# Patient Record
Sex: MALE | Race: WHITE | NOT HISPANIC OR LATINO | Employment: FULL TIME | URBAN - METROPOLITAN AREA
[De-identification: names, ages, dates, MRNs, and addresses within clinical notes are randomized per-mention and may not be internally consistent; named-entity substitution may affect disease eponyms.]

---

## 2022-02-08 ENCOUNTER — OFFICE VISIT (OUTPATIENT)
Dept: FAMILY MEDICINE CLINIC | Facility: CLINIC | Age: 52
End: 2022-02-08
Payer: COMMERCIAL

## 2022-02-08 VITALS
TEMPERATURE: 98 F | WEIGHT: 292.6 LBS | OXYGEN SATURATION: 98 % | BODY MASS INDEX: 39.63 KG/M2 | HEART RATE: 95 BPM | HEIGHT: 72 IN | SYSTOLIC BLOOD PRESSURE: 152 MMHG | DIASTOLIC BLOOD PRESSURE: 100 MMHG

## 2022-02-08 DIAGNOSIS — E66.01 MORBID OBESITY WITH BMI OF 40.0-44.9, ADULT (HCC): ICD-10-CM

## 2022-02-08 DIAGNOSIS — Z12.11 COLON CANCER SCREENING: ICD-10-CM

## 2022-02-08 DIAGNOSIS — R80.9 PROTEINURIA, UNSPECIFIED TYPE: ICD-10-CM

## 2022-02-08 DIAGNOSIS — I10 PRIMARY HYPERTENSION: Primary | ICD-10-CM

## 2022-02-08 DIAGNOSIS — M25.552 CHRONIC PAIN OF BOTH HIPS: ICD-10-CM

## 2022-02-08 DIAGNOSIS — G89.29 CHRONIC PAIN OF BOTH HIPS: ICD-10-CM

## 2022-02-08 DIAGNOSIS — M25.551 CHRONIC PAIN OF BOTH HIPS: ICD-10-CM

## 2022-02-08 DIAGNOSIS — Z80.42 FAMILY HISTORY OF PROSTATE CANCER IN FATHER: ICD-10-CM

## 2022-02-08 PROCEDURE — 1036F TOBACCO NON-USER: CPT | Performed by: STUDENT IN AN ORGANIZED HEALTH CARE EDUCATION/TRAINING PROGRAM

## 2022-02-08 PROCEDURE — 99204 OFFICE O/P NEW MOD 45 MIN: CPT | Performed by: STUDENT IN AN ORGANIZED HEALTH CARE EDUCATION/TRAINING PROGRAM

## 2022-02-08 PROCEDURE — 3008F BODY MASS INDEX DOCD: CPT | Performed by: STUDENT IN AN ORGANIZED HEALTH CARE EDUCATION/TRAINING PROGRAM

## 2022-02-08 PROCEDURE — 3725F SCREEN DEPRESSION PERFORMED: CPT | Performed by: STUDENT IN AN ORGANIZED HEALTH CARE EDUCATION/TRAINING PROGRAM

## 2022-02-08 RX ORDER — NEBULIZER AND COMPRESSOR
EACH MISCELLANEOUS 3 TIMES DAILY
Qty: 1 KIT | Refills: 0 | Status: SHIPPED | OUTPATIENT
Start: 2022-02-08

## 2022-02-08 RX ORDER — LISINOPRIL 10 MG/1
10 TABLET ORAL DAILY
Qty: 45 TABLET | Refills: 0 | Status: SHIPPED | OUTPATIENT
Start: 2022-02-08 | End: 2022-03-21 | Stop reason: SDUPTHER

## 2022-02-08 RX ORDER — NAPROXEN SODIUM 220 MG
220 TABLET ORAL
COMMUNITY

## 2022-02-08 NOTE — PROGRESS NOTES
Assessment/Plan:         Problem List Items Addressed This Visit        Cardiovascular and Mediastinum    Primary hypertension - Primary     Will send in lisinopril as there is protein in his urine  Will have close follow-up         Relevant Medications    lisinopril (ZESTRIL) 10 mg tablet    Blood Pressure Monitoring (Adult Blood Pressure Cuff Lg) KIT    Other Relevant Orders    Lipid panel    TSH, 3rd generation with Free T4 reflex    Comprehensive metabolic panel       Other    Proteinuria     Will repeat urine and kidney function  May be from chronic untreated high blood pressure and also NSAID use  Recommend he cut back on the NSAIDs for this and the blood pressure         Relevant Orders    UA w Reflex to Microscopic w Reflex to Culture - Clinic Collect    Comprehensive metabolic panel    Chronic pain of both hips     Sees Orthopedics and was scheduled for surgery but was canceled due to high blood pressure  Discussed other options take besides NSAIDs given the proteinuria and high blood pressure         Family history of prostate cancer in father    Relevant Orders    PSA, Total Screen      Other Visit Diagnoses     Colon cancer screening        Relevant Orders    Cologuard        Portions of the record may have been created with voice recognition software  Occasional wrong word or "sound a like" substitutions may have occurred due to the inherent limitations of voice recognition software  Read the chart carefully and recognize, using context, where substitutions have occurred  Subjective:      Patient ID: Basil Dalton is a 46 y o  male  HPI    Patient coming in to establish care and discuss high blood pressure  He has chronic hip pain related arthritis and was scheduled to undergo a hip replacement but was canceled due to high blood pressure  He is coming here to be seen and evaluated for this    States he never had any history of high blood pressure but has not been to a doctor in several years  He has actually lost about 15 lb as he has been following a ketogenic diet  He does take multiple NSAIDs today including Motrin, Advil, and Aleve  Denies any chest pain or trouble breathing    The following portions of the patient's history were reviewed and updated as appropriate:   Past Medical History:  He has no past medical history on file ,  _______________________________________________________________________  Medical Problems:  does not have any pertinent problems on file ,  _______________________________________________________________________  Past Surgical History:   has a past surgical history that includes Hernia repair and Tonsillectomy (1976)  ,  _______________________________________________________________________  Family History:  family history includes Cancer in his father and mother; Multiple myeloma in his mother; Prostate cancer in his father ,  _______________________________________________________________________  Social History:   reports that he has never smoked  His smokeless tobacco use includes chew  He reports current alcohol use  He reports that he does not use drugs  ,  _______________________________________________________________________  Allergies:  has No Known Allergies     _______________________________________________________________________  Current Outpatient Medications   Medication Sig Dispense Refill    naproxen sodium (ALEVE) 220 MG tablet Take 220 mg by mouth      Blood Pressure Monitoring (Adult Blood Pressure Cuff Lg) KIT Use 3 (three) times a day 1 kit 0    lisinopril (ZESTRIL) 10 mg tablet Take 1 tablet (10 mg total) by mouth daily 45 tablet 0     No current facility-administered medications for this visit      _______________________________________________________________________  Review of Systems   Constitutional: Negative for chills, fatigue and fever  HENT: Negative for rhinorrhea and sore throat  Eyes: Negative for visual disturbance  Respiratory: Negative for cough and shortness of breath  Cardiovascular: Negative for chest pain and palpitations  Gastrointestinal: Negative for abdominal pain, constipation, diarrhea, nausea and vomiting  Genitourinary: Negative for difficulty urinating, dysuria and frequency  Musculoskeletal: Positive for arthralgias and back pain  Negative for myalgias  Skin: Negative for color change and rash  Neurological: Negative for weakness and headaches  Objective:  Vitals:    02/08/22 1500   BP: 152/100   BP Location: Left arm   Patient Position: Sitting   Cuff Size: Large   Pulse: 95   Temp: 98 °F (36 7 °C)   SpO2: 98%   Weight: 133 kg (292 lb 9 6 oz)   Height: 5' 11 5" (1 816 m)     Body mass index is 40 24 kg/m²  Physical Exam  Constitutional:       General: He is not in acute distress  Appearance: Normal appearance  He is obese  He is not ill-appearing  HENT:      Head: Normocephalic and atraumatic  Right Ear: External ear normal       Left Ear: External ear normal       Nose: Nose normal  No congestion or rhinorrhea  Mouth/Throat:      Mouth: Mucous membranes are moist       Pharynx: Oropharynx is clear  No oropharyngeal exudate or posterior oropharyngeal erythema  Eyes:      Extraocular Movements: Extraocular movements intact  Conjunctiva/sclera: Conjunctivae normal       Pupils: Pupils are equal, round, and reactive to light  Cardiovascular:      Rate and Rhythm: Normal rate and regular rhythm  Pulses: Normal pulses  Heart sounds: No murmur heard  Pulmonary:      Effort: Pulmonary effort is normal  No respiratory distress  Breath sounds: Normal breath sounds  No wheezing  Chest:      Chest wall: No tenderness  Abdominal:      General: Bowel sounds are normal       Palpations: Abdomen is soft  Tenderness: There is no abdominal tenderness  Musculoskeletal:      Cervical back: Normal range of motion  Right hip: Tenderness present  Decreased range of motion  Left hip: Tenderness present  Decreased range of motion  Skin:     General: Skin is warm and dry  Capillary Refill: Capillary refill takes less than 2 seconds  Findings: No rash  Neurological:      General: No focal deficit present  Mental Status: He is alert  Mental status is at baseline  BMI Counseling: Body mass index is 40 24 kg/m²  The BMI is above normal  Nutrition recommendations include reducing portion sizes, 3-5 servings of fruits/vegetables daily, moderation in carbohydrate intake and reducing intake of saturated fat and trans fat  Exercise recommendations include moderate aerobic physical activity for 150 minutes/week, exercising 3-5 times per week and strength training exercises

## 2022-02-08 NOTE — ASSESSMENT & PLAN NOTE
Sees Orthopedics and was scheduled for surgery but was canceled due to high blood pressure    Discussed other options take besides NSAIDs given the proteinuria and high blood pressure

## 2022-02-08 NOTE — ASSESSMENT & PLAN NOTE
Will repeat urine and kidney function  May be from chronic untreated high blood pressure and also NSAID use    Recommend he cut back on the NSAIDs for this and the blood pressure

## 2022-03-21 ENCOUNTER — OFFICE VISIT (OUTPATIENT)
Dept: FAMILY MEDICINE CLINIC | Facility: CLINIC | Age: 52
End: 2022-03-21
Payer: COMMERCIAL

## 2022-03-21 VITALS
HEART RATE: 106 BPM | WEIGHT: 291.4 LBS | BODY MASS INDEX: 39.47 KG/M2 | DIASTOLIC BLOOD PRESSURE: 88 MMHG | TEMPERATURE: 97.7 F | SYSTOLIC BLOOD PRESSURE: 124 MMHG | HEIGHT: 72 IN | OXYGEN SATURATION: 96 %

## 2022-03-21 DIAGNOSIS — G89.29 CHRONIC PAIN OF BOTH HIPS: ICD-10-CM

## 2022-03-21 DIAGNOSIS — R80.9 PROTEINURIA, UNSPECIFIED TYPE: ICD-10-CM

## 2022-03-21 DIAGNOSIS — M25.551 CHRONIC PAIN OF BOTH HIPS: ICD-10-CM

## 2022-03-21 DIAGNOSIS — I10 PRIMARY HYPERTENSION: ICD-10-CM

## 2022-03-21 DIAGNOSIS — M25.552 CHRONIC PAIN OF BOTH HIPS: ICD-10-CM

## 2022-03-21 DIAGNOSIS — Z00.00 ANNUAL PHYSICAL EXAM: Primary | ICD-10-CM

## 2022-03-21 PROCEDURE — 3008F BODY MASS INDEX DOCD: CPT | Performed by: STUDENT IN AN ORGANIZED HEALTH CARE EDUCATION/TRAINING PROGRAM

## 2022-03-21 PROCEDURE — 99396 PREV VISIT EST AGE 40-64: CPT | Performed by: STUDENT IN AN ORGANIZED HEALTH CARE EDUCATION/TRAINING PROGRAM

## 2022-03-21 PROCEDURE — 3725F SCREEN DEPRESSION PERFORMED: CPT | Performed by: STUDENT IN AN ORGANIZED HEALTH CARE EDUCATION/TRAINING PROGRAM

## 2022-03-21 PROCEDURE — 1036F TOBACCO NON-USER: CPT | Performed by: STUDENT IN AN ORGANIZED HEALTH CARE EDUCATION/TRAINING PROGRAM

## 2022-03-21 RX ORDER — LISINOPRIL 10 MG/1
10 TABLET ORAL DAILY
Qty: 90 TABLET | Refills: 1 | Status: SHIPPED | OUTPATIENT
Start: 2022-03-21

## 2022-03-21 NOTE — ASSESSMENT & PLAN NOTE
Much better controlled  Continue lisinopril and he will follow-up labs  Given his blood pressure is much more supple now do recommend he proceed with orthopedic surgery  He will call if he needs a preop for few wants to see another surgeon

## 2022-03-21 NOTE — PROGRESS NOTES
[facility] [id number]    NAME: Lisa Martinez  AGE: 46 y o  SEX: male  : 1970     DATE: 3/21/2022     Assessment and Plan:     Problem List Items Addressed This Visit        Cardiovascular and Mediastinum    Primary hypertension     Much better controlled  Continue lisinopril and he will follow-up labs  Given his blood pressure is much more supple now do recommend he proceed with orthopedic surgery  He will call if he needs a preop for few wants to see another surgeon  Relevant Medications    lisinopril (ZESTRIL) 10 mg tablet       Other    Proteinuria    Chronic pain of both hips     Follow-up orthopedic           Other Visit Diagnoses     Annual physical exam    -  Primary          Immunizations and preventive care screenings were discussed with patient today  Appropriate education was printed on patient's after visit summary  Counseling:  · Exercise: the importance of regular exercise/physical activity was discussed  Recommend exercise 3-5 times per week for at least 30 minutes  Depression Screening and Follow-up Plan: Patient was screened for depression during today's encounter  They screened negative with a PHQ-2 score of 0  Tobacco Cessation Counseling: Tobacco cessation counseling was not provided  The patient is sincerely urged to quit consumption of tobacco  He is not ready to quit tobacco  Not a smoker      Return in about 6 months (around 2022) for Recheck htn  Chief Complaint:     Chief Complaint   Patient presents with    Annual Exam     No concerns       History of Present Illness:     Adult Annual Physical   Patient here for a comprehensive physical exam  The patient reports no problems  Diet and Physical Activity  · Diet/Nutrition: well balanced diet  · Exercise: no formal exercise        Depression Screening  PHQ-2/9 Depression Screening    Little interest or pleasure in doing things: 0 - not at all  Feeling down, depressed, or hopeless: 0 - not at all  PHQ-2 Score: 0  PHQ-2 Interpretation: Negative depression screen       General Health  · Sleep: sleeps well  · Hearing: normal - bilateral   · Vision: no vision problems and goes for regular eye exams  · Dental: regular dental visits   Health  · Symptoms include: none     Review of Systems:     Review of Systems   Constitutional: Negative for chills, fatigue and fever  HENT: Negative for rhinorrhea and sore throat  Eyes: Negative for visual disturbance  Respiratory: Negative for cough and shortness of breath  Cardiovascular: Negative for chest pain and palpitations  Gastrointestinal: Negative for abdominal pain, constipation, diarrhea, nausea and vomiting  Genitourinary: Negative for difficulty urinating, dysuria and frequency  Musculoskeletal: Positive for arthralgias  Negative for myalgias  Skin: Negative for color change and rash  Neurological: Negative for weakness and headaches  Past Medical History:     History reviewed  No pertinent past medical history  Past Surgical History:     Past Surgical History:   Procedure Laterality Date    HERNIA REPAIR      TONSILLECTOMY  1976      Family History:     Family History   Problem Relation Age of Onset    Cancer Mother     Multiple myeloma Mother     Prostate cancer Father     Cancer Father       Social History:     Social History     Socioeconomic History    Marital status: /Civil Union     Spouse name: None    Number of children: None    Years of education: None    Highest education level: None   Occupational History    None   Tobacco Use    Smoking status: Never Smoker    Smokeless tobacco: Current User     Types: Chew   Vaping Use    Vaping Use: Never used   Substance and Sexual Activity    Alcohol use:  Yes    Drug use: Never    Sexual activity: None   Other Topics Concern    None   Social History Narrative    None     Social Determinants of Health     Financial Resource Strain: Not on file   Food Insecurity: Not on file   Transportation Needs: Not on file   Physical Activity: Not on file   Stress: Not on file   Social Connections: Not on file   Intimate Partner Violence: Not on file   Housing Stability: Not on file      Current Medications:     Current Outpatient Medications   Medication Sig Dispense Refill    Blood Pressure Monitoring (Adult Blood Pressure Cuff Lg) KIT Use 3 (three) times a day 1 kit 0    lisinopril (ZESTRIL) 10 mg tablet Take 1 tablet (10 mg total) by mouth daily 90 tablet 1    naproxen sodium (ALEVE) 220 MG tablet Take 220 mg by mouth       No current facility-administered medications for this visit  Allergies:     No Known Allergies   Physical Exam:     /88 (BP Location: Left arm, Patient Position: Sitting, Cuff Size: Large)   Pulse (!) 106   Temp 97 7 °F (36 5 °C)   Ht 5' 11 5" (1 816 m)   Wt 132 kg (291 lb 6 4 oz)   SpO2 96%   BMI 40 08 kg/m²     Physical Exam  Constitutional:       General: He is not in acute distress  Appearance: Normal appearance  He is not ill-appearing  HENT:      Head: Normocephalic and atraumatic  Right Ear: Tympanic membrane, ear canal and external ear normal       Left Ear: Tympanic membrane, ear canal and external ear normal       Nose: Nose normal       Mouth/Throat:      Mouth: Mucous membranes are moist       Pharynx: Oropharynx is clear  No oropharyngeal exudate or posterior oropharyngeal erythema  Eyes:      General: No scleral icterus  Right eye: No discharge  Left eye: No discharge  Extraocular Movements: Extraocular movements intact  Conjunctiva/sclera: Conjunctivae normal       Pupils: Pupils are equal, round, and reactive to light  Cardiovascular:      Rate and Rhythm: Normal rate and regular rhythm  Pulses: Normal pulses  Heart sounds: Normal heart sounds   No murmur heard       Pulmonary:      Effort: Pulmonary effort is normal  No respiratory distress  Breath sounds: Normal breath sounds  Abdominal:      General: Bowel sounds are normal       Palpations: Abdomen is soft  Tenderness: There is no abdominal tenderness  Musculoskeletal:      Cervical back: Normal range of motion and neck supple  Right hip: Crepitus present  Decreased range of motion  Left hip: Crepitus present  Decreased range of motion  Lymphadenopathy:      Cervical: No cervical adenopathy  Skin:     General: Skin is warm and dry  Capillary Refill: Capillary refill takes less than 2 seconds  Neurological:      General: No focal deficit present  Mental Status: He is alert and oriented to person, place, and time  Mental status is at baseline  Cranial Nerves: No cranial nerve deficit  Psychiatric:         Mood and Affect: Mood normal           Cristopher Anderson MD  [address]  BMI Counseling: Body mass index is 40 08 kg/m²  The BMI is above normal  Nutrition recommendations include reducing portion sizes, 3-5 servings of fruits/vegetables daily, consuming healthier snacks, increasing intake of lean protein and reducing intake of saturated fat and trans fat  Exercise recommendations include moderate aerobic physical activity for 150 minutes/week, exercising 3-5 times per week and strength training exercises

## 2022-03-21 NOTE — PATIENT INSTRUCTIONS

## 2022-09-29 DIAGNOSIS — I10 PRIMARY HYPERTENSION: ICD-10-CM

## 2022-10-03 RX ORDER — LISINOPRIL 10 MG/1
TABLET ORAL
Qty: 90 TABLET | Refills: 1 | Status: SHIPPED | OUTPATIENT
Start: 2022-10-03

## 2022-11-21 DIAGNOSIS — I10 PRIMARY HYPERTENSION: ICD-10-CM

## 2022-11-21 RX ORDER — LISINOPRIL 10 MG/1
10 TABLET ORAL DAILY
Qty: 90 TABLET | Refills: 1 | Status: SHIPPED | OUTPATIENT
Start: 2022-11-21

## 2022-11-21 NOTE — TELEPHONE ENCOUNTER
T/c from pt - pt scheduled his 6m appt for 12/20 but it completely out of lisinopril  Requests a refill until scheduled appt  Please advise

## 2023-01-01 ENCOUNTER — APPOINTMENT (INPATIENT)
Dept: RADIOLOGY | Facility: HOSPITAL | Age: 53
DRG: 853 | End: 2023-01-01
Payer: COMMERCIAL

## 2023-01-01 ENCOUNTER — ANESTHESIA EVENT (INPATIENT)
Dept: RADIOLOGY | Facility: HOSPITAL | Age: 53
DRG: 853 | End: 2023-01-01
Payer: COMMERCIAL

## 2023-01-01 ENCOUNTER — ANESTHESIA (INPATIENT)
Dept: RADIOLOGY | Facility: HOSPITAL | Age: 53
DRG: 853 | End: 2023-01-01
Payer: COMMERCIAL

## 2023-01-01 ENCOUNTER — HOSPITAL ENCOUNTER (INPATIENT)
Facility: HOSPITAL | Age: 53
LOS: 7 days | DRG: 853 | End: 2023-10-20
Attending: SURGERY | Admitting: SURGERY
Payer: COMMERCIAL

## 2023-01-01 ENCOUNTER — ANESTHESIA (INPATIENT)
Dept: GASTROENTEROLOGY | Facility: HOSPITAL | Age: 53
DRG: 853 | End: 2023-01-01
Payer: COMMERCIAL

## 2023-01-01 ENCOUNTER — APPOINTMENT (INPATIENT)
Dept: RADIOLOGY | Facility: HOSPITAL | Age: 53
DRG: 853 | End: 2023-01-01
Attending: RADIOLOGY
Payer: COMMERCIAL

## 2023-01-01 ENCOUNTER — TELEPHONE (OUTPATIENT)
Dept: FAMILY MEDICINE CLINIC | Facility: CLINIC | Age: 53
End: 2023-01-01

## 2023-01-01 ENCOUNTER — TELEPHONE (OUTPATIENT)
Age: 53
End: 2023-01-01

## 2023-01-01 ENCOUNTER — APPOINTMENT (INPATIENT)
Dept: GASTROENTEROLOGY | Facility: HOSPITAL | Age: 53
DRG: 853 | End: 2023-01-01
Payer: COMMERCIAL

## 2023-01-01 ENCOUNTER — ANESTHESIA EVENT (INPATIENT)
Dept: GASTROENTEROLOGY | Facility: HOSPITAL | Age: 53
DRG: 853 | End: 2023-01-01
Payer: COMMERCIAL

## 2023-01-01 ENCOUNTER — APPOINTMENT (INPATIENT)
Dept: RADIOLOGY | Facility: HOSPITAL | Age: 53
DRG: 853 | End: 2023-01-01
Attending: INTERNAL MEDICINE
Payer: COMMERCIAL

## 2023-01-01 VITALS
OXYGEN SATURATION: 96 % | WEIGHT: 242.95 LBS | BODY MASS INDEX: 34.01 KG/M2 | SYSTOLIC BLOOD PRESSURE: 73 MMHG | RESPIRATION RATE: 22 BRPM | HEIGHT: 71 IN | TEMPERATURE: 102.9 F | DIASTOLIC BLOOD PRESSURE: 47 MMHG

## 2023-01-01 DIAGNOSIS — D62 ACUTE BLOOD LOSS ANEMIA: ICD-10-CM

## 2023-01-01 DIAGNOSIS — K85.91 NECROTIZING PANCREATITIS: Primary | ICD-10-CM

## 2023-01-01 DIAGNOSIS — K92.2 LOWER GI BLEED: ICD-10-CM

## 2023-01-01 DIAGNOSIS — R78.81 BACTEREMIA: ICD-10-CM

## 2023-01-01 LAB
ABO GROUP BLD BPU: NORMAL
ABO GROUP BLD: NORMAL
ALBUMIN SERPL BCP-MCNC: 2 G/DL (ref 3.5–5)
ALBUMIN SERPL BCP-MCNC: 2.1 G/DL (ref 3.5–5)
ALBUMIN SERPL BCP-MCNC: 2.2 G/DL (ref 3.5–5)
ALBUMIN SERPL BCP-MCNC: 2.9 G/DL (ref 3.5–5)
ALBUMIN SERPL BCP-MCNC: 3 G/DL (ref 3.5–5)
ALBUMIN SERPL BCP-MCNC: 3.1 G/DL (ref 3.5–5)
ALBUMIN SERPL BCP-MCNC: 3.3 G/DL (ref 3.5–5)
ALP SERPL-CCNC: 101 U/L (ref 34–104)
ALP SERPL-CCNC: 103 U/L (ref 34–104)
ALP SERPL-CCNC: 111 U/L (ref 34–104)
ALP SERPL-CCNC: 62 U/L (ref 34–104)
ALP SERPL-CCNC: 65 U/L (ref 34–104)
ALP SERPL-CCNC: 77 U/L (ref 34–104)
ALP SERPL-CCNC: 80 U/L (ref 34–104)
ALP SERPL-CCNC: 97 U/L (ref 34–104)
ALP SERPL-CCNC: 98 U/L (ref 34–104)
ALT SERPL W P-5'-P-CCNC: 10 U/L (ref 7–52)
ALT SERPL W P-5'-P-CCNC: 15 U/L (ref 7–52)
ALT SERPL W P-5'-P-CCNC: 18 U/L (ref 7–52)
ALT SERPL W P-5'-P-CCNC: 20 U/L (ref 7–52)
ALT SERPL W P-5'-P-CCNC: 21 U/L (ref 7–52)
ALT SERPL W P-5'-P-CCNC: 7 U/L (ref 7–52)
ALT SERPL W P-5'-P-CCNC: 9 U/L (ref 7–52)
AMMONIA PLAS-SCNC: <10 UMOL/L (ref 18–72)
ANCILLARY VALUES: ABNORMAL
ANION GAP SERPL CALCULATED.3IONS-SCNC: 10 MMOL/L
ANION GAP SERPL CALCULATED.3IONS-SCNC: 11 MMOL/L
ANION GAP SERPL CALCULATED.3IONS-SCNC: 11 MMOL/L
ANION GAP SERPL CALCULATED.3IONS-SCNC: 13 MMOL/L
ANION GAP SERPL CALCULATED.3IONS-SCNC: 14 MMOL/L
ANION GAP SERPL CALCULATED.3IONS-SCNC: 14 MMOL/L
ANION GAP SERPL CALCULATED.3IONS-SCNC: 19 MMOL/L
ANION GAP SERPL CALCULATED.3IONS-SCNC: 9 MMOL/L
ANISOCYTOSIS BLD QL SMEAR: PRESENT
APTT PPP: 44 SECONDS (ref 23–37)
AST SERPL W P-5'-P-CCNC: 10 U/L (ref 13–39)
AST SERPL W P-5'-P-CCNC: 14 U/L (ref 13–39)
AST SERPL W P-5'-P-CCNC: 15 U/L (ref 13–39)
AST SERPL W P-5'-P-CCNC: 19 U/L (ref 13–39)
AST SERPL W P-5'-P-CCNC: 22 U/L (ref 13–39)
AST SERPL W P-5'-P-CCNC: 23 U/L (ref 13–39)
AST SERPL W P-5'-P-CCNC: 25 U/L (ref 13–39)
AST SERPL W P-5'-P-CCNC: 25 U/L (ref 13–39)
AST SERPL W P-5'-P-CCNC: 41 U/L (ref 13–39)
ATRIAL RATE: 108 BPM
ATRIAL RATE: 132 BPM
ATRIAL RATE: 141 BPM
ATRIAL RATE: 146 BPM
ATRIAL RATE: 146 BPM
BACTERIA SPEC ANAEROBE CULT: ABNORMAL
BACTERIA SPEC BFLD CULT: ABNORMAL
BACTERIA UR QL AUTO: ABNORMAL /HPF
BASE EX.OXY STD BLDV CALC-SCNC: 73.6 % (ref 60–80)
BASE EXCESS BLDA CALC-SCNC: -10 MMOL/L (ref -2–3)
BASE EXCESS BLDA CALC-SCNC: -11 MMOL/L (ref -2–3)
BASE EXCESS BLDA CALC-SCNC: -3 MMOL/L (ref -2–3)
BASE EXCESS BLDA CALC-SCNC: -3.7 MMOL/L
BASE EXCESS BLDA CALC-SCNC: -4 MMOL/L (ref -2–3)
BASE EXCESS BLDA CALC-SCNC: -5 MMOL/L (ref -2–3)
BASE EXCESS BLDA CALC-SCNC: -5.3 MMOL/L
BASE EXCESS BLDA CALC-SCNC: -5.4 MMOL/L
BASE EXCESS BLDA CALC-SCNC: -5.5 MMOL/L
BASE EXCESS BLDA CALC-SCNC: -6 MMOL/L (ref -2–3)
BASE EXCESS BLDA CALC-SCNC: -6.8 MMOL/L
BASE EXCESS BLDA CALC-SCNC: -7 MMOL/L (ref -2–3)
BASE EXCESS BLDA CALC-SCNC: -7.1 MMOL/L
BASE EXCESS BLDV CALC-SCNC: -1.5 MMOL/L
BASOPHILS # BLD AUTO: 0.19 THOUSANDS/ÂΜL (ref 0–0.1)
BASOPHILS # BLD MANUAL: 0 THOUSAND/UL (ref 0–0.1)
BASOPHILS NFR BLD AUTO: 1 % (ref 0–1)
BASOPHILS NFR MAR MANUAL: 0 % (ref 0–1)
BILIRUB DIRECT SERPL-MCNC: 0.34 MG/DL (ref 0–0.2)
BILIRUB SERPL-MCNC: 0.48 MG/DL (ref 0.2–1)
BILIRUB SERPL-MCNC: 0.68 MG/DL (ref 0.2–1)
BILIRUB SERPL-MCNC: 0.81 MG/DL (ref 0.2–1)
BILIRUB SERPL-MCNC: 1.31 MG/DL (ref 0.2–1)
BILIRUB SERPL-MCNC: 3.05 MG/DL (ref 0.2–1)
BILIRUB SERPL-MCNC: 4.25 MG/DL (ref 0.2–1)
BILIRUB SERPL-MCNC: 5.35 MG/DL (ref 0.2–1)
BILIRUB UR QL STRIP: NEGATIVE
BLD GP AB SCN SERPL QL: NEGATIVE
BPU ID: NORMAL
BUN SERPL-MCNC: 16 MG/DL (ref 5–25)
BUN SERPL-MCNC: 16 MG/DL (ref 5–25)
BUN SERPL-MCNC: 17 MG/DL (ref 5–25)
BUN SERPL-MCNC: 17 MG/DL (ref 5–25)
BUN SERPL-MCNC: 19 MG/DL (ref 5–25)
BUN SERPL-MCNC: 20 MG/DL (ref 5–25)
BUN SERPL-MCNC: 21 MG/DL (ref 5–25)
BUN SERPL-MCNC: 23 MG/DL (ref 5–25)
BUN SERPL-MCNC: 24 MG/DL (ref 5–25)
BUN SERPL-MCNC: 26 MG/DL (ref 5–25)
BUN SERPL-MCNC: 28 MG/DL (ref 5–25)
BUN SERPL-MCNC: 29 MG/DL (ref 5–25)
CA-I BLD-SCNC: 0.7 MMOL/L (ref 1.12–1.32)
CA-I BLD-SCNC: 0.78 MMOL/L (ref 1.12–1.32)
CA-I BLD-SCNC: 0.99 MMOL/L (ref 1.12–1.32)
CA-I BLD-SCNC: 1 MMOL/L (ref 1.12–1.32)
CA-I BLD-SCNC: 1.08 MMOL/L (ref 1.12–1.32)
CA-I BLD-SCNC: 1.09 MMOL/L (ref 1.12–1.32)
CA-I BLD-SCNC: 1.1 MMOL/L (ref 1.12–1.32)
CA-I BLD-SCNC: 1.1 MMOL/L (ref 1.12–1.32)
CA-I BLD-SCNC: 1.12 MMOL/L (ref 1.12–1.32)
CA-I BLD-SCNC: 1.13 MMOL/L (ref 1.12–1.32)
CA-I BLD-SCNC: 1.15 MMOL/L (ref 1.12–1.32)
CA-I BLD-SCNC: 1.19 MMOL/L (ref 1.12–1.32)
CA-I BLD-SCNC: 1.25 MMOL/L (ref 1.12–1.32)
CALCIUM ALBUM COR SERPL-MCNC: 10 MG/DL (ref 8.3–10.1)
CALCIUM ALBUM COR SERPL-MCNC: 10.1 MG/DL (ref 8.3–10.1)
CALCIUM ALBUM COR SERPL-MCNC: 10.5 MG/DL (ref 8.3–10.1)
CALCIUM ALBUM COR SERPL-MCNC: 8.6 MG/DL (ref 8.3–10.1)
CALCIUM ALBUM COR SERPL-MCNC: 8.8 MG/DL (ref 8.3–10.1)
CALCIUM ALBUM COR SERPL-MCNC: 8.9 MG/DL (ref 8.3–10.1)
CALCIUM ALBUM COR SERPL-MCNC: 9.1 MG/DL (ref 8.3–10.1)
CALCIUM ALBUM COR SERPL-MCNC: 9.1 MG/DL (ref 8.3–10.1)
CALCIUM SERPL-MCNC: 6.9 MG/DL (ref 8.4–10.2)
CALCIUM SERPL-MCNC: 7.1 MG/DL (ref 8.4–10.2)
CALCIUM SERPL-MCNC: 7.2 MG/DL (ref 8.4–10.2)
CALCIUM SERPL-MCNC: 7.6 MG/DL (ref 8.4–10.2)
CALCIUM SERPL-MCNC: 7.6 MG/DL (ref 8.4–10.2)
CALCIUM SERPL-MCNC: 7.7 MG/DL (ref 8.4–10.2)
CALCIUM SERPL-MCNC: 7.7 MG/DL (ref 8.4–10.2)
CALCIUM SERPL-MCNC: 8.1 MG/DL (ref 8.4–10.2)
CALCIUM SERPL-MCNC: 8.1 MG/DL (ref 8.4–10.2)
CALCIUM SERPL-MCNC: 9.2 MG/DL (ref 8.4–10.2)
CALCIUM SERPL-MCNC: 9.3 MG/DL (ref 8.4–10.2)
CALCIUM SERPL-MCNC: 9.9 MG/DL (ref 8.4–10.2)
CFFFLEV: 450.7 MG/DL (ref 278–581)
CFFMA (FUNCTIONAL FIBRINOGEN MAX AMPLITUDE): 19.9 MM (ref 15–32)
CFFMA (FUNCTIONAL FIBRINOGEN MAX AMPLITUDE): 21.8 MM (ref 15–32)
CFFMA (FUNCTIONAL FIBRINOGEN MAX AMPLITUDE): 24.7 MM (ref 15–32)
CFFMA (FUNCTIONAL FIBRINOGEN MAX AMPLITUDE): 32.4 MM (ref 15–32)
CFFMA (FUNCTIONAL FIBRINOGEN MAX AMPLITUDE): >52 MM (ref 15–32)
CHLORIDE SERPL-SCNC: 101 MMOL/L (ref 96–108)
CHLORIDE SERPL-SCNC: 102 MMOL/L (ref 96–108)
CHLORIDE SERPL-SCNC: 103 MMOL/L (ref 96–108)
CHLORIDE SERPL-SCNC: 104 MMOL/L (ref 96–108)
CHLORIDE SERPL-SCNC: 107 MMOL/L (ref 96–108)
CHLORIDE SERPL-SCNC: 108 MMOL/L (ref 96–108)
CHLORIDE SERPL-SCNC: 108 MMOL/L (ref 96–108)
CHLORIDE SERPL-SCNC: 109 MMOL/L (ref 96–108)
CHLORIDE SERPL-SCNC: 96 MMOL/L (ref 96–108)
CHLORIDE SERPL-SCNC: 96 MMOL/L (ref 96–108)
CHLORIDE SERPL-SCNC: 97 MMOL/L (ref 96–108)
CHLORIDE SERPL-SCNC: 97 MMOL/L (ref 96–108)
CKA(ANGLE): 75.5 DEG (ref 63–78)
CKA(ANGLE): 79.2 DEG (ref 63–78)
CKHR(HEPARINASE REACTION TIME): 3.3 MIN (ref 4.3–8.3)
CKHR(HEPARINASE REACTION TIME): 6.1 MIN (ref 4.3–8.3)
CKK(CLOT KINETICS): 0.9 MIN (ref 0.8–2.1)
CKK(CLOT KINETICS): 1 MIN (ref 0.8–2.1)
CKLY30: 0 % (ref 0–2.6)
CKLY30: 0 % (ref 0–2.6)
CKMA(MAX AMPLITUDE): 62 MM (ref 52–69)
CKMA(MAX AMPLITUDE): 71.2 MM (ref 52–69)
CKR(REACTION TIME): 3.4 MIN (ref 4.6–9.1)
CKR(REACTION TIME): 3.5 MIN (ref 4.6–9.1)
CKR(REACTION TIME): 3.6 MIN (ref 4.6–9.1)
CKR(REACTION TIME): 4.6 MIN (ref 4.6–9.1)
CKR(REACTION TIME): 5.3 MIN (ref 4.6–9.1)
CLARITY UR: ABNORMAL
CO2 SERPL-SCNC: 18 MMOL/L (ref 21–32)
CO2 SERPL-SCNC: 20 MMOL/L (ref 21–32)
CO2 SERPL-SCNC: 21 MMOL/L (ref 21–32)
CO2 SERPL-SCNC: 22 MMOL/L (ref 21–32)
CO2 SERPL-SCNC: 23 MMOL/L (ref 21–32)
CO2 SERPL-SCNC: 24 MMOL/L (ref 21–32)
CO2 SERPL-SCNC: 24 MMOL/L (ref 21–32)
CO2 SERPL-SCNC: 25 MMOL/L (ref 21–32)
COLOR UR: YELLOW
CREAT SERPL-MCNC: 0.79 MG/DL (ref 0.6–1.3)
CREAT SERPL-MCNC: 0.83 MG/DL (ref 0.6–1.3)
CREAT SERPL-MCNC: 0.85 MG/DL (ref 0.6–1.3)
CREAT SERPL-MCNC: 0.9 MG/DL (ref 0.6–1.3)
CREAT SERPL-MCNC: 0.94 MG/DL (ref 0.6–1.3)
CREAT SERPL-MCNC: 0.95 MG/DL (ref 0.6–1.3)
CREAT SERPL-MCNC: 0.96 MG/DL (ref 0.6–1.3)
CREAT SERPL-MCNC: 0.97 MG/DL (ref 0.6–1.3)
CREAT SERPL-MCNC: 0.99 MG/DL (ref 0.6–1.3)
CREAT SERPL-MCNC: 1.01 MG/DL (ref 0.6–1.3)
CREAT SERPL-MCNC: 1.09 MG/DL (ref 0.6–1.3)
CREAT SERPL-MCNC: 1.11 MG/DL (ref 0.6–1.3)
CROSSMATCH: NORMAL
CRTMA(RAPIDTEG MAX AMPLITUDE): 54.7 MM (ref 52–70)
CRTMA(RAPIDTEG MAX AMPLITUDE): 59.5 MM (ref 52–70)
CRTMA(RAPIDTEG MAX AMPLITUDE): 60.6 MM (ref 52–70)
CRTMA(RAPIDTEG MAX AMPLITUDE): 69.2 MM (ref 52–70)
CRTMA(RAPIDTEG MAX AMPLITUDE): 70.8 MM (ref 52–70)
DS:DELIVERY SYSTEM: AC
EOSINOPHIL # BLD AUTO: 0 THOUSAND/ÂΜL (ref 0–0.61)
EOSINOPHIL # BLD MANUAL: 0 THOUSAND/UL (ref 0–0.4)
EOSINOPHIL NFR BLD AUTO: 0 % (ref 0–6)
EOSINOPHIL NFR BLD MANUAL: 0 % (ref 0–6)
ERYTHROCYTE [DISTWIDTH] IN BLOOD BY AUTOMATED COUNT: 15.1 % (ref 11.6–15.1)
ERYTHROCYTE [DISTWIDTH] IN BLOOD BY AUTOMATED COUNT: 15.2 % (ref 11.6–15.1)
ERYTHROCYTE [DISTWIDTH] IN BLOOD BY AUTOMATED COUNT: 15.9 % (ref 11.6–15.1)
ERYTHROCYTE [DISTWIDTH] IN BLOOD BY AUTOMATED COUNT: 16.8 % (ref 11.6–15.1)
ERYTHROCYTE [DISTWIDTH] IN BLOOD BY AUTOMATED COUNT: 17.1 % (ref 11.6–15.1)
ERYTHROCYTE [DISTWIDTH] IN BLOOD BY AUTOMATED COUNT: 17.7 % (ref 11.6–15.1)
ERYTHROCYTE [DISTWIDTH] IN BLOOD BY AUTOMATED COUNT: 17.8 % (ref 11.6–15.1)
ERYTHROCYTE [DISTWIDTH] IN BLOOD BY AUTOMATED COUNT: 18.3 % (ref 11.6–15.1)
ERYTHROCYTE [DISTWIDTH] IN BLOOD BY AUTOMATED COUNT: 19 % (ref 11.6–15.1)
ERYTHROCYTE [DISTWIDTH] IN BLOOD BY AUTOMATED COUNT: 19 % (ref 11.6–15.1)
FIO2 GAS DIL.REBREATH: 100 L
FIO2 GAS DIL.REBREATH: 40 L
GFR SERPL CREATININE-BSD FRML MDRD: 100 ML/MIN/1.73SQ M
GFR SERPL CREATININE-BSD FRML MDRD: 102 ML/MIN/1.73SQ M
GFR SERPL CREATININE-BSD FRML MDRD: 75 ML/MIN/1.73SQ M
GFR SERPL CREATININE-BSD FRML MDRD: 77 ML/MIN/1.73SQ M
GFR SERPL CREATININE-BSD FRML MDRD: 84 ML/MIN/1.73SQ M
GFR SERPL CREATININE-BSD FRML MDRD: 86 ML/MIN/1.73SQ M
GFR SERPL CREATININE-BSD FRML MDRD: 88 ML/MIN/1.73SQ M
GFR SERPL CREATININE-BSD FRML MDRD: 89 ML/MIN/1.73SQ M
GFR SERPL CREATININE-BSD FRML MDRD: 91 ML/MIN/1.73SQ M
GFR SERPL CREATININE-BSD FRML MDRD: 92 ML/MIN/1.73SQ M
GFR SERPL CREATININE-BSD FRML MDRD: 97 ML/MIN/1.73SQ M
GFR SERPL CREATININE-BSD FRML MDRD: 99 ML/MIN/1.73SQ M
GLUCOSE SERPL-MCNC: 102 MG/DL (ref 65–140)
GLUCOSE SERPL-MCNC: 104 MG/DL (ref 65–140)
GLUCOSE SERPL-MCNC: 106 MG/DL (ref 65–140)
GLUCOSE SERPL-MCNC: 109 MG/DL (ref 65–140)
GLUCOSE SERPL-MCNC: 109 MG/DL (ref 65–140)
GLUCOSE SERPL-MCNC: 110 MG/DL (ref 65–140)
GLUCOSE SERPL-MCNC: 112 MG/DL (ref 65–140)
GLUCOSE SERPL-MCNC: 113 MG/DL (ref 65–140)
GLUCOSE SERPL-MCNC: 114 MG/DL (ref 65–140)
GLUCOSE SERPL-MCNC: 115 MG/DL (ref 65–140)
GLUCOSE SERPL-MCNC: 117 MG/DL (ref 65–140)
GLUCOSE SERPL-MCNC: 118 MG/DL (ref 65–140)
GLUCOSE SERPL-MCNC: 121 MG/DL (ref 65–140)
GLUCOSE SERPL-MCNC: 123 MG/DL (ref 65–140)
GLUCOSE SERPL-MCNC: 124 MG/DL (ref 65–140)
GLUCOSE SERPL-MCNC: 125 MG/DL (ref 65–140)
GLUCOSE SERPL-MCNC: 127 MG/DL (ref 65–140)
GLUCOSE SERPL-MCNC: 131 MG/DL (ref 65–140)
GLUCOSE SERPL-MCNC: 134 MG/DL (ref 65–140)
GLUCOSE SERPL-MCNC: 137 MG/DL (ref 65–140)
GLUCOSE SERPL-MCNC: 140 MG/DL (ref 65–140)
GLUCOSE SERPL-MCNC: 143 MG/DL (ref 65–140)
GLUCOSE SERPL-MCNC: 144 MG/DL (ref 65–140)
GLUCOSE SERPL-MCNC: 146 MG/DL (ref 65–140)
GLUCOSE SERPL-MCNC: 151 MG/DL (ref 65–140)
GLUCOSE SERPL-MCNC: 155 MG/DL (ref 65–140)
GLUCOSE SERPL-MCNC: 158 MG/DL (ref 65–140)
GLUCOSE SERPL-MCNC: 161 MG/DL (ref 65–140)
GLUCOSE SERPL-MCNC: 162 MG/DL (ref 65–140)
GLUCOSE SERPL-MCNC: 165 MG/DL (ref 65–140)
GLUCOSE SERPL-MCNC: 177 MG/DL (ref 65–140)
GLUCOSE SERPL-MCNC: 177 MG/DL (ref 65–140)
GLUCOSE SERPL-MCNC: 180 MG/DL (ref 65–140)
GLUCOSE SERPL-MCNC: 181 MG/DL (ref 65–140)
GLUCOSE SERPL-MCNC: 185 MG/DL (ref 65–140)
GLUCOSE SERPL-MCNC: 186 MG/DL (ref 65–140)
GLUCOSE SERPL-MCNC: 189 MG/DL (ref 65–140)
GLUCOSE SERPL-MCNC: 191 MG/DL (ref 65–140)
GLUCOSE SERPL-MCNC: 208 MG/DL (ref 65–140)
GLUCOSE SERPL-MCNC: 208 MG/DL (ref 65–140)
GLUCOSE SERPL-MCNC: 211 MG/DL (ref 65–140)
GLUCOSE SERPL-MCNC: 85 MG/DL (ref 65–140)
GLUCOSE SERPL-MCNC: 96 MG/DL (ref 65–140)
GLUCOSE UR STRIP-MCNC: NEGATIVE MG/DL
GRAM STN SPEC: ABNORMAL
HCO3 BLDA-SCNC: 17.6 MMOL/L (ref 22–28)
HCO3 BLDA-SCNC: 18 MMOL/L (ref 22–28)
HCO3 BLDA-SCNC: 18.1 MMOL/L (ref 22–28)
HCO3 BLDA-SCNC: 18.6 MMOL/L (ref 24–30)
HCO3 BLDA-SCNC: 18.8 MMOL/L (ref 22–28)
HCO3 BLDA-SCNC: 18.9 MMOL/L (ref 22–28)
HCO3 BLDA-SCNC: 19.1 MMOL/L (ref 22–28)
HCO3 BLDA-SCNC: 19.2 MMOL/L (ref 22–28)
HCO3 BLDA-SCNC: 19.8 MMOL/L (ref 24–30)
HCO3 BLDA-SCNC: 20.2 MMOL/L (ref 22–28)
HCO3 BLDA-SCNC: 20.7 MMOL/L (ref 22–28)
HCO3 BLDA-SCNC: 21.3 MMOL/L (ref 22–28)
HCO3 BLDA-SCNC: 21.9 MMOL/L (ref 22–28)
HCO3 BLDA-SCNC: 22.2 MMOL/L (ref 22–28)
HCO3 BLDA-SCNC: 22.7 MMOL/L (ref 22–28)
HCO3 BLDV-SCNC: 24.2 MMOL/L (ref 24–30)
HCT VFR BLD AUTO: 21.9 % (ref 36.5–49.3)
HCT VFR BLD AUTO: 22.3 % (ref 36.5–49.3)
HCT VFR BLD AUTO: 22.7 % (ref 36.5–49.3)
HCT VFR BLD AUTO: 22.7 % (ref 36.5–49.3)
HCT VFR BLD AUTO: 24.7 % (ref 36.5–49.3)
HCT VFR BLD AUTO: 24.9 % (ref 36.5–49.3)
HCT VFR BLD AUTO: 25.1 % (ref 36.5–49.3)
HCT VFR BLD AUTO: 25.1 % (ref 36.5–49.3)
HCT VFR BLD AUTO: 26.1 % (ref 36.5–49.3)
HCT VFR BLD AUTO: 30.7 % (ref 36.5–49.3)
HCT VFR BLD AUTO: 30.8 % (ref 36.5–49.3)
HCT VFR BLD AUTO: 40.1 % (ref 36.5–49.3)
HCT VFR BLD CALC: 24 % (ref 36.5–49.3)
HCT VFR BLD CALC: 25 % (ref 36.5–49.3)
HCT VFR BLD CALC: 27 % (ref 36.5–49.3)
HCT VFR BLD CALC: 29 % (ref 36.5–49.3)
HCT VFR BLD CALC: 30 % (ref 36.5–49.3)
HCT VFR BLD CALC: 33 % (ref 36.5–49.3)
HCT VFR BLD CALC: 45 % (ref 36.5–49.3)
HGB BLD-MCNC: 10.3 G/DL (ref 12–17)
HGB BLD-MCNC: 10.5 G/DL (ref 12–17)
HGB BLD-MCNC: 14.3 G/DL (ref 12–17)
HGB BLD-MCNC: 6.8 G/DL (ref 12–17)
HGB BLD-MCNC: 7 G/DL (ref 12–17)
HGB BLD-MCNC: 7.1 G/DL (ref 12–17)
HGB BLD-MCNC: 7.6 G/DL (ref 12–17)
HGB BLD-MCNC: 7.7 G/DL (ref 12–17)
HGB BLD-MCNC: 7.8 G/DL (ref 12–17)
HGB BLD-MCNC: 8 G/DL (ref 12–17)
HGB BLD-MCNC: 8.1 G/DL (ref 12–17)
HGB BLD-MCNC: 8.2 G/DL (ref 12–17)
HGB BLD-MCNC: 8.4 G/DL (ref 12–17)
HGB BLD-MCNC: 8.5 G/DL (ref 12–17)
HGB BLD-MCNC: 8.7 G/DL (ref 12–17)
HGB BLD-MCNC: 8.8 G/DL (ref 12–17)
HGB BLDA-MCNC: 10.2 G/DL (ref 12–17)
HGB BLDA-MCNC: 11.2 G/DL (ref 12–17)
HGB BLDA-MCNC: 15.3 G/DL (ref 12–17)
HGB BLDA-MCNC: 8.2 G/DL (ref 12–17)
HGB BLDA-MCNC: 8.5 G/DL (ref 12–17)
HGB BLDA-MCNC: 9.2 G/DL (ref 12–17)
HGB BLDA-MCNC: 9.9 G/DL (ref 12–17)
HGB UR QL STRIP.AUTO: NEGATIVE
HYALINE CASTS #/AREA URNS LPF: ABNORMAL /LPF
IMM GRANULOCYTES # BLD AUTO: 0.26 THOUSAND/UL (ref 0–0.2)
IMM GRANULOCYTES NFR BLD AUTO: 1 % (ref 0–2)
INR PPP: 1.39 (ref 0.84–1.19)
INR PPP: 1.58 (ref 0.84–1.19)
KETONES UR STRIP-MCNC: ABNORMAL MG/DL
LACTATE SERPL-SCNC: 1.7 MMOL/L (ref 0.5–2)
LACTATE SERPL-SCNC: 3.3 MMOL/L (ref 0.5–2)
LACTATE SERPL-SCNC: 4.9 MMOL/L (ref 0.5–2)
LACTATE SERPL-SCNC: 5.5 MMOL/L (ref 0.5–2)
LEUKOCYTE ESTERASE UR QL STRIP: NEGATIVE
LYMPHOCYTES # BLD AUTO: 0.44 THOUSAND/UL (ref 0.6–4.47)
LYMPHOCYTES # BLD AUTO: 0.48 THOUSANDS/ÂΜL (ref 0.6–4.47)
LYMPHOCYTES # BLD AUTO: 2 % (ref 14–44)
LYMPHOCYTES NFR BLD AUTO: 2 % (ref 14–44)
MAGNESIUM SERPL-MCNC: 1.6 MG/DL (ref 1.9–2.7)
MAGNESIUM SERPL-MCNC: 1.9 MG/DL (ref 1.9–2.7)
MAGNESIUM SERPL-MCNC: 1.9 MG/DL (ref 1.9–2.7)
MAGNESIUM SERPL-MCNC: 2.1 MG/DL (ref 1.9–2.7)
MAGNESIUM SERPL-MCNC: 2.2 MG/DL (ref 1.9–2.7)
MAGNESIUM SERPL-MCNC: 2.3 MG/DL (ref 1.9–2.7)
MCH RBC QN AUTO: 25.6 PG (ref 26.8–34.3)
MCH RBC QN AUTO: 26 PG (ref 26.8–34.3)
MCH RBC QN AUTO: 28.1 PG (ref 26.8–34.3)
MCH RBC QN AUTO: 28.6 PG (ref 26.8–34.3)
MCH RBC QN AUTO: 28.8 PG (ref 26.8–34.3)
MCH RBC QN AUTO: 29 PG (ref 26.8–34.3)
MCH RBC QN AUTO: 29.1 PG (ref 26.8–34.3)
MCH RBC QN AUTO: 30.4 PG (ref 26.8–34.3)
MCH RBC QN AUTO: 30.6 PG (ref 26.8–34.3)
MCH RBC QN AUTO: 31.1 PG (ref 26.8–34.3)
MCHC RBC AUTO-ENTMCNC: 29.5 G/DL (ref 31.4–37.4)
MCHC RBC AUTO-ENTMCNC: 30 G/DL (ref 31.4–37.4)
MCHC RBC AUTO-ENTMCNC: 31.9 G/DL (ref 31.4–37.4)
MCHC RBC AUTO-ENTMCNC: 32.8 G/DL (ref 31.4–37.4)
MCHC RBC AUTO-ENTMCNC: 32.9 G/DL (ref 31.4–37.4)
MCHC RBC AUTO-ENTMCNC: 33.4 G/DL (ref 31.4–37.4)
MCHC RBC AUTO-ENTMCNC: 33.5 G/DL (ref 31.4–37.4)
MCHC RBC AUTO-ENTMCNC: 33.7 G/DL (ref 31.4–37.4)
MCHC RBC AUTO-ENTMCNC: 34.2 G/DL (ref 31.4–37.4)
MCHC RBC AUTO-ENTMCNC: 35.7 G/DL (ref 31.4–37.4)
MCV RBC AUTO: 85 FL (ref 82–98)
MCV RBC AUTO: 87 FL (ref 82–98)
MCV RBC AUTO: 88 FL (ref 82–98)
MCV RBC AUTO: 89 FL (ref 82–98)
MCV RBC AUTO: 91 FL (ref 82–98)
MONOCYTES # BLD AUTO: 0.56 THOUSAND/ÂΜL (ref 0.17–1.22)
MONOCYTES # BLD AUTO: 0.65 THOUSAND/UL (ref 0–1.22)
MONOCYTES NFR BLD AUTO: 2 % (ref 4–12)
MONOCYTES NFR BLD: 3 % (ref 4–12)
NASAL CANNULA: 6
NEUTROPHILS # BLD AUTO: 23.12 THOUSANDS/ÂΜL (ref 1.85–7.62)
NEUTROPHILS # BLD MANUAL: 20.7 THOUSAND/UL (ref 1.85–7.62)
NEUTS BAND NFR BLD MANUAL: 7 % (ref 0–8)
NEUTS SEG NFR BLD AUTO: 88 % (ref 43–75)
NEUTS SEG NFR BLD AUTO: 94 % (ref 43–75)
NITRITE UR QL STRIP: NEGATIVE
NON-SQ EPI CELLS URNS QL MICRO: ABNORMAL /HPF
NRBC BLD AUTO-RTO: 0 /100 WBCS
O2 CT BLDA-SCNC: 15.3 ML/DL (ref 16–23)
O2 CT BLDA-SCNC: 15.8 ML/DL (ref 16–23)
O2 CT BLDA-SCNC: 16.3 ML/DL (ref 16–23)
O2 CT BLDA-SCNC: 20.1 ML/DL (ref 16–23)
O2 CT BLDA-SCNC: 20.9 ML/DL (ref 16–23)
O2 CT BLDA-SCNC: 20.9 ML/DL (ref 16–23)
O2 CT BLDV-SCNC: 8.4 ML/DL
OXYHGB MFR BLDA: 93.1 % (ref 94–97)
OXYHGB MFR BLDA: 95.9 % (ref 94–97)
OXYHGB MFR BLDA: 96.7 % (ref 94–97)
OXYHGB MFR BLDA: 96.8 % (ref 94–97)
OXYHGB MFR BLDA: 98 % (ref 94–97)
OXYHGB MFR BLDA: 98.1 % (ref 94–97)
P AXIS: 33 DEGREES
P AXIS: 36 DEGREES
P AXIS: 44 DEGREES
P AXIS: 51 DEGREES
P AXIS: 56 DEGREES
PCO2 BLD: 19 MMOL/L (ref 21–32)
PCO2 BLD: 19 MMOL/L (ref 21–32)
PCO2 BLD: 20 MMOL/L (ref 21–32)
PCO2 BLD: 21 MMOL/L (ref 21–32)
PCO2 BLD: 22 MMOL/L (ref 21–32)
PCO2 BLD: 24 MMOL/L (ref 21–32)
PCO2 BLD: 24 MMOL/L (ref 21–32)
PCO2 BLD: 25.2 MM HG (ref 42–50)
PCO2 BLD: 34.3 MM HG (ref 42–50)
PCO2 BLD: 38.2 MM HG (ref 36–44)
PCO2 BLD: 42.7 MM HG (ref 36–44)
PCO2 BLD: 42.9 MM HG (ref 36–44)
PCO2 BLD: 45.9 MM HG (ref 36–44)
PCO2 BLD: 46.1 MM HG (ref 36–44)
PCO2 BLD: 48.2 MM HG (ref 36–44)
PCO2 BLD: 59 MM HG (ref 36–44)
PCO2 BLD: 78 MM HG
PCO2 BLDA: 28.8 MM HG (ref 36–44)
PCO2 BLDA: 31.5 MM HG (ref 36–44)
PCO2 BLDA: 38.6 MM HG (ref 36–44)
PCO2 BLDA: 41 MM HG (ref 36–44)
PCO2 BLDA: 43.5 MM HG (ref 36–44)
PCO2 BLDA: 46.1 MM HG (ref 36–44)
PCO2 BLDA: 46.2 MM HG
PCO2 BLDV: 45.5 MM HG (ref 42–50)
PH BLD: 7.17 [PH] (ref 7.35–7.45)
PH BLD: 7.18 [PH] (ref 7.35–7.45)
PH BLD: 7.2 [PH] (ref 7.35–7.45)
PH BLD: 7.26 [PH] (ref 7.35–7.45)
PH BLD: 7.28 [PH] (ref 7.35–7.45)
PH BLD: 7.3 [PH]
PH BLD: 7.3 [PH] (ref 7.35–7.45)
PH BLD: 7.34 [PH] (ref 7.35–7.45)
PH BLD: 7.34 [PH] (ref 7.3–7.4)
PH BLD: 7.5 [PH] (ref 7.3–7.4)
PH BLDA: 7.28 [PH] (ref 7.35–7.45)
PH BLDA: 7.29 [PH] (ref 7.35–7.45)
PH BLDA: 7.31 [PH] (ref 7.35–7.45)
PH BLDA: 7.33 [PH] (ref 7.35–7.45)
PH BLDA: 7.39 [PH] (ref 7.35–7.45)
PH BLDA: 7.41 [PH] (ref 7.35–7.45)
PH BLDV: 7.34 [PH] (ref 7.3–7.4)
PH UR STRIP.AUTO: 6 [PH]
PHOSPHATE SERPL-MCNC: 3.5 MG/DL (ref 2.7–4.5)
PHOSPHATE SERPL-MCNC: 3.8 MG/DL (ref 2.7–4.5)
PHOSPHATE SERPL-MCNC: 3.8 MG/DL (ref 2.7–4.5)
PHOSPHATE SERPL-MCNC: 5.4 MG/DL (ref 2.7–4.5)
PHOSPHATE SERPL-MCNC: 6 MG/DL (ref 2.7–4.5)
PLATELET # BLD AUTO: 101 THOUSANDS/UL (ref 149–390)
PLATELET # BLD AUTO: 130 THOUSANDS/UL (ref 149–390)
PLATELET # BLD AUTO: 143 THOUSANDS/UL (ref 149–390)
PLATELET # BLD AUTO: 262 THOUSANDS/UL (ref 149–390)
PLATELET # BLD AUTO: 312 THOUSANDS/UL (ref 149–390)
PLATELET # BLD AUTO: 341 THOUSANDS/UL (ref 149–390)
PLATELET # BLD AUTO: 351 THOUSANDS/UL (ref 149–390)
PLATELET # BLD AUTO: 393 THOUSANDS/UL (ref 149–390)
PLATELET # BLD AUTO: 407 THOUSANDS/UL (ref 149–390)
PLATELET # BLD AUTO: 423 THOUSANDS/UL (ref 149–390)
PLATELET # BLD AUTO: 490 THOUSANDS/UL (ref 149–390)
PLATELET BLD QL SMEAR: ADEQUATE
PMV BLD AUTO: 8.5 FL (ref 8.9–12.7)
PMV BLD AUTO: 8.6 FL (ref 8.9–12.7)
PMV BLD AUTO: 8.7 FL (ref 8.9–12.7)
PMV BLD AUTO: 9 FL (ref 8.9–12.7)
PMV BLD AUTO: 9.1 FL (ref 8.9–12.7)
PMV BLD AUTO: 9.2 FL (ref 8.9–12.7)
PMV BLD AUTO: 9.3 FL (ref 8.9–12.7)
PMV BLD AUTO: 9.3 FL (ref 8.9–12.7)
PMV BLD AUTO: 9.4 FL (ref 8.9–12.7)
PMV BLD AUTO: 9.8 FL (ref 8.9–12.7)
PMV BLD AUTO: 9.8 FL (ref 8.9–12.7)
PO2 BLD: 107 MM HG (ref 75–129)
PO2 BLD: 22 MM HG (ref 35–45)
PO2 BLD: 290 MM HG (ref 75–129)
PO2 BLD: 352 MM HG (ref 75–129)
PO2 BLD: 382 MM HG (ref 75–129)
PO2 BLD: 78 MM HG (ref 75–129)
PO2 BLD: 92 MM HG (ref 35–45)
PO2 BLD: 94 MM HG (ref 75–129)
PO2 BLD: 96 MM HG (ref 75–129)
PO2 BLDA: 106.7 MM HG (ref 75–129)
PO2 BLDA: 146.2 MM HG (ref 75–129)
PO2 BLDA: 148.5 MM HG (ref 75–129)
PO2 BLDA: 326.4 MM HG (ref 75–129)
PO2 BLDA: 331.8 MM HG (ref 75–129)
PO2 BLDA: 80.8 MM HG (ref 75–129)
PO2 BLDV: 41.7 MM HG (ref 35–45)
POIKILOCYTOSIS BLD QL SMEAR: PRESENT
POLYCHROMASIA BLD QL SMEAR: PRESENT
POTASSIUM BLD-SCNC: 2.9 MMOL/L (ref 3.5–5.3)
POTASSIUM BLD-SCNC: 2.9 MMOL/L (ref 3.5–5.3)
POTASSIUM BLD-SCNC: 3 MMOL/L (ref 3.5–5.3)
POTASSIUM BLD-SCNC: 3.2 MMOL/L (ref 3.5–5.3)
POTASSIUM BLD-SCNC: 3.7 MMOL/L (ref 3.5–5.3)
POTASSIUM BLD-SCNC: 3.7 MMOL/L (ref 3.5–5.3)
POTASSIUM BLD-SCNC: 3.8 MMOL/L (ref 3.5–5.3)
POTASSIUM BLD-SCNC: 4.2 MMOL/L (ref 3.5–5.3)
POTASSIUM BLD-SCNC: 4.2 MMOL/L (ref 3.5–5.3)
POTASSIUM SERPL-SCNC: 3 MMOL/L (ref 3.5–5.3)
POTASSIUM SERPL-SCNC: 3.2 MMOL/L (ref 3.5–5.3)
POTASSIUM SERPL-SCNC: 3.5 MMOL/L (ref 3.5–5.3)
POTASSIUM SERPL-SCNC: 3.5 MMOL/L (ref 3.5–5.3)
POTASSIUM SERPL-SCNC: 3.7 MMOL/L (ref 3.5–5.3)
POTASSIUM SERPL-SCNC: 3.8 MMOL/L (ref 3.5–5.3)
POTASSIUM SERPL-SCNC: 3.9 MMOL/L (ref 3.5–5.3)
POTASSIUM SERPL-SCNC: 4 MMOL/L (ref 3.5–5.3)
POTASSIUM SERPL-SCNC: 4.2 MMOL/L (ref 3.5–5.3)
POTASSIUM SERPL-SCNC: 4.5 MMOL/L (ref 3.5–5.3)
PR INTERVAL: 124 MS
PR INTERVAL: 124 MS
PR INTERVAL: 128 MS
PR INTERVAL: 129 MS
PR INTERVAL: 188 MS
PRESSURE SETTING: 6
PROCALCITONIN SERPL-MCNC: 4.45 NG/ML
PROT SERPL-MCNC: 4.6 G/DL (ref 6.4–8.4)
PROT SERPL-MCNC: 4.9 G/DL (ref 6.4–8.4)
PROT SERPL-MCNC: 5 G/DL (ref 6.4–8.4)
PROT SERPL-MCNC: 5 G/DL (ref 6.4–8.4)
PROT SERPL-MCNC: 5.6 G/DL (ref 6.4–8.4)
PROT SERPL-MCNC: 5.7 G/DL (ref 6.4–8.4)
PROT SERPL-MCNC: 5.8 G/DL (ref 6.4–8.4)
PROT SERPL-MCNC: 5.9 G/DL (ref 6.4–8.4)
PROT SERPL-MCNC: 5.9 G/DL (ref 6.4–8.4)
PROT UR STRIP-MCNC: ABNORMAL MG/DL
PROTHROMBIN TIME: 16.9 SECONDS (ref 11.6–14.5)
PROTHROMBIN TIME: 18.7 SECONDS (ref 11.6–14.5)
QRS AXIS: 34 DEGREES
QRS AXIS: 38 DEGREES
QRS AXIS: 79 DEGREES
QRS AXIS: 86 DEGREES
QRS AXIS: 91 DEGREES
QRSD INTERVAL: 76 MS
QRSD INTERVAL: 80 MS
QRSD INTERVAL: 83 MS
QRSD INTERVAL: 88 MS
QRSD INTERVAL: 88 MS
QT INTERVAL: 266 MS
QT INTERVAL: 267 MS
QT INTERVAL: 292 MS
QT INTERVAL: 316 MS
QT INTERVAL: 354 MS
QTC INTERVAL: 414 MS
QTC INTERVAL: 416 MS
QTC INTERVAL: 448 MS
QTC INTERVAL: 468 MS
QTC INTERVAL: 474 MS
RBC # BLD AUTO: 2.61 MILLION/UL (ref 3.88–5.62)
RBC # BLD AUTO: 2.62 MILLION/UL (ref 3.88–5.62)
RBC # BLD AUTO: 2.62 MILLION/UL (ref 3.88–5.62)
RBC # BLD AUTO: 2.81 MILLION/UL (ref 3.88–5.62)
RBC # BLD AUTO: 2.83 MILLION/UL (ref 3.88–5.62)
RBC # BLD AUTO: 2.85 MILLION/UL (ref 3.88–5.62)
RBC # BLD AUTO: 3.08 MILLION/UL (ref 3.88–5.62)
RBC # BLD AUTO: 3.37 MILLION/UL (ref 3.88–5.62)
RBC # BLD AUTO: 3.45 MILLION/UL (ref 3.88–5.62)
RBC # BLD AUTO: 4.6 MILLION/UL (ref 3.88–5.62)
RBC #/AREA URNS AUTO: ABNORMAL /HPF
RBC MORPH BLD: PRESENT
RESPIRATORY RATE: 16
RH BLD: POSITIVE
SAO2 % BLD FROM PO2: 100 % (ref 60–85)
SAO2 % BLD FROM PO2: 34 % (ref 60–85)
SAO2 % BLD FROM PO2: 94 % (ref 60–85)
SAO2 % BLD FROM PO2: 95 % (ref 60–85)
SAO2 % BLD FROM PO2: 95 % (ref 60–85)
SAO2 % BLD FROM PO2: 97 % (ref 60–85)
SAO2 % BLD FROM PO2: 98 % (ref 60–85)
SODIUM BLD-SCNC: 132 MMOL/L (ref 136–145)
SODIUM BLD-SCNC: 136 MMOL/L (ref 136–145)
SODIUM BLD-SCNC: 139 MMOL/L (ref 136–145)
SODIUM BLD-SCNC: 139 MMOL/L (ref 136–145)
SODIUM BLD-SCNC: 141 MMOL/L (ref 136–145)
SODIUM BLD-SCNC: 142 MMOL/L (ref 136–145)
SODIUM BLD-SCNC: 143 MMOL/L (ref 136–145)
SODIUM SERPL-SCNC: 130 MMOL/L (ref 135–147)
SODIUM SERPL-SCNC: 132 MMOL/L (ref 135–147)
SODIUM SERPL-SCNC: 132 MMOL/L (ref 135–147)
SODIUM SERPL-SCNC: 133 MMOL/L (ref 135–147)
SODIUM SERPL-SCNC: 134 MMOL/L (ref 135–147)
SODIUM SERPL-SCNC: 137 MMOL/L (ref 135–147)
SODIUM SERPL-SCNC: 142 MMOL/L (ref 135–147)
SP GR UR STRIP.AUTO: 1.04 (ref 1–1.03)
SPECIMEN EXPIRATION DATE: NORMAL
SPECIMEN SOURCE: ABNORMAL
T WAVE AXIS: 0 DEGREES
T WAVE AXIS: 33 DEGREES
T WAVE AXIS: 34 DEGREES
T WAVE AXIS: 34 DEGREES
T WAVE AXIS: 35 DEGREES
TRANS CELLS #/AREA URNS HPF: PRESENT /[HPF]
UNIT DISPENSE STATUS: NORMAL
UNIT PRODUCT CODE: NORMAL
UNIT PRODUCT VOLUME: 235 ML
UNIT PRODUCT VOLUME: 250 ML
UNIT PRODUCT VOLUME: 280 ML
UNIT PRODUCT VOLUME: 300 ML
UNIT PRODUCT VOLUME: 328 ML
UNIT PRODUCT VOLUME: 350 ML
UNIT RH: NORMAL
UROBILINOGEN UR STRIP-ACNC: <2 MG/DL
VANCOMYCIN SERPL-MCNC: 30.4 UG/ML (ref 10–20)
VENT TYPE: ABNORMAL
VENTILATION VALUE: 500
VENTRICULAR RATE: 108 BPM
VENTRICULAR RATE: 132 BPM
VENTRICULAR RATE: 141 BPM
VENTRICULAR RATE: 146 BPM
VENTRICULAR RATE: 146 BPM
WBC # BLD AUTO: 12.58 THOUSAND/UL (ref 4.31–10.16)
WBC # BLD AUTO: 14.59 THOUSAND/UL (ref 4.31–10.16)
WBC # BLD AUTO: 15.16 THOUSAND/UL (ref 4.31–10.16)
WBC # BLD AUTO: 15.7 THOUSAND/UL (ref 4.31–10.16)
WBC # BLD AUTO: 21.79 THOUSAND/UL (ref 4.31–10.16)
WBC # BLD AUTO: 23.66 THOUSAND/UL (ref 4.31–10.16)
WBC # BLD AUTO: 24.61 THOUSAND/UL (ref 4.31–10.16)
WBC # BLD AUTO: 24.7 THOUSAND/UL (ref 4.31–10.16)
WBC # BLD AUTO: 7.4 THOUSAND/UL (ref 4.31–10.16)
WBC # BLD AUTO: 9.5 THOUSAND/UL (ref 4.31–10.16)
WBC #/AREA URNS AUTO: ABNORMAL /HPF

## 2023-01-01 PROCEDURE — 84295 ASSAY OF SERUM SODIUM: CPT

## 2023-01-01 PROCEDURE — 85027 COMPLETE CBC AUTOMATED: CPT | Performed by: PHYSICIAN ASSISTANT

## 2023-01-01 PROCEDURE — 82803 BLOOD GASES ANY COMBINATION: CPT

## 2023-01-01 PROCEDURE — 87040 BLOOD CULTURE FOR BACTERIA: CPT | Performed by: PHYSICIAN ASSISTANT

## 2023-01-01 PROCEDURE — 99232 SBSQ HOSP IP/OBS MODERATE 35: CPT | Performed by: INTERNAL MEDICINE

## 2023-01-01 PROCEDURE — B41J1ZZ FLUOROSCOPY OF OTHER LOWER ARTERIES USING LOW OSMOLAR CONTRAST: ICD-10-PCS | Performed by: RADIOLOGY

## 2023-01-01 PROCEDURE — 84132 ASSAY OF SERUM POTASSIUM: CPT

## 2023-01-01 PROCEDURE — 97163 PT EVAL HIGH COMPLEX 45 MIN: CPT

## 2023-01-01 PROCEDURE — 85014 HEMATOCRIT: CPT

## 2023-01-01 PROCEDURE — 30233N1 TRANSFUSION OF NONAUTOLOGOUS RED BLOOD CELLS INTO PERIPHERAL VEIN, PERCUTANEOUS APPROACH: ICD-10-PCS

## 2023-01-01 PROCEDURE — 82948 REAGENT STRIP/BLOOD GLUCOSE: CPT

## 2023-01-01 PROCEDURE — 0W2HX0Z CHANGE DRAINAGE DEVICE IN RETROPERITONEUM, EXTERNAL APPROACH: ICD-10-PCS | Performed by: INTERNAL MEDICINE

## 2023-01-01 PROCEDURE — 86901 BLOOD TYPING SEROLOGIC RH(D): CPT | Performed by: PHYSICIAN ASSISTANT

## 2023-01-01 PROCEDURE — 83735 ASSAY OF MAGNESIUM: CPT

## 2023-01-01 PROCEDURE — 99292 CRITICAL CARE ADDL 30 MIN: CPT | Performed by: STUDENT IN AN ORGANIZED HEALTH CARE EDUCATION/TRAINING PROGRAM

## 2023-01-01 PROCEDURE — 80202 ASSAY OF VANCOMYCIN: CPT | Performed by: PHYSICIAN ASSISTANT

## 2023-01-01 PROCEDURE — 74174 CTA ABD&PLVS W/CONTRAST: CPT

## 2023-01-01 PROCEDURE — 85384 FIBRINOGEN ACTIVITY: CPT | Performed by: EMERGENCY MEDICINE

## 2023-01-01 PROCEDURE — C1887 CATHETER, GUIDING: HCPCS

## 2023-01-01 PROCEDURE — 85018 HEMOGLOBIN: CPT

## 2023-01-01 PROCEDURE — C1894 INTRO/SHEATH, NON-LASER: HCPCS

## 2023-01-01 PROCEDURE — 0BH18EZ INSERTION OF ENDOTRACHEAL AIRWAY INTO TRACHEA, VIA NATURAL OR ARTIFICIAL OPENING ENDOSCOPIC: ICD-10-PCS

## 2023-01-01 PROCEDURE — 86923 COMPATIBILITY TEST ELECTRIC: CPT

## 2023-01-01 PROCEDURE — C9113 INJ PANTOPRAZOLE SODIUM, VIA: HCPCS | Performed by: PHYSICIAN ASSISTANT

## 2023-01-01 PROCEDURE — 85007 BL SMEAR W/DIFF WBC COUNT: CPT | Performed by: PHYSICIAN ASSISTANT

## 2023-01-01 PROCEDURE — 85384 FIBRINOGEN ACTIVITY: CPT | Performed by: PHYSICIAN ASSISTANT

## 2023-01-01 PROCEDURE — 49423 EXCHANGE DRAINAGE CATHETER: CPT | Performed by: INTERNAL MEDICINE

## 2023-01-01 PROCEDURE — 49423 EXCHANGE DRAINAGE CATHETER: CPT

## 2023-01-01 PROCEDURE — 82330 ASSAY OF CALCIUM: CPT | Performed by: STUDENT IN AN ORGANIZED HEALTH CARE EDUCATION/TRAINING PROGRAM

## 2023-01-01 PROCEDURE — 87205 SMEAR GRAM STAIN: CPT | Performed by: PHYSICIAN ASSISTANT

## 2023-01-01 PROCEDURE — 86900 BLOOD TYPING SEROLOGIC ABO: CPT | Performed by: PHYSICIAN ASSISTANT

## 2023-01-01 PROCEDURE — 36247 INS CATH ABD/L-EXT ART 3RD: CPT | Performed by: INTERNAL MEDICINE

## 2023-01-01 PROCEDURE — P9017 PLASMA 1 DONOR FRZ W/IN 8 HR: HCPCS

## 2023-01-01 PROCEDURE — 83605 ASSAY OF LACTIC ACID: CPT | Performed by: PHYSICIAN ASSISTANT

## 2023-01-01 PROCEDURE — 82947 ASSAY GLUCOSE BLOOD QUANT: CPT

## 2023-01-01 PROCEDURE — 37244 VASC EMBOLIZE/OCCLUDE BLEED: CPT

## 2023-01-01 PROCEDURE — 87186 SC STD MICRODIL/AGAR DIL: CPT | Performed by: PHYSICIAN ASSISTANT

## 2023-01-01 PROCEDURE — 36246 INS CATH ABD/L-EXT ART 2ND: CPT

## 2023-01-01 PROCEDURE — 75726 ARTERY X-RAYS ABDOMEN: CPT | Performed by: INTERNAL MEDICINE

## 2023-01-01 PROCEDURE — 87185 SC STD ENZYME DETCJ PER NZM: CPT | Performed by: SURGERY

## 2023-01-01 PROCEDURE — 85027 COMPLETE CBC AUTOMATED: CPT | Performed by: SURGERY

## 2023-01-01 PROCEDURE — 75625 CONTRAST EXAM ABDOMINL AORTA: CPT

## 2023-01-01 PROCEDURE — 84100 ASSAY OF PHOSPHORUS: CPT | Performed by: PHYSICIAN ASSISTANT

## 2023-01-01 PROCEDURE — 99233 SBSQ HOSP IP/OBS HIGH 50: CPT | Performed by: STUDENT IN AN ORGANIZED HEALTH CARE EDUCATION/TRAINING PROGRAM

## 2023-01-01 PROCEDURE — 87186 SC STD MICRODIL/AGAR DIL: CPT | Performed by: SURGERY

## 2023-01-01 PROCEDURE — 84100 ASSAY OF PHOSPHORUS: CPT

## 2023-01-01 PROCEDURE — 99232 SBSQ HOSP IP/OBS MODERATE 35: CPT | Performed by: STUDENT IN AN ORGANIZED HEALTH CARE EDUCATION/TRAINING PROGRAM

## 2023-01-01 PROCEDURE — 99291 CRITICAL CARE FIRST HOUR: CPT | Performed by: SURGERY

## 2023-01-01 PROCEDURE — 75984 XRAY CONTROL CATHETER CHANGE: CPT

## 2023-01-01 PROCEDURE — 30233R1 TRANSFUSION OF NONAUTOLOGOUS PLATELETS INTO PERIPHERAL VEIN, PERCUTANEOUS APPROACH: ICD-10-PCS | Performed by: STUDENT IN AN ORGANIZED HEALTH CARE EDUCATION/TRAINING PROGRAM

## 2023-01-01 PROCEDURE — B4151ZZ FLUOROSCOPY OF INFERIOR MESENTERIC ARTERY USING LOW OSMOLAR CONTRAST: ICD-10-PCS | Performed by: RADIOLOGY

## 2023-01-01 PROCEDURE — 87077 CULTURE AEROBIC IDENTIFY: CPT | Performed by: SURGERY

## 2023-01-01 PROCEDURE — 80048 BASIC METABOLIC PNL TOTAL CA: CPT | Performed by: PHYSICIAN ASSISTANT

## 2023-01-01 PROCEDURE — 37244 VASC EMBOLIZE/OCCLUDE BLEED: CPT | Performed by: INTERNAL MEDICINE

## 2023-01-01 PROCEDURE — 36556 INSERT NON-TUNNEL CV CATH: CPT | Performed by: NURSE PRACTITIONER

## 2023-01-01 PROCEDURE — 82330 ASSAY OF CALCIUM: CPT

## 2023-01-01 PROCEDURE — C1769 GUIDE WIRE: HCPCS

## 2023-01-01 PROCEDURE — P9016 RBC LEUKOCYTES REDUCED: HCPCS

## 2023-01-01 PROCEDURE — 85347 COAGULATION TIME ACTIVATED: CPT | Performed by: EMERGENCY MEDICINE

## 2023-01-01 PROCEDURE — 75726 ARTERY X-RAYS ABDOMEN: CPT

## 2023-01-01 PROCEDURE — 93005 ELECTROCARDIOGRAM TRACING: CPT

## 2023-01-01 PROCEDURE — NC001 PR NO CHARGE: Performed by: NURSE PRACTITIONER

## 2023-01-01 PROCEDURE — 85576 BLOOD PLATELET AGGREGATION: CPT | Performed by: PHYSICIAN ASSISTANT

## 2023-01-01 PROCEDURE — 84100 ASSAY OF PHOSPHORUS: CPT | Performed by: STUDENT IN AN ORGANIZED HEALTH CARE EDUCATION/TRAINING PROGRAM

## 2023-01-01 PROCEDURE — 99232 SBSQ HOSP IP/OBS MODERATE 35: CPT | Performed by: SURGERY

## 2023-01-01 PROCEDURE — 86920 COMPATIBILITY TEST SPIN: CPT

## 2023-01-01 PROCEDURE — 85730 THROMBOPLASTIN TIME PARTIAL: CPT

## 2023-01-01 PROCEDURE — 71045 X-RAY EXAM CHEST 1 VIEW: CPT

## 2023-01-01 PROCEDURE — 85397 CLOTTING FUNCT ACTIVITY: CPT | Performed by: PHYSICIAN ASSISTANT

## 2023-01-01 PROCEDURE — 86850 RBC ANTIBODY SCREEN: CPT | Performed by: NURSE PRACTITIONER

## 2023-01-01 PROCEDURE — 93308 TTE F-UP OR LMTD: CPT | Performed by: STUDENT IN AN ORGANIZED HEALTH CARE EDUCATION/TRAINING PROGRAM

## 2023-01-01 PROCEDURE — 85347 COAGULATION TIME ACTIVATED: CPT | Performed by: PHYSICIAN ASSISTANT

## 2023-01-01 PROCEDURE — 99223 1ST HOSP IP/OBS HIGH 75: CPT | Performed by: SURGERY

## 2023-01-01 PROCEDURE — 97167 OT EVAL HIGH COMPLEX 60 MIN: CPT

## 2023-01-01 PROCEDURE — 85018 HEMOGLOBIN: CPT | Performed by: NURSE PRACTITIONER

## 2023-01-01 PROCEDURE — 86850 RBC ANTIBODY SCREEN: CPT

## 2023-01-01 PROCEDURE — B412YZZ FLUOROSCOPY OF HEPATIC ARTERY USING OTHER CONTRAST: ICD-10-PCS | Performed by: INTERNAL MEDICINE

## 2023-01-01 PROCEDURE — 93010 ELECTROCARDIOGRAM REPORT: CPT | Performed by: INTERNAL MEDICINE

## 2023-01-01 PROCEDURE — 86901 BLOOD TYPING SEROLOGIC RH(D): CPT

## 2023-01-01 PROCEDURE — C1760 CLOSURE DEV, VASC: HCPCS

## 2023-01-01 PROCEDURE — 87077 CULTURE AEROBIC IDENTIFY: CPT | Performed by: PHYSICIAN ASSISTANT

## 2023-01-01 PROCEDURE — 85397 CLOTTING FUNCT ACTIVITY: CPT

## 2023-01-01 PROCEDURE — 84145 PROCALCITONIN (PCT): CPT | Performed by: STUDENT IN AN ORGANIZED HEALTH CARE EDUCATION/TRAINING PROGRAM

## 2023-01-01 PROCEDURE — 99255 IP/OBS CONSLTJ NEW/EST HI 80: CPT | Performed by: INTERNAL MEDICINE

## 2023-01-01 PROCEDURE — 99285 EMERGENCY DEPT VISIT HI MDM: CPT

## 2023-01-01 PROCEDURE — 80076 HEPATIC FUNCTION PANEL: CPT

## 2023-01-01 PROCEDURE — NC001 PR NO CHARGE: Performed by: INTERNAL MEDICINE

## 2023-01-01 PROCEDURE — 36245 INS CATH ABD/L-EXT ART 1ST: CPT

## 2023-01-01 PROCEDURE — 80053 COMPREHEN METABOLIC PANEL: CPT

## 2023-01-01 PROCEDURE — 82805 BLOOD GASES W/O2 SATURATION: CPT | Performed by: PHYSICIAN ASSISTANT

## 2023-01-01 PROCEDURE — 83735 ASSAY OF MAGNESIUM: CPT | Performed by: EMERGENCY MEDICINE

## 2023-01-01 PROCEDURE — 04L43DZ OCCLUSION OF SPLENIC ARTERY WITH INTRALUMINAL DEVICE, PERCUTANEOUS APPROACH: ICD-10-PCS | Performed by: INTERNAL MEDICINE

## 2023-01-01 PROCEDURE — 85610 PROTHROMBIN TIME: CPT | Performed by: PHYSICIAN ASSISTANT

## 2023-01-01 PROCEDURE — NC001 PR NO CHARGE: Performed by: SURGERY

## 2023-01-01 PROCEDURE — 4A133J1 MONITORING OF ARTERIAL PULSE, PERIPHERAL, PERCUTANEOUS APPROACH: ICD-10-PCS | Performed by: SURGERY

## 2023-01-01 PROCEDURE — B4131ZZ FLUOROSCOPY OF SPLENIC ARTERIES USING LOW OSMOLAR CONTRAST: ICD-10-PCS | Performed by: INTERNAL MEDICINE

## 2023-01-01 PROCEDURE — C9113 INJ PANTOPRAZOLE SODIUM, VIA: HCPCS | Performed by: EMERGENCY MEDICINE

## 2023-01-01 PROCEDURE — 85576 BLOOD PLATELET AGGREGATION: CPT | Performed by: EMERGENCY MEDICINE

## 2023-01-01 PROCEDURE — 87070 CULTURE OTHR SPECIMN AEROBIC: CPT | Performed by: SURGERY

## 2023-01-01 PROCEDURE — 86900 BLOOD TYPING SEROLOGIC ABO: CPT | Performed by: NURSE PRACTITIONER

## 2023-01-01 PROCEDURE — 85347 COAGULATION TIME ACTIVATED: CPT

## 2023-01-01 PROCEDURE — 85014 HEMATOCRIT: CPT | Performed by: SURGERY

## 2023-01-01 PROCEDURE — 85049 AUTOMATED PLATELET COUNT: CPT

## 2023-01-01 PROCEDURE — 82140 ASSAY OF AMMONIA: CPT | Performed by: PHYSICIAN ASSISTANT

## 2023-01-01 PROCEDURE — 99223 1ST HOSP IP/OBS HIGH 75: CPT | Performed by: INTERNAL MEDICINE

## 2023-01-01 PROCEDURE — G1004 CDSM NDSC: HCPCS

## 2023-01-01 PROCEDURE — 99291 CRITICAL CARE FIRST HOUR: CPT | Performed by: STUDENT IN AN ORGANIZED HEALTH CARE EDUCATION/TRAINING PROGRAM

## 2023-01-01 PROCEDURE — B41BYZZ FLUOROSCOPY OF OTHER INTRA-ABDOMINAL ARTERIES USING OTHER CONTRAST: ICD-10-PCS | Performed by: INTERNAL MEDICINE

## 2023-01-01 PROCEDURE — 86850 RBC ANTIBODY SCREEN: CPT | Performed by: PHYSICIAN ASSISTANT

## 2023-01-01 PROCEDURE — 85018 HEMOGLOBIN: CPT | Performed by: PHYSICIAN ASSISTANT

## 2023-01-01 PROCEDURE — 36600 WITHDRAWAL OF ARTERIAL BLOOD: CPT

## 2023-01-01 PROCEDURE — 04HY32Z INSERTION OF MONITORING DEVICE INTO LOWER ARTERY, PERCUTANEOUS APPROACH: ICD-10-PCS | Performed by: SURGERY

## 2023-01-01 PROCEDURE — 83735 ASSAY OF MAGNESIUM: CPT | Performed by: PHYSICIAN ASSISTANT

## 2023-01-01 PROCEDURE — 80048 BASIC METABOLIC PNL TOTAL CA: CPT | Performed by: SURGERY

## 2023-01-01 PROCEDURE — C9113 INJ PANTOPRAZOLE SODIUM, VIA: HCPCS

## 2023-01-01 PROCEDURE — 87075 CULTR BACTERIA EXCEPT BLOOD: CPT | Performed by: SURGERY

## 2023-01-01 PROCEDURE — 85610 PROTHROMBIN TIME: CPT

## 2023-01-01 PROCEDURE — 85018 HEMOGLOBIN: CPT | Performed by: SURGERY

## 2023-01-01 PROCEDURE — NC001 PR NO CHARGE

## 2023-01-01 PROCEDURE — 83735 ASSAY OF MAGNESIUM: CPT | Performed by: STUDENT IN AN ORGANIZED HEALTH CARE EDUCATION/TRAINING PROGRAM

## 2023-01-01 PROCEDURE — 30233N1 TRANSFUSION OF NONAUTOLOGOUS RED BLOOD CELLS INTO PERIPHERAL VEIN, PERCUTANEOUS APPROACH: ICD-10-PCS | Performed by: STUDENT IN AN ORGANIZED HEALTH CARE EDUCATION/TRAINING PROGRAM

## 2023-01-01 PROCEDURE — 87205 SMEAR GRAM STAIN: CPT | Performed by: SURGERY

## 2023-01-01 PROCEDURE — 85027 COMPLETE CBC AUTOMATED: CPT | Performed by: EMERGENCY MEDICINE

## 2023-01-01 PROCEDURE — 94002 VENT MGMT INPAT INIT DAY: CPT

## 2023-01-01 PROCEDURE — 74178 CT ABD&PLV WO CNTR FLWD CNTR: CPT

## 2023-01-01 PROCEDURE — C1729 CATH, DRAINAGE: HCPCS

## 2023-01-01 PROCEDURE — 36245 INS CATH ABD/L-EXT ART 1ST: CPT | Performed by: RADIOLOGY

## 2023-01-01 PROCEDURE — 94003 VENT MGMT INPAT SUBQ DAY: CPT

## 2023-01-01 PROCEDURE — 76937 US GUIDE VASCULAR ACCESS: CPT

## 2023-01-01 PROCEDURE — 80053 COMPREHEN METABOLIC PANEL: CPT | Performed by: PHYSICIAN ASSISTANT

## 2023-01-01 PROCEDURE — 82805 BLOOD GASES W/O2 SATURATION: CPT | Performed by: STUDENT IN AN ORGANIZED HEALTH CARE EDUCATION/TRAINING PROGRAM

## 2023-01-01 PROCEDURE — P9037 PLATE PHERES LEUKOREDU IRRAD: HCPCS

## 2023-01-01 PROCEDURE — 99233 SBSQ HOSP IP/OBS HIGH 50: CPT | Performed by: INTERNAL MEDICINE

## 2023-01-01 PROCEDURE — B4141ZZ FLUOROSCOPY OF SUPERIOR MESENTERIC ARTERY USING LOW OSMOLAR CONTRAST: ICD-10-PCS | Performed by: RADIOLOGY

## 2023-01-01 PROCEDURE — 81001 URINALYSIS AUTO W/SCOPE: CPT | Performed by: PHYSICIAN ASSISTANT

## 2023-01-01 PROCEDURE — 86901 BLOOD TYPING SEROLOGIC RH(D): CPT | Performed by: NURSE PRACTITIONER

## 2023-01-01 PROCEDURE — 02HV33Z INSERTION OF INFUSION DEVICE INTO SUPERIOR VENA CAVA, PERCUTANEOUS APPROACH: ICD-10-PCS | Performed by: SURGERY

## 2023-01-01 PROCEDURE — 85576 BLOOD PLATELET AGGREGATION: CPT

## 2023-01-01 PROCEDURE — 99292 CRITICAL CARE ADDL 30 MIN: CPT | Performed by: SURGERY

## 2023-01-01 PROCEDURE — 76937 US GUIDE VASCULAR ACCESS: CPT | Performed by: INTERNAL MEDICINE

## 2023-01-01 PROCEDURE — 94760 N-INVAS EAR/PLS OXIMETRY 1: CPT

## 2023-01-01 PROCEDURE — 36246 INS CATH ABD/L-EXT ART 2ND: CPT | Performed by: INTERNAL MEDICINE

## 2023-01-01 PROCEDURE — P9040 RBC LEUKOREDUCED IRRADIATED: HCPCS

## 2023-01-01 PROCEDURE — 85384 FIBRINOGEN ACTIVITY: CPT

## 2023-01-01 PROCEDURE — 04L23DZ OCCLUSION OF GASTRIC ARTERY WITH INTRALUMINAL DEVICE, PERCUTANEOUS APPROACH: ICD-10-PCS | Performed by: INTERNAL MEDICINE

## 2023-01-01 PROCEDURE — 84100 ASSAY OF PHOSPHORUS: CPT | Performed by: EMERGENCY MEDICINE

## 2023-01-01 PROCEDURE — 31500 INSERT EMERGENCY AIRWAY: CPT | Performed by: SURGERY

## 2023-01-01 PROCEDURE — 4A133B1 MONITORING OF ARTERIAL PRESSURE, PERIPHERAL, PERCUTANEOUS APPROACH: ICD-10-PCS | Performed by: SURGERY

## 2023-01-01 PROCEDURE — B414YZZ FLUOROSCOPY OF SUPERIOR MESENTERIC ARTERY USING OTHER CONTRAST: ICD-10-PCS | Performed by: INTERNAL MEDICINE

## 2023-01-01 PROCEDURE — 85397 CLOTTING FUNCT ACTIVITY: CPT | Performed by: EMERGENCY MEDICINE

## 2023-01-01 PROCEDURE — 30233K1 TRANSFUSION OF NONAUTOLOGOUS FROZEN PLASMA INTO PERIPHERAL VEIN, PERCUTANEOUS APPROACH: ICD-10-PCS | Performed by: STUDENT IN AN ORGANIZED HEALTH CARE EDUCATION/TRAINING PROGRAM

## 2023-01-01 PROCEDURE — 0DJD8ZZ INSPECTION OF LOWER INTESTINAL TRACT, VIA NATURAL OR ARTIFICIAL OPENING ENDOSCOPIC: ICD-10-PCS | Performed by: STUDENT IN AN ORGANIZED HEALTH CARE EDUCATION/TRAINING PROGRAM

## 2023-01-01 PROCEDURE — 36620 INSERTION CATHETER ARTERY: CPT | Performed by: SURGERY

## 2023-01-01 PROCEDURE — NC001 PR NO CHARGE: Performed by: STUDENT IN AN ORGANIZED HEALTH CARE EDUCATION/TRAINING PROGRAM

## 2023-01-01 PROCEDURE — 82805 BLOOD GASES W/O2 SATURATION: CPT

## 2023-01-01 PROCEDURE — 82330 ASSAY OF CALCIUM: CPT | Performed by: PHYSICIAN ASSISTANT

## 2023-01-01 PROCEDURE — 82805 BLOOD GASES W/O2 SATURATION: CPT | Performed by: EMERGENCY MEDICINE

## 2023-01-01 PROCEDURE — 85027 COMPLETE CBC AUTOMATED: CPT

## 2023-01-01 PROCEDURE — 87076 CULTURE ANAEROBE IDENT EACH: CPT | Performed by: SURGERY

## 2023-01-01 PROCEDURE — 86900 BLOOD TYPING SEROLOGIC ABO: CPT

## 2023-01-01 PROCEDURE — 82330 ASSAY OF CALCIUM: CPT | Performed by: EMERGENCY MEDICINE

## 2023-01-01 PROCEDURE — 75726 ARTERY X-RAYS ABDOMEN: CPT | Performed by: RADIOLOGY

## 2023-01-01 PROCEDURE — 80053 COMPREHEN METABOLIC PANEL: CPT | Performed by: EMERGENCY MEDICINE

## 2023-01-01 PROCEDURE — 80053 COMPREHEN METABOLIC PANEL: CPT | Performed by: STUDENT IN AN ORGANIZED HEALTH CARE EDUCATION/TRAINING PROGRAM

## 2023-01-01 PROCEDURE — 85027 COMPLETE CBC AUTOMATED: CPT | Performed by: NURSE PRACTITIONER

## 2023-01-01 PROCEDURE — B41B1ZZ FLUOROSCOPY OF OTHER INTRA-ABDOMINAL ARTERIES USING LOW OSMOLAR CONTRAST: ICD-10-PCS | Performed by: INTERNAL MEDICINE

## 2023-01-01 PROCEDURE — 5A1945Z RESPIRATORY VENTILATION, 24-96 CONSECUTIVE HOURS: ICD-10-PCS

## 2023-01-01 PROCEDURE — 80048 BASIC METABOLIC PNL TOTAL CA: CPT

## 2023-01-01 PROCEDURE — 75984 XRAY CONTROL CATHETER CHANGE: CPT | Performed by: INTERNAL MEDICINE

## 2023-01-01 PROCEDURE — 87070 CULTURE OTHR SPECIMN AEROBIC: CPT | Performed by: PHYSICIAN ASSISTANT

## 2023-01-01 RX ORDER — DEXMEDETOMIDINE HYDROCHLORIDE 4 UG/ML
.1-.7 INJECTION, SOLUTION INTRAVENOUS
Status: DISCONTINUED | OUTPATIENT
Start: 2023-01-01 | End: 2023-01-01

## 2023-01-01 RX ORDER — MAGNESIUM SULFATE HEPTAHYDRATE 40 MG/ML
2 INJECTION, SOLUTION INTRAVENOUS ONCE
Status: COMPLETED | OUTPATIENT
Start: 2023-01-01 | End: 2023-01-01

## 2023-01-01 RX ORDER — ROCURONIUM BROMIDE 10 MG/ML
INJECTION, SOLUTION INTRAVENOUS AS NEEDED
Status: DISCONTINUED | OUTPATIENT
Start: 2023-01-01 | End: 2023-01-01

## 2023-01-01 RX ORDER — MIDAZOLAM HYDROCHLORIDE 2 MG/2ML
2 INJECTION, SOLUTION INTRAMUSCULAR; INTRAVENOUS ONCE
Status: COMPLETED | OUTPATIENT
Start: 2023-01-01 | End: 2023-01-01

## 2023-01-01 RX ORDER — PANTOPRAZOLE SODIUM 40 MG/10ML
40 INJECTION, POWDER, LYOPHILIZED, FOR SOLUTION INTRAVENOUS
Status: DISCONTINUED | OUTPATIENT
Start: 2023-01-01 | End: 2023-01-01

## 2023-01-01 RX ORDER — INSULIN LISPRO 100 [IU]/ML
2-12 INJECTION, SOLUTION INTRAVENOUS; SUBCUTANEOUS EVERY 6 HOURS SCHEDULED
Status: DISCONTINUED | OUTPATIENT
Start: 2023-01-01 | End: 2023-01-01

## 2023-01-01 RX ORDER — POTASSIUM CHLORIDE 14.9 MG/ML
20 INJECTION INTRAVENOUS ONCE
Status: COMPLETED | OUTPATIENT
Start: 2023-01-01 | End: 2023-01-01

## 2023-01-01 RX ORDER — ENOXAPARIN SODIUM 100 MG/ML
40 INJECTION SUBCUTANEOUS EVERY 12 HOURS SCHEDULED
Status: DISCONTINUED | OUTPATIENT
Start: 2023-01-01 | End: 2023-01-01

## 2023-01-01 RX ORDER — CALCIUM CHLORIDE 100 MG/ML
INJECTION INTRAVENOUS; INTRAVENTRICULAR AS NEEDED
Status: DISCONTINUED | OUTPATIENT
Start: 2023-01-01 | End: 2023-01-01

## 2023-01-01 RX ORDER — ACETAMINOPHEN 650 MG/1
650 SUPPOSITORY RECTAL EVERY 6 HOURS PRN
Status: DISCONTINUED | OUTPATIENT
Start: 2023-01-01 | End: 2023-01-01 | Stop reason: HOSPADM

## 2023-01-01 RX ORDER — MIDAZOLAM HYDROCHLORIDE 2 MG/2ML
INJECTION, SOLUTION INTRAMUSCULAR; INTRAVENOUS AS NEEDED
Status: DISCONTINUED | OUTPATIENT
Start: 2023-01-01 | End: 2023-01-01

## 2023-01-01 RX ORDER — IODIXANOL 320 MG/ML
200 INJECTION, SOLUTION INTRAVASCULAR
Status: COMPLETED | OUTPATIENT
Start: 2023-01-01 | End: 2023-01-01

## 2023-01-01 RX ORDER — OXYCODONE HYDROCHLORIDE 5 MG/1
5 TABLET ORAL EVERY 4 HOURS PRN
Status: DISCONTINUED | OUTPATIENT
Start: 2023-01-01 | End: 2023-01-01

## 2023-01-01 RX ORDER — PROPOFOL 10 MG/ML
INJECTION, EMULSION INTRAVENOUS CONTINUOUS PRN
Status: DISCONTINUED | OUTPATIENT
Start: 2023-01-01 | End: 2023-01-01

## 2023-01-01 RX ORDER — MIDAZOLAM HYDROCHLORIDE 2 MG/2ML
INJECTION, SOLUTION INTRAMUSCULAR; INTRAVENOUS
Status: COMPLETED
Start: 2023-01-01 | End: 2023-01-01

## 2023-01-01 RX ORDER — SODIUM CHLORIDE, SODIUM LACTATE, POTASSIUM CHLORIDE, CALCIUM CHLORIDE 600; 310; 30; 20 MG/100ML; MG/100ML; MG/100ML; MG/100ML
INJECTION, SOLUTION INTRAVENOUS CONTINUOUS PRN
Status: DISCONTINUED | OUTPATIENT
Start: 2023-01-01 | End: 2023-01-01

## 2023-01-01 RX ORDER — MIDAZOLAM HYDROCHLORIDE 2 MG/2ML
2 INJECTION, SOLUTION INTRAMUSCULAR; INTRAVENOUS EVERY 2 HOUR PRN
Status: DISCONTINUED | OUTPATIENT
Start: 2023-01-01 | End: 2023-01-01

## 2023-01-01 RX ORDER — NOREPINEPHRINE BITARTRATE 1 MG/ML
INJECTION, SOLUTION INTRAVENOUS
Status: DISPENSED
Start: 2023-01-01 | End: 2023-01-01

## 2023-01-01 RX ORDER — SODIUM CHLORIDE, SODIUM GLUCONATE, SODIUM ACETATE, POTASSIUM CHLORIDE, MAGNESIUM CHLORIDE, SODIUM PHOSPHATE, DIBASIC, AND POTASSIUM PHOSPHATE .53; .5; .37; .037; .03; .012; .00082 G/100ML; G/100ML; G/100ML; G/100ML; G/100ML; G/100ML; G/100ML
1000 INJECTION, SOLUTION INTRAVENOUS ONCE
Status: COMPLETED | OUTPATIENT
Start: 2023-01-01 | End: 2023-01-01

## 2023-01-01 RX ORDER — CALCIUM GLUCONATE 20 MG/ML
2 INJECTION, SOLUTION INTRAVENOUS ONCE
Status: COMPLETED | OUTPATIENT
Start: 2023-01-01 | End: 2023-01-01

## 2023-01-01 RX ORDER — KETAMINE HCL IN NACL, ISO-OSM 100MG/10ML
SYRINGE (ML) INJECTION AS NEEDED
Status: DISCONTINUED | OUTPATIENT
Start: 2023-01-01 | End: 2023-01-01

## 2023-01-01 RX ORDER — HALOPERIDOL 5 MG/ML
0.5 INJECTION INTRAMUSCULAR EVERY 2 HOUR PRN
Status: DISCONTINUED | OUTPATIENT
Start: 2023-01-01 | End: 2023-01-01 | Stop reason: HOSPADM

## 2023-01-01 RX ORDER — HEPARIN SODIUM 5000 [USP'U]/ML
5000 INJECTION, SOLUTION INTRAVENOUS; SUBCUTANEOUS EVERY 8 HOURS SCHEDULED
Status: CANCELLED | OUTPATIENT
Start: 2023-01-01

## 2023-01-01 RX ORDER — HEPARIN SODIUM 5000 [USP'U]/ML
5000 INJECTION, SOLUTION INTRAVENOUS; SUBCUTANEOUS EVERY 8 HOURS SCHEDULED
Status: DISCONTINUED | OUTPATIENT
Start: 2023-01-01 | End: 2023-01-01

## 2023-01-01 RX ORDER — VANCOMYCIN HYDROCHLORIDE 1 G/200ML
1000 INJECTION, SOLUTION INTRAVENOUS EVERY 12 HOURS
Status: DISCONTINUED | OUTPATIENT
Start: 2023-01-01 | End: 2023-01-01

## 2023-01-01 RX ORDER — BUMETANIDE 0.25 MG/ML
1 INJECTION INTRAMUSCULAR; INTRAVENOUS ONCE
Status: COMPLETED | OUTPATIENT
Start: 2023-01-01 | End: 2023-01-01

## 2023-01-01 RX ORDER — BUMETANIDE 0.25 MG/ML
2 INJECTION INTRAMUSCULAR; INTRAVENOUS ONCE
Status: COMPLETED | OUTPATIENT
Start: 2023-01-01 | End: 2023-01-01

## 2023-01-01 RX ORDER — VASOPRESSIN 20 U/ML
INJECTION PARENTERAL AS NEEDED
Status: DISCONTINUED | OUTPATIENT
Start: 2023-01-01 | End: 2023-01-01

## 2023-01-01 RX ORDER — POTASSIUM CHLORIDE 29.8 MG/ML
40 INJECTION INTRAVENOUS ONCE
Status: COMPLETED | OUTPATIENT
Start: 2023-01-01 | End: 2023-01-01

## 2023-01-01 RX ORDER — FENTANYL CITRATE 50 UG/ML
INJECTION, SOLUTION INTRAMUSCULAR; INTRAVENOUS
Status: COMPLETED
Start: 2023-01-01 | End: 2023-01-01

## 2023-01-01 RX ORDER — AMOXICILLIN 250 MG
1 CAPSULE ORAL
Status: DISCONTINUED | OUTPATIENT
Start: 2023-01-01 | End: 2023-01-01

## 2023-01-01 RX ORDER — OXYCODONE HYDROCHLORIDE 10 MG/1
10 TABLET ORAL EVERY 4 HOURS PRN
Status: DISCONTINUED | OUTPATIENT
Start: 2023-01-01 | End: 2023-01-01

## 2023-01-01 RX ORDER — CALCIUM CHLORIDE 100 MG/ML
INJECTION INTRAVENOUS; INTRAVENTRICULAR
Status: COMPLETED
Start: 2023-01-01 | End: 2023-01-01

## 2023-01-01 RX ORDER — CHLORHEXIDINE GLUCONATE ORAL RINSE 1.2 MG/ML
15 SOLUTION DENTAL EVERY 12 HOURS SCHEDULED
Status: DISCONTINUED | OUTPATIENT
Start: 2023-01-01 | End: 2023-01-01 | Stop reason: HOSPADM

## 2023-01-01 RX ORDER — LORAZEPAM 2 MG/ML
1 INJECTION INTRAMUSCULAR
Status: DISCONTINUED | OUTPATIENT
Start: 2023-01-01 | End: 2023-01-01 | Stop reason: HOSPADM

## 2023-01-01 RX ORDER — INSULIN LISPRO 100 [IU]/ML
2-12 INJECTION, SOLUTION INTRAVENOUS; SUBCUTANEOUS 4 TIMES DAILY
Status: DISCONTINUED | OUTPATIENT
Start: 2023-01-01 | End: 2023-01-01

## 2023-01-01 RX ORDER — MINERAL OIL AND PETROLATUM 150; 830 MG/G; MG/G
OINTMENT OPHTHALMIC
Status: DISCONTINUED | OUTPATIENT
Start: 2023-01-01 | End: 2023-01-01

## 2023-01-01 RX ORDER — ONDANSETRON 2 MG/ML
4 INJECTION INTRAMUSCULAR; INTRAVENOUS EVERY 6 HOURS PRN
Status: DISCONTINUED | OUTPATIENT
Start: 2023-01-01 | End: 2023-01-01 | Stop reason: HOSPADM

## 2023-01-01 RX ORDER — PROPOFOL 10 MG/ML
5-50 INJECTION, EMULSION INTRAVENOUS
Status: DISCONTINUED | OUTPATIENT
Start: 2023-01-01 | End: 2023-01-01

## 2023-01-01 RX ORDER — PROPOFOL 10 MG/ML
INJECTION, EMULSION INTRAVENOUS AS NEEDED
Status: DISCONTINUED | OUTPATIENT
Start: 2023-01-01 | End: 2023-01-01

## 2023-01-01 RX ORDER — GLYCOPYRROLATE 0.2 MG/ML
0.1 INJECTION INTRAMUSCULAR; INTRAVENOUS EVERY 4 HOURS PRN
Status: DISCONTINUED | OUTPATIENT
Start: 2023-01-01 | End: 2023-01-01 | Stop reason: HOSPADM

## 2023-01-01 RX ORDER — SODIUM CHLORIDE 9 MG/ML
INJECTION, SOLUTION INTRAVENOUS CONTINUOUS PRN
Status: DISCONTINUED | OUTPATIENT
Start: 2023-01-01 | End: 2023-01-01

## 2023-01-01 RX ORDER — ACETAMINOPHEN 325 MG/1
650 TABLET ORAL EVERY 6 HOURS PRN
Status: DISCONTINUED | OUTPATIENT
Start: 2023-01-01 | End: 2023-01-01

## 2023-01-01 RX ORDER — CALCIUM CHLORIDE 100 MG/ML
1 INJECTION INTRAVENOUS; INTRAVENTRICULAR ONCE
Status: COMPLETED | OUTPATIENT
Start: 2023-01-01 | End: 2023-01-01

## 2023-01-01 RX ORDER — SODIUM CHLORIDE, SODIUM GLUCONATE, SODIUM ACETATE, POTASSIUM CHLORIDE, MAGNESIUM CHLORIDE, SODIUM PHOSPHATE, DIBASIC, AND POTASSIUM PHOSPHATE .53; .5; .37; .037; .03; .012; .00082 G/100ML; G/100ML; G/100ML; G/100ML; G/100ML; G/100ML; G/100ML
500 INJECTION, SOLUTION INTRAVENOUS ONCE
Status: COMPLETED | OUTPATIENT
Start: 2023-01-01 | End: 2023-01-01

## 2023-01-01 RX ORDER — HYDROMORPHONE HCL/PF 1 MG/ML
0.5 SYRINGE (ML) INJECTION
Status: DISCONTINUED | OUTPATIENT
Start: 2023-01-01 | End: 2023-01-01 | Stop reason: HOSPADM

## 2023-01-01 RX ORDER — BISACODYL 10 MG
10 SUPPOSITORY, RECTAL RECTAL ONCE
Status: COMPLETED | OUTPATIENT
Start: 2023-01-01 | End: 2023-01-01

## 2023-01-01 RX ORDER — IODIXANOL 320 MG/ML
50 INJECTION, SOLUTION INTRAVASCULAR
Status: DISCONTINUED | OUTPATIENT
Start: 2023-01-01 | End: 2023-01-01

## 2023-01-01 RX ORDER — FENTANYL CITRATE 50 UG/ML
100 INJECTION, SOLUTION INTRAMUSCULAR; INTRAVENOUS ONCE
Status: COMPLETED | OUTPATIENT
Start: 2023-01-01 | End: 2023-01-01

## 2023-01-01 RX ORDER — PANTOPRAZOLE SODIUM 40 MG/10ML
40 INJECTION, POWDER, LYOPHILIZED, FOR SOLUTION INTRAVENOUS EVERY 12 HOURS SCHEDULED
Status: DISCONTINUED | OUTPATIENT
Start: 2023-01-01 | End: 2023-01-01

## 2023-01-01 RX ORDER — FENTANYL CITRATE-0.9 % NACL/PF 10 MCG/ML
50 PLASTIC BAG, INJECTION (ML) INTRAVENOUS CONTINUOUS
Status: DISCONTINUED | OUTPATIENT
Start: 2023-01-01 | End: 2023-01-01 | Stop reason: HOSPADM

## 2023-01-01 RX ORDER — LISINOPRIL 10 MG/1
10 TABLET ORAL DAILY
Status: DISCONTINUED | OUTPATIENT
Start: 2023-01-01 | End: 2023-01-01

## 2023-01-01 RX ORDER — FUROSEMIDE 20 MG/1
20 TABLET ORAL DAILY
Status: DISCONTINUED | OUTPATIENT
Start: 2023-01-01 | End: 2023-01-01

## 2023-01-01 RX ORDER — HYDROMORPHONE HCL/PF 1 MG/ML
0.3 SYRINGE (ML) INJECTION EVERY 2 HOUR PRN
Status: DISCONTINUED | OUTPATIENT
Start: 2023-01-01 | End: 2023-01-01

## 2023-01-01 RX ORDER — ENOXAPARIN SODIUM 100 MG/ML
30 INJECTION SUBCUTANEOUS EVERY 12 HOURS SCHEDULED
Status: DISCONTINUED | OUTPATIENT
Start: 2023-01-01 | End: 2023-01-01

## 2023-01-01 RX ORDER — LIDOCAINE HYDROCHLORIDE 10 MG/ML
INJECTION, SOLUTION EPIDURAL; INFILTRATION; INTRACAUDAL; PERINEURAL AS NEEDED
Status: COMPLETED | OUTPATIENT
Start: 2023-01-01 | End: 2023-01-01

## 2023-01-01 RX ORDER — MIDAZOLAM HYDROCHLORIDE 2 MG/2ML
2 INJECTION, SOLUTION INTRAMUSCULAR; INTRAVENOUS EVERY 2 HOUR PRN
Status: DISCONTINUED | OUTPATIENT
Start: 2023-01-01 | End: 2023-01-01 | Stop reason: HOSPADM

## 2023-01-01 RX ORDER — ALBUMIN, HUMAN INJ 5% 5 %
25 SOLUTION INTRAVENOUS ONCE
Status: COMPLETED | OUTPATIENT
Start: 2023-01-01 | End: 2023-01-01

## 2023-01-01 RX ORDER — INSULIN LISPRO 100 [IU]/ML
2-12 INJECTION, SOLUTION INTRAVENOUS; SUBCUTANEOUS EVERY 6 HOURS PRN
Status: DISCONTINUED | OUTPATIENT
Start: 2023-01-01 | End: 2023-01-01

## 2023-01-01 RX ORDER — NOREPINEPHRINE BITARTRATE 1 MG/ML
INJECTION, SOLUTION INTRAVENOUS
Status: COMPLETED
Start: 2023-01-01 | End: 2023-01-01

## 2023-01-01 RX ORDER — POLYETHYLENE GLYCOL 3350 17 G/17G
17 POWDER, FOR SOLUTION ORAL 3 TIMES DAILY
Status: DISCONTINUED | OUTPATIENT
Start: 2023-01-01 | End: 2023-01-01

## 2023-01-01 RX ORDER — FUROSEMIDE 10 MG/ML
20 INJECTION INTRAMUSCULAR; INTRAVENOUS ONCE
Status: COMPLETED | OUTPATIENT
Start: 2023-01-01 | End: 2023-01-01

## 2023-01-01 RX ORDER — SODIUM CHLORIDE, SODIUM GLUCONATE, SODIUM ACETATE, POTASSIUM CHLORIDE, MAGNESIUM CHLORIDE, SODIUM PHOSPHATE, DIBASIC, AND POTASSIUM PHOSPHATE .53; .5; .37; .037; .03; .012; .00082 G/100ML; G/100ML; G/100ML; G/100ML; G/100ML; G/100ML; G/100ML
125 INJECTION, SOLUTION INTRAVENOUS CONTINUOUS
Status: DISCONTINUED | OUTPATIENT
Start: 2023-01-01 | End: 2023-01-01

## 2023-01-01 RX ORDER — TRANEXAMIC ACID 100 MG/ML
INJECTION, SOLUTION INTRAVENOUS AS NEEDED
Status: DISCONTINUED | OUTPATIENT
Start: 2023-01-01 | End: 2023-01-01

## 2023-01-01 RX ORDER — POTASSIUM CHLORIDE 14.9 MG/ML
INJECTION INTRAVENOUS CONTINUOUS PRN
Status: DISCONTINUED | OUTPATIENT
Start: 2023-01-01 | End: 2023-01-01

## 2023-01-01 RX ADMIN — ALBUMIN (HUMAN) 25 G: 12.5 INJECTION, SOLUTION INTRAVENOUS at 06:05

## 2023-01-01 RX ADMIN — VANCOMYCIN HYDROCHLORIDE 2000 MG: 1 INJECTION, POWDER, LYOPHILIZED, FOR SOLUTION INTRAVENOUS at 18:09

## 2023-01-01 RX ADMIN — Medication: at 20:39

## 2023-01-01 RX ADMIN — PANCRELIPASE 6000 UNITS: 30000; 6000; 19000 CAPSULE, DELAYED RELEASE PELLETS ORAL at 07:22

## 2023-01-01 RX ADMIN — MIDAZOLAM 2 MG: 1 INJECTION INTRAMUSCULAR; INTRAVENOUS at 12:17

## 2023-01-01 RX ADMIN — VASOPRESSIN 1 UNITS: 20 INJECTION, SOLUTION INTRAMUSCULAR; SUBCUTANEOUS at 00:15

## 2023-01-01 RX ADMIN — NOREPINEPHRINE BITARTRATE 23 MCG/MIN: 1 INJECTION INTRAVENOUS at 22:51

## 2023-01-01 RX ADMIN — PANTOPRAZOLE SODIUM 40 MG: 40 INJECTION, POWDER, FOR SOLUTION INTRAVENOUS at 08:49

## 2023-01-01 RX ADMIN — PROPOFOL 50 MCG/KG/MIN: 10 INJECTION, EMULSION INTRAVENOUS at 22:24

## 2023-01-01 RX ADMIN — VASOPRESSIN 4 UNITS: 20 INJECTION, SOLUTION INTRAMUSCULAR; SUBCUTANEOUS at 03:06

## 2023-01-01 RX ADMIN — PIPERACILLIN SODIUM AND TAZOBACTAM SODIUM 3.38 G: 36; 4.5 INJECTION, POWDER, LYOPHILIZED, FOR SOLUTION INTRAVENOUS at 16:45

## 2023-01-01 RX ADMIN — INSULIN LISPRO 2 UNITS: 100 INJECTION, SOLUTION INTRAVENOUS; SUBCUTANEOUS at 05:51

## 2023-01-01 RX ADMIN — PIPERACILLIN SODIUM AND TAZOBACTAM SODIUM 3.38 G: 36; 4.5 INJECTION, POWDER, LYOPHILIZED, FOR SOLUTION INTRAVENOUS at 08:21

## 2023-01-01 RX ADMIN — MIDAZOLAM 2 MG: 1 INJECTION INTRAMUSCULAR; INTRAVENOUS at 06:06

## 2023-01-01 RX ADMIN — PIPERACILLIN SODIUM AND TAZOBACTAM SODIUM 3.38 G: 36; 4.5 INJECTION, POWDER, LYOPHILIZED, FOR SOLUTION INTRAVENOUS at 20:19

## 2023-01-01 RX ADMIN — ROCURONIUM BROMIDE 50 MG: 10 INJECTION, SOLUTION INTRAVENOUS at 02:49

## 2023-01-01 RX ADMIN — SODIUM CHLORIDE, SODIUM GLUCONATE, SODIUM ACETATE, POTASSIUM CHLORIDE, MAGNESIUM CHLORIDE, SODIUM PHOSPHATE, DIBASIC, AND POTASSIUM PHOSPHATE 125 ML/HR: .53; .5; .37; .037; .03; .012; .00082 INJECTION, SOLUTION INTRAVENOUS at 09:52

## 2023-01-01 RX ADMIN — FUROSEMIDE 20 MG: 20 TABLET ORAL at 08:37

## 2023-01-01 RX ADMIN — LIDOCAINE HYDROCHLORIDE 10 ML: 10 INJECTION, SOLUTION EPIDURAL; INFILTRATION; INTRACAUDAL; PERINEURAL at 16:20

## 2023-01-01 RX ADMIN — CHLORHEXIDINE GLUCONATE 15 ML: 1.2 SOLUTION ORAL at 09:22

## 2023-01-01 RX ADMIN — ACETAMINOPHEN 650 MG: 650 SUPPOSITORY RECTAL at 23:34

## 2023-01-01 RX ADMIN — VANCOMYCIN HYDROCHLORIDE 750 MG: 750 INJECTION, SOLUTION INTRAVENOUS at 08:40

## 2023-01-01 RX ADMIN — Medication: at 03:52

## 2023-01-01 RX ADMIN — INSULIN LISPRO 2 UNITS: 100 INJECTION, SOLUTION INTRAVENOUS; SUBCUTANEOUS at 00:22

## 2023-01-01 RX ADMIN — PANCRELIPASE 6000 UNITS: 30000; 6000; 19000 CAPSULE, DELAYED RELEASE PELLETS ORAL at 13:00

## 2023-01-01 RX ADMIN — ENOXAPARIN SODIUM 30 MG: 30 INJECTION SUBCUTANEOUS at 08:27

## 2023-01-01 RX ADMIN — PIPERACILLIN SODIUM AND TAZOBACTAM SODIUM 3.38 G: 36; 4.5 INJECTION, POWDER, LYOPHILIZED, FOR SOLUTION INTRAVENOUS at 07:58

## 2023-01-01 RX ADMIN — CALCIUM GLUCONATE 2 G: 20 INJECTION, SOLUTION INTRAVENOUS at 22:06

## 2023-01-01 RX ADMIN — PROPOFOL 50 MCG/KG/MIN: 10 INJECTION, EMULSION INTRAVENOUS at 13:03

## 2023-01-01 RX ADMIN — CHLORHEXIDINE GLUCONATE 15 ML: 1.2 SOLUTION ORAL at 08:25

## 2023-01-01 RX ADMIN — CALCIUM GLUCONATE 2 G: 20 INJECTION, SOLUTION INTRAVENOUS at 18:16

## 2023-01-01 RX ADMIN — PROPOFOL 50 MG: 10 INJECTION, EMULSION INTRAVENOUS at 16:22

## 2023-01-01 RX ADMIN — Medication: at 22:01

## 2023-01-01 RX ADMIN — LIDOCAINE HYDROCHLORIDE 10 ML: 10 INJECTION, SOLUTION EPIDURAL; INFILTRATION; INTRACAUDAL; PERINEURAL at 15:30

## 2023-01-01 RX ADMIN — PANCRELIPASE 6000 UNITS: 30000; 6000; 19000 CAPSULE, DELAYED RELEASE PELLETS ORAL at 16:04

## 2023-01-01 RX ADMIN — POTASSIUM CHLORIDE 40 MEQ: 29.8 INJECTION, SOLUTION INTRAVENOUS at 08:19

## 2023-01-01 RX ADMIN — PANTOPRAZOLE SODIUM 8 MG/HR: 40 INJECTION, POWDER, FOR SOLUTION INTRAVENOUS at 05:26

## 2023-01-01 RX ADMIN — HEPARIN SODIUM 5000 UNITS: 5000 INJECTION INTRAVENOUS; SUBCUTANEOUS at 05:00

## 2023-01-01 RX ADMIN — SENNOSIDES AND DOCUSATE SODIUM 1 TABLET: 50; 8.6 TABLET ORAL at 21:59

## 2023-01-01 RX ADMIN — PROPOFOL 50 MCG/KG/MIN: 10 INJECTION, EMULSION INTRAVENOUS at 07:04

## 2023-01-01 RX ADMIN — CALCIUM CHLORIDE 0.5 G: 100 INJECTION INTRAVENOUS; INTRAVENTRICULAR at 03:11

## 2023-01-01 RX ADMIN — PROPOFOL 50 MCG/KG/MIN: 10 INJECTION, EMULSION INTRAVENOUS at 03:51

## 2023-01-01 RX ADMIN — CALCIUM CHLORIDE 0.5 G: 100 INJECTION INTRAVENOUS; INTRAVENTRICULAR at 04:14

## 2023-01-01 RX ADMIN — PANTOPRAZOLE SODIUM 40 MG: 40 INJECTION, POWDER, FOR SOLUTION INTRAVENOUS at 08:26

## 2023-01-01 RX ADMIN — FUROSEMIDE 20 MG: 10 INJECTION, SOLUTION INTRAMUSCULAR; INTRAVENOUS at 12:39

## 2023-01-01 RX ADMIN — NOREPINEPHRINE BITARTRATE 10 MCG/MIN: 1 INJECTION, SOLUTION, CONCENTRATE INTRAVENOUS at 13:30

## 2023-01-01 RX ADMIN — MIDAZOLAM HYDROCHLORIDE 2 MG: 2 INJECTION, SOLUTION INTRAMUSCULAR; INTRAVENOUS at 06:06

## 2023-01-01 RX ADMIN — BUMETANIDE 1 MG: 0.25 INJECTION INTRAMUSCULAR; INTRAVENOUS at 19:41

## 2023-01-01 RX ADMIN — PANTOPRAZOLE SODIUM 8 MG/HR: 40 INJECTION, POWDER, FOR SOLUTION INTRAVENOUS at 00:45

## 2023-01-01 RX ADMIN — MIDAZOLAM 1 MG: 1 INJECTION INTRAMUSCULAR; INTRAVENOUS at 15:58

## 2023-01-01 RX ADMIN — SODIUM BICARBONATE 50 MEQ: 84 INJECTION, SOLUTION INTRAVENOUS at 13:01

## 2023-01-01 RX ADMIN — VASOPRESSIN 5 UNITS: 20 INJECTION, SOLUTION INTRAMUSCULAR; SUBCUTANEOUS at 03:08

## 2023-01-01 RX ADMIN — CALCIUM CHLORIDE 1 G: 100 INJECTION INTRAVENOUS; INTRAVENTRICULAR at 13:00

## 2023-01-01 RX ADMIN — VASOPRESSIN 0.04 UNITS/MIN: 20 INJECTION INTRAVENOUS at 20:41

## 2023-01-01 RX ADMIN — CALCIUM CHLORIDE 0.3 G: 100 INJECTION INTRAVENOUS; INTRAVENTRICULAR at 00:44

## 2023-01-01 RX ADMIN — PROPOFOL 50 MCG/KG/MIN: 10 INJECTION, EMULSION INTRAVENOUS at 15:53

## 2023-01-01 RX ADMIN — ROCURONIUM BROMIDE 50 MG: 10 INJECTION, SOLUTION INTRAVENOUS at 03:22

## 2023-01-01 RX ADMIN — PIPERACILLIN SODIUM AND TAZOBACTAM SODIUM 3.38 G: 36; 4.5 INJECTION, POWDER, LYOPHILIZED, FOR SOLUTION INTRAVENOUS at 19:53

## 2023-01-01 RX ADMIN — SENNOSIDES AND DOCUSATE SODIUM 1 TABLET: 50; 8.6 TABLET ORAL at 21:00

## 2023-01-01 RX ADMIN — CALCIUM CHLORIDE 1 G: 100 INJECTION, SOLUTION INTRAVENOUS at 14:43

## 2023-01-01 RX ADMIN — LORAZEPAM 1 MG: 2 INJECTION INTRAMUSCULAR; INTRAVENOUS at 09:37

## 2023-01-01 RX ADMIN — PIPERACILLIN SODIUM AND TAZOBACTAM SODIUM 3.38 G: 36; 4.5 INJECTION, POWDER, LYOPHILIZED, FOR SOLUTION INTRAVENOUS at 14:11

## 2023-01-01 RX ADMIN — SODIUM CHLORIDE: 9 INJECTION, SOLUTION INTRAVENOUS at 15:51

## 2023-01-01 RX ADMIN — BUMETANIDE 2 MG: 0.25 INJECTION INTRAMUSCULAR; INTRAVENOUS at 21:54

## 2023-01-01 RX ADMIN — ENOXAPARIN SODIUM 30 MG: 30 INJECTION SUBCUTANEOUS at 08:48

## 2023-01-01 RX ADMIN — SODIUM CHLORIDE, SODIUM GLUCONATE, SODIUM ACETATE, POTASSIUM CHLORIDE, MAGNESIUM CHLORIDE, SODIUM PHOSPHATE, DIBASIC, AND POTASSIUM PHOSPHATE 125 ML/HR: .53; .5; .37; .037; .03; .012; .00082 INJECTION, SOLUTION INTRAVENOUS at 00:52

## 2023-01-01 RX ADMIN — VASOPRESSIN 1 UNITS: 20 INJECTION, SOLUTION INTRAMUSCULAR; SUBCUTANEOUS at 23:58

## 2023-01-01 RX ADMIN — VASOPRESSIN 1 UNITS: 20 INJECTION, SOLUTION INTRAMUSCULAR; SUBCUTANEOUS at 00:49

## 2023-01-01 RX ADMIN — CALCIUM CHLORIDE 1 G: 100 INJECTION INTRAVENOUS; INTRAVENTRICULAR at 02:30

## 2023-01-01 RX ADMIN — FUROSEMIDE 20 MG: 20 TABLET ORAL at 09:04

## 2023-01-01 RX ADMIN — NOREPINEPHRINE BITARTRATE 10 MCG/MIN: 1 INJECTION INTRAVENOUS at 12:22

## 2023-01-01 RX ADMIN — CALCIUM GLUCONATE 2 G: 20 INJECTION, SOLUTION INTRAVENOUS at 07:31

## 2023-01-01 RX ADMIN — VASOPRESSIN 0.04 UNITS/MIN: 20 INJECTION INTRAVENOUS at 05:13

## 2023-01-01 RX ADMIN — POLYETHYLENE GLYCOL 3350 17 G: 17 POWDER, FOR SOLUTION ORAL at 20:57

## 2023-01-01 RX ADMIN — SODIUM CHLORIDE, SODIUM GLUCONATE, SODIUM ACETATE, POTASSIUM CHLORIDE, MAGNESIUM CHLORIDE, SODIUM PHOSPHATE, DIBASIC, AND POTASSIUM PHOSPHATE 125 ML/HR: .53; .5; .37; .037; .03; .012; .00082 INJECTION, SOLUTION INTRAVENOUS at 06:32

## 2023-01-01 RX ADMIN — IOHEXOL 100 ML: 350 INJECTION, SOLUTION INTRAVENOUS at 21:53

## 2023-01-01 RX ADMIN — LISINOPRIL 10 MG: 10 TABLET ORAL at 08:37

## 2023-01-01 RX ADMIN — PROPOFOL 50 MCG/KG/MIN: 10 INJECTION, EMULSION INTRAVENOUS at 02:04

## 2023-01-01 RX ADMIN — Medication: at 05:41

## 2023-01-01 RX ADMIN — ROCURONIUM BROMIDE 50 MG: 10 INJECTION, SOLUTION INTRAVENOUS at 04:16

## 2023-01-01 RX ADMIN — VASOPRESSIN 2 UNITS: 20 INJECTION, SOLUTION INTRAMUSCULAR; SUBCUTANEOUS at 02:56

## 2023-01-01 RX ADMIN — CALCIUM CHLORIDE 0.5 G: 100 INJECTION INTRAVENOUS; INTRAVENTRICULAR at 12:43

## 2023-01-01 RX ADMIN — VANCOMYCIN HYDROCHLORIDE 1250 MG: 10 INJECTION, POWDER, LYOPHILIZED, FOR SOLUTION INTRAVENOUS at 22:09

## 2023-01-01 RX ADMIN — MAGNESIUM SULFATE HEPTAHYDRATE 2 G: 40 INJECTION, SOLUTION INTRAVENOUS at 07:58

## 2023-01-01 RX ADMIN — SENNOSIDES AND DOCUSATE SODIUM 1 TABLET: 50; 8.6 TABLET ORAL at 22:09

## 2023-01-01 RX ADMIN — ACETAMINOPHEN 650 MG: 325 TABLET, FILM COATED ORAL at 23:00

## 2023-01-01 RX ADMIN — PANCRELIPASE 6000 UNITS: 30000; 6000; 19000 CAPSULE, DELAYED RELEASE PELLETS ORAL at 08:49

## 2023-01-01 RX ADMIN — SODIUM CHLORIDE, SODIUM GLUCONATE, SODIUM ACETATE, POTASSIUM CHLORIDE, MAGNESIUM CHLORIDE, SODIUM PHOSPHATE, DIBASIC, AND POTASSIUM PHOSPHATE 125 ML/HR: .53; .5; .37; .037; .03; .012; .00082 INJECTION, SOLUTION INTRAVENOUS at 07:11

## 2023-01-01 RX ADMIN — MAGNESIUM SULFATE HEPTAHYDRATE 2 G: 40 INJECTION, SOLUTION INTRAVENOUS at 08:19

## 2023-01-01 RX ADMIN — SODIUM CHLORIDE: 9 INJECTION, SOLUTION INTRAVENOUS at 12:14

## 2023-01-01 RX ADMIN — PANCRELIPASE 6000 UNITS: 30000; 6000; 19000 CAPSULE, DELAYED RELEASE PELLETS ORAL at 11:37

## 2023-01-01 RX ADMIN — SODIUM CHLORIDE, SODIUM GLUCONATE, SODIUM ACETATE, POTASSIUM CHLORIDE, MAGNESIUM CHLORIDE, SODIUM PHOSPHATE, DIBASIC, AND POTASSIUM PHOSPHATE 125 ML/HR: .53; .5; .37; .037; .03; .012; .00082 INJECTION, SOLUTION INTRAVENOUS at 16:04

## 2023-01-01 RX ADMIN — IOHEXOL 15 ML: 300 INJECTION, SOLUTION INTRAVENOUS at 16:50

## 2023-01-01 RX ADMIN — INSULIN LISPRO 2 UNITS: 100 INJECTION, SOLUTION INTRAVENOUS; SUBCUTANEOUS at 18:36

## 2023-01-01 RX ADMIN — Medication 20 MG: at 16:06

## 2023-01-01 RX ADMIN — Medication 20 MG: at 15:58

## 2023-01-01 RX ADMIN — CHLORHEXIDINE GLUCONATE 15 ML: 1.2 SOLUTION ORAL at 08:48

## 2023-01-01 RX ADMIN — NOREPINEPHRINE BITARTRATE 30 MCG/MIN: 1 INJECTION INTRAVENOUS at 03:06

## 2023-01-01 RX ADMIN — ROCURONIUM BROMIDE 50 MG: 10 INJECTION, SOLUTION INTRAVENOUS at 23:27

## 2023-01-01 RX ADMIN — POTASSIUM CHLORIDE 20 MEQ: 14.9 INJECTION, SOLUTION INTRAVENOUS at 09:31

## 2023-01-01 RX ADMIN — INSULIN LISPRO 2 UNITS: 100 INJECTION, SOLUTION INTRAVENOUS; SUBCUTANEOUS at 12:02

## 2023-01-01 RX ADMIN — MIDAZOLAM 2 MG: 1 INJECTION INTRAMUSCULAR; INTRAVENOUS at 05:39

## 2023-01-01 RX ADMIN — PROPOFOL 15 MCG/KG/MIN: 10 INJECTION, EMULSION INTRAVENOUS at 23:00

## 2023-01-01 RX ADMIN — VASOPRESSIN 2 UNITS: 20 INJECTION, SOLUTION INTRAMUSCULAR; SUBCUTANEOUS at 04:14

## 2023-01-01 RX ADMIN — POTASSIUM CHLORIDE 20 MEQ: 14.9 INJECTION, SOLUTION INTRAVENOUS at 11:37

## 2023-01-01 RX ADMIN — IOHEXOL 100 ML: 350 INJECTION, SOLUTION INTRAVENOUS at 09:27

## 2023-01-01 RX ADMIN — PIPERACILLIN SODIUM AND TAZOBACTAM SODIUM 3.38 G: 36; 4.5 INJECTION, POWDER, LYOPHILIZED, FOR SOLUTION INTRAVENOUS at 14:50

## 2023-01-01 RX ADMIN — Medication 10 MG: at 16:25

## 2023-01-01 RX ADMIN — MIDAZOLAM 2 MG: 1 INJECTION INTRAMUSCULAR; INTRAVENOUS at 04:14

## 2023-01-01 RX ADMIN — POTASSIUM CHLORIDE 20 MEQ: 14.9 INJECTION, SOLUTION INTRAVENOUS at 07:46

## 2023-01-01 RX ADMIN — VASOPRESSIN 4 UNITS: 20 INJECTION, SOLUTION INTRAMUSCULAR; SUBCUTANEOUS at 23:33

## 2023-01-01 RX ADMIN — NOREPINEPHRINE BITARTRATE 4 MG: 1 INJECTION, SOLUTION, CONCENTRATE INTRAVENOUS at 11:58

## 2023-01-01 RX ADMIN — PANTOPRAZOLE SODIUM 40 MG: 40 INJECTION, POWDER, FOR SOLUTION INTRAVENOUS at 20:01

## 2023-01-01 RX ADMIN — PHENYLEPHRINE HYDROCHLORIDE 200 MCG: 10 INJECTION INTRAVENOUS at 12:26

## 2023-01-01 RX ADMIN — MIDAZOLAM HYDROCHLORIDE 2 MG: 2 INJECTION, SOLUTION INTRAMUSCULAR; INTRAVENOUS at 05:39

## 2023-01-01 RX ADMIN — HEPARIN SODIUM 5000 UNITS: 5000 INJECTION INTRAVENOUS; SUBCUTANEOUS at 21:59

## 2023-01-01 RX ADMIN — PIPERACILLIN SODIUM AND TAZOBACTAM SODIUM 3.38 G: 36; 4.5 INJECTION, POWDER, LYOPHILIZED, FOR SOLUTION INTRAVENOUS at 07:59

## 2023-01-01 RX ADMIN — PANCRELIPASE 6000 UNITS: 30000; 6000; 19000 CAPSULE, DELAYED RELEASE PELLETS ORAL at 17:14

## 2023-01-01 RX ADMIN — BISACODYL 10 MG: 10 SUPPOSITORY RECTAL at 09:41

## 2023-01-01 RX ADMIN — IODIXANOL 80 ML: 320 INJECTION, SOLUTION INTRAVASCULAR at 04:08

## 2023-01-01 RX ADMIN — SODIUM CHLORIDE, SODIUM GLUCONATE, SODIUM ACETATE, POTASSIUM CHLORIDE, MAGNESIUM CHLORIDE, SODIUM PHOSPHATE, DIBASIC, AND POTASSIUM PHOSPHATE 1000 ML: .53; .5; .37; .037; .03; .012; .00082 INJECTION, SOLUTION INTRAVENOUS at 13:38

## 2023-01-01 RX ADMIN — SODIUM CHLORIDE: 0.9 INJECTION, SOLUTION INTRAVENOUS at 23:21

## 2023-01-01 RX ADMIN — MAGNESIUM SULFATE HEPTAHYDRATE 2 G: 40 INJECTION, SOLUTION INTRAVENOUS at 07:39

## 2023-01-01 RX ADMIN — VASOPRESSIN 1 UNITS: 20 INJECTION, SOLUTION INTRAMUSCULAR; SUBCUTANEOUS at 00:27

## 2023-01-01 RX ADMIN — SODIUM BICARBONATE 50 MEQ: 84 INJECTION, SOLUTION INTRAVENOUS at 03:38

## 2023-01-01 RX ADMIN — TRANEXAMIC ACID 1 G: 100 INJECTION INTRAVENOUS at 03:15

## 2023-01-01 RX ADMIN — SODIUM CHLORIDE, SODIUM GLUCONATE, SODIUM ACETATE, POTASSIUM CHLORIDE, MAGNESIUM CHLORIDE, SODIUM PHOSPHATE, DIBASIC, AND POTASSIUM PHOSPHATE 500 ML: .53; .5; .37; .037; .03; .012; .00082 INJECTION, SOLUTION INTRAVENOUS at 19:57

## 2023-01-01 RX ADMIN — HYDROMORPHONE HYDROCHLORIDE 0.5 MG: 1 INJECTION, SOLUTION INTRAMUSCULAR; INTRAVENOUS; SUBCUTANEOUS at 09:37

## 2023-01-01 RX ADMIN — PROPOFOL 30 MG: 10 INJECTION, EMULSION INTRAVENOUS at 16:25

## 2023-01-01 RX ADMIN — INSULIN LISPRO 2 UNITS: 100 INJECTION, SOLUTION INTRAVENOUS; SUBCUTANEOUS at 09:05

## 2023-01-01 RX ADMIN — DEXMEDETOMIDINE HYDROCHLORIDE 0.2 MCG/KG/HR: 4 INJECTION, SOLUTION INTRAVENOUS at 10:08

## 2023-01-01 RX ADMIN — SODIUM CHLORIDE: 0.9 INJECTION, SOLUTION INTRAVENOUS at 02:41

## 2023-01-01 RX ADMIN — CHLORHEXIDINE GLUCONATE 15 ML: 1.2 SOLUTION ORAL at 22:10

## 2023-01-01 RX ADMIN — SODIUM CHLORIDE: 0.9 INJECTION, SOLUTION INTRAVENOUS at 15:45

## 2023-01-01 RX ADMIN — VASOPRESSIN 2 UNITS: 20 INJECTION, SOLUTION INTRAMUSCULAR; SUBCUTANEOUS at 03:43

## 2023-01-01 RX ADMIN — ACETAMINOPHEN 650 MG: 325 TABLET, FILM COATED ORAL at 12:41

## 2023-01-01 RX ADMIN — SODIUM CHLORIDE, SODIUM GLUCONATE, SODIUM ACETATE, POTASSIUM CHLORIDE, MAGNESIUM CHLORIDE, SODIUM PHOSPHATE, DIBASIC, AND POTASSIUM PHOSPHATE 125 ML/HR: .53; .5; .37; .037; .03; .012; .00082 INJECTION, SOLUTION INTRAVENOUS at 01:33

## 2023-01-01 RX ADMIN — SODIUM CHLORIDE, SODIUM LACTATE, POTASSIUM CHLORIDE, AND CALCIUM CHLORIDE: .6; .31; .03; .02 INJECTION, SOLUTION INTRAVENOUS at 16:20

## 2023-01-01 RX ADMIN — CHLORHEXIDINE GLUCONATE 15 ML: 1.2 SOLUTION ORAL at 21:54

## 2023-01-01 RX ADMIN — OXYCODONE HYDROCHLORIDE 5 MG: 5 TABLET ORAL at 10:56

## 2023-01-01 RX ADMIN — HEPARIN SODIUM 5000 UNITS: 5000 INJECTION INTRAVENOUS; SUBCUTANEOUS at 21:00

## 2023-01-01 RX ADMIN — ENOXAPARIN SODIUM 30 MG: 30 INJECTION SUBCUTANEOUS at 22:09

## 2023-01-01 RX ADMIN — FENTANYL CITRATE 100 MCG: 50 INJECTION INTRAMUSCULAR; INTRAVENOUS at 02:45

## 2023-01-01 RX ADMIN — INSULIN LISPRO 2 UNITS: 100 INJECTION, SOLUTION INTRAVENOUS; SUBCUTANEOUS at 18:23

## 2023-01-01 RX ADMIN — PIPERACILLIN SODIUM AND TAZOBACTAM SODIUM 3.38 G: 36; 4.5 INJECTION, POWDER, LYOPHILIZED, FOR SOLUTION INTRAVENOUS at 01:39

## 2023-01-01 RX ADMIN — PANCRELIPASE 6000 UNITS: 30000; 6000; 19000 CAPSULE, DELAYED RELEASE PELLETS ORAL at 11:51

## 2023-01-01 RX ADMIN — Medication: at 18:37

## 2023-01-01 RX ADMIN — PANTOPRAZOLE SODIUM 8 MG/HR: 40 INJECTION, POWDER, FOR SOLUTION INTRAVENOUS at 14:26

## 2023-01-01 RX ADMIN — VASOPRESSIN 2 UNITS: 20 INJECTION, SOLUTION INTRAMUSCULAR; SUBCUTANEOUS at 03:38

## 2023-01-01 RX ADMIN — CALCIUM CHLORIDE 0.5 G: 100 INJECTION INTRAVENOUS; INTRAVENTRICULAR at 03:31

## 2023-01-01 RX ADMIN — SODIUM CHLORIDE, SODIUM GLUCONATE, SODIUM ACETATE, POTASSIUM CHLORIDE, MAGNESIUM CHLORIDE, SODIUM PHOSPHATE, DIBASIC, AND POTASSIUM PHOSPHATE 125 ML/HR: .53; .5; .37; .037; .03; .012; .00082 INJECTION, SOLUTION INTRAVENOUS at 00:38

## 2023-01-01 RX ADMIN — Medication: at 00:21

## 2023-01-01 RX ADMIN — PROPOFOL 50 MCG/KG/MIN: 10 INJECTION, EMULSION INTRAVENOUS at 14:01

## 2023-01-01 RX ADMIN — NOREPINEPHRINE BITARTRATE 30 MCG/MIN: 1 INJECTION INTRAVENOUS at 07:43

## 2023-01-01 RX ADMIN — Medication 100 MCG/HR: at 02:40

## 2023-01-01 RX ADMIN — PROPOFOL 50 MCG/KG/MIN: 10 INJECTION, EMULSION INTRAVENOUS at 19:34

## 2023-01-01 RX ADMIN — VASOPRESSIN 1 UNITS: 20 INJECTION, SOLUTION INTRAMUSCULAR; SUBCUTANEOUS at 00:34

## 2023-01-01 RX ADMIN — Medication 50 MCG/HR: at 05:34

## 2023-01-01 RX ADMIN — PANCRELIPASE 6000 UNITS: 30000; 6000; 19000 CAPSULE, DELAYED RELEASE PELLETS ORAL at 16:55

## 2023-01-01 RX ADMIN — FENTANYL CITRATE 100 MCG: 50 INJECTION, SOLUTION INTRAMUSCULAR; INTRAVENOUS at 02:30

## 2023-01-01 RX ADMIN — ENOXAPARIN SODIUM 30 MG: 30 INJECTION SUBCUTANEOUS at 20:01

## 2023-01-01 RX ADMIN — PANCRELIPASE 6000 UNITS: 30000; 6000; 19000 CAPSULE, DELAYED RELEASE PELLETS ORAL at 09:05

## 2023-01-01 RX ADMIN — ROCURONIUM BROMIDE 50 MG: 10 INJECTION, SOLUTION INTRAVENOUS at 12:21

## 2023-01-01 RX ADMIN — CISATRACURIUM BESYLATE 0.2 MCG/KG/MIN: 10 INJECTION INTRAVENOUS at 13:30

## 2023-01-01 RX ADMIN — MIDAZOLAM 1 MG: 1 INJECTION INTRAMUSCULAR; INTRAVENOUS at 16:23

## 2023-01-01 RX ADMIN — ALBUMIN (HUMAN) 25 G: 12.5 INJECTION, SOLUTION INTRAVENOUS at 12:21

## 2023-01-01 RX ADMIN — SODIUM BICARBONATE 50 ML/HR: 84 INJECTION, SOLUTION INTRAVENOUS at 00:24

## 2023-01-01 RX ADMIN — SODIUM CHLORIDE, SODIUM GLUCONATE, SODIUM ACETATE, POTASSIUM CHLORIDE, MAGNESIUM CHLORIDE, SODIUM PHOSPHATE, DIBASIC, AND POTASSIUM PHOSPHATE 500 ML: .53; .5; .37; .037; .03; .012; .00082 INJECTION, SOLUTION INTRAVENOUS at 10:30

## 2023-01-01 RX ADMIN — VANCOMYCIN HYDROCHLORIDE 750 MG: 750 INJECTION, SOLUTION INTRAVENOUS at 20:44

## 2023-01-01 RX ADMIN — MAGNESIUM SULFATE HEPTAHYDRATE 2 G: 40 INJECTION, SOLUTION INTRAVENOUS at 13:00

## 2023-01-01 RX ADMIN — SODIUM CHLORIDE 500 ML: 0.9 INJECTION, SOLUTION INTRAVENOUS at 03:43

## 2023-01-01 RX ADMIN — SODIUM BICARBONATE 50 MEQ: 84 INJECTION, SOLUTION INTRAVENOUS at 03:01

## 2023-01-01 RX ADMIN — PANCRELIPASE 6000 UNITS: 30000; 6000; 19000 CAPSULE, DELAYED RELEASE PELLETS ORAL at 17:40

## 2023-01-01 RX ADMIN — PANTOPRAZOLE SODIUM 40 MG: 40 INJECTION, POWDER, FOR SOLUTION INTRAVENOUS at 18:17

## 2023-01-01 RX ADMIN — FENTANYL CITRATE 100 MCG: 50 INJECTION, SOLUTION INTRAMUSCULAR; INTRAVENOUS at 02:45

## 2023-01-01 RX ADMIN — SODIUM CHLORIDE, SODIUM GLUCONATE, SODIUM ACETATE, POTASSIUM CHLORIDE, MAGNESIUM CHLORIDE, SODIUM PHOSPHATE, DIBASIC, AND POTASSIUM PHOSPHATE 125 ML/HR: .53; .5; .37; .037; .03; .012; .00082 INJECTION, SOLUTION INTRAVENOUS at 17:30

## 2023-01-01 RX ADMIN — PROPOFOL 50 MCG/KG/MIN: 10 INJECTION, EMULSION INTRAVENOUS at 01:00

## 2023-01-01 RX ADMIN — PIPERACILLIN SODIUM AND TAZOBACTAM SODIUM 3.38 G: 36; 4.5 INJECTION, POWDER, LYOPHILIZED, FOR SOLUTION INTRAVENOUS at 01:37

## 2023-01-01 RX ADMIN — Medication 100 MCG/HR: at 16:20

## 2023-01-01 RX ADMIN — POTASSIUM CHLORIDE: 14.9 INJECTION, SOLUTION INTRAVENOUS at 23:42

## 2023-01-01 RX ADMIN — HYDROMORPHONE HYDROCHLORIDE 0.5 MG: 1 INJECTION, SOLUTION INTRAMUSCULAR; INTRAVENOUS; SUBCUTANEOUS at 10:28

## 2023-01-01 RX ADMIN — ROCURONIUM BROMIDE 50 MG: 10 INJECTION, SOLUTION INTRAVENOUS at 12:53

## 2023-01-01 RX ADMIN — POTASSIUM CHLORIDE 20 MEQ: 14.9 INJECTION, SOLUTION INTRAVENOUS at 09:22

## 2023-01-01 RX ADMIN — PIPERACILLIN SODIUM AND TAZOBACTAM SODIUM 3.38 G: 36; 4.5 INJECTION, POWDER, LYOPHILIZED, FOR SOLUTION INTRAVENOUS at 00:37

## 2023-01-01 RX ADMIN — HALOPERIDOL LACTATE 0.5 MG: 5 INJECTION, SOLUTION INTRAMUSCULAR at 09:15

## 2023-01-01 RX ADMIN — VASOPRESSIN 0.04 UNITS/MIN: 20 INJECTION INTRAVENOUS at 14:31

## 2023-01-01 RX ADMIN — PIPERACILLIN SODIUM AND TAZOBACTAM SODIUM 3.38 G: 36; 4.5 INJECTION, POWDER, LYOPHILIZED, FOR SOLUTION INTRAVENOUS at 20:52

## 2023-01-01 RX ADMIN — HEPARIN SODIUM 5000 UNITS: 5000 INJECTION INTRAVENOUS; SUBCUTANEOUS at 05:20

## 2023-01-01 RX ADMIN — SODIUM CHLORIDE: 0.9 INJECTION, SOLUTION INTRAVENOUS at 12:14

## 2023-01-01 RX ADMIN — SODIUM CHLORIDE, SODIUM GLUCONATE, SODIUM ACETATE, POTASSIUM CHLORIDE, MAGNESIUM CHLORIDE, SODIUM PHOSPHATE, DIBASIC, AND POTASSIUM PHOSPHATE 125 ML/HR: .53; .5; .37; .037; .03; .012; .00082 INJECTION, SOLUTION INTRAVENOUS at 23:43

## 2023-01-01 RX ADMIN — Medication: at 02:04

## 2023-01-01 RX ADMIN — VASOPRESSIN 6 UNITS: 20 INJECTION, SOLUTION INTRAMUSCULAR; SUBCUTANEOUS at 04:26

## 2023-01-01 RX ADMIN — PHENYLEPHRINE HYDROCHLORIDE 200 MCG: 10 INJECTION INTRAVENOUS at 12:23

## 2023-01-01 RX ADMIN — CALCIUM CHLORIDE 0.5 G: 100 INJECTION INTRAVENOUS; INTRAVENTRICULAR at 12:22

## 2023-01-01 RX ADMIN — PIPERACILLIN SODIUM AND TAZOBACTAM SODIUM 3.38 G: 36; 4.5 INJECTION, POWDER, LYOPHILIZED, FOR SOLUTION INTRAVENOUS at 13:07

## 2023-01-01 RX ADMIN — NOREPINEPHRINE BITARTRATE 12 MCG/MIN: 1 INJECTION, SOLUTION, CONCENTRATE INTRAVENOUS at 21:25

## 2023-01-01 RX ADMIN — VASOPRESSIN 2 UNITS: 20 INJECTION, SOLUTION INTRAMUSCULAR; SUBCUTANEOUS at 03:33

## 2023-01-18 ENCOUNTER — TELEPHONE (OUTPATIENT)
Dept: FAMILY MEDICINE CLINIC | Facility: CLINIC | Age: 53
End: 2023-01-18

## 2023-01-18 NOTE — TELEPHONE ENCOUNTER
Left voicemail regarding cologuard kit  Please let patient know to complete kit and send sample back to the lab

## 2023-07-19 ENCOUNTER — TELEPHONE (OUTPATIENT)
Dept: FAMILY MEDICINE CLINIC | Facility: CLINIC | Age: 53
End: 2023-07-19

## 2023-07-19 NOTE — TELEPHONE ENCOUNTER
Can we get records?  I dont have anything in the system and need to know more to make any further comment

## 2023-07-19 NOTE — TELEPHONE ENCOUNTER
T/c from pt's wife, Camden Moreno [phone] - pt has an appt on 7/31 for f/u from ED for pancreatic swelling - placed on wait list for an earlier appt (just same day appts open) - he is feeling a little better each day but stomach is still swollen - taking GAS-X to help - he is not eating, only drinking water, clear sodas - is there anything he can eat (other than jello - he refused that) and take for the discomfort? Please advise.

## 2023-07-19 NOTE — TELEPHONE ENCOUNTER
Called wife - she will be calling [facility] in [place] to see if they can fax us the records for that visit. Wife was given our fax number.

## 2023-07-20 NOTE — TELEPHONE ENCOUNTER
Reviewed records and he has pancreatitis which can be quite severe and painful. If he cannot tolerate clear liquids and jello due to nausea, vomiting it is recommended that he go to the hospital for IV treatment. If he is tolerating clear liquids, he can start to increase starchy foods in his diet (bland pasta, rice, bread). Eat small meals 4-6x a day instead of 2-3 large meals. If tolerating this, start to add in lean/low fat meat such as chicken or turkey (skinless). If doing well  On this start to add more vegetables  and fruit. Avoid fatty foods until symptoms are completely resolved and return fatty foods to the diet slowly.  But until symptoms are resolved avoid red meat, alcohol fried foods, chips/snacks, butter/margirines, vegetables oils, and high dairy products such as milk or ice cream

## 2023-07-20 NOTE — TELEPHONE ENCOUNTER
Spoke to pt's spouse and gave detailed advisement. Spouse stated that pt has stopped vomiting and the pain is subsiding. Will comply with dr Dalia Todd advisement.

## 2023-07-24 ENCOUNTER — TELEPHONE (OUTPATIENT)
Dept: FAMILY MEDICINE CLINIC | Facility: CLINIC | Age: 53
End: 2023-07-24

## 2023-07-24 ENCOUNTER — APPOINTMENT (EMERGENCY)
Dept: CT IMAGING | Facility: HOSPITAL | Age: 53
End: 2023-07-24
Payer: COMMERCIAL

## 2023-07-24 ENCOUNTER — APPOINTMENT (OUTPATIENT)
Dept: RADIOLOGY | Facility: HOSPITAL | Age: 53
DRG: 438 | End: 2023-07-24
Payer: COMMERCIAL

## 2023-07-24 ENCOUNTER — HOSPITAL ENCOUNTER (EMERGENCY)
Facility: HOSPITAL | Age: 53
End: 2023-07-24
Attending: EMERGENCY MEDICINE | Admitting: EMERGENCY MEDICINE
Payer: COMMERCIAL

## 2023-07-24 ENCOUNTER — HOSPITAL ENCOUNTER (INPATIENT)
Facility: HOSPITAL | Age: 53
LOS: 4 days | Discharge: HOME | DRG: 438 | End: 2023-07-28
Attending: STUDENT IN AN ORGANIZED HEALTH CARE EDUCATION/TRAINING PROGRAM | Admitting: STUDENT IN AN ORGANIZED HEALTH CARE EDUCATION/TRAINING PROGRAM
Payer: COMMERCIAL

## 2023-07-24 VITALS
TEMPERATURE: 98.5 F | HEART RATE: 111 BPM | SYSTOLIC BLOOD PRESSURE: 126 MMHG | OXYGEN SATURATION: 94 % | RESPIRATION RATE: 18 BRPM | DIASTOLIC BLOOD PRESSURE: 73 MMHG

## 2023-07-24 DIAGNOSIS — K85.91 NECROTIZING PANCREATITIS: Primary | ICD-10-CM

## 2023-07-24 DIAGNOSIS — K55.069 SUPERIOR MESENTERIC ARTERY THROMBOSIS (HCC): ICD-10-CM

## 2023-07-24 DIAGNOSIS — E87.1 HYPONATREMIA: ICD-10-CM

## 2023-07-24 DIAGNOSIS — R73.03 PREDIABETES: ICD-10-CM

## 2023-07-24 PROBLEM — R82.90 ABNORMAL URINALYSIS: Status: ACTIVE | Noted: 2023-07-24

## 2023-07-24 PROBLEM — N17.9 AKI (ACUTE KIDNEY INJURY) (HCC): Status: ACTIVE | Noted: 2023-07-24

## 2023-07-24 PROBLEM — D72.829 LEUKOCYTOSIS: Status: ACTIVE | Noted: 2023-07-24

## 2023-07-24 PROBLEM — J90 PLEURAL EFFUSION, LEFT: Status: ACTIVE | Noted: 2023-07-24

## 2023-07-24 LAB
ALBUMIN SERPL BCP-MCNC: 3.2 G/DL (ref 3.5–5)
ALP SERPL-CCNC: 105 U/L (ref 34–104)
ALT SERPL W P-5'-P-CCNC: 34 U/L (ref 7–52)
ANION GAP SERPL CALCULATED.3IONS-SCNC: 10 MMOL/L
ANION GAP SERPL CALCULATED.3IONS-SCNC: 13 MMOL/L
APTT PPP: 25 SECONDS (ref 23–37)
APTT PPP: 46 SECONDS (ref 23–37)
AST SERPL W P-5'-P-CCNC: 27 U/L (ref 13–39)
BACTERIA UR QL AUTO: ABNORMAL /HPF
BACTERIA UR QL AUTO: ABNORMAL /HPF
BASOPHILS # BLD MANUAL: 0 THOUSAND/UL (ref 0–0.1)
BASOPHILS NFR MAR MANUAL: 0 % (ref 0–1)
BILIRUB SERPL-MCNC: 1.28 MG/DL (ref 0.2–1)
BILIRUB UR QL STRIP: NEGATIVE
BILIRUB UR QL STRIP: NEGATIVE
BUDDING YEAST: PRESENT
BUN SERPL-MCNC: 28 MG/DL (ref 5–25)
BUN SERPL-MCNC: 31 MG/DL (ref 5–25)
CALCIUM ALBUM COR SERPL-MCNC: 9.2 MG/DL (ref 8.3–10.1)
CALCIUM SERPL-MCNC: 8.1 MG/DL (ref 8.3–10.1)
CALCIUM SERPL-MCNC: 8.6 MG/DL (ref 8.4–10.2)
CHLORIDE SERPL-SCNC: 89 MMOL/L (ref 96–108)
CHLORIDE SERPL-SCNC: 96 MMOL/L (ref 96–108)
CHOLEST SERPL-MCNC: 84 MG/DL
CLARITY UR: ABNORMAL
CLARITY UR: CLEAR
CO2 SERPL-SCNC: 25 MMOL/L (ref 21–32)
CO2 SERPL-SCNC: 27 MMOL/L (ref 21–32)
COLOR UR: YELLOW
COLOR UR: YELLOW
CREAT SERPL-MCNC: 1.22 MG/DL (ref 0.6–1.3)
CREAT SERPL-MCNC: 1.48 MG/DL (ref 0.6–1.3)
EOSINOPHIL # BLD MANUAL: 0 THOUSAND/UL (ref 0–0.4)
EOSINOPHIL NFR BLD MANUAL: 0 % (ref 0–6)
ERYTHROCYTE [DISTWIDTH] IN BLOOD BY AUTOMATED COUNT: 13.6 % (ref 11.6–15.1)
ERYTHROCYTE [DISTWIDTH] IN BLOOD BY AUTOMATED COUNT: 13.7 % (ref 11.6–15.1)
EST. AVERAGE GLUCOSE BLD GHB EST-MCNC: 134 MG/DL
GFR SERPL CREATININE-BSD FRML MDRD: 53 ML/MIN/1.73SQ M
GFR SERPL CREATININE-BSD FRML MDRD: 67 ML/MIN/1.73SQ M
GLUCOSE SERPL-MCNC: 181 MG/DL (ref 65–140)
GLUCOSE SERPL-MCNC: 191 MG/DL (ref 65–140)
GLUCOSE SERPL-MCNC: 260 MG/DL (ref 65–140)
GLUCOSE SERPL-MCNC: 296 MG/DL (ref 65–140)
GLUCOSE UR STRIP-MCNC: ABNORMAL MG/DL
GLUCOSE UR STRIP-MCNC: ABNORMAL MG/DL
HBA1C MFR BLD: 6.3 %
HCT VFR BLD AUTO: 42.9 % (ref 36.5–49.3)
HCT VFR BLD AUTO: 47.8 % (ref 36.5–49.3)
HDLC SERPL-MCNC: 23 MG/DL
HGB BLD-MCNC: 14.7 G/DL (ref 12–17)
HGB BLD-MCNC: 16.1 G/DL (ref 12–17)
HGB UR QL STRIP.AUTO: ABNORMAL
HGB UR QL STRIP.AUTO: ABNORMAL
HYALINE CASTS #/AREA URNS LPF: ABNORMAL /LPF
INR PPP: 1.23 (ref 0.84–1.19)
KETONES UR STRIP-MCNC: ABNORMAL MG/DL
KETONES UR STRIP-MCNC: ABNORMAL MG/DL
LACTATE SERPL-SCNC: 1.5 MMOL/L (ref 0.5–2)
LACTATE SERPL-SCNC: 1.9 MMOL/L (ref 0.5–2)
LACTATE SERPL-SCNC: 2.1 MMOL/L (ref 0.5–2)
LDH SERPL-CCNC: 565 U/L (ref 140–271)
LDLC SERPL CALC-MCNC: 38 MG/DL (ref 0–100)
LEUKOCYTE ESTERASE UR QL STRIP: NEGATIVE
LEUKOCYTE ESTERASE UR QL STRIP: NEGATIVE
LIPASE SERPL-CCNC: 23 U/L (ref 11–82)
LYMPHOCYTES # BLD AUTO: 2.81 THOUSAND/UL (ref 0.6–4.47)
LYMPHOCYTES # BLD AUTO: 6 % (ref 14–44)
MAGNESIUM SERPL-MCNC: 2.4 MG/DL (ref 1.9–2.7)
MCH RBC QN AUTO: 28.9 PG (ref 26.8–34.3)
MCH RBC QN AUTO: 29.2 PG (ref 26.8–34.3)
MCHC RBC AUTO-ENTMCNC: 33.7 G/DL (ref 31.4–37.4)
MCHC RBC AUTO-ENTMCNC: 34.3 G/DL (ref 31.4–37.4)
MCV RBC AUTO: 85 FL (ref 82–98)
MCV RBC AUTO: 86 FL (ref 82–98)
METAMYELOCYTES NFR BLD MANUAL: 1 % (ref 0–1)
MONOCYTES # BLD AUTO: 0.8 THOUSAND/UL (ref 0–1.22)
MONOCYTES NFR BLD: 2 % (ref 4–12)
NEUTROPHILS # BLD MANUAL: 36.11 THOUSAND/UL (ref 1.85–7.62)
NEUTS BAND NFR BLD MANUAL: 4 % (ref 0–8)
NEUTS SEG NFR BLD AUTO: 86 % (ref 43–75)
NITRITE UR QL STRIP: NEGATIVE
NITRITE UR QL STRIP: NEGATIVE
NON-SQ EPI CELLS URNS QL MICRO: ABNORMAL /HPF
NON-SQ EPI CELLS URNS QL MICRO: ABNORMAL /HPF
NONHDLC SERPL-MCNC: 61 MG/DL
NRBC BLD AUTO-RTO: 0 /100 WBCS
PH UR STRIP.AUTO: 6 [PH]
PH UR STRIP.AUTO: 6.5 [PH]
PLATELET # BLD AUTO: 313 THOUSANDS/UL (ref 149–390)
PLATELET # BLD AUTO: 374 THOUSANDS/UL (ref 149–390)
PLATELET BLD QL SMEAR: ADEQUATE
PMV BLD AUTO: 10.2 FL (ref 8.9–12.7)
PMV BLD AUTO: 10.4 FL (ref 8.9–12.7)
POLYCHROMASIA BLD QL SMEAR: PRESENT
POTASSIUM SERPL-SCNC: 3.7 MMOL/L (ref 3.5–5.3)
POTASSIUM SERPL-SCNC: 3.8 MMOL/L (ref 3.5–5.3)
PROCALCITONIN SERPL-MCNC: 0.78 NG/ML
PROCALCITONIN SERPL-MCNC: 0.96 NG/ML
PROT SERPL-MCNC: 6.7 G/DL (ref 6.4–8.4)
PROT UR STRIP-MCNC: ABNORMAL MG/DL
PROT UR STRIP-MCNC: ABNORMAL MG/DL
PROTHROMBIN TIME: 15.8 SECONDS (ref 11.6–14.5)
RBC # BLD AUTO: 5.03 MILLION/UL (ref 3.88–5.62)
RBC # BLD AUTO: 5.58 MILLION/UL (ref 3.88–5.62)
RBC #/AREA URNS AUTO: ABNORMAL /HPF
RBC #/AREA URNS AUTO: ABNORMAL /HPF
RBC MORPH BLD: PRESENT
SODIUM SERPL-SCNC: 129 MMOL/L (ref 135–147)
SODIUM SERPL-SCNC: 131 MMOL/L (ref 135–147)
SP GR UR STRIP.AUTO: 1.02 (ref 1–1.03)
SP GR UR STRIP.AUTO: 1.05 (ref 1–1.03)
TOXIC GRANULES BLD QL SMEAR: PRESENT
TRIGL SERPL-MCNC: 115 MG/DL
UROBILINOGEN UR STRIP-ACNC: 2 MG/DL
UROBILINOGEN UR STRIP-ACNC: <2 MG/DL
VARIANT LYMPHS # BLD AUTO: 1 %
WBC # BLD AUTO: 34.54 THOUSAND/UL (ref 4.31–10.16)
WBC # BLD AUTO: 40.12 THOUSAND/UL (ref 4.31–10.16)
WBC #/AREA URNS AUTO: ABNORMAL /HPF
WBC #/AREA URNS AUTO: ABNORMAL /HPF
WBC TOXIC VACUOLES BLD QL SMEAR: PRESENT

## 2023-07-24 PROCEDURE — 80053 COMPREHEN METABOLIC PANEL: CPT | Performed by: EMERGENCY MEDICINE

## 2023-07-24 PROCEDURE — 85007 BL SMEAR W/DIFF WBC COUNT: CPT | Performed by: EMERGENCY MEDICINE

## 2023-07-24 PROCEDURE — 36415 COLL VENOUS BLD VENIPUNCTURE: CPT | Performed by: EMERGENCY MEDICINE

## 2023-07-24 PROCEDURE — 80048 BASIC METABOLIC PNL TOTAL CA: CPT | Performed by: PHYSICIAN ASSISTANT

## 2023-07-24 PROCEDURE — 80061 LIPID PANEL: CPT | Performed by: PHYSICIAN ASSISTANT

## 2023-07-24 PROCEDURE — 82948 REAGENT STRIP/BLOOD GLUCOSE: CPT

## 2023-07-24 PROCEDURE — 85027 COMPLETE CBC AUTOMATED: CPT | Performed by: PHYSICIAN ASSISTANT

## 2023-07-24 PROCEDURE — 74177 CT ABD & PELVIS W/CONTRAST: CPT

## 2023-07-24 PROCEDURE — 99291 CRITICAL CARE FIRST HOUR: CPT | Performed by: EMERGENCY MEDICINE

## 2023-07-24 PROCEDURE — 83605 ASSAY OF LACTIC ACID: CPT | Performed by: PHYSICIAN ASSISTANT

## 2023-07-24 PROCEDURE — 84145 PROCALCITONIN (PCT): CPT | Performed by: EMERGENCY MEDICINE

## 2023-07-24 PROCEDURE — 83605 ASSAY OF LACTIC ACID: CPT | Performed by: EMERGENCY MEDICINE

## 2023-07-24 PROCEDURE — 83036 HEMOGLOBIN GLYCOSYLATED A1C: CPT | Performed by: PHYSICIAN ASSISTANT

## 2023-07-24 PROCEDURE — 99223 1ST HOSP IP/OBS HIGH 75: CPT | Performed by: PHYSICIAN ASSISTANT

## 2023-07-24 PROCEDURE — 87147 CULTURE TYPE IMMUNOLOGIC: CPT | Performed by: PHYSICIAN ASSISTANT

## 2023-07-24 PROCEDURE — 85730 THROMBOPLASTIN TIME PARTIAL: CPT | Performed by: SURGERY

## 2023-07-24 PROCEDURE — 87086 URINE CULTURE/COLONY COUNT: CPT | Performed by: PHYSICIAN ASSISTANT

## 2023-07-24 PROCEDURE — 81001 URINALYSIS AUTO W/SCOPE: CPT | Performed by: PHYSICIAN ASSISTANT

## 2023-07-24 PROCEDURE — 87077 CULTURE AEROBIC IDENTIFY: CPT | Performed by: PHYSICIAN ASSISTANT

## 2023-07-24 PROCEDURE — 76705 ECHO EXAM OF ABDOMEN: CPT

## 2023-07-24 PROCEDURE — C9113 INJ PANTOPRAZOLE SODIUM, VIA: HCPCS | Performed by: PHYSICIAN ASSISTANT

## 2023-07-24 PROCEDURE — 85027 COMPLETE CBC AUTOMATED: CPT | Performed by: EMERGENCY MEDICINE

## 2023-07-24 PROCEDURE — 83735 ASSAY OF MAGNESIUM: CPT | Performed by: EMERGENCY MEDICINE

## 2023-07-24 PROCEDURE — 85610 PROTHROMBIN TIME: CPT | Performed by: EMERGENCY MEDICINE

## 2023-07-24 PROCEDURE — 87040 BLOOD CULTURE FOR BACTERIA: CPT | Performed by: EMERGENCY MEDICINE

## 2023-07-24 PROCEDURE — 83690 ASSAY OF LIPASE: CPT | Performed by: EMERGENCY MEDICINE

## 2023-07-24 PROCEDURE — 81001 URINALYSIS AUTO W/SCOPE: CPT | Performed by: EMERGENCY MEDICINE

## 2023-07-24 PROCEDURE — 85730 THROMBOPLASTIN TIME PARTIAL: CPT | Performed by: EMERGENCY MEDICINE

## 2023-07-24 PROCEDURE — 99254 IP/OBS CNSLTJ NEW/EST MOD 60: CPT | Performed by: SURGERY

## 2023-07-24 PROCEDURE — 83615 LACTATE (LD) (LDH) ENZYME: CPT | Performed by: EMERGENCY MEDICINE

## 2023-07-24 PROCEDURE — 84145 PROCALCITONIN (PCT): CPT | Performed by: PHYSICIAN ASSISTANT

## 2023-07-24 PROCEDURE — 87040 BLOOD CULTURE FOR BACTERIA: CPT | Performed by: PHYSICIAN ASSISTANT

## 2023-07-24 RX ORDER — OXYCODONE HYDROCHLORIDE 5 MG/1
5 TABLET ORAL EVERY 4 HOURS PRN
Status: DISCONTINUED | OUTPATIENT
Start: 2023-07-24 | End: 2023-07-25

## 2023-07-24 RX ORDER — HEPARIN SODIUM 10000 [USP'U]/100ML
3-30 INJECTION, SOLUTION INTRAVENOUS
Status: DISCONTINUED | OUTPATIENT
Start: 2023-07-24 | End: 2023-07-24

## 2023-07-24 RX ORDER — HYDRALAZINE HYDROCHLORIDE 20 MG/ML
10 INJECTION INTRAMUSCULAR; INTRAVENOUS EVERY 6 HOURS PRN
Status: DISCONTINUED | OUTPATIENT
Start: 2023-07-24 | End: 2023-07-25

## 2023-07-24 RX ORDER — PANTOPRAZOLE SODIUM 40 MG/10ML
40 INJECTION, POWDER, LYOPHILIZED, FOR SOLUTION INTRAVENOUS EVERY 12 HOURS SCHEDULED
Status: DISCONTINUED | OUTPATIENT
Start: 2023-07-25 | End: 2023-07-28

## 2023-07-24 RX ORDER — HEPARIN SODIUM 1000 [USP'U]/ML
10000 INJECTION, SOLUTION INTRAVENOUS; SUBCUTANEOUS ONCE
Status: DISCONTINUED | OUTPATIENT
Start: 2023-07-24 | End: 2023-07-24

## 2023-07-24 RX ORDER — INSULIN LISPRO 100 [IU]/ML
1-5 INJECTION, SOLUTION INTRAVENOUS; SUBCUTANEOUS
Status: DISCONTINUED | OUTPATIENT
Start: 2023-07-24 | End: 2023-07-24

## 2023-07-24 RX ORDER — HEPARIN SODIUM 1000 [USP'U]/ML
4800 INJECTION, SOLUTION INTRAVENOUS; SUBCUTANEOUS EVERY 6 HOURS PRN
Status: DISCONTINUED | OUTPATIENT
Start: 2023-07-24 | End: 2023-07-28 | Stop reason: HOSPADM

## 2023-07-24 RX ORDER — CHLORHEXIDINE GLUCONATE 0.12 MG/ML
15 RINSE ORAL EVERY 12 HOURS SCHEDULED
Status: DISCONTINUED | OUTPATIENT
Start: 2023-07-24 | End: 2023-07-28 | Stop reason: HOSPADM

## 2023-07-24 RX ORDER — PANTOPRAZOLE SODIUM 40 MG/10ML
40 INJECTION, POWDER, LYOPHILIZED, FOR SOLUTION INTRAVENOUS
Status: DISCONTINUED | OUTPATIENT
Start: 2023-07-24 | End: 2023-07-24

## 2023-07-24 RX ORDER — SODIUM CHLORIDE, SODIUM GLUCONATE, SODIUM ACETATE, POTASSIUM CHLORIDE, MAGNESIUM CHLORIDE, SODIUM PHOSPHATE, DIBASIC, AND POTASSIUM PHOSPHATE .53; .5; .37; .037; .03; .012; .00082 G/100ML; G/100ML; G/100ML; G/100ML; G/100ML; G/100ML; G/100ML
1000 INJECTION, SOLUTION INTRAVENOUS ONCE
Status: COMPLETED | OUTPATIENT
Start: 2023-07-24 | End: 2023-07-24

## 2023-07-24 RX ORDER — OXYCODONE HYDROCHLORIDE 10 MG/1
10 TABLET ORAL EVERY 4 HOURS PRN
Status: DISCONTINUED | OUTPATIENT
Start: 2023-07-24 | End: 2023-07-25

## 2023-07-24 RX ORDER — ONDANSETRON 2 MG/ML
4 INJECTION INTRAMUSCULAR; INTRAVENOUS ONCE
Status: COMPLETED | OUTPATIENT
Start: 2023-07-24 | End: 2023-07-24

## 2023-07-24 RX ORDER — INSULIN LISPRO 100 [IU]/ML
1-5 INJECTION, SOLUTION INTRAVENOUS; SUBCUTANEOUS EVERY 6 HOURS SCHEDULED
Status: DISCONTINUED | OUTPATIENT
Start: 2023-07-24 | End: 2023-07-24

## 2023-07-24 RX ORDER — HYDROMORPHONE HCL/PF 1 MG/ML
0.5 SYRINGE (ML) INJECTION EVERY 2 HOUR PRN
Status: DISCONTINUED | OUTPATIENT
Start: 2023-07-24 | End: 2023-07-25

## 2023-07-24 RX ORDER — SODIUM CHLORIDE, SODIUM GLUCONATE, SODIUM ACETATE, POTASSIUM CHLORIDE, MAGNESIUM CHLORIDE, SODIUM PHOSPHATE, DIBASIC, AND POTASSIUM PHOSPHATE .53; .5; .37; .037; .03; .012; .00082 G/100ML; G/100ML; G/100ML; G/100ML; G/100ML; G/100ML; G/100ML
100 INJECTION, SOLUTION INTRAVENOUS CONTINUOUS
Status: DISCONTINUED | OUTPATIENT
Start: 2023-07-24 | End: 2023-07-25

## 2023-07-24 RX ORDER — HEPARIN SODIUM 1000 [USP'U]/ML
9600 INJECTION, SOLUTION INTRAVENOUS; SUBCUTANEOUS ONCE
Status: COMPLETED | OUTPATIENT
Start: 2023-07-24 | End: 2023-07-24

## 2023-07-24 RX ORDER — HEPARIN SODIUM 10000 [USP'U]/100ML
3-30 INJECTION, SOLUTION INTRAVENOUS
Status: DISCONTINUED | OUTPATIENT
Start: 2023-07-24 | End: 2023-07-28 | Stop reason: HOSPADM

## 2023-07-24 RX ORDER — HEPARIN SODIUM 1000 [USP'U]/ML
9600 INJECTION, SOLUTION INTRAVENOUS; SUBCUTANEOUS EVERY 6 HOURS PRN
Status: DISCONTINUED | OUTPATIENT
Start: 2023-07-24 | End: 2023-07-28 | Stop reason: HOSPADM

## 2023-07-24 RX ORDER — MORPHINE SULFATE 4 MG/ML
4 INJECTION, SOLUTION INTRAMUSCULAR; INTRAVENOUS ONCE
Status: COMPLETED | OUTPATIENT
Start: 2023-07-24 | End: 2023-07-24

## 2023-07-24 RX ORDER — HEPARIN SODIUM 1000 [USP'U]/ML
10000 INJECTION, SOLUTION INTRAVENOUS; SUBCUTANEOUS EVERY 6 HOURS PRN
Status: DISCONTINUED | OUTPATIENT
Start: 2023-07-24 | End: 2023-07-24

## 2023-07-24 RX ORDER — HEPARIN SODIUM 1000 [USP'U]/ML
5000 INJECTION, SOLUTION INTRAVENOUS; SUBCUTANEOUS EVERY 6 HOURS PRN
Status: DISCONTINUED | OUTPATIENT
Start: 2023-07-24 | End: 2023-07-24

## 2023-07-24 RX ADMIN — IOHEXOL 100 ML: 350 INJECTION, SOLUTION INTRAVENOUS at 10:23

## 2023-07-24 RX ADMIN — SODIUM CHLORIDE, SODIUM GLUCONATE, SODIUM ACETATE, POTASSIUM CHLORIDE, MAGNESIUM CHLORIDE, SODIUM PHOSPHATE, DIBASIC, AND POTASSIUM PHOSPHATE 125 ML/HR: .53; .5; .37; .037; .03; .012; .00082 INJECTION, SOLUTION INTRAVENOUS at 21:09

## 2023-07-24 RX ADMIN — HEPARIN SODIUM 9600 UNITS: 1000 INJECTION INTRAVENOUS; SUBCUTANEOUS at 15:26

## 2023-07-24 RX ADMIN — PANTOPRAZOLE SODIUM 40 MG: 40 INJECTION, POWDER, FOR SOLUTION INTRAVENOUS at 15:25

## 2023-07-24 RX ADMIN — ONDANSETRON 4 MG: 2 INJECTION INTRAMUSCULAR; INTRAVENOUS at 09:59

## 2023-07-24 RX ADMIN — SODIUM CHLORIDE 1000 ML: 0.9 INJECTION, SOLUTION INTRAVENOUS at 09:37

## 2023-07-24 RX ADMIN — SODIUM CHLORIDE 3 UNITS/HR: 9 INJECTION, SOLUTION INTRAVENOUS at 17:23

## 2023-07-24 RX ADMIN — SODIUM CHLORIDE, SODIUM GLUCONATE, SODIUM ACETATE, POTASSIUM CHLORIDE, MAGNESIUM CHLORIDE, SODIUM PHOSPHATE, DIBASIC, AND POTASSIUM PHOSPHATE 125 ML/HR: .53; .5; .37; .037; .03; .012; .00082 INJECTION, SOLUTION INTRAVENOUS at 15:42

## 2023-07-24 RX ADMIN — SODIUM CHLORIDE, SODIUM GLUCONATE, SODIUM ACETATE, POTASSIUM CHLORIDE, MAGNESIUM CHLORIDE, SODIUM PHOSPHATE, DIBASIC, AND POTASSIUM PHOSPHATE 1000 ML: .53; .5; .37; .037; .03; .012; .00082 INJECTION, SOLUTION INTRAVENOUS at 19:15

## 2023-07-24 RX ADMIN — MORPHINE SULFATE 4 MG: 4 INJECTION INTRAVENOUS at 09:59

## 2023-07-24 RX ADMIN — HEPARIN SODIUM 4800 UNITS: 1000 INJECTION INTRAVENOUS; SUBCUTANEOUS at 22:56

## 2023-07-24 RX ADMIN — HEPARIN SODIUM 18 UNITS/KG/HR: 10000 INJECTION, SOLUTION INTRAVENOUS at 15:26

## 2023-07-24 NOTE — EMTALA/ACUTE CARE TRANSFER
[address] [facility] [id number]  Dept: [phone]      EMTALA TRANSFER CONSENT    NAME Karlynn Nissen                                         1970                              MRN [id number]    I have been informed of my rights regarding examination, treatment, and transfer   by Dr. Nicholas Lara DO    Benefits: Specialized equipment and/or services available at the receiving facility (Include comment)________________________    Risks: Potential deterioration of medical condition, Loss of IV, Increased discomfort during transfer, Possible worsening of condition or death during transfer      Consent for Transfer:  I acknowledge that my medical condition has been evaluated and explained to me by the emergency department physician or other qualified medical person and/or my attending physician, who has recommended that I be transferred to the service of  Accepting Physician: Dr. Gera Kumar at [address] [place] [address] [address] Name, [place] [facility] [address] : [facility]. The above potential benefits of such transfer, the potential risks associated with such transfer, and the probable risks of not being transferred have been explained to me, and I fully understand them. The doctor has explained that, in my case, the benefits of transfer outweigh the risks. I agree to be transferred. I authorize the performance of emergency medical procedures and treatments upon me in both transit and upon arrival at the receiving facility. Additionally, I authorize the release of any and all medical records to the receiving facility and request they be transported with me, if possible. I understand that the safest mode of transportation during a medical emergency is an ambulance and that the Hospital advocates the use of this mode of transport.  Risks of traveling to the receiving facility by car, including absence of medical control, life sustaining equipment, such as oxygen, and medical personnel has been explained to me and I fully understand them. (GEE CORRECT BOX BELOW)  [  ]  I consent to the stated transfer and to be transported by ambulance/helicopter. [  ]  I consent to the stated transfer, but refuse transportation by ambulance and accept full responsibility for my transportation by car. I understand the risks of non-ambulance transfers and I exonerate the Hospital and its staff from any deterioration in my condition that results from this refusal.    X___________________________________________    DATE  23  TIME________  Signature of patient or legally responsible individual signing on patient behalf           RELATIONSHIP TO PATIENT_________________________          Provider Certification    NAME Stu Goddard                                         1970                              MRN [id number]    A medical screening exam was performed on the above named patient. Based on the examination:    Condition Necessitating Transfer The primary encounter diagnosis was Necrotizing pancreatitis. A diagnosis of Hyponatremia was also pertinent to this visit.     Patient Condition: The patient has been stabilized such that within reasonable medical probability, no material deterioration of the patient condition or the condition of the unborn child(jorge) is likely to result from the transfer    Reason for Transfer: Level of Care needed not available at this facility    Transfer Requirements: Facility [facility]   · Space available and qualified personnel available for treatment as acknowledged by    · Agreed to accept transfer and to provide appropriate medical treatment as acknowledged by       Dr. Promise Hines  · Appropriate medical records of the examination and treatment of the patient are provided at the time of transfer   [address] _______  · Transfer will be performed by qualified personnel from    and appropriate transfer equipment as required, including the use of necessary and appropriate life support measures. Provider Certification: I have examined the patient and explained the following risks and benefits of being transferred/refusing transfer to the patient/family:  General risk, such as traffic hazards, adverse weather conditions, rough terrain or turbulence, possible failure of equipment (including vehicle or aircraft), or consequences of actions of persons outside the control of the transport personnel, Unanticipated needs of medical equipment and personnel during transport, Risk of worsening condition, The possibility of a transport vehicle being unavailable      Based on these reasonable risks and benefits to the patient and/or the unborn child(jorge), and based upon the information available at the time of the patient’s examination, I certify that the medical benefits reasonably to be expected from the provision of appropriate medical treatments at another medical facility outweigh the increasing risks, if any, to the individual’s medical condition, and in the case of labor to the unborn child, from effecting the transfer.     X____________________________________________ DATE 07/24/23        TIME_______      ORIGINAL - SEND TO MEDICAL RECORDS   COPY - SEND WITH PATIENT DURING TRANSFER

## 2023-07-24 NOTE — H&P
[address] [place] [address]  H&P: Critical Care  Name: Kristin Robles 46 y.o. male I MRN: [id number]  Unit/Bed#: MICU 05 I Date of Admission: 7/24/2023   Date of Service: 7/24/2023 I Hospital Day: 0      Assessment/Plan   Neuro:   • Acute Pain due to Pancreatitis  o Oxy 5/10 Q4 PRN  o Dilaudid 0.5 Q4 PRN for breakthrough pain   • Diagnosis: At risk for ICU Delirium   o CAM-ICU  o Regulate Sleep Wake Cycle  o Redirection as needed      CV:   • Diagnosis:History of Hypertension   o Continue to monitor off home antihypertensives as is normotensive on arrival  o Follow-up Lipid Panel   o Hydralazine 10mg IV PRN for SBP > 160  o Continue Cardiopulmonary Monitoring   o MAP >65      Pulm:  • Diagnosis:Small Left Sided Pleural Effusion  o Maintain SpO2 > 90%  o Pulmonary Toilet       GI:   • Diagnosis:Acute Necrotizing Pancreatitis   o Pain Regimen as above  o NPO, Sips with clears  o Serial Abdominal Exams  o Red Surgery Consult  o Protonix 40 BID  • Diagnosis:SMA Thrombosis  o Plan: Initiate Heparin Drip        :   • Diagnosis: JERZY  o Likely Pre-Renal in the setting of poor PO intake  o Continue to monitor BUN/Cr.  - Baseline: 0.9  - Today:1.48  o Continue to Monitor I/O      F/E/N:    • F: Isolyte 125/hr  • E: Hyponatremia  o Continue to monitor sodium on daily BMP  o Replete other as needed, continue to trend electrolytes.  Goal: K >4.0, Mg >2.0, Phos >3.0  • N: NPO, Sips of Clears       Heme/Onc:   • Diagnosis:SMA Thrombosis  - Continue Heparin Drip    o Continue to monitor CBC, Current HgB 16.1  o Consider Transfusion for Hemoglobin <7  • DVT Prophylaxis: Heparin drip, bilateral SCD      Endo:   • Diagnosis:Hyperglycemia   o SSI  o Maintain Euglycemia  o Goal blood glucose 140-180 in the setting of critical illness      ID:   • Diagnosis:Leukocytosis  o Follow Up Blood and Urine Cultures and RUQ ultrasound   o Monitor off Antibiotics for now  o Continue to Monitor Fever Curve and WBC Count MSK/Skin:   • Diagnosis: At risk for pressure ulcers  o Plan: Q2 Turning  o Strict skin surveillance   o PT/OT Eval and Treat      Disposition: Stepdown Level 1       History of Present Illness     HPI: Kelly Patterson is a 46year old male with PMH of HTN who presented to [facility] with complaints of Abdominal Pain, neausea, vomiting and weakness since 7/15. Patient was seen at an outside hospital on 7/19 and diagnosed with pancreatitis with reccomendations for admission however patient elected to manage symtpoms at home with a bland diet. He represented on 7/24 to [facility] with continued abdominal pain, nausea, vomiting and weakness. CT scan revealed evidence of necrotizing pancreatitis therefore patient was transferred to [facility] for management. History obtained from chart review and the patient. Review of Systems   Constitutional: Positive for fever. Gastrointestinal: Positive for abdominal pain. Negative for diarrhea, nausea and vomiting. All other systems reviewed and are negative. Historical Information   No past medical history on file. Past Surgical History:  No date: HERNIA REPAIR  1976: TONSILLECTOMY   Current Outpatient Medications   Medication Instructions   • Blood Pressure Monitoring (Adult Blood Pressure Cuff Lg) KIT Does not apply, 3 times daily   • lisinopril (ZESTRIL) 10 mg, Oral, Daily   • naproxen sodium (ALEVE) 220 mg, Oral    No Known Allergies   Social History     Tobacco Use   • Smoking status: Never   • Smokeless tobacco: Current     Types: Chew   Vaping Use   • Vaping Use: Never used   Substance Use Topics   • Alcohol use:  Yes   • Drug use: Never    Family History   Problem Relation Age of Onset   • Cancer Mother    • Multiple myeloma Mother    • Prostate cancer Father    • Cancer Father           Objective                            Vitals I/O      Most Recent Min/Max in 24hrs   Temp   Temp  Min: 98.5 °F (36.9 °C)  Max: 98.5 °F (36.9 °C)   Pulse   Pulse  Min: 111  Max: 119   Resp Resp  Min: 17  Max: 20   BP   BP  Min: 122/63  Max: 132/63   O2 Sat   SpO2  Min: 93 %  Max: 99 %    No intake or output data in the 24 hours ending 07/24/23 1520      Diet NPO; Sips of clear liquids     Invasive Monitoring Physical exam    Physical Exam  Vitals and nursing note reviewed. Constitutional:       General: He is not in acute distress. Appearance: Normal appearance. He is normal weight. He is not ill-appearing or toxic-appearing. HENT:      Nose: Nose normal.      Mouth/Throat:      Mouth: Mucous membranes are moist.      Pharynx: Oropharynx is clear. Eyes:      Extraocular Movements: Extraocular movements intact. Pupils: Pupils are equal, round, and reactive to light. Cardiovascular:      Rate and Rhythm: Normal rate and regular rhythm. Pulses: Normal pulses. Pulmonary:      Effort: Pulmonary effort is normal.      Breath sounds: Normal breath sounds. Abdominal:      General: There is distension. Palpations: Abdomen is soft. Tenderness: There is abdominal tenderness. Comments: Abdomen Distended, tender in the epigastrium, no rebound or guarding    Musculoskeletal:         General: Normal range of motion. Cervical back: Normal range of motion. Skin:     General: Skin is warm. Capillary Refill: Capillary refill takes less than 2 seconds. Neurological:      General: No focal deficit present. Mental Status: He is alert and oriented to person, place, and time. Mental status is at baseline. Cranial Nerves: No cranial nerve deficit. Diagnostic Studies    EKG: NSR  Imaging:  I have personally reviewed pertinent reports.        Medications:  Scheduled PRN   chlorhexidine, 15 mL, Q12H ALEX  heparin (porcine), 9,600 Units, Once  insulin lispro, 1-5 Units, Q6H ALEX  insulin lispro, 1-5 Units, HS  pantoprazole, 40 mg, Q24H ALEX      heparin (porcine), 4,800 Units, Q6H PRN  heparin (porcine), 9,600 Units, Q6H PRN  hydrALAZINE, 10 mg, Q6H PRN  HYDROmorphone, 0.5 mg, Q2H PRN  oxyCODONE, 10 mg, Q4H PRN  oxyCODONE, 5 mg, Q4H PRN       Continuous    heparin (porcine), 3-30 Units/kg/hr (Order-Specific)  multi-electrolyte, 125 mL/hr         Labs:    CBC    Recent Labs     07/24/23  0936   WBC 40.12*   HGB 16.1   HCT 47.8      BANDSPCT 4     BMP    Recent Labs     07/24/23  0936   SODIUM 129*   K 3.7   CL 89*   CO2 27   AGAP 13   BUN 31*   CREATININE 1.48*   CALCIUM 8.6       Coags    No recent results     Additional Electrolytes  Recent Labs     07/24/23  0936   MG 2.4          Blood Gas    No recent results  No recent results LFTs  Recent Labs     07/24/23  0936   ALT 34   AST 27   ALKPHOS 105*   ALB 3.2*   TBILI 1.28*       Infectious  Recent Labs     07/24/23  1133   PROCALCITONI 0.78*     Glucose  Recent Labs     07/24/23  0936   GLUC 296*                 [facility]

## 2023-07-24 NOTE — ED PROVIDER NOTES
Pt Name: James Hernandez  MRN: [id number]  [address] [facility] [place] 1970  Age/Sex: 46 y.o. male  Date of evaluation: 7/24/2023  PCP: Chayo Santacruz MD    CHIEF COMPLAINT    Chief Complaint   Patient presents with   • Abdominal Pain     Pt with a pancreatitis flare up. Also c/o weakness and diarrhea. HPI and MDM    46 y.o. male presenting with abdominal pain, nausea/vomiting, generalized weakness. Patient had been on a low sugar diet for about 6 weeks, last weekend on Saturday was his niece's wedding, he states he started eating greasy foods and drank alcohol. The next day he started having abdominal pain and nausea and vomiting. Since then he has had the symptoms. He was seen at a hospital on 7/19/2023 in [place], states he was told he had alcoholic pancreatitis and was recommended hospitalization however he did not want to stay. Since then he has been trying to stick to a bland diet but symptoms have not been improving. No history abdominal surgeries. No fevers or chills. Differential diagnosis considered includes but not limited to gastritis, pancreatitis, electrolyte abnormalities, dehydration. I reviewed charts from [facility] in [place], patient was seen on 7/16/2023, he was found to have acute pancreatitis alcohol-related lipase greater than 10,000, gallbladder sonogram per tech likely negative for gallstones however official read was pending. Patient is not an alcoholic. Patient states he was offered hospitalization but decided not to stay. Patient with significant leukocytosis. Tachycardia. Otherwise vitals are reassuring. He is not very tender lamination, does not look toxic or in acute distress. CT abdomen/pelvis is concerning for necrotizing pancreatitis. I spoke with general surgery, Dr. Lexx Vasques, recommending transfer to [address] [place] [address] for surgical ICU. Spoke with Dr. Sandhya Elaine at [address] [place] [address] surgical ICU, agrees with transfer.   Patient and wife updated with results and transfer plan. Medications   sodium chloride 0.9 % bolus 1,000 mL (0 mL Intravenous Stopped 7/24/23 1044)   morphine injection 4 mg (4 mg Intravenous Given 7/24/23 0959)   ondansetron (ZOFRAN) injection 4 mg (4 mg Intravenous Given 7/24/23 0959)   iohexol (OMNIPAQUE) 350 MG/ML injection (SINGLE-DOSE) 100 mL (100 mL Intravenous Given 7/24/23 1023)         Past Medical and Surgical History    History reviewed. No pertinent past medical history. Past Surgical History:   Procedure Laterality Date   • HERNIA REPAIR     • TONSILLECTOMY  1976       Family History   Problem Relation Age of Onset   • Cancer Mother    • Multiple myeloma Mother    • Prostate cancer Father    • Cancer Father        Social History     Tobacco Use   • Smoking status: Never   • Smokeless tobacco: Current     Types: Chew   Vaping Use   • Vaping Use: Never used   Substance Use Topics   • Alcohol use: Yes   • Drug use: Never           Allergies    No Known Allergies    Home Medications    Prior to Admission medications    Medication Sig Start Date End Date Taking? Authorizing Provider   Blood Pressure Monitoring (Adult Blood Pressure Cuff Lg) KIT Use 3 (three) times a day 2/8/22   Parker Parry MD   lisinopril (ZESTRIL) 10 mg tablet Take 1 tablet (10 mg total) by mouth daily 11/21/22   Parker Parry MD   naproxen sodium (ALEVE) 220 MG tablet Take 220 mg by mouth    Historical Provider, MD           Physical Exam      ED Triage Vitals   Temperature Pulse Respirations Blood Pressure SpO2   07/24/23 0913 07/24/23 0914 07/24/23 0914 07/24/23 0914 07/24/23 0914   98.5 °F (36.9 °C) (!) 119 20 132/63 99 %      Temp src Heart Rate Source Patient Position - Orthostatic VS BP Location FiO2 (%)   -- 07/24/23 1030 07/24/23 1150 07/24/23 1150 --    Monitor Lying Right arm       Pain Score       07/24/23 0959       3               Physical Exam  Constitutional:       General: He is not in acute distress.      Appearance: He is not ill-appearing. HENT:      Head: Normocephalic and atraumatic. Nose: Nose normal.      Mouth/Throat:      Mouth: Mucous membranes are dry. Eyes:      Extraocular Movements: Extraocular movements intact. Pupils: Pupils are equal, round, and reactive to light. Cardiovascular:      Rate and Rhythm: Regular rhythm. Tachycardia present. Pulmonary:      Effort: No respiratory distress. Breath sounds: Normal breath sounds. No wheezing. Abdominal:      General: There is no distension. Palpations: Abdomen is soft. Comments: Epigastric ttp   Musculoskeletal:         General: No swelling or deformity. Normal range of motion. Cervical back: Normal range of motion and neck supple. Skin:     General: Skin is warm. Findings: No erythema. Neurological:      Mental Status: He is alert and oriented to person, place, and time. Mental status is at baseline. Diagnostic Results      Labs:    Results Reviewed     Procedure Component Value Units Date/Time    Procalcitonin [[id number]]  (Abnormal) Collected: 07/24/23 1133    Lab Status: Final result Specimen: Blood Updated: 07/24/23 1217     Procalcitonin 0.78 ng/ml     Lactic acid, plasma (w/reflex if result > 2.0) [[id number]]  (Normal) Collected: 07/24/23 1136    Lab Status: Final result Specimen: Blood from Arm, Left Updated: 07/24/23 1203     LACTIC ACID 1.5 mmol/L     Narrative:      Result may be elevated if tourniquet was used during collection. Blood culture #2 [[id number]] Collected: 07/24/23 1133    Lab Status: In process Specimen: Blood from Arm, Left Updated: 07/24/23 1142    Blood culture #1 [[id number]] Collected: 07/24/23 1136    Lab Status:  In process Specimen: Blood from Arm, Left Updated: 07/24/23 1142    LD,Blood [[id number]]  (Abnormal) Collected: 07/24/23 0936    Lab Status: Final result Specimen: Blood from Arm, Left Updated: 07/24/23 1142      U/L     Manual Differential(PHLEBS Do Not Order) [[id number]]  (Abnormal) Collected: 07/24/23 0936    Lab Status: Final result Specimen: Blood from Arm, Left Updated: 07/24/23 1022     Segmented % 86 %      Bands % 4 %      Lymphocytes % 6 %      Monocytes % 2 %      Eosinophils, % 0 %      Basophils % 0 %      Metamyelocytes% 1 %      Atypical Lymphocytes % 1 %      Absolute Neutrophils 36.11 Thousand/uL      Lymphocytes Absolute 2.81 Thousand/uL      Monocytes Absolute 0.80 Thousand/uL      Eosinophils Absolute 0.00 Thousand/uL      Basophils Absolute 0.00 Thousand/uL      Total Counted --     Toxic Granulation Present     Toxic Vacuolated Neutrophils Present     RBC Morphology Present     Platelet Estimate Adequate     Polychromasia Present    Comprehensive metabolic panel [[id number]]  (Abnormal) Collected: 07/24/23 0936    Lab Status: Final result Specimen: Blood from Arm, Left Updated: 07/24/23 1004     Sodium 129 mmol/L      Potassium 3.7 mmol/L      Chloride 89 mmol/L      CO2 27 mmol/L      ANION GAP 13 mmol/L      BUN 31 mg/dL      Creatinine 1.48 mg/dL      Glucose 296 mg/dL      Calcium 8.6 mg/dL      Corrected Calcium 9.2 mg/dL      AST 27 U/L      ALT 34 U/L      Alkaline Phosphatase 105 U/L      Total Protein 6.7 g/dL      Albumin 3.2 g/dL      Total Bilirubin 1.28 mg/dL      eGFR 53 ml/min/1.73sq m     Narrative:      [place] guidelines for Chronic Kidney Disease (CKD):   •  Stage 1 with normal or high GFR (GFR > 90 mL/min/1.73 square meters)  •  Stage 2 Mild CKD (GFR = 60-89 mL/min/1.73 square meters)  •  Stage 3A Moderate CKD (GFR = 45-59 mL/min/1.73 square meters)  •  Stage 3B Moderate CKD (GFR = 30-44 mL/min/1.73 square meters)  •  Stage 4 Severe CKD (GFR = 15-29 mL/min/1.73 square meters)  •  Stage 5 End Stage CKD (GFR <15 mL/min/1.73 square meters)  Note: GFR calculation is accurate only with a steady state creatinine    Lipase [[id number]]  (Normal) Collected: 07/24/23 0936    Lab Status: Final result Specimen: Blood from Arm, Left Updated: 07/24/23 1004     Lipase 23 u/L     Magnesium [[id number]]  (Normal) Collected: 07/24/23 0936    Lab Status: Final result Specimen: Blood from Arm, Left Updated: 07/24/23 1004     Magnesium 2.4 mg/dL     CBC and differential [[id number]]  (Abnormal) Collected: 07/24/23 0936    Lab Status: Final result Specimen: Blood from Arm, Left Updated: 07/24/23 0952     WBC 40.12 Thousand/uL      RBC 5.58 Million/uL      Hemoglobin 16.1 g/dL      Hematocrit 47.8 %      MCV 86 fL      MCH 28.9 pg      MCHC 33.7 g/dL      RDW 13.6 %      MPV 10.2 fL      Platelets 342 Thousands/uL     Urine Microscopic [[id number]]  (Abnormal) Collected: 07/24/23 0938    Lab Status: Final result Specimen: Urine, Clean Catch Updated: 07/24/23 0952     RBC, UA Innumerable /hpf      WBC, UA 4-10 /hpf      Epithelial Cells Occasional /hpf      Bacteria, UA Moderate /hpf      Hyaline Casts, UA 5-10 /lpf      Budding Yeast Present    UA (URINE) with reflex to Scope [[id number]]  (Abnormal) Collected: 07/24/23 0938    Lab Status: Final result Specimen: Urine, Clean Catch Updated: 07/24/23 0948     Color, UA Yellow     Clarity, UA Turbid     Specific Gravity, UA 1.022     pH, UA 6.0     Leukocytes, UA Negative     Nitrite, UA Negative     Protein,  (2+) mg/dl      Glucose,  (3/10%) mg/dl      Ketones, UA 20 (1+) mg/dl      Urobilinogen, UA 2.0 mg/dl      Bilirubin, UA Negative     Occult Blood, UA Large          All labs reviewed and utilized in the medical decision making process    Radiology:    CT abdomen pelvis with contrast   Final Result      Necrotizing pancreatitis with areas of parenchymal pancreatic necrosis in the tail and body of the pancreas   Peripancreatic fat necrosis in the lesser sac, gastrocolic ligament with tracking of inflammatory changes along the anterior posterior pararenal fascia, paracolic fascia into the pelvis   Evolving nonocclusive thrombus in the superior mesenteric vein      I personally discussed this study with Bharathi Ibarra on 7/24/2023 11:07 AM.         The study was marked in EPIC for immediate notification. Workstation performed: [id number]             All radiology studies independently viewed by me and interpreted by the radiologist.    Procedure    CriticalCare Time    Date/Time: 7/24/2023 3:16 PM    Performed by: Bharathi Ibarra DO  Authorized by: Bharathi Ibarra DO    Critical care provider statement:     Critical care time (minutes):  35    Critical care was necessary to treat or prevent imminent or life-threatening deterioration of the following conditions:  Dehydration, metabolic crisis, circulatory failure and CNS failure or compromise    Critical care was time spent personally by me on the following activities:  Obtaining history from patient or surrogate, development of treatment plan with patient or surrogate, discussions with consultants, evaluation of patient's response to treatment, examination of patient, review of old charts, re-evaluation of patient's condition and ordering and review of laboratory studies            FINAL IMPRESSION    Final diagnoses:   Necrotizing pancreatitis   Hyponatremia         DISPOSITION    Time reflects when diagnosis was documented in both MDM as applicable and the Disposition within this note     Time User Action Codes Description Comment    7/24/2023 12:02 PM Delilah Roles Add [K85.91] Necrotizing pancreatitis     7/24/2023 12:02 PM Delilah Roles Add [E87.1] Hyponatremia       ED Disposition     ED Disposition   Transfer to Another Facility-In Network    Condition   --    Date/Time   Mon Jul 24, 2023 12:02 PM    Comment   Viktoriya Calhoun should be transferred out to B.            MD Documentation    Nobie Pretty Most Recent Value   Patient Condition The patient has been stabilized such that within reasonable medical probability, no material deterioration of the patient condition or the condition of the unborn child(jorge) is likely to result from the transfer   Reason for Transfer Level of Care needed not available at this facility   Benefits of Transfer Specialized equipment and/or services available at the receiving facility (Include comment)________________________   Risks of Transfer Potential deterioration of medical condition, Loss of IV, Increased discomfort during transfer, Possible worsening of condition or death during transfer   Accepting Physician Dr. Andrew Howe Name, Jerome Russo   Provider Certification General risk, such as traffic hazards, adverse weather conditions, rough terrain or turbulence, possible failure of equipment (including vehicle or aircraft), or consequences of actions of persons outside the control of the transport personnel, Unanticipated needs of medical equipment and personnel during transport, Risk of worsening condition, The possibility of a transport vehicle being unavailable      RN Documentation    [address] Name, [facility]      Follow-up Information    None           PATIENT REFERRED TO:    No follow-up provider specified. DISCHARGE MEDICATIONS:    Discharge Medication List as of 7/24/2023 12:46 PM      CONTINUE these medications which have NOT CHANGED    Details   Blood Pressure Monitoring (Adult Blood Pressure Cuff Lg) KIT Use 3 (three) times a day, Starting Tue 2/8/2022, Normal      lisinopril (ZESTRIL) 10 mg tablet Take 1 tablet (10 mg total) by mouth daily, Starting Mon 11/21/2022, Normal      naproxen sodium (ALEVE) 220 MG tablet Take 220 mg by mouth, Historical Med             No discharge procedures on file. Daljit Butcher DO        This note was partially completed using voice recognition technology, and was scanned for gross errors; however some errors may still exist. Please contact the author with any questions or requests for clarification.       Daljit Butcher DO  07/24/23 9153

## 2023-07-24 NOTE — TELEPHONE ENCOUNTER
T/c from pt's spouse -- state pt is getting weaker and is having trouble with eating solid foods. Taking him to the ED now. Just wanted to give dr Blake Null an update. Will call to update if pt is being admitted.

## 2023-07-24 NOTE — CONSULTS
Consultation - General Surgery   Erendira Molina 46 y.o. male MRN: [id number]  Unit/Bed#: MICU 05 Encounter: [id number]    Assessment/Plan     Assessment:  47yo M acute necrotizing pancreatitis, etiology possibly 2/2 heavy alcohol intake at a wedding. Also with nonocclusive SMV thrombus seen on CT. Afebrile, tachycardic to 110s  Voiding, will start measure UOP    Lactic 2.1 (1.5)  WBC 34.54 (40)  Glucose 260  Creatinine 1.22 (1.48)    7/24 CT AP: Necrotizing pancreatitis with areas of parenchymal pancreatic necrosis in the tail and body of the pancreas Peripancreatic fat necrosis in the lesser sac, gastrocolic ligament with tracking of inflammatory changes along the anterior posterior pararenal fascia, paracolic fascia into the pelvis. Evolving nonocclusive thrombus in the superior mesenteric vein    Plan:  - NPO  - need to discuss alternative nutrition until patient is able to tolerate adequate PO intake, likely PICC/TPN  - mIVF 125  - strict I/Os  - f/u RUQ US looking for gallstones  - heparin gtt for SMV thrombus  - monitor off abx, no evidence of infected pancreatic collection at this time  - appreciate care per ICU    History of Present Illness   HPI:  Erendira Molina is a 46 y.o. male who presented as a transfer from [facility] with necrotizing pancreatitis. He states he has had these symptoms for about a week. Started after a family wedding after he had about 15 alcoholic drinks over the course of 2 days (Friday and Saturday). Then started having severe abdominal pain on Sunday. He has not had any alcoholic drinks for about 6 months prior to this, but used to drink socially on Friday/Saturdays. He denies daily alcohol consumption prior to this wedding. He is unsure if he has gallstones and does not believe he has high triglyceride levels. He has had poor PO intake for the past week, unable to tolerate anything more than broth and jello. He feels he is weaker from this lack of nutrition.   His PMHx consists of HTN and only surgical history is a hernia repair. Inpatient consult to Acute Care Surgery(General Surgery)  Consult performed by: Grace Sultana MD  Consult ordered by: Deshawn Torrez PA-C          Review of Systems   Constitutional: Positive for appetite change. Negative for fever. HENT: Negative. Respiratory: Negative. Cardiovascular: Negative. Gastrointestinal: Positive for abdominal pain and diarrhea. Genitourinary: Negative. Musculoskeletal: Negative. Skin: Negative. Neurological: Negative. Psychiatric/Behavioral: Negative. Historical Information   History reviewed. No pertinent past medical history.   Past Surgical History:   Procedure Laterality Date   • HERNIA REPAIR     • JOINT REPLACEMENT     • TONSILLECTOMY  12     Social History   Social History     Substance and Sexual Activity   Alcohol Use Yes   • Alcohol/week: 6.0 standard drinks of alcohol   • Types: 6 Cans of beer per week    Comment: Weekends     Social History     Substance and Sexual Activity   Drug Use Never     E-Cigarette/Vaping   • E-Cigarette Use Never User      E-Cigarette/Vaping Substances   • Nicotine No    • THC No      Social History     Tobacco Use   Smoking Status Never   Smokeless Tobacco Current   • Types: Chew     Family History: non-contributory    Meds/Allergies   all current active meds have been reviewed  No Known Allergies    Objective   First Vitals:   Blood Pressure: 133/90 (07/24/23 1415)  Pulse: (!) 114 (07/24/23 1415)  Temperature: 99 °F (37.2 °C) (07/24/23 1415)  Temp Source: Oral (07/24/23 1415)  Respirations: (!) 27 (07/24/23 1415)  Height: 5' 11" (180.3 cm) (07/24/23 1600)  Weight - Scale: 124 kg (274 lb 4 oz) (07/24/23 1410)  SpO2: 94 % (07/24/23 1415)    Current Vitals:   Blood Pressure: 142/74 (07/24/23 1700)  Pulse: (!) 116 (07/24/23 1700)  Temperature: 99 °F (37.2 °C) (07/24/23 1415)  Temp Source: Oral (07/24/23 1415)  Respirations: (!) 25 (07/24/23 1700)  Height: 5' 11" (180.3 cm) (07/24/23 1600)  Weight - Scale: 124 kg (274 lb 4 oz) (07/24/23 1410)  SpO2: 93 % (07/24/23 1700)    No intake or output data in the 24 hours ending 07/24/23 1720    Invasive Devices     Peripheral Intravenous Line  Duration           Peripheral IV 07/24/23 Left Antecubital <1 day                Physical Exam:  General: No acute distress  Neuro: alert and oriented  HEENT: moist mucous membranes  CV: Well perfused, regular rate and rhythm  Lungs: Normal work of breathing, no increased respiratory effort  Abdomen: Soft, minimally tender, non-distended. Extremities: No edema, clubbing or cyanosis  Skin: Warm, dry    Lab Results:   CBC:   Lab Results   Component Value Date    WBC 34.54 (HH) 07/24/2023    HGB 14.7 07/24/2023    HCT 42.9 07/24/2023    MCV 85 07/24/2023     07/24/2023    RBC 5.03 07/24/2023    MCH 29.2 07/24/2023    MCHC 34.3 07/24/2023    RDW 13.7 07/24/2023    MPV 10.4 07/24/2023    NRBC 0 07/24/2023   , CMP:   Lab Results   Component Value Date    SODIUM 131 (L) 07/24/2023    K 3.8 07/24/2023    CL 96 07/24/2023    CO2 25 07/24/2023    BUN 28 (H) 07/24/2023    CREATININE 1.22 07/24/2023    CALCIUM 8.1 (L) 07/24/2023    AST 27 07/24/2023    ALT 34 07/24/2023    ALKPHOS 105 (H) 07/24/2023    EGFR 67 07/24/2023     Imaging: I have personally reviewed pertinent films in PACS  EKG, Pathology, and Other Studies: I have personally reviewed pertinent films in PACS    Counseling / Coordination of Care  Total floor / unit time spent today 20 minutes. Greater than 50% of total time was spent with the patient and / or family counseling and / or coordination of care.        Krysta Chadwick MD  General Surgery PGY2

## 2023-07-25 LAB
ALBUMIN SERPL BCP-MCNC: 1.6 G/DL (ref 3.5–5)
ALP SERPL-CCNC: 111 U/L (ref 46–116)
ALT SERPL W P-5'-P-CCNC: 32 U/L (ref 12–78)
ANION GAP SERPL CALCULATED.3IONS-SCNC: 8 MMOL/L
APTT PPP: 48 SECONDS (ref 23–37)
APTT PPP: 58 SECONDS (ref 23–37)
APTT PPP: 60 SECONDS (ref 23–37)
APTT PPP: 64 SECONDS (ref 23–37)
AST SERPL W P-5'-P-CCNC: 34 U/L (ref 5–45)
ATRIAL RATE: 107 BPM
BACTERIA UR CULT: ABNORMAL
BACTERIA UR CULT: ABNORMAL
BILIRUB SERPL-MCNC: 1.04 MG/DL (ref 0.2–1)
BUN SERPL-MCNC: 24 MG/DL (ref 5–25)
CA-I BLD-SCNC: 1.01 MMOL/L (ref 1.12–1.32)
CALCIUM ALBUM COR SERPL-MCNC: 9.3 MG/DL (ref 8.3–10.1)
CALCIUM SERPL-MCNC: 7.4 MG/DL (ref 8.3–10.1)
CHLORIDE SERPL-SCNC: 100 MMOL/L (ref 96–108)
CO2 SERPL-SCNC: 27 MMOL/L (ref 21–32)
CREAT SERPL-MCNC: 1.25 MG/DL (ref 0.6–1.3)
ERYTHROCYTE [DISTWIDTH] IN BLOOD BY AUTOMATED COUNT: 13.9 % (ref 11.6–15.1)
GFR SERPL CREATININE-BSD FRML MDRD: 65 ML/MIN/1.73SQ M
GLUCOSE SERPL-MCNC: 117 MG/DL (ref 65–140)
GLUCOSE SERPL-MCNC: 120 MG/DL (ref 65–140)
GLUCOSE SERPL-MCNC: 121 MG/DL (ref 65–140)
GLUCOSE SERPL-MCNC: 123 MG/DL (ref 65–140)
GLUCOSE SERPL-MCNC: 129 MG/DL (ref 65–140)
GLUCOSE SERPL-MCNC: 131 MG/DL (ref 65–140)
GLUCOSE SERPL-MCNC: 137 MG/DL (ref 65–140)
GLUCOSE SERPL-MCNC: 146 MG/DL (ref 65–140)
GLUCOSE SERPL-MCNC: 150 MG/DL (ref 65–140)
GLUCOSE SERPL-MCNC: 157 MG/DL (ref 65–140)
GLUCOSE SERPL-MCNC: 158 MG/DL (ref 65–140)
GLUCOSE SERPL-MCNC: 181 MG/DL (ref 65–140)
GLUCOSE SERPL-MCNC: 185 MG/DL (ref 65–140)
GLUCOSE SERPL-MCNC: 238 MG/DL (ref 65–140)
HCT VFR BLD AUTO: 39.3 % (ref 36.5–49.3)
HGB BLD-MCNC: 13.2 G/DL (ref 12–17)
MAGNESIUM SERPL-MCNC: 2.3 MG/DL (ref 1.6–2.6)
MCH RBC QN AUTO: 28.8 PG (ref 26.8–34.3)
MCHC RBC AUTO-ENTMCNC: 33.6 G/DL (ref 31.4–37.4)
MCV RBC AUTO: 86 FL (ref 82–98)
NRBC BLD AUTO-RTO: 0 /100 WBCS
P AXIS: 46 DEGREES
PHOSPHATE SERPL-MCNC: 2.5 MG/DL (ref 2.7–4.5)
PLATELET # BLD AUTO: 278 THOUSANDS/UL (ref 149–390)
PMV BLD AUTO: 9.6 FL (ref 8.9–12.7)
POTASSIUM SERPL-SCNC: 3.6 MMOL/L (ref 3.5–5.3)
PR INTERVAL: 133 MS
PROT SERPL-MCNC: 5.2 G/DL (ref 6.4–8.4)
QRS AXIS: 105 DEGREES
QRSD INTERVAL: 88 MS
QT INTERVAL: 417 MS
QTC INTERVAL: 557 MS
RBC # BLD AUTO: 4.58 MILLION/UL (ref 3.88–5.62)
SODIUM SERPL-SCNC: 135 MMOL/L (ref 135–147)
T WAVE AXIS: 22 DEGREES
VENTRICULAR RATE: 107 BPM
WBC # BLD AUTO: 35.81 THOUSAND/UL (ref 4.31–10.16)

## 2023-07-25 PROCEDURE — 85730 THROMBOPLASTIN TIME PARTIAL: CPT | Performed by: STUDENT IN AN ORGANIZED HEALTH CARE EDUCATION/TRAINING PROGRAM

## 2023-07-25 PROCEDURE — 99233 SBSQ HOSP IP/OBS HIGH 50: CPT | Performed by: SURGERY

## 2023-07-25 PROCEDURE — 84100 ASSAY OF PHOSPHORUS: CPT | Performed by: PHYSICIAN ASSISTANT

## 2023-07-25 PROCEDURE — C9113 INJ PANTOPRAZOLE SODIUM, VIA: HCPCS | Performed by: EMERGENCY MEDICINE

## 2023-07-25 PROCEDURE — 99233 SBSQ HOSP IP/OBS HIGH 50: CPT | Performed by: STUDENT IN AN ORGANIZED HEALTH CARE EDUCATION/TRAINING PROGRAM

## 2023-07-25 PROCEDURE — 93005 ELECTROCARDIOGRAM TRACING: CPT

## 2023-07-25 PROCEDURE — 82330 ASSAY OF CALCIUM: CPT | Performed by: PHYSICIAN ASSISTANT

## 2023-07-25 PROCEDURE — 80053 COMPREHEN METABOLIC PANEL: CPT | Performed by: PHYSICIAN ASSISTANT

## 2023-07-25 PROCEDURE — C9113 INJ PANTOPRAZOLE SODIUM, VIA: HCPCS

## 2023-07-25 PROCEDURE — 85730 THROMBOPLASTIN TIME PARTIAL: CPT | Performed by: SURGERY

## 2023-07-25 PROCEDURE — 93010 ELECTROCARDIOGRAM REPORT: CPT | Performed by: INTERNAL MEDICINE

## 2023-07-25 PROCEDURE — 82948 REAGENT STRIP/BLOOD GLUCOSE: CPT

## 2023-07-25 PROCEDURE — 85027 COMPLETE CBC AUTOMATED: CPT | Performed by: PHYSICIAN ASSISTANT

## 2023-07-25 PROCEDURE — 83735 ASSAY OF MAGNESIUM: CPT | Performed by: PHYSICIAN ASSISTANT

## 2023-07-25 RX ORDER — AMOXICILLIN 250 MG
1 CAPSULE ORAL 2 TIMES DAILY
Status: DISCONTINUED | OUTPATIENT
Start: 2023-07-25 | End: 2023-07-28 | Stop reason: HOSPADM

## 2023-07-25 RX ORDER — SODIUM CHLORIDE 9 MG/ML
150 INJECTION, SOLUTION INTRAVENOUS CONTINUOUS
Status: DISCONTINUED | OUTPATIENT
Start: 2023-07-25 | End: 2023-07-26

## 2023-07-25 RX ORDER — HYDROMORPHONE HCL IN WATER/PF 6 MG/30 ML
0.2 PATIENT CONTROLLED ANALGESIA SYRINGE INTRAVENOUS
Status: DISCONTINUED | OUTPATIENT
Start: 2023-07-25 | End: 2023-07-28 | Stop reason: HOSPADM

## 2023-07-25 RX ORDER — ACETAMINOPHEN 325 MG/1
650 TABLET ORAL EVERY 6 HOURS PRN
Status: DISCONTINUED | OUTPATIENT
Start: 2023-07-25 | End: 2023-07-25

## 2023-07-25 RX ORDER — HYDRALAZINE HYDROCHLORIDE 20 MG/ML
10 INJECTION INTRAMUSCULAR; INTRAVENOUS EVERY 6 HOURS PRN
Status: DISCONTINUED | OUTPATIENT
Start: 2023-07-25 | End: 2023-07-28 | Stop reason: HOSPADM

## 2023-07-25 RX ORDER — ACETAMINOPHEN 325 MG/1
975 TABLET ORAL EVERY 6 HOURS
Status: DISCONTINUED | OUTPATIENT
Start: 2023-07-26 | End: 2023-07-28 | Stop reason: HOSPADM

## 2023-07-25 RX ORDER — OXYCODONE HYDROCHLORIDE 5 MG/1
5 TABLET ORAL EVERY 4 HOURS PRN
Status: DISCONTINUED | OUTPATIENT
Start: 2023-07-25 | End: 2023-07-28 | Stop reason: HOSPADM

## 2023-07-25 RX ADMIN — SODIUM CHLORIDE, SODIUM GLUCONATE, SODIUM ACETATE, POTASSIUM CHLORIDE, MAGNESIUM CHLORIDE, SODIUM PHOSPHATE, DIBASIC, AND POTASSIUM PHOSPHATE 100 ML/HR: .53; .5; .37; .037; .03; .012; .00082 INJECTION, SOLUTION INTRAVENOUS at 05:58

## 2023-07-25 RX ADMIN — PANTOPRAZOLE SODIUM 40 MG: 40 INJECTION, POWDER, FOR SOLUTION INTRAVENOUS at 08:50

## 2023-07-25 RX ADMIN — SODIUM CHLORIDE 1.5 UNITS/HR: 9 INJECTION, SOLUTION INTRAVENOUS at 20:31

## 2023-07-25 RX ADMIN — CHLORHEXIDINE GLUCONATE 15 ML: 1.2 SOLUTION ORAL at 20:41

## 2023-07-25 RX ADMIN — CHLORHEXIDINE GLUCONATE 15 ML: 1.2 SOLUTION ORAL at 08:50

## 2023-07-25 RX ADMIN — SODIUM CHLORIDE 10 ML/HR: 0.9 INJECTION, SOLUTION INTRAVENOUS at 20:41

## 2023-07-25 RX ADMIN — POTASSIUM PHOSPHATE, MONOBASIC AND POTASSIUM PHOSPHATE, DIBASIC 30 MMOL: 224; 236 INJECTION, SOLUTION, CONCENTRATE INTRAVENOUS at 08:59

## 2023-07-25 RX ADMIN — ACETAMINOPHEN 650 MG: 325 TABLET, FILM COATED ORAL at 18:59

## 2023-07-25 RX ADMIN — HEPARIN SODIUM 20 UNITS/KG/HR: 10000 INJECTION, SOLUTION INTRAVENOUS at 14:04

## 2023-07-25 RX ADMIN — HEPARIN SODIUM 4800 UNITS: 1000 INJECTION INTRAVENOUS; SUBCUTANEOUS at 17:06

## 2023-07-25 RX ADMIN — PANTOPRAZOLE SODIUM 40 MG: 40 INJECTION, POWDER, FOR SOLUTION INTRAVENOUS at 20:41

## 2023-07-25 RX ADMIN — SODIUM CHLORIDE 10 ML/HR: 0.9 INJECTION, SOLUTION INTRAVENOUS at 01:48

## 2023-07-25 RX ADMIN — HEPARIN SODIUM 20 UNITS/KG/HR: 10000 INJECTION, SOLUTION INTRAVENOUS at 01:55

## 2023-07-25 NOTE — PROGRESS NOTES
Progress Note - General Surgery   Eusebia Sheth 46 y.o. male MRN: [id number]  Unit/Bed#: MICU 05 Encounter: [id number]    Assessment:  45yo M with acute necrotizing pancreatitis    Plan:  - advance diet to CLD with no carbonation  - cont to monitor off abx until evidence of infected collection  - monitor I/Os  - f/u RUQ US  - heparin gtt  - appreciate care per ICU    Subjective/Objective   Subjective:   No acute events overnight. Feeling better this morning, does not have any pain. Passing flatus, had a small BM. But is also burping. Objective:     Blood pressure 142/73, pulse (!) 106, temperature 99.6 °F (37.6 °C), temperature source Oral, resp. rate (!) 26, height 5' 11" (1.803 m), weight 126 kg (277 lb 9 oz), SpO2 93 %. ,Body mass index is 38.71 kg/m². Intake/Output Summary (Last 24 hours) at 7/25/2023 0942  Last data filed at 7/25/2023 0600  Gross per 24 hour   Intake 3675.86 ml   Output 775 ml   Net 2900.86 ml       Invasive Devices     Peripheral Intravenous Line  Duration           Peripheral IV 07/24/23 Left Antecubital 1 day    Peripheral IV 07/24/23 Dorsal (posterior); Right Hand <1 day                Physical Exam:  General: No acute distress  Neuro: alert and oriented  HEENT: moist mucous membranes  CV: Well perfused, regular rate and rhythm  Lungs: Normal work of breathing, no increased respiratory effort  Abdomen: Soft, minimally tender, distended.   Extremities: No edema, clubbing or cyanosis  Skin: Warm, dry        Keena Romero MD  General Surgery PGY2

## 2023-07-25 NOTE — PROGRESS NOTES
Pastoral Care Progress Note    2023  Patient: Erendira Molina : 1970  Admission Date & Time: 2023 1403  MRN: [id number] CSN: [id number]          Introduced myself/pastoral care to Pt and spouse in room. They were gracious yet did not seem to want to talk/share much, so I kept the visit very brief and wished them well.

## 2023-07-25 NOTE — UTILIZATION REVIEW
Initial Clinical Review    Admission: Date/Time/Statement:   Admission Orders (From admission, onward)     Ordered        07/24/23 1409  Inpatient Admission  Once                      Orders Placed This Encounter   Procedures   • Inpatient Admission     Standing Status:   Standing     Number of Occurrences:   1     Order Specific Question:   Level of Care     Answer:   Critical Care [15]     Order Specific Question:   Estimated length of stay     Answer:   More than 2 Midnights     Order Specific Question:   Certification     Answer:   I certify that inpatient services are medically necessary for this patient for a duration of greater than two midnights. See H&P and MD Progress Notes for additional information about the patient's course of treatment. Initial Presentation: transferred from [address] [place] [address]   46year old male with PMH of HTN who presented to [facility] with complaints of Abdominal Pain, neausea, vomiting and weakness since 7/15. Patient was seen at an outside hospital on 7/19 and diagnosed with pancreatitis with reccomendations for admission however patient elected to manage symtpoms at home with a bland diet. He represented on 7/24 to [facility] with continued abdominal pain, nausea, vomiting and weakness. CT scan revealed evidence of necrotizing pancreatitis. Transferred to [facility] admitted to inpatient status for necrotizing pancreatitis. Per surg: acute necrotizing pancreatitis, etiology possibly 2/2 heavy alcohol intake at a wedding. Also with nonocclusive SMV thrombus seen on CT.  - f/u RUQ US looking for gallstones-elevated Tbili  - heparin gtt for SMV thrombus  - monitor off abx, no evidence of infected pancreatic collection at this time    Date: 7/25/23   Day 2:   HPI/24hr events: Given 1L [place] for elevated lactate, decrease in UOP. Lactate cleared. Pain well controlled, not requiring analgesics. Abdomen: Soft, minimally tender, distended, +BS, burping.  Trial advancing diet to clear liquids. Remains on IV Heparin gtt, IV Insulin gtt.  US pending    ED Triage Vitals   Temperature Pulse Respirations Blood Pressure SpO2   07/24/23 1415 07/24/23 1415 07/24/23 1415 07/24/23 1415 07/24/23 1415   99 °F (37.2 °C) (!) 114 (!) 27 133/90 94 %      Temp Source Heart Rate Source Patient Position - Orthostatic VS BP Location FiO2 (%)   07/24/23 1415 07/25/23 0800 07/25/23 0800 07/25/23 0800 --   Oral Monitor Lying Right arm       Pain Score       07/24/23 1415       4          Wt Readings from Last 1 Encounters:   07/25/23 126 kg (277 lb 9 oz)     Additional Vital Signs:   Date/Time Temp Pulse Resp BP MAP (mmHg) SpO2 O2 Device Patient Position - Orthostatic VS   07/25/23 1000 -- 102 22 -- -- 94 % -- --   07/25/23 0900 -- 106 Abnormal  26 Abnormal  126/79 94 94 % -- --   07/25/23 0800 98.4 °F (36.9 °C) 108 Abnormal  22 138/76 92 93 % None (Room air) Lying   07/25/23 0700 -- 108 Abnormal  22 148/77 98 93 % -- --   07/25/23 0615 -- 106 Abnormal  26 Abnormal  142/73 95 93 % None (Room air) --   07/25/23 0500 -- 108 Abnormal  26 Abnormal  137/78 96 93 % None (Room air) --   07/25/23 0400 99.6 °F (37.6 °C) 110 Abnormal  26 Abnormal  150/69 87 94 % None (Room air) --   07/25/23 0300 -- 108 Abnormal  22 154/79 100 94 % -- --   07/25/23 0200 -- 106 Abnormal  26 Abnormal  135/83 99 94 % -- --   07/25/23 0100 -- 106 Abnormal  22 141/81 97 93 % -- --   07/25/23 0000 99.1 °F (37.3 °C) 112 Abnormal  24 Abnormal  123/78 91 94 % None (Room air) --   07/24/23 2300 -- 114 Abnormal  31 Abnormal  140/81 103 93 % -- --   07/24/23 2200 -- 114 Abnormal  28 Abnormal  144/71 94 95 % -- --   07/24/23 2100 -- 118 Abnormal  28 Abnormal  144/79 92 93 % -- --   07/24/23 2000 99.9 °F (37.7 °C) 116 Abnormal  29 Abnormal  140/75 96 93 % None (Room air) --   07/24/23 1900 98.6 °F (37 °C) 120 Abnormal  26 Abnormal  142/79 108 94 % -- --   07/24/23 1700 -- 116 Abnormal  25 Abnormal  142/74 89 93 % -- --   07/24/23 1600 -- 112 Abnormal  22 112/56 67 91 % -- --   07/24/23 1415 99 °F (37.2 °C) 114 Abnormal  27 Abnormal  133/90 119 94 % -- --     Pertinent Labs/Diagnostic Test Results:   Prior to admission 7/24  CT abdomen pelvis with contrast  Necrotizing pancreatitis with areas of parenchymal pancreatic necrosis in the tail and body of the pancreas  Peripancreatic fat necrosis in the lesser sac, gastrocolic ligament with tracking of inflammatory changes along the anterior posterior pararenal fascia, paracolic fascia into the pelvis  Evolving nonocclusive thrombus in the superior mesenteric vein    US right upper quadrant   Final Result  (07/25 1349)         1. Mild hepatic steatosis. 2. Nonobstructive cholelithiasis with no additional sonographic evidence of acute cholecystitis or biliary duct dilation. 3. Trace ascites. 4. Suboptimally visualized pancreas due to body habitus and overlying bowel gas.      Results from last 7 days   Lab Units 07/25/23  0502 07/24/23  1452 07/24/23  0936   WBC Thousand/uL 35.81* 34.54* 40.12*   HEMOGLOBIN g/dL 13.2 14.7 16.1   HEMATOCRIT % 39.3 42.9 47.8   PLATELETS Thousands/uL 278 313 374   BANDS PCT %  --   --  4     Results from last 7 days   Lab Units 07/25/23  0502 07/24/23  1452 07/24/23  0936   SODIUM mmol/L 135 131* 129*   POTASSIUM mmol/L 3.6 3.8 3.7   CHLORIDE mmol/L 100 96 89*   CO2 mmol/L 27 25 27   ANION GAP mmol/L 8 10 13   BUN mg/dL 24 28* 31*   CREATININE mg/dL 1.25 1.22 1.48*   EGFR ml/min/1.73sq m 65 67 53   CALCIUM mg/dL 7.4* 8.1* 8.6   CALCIUM, IONIZED mmol/L 1.01*  --   --    MAGNESIUM mg/dL 2.3  --  2.4   PHOSPHORUS mg/dL 2.5*  --   --      Results from last 7 days   Lab Units 07/25/23  0502 07/24/23  0936   AST U/L 34 27   ALT U/L 32 34   ALK PHOS U/L 111 105*   TOTAL PROTEIN g/dL 5.2* 6.7   ALBUMIN g/dL 1.6* 3.2*   TOTAL BILIRUBIN mg/dL 1.04* 1.28*     Results from last 7 days   Lab Units 07/25/23  1559 07/25/23  1401 07/25/23  1158 07/25/23  0957 07/25/23  0839 07/25/23  9640 07/25/23  0402 07/25/23  0149 07/25/23  0004 07/24/23  2200 07/24/23 2005 07/24/23  1728   POC GLUCOSE mg/dl 157* 158* 123 117 137 150* 131 120 185* 181* 191* 238*     Results from last 7 days   Lab Units 07/25/23  0502 07/24/23  1452 07/24/23  0936   GLUCOSE RANDOM mg/dL 146* 260* 296*     Results from last 7 days   Lab Units 07/24/23  1452   HEMOGLOBIN A1C % 6.3*   EAG mg/dl 134     Results from last 7 days   Lab Units 07/25/23  1048 07/25/23  0502 07/24/23  2157 07/24/23  1517   PROTIME seconds  --   --   --  15.8*   INR   --   --   --  1.23*   PTT seconds 64* 60* 46* 25     Results from last 7 days   Lab Units 07/24/23  1452 07/24/23  1133   PROCALCITONIN ng/ml 0.96* 0.78*     Results from last 7 days   Lab Units 07/24/23  1830 07/24/23  1452 07/24/23  1136   LACTIC ACID mmol/L 1.9 2.1* 1.5     Results from last 7 days   Lab Units 07/24/23  0936   LIPASE u/L 23     Results from last 7 days   Lab Units 07/24/23  1513 07/24/23  0938   CLARITY UA  Clear Turbid   COLOR UA  Yellow Yellow   SPEC GRAV UA  1.048* 1.022   PH UA  6.5 6.0   GLUCOSE UA mg/dl 100 (1/10%)* 300 (3/10%)*   KETONES UA mg/dl 10 (1+)* 20 (1+)*   BLOOD UA  Large* Large*   PROTEIN UA mg/dl 100 (2+)* 100 (2+)*   NITRITE UA  Negative Negative   BILIRUBIN UA  Negative Negative   UROBILINOGEN UA (BE) mg/dl <2.0 2.0*   LEUKOCYTES UA  Negative Negative   WBC UA /hpf 4-10* 4-10*   RBC UA /hpf Innumerable* Innumerable*   BACTERIA UA /hpf Occasional Moderate*   EPITHELIAL CELLS WET PREP /hpf Occasional Occasional     Results from last 7 days   Lab Units 07/24/23  1513 07/24/23  1136 07/24/23  1133   BLOOD CULTURE  Received in Microbiology Lab. Culture in Progress. Received in Microbiology Lab. Culture in Progress. No Growth at 24 hrs. No Growth at 24 hrs. History reviewed. No pertinent past medical history.   Present on Admission:  • Primary hypertension  • Necrotizing pancreatitis  • Superior mesenteric artery thrombosis (HCC)  • Pleural effusion, left  • Hyponatremia  • Abnormal urinalysis  • Leukocytosis  • JERZY (acute kidney injury) ([address])      Admitting Diagnosis: Necrotizing pancreatitis [K85.91]  Age/Sex: 46 y.o. male  Admission Orders:  Neuro checks q4h  Scd/foot pumps  accuchecks    Scheduled Medications:  chlorhexidine, 15 mL, Mouth/Throat, Q12H ALEX  pantoprazole, 40 mg, Intravenous, Q12H [facility]  potassium phosphate, 30 mmol, Intravenous, Once  senna-docusate sodium, 1 tablet, Oral, BID    Continuous IV Infusions:  heparin (porcine), 3-30 Units/kg/hr (Order-Specific), Intravenous, Titrated  insulin regular (HumuLIN R,NovoLIN R) 1 Units/mL in sodium chloride 0.9 % 100 mL infusion, 0.3-21 Units/hr, Intravenous, Titrated  sodium chloride, 10 mL/hr, Intravenous, Continuous    PRN Meds:  heparin (porcine), 4,800 Units, Intravenous, Q6H PRN  heparin (porcine), 9,600 Units, Intravenous, Q6H PRN  hydrALAZINE, 10 mg, Intravenous, Q6H PRN  HYDROmorphone, 0.2 mg, Intravenous, Q3H PRN  oxyCODONE, 5 mg, Oral, Q4H PRN   Or  oxyCODONE, 2.5 mg, Oral, Q4H PRN    Network Utilization Review Department  ATTENTION: Please call with any questions or concerns to [phone] and carefully listen to the prompts so that you are directed to the right person. All voicemails are confidential.  Genet Romero all requests for admission clinical reviews, approved or denied determinations and any other requests to dedicated fax number below belonging to the campus where the patient is receiving treatment.  List of dedicated fax numbers for the Facilities:  [place] DENIALS (Administrative/Medical Necessity) [phone] [address] [facility] (Maternity/NICU/Pediatrics) [phone]   [address] [place] [address] Drive [phone]   [address] [phone] [address] [place] [address] [phone]   [address] [address] [facility] [phone]

## 2023-07-25 NOTE — PROGRESS NOTES
[address] [place] [address]  Progress Note: Critical Care  Name: Sahra Dockery 46 y.o. male I MRN: [id number]  Unit/Bed#: MICU 05 I Date of Admission: 7/24/2023   Date of Service: 7/25/2023 I Hospital Day: 1    Assessment and Plan  Principal Problem:    Necrotizing pancreatitis  Active Problems:    Hyponatremia    Superior mesenteric artery thrombosis (HCC)    Abnormal urinalysis    Pleural effusion, left    Leukocytosis    Primary hypertension    JERZY (acute kidney injury) ([address])  Resolved Problems:    * No resolved hospital problems. *      Neuro:   · No acute neurologic issues   ? Routine neuro checks     ? Sleep/wake cycle regulation as able   ? CAM-ICU BID  · Analgesia   ? Continuous infusions: None  ? Scheduled medication: None  ? PRN medications:  § Oxycodone 5-10mg q4h   § 0 doses / 24 hours    § Reduce to 2.5-5 mg given no doses or high-level pain reported   § Dilaudid 0.5 mg q3h PRN  § 0 doses / 24 hours   § Reduce to 0.2mg  § Consider discontinuing       CV:   • Sinus tachycardia  o Likely related to ongoing inflammatory response related to necrotizing pancreatitis   o Continue volume resuscitation as indicated   o Continue telemetry monitoring   • Hypertension   o Maintain MAP >65  o Maintain SBP < 180  o Holding home antihypertensives:  - Lisinopril 10mg daily   o Hydralazine 10mg q6h PRN       Pulm:  • No acute issues   o Monitor for evidence of volume overload   o Maintain SpO2 >90%  o  Continue pulmonary hygiene. Incentive spirometer q1h while awake, encourage coughing and deep breathing. Upright positioning  o Suction as needed and closely monitor secretions. Maintain HOB >30 degrees. Q4h oral care with chlorhexidine BID      GI:   • Necrotizing pancreatitis   o 7/24/23 CT A/P: Necrotizing pancreatitis with areas of parenchymal pancreatic necrosis in the tail and body of the pancreas.  Peripancreatic fat necrosis in the lesser sac, gastrocolic ligament with tracking of inflammatory changes along the anterior posterior pararenal fascia, paracolic fascia into the pelvis  o Protonix 40mg IV q24h   o Red surgery consulted, appreciate ongoing follow up  • Elevated total bilirubin  o RUQ US ordered and pending  o Follow up AM CMP   • Bowel regimen  o Start senna/colace BID   o Last BM: 7/24/23      :   • JERZY  o Likely pre-renal given poor oral intake and capillary leak from inflammatory state   o Baseline creatinine: 0.87 - 0.92  o Admission creatinine: 1.48 (7/24/23)   o Last creatinine: 1.22 (7/24/23)   o Follow up AM creatinine   o Continue volume resuscitation as indicated   o Strict q4h I/O monitoring  o Monitor without Saunders catheter  o Continue to follow renal function tests      F/E/N:   • Fluids  o Maintenance fluids: Isolyte 100ml/hr   o Diuresis plan: None  • Electrolytes   o Replete electrolytes with as needed to maintain K >4.0, Mag >2.0, Phos >3.0, ical > 1.10  • Nutrition  o NPO  o Advance to clear liquids today as tolerated       Heme/Onc:   • SMA thrombosis   o Heparin drip anticoagulation   o Monitor CBC daily   • VTE prophylaxis:  SCD's to BLE      Endo/Rheum:   • Type 2 diabetes   o Last hemoglobin A1c 6.3% on 7/24/23  o Continue insulin infusion for today  o Consider transition to SSI once on stable diet   o Adjust insulin regimen as needed to maintain goal -180  o Holding home antihyperglycemics:      ID:   • Leukocytosis   o Related to acute necrotizing pancreatitis   o Continue to monitor fever and WBC curve off antibiotics   o Cultures:  - 7/24/23 BCXx2: PENDING   - 7/24/23 BCXx2: PENDING    - 7/24/23 Urine Cx: PENDING       MSK/Skin:   • No acute issues   o Reposition q2h, eliminate pressure points while in bed  o Close skin surveillance     Disposition: Stepdown Level 2    ICU Core Measures     A: Assess, Prevent, and Manage Pain · Has pain been assessed? Yes  · Need for changes to pain regimen?  No   B: Both SAT/SAT  · N/A   C: Choice of Sedation · RASS Goal: 0 Alert and Calm or N/A patient not on sedation  · Need for changes to sedation or analgesia regimen? NA   D: Delirium · CAM-ICU: Negative   E: Early Mobility  · Plan for early mobility? Yes   F: Family Engagement · Plan for family engagement today? Yes         Prophylaxis:  VTE VTE covered by:  heparin (porcine), Intravenous, 20 Units/kg/hr at 07/25/23 0155  heparin (porcine), Intravenous, 4,800 Units at 07/24/23 2256  heparin (porcine), Intravenous       Stress Ulcer  covered by pantoprazole (PROTONIX) injection 40 mg [[id number]]          Subjective   HPI/24hr events: No acute events overnight. Given 1L Isolyte for elevated lactate, decrease in UOP. Lactate cleared. Pain well controlled, not requiring analgesics. Review of Systems   Constitutional: Positive for fatigue. Respiratory: Negative for shortness of breath. Gastrointestinal: Negative for abdominal pain and nausea. Musculoskeletal: Negative for back pain. Objective                            Vitals I/O      Most Recent Min/Max in 24hrs   Temp 99.1 °F (37.3 °C) Temp  Min: 98.5 °F (36.9 °C)  Max: 99.9 °F (37.7 °C)   Pulse (!) 106 Pulse  Min: 106  Max: 120   Resp (!) 26 Resp  Min: 17  Max: 31   /83 BP  Min: 112/56  Max: 144/71   O2 Sat 94 % SpO2  Min: 91 %  Max: 99 %      Intake/Output Summary (Last 24 hours) at 7/25/2023 0245  Last data filed at 7/25/2023 0200  Gross per 24 hour   Intake 3109.69 ml   Output 775 ml   Net 2334.69 ml         Diet NPO; Sips of clear liquids     Invasive Monitoring Physical exam    Physical Exam  Vitals reviewed. Constitutional:       General: He is awake. Appearance: Normal appearance. He is obese. HENT:      Head: Normocephalic and atraumatic. Mouth/Throat:      Lips: Pink. Mouth: Mucous membranes are dry. Eyes:      General: No scleral icterus. Cardiovascular:      Rate and Rhythm: Regular rhythm. Tachycardia present.       Pulses:           Radial pulses are 2+ on the right side and 2+ on the left side. Dorsalis pedis pulses are 2+ on the right side and 2+ on the left side. Heart sounds: Normal heart sounds. Pulmonary:      Effort: Pulmonary effort is normal. Tachypnea present. Breath sounds: Examination of the right-lower field reveals decreased breath sounds. Examination of the left-lower field reveals decreased breath sounds. Decreased breath sounds present. Abdominal:      General: Abdomen is protuberant. Bowel sounds are normal. There is no distension. Palpations: Abdomen is soft. Tenderness: There is no abdominal tenderness. Musculoskeletal:      Right lower le+ Pitting Edema present. Left lower le+ Pitting Edema present. Skin:     General: Skin is warm and dry. Capillary Refill: Capillary refill takes less than 2 seconds. Neurological:      General: No focal deficit present. Mental Status: He is alert. GCS: GCS eye subscore is 4. GCS verbal subscore is 5. GCS motor subscore is 6. Motor: Motor function is intact. Psychiatric:         Behavior: Behavior is cooperative. Diagnostic Studies      EKG: Sinus tachycardia   Imaging: No new imaging I have personally reviewed pertinent reports.    and I have personally reviewed pertinent films in PACS     Medications:  Scheduled PRN   chlorhexidine, 15 mL, Q12H [address]  pantoprazole, 40 mg, Q12H ALEX      heparin (porcine), 4,800 Units, Q6H PRN  heparin (porcine), 9,600 Units, Q6H PRN  hydrALAZINE, 10 mg, Q6H PRN  HYDROmorphone, 0.5 mg, Q2H PRN  oxyCODONE, 10 mg, Q4H PRN  oxyCODONE, 5 mg, Q4H PRN       Continuous    heparin (porcine), 3-30 Units/kg/hr (Order-Specific), Last Rate: 20 Units/kg/hr (23 0155)  insulin regular (HumuLIN R,NovoLIN R) 1 Units/mL in sodium chloride 0.9 % 100 mL infusion, 0.3-21 Units/hr, Last Rate: 1.5 Units/hr (23 014)  multi-electrolyte, 125 mL/hr, Last Rate: 125 mL/hr (23)  sodium chloride, 10 mL/hr, Last Rate: 10 mL/hr (07/25/23 0148)         Labs:    CBC    Recent Labs     07/24/23  0936 07/24/23  1452   WBC 40.12* 34.54*   HGB 16.1 14.7   HCT 47.8 42.9    313   BANDSPCT 4  --      BMP    Recent Labs     07/24/23  0936 07/24/23  1452   SODIUM 129* 131*   K 3.7 3.8   CL 89* 96   CO2 27 25   AGAP 13 10   BUN 31* 28*   CREATININE 1.48* 1.22   CALCIUM 8.6 8.1*       Coags    Recent Labs     07/24/23  1517 07/24/23  2157   INR 1.23*  --    PTT 25 46*        Additional Electrolytes  Recent Labs     07/24/23  0936   MG 2.4          Blood Gas    No recent results  No recent results LFTs  Recent Labs     07/24/23  0936   ALT 34   AST 27   ALKPHOS 105*   ALB 3.2*   TBILI 1.28*       Infectious  Recent Labs     07/24/23  1133 07/24/23  1452   PROCALCITONI 0.78* 0.96*     Glucose  Recent Labs     07/24/23  0936 07/24/23  1452   GLUC 296* [address] [facility]

## 2023-07-26 ENCOUNTER — RA CDI HCC (OUTPATIENT)
Dept: OTHER | Facility: HOSPITAL | Age: 53
End: 2023-07-26

## 2023-07-26 LAB
ANION GAP SERPL CALCULATED.3IONS-SCNC: 9 MMOL/L
APTT PPP: 59 SECONDS (ref 23–37)
APTT PPP: 75 SECONDS (ref 23–37)
APTT PPP: 92 SECONDS (ref 23–37)
BUN SERPL-MCNC: 23 MG/DL (ref 5–25)
CALCIUM SERPL-MCNC: 7.2 MG/DL (ref 8.3–10.1)
CHLORIDE SERPL-SCNC: 102 MMOL/L (ref 96–108)
CO2 SERPL-SCNC: 26 MMOL/L (ref 21–32)
CREAT SERPL-MCNC: 1.22 MG/DL (ref 0.6–1.3)
ERYTHROCYTE [DISTWIDTH] IN BLOOD BY AUTOMATED COUNT: 14 % (ref 11.6–15.1)
GFR SERPL CREATININE-BSD FRML MDRD: 67 ML/MIN/1.73SQ M
GLUCOSE SERPL-MCNC: 123 MG/DL (ref 65–140)
GLUCOSE SERPL-MCNC: 124 MG/DL (ref 65–140)
GLUCOSE SERPL-MCNC: 126 MG/DL (ref 65–140)
GLUCOSE SERPL-MCNC: 126 MG/DL (ref 65–140)
GLUCOSE SERPL-MCNC: 131 MG/DL (ref 65–140)
GLUCOSE SERPL-MCNC: 133 MG/DL (ref 65–140)
GLUCOSE SERPL-MCNC: 133 MG/DL (ref 65–140)
GLUCOSE SERPL-MCNC: 138 MG/DL (ref 65–140)
GLUCOSE SERPL-MCNC: 139 MG/DL (ref 65–140)
GLUCOSE SERPL-MCNC: 147 MG/DL (ref 65–140)
GLUCOSE SERPL-MCNC: 151 MG/DL (ref 65–140)
GLUCOSE SERPL-MCNC: 163 MG/DL (ref 65–140)
GLUCOSE SERPL-MCNC: 176 MG/DL (ref 65–140)
HCT VFR BLD AUTO: 39.8 % (ref 36.5–49.3)
HGB BLD-MCNC: 13.3 G/DL (ref 12–17)
MAGNESIUM SERPL-MCNC: 2.4 MG/DL (ref 1.6–2.6)
MCH RBC QN AUTO: 29.1 PG (ref 26.8–34.3)
MCHC RBC AUTO-ENTMCNC: 33.4 G/DL (ref 31.4–37.4)
MCV RBC AUTO: 87 FL (ref 82–98)
PHOSPHATE SERPL-MCNC: 3.6 MG/DL (ref 2.7–4.5)
PLATELET # BLD AUTO: 272 THOUSANDS/UL (ref 149–390)
PMV BLD AUTO: 10 FL (ref 8.9–12.7)
POTASSIUM SERPL-SCNC: 3.1 MMOL/L (ref 3.5–5.3)
RBC # BLD AUTO: 4.57 MILLION/UL (ref 3.88–5.62)
SODIUM SERPL-SCNC: 137 MMOL/L (ref 135–147)
WBC # BLD AUTO: 29.32 THOUSAND/UL (ref 4.31–10.16)

## 2023-07-26 PROCEDURE — C9113 INJ PANTOPRAZOLE SODIUM, VIA: HCPCS

## 2023-07-26 PROCEDURE — 99232 SBSQ HOSP IP/OBS MODERATE 35: CPT | Performed by: SURGERY

## 2023-07-26 PROCEDURE — 82948 REAGENT STRIP/BLOOD GLUCOSE: CPT

## 2023-07-26 PROCEDURE — 85027 COMPLETE CBC AUTOMATED: CPT

## 2023-07-26 PROCEDURE — 80048 BASIC METABOLIC PNL TOTAL CA: CPT

## 2023-07-26 PROCEDURE — 85730 THROMBOPLASTIN TIME PARTIAL: CPT | Performed by: SURGERY

## 2023-07-26 PROCEDURE — 83735 ASSAY OF MAGNESIUM: CPT

## 2023-07-26 PROCEDURE — 84100 ASSAY OF PHOSPHORUS: CPT

## 2023-07-26 RX ORDER — SODIUM CHLORIDE, SODIUM LACTATE, POTASSIUM CHLORIDE, CALCIUM CHLORIDE 600; 310; 30; 20 MG/100ML; MG/100ML; MG/100ML; MG/100ML
125 INJECTION, SOLUTION INTRAVENOUS CONTINUOUS
Status: DISCONTINUED | OUTPATIENT
Start: 2023-07-26 | End: 2023-07-27

## 2023-07-26 RX ADMIN — CHLORHEXIDINE GLUCONATE 15 ML: 1.2 SOLUTION ORAL at 08:02

## 2023-07-26 RX ADMIN — HEPARIN SODIUM 24 UNITS/KG/HR: 10000 INJECTION, SOLUTION INTRAVENOUS at 09:59

## 2023-07-26 RX ADMIN — Medication 2.5 MG: at 18:15

## 2023-07-26 RX ADMIN — HEPARIN SODIUM 4800 UNITS: 1000 INJECTION INTRAVENOUS; SUBCUTANEOUS at 13:14

## 2023-07-26 RX ADMIN — PANTOPRAZOLE SODIUM 40 MG: 40 INJECTION, POWDER, FOR SOLUTION INTRAVENOUS at 08:02

## 2023-07-26 RX ADMIN — CHLORHEXIDINE GLUCONATE 15 ML: 1.2 SOLUTION ORAL at 21:29

## 2023-07-26 RX ADMIN — SODIUM CHLORIDE, SODIUM LACTATE, POTASSIUM CHLORIDE, AND CALCIUM CHLORIDE 125 ML/HR: .6; .31; .03; .02 INJECTION, SOLUTION INTRAVENOUS at 08:01

## 2023-07-26 RX ADMIN — HEPARIN SODIUM 24 UNITS/KG/HR: 10000 INJECTION, SOLUTION INTRAVENOUS at 00:14

## 2023-07-26 RX ADMIN — SODIUM CHLORIDE, SODIUM LACTATE, POTASSIUM CHLORIDE, AND CALCIUM CHLORIDE 125 ML/HR: .6; .31; .03; .02 INJECTION, SOLUTION INTRAVENOUS at 22:59

## 2023-07-26 RX ADMIN — ACETAMINOPHEN 975 MG: 325 TABLET, FILM COATED ORAL at 18:03

## 2023-07-26 RX ADMIN — HEPARIN SODIUM 4800 UNITS: 1000 INJECTION INTRAVENOUS; SUBCUTANEOUS at 00:04

## 2023-07-26 RX ADMIN — HEPARIN SODIUM 26 UNITS/KG/HR: 10000 INJECTION, SOLUTION INTRAVENOUS at 18:03

## 2023-07-26 RX ADMIN — ACETAMINOPHEN 975 MG: 325 TABLET, FILM COATED ORAL at 00:03

## 2023-07-26 RX ADMIN — ACETAMINOPHEN 975 MG: 325 TABLET, FILM COATED ORAL at 07:05

## 2023-07-26 RX ADMIN — Medication 2.5 MG: at 22:57

## 2023-07-26 RX ADMIN — SODIUM CHLORIDE 150 ML/HR: 0.9 INJECTION, SOLUTION INTRAVENOUS at 05:13

## 2023-07-26 RX ADMIN — PANTOPRAZOLE SODIUM 40 MG: 40 INJECTION, POWDER, FOR SOLUTION INTRAVENOUS at 21:29

## 2023-07-26 NOTE — PROGRESS NOTES
[address] [facility] coding opportunities       Chart reviewed, no opportunity found: CHART REVIEWED, NO OPPORTUNITY FOUND        Patients Insurance        Commercial Insurance: [employer]

## 2023-07-26 NOTE — PLAN OF CARE
Problem: Nutrition/Hydration-ADULT  Goal: Nutrient/Hydration intake appropriate for improving, restoring or maintaining nutritional needs  Description: Monitor and assess patient's nutrition/hydration status for malnutrition. Collaborate with interdisciplinary team and initiate plan and interventions as ordered. Monitor patient's weight and dietary intake as ordered or per policy. Utilize nutrition screening tool and intervene as necessary. Determine patient's food preferences and provide high-protein, high-caloric foods as appropriate.      INTERVENTIONS:  - Monitor oral intake, urinary output, labs, and treatment plans  - Assess nutrition and hydration status and recommend course of action  - Evaluate amount of meals eaten  - Assist patient with eating if necessary   - Allow adequate time for meals  - Recommend/ encourage appropriate diets, oral nutritional supplements, and vitamin/mineral supplements  - Order, calculate, and assess calorie counts as needed  - Recommend, monitor, and adjust tube feedings and TPN/PPN based on assessed needs  - Assess need for intravenous fluids  - Provide specific nutrition/hydration education as appropriate  - Include patient/family/caregiver in decisions related to nutrition  Outcome: Progressing     Problem: Prexisting or High Potential for Compromised Skin Integrity  Goal: Skin integrity is maintained or improved  Description: INTERVENTIONS:  - Identify patients at risk for skin breakdown  - Assess and monitor skin integrity  - Assess and monitor nutrition and hydration status  - Monitor labs   - Assess for incontinence   - Turn and reposition patient  - Assist with mobility/ambulation  - Relieve pressure over bony prominences  - Avoid friction and shearing  - Provide appropriate hygiene as needed including keeping skin clean and dry  - Evaluate need for skin moisturizer/barrier cream  - Collaborate with interdisciplinary team   - Patient/family teaching  - Consider wound care consult   Outcome: Progressing

## 2023-07-26 NOTE — CASE MANAGEMENT
Case Management Assessment & Discharge Planning Note    Patient name Nataliia Lewis  Location [address] [address]22 MRN [id number]  : 1970 Date 2023       Current Admission Date: 2023  Current Admission Diagnosis:Necrotizing pancreatitis   Patient Active Problem List    Diagnosis Date Noted   • Necrotizing pancreatitis 2023   • Superior mesenteric artery thrombosis ([address]) 2023   • Pleural effusion, left 2023   • Hyponatremia 2023   • Abnormal urinalysis 2023   • Leukocytosis 2023   • JERZY (acute kidney injury) ([address]) 2023   • Primary hypertension 2022   • Proteinuria 2022   • Chronic pain of both hips 2022   • Family history of prostate cancer in father 2022      LOS (days): 2  Geometric Mean LOS (GMLOS) (days):   Days to GMLOS:     OBJECTIVE:    Risk of Unplanned Readmission Score: 9.34         Current admission status: Inpatient       Preferred Pharmacy:   [facility]/pharmacy #[id number]- [place], [place] - [address] [address]  Phone: [phone] Fax: [phone]    Primary Care Provider: Rachael Eckert MD    Primary Insurance: Rusty Mason Insurance:     ASSESSMENT:  [facility] Proxies    There are no active Health Care Proxies on file. Readmission Root Cause  30 Day Readmission: No    Patient Information  Admitted from[de-identified] Home  Mental Status: Alert  During Assessment patient was accompanied by: Not accompanied during assessment  Assessment information provided by[de-identified] Patient  Primary Caregiver: Self  Support Systems: Self, Spouse/significant other  [place] of Residence: [address] [place] [address] do you live in?: [facility] entry access options.  Select all that apply.: Stairs  Number of steps to enter home.: 2  Do the steps have railings?: Yes  Type of Current Residence: [facility]  In the last 12 months, was there a time when you were not able to pay the mortgage or rent on time?: No  In the last 12 months, was there a time when you did not have a steady place to sleep or slept in a shelter (including now)?: No  Homeless/housing insecurity resource given?: N/A  Living Arrangements: Lives w/ Spouse/significant other  Is patient a ?: Yes  Is patient active with [facility])?: No    Activities of Daily Living Prior to Admission  Functional Status: Independent  Completes ADLs independently?: Yes  Ambulates independently?: Yes  Does patient use assisted devices?: No  Does patient currently own DME?: No  Does patient have a history of Outpatient Therapy (PT/OT)?: No  Does the patient have a history of Short-Term Rehab?: No  Does patient have a history of HHC?: Yes ([address] [facility] years ago)  Does patient currently have [facility]?: No         Patient Information Continued  Income Source: Employed  Does patient have prescription coverage?: Yes  Within the past 12 months, you worried that your food would run out before you got the money to buy more.: Never true  Within the past 12 months, the food you bought just didn't last and you didn't have money to get more.: Never true  Food insecurity resource given?: N/A  Does patient receive dialysis treatments?: No  Does patient have a history of substance abuse?: No  Does patient have a history of Mental Health Diagnosis?: No         Means of Transportation  Means of Transport to Appts[de-identified] Drives Self  In the past 12 months, has lack of transportation kept you from medical appointments or from getting medications?: No  In the past 12 months, has lack of transportation kept you from meetings, work, or from getting things needed for daily living?: No  Was application for public transport provided?: N/A        DISCHARGE DETAILS:    Discharge planning discussed with[de-identified] Patient at bedside. Freedom of Choice: Yes  Comments - Freedom of Choice: No PT/OT ordered at this time. No rehab needs anticipated at this time.   LORRAINE contacted family/caregiver?: No- see comments (Patient indepent and able to [facility] own decisions.)  Were Treatment Team discharge recommendations reviewed with patient/caregiver?: Yes  Did patient/caregiver verbalize understanding of patient care needs?: Yes  Were patient/caregiver advised of the risks associated with not following Treatment Team discharge recommendations?: Yes    Contacts  Patient Contacts: Patient  Relationship to Patient[de-identified] Other (Comment)  Contact Method: In Person  Reason/Outcome: Continuity of Care, Discharge [address] [facility]         Is the patient interested in [facility] at discharge?: No    DME Referral Provided  Referral made for DME?: No    Other Referral/Resources/Interventions Provided:  Referral Comments: No referrals made at this time. No PT/OT consulted. Pt fully ambulatory prior to admission and did not use any DME to ambulate.          Treatment Team Recommendation: Home  Discharge Destination Plan[de-identified] Home  Transport at Discharge : Family

## 2023-07-26 NOTE — PLAN OF CARE
Problem: Nutrition/Hydration-ADULT  Goal: Nutrient/Hydration intake appropriate for improving, restoring or maintaining nutritional needs  Description: Monitor and assess patient's nutrition/hydration status for malnutrition. Collaborate with interdisciplinary team and initiate plan and interventions as ordered. Monitor patient's weight and dietary intake as ordered or per policy. Utilize nutrition screening tool and intervene as necessary. Determine patient's food preferences and provide high-protein, high-caloric foods as appropriate.      INTERVENTIONS:  - Monitor oral intake, urinary output, labs, and treatment plans  - Assess nutrition and hydration status and recommend course of action  - Evaluate amount of meals eaten  - Assist patient with eating if necessary   - Allow adequate time for meals  - Recommend/ encourage appropriate diets, oral nutritional supplements, and vitamin/mineral supplements  - Order, calculate, and assess calorie counts as needed  - Recommend, monitor, and adjust tube feedings and TPN/PPN based on assessed needs  - Assess need for intravenous fluids  - Provide specific nutrition/hydration education as appropriate  - Include patient/family/caregiver in decisions related to nutrition  Outcome: Progressing     Problem: Prexisting or High Potential for Compromised Skin Integrity  Goal: Skin integrity is maintained or improved  Description: INTERVENTIONS:  - Identify patients at risk for skin breakdown  - Assess and monitor skin integrity  - Assess and monitor nutrition and hydration status  - Monitor labs   - Assess for incontinence   - Turn and reposition patient  - Assist with mobility/ambulation  - Relieve pressure over bony prominences  - Avoid friction and shearing  - Provide appropriate hygiene as needed including keeping skin clean and dry  - Evaluate need for skin moisturizer/barrier cream  - Collaborate with interdisciplinary team   - Patient/family teaching  - Consider wound care consult   Outcome: Progressing     Problem: MOBILITY - ADULT  Goal: Maintain or return to baseline ADL function  Description: INTERVENTIONS:  -  Assess patient's ability to carry out ADLs; assess patient's baseline for ADL function and identify physical deficits which impact ability to perform ADLs (bathing, care of mouth/teeth, toileting, grooming, dressing, etc.)  - Assess/evaluate cause of self-care deficits   - Assess range of motion  - Assess patient's mobility; develop plan if impaired  - Assess patient's need for assistive devices and provide as appropriate  - Encourage maximum independence but intervene and supervise when necessary  - Involve family in performance of ADLs  - Assess for home care needs following discharge   - Consider OT consult to assist with ADL evaluation and planning for discharge  - Provide patient education as appropriate  Outcome: Progressing  Goal: Maintains/Returns to pre admission functional level  Description: INTERVENTIONS:  - Perform BMAT or MOVE assessment daily.   - Set and communicate daily mobility goal to care team and patient/family/caregiver. - Collaborate with rehabilitation services on mobility goals if consulted  - Perform Range of Motion 3 times a day. - Reposition patient every 2 hours.   - Dangle patient 3 times a day  - Stand patient 3 times a day  - Ambulate patient 3 times a day  - Out of bed to chair 3 times a day   - Out of bed for meals 3 times a day  - Out of bed for toileting  - Record patient progress and toleration of activity level   Outcome: Progressing

## 2023-07-26 NOTE — PROGRESS NOTES
Progress Note - General Surgery   Phoebchristiano Gains 46 y.o. male MRN: [id number]  Unit/Bed#: [facility] 826-01 Encounter: [id number]    Assessment:  45yo M with acute necrotizing pancreatitis    7/24 RUQ US : Mild hepatic steatosis, suboptimally visualized pancreas, nonobstructive cholelithiasis    Plan:  - back to CLD with no carbonation  - cont to monitor off abx until evidence of infected collection  - monitor I/Os  - heparin gtt  - appreciate care per ICU    Subjective/Objective   Subjective: Patient was febrile to 102 overnight. Doing well this morning. Pain under control. Reports passing flatus, having bowel movements. Tolerating his clear liquid diet well. Objective: Vitals    Blood pressure 129/78, pulse 96, temperature 99.7 °F (37.6 °C), resp. rate 18, height 5' 11" (1.803 m), weight 130 kg (286 lb 9.6 oz), SpO2 94 %. ,Body mass index is 39.97 kg/m². Intake/Output Summary (Last 24 hours) at 7/26/2023 0953  Last data filed at 7/26/2023 0804  Gross per 24 hour   Intake 3332.35 ml   Output 0 ml   Net 3332.35 ml       Invasive Devices     Peripheral Intravenous Line  Duration           Peripheral IV 07/24/23 Left Antecubital 2 days    Peripheral IV 07/24/23 Dorsal (posterior); Right Hand 1 day                Physical Exam:   General: No acute distress  Neuro: alert and oriented  HEENT: moist mucous membranes  CV: Well perfused, regular rate and rhythm  Lungs: Normal work of breathing, no increased respiratory effort  Abdomen: Soft, minimally tender, distended.   Extremities: No edema, clubbing or cyanosis  Skin: Warm, dry    Lab, Imaging and other studies:  CBC:   Lab Results   Component Value Date    WBC 29.32 (H) 07/26/2023    HGB 13.3 07/26/2023    HCT 39.8 07/26/2023    MCV 87 07/26/2023     07/26/2023    RBC 4.57 07/26/2023    MCH 29.1 07/26/2023    MCHC 33.4 07/26/2023    RDW 14.0 07/26/2023    MPV 10.0 07/26/2023   , CMP:   Lab Results   Component Value Date    SODIUM 137 07/26/2023    K 3.1 (L) 07/26/2023     07/26/2023    CO2 26 07/26/2023    BUN 23 07/26/2023    CREATININE 1.22 07/26/2023    CALCIUM 7.2 (L) 07/26/2023    EGFR 67 07/26/2023

## 2023-07-27 LAB
ANION GAP SERPL CALCULATED.3IONS-SCNC: 7 MMOL/L
APTT PPP: 53 SECONDS (ref 23–37)
APTT PPP: 92 SECONDS (ref 23–37)
APTT PPP: >210 SECONDS (ref 23–37)
BUN SERPL-MCNC: 21 MG/DL (ref 5–25)
CALCIUM SERPL-MCNC: 7.4 MG/DL (ref 8.3–10.1)
CHLORIDE SERPL-SCNC: 101 MMOL/L (ref 96–108)
CO2 SERPL-SCNC: 27 MMOL/L (ref 21–32)
CREAT SERPL-MCNC: 1.16 MG/DL (ref 0.6–1.3)
ERYTHROCYTE [DISTWIDTH] IN BLOOD BY AUTOMATED COUNT: 14 % (ref 11.6–15.1)
EST. AVERAGE GLUCOSE BLD GHB EST-MCNC: 134 MG/DL
GFR SERPL CREATININE-BSD FRML MDRD: 71 ML/MIN/1.73SQ M
GLUCOSE SERPL-MCNC: 101 MG/DL (ref 65–140)
GLUCOSE SERPL-MCNC: 116 MG/DL (ref 65–140)
GLUCOSE SERPL-MCNC: 128 MG/DL (ref 65–140)
GLUCOSE SERPL-MCNC: 136 MG/DL (ref 65–140)
GLUCOSE SERPL-MCNC: 143 MG/DL (ref 65–140)
GLUCOSE SERPL-MCNC: 152 MG/DL (ref 65–140)
GLUCOSE SERPL-MCNC: 154 MG/DL (ref 65–140)
GLUCOSE SERPL-MCNC: 165 MG/DL (ref 65–140)
GLUCOSE SERPL-MCNC: 91 MG/DL (ref 65–140)
HBA1C MFR BLD: 6.3 %
HCT VFR BLD AUTO: 37.7 % (ref 36.5–49.3)
HGB BLD-MCNC: 12.6 G/DL (ref 12–17)
MAGNESIUM SERPL-MCNC: 2.2 MG/DL (ref 1.6–2.6)
MCH RBC QN AUTO: 29.2 PG (ref 26.8–34.3)
MCHC RBC AUTO-ENTMCNC: 33.4 G/DL (ref 31.4–37.4)
MCV RBC AUTO: 88 FL (ref 82–98)
PHOSPHATE SERPL-MCNC: 3.1 MG/DL (ref 2.7–4.5)
PLATELET # BLD AUTO: 278 THOUSANDS/UL (ref 149–390)
PMV BLD AUTO: 9.8 FL (ref 8.9–12.7)
POTASSIUM SERPL-SCNC: 3.1 MMOL/L (ref 3.5–5.3)
RBC # BLD AUTO: 4.31 MILLION/UL (ref 3.88–5.62)
SODIUM SERPL-SCNC: 135 MMOL/L (ref 135–147)
WBC # BLD AUTO: 28.97 THOUSAND/UL (ref 4.31–10.16)

## 2023-07-27 PROCEDURE — 83036 HEMOGLOBIN GLYCOSYLATED A1C: CPT | Performed by: SURGERY

## 2023-07-27 PROCEDURE — 80048 BASIC METABOLIC PNL TOTAL CA: CPT | Performed by: SURGERY

## 2023-07-27 PROCEDURE — 84100 ASSAY OF PHOSPHORUS: CPT | Performed by: NURSE PRACTITIONER

## 2023-07-27 PROCEDURE — 83735 ASSAY OF MAGNESIUM: CPT | Performed by: NURSE PRACTITIONER

## 2023-07-27 PROCEDURE — C9113 INJ PANTOPRAZOLE SODIUM, VIA: HCPCS

## 2023-07-27 PROCEDURE — 85730 THROMBOPLASTIN TIME PARTIAL: CPT | Performed by: SURGERY

## 2023-07-27 PROCEDURE — 99255 IP/OBS CONSLTJ NEW/EST HI 80: CPT | Performed by: INTERNAL MEDICINE

## 2023-07-27 PROCEDURE — 99232 SBSQ HOSP IP/OBS MODERATE 35: CPT | Performed by: SURGERY

## 2023-07-27 PROCEDURE — 85027 COMPLETE CBC AUTOMATED: CPT | Performed by: SURGERY

## 2023-07-27 PROCEDURE — 82948 REAGENT STRIP/BLOOD GLUCOSE: CPT

## 2023-07-27 RX ORDER — INSULIN LISPRO 100 [IU]/ML
4-20 INJECTION, SOLUTION INTRAVENOUS; SUBCUTANEOUS
Status: DISCONTINUED | OUTPATIENT
Start: 2023-07-27 | End: 2023-07-27

## 2023-07-27 RX ORDER — INSULIN LISPRO 100 [IU]/ML
1-5 INJECTION, SOLUTION INTRAVENOUS; SUBCUTANEOUS
Status: DISCONTINUED | OUTPATIENT
Start: 2023-07-27 | End: 2023-07-28 | Stop reason: HOSPADM

## 2023-07-27 RX ORDER — POTASSIUM CHLORIDE 20 MEQ/1
40 TABLET, EXTENDED RELEASE ORAL ONCE
Status: COMPLETED | OUTPATIENT
Start: 2023-07-27 | End: 2023-07-27

## 2023-07-27 RX ORDER — INSULIN LISPRO 100 [IU]/ML
1-6 INJECTION, SOLUTION INTRAVENOUS; SUBCUTANEOUS
Status: DISCONTINUED | OUTPATIENT
Start: 2023-07-27 | End: 2023-07-28 | Stop reason: HOSPADM

## 2023-07-27 RX ADMIN — OXYCODONE HYDROCHLORIDE 5 MG: 5 TABLET ORAL at 14:28

## 2023-07-27 RX ADMIN — HYDROMORPHONE HYDROCHLORIDE 0.2 MG: 0.2 INJECTION, SOLUTION INTRAMUSCULAR; INTRAVENOUS; SUBCUTANEOUS at 15:32

## 2023-07-27 RX ADMIN — HEPARIN SODIUM 23 UNITS/KG/HR: 10000 INJECTION, SOLUTION INTRAVENOUS at 21:08

## 2023-07-27 RX ADMIN — CHLORHEXIDINE GLUCONATE 15 ML: 1.2 SOLUTION ORAL at 10:39

## 2023-07-27 RX ADMIN — OXYCODONE HYDROCHLORIDE 5 MG: 5 TABLET ORAL at 18:57

## 2023-07-27 RX ADMIN — POTASSIUM CHLORIDE 40 MEQ: 1500 TABLET, EXTENDED RELEASE ORAL at 10:38

## 2023-07-27 RX ADMIN — INSULIN LISPRO 1 UNITS: 100 INJECTION, SOLUTION INTRAVENOUS; SUBCUTANEOUS at 17:47

## 2023-07-27 RX ADMIN — ACETAMINOPHEN 975 MG: 325 TABLET, FILM COATED ORAL at 17:46

## 2023-07-27 RX ADMIN — SENNOSIDES AND DOCUSATE SODIUM 1 TABLET: 50; 8.6 TABLET ORAL at 10:38

## 2023-07-27 RX ADMIN — SODIUM CHLORIDE, SODIUM LACTATE, POTASSIUM CHLORIDE, AND CALCIUM CHLORIDE 125 ML/HR: .6; .31; .03; .02 INJECTION, SOLUTION INTRAVENOUS at 05:59

## 2023-07-27 RX ADMIN — PANTOPRAZOLE SODIUM 40 MG: 40 INJECTION, POWDER, FOR SOLUTION INTRAVENOUS at 10:38

## 2023-07-27 RX ADMIN — SODIUM CHLORIDE, SODIUM LACTATE, POTASSIUM CHLORIDE, AND CALCIUM CHLORIDE 125 ML/HR: .6; .31; .03; .02 INJECTION, SOLUTION INTRAVENOUS at 12:18

## 2023-07-27 RX ADMIN — HEPARIN SODIUM 24 UNITS/KG/HR: 10000 INJECTION, SOLUTION INTRAVENOUS at 02:28

## 2023-07-27 RX ADMIN — ACETAMINOPHEN 975 MG: 325 TABLET, FILM COATED ORAL at 10:38

## 2023-07-27 RX ADMIN — INSULIN LISPRO 1 UNITS: 100 INJECTION, SOLUTION INTRAVENOUS; SUBCUTANEOUS at 21:05

## 2023-07-27 RX ADMIN — CHLORHEXIDINE GLUCONATE 15 ML: 1.2 SOLUTION ORAL at 21:05

## 2023-07-27 RX ADMIN — PANTOPRAZOLE SODIUM 40 MG: 40 INJECTION, POWDER, FOR SOLUTION INTRAVENOUS at 21:04

## 2023-07-27 RX ADMIN — OXYCODONE HYDROCHLORIDE 5 MG: 5 TABLET ORAL at 10:39

## 2023-07-27 RX ADMIN — Medication 2.5 MG: at 05:37

## 2023-07-27 RX ADMIN — HEPARIN SODIUM 4800 UNITS: 1000 INJECTION INTRAVENOUS; SUBCUTANEOUS at 18:57

## 2023-07-27 RX ADMIN — SENNOSIDES AND DOCUSATE SODIUM 1 TABLET: 50; 8.6 TABLET ORAL at 17:46

## 2023-07-27 RX ADMIN — ACETAMINOPHEN 975 MG: 325 TABLET, FILM COATED ORAL at 01:33

## 2023-07-27 RX ADMIN — HEPARIN SODIUM 21 UNITS/KG/HR: 10000 INJECTION, SOLUTION INTRAVENOUS at 12:18

## 2023-07-27 NOTE — PROGRESS NOTES
General Surgery  Progress Note   Danelle Lou 46 y.o. male MRN: [id number]  Unit/Bed#: [facility] 826-01 Encounter: [id number]    Assessment:  47yo M with acute necrotizing pancreatitis      RUQ US : Mild hepatic steatosis, suboptimally visualized pancreas, nonobstructive cholelithiasis     Plan:   - Carb control diet  - cont to monitor off abx until evidence of infected collection  - monitor I/Os  - heparin gtt  - appreciate care per ICU         Subjective/Objective   Subjective:   Patient was febrile to 102 overnight. Doing well this morning. Pain under control. Reports passing flatus, having bowel movements. Tolerating his clear liquid diet well. Objective:   Vitals:Blood pressure 133/80, pulse 98, temperature 98.9 °F (37.2 °C), resp. rate 22, height 5' 11" (1.803 m), weight 130 kg (286 lb 9.6 oz), SpO2 95 %. Temp (24hrs), Av.4 °F (37.4 °C), Min:98.9 °F (37.2 °C), Max:99.7 °F (37.6 °C)      I/O        0701   0700  0701   0700    P. O. 840 120    I.V. (mL/kg) 2129.8 (16.4) 2215.2 (17)    IV Piggyback 250     Total Intake(mL/kg) 3219.8 (24.8) 2335.2 (18)    Urine (mL/kg/hr) 0 (0)     Stool 0     Total Output 0     Net +3219.8 +2335.2          Unmeasured Urine Occurrence 2 x 1 x    Unmeasured Stool Occurrence 3 x 4 x          Invasive Devices     Peripheral Intravenous Line  Duration           Peripheral IV 23 Dorsal (posterior); Right Hand 2 days    Peripheral IV 23 Left Antecubital 2 days                Physical Exam:   General: No acute distress  Neuro: Awake, Alert and Oriented x 3  HEENT:  Normocephalic, atraumatic, moist mucous membranes  CV: Regular rate and rhythm  Lungs: Normal work of breathing, no increased respiratory effort  Abdomen: Soft, minimally tender, distended.   Extremities: No edema, clubbing or cyanosis  Skin: Warm, dry    Lab Results:   BMP/CMP:   Lab Results   Component Value Date    SODIUM 135 2023    K 3.1 (L) 2023     07/27/2023    CO2 27 07/27/2023    BUN 21 07/27/2023    CREATININE 1.16 07/27/2023    CALCIUM 7.4 (L) 07/27/2023    EGFR 71 07/27/2023    and CBC:   Lab Results   Component Value Date    WBC 28.97 (H) 07/27/2023    HGB 12.6 07/27/2023    HCT 37.7 07/27/2023    MCV 88 07/27/2023     07/27/2023    RBC 4.31 07/27/2023    MCH 29.2 07/27/2023    MCHC 33.4 07/27/2023    RDW 14.0 07/27/2023    MPV 9.8 07/27/2023     VTE Prophylaxis: Sequential compression device (Venodyne)  and Heparin    Steven Lin MD  7/27/2023  2:48 AM

## 2023-07-27 NOTE — CONSULTS
Consultation - Eusebia Sheth 46 y.o. male MRN: [id number]    Unit/Bed#: [facility] 826-01 Encounter: [id number]      Assessment/Plan     Assessment:  New onset prediabetes with hyperglycemia in the setting of acute necrotizing pancreatitis  Acute necrotizing pancreatitis secondary to alcohol use  This is a 46y.o.-year-old male with  hyperglycemia in the setting of new diagnosis of prediabetes with A1c 6.3 admitted with acute necrotizing pancreatitis in the tail and body of pancreas, possibly following heavy alcohol use. Plan:  Blood glucose levels while inpatient have been within goal, off insulin gtt. Not tolerating diet at this time, advised to continue with correctional scale algorithm as needed, changed to algorithm 3 with meals and 2 at bedtime  Patient will benefit from nutrition consult  Had a detailed discussion at bedside about healthy lifestyle measures and low glycemic diet. Patient to be followed as outpatient with his primary care physician. Advise glucose checks once daily when he returns home. Will need glucometer with strips and bedside teaching for checking his sugars before discharge. Patient seen at bedside and management plan discussed with attending physician. Thank you for letting us participate in the care of your patient, please do not hesitate to reach out with questions. Please Tigertext questions to the clinician covering the "[id number]" Role. Thank you. CC: Diabetes Consult    History of Present Illness     HPI: Eusebia Sheth is a 46y.o. year old male with hyperglycemia in the setting of new diagnosis of prediabetes with A1c 6.  admitted with acute necrotizing pancreatitis in the tail and body of pancreas, following an episode of heavy alcohol use at a wedding.   No previous heavy alcohol history or history of pancreatitis or diabetes mellitus  Patient states that he does not have any polyuria polydipsia or polyphagia denies blurry vision  Follows up with primary care physician at [facility], not aware of previous diagnosis of prediabetes. Denies family history of diabetes. Denies coronary artery disease, MI/CVA, neuropathy symptoms. Inpatient, he had been maintained on insulin infusion discontinued this a.m. currently on sliding scale algorithm 5 premeals, started on a diet this afternoon, was n.p.o. before that but states that he did not have lunch except juice; appetite has not returned yet. Blood glucose levels have been within goal 140- 180. Fasting blood glucose this a.m. 128 then 154 and repeat 136. Inpatient consult to Endocrinology  Consult performed by: Steffen Burris MD  Consult ordered by: Vero Haney DO          Review of Systems   10 point ROS performed, negative except stated above. Historical Information   History reviewed. No pertinent past medical history.   Past Surgical History:   Procedure Laterality Date   • HERNIA REPAIR     • JOINT REPLACEMENT     • TONSILLECTOMY  12     Social History   Social History     Substance and Sexual Activity   Alcohol Use Yes   • Alcohol/week: 6.0 standard drinks of alcohol   • Types: 6 Cans of beer per week    Comment: Weekends     Social History     Substance and Sexual Activity   Drug Use Never     Social History     Tobacco Use   Smoking Status Never   Smokeless Tobacco Current   • Types: Chew     Family History:   Family History   Problem Relation Age of Onset   • Cancer Mother    • Multiple myeloma Mother    • Prostate cancer Father    • Cancer Father        Meds/Allergies   Current Facility-Administered Medications   Medication Dose Route Frequency Provider Last Rate Last Admin   • acetaminophen (TYLENOL) tablet 975 mg  975 mg Oral Q6H Tarsha Sanchez MD   975 mg at 07/27/23 0133   • chlorhexidine (PERIDEX) 0.12 % oral rinse 15 mL  15 mL Mouth/Throat Q12H [facility] Loi Ordaz   15 mL at 07/26/23 2129   • heparin (porcine) 25,000 units in 0.45% NaCl 250 mL infusion (premix)  3-30 Units/kg/hr (Order-Specific) Intravenous Titrated Loi G Orlin 28.8 mL/hr at 07/27/23 0228 24 Units/kg/hr at 07/27/23 0228   • heparin (porcine) injection 4,800 Units  4,800 Units Intravenous Q6H PRN Silvio Roach   4,800 Units at 07/26/23 1314   • heparin (porcine) injection 9,600 Units  9,600 Units Intravenous Q6H PRN Silvio Roach       • hydrALAZINE (APRESOLINE) injection 10 mg  10 mg Intravenous Q6H PRN Silvio Roach       • HYDROmorphone HCl (DILAUDID) injection 0.2 mg  0.2 mg Intravenous Q3H PRN Silvio Roach       • insulin lispro (HumaLOG) 100 units/mL subcutaneous injection 4-20 Units  4-20 Units Subcutaneous TID AC Dinesh Haney DO       • lactated ringers infusion  125 mL/hr Intravenous Continuous Vern Schrader  mL/hr at 07/27/23 0559 125 mL/hr at 07/27/23 0559   • oxyCODONE (ROXICODONE) IR tablet 5 mg  5 mg Oral Q4H PRN Silvio Roach        Or   • oxyCODONE (ROXICODONE) split tablet 2.5 mg  2.5 mg Oral Q4H PRN Loi G Orlin   2.5 mg at 07/27/23 0537   • pantoprazole (PROTONIX) injection 40 mg  40 mg Intravenous Q12H [address] Loi G Orlin   40 mg at 07/26/23 2129   • potassium chloride (K-DUR,KLOR-CON) CR tablet 40 mEq  40 mEq Oral Once GHASSAN Reyes       • senna-docusate sodium (SENOKOT S) 8.6-50 mg per tablet 1 tablet  1 tablet Oral BID Loi G Orlin         No Known Allergies    Objective   Vitals: Blood pressure 131/80, pulse 93, temperature 97.9 °F (36.6 °C), resp. rate 16, height 5' 11" (1.803 m), weight 133 kg (293 lb 3.4 oz), SpO2 95 %. Intake/Output Summary (Last 24 hours) at 7/27/2023 0840  Last data filed at 7/27/2023 0558  Gross per 24 hour   Intake 2787.56 ml   Output --   Net 2787.56 ml     Invasive Devices     Peripheral Intravenous Line  Duration           Peripheral IV 07/24/23 Dorsal (posterior); Right Hand 2 days    Peripheral IV 07/24/23 Left Antecubital 2 days                Physical Exam  Constitutional:       Appearance: He is obese. Comments: No acute distress   HENT:      Head: Normocephalic and atraumatic. Eyes:      Extraocular Movements: Extraocular movements intact. Neck:      Comments: No neck swelling  Cardiovascular:      Rate and Rhythm: Normal rate. Pulmonary:      Effort: Pulmonary effort is normal.      Breath sounds: Normal breath sounds. Comments: On room air  Abdominal:      Palpations: Abdomen is soft. Tenderness: There is no abdominal tenderness (Nontender). Comments: Obese   Musculoskeletal:         General: Normal range of motion. Cervical back: Neck supple. Comments: Resting in bed   Skin:     General: Skin is warm and dry. Neurological:      Mental Status: He is alert and oriented to person, place, and time. Psychiatric:         Mood and Affect: Mood normal.         The history was obtained from the review of the chart, patient. Lab Results:   Results from last 7 days   Lab Units 07/24/23  1452   HEMOGLOBIN A1C % 6.3*     Lab Results   Component Value Date    WBC 28.97 (H) 07/27/2023    HGB 12.6 07/27/2023    HCT 37.7 07/27/2023    MCV 88 07/27/2023     07/27/2023     Lab Results   Component Value Date/Time    BUN 21 07/27/2023 02:38 AM    K 3.1 (L) 07/27/2023 02:38 AM     07/27/2023 02:38 AM    CO2 27 07/27/2023 02:38 AM    CREATININE 1.16 07/27/2023 02:38 AM    AST 34 07/25/2023 05:02 AM    ALT 32 07/25/2023 05:02 AM    TP 5.2 (L) 07/25/2023 05:02 AM    ALB 1.6 (L) 07/25/2023 05:02 AM     Recent Labs     07/24/23  1452   HDL 23*   TRIG 115     No results found for: "Magdi Aas", "[id number]"  POC Glucose (mg/dl)   Date Value   07/27/2023 136   07/27/2023 154 (H)   07/27/2023 128   07/27/2023 101   07/26/2023 91   07/26/2023 133   07/26/2023 151 (H)   07/26/2023 139   07/26/2023 163 (H)   07/26/2023 176 (H)       Imaging Studies: I have personally reviewed pertinent reports. Portions of the record may have been created with voice recognition software.

## 2023-07-27 NOTE — UTILIZATION REVIEW
Continued Stay Review    Date: 7/27/23                          Current Patient Class: IP  Current Level of Care: MS    HPI:52 y.o. male initially admitted on 7/24/23 - DX:  Necrotizing pancreatitis    Assessment/Plan:   7/27/23: Patient was febrile to 102 overnight. Reports passing flatus, having bowel movements.  Tolerating his clear liquid diet well. cont to monitor off abx  Plan: cont iv protonix; cont heparin gtt; cont ivf; cont insulin gtt; monitor labs; pain control    Vital Signs:   Date/Time Temp Pulse Resp BP MAP (mmHg) SpO2 O2 Device Patient Position - Orthostatic VS   07/27/23 07:34:20 97.9 °F (36.6 °C) 93 16 131/80 97 95 % -- --   07/26/23 22:53:57 98.9 °F (37.2 °C) 98 22 133/80 98 95 % -- --   07/26/23 15:28:08 99.6 °F (37.6 °C) 99 15 131/79 96 96 % -- --   07/26/23 07:32:31 99.7 °F (37.6 °C) 96 18 129/78 95 94 % -- --   07/26/23 0003 100.3 °F (37.9 °C) -- -- -- -- -- -- --   07/25/23 23:14:06 100.4 °F (38 °C) 103 18 129/79 96 94 % -- --   07/25/23 22:01:44 101.6 °F (38.7 °C) Abnormal  103 -- -- -- 93 % -- --   07/25/23 21:15:14 100.7 °F (38.2 °C) Abnormal  105 -- -- -- 93 % None (Room air) --   07/25/23 20:00:48 102.9 °F (39.4 °C) Abnormal  108 Abnormal  -- -- -- 93 % -- --   07/25/23 18:45:40 102.8 °F (39.3 °C) Abnormal  107 Abnormal  18 129/79 96 93 % -- --   07/25/23 1600 -- 106 Abnormal  24 Abnormal  118/82 95 93 % -- --   07/25/23 1500 -- 104 23 Abnormal  -- -- 94 % -- --   07/25/23 1200 -- 110 Abnormal  27 Abnormal  -- -- 94 % -- --         Pertinent Labs/Diagnostic Results:       Results from last 7 days   Lab Units 07/27/23 0238 07/26/23  0557 07/25/23  0502 07/24/23  1452 07/24/23  0936   WBC Thousand/uL 28.97* 29.32* 35.81* 34.54* 40.12*   HEMOGLOBIN g/dL 12.6 13.3 13.2 14.7 16.1   HEMATOCRIT % 37.7 39.8 39.3 42.9 47.8   PLATELETS Thousands/uL 278 272 278 313 374   BANDS PCT %  --   --   --   --  4         Results from last 7 days   Lab Units 07/27/23 0238 07/26/23  0557 07/25/23  0502 07/24/23  1452 07/24/23  0936   SODIUM mmol/L 135 137 135 131* 129*   POTASSIUM mmol/L 3.1* 3.1* 3.6 3.8 3.7   CHLORIDE mmol/L 101 102 100 96 89*   CO2 mmol/L 27 26 27 25 27   ANION GAP mmol/L 7 9 8 10 13   BUN mg/dL 21 23 24 28* 31*   CREATININE mg/dL 1.16 1.22 1.25 1.22 1.48*   EGFR ml/min/1.73sq m 71 67 65 67 53   CALCIUM mg/dL 7.4* 7.2* 7.4* 8.1* 8.6   CALCIUM, IONIZED mmol/L  --   --  1.01*  --   --    MAGNESIUM mg/dL 2.2 2.4 2.3  --  2.4   PHOSPHORUS mg/dL 3.1 3.6 2.5*  --   --      Results from last 7 days   Lab Units 07/25/23  0502 07/24/23  0936   AST U/L 34 27   ALT U/L 32 34   ALK PHOS U/L 111 105*   TOTAL PROTEIN g/dL 5.2* 6.7   ALBUMIN g/dL 1.6* 3.2*   TOTAL BILIRUBIN mg/dL 1.04* 1.28*     Results from last 7 days   Lab Units 07/27/23  0806 07/27/23  0557 07/27/23  0411 07/27/23  0206 07/26/23  2353 07/26/23  2147 07/26/23  1957 07/26/23  1756 07/26/23  1607 07/26/23  1359 07/26/23  1158 07/26/23  0958   POC GLUCOSE mg/dl 136 154* 128 101 91 133 151* 139 163* 176* 131 126     Results from last 7 days   Lab Units 07/27/23  0238 07/26/23  0557 07/25/23  0502 07/24/23  1452 07/24/23  0936   GLUCOSE RANDOM mg/dL 116 133 146* 260* 296*         Results from last 7 days   Lab Units 07/27/23  0238 07/24/23  1452   HEMOGLOBIN A1C % 6.3* 6.3*   EAG mg/dl 134 134       Results from last 7 days   Lab Units 07/27/23  0922 07/27/23  0240 07/26/23  1912 07/24/23  2157 07/24/23  1517   PROTIME seconds  --   --   --   --  15.8*   INR   --   --   --   --  1.23*   PTT seconds >210* 92* 92*   < > 25    < > = values in this interval not displayed.          Results from last 7 days   Lab Units 07/24/23  1452 07/24/23  1133   PROCALCITONIN ng/ml 0.96* 0.78*     Results from last 7 days   Lab Units 07/24/23  1830 07/24/23  1452 07/24/23  1136   LACTIC ACID mmol/L 1.9 2.1* 1.5       Results from last 7 days   Lab Units 07/24/23  0936   LIPASE u/L 23       Results from last 7 days   Lab Units 07/24/23  1513 07/24/23  6900 CLARITY UA  Clear Turbid   COLOR UA  Yellow Yellow   SPEC GRAV UA  1.048* 1.022   PH UA  6.5 6.0   GLUCOSE UA mg/dl 100 (1/10%)* 300 (3/10%)*   KETONES UA mg/dl 10 (1+)* 20 (1+)*   BLOOD UA  Large* Large*   PROTEIN UA mg/dl 100 (2+)* 100 (2+)*   NITRITE UA  Negative Negative   BILIRUBIN UA  Negative Negative   UROBILINOGEN UA (BE) mg/dl <2.0 2.0*   LEUKOCYTES UA  Negative Negative   WBC UA /hpf 4-10* 4-10*   RBC UA /hpf Innumerable* Innumerable*   BACTERIA UA /hpf Occasional Moderate*   EPITHELIAL CELLS WET PREP /hpf Occasional Occasional       Results from last 7 days   Lab Units 07/24/23  1513 07/24/23  1136 07/24/23  1133   BLOOD CULTURE  No Growth at 48 hrs. No Growth at 48 hrs. No Growth at 48 hrs. No Growth at 48 hrs. URINE CULTURE  >100,000 cfu/ml Aerococcus urinae*  >100,000 cfu/ml Streptococcus anginosus*  --   --        Medications:   Scheduled Medications:  acetaminophen, 975 mg, Oral, Q6H  chlorhexidine, 15 mL, Mouth/Throat, Q12H ALEX  insulin lispro, 4-20 Units, Subcutaneous, TID AC  pantoprazole, 40 mg, Intravenous, Q12H ALEX  senna-docusate sodium, 1 tablet, Oral, BID      Continuous IV Infusions:  heparin (porcine), 3-30 Units/kg/hr (Order-Specific), Intravenous, Titrated  lactated ringers, 125 mL/hr, Intravenous, Continuous  insulin regular (HumuLIN R,NovoLIN R) 1 Units/mL in sodium chloride 0.9 % 100 mL infusion    PRN Meds:  heparin (porcine), 4,800 Units, Intravenous, Q6H PRN  heparin (porcine), 9,600 Units, Intravenous, Q6H PRN  hydrALAZINE, 10 mg, Intravenous, Q6H PRN  HYDROmorphone, 0.2 mg, Intravenous, Q3H PRN  oxyCODONE, 5 mg, Oral, Q4H PRN (7/27 recd x1 so far today)    Or  oxyCODONE, 2.5 mg, Oral, Q4H PRN  (7/27 recd x1 so far today)         Discharge Plan: TBD    Network Utilization Review Department  ATTENTION: Please call with any questions or concerns to [phone] and carefully listen to the prompts so that you are directed to the right person.  All voicemails are confidential.  Ke Miguel all requests for admission clinical reviews, approved or denied determinations and any other requests to dedicated fax number below belonging to the campus where the patient is receiving treatment.  List of dedicated fax numbers for the Facilities:  [facility] (Administrative/Medical Necessity) [phone] [address] (Maternity/NICU/Pediatrics) [phone]   [address] [place] [address] Drive [phone]   [facility] [address] [facility] [phone]   [address] [place] [address] [address] [place] [address] [id number] [place] [address] [phone]   [address] [facility] [address] [facility] [address] [phone]

## 2023-07-27 NOTE — PLAN OF CARE
Problem: Nutrition/Hydration-ADULT  Goal: Nutrient/Hydration intake appropriate for improving, restoring or maintaining nutritional needs  Description: Monitor and assess patient's nutrition/hydration status for malnutrition. Collaborate with interdisciplinary team and initiate plan and interventions as ordered. Monitor patient's weight and dietary intake as ordered or per policy. Utilize nutrition screening tool and intervene as necessary. Determine patient's food preferences and provide high-protein, high-caloric foods as appropriate.      INTERVENTIONS:  - Monitor oral intake, urinary output, labs, and treatment plans  - Assess nutrition and hydration status and recommend course of action  - Evaluate amount of meals eaten  - Assist patient with eating if necessary   - Allow adequate time for meals  - Recommend/ encourage appropriate diets, oral nutritional supplements, and vitamin/mineral supplements  - Order, calculate, and assess calorie counts as needed  - Recommend, monitor, and adjust tube feedings and TPN/PPN based on assessed needs  - Assess need for intravenous fluids  - Provide specific nutrition/hydration education as appropriate  - Include patient/family/caregiver in decisions related to nutrition  Outcome: Progressing     Problem: Prexisting or High Potential for Compromised Skin Integrity  Goal: Skin integrity is maintained or improved  Description: INTERVENTIONS:  - Identify patients at risk for skin breakdown  - Assess and monitor skin integrity  - Assess and monitor nutrition and hydration status  - Monitor labs   - Assess for incontinence   - Turn and reposition patient  - Assist with mobility/ambulation  - Relieve pressure over bony prominences  - Avoid friction and shearing  - Provide appropriate hygiene as needed including keeping skin clean and dry  - Evaluate need for skin moisturizer/barrier cream  - Collaborate with interdisciplinary team   - Patient/family teaching  - Consider wound care consult   Outcome: Progressing     Problem: MOBILITY - ADULT  Goal: Maintain or return to baseline ADL function  Description: INTERVENTIONS:  -  Assess patient's ability to carry out ADLs; assess patient's baseline for ADL function and identify physical deficits which impact ability to perform ADLs (bathing, care of mouth/teeth, toileting, grooming, dressing, etc.)  - Assess/evaluate cause of self-care deficits   - Assess range of motion  - Assess patient's mobility; develop plan if impaired  - Assess patient's need for assistive devices and provide as appropriate  - Encourage maximum independence but intervene and supervise when necessary  - Involve family in performance of ADLs  - Assess for home care needs following discharge   - Consider OT consult to assist with ADL evaluation and planning for discharge  - Provide patient education as appropriate  Outcome: Progressing  Goal: Maintains/Returns to pre admission functional level  Description: INTERVENTIONS:  - Perform BMAT or MOVE assessment daily.   - Set and communicate daily mobility goal to care team and patient/family/caregiver.    - Collaborate with rehabilitation services on mobility goals if consulted  - Ambulate patient 3 times a day  - Out of bed to chair 3 times a day   - Out of bed for meals 3 times a day  - Out of bed for toileting  - Record patient progress and toleration of activity level   Outcome: Progressing

## 2023-07-28 VITALS
TEMPERATURE: 98.1 F | HEART RATE: 97 BPM | BODY MASS INDEX: 41.17 KG/M2 | OXYGEN SATURATION: 96 % | WEIGHT: 294.09 LBS | HEIGHT: 71 IN | SYSTOLIC BLOOD PRESSURE: 146 MMHG | DIASTOLIC BLOOD PRESSURE: 90 MMHG | RESPIRATION RATE: 16 BRPM

## 2023-07-28 LAB
ANION GAP SERPL CALCULATED.3IONS-SCNC: 10 MMOL/L
APTT PPP: 52 SECONDS (ref 23–37)
APTT PPP: 64 SECONDS (ref 23–37)
BASOPHILS # BLD MANUAL: 0 THOUSAND/UL (ref 0–0.1)
BASOPHILS NFR MAR MANUAL: 0 % (ref 0–1)
BUN SERPL-MCNC: 17 MG/DL (ref 5–25)
CALCIUM SERPL-MCNC: 7.5 MG/DL (ref 8.3–10.1)
CHLORIDE SERPL-SCNC: 103 MMOL/L (ref 96–108)
CO2 SERPL-SCNC: 24 MMOL/L (ref 21–32)
CREAT SERPL-MCNC: 0.88 MG/DL (ref 0.6–1.3)
EOSINOPHIL # BLD MANUAL: 0 THOUSAND/UL (ref 0–0.4)
EOSINOPHIL NFR BLD MANUAL: 0 % (ref 0–6)
ERYTHROCYTE [DISTWIDTH] IN BLOOD BY AUTOMATED COUNT: 14.3 % (ref 11.6–15.1)
GFR SERPL CREATININE-BSD FRML MDRD: 98 ML/MIN/1.73SQ M
GIANT PLATELETS BLD QL SMEAR: PRESENT
GLUCOSE SERPL-MCNC: 142 MG/DL (ref 65–140)
GLUCOSE SERPL-MCNC: 144 MG/DL (ref 65–140)
GLUCOSE SERPL-MCNC: 151 MG/DL (ref 65–140)
HCT VFR BLD AUTO: 37.8 % (ref 36.5–49.3)
HGB BLD-MCNC: 12 G/DL (ref 12–17)
LYMPHOCYTES # BLD AUTO: 0.2 THOUSAND/UL (ref 0.6–4.47)
LYMPHOCYTES # BLD AUTO: 1 % (ref 14–44)
MCH RBC QN AUTO: 28.6 PG (ref 26.8–34.3)
MCHC RBC AUTO-ENTMCNC: 31.7 G/DL (ref 31.4–37.4)
MCV RBC AUTO: 90 FL (ref 82–98)
MONOCYTES # BLD AUTO: 0.6 THOUSAND/UL (ref 0–1.22)
MONOCYTES NFR BLD: 3 % (ref 4–12)
NEUTROPHILS # BLD MANUAL: 19.05 THOUSAND/UL (ref 1.85–7.62)
NEUTS BAND NFR BLD MANUAL: 40 % (ref 0–8)
NEUTS SEG NFR BLD AUTO: 56 % (ref 43–75)
PLATELET # BLD AUTO: 297 THOUSANDS/UL (ref 149–390)
PLATELET BLD QL SMEAR: ADEQUATE
PMV BLD AUTO: 10.2 FL (ref 8.9–12.7)
POTASSIUM SERPL-SCNC: 3.5 MMOL/L (ref 3.5–5.3)
RBC # BLD AUTO: 4.2 MILLION/UL (ref 3.88–5.62)
SODIUM SERPL-SCNC: 137 MMOL/L (ref 135–147)
WBC # BLD AUTO: 19.84 THOUSAND/UL (ref 4.31–10.16)

## 2023-07-28 PROCEDURE — 99238 HOSP IP/OBS DSCHRG MGMT 30/<: CPT | Performed by: SURGERY

## 2023-07-28 PROCEDURE — 82948 REAGENT STRIP/BLOOD GLUCOSE: CPT

## 2023-07-28 PROCEDURE — 80048 BASIC METABOLIC PNL TOTAL CA: CPT | Performed by: NURSE PRACTITIONER

## 2023-07-28 PROCEDURE — 99232 SBSQ HOSP IP/OBS MODERATE 35: CPT | Performed by: SURGERY

## 2023-07-28 PROCEDURE — 85027 COMPLETE CBC AUTOMATED: CPT | Performed by: NURSE PRACTITIONER

## 2023-07-28 PROCEDURE — C9113 INJ PANTOPRAZOLE SODIUM, VIA: HCPCS

## 2023-07-28 PROCEDURE — 85730 THROMBOPLASTIN TIME PARTIAL: CPT | Performed by: SURGERY

## 2023-07-28 PROCEDURE — 85007 BL SMEAR W/DIFF WBC COUNT: CPT | Performed by: NURSE PRACTITIONER

## 2023-07-28 RX ORDER — RIVAROXABAN 15 MG-20MG
KIT ORAL
Qty: 1 EACH | Refills: 0 | Status: SHIPPED | OUTPATIENT
Start: 2023-07-28 | End: 2023-07-28

## 2023-07-28 RX ORDER — PANTOPRAZOLE SODIUM 40 MG/1
40 TABLET, DELAYED RELEASE ORAL
Status: DISCONTINUED | OUTPATIENT
Start: 2023-07-28 | End: 2023-07-28 | Stop reason: HOSPADM

## 2023-07-28 RX ADMIN — ACETAMINOPHEN 975 MG: 325 TABLET, FILM COATED ORAL at 06:03

## 2023-07-28 RX ADMIN — HEPARIN SODIUM 23 UNITS/KG/HR: 10000 INJECTION, SOLUTION INTRAVENOUS at 06:34

## 2023-07-28 RX ADMIN — INSULIN LISPRO 1 UNITS: 100 INJECTION, SOLUTION INTRAVENOUS; SUBCUTANEOUS at 11:30

## 2023-07-28 RX ADMIN — ACETAMINOPHEN 975 MG: 325 TABLET, FILM COATED ORAL at 01:35

## 2023-07-28 RX ADMIN — HEPARIN SODIUM 4800 UNITS: 1000 INJECTION INTRAVENOUS; SUBCUTANEOUS at 09:41

## 2023-07-28 RX ADMIN — OXYCODONE HYDROCHLORIDE 5 MG: 5 TABLET ORAL at 14:26

## 2023-07-28 RX ADMIN — Medication 2.5 MG: at 01:34

## 2023-07-28 RX ADMIN — OXYCODONE HYDROCHLORIDE 5 MG: 5 TABLET ORAL at 09:40

## 2023-07-28 RX ADMIN — PANTOPRAZOLE SODIUM 40 MG: 40 INJECTION, POWDER, FOR SOLUTION INTRAVENOUS at 09:40

## 2023-07-28 NOTE — PROGRESS NOTES
General Surgery  Progress Note   Elda Eng 46 y.o. male MRN: [id number]  Unit/Bed#: [facility] 826-01 Encounter: [id number]    Assessment:  47yo M with acute necrotizing pancreatitis     Vital signs within normal limits and stable    Blood cultures x2 no growth at 72 hours  WBC pending from 28.9 from 29.3  Hemoglobin pending from 12.6  Creatinine pending from 1.16    No documented output    Plan:   Carb control diet  Cont to monitor off abx until evidence of infected collection  Monitor I/Os  Transition heparin drip to oral anticoagulant  Discharge planning         Subjective/Objective   Subjective:   Patient seen and examined at bedside. No acute events overnight. Patient reports he is doing well and having improved abdominal pain. He is tolerating a diet without nausea vomiting fevers chills and shortness of breath. He is having bowel movements, he is urinating, and ambulating. Patient reports he is ready go home. Objective:   Vitals:Blood pressure 156/81, pulse 105, temperature 99.1 °F (37.3 °C), resp. rate 18, height 5' 11" (1.803 m), weight 133 kg (293 lb 3.4 oz), SpO2 94 %. Temp (24hrs), Av.4 °F (36.9 °C), Min:97.9 °F (36.6 °C), Max:99.1 °F (37.3 °C)      I/O        0701   0700  0701   0700    P. O. 840 120    I.V. (mL/kg) 2129.8 (16.4) 2215.2 (17)    IV Piggyback 250     Total Intake(mL/kg) 3219.8 (24.8) 2335.2 (18)    Urine (mL/kg/hr) 0 (0)     Stool 0     Total Output 0     Net +3219.8 +2335.2          Unmeasured Urine Occurrence 2 x 1 x    Unmeasured Stool Occurrence 3 x 4 x          Invasive Devices     Peripheral Intravenous Line  Duration           Peripheral IV 23 Dorsal (posterior); Right Hand 3 days    Peripheral IV 23 Left Antecubital 3 days    Peripheral IV 23 Dorsal (posterior); Left Forearm <1 day                Physical Exam:   General: NAD  Neuro: AOx3  HEENT:  Normocephalic, atraumatic, moist mucous membranes  CV: Well-perfused regular rate  Lungs: No respiratory distress, normal work of breathing on room air  Abdomen: Soft, minimally tender, distended.   Extremities: No edema, clubbing or cyanosis  Skin: Warm and dry    Lab Results:   BMP/CMP:   No results found for: "SODIUM", "K", "CL", "CO2", "ANIONGAP", "BUN", "CREATININE", "GLUCOSE", "CALCIUM", "AST", "ALT", "ALKPHOS", "PROT", "BILITOT", "EGFR" and CBC:   No results found for: "WBC", "HGB", "HCT", "MCV", "PLT", "ADJUSTEDWBC", "RBC", "MCH", "MCHC", "RDW", "MPV", "NRBC"  VTE Prophylaxis: Sequential compression device (Venodyne)  and Heparin    Bartolo Hay MD  7/28/2023  6:15 AM

## 2023-07-28 NOTE — PROGRESS NOTES
The pantoprazole has / have been converted to Oral per [facility] IV-to-PO Auto-Conversion Protocol for Adults as approved by the Pharmacy and Therapeutics Committee. The patient met all eligible criteria:  3 Age = 25years old   2) Received at least one dose of the IV form   3) Receiving at least one other scheduled oral/enteral medication   4) Tolerating an oral/enteral diet   and did not have any exclusions:   1) Critical care patient   2) Active GI bleed (IF assessing H2RAs or PPIs)   3) Continuous tube feeding (IF assessing cipro, doxycycline, levofloxacin, minocycline, rifampin, or voriconazole)   4) Receiving PO vancomycin (IF assessing metronidazole)   5) Persistent nausea and/or vomiting   6) Ileus or gastrointestinal obstruction   7) Philip/nasogastric tube set for continuous suction   8) Specific order not to automatically convert to PO (in the order's comments or if discussed in the most recent Infectious Disease or primary team's progress notes).      Abran Saenz, BowenD

## 2023-07-28 NOTE — DISCHARGE INSTR - AVS FIRST PAGE
Discharge instructions    Activity: You may go up and down stairs, walk as much as you are comfortable, but walk at least 3 times each day. Diet: You may resume a regular diet. You may wish to eat lightly for a few days: soups, crackers, and sandwiches. You may resume a regular diet when ready. Medications: Resume all of your previous medications, unless told otherwise by the doctor. Tylenol and ibuoprofen is always fine, unless you are taking any narcotic pain medication containing Tylenol (such as Percocet, Darvocet, Vicodin, or anything containing acetaminophen). Do not take Tylenol if you're taking these medications. You do not need to take the narcotic pain medications unless you are having significant pain and discomfort. Upset Stomach: You may take Maalox, Tums, or similar items for an upset stomach. If your narcotic pain medication causes an upset stomach, do not take it on an empty stomach.  Try taking it with at least some crackers or toast.     Constipation: You may take over-the-counter medication for this, such as Metamucil, Senokot, Dulcolax, milk of magnesia, etc.

## 2023-07-29 LAB
BACTERIA BLD CULT: NORMAL

## 2023-07-31 ENCOUNTER — OFFICE VISIT (OUTPATIENT)
Dept: FAMILY MEDICINE CLINIC | Facility: CLINIC | Age: 53
End: 2023-07-31
Payer: COMMERCIAL

## 2023-07-31 ENCOUNTER — TELEPHONE (OUTPATIENT)
Dept: FAMILY MEDICINE CLINIC | Facility: CLINIC | Age: 53
End: 2023-07-31

## 2023-07-31 ENCOUNTER — TRANSITIONAL CARE MANAGEMENT (OUTPATIENT)
Dept: FAMILY MEDICINE CLINIC | Facility: CLINIC | Age: 53
End: 2023-07-31

## 2023-07-31 VITALS
HEART RATE: 120 BPM | BODY MASS INDEX: 41.02 KG/M2 | DIASTOLIC BLOOD PRESSURE: 72 MMHG | HEIGHT: 71 IN | SYSTOLIC BLOOD PRESSURE: 138 MMHG | WEIGHT: 293 LBS | TEMPERATURE: 99 F | OXYGEN SATURATION: 98 %

## 2023-07-31 DIAGNOSIS — K55.069 SUPERIOR MESENTERIC ARTERY THROMBOSIS (HCC): Primary | ICD-10-CM

## 2023-07-31 DIAGNOSIS — K85.91 NECROTIZING PANCREATITIS: Primary | ICD-10-CM

## 2023-07-31 DIAGNOSIS — I10 PRIMARY HYPERTENSION: ICD-10-CM

## 2023-07-31 DIAGNOSIS — K55.069 SUPERIOR MESENTERIC ARTERY THROMBOSIS (HCC): ICD-10-CM

## 2023-07-31 DIAGNOSIS — K85.91 NECROTIZING PANCREATITIS: ICD-10-CM

## 2023-07-31 PROCEDURE — 99496 TRANSJ CARE MGMT HIGH F2F 7D: CPT | Performed by: STUDENT IN AN ORGANIZED HEALTH CARE EDUCATION/TRAINING PROGRAM

## 2023-07-31 PROCEDURE — 1111F DSCHRG MED/CURRENT MED MERGE: CPT | Performed by: STUDENT IN AN ORGANIZED HEALTH CARE EDUCATION/TRAINING PROGRAM

## 2023-07-31 RX ORDER — LISINOPRIL 10 MG/1
10 TABLET ORAL DAILY
Qty: 90 TABLET | Refills: 1 | Status: SHIPPED | OUTPATIENT
Start: 2023-07-31

## 2023-07-31 NOTE — UTILIZATION REVIEW
NOTIFICATION OF ADMISSION DISCHARGE   This is a Notification of Discharge from [facility]. Please be advised that this patient has been discharge from our facility. Below you will find the admission and discharge date and time including the patient’s disposition. UTILIZATION REVIEW CONTACT:  Ilda Murillo  Utilization   Network Utilization Review Department  Phone: [phone] x carefully listen to the prompts. All voicemails are confidential.  Email: [email or web address]     ADMISSION INFORMATION  PRESENTATION DATE: 7/24/2023  2:03 PM  OBERVATION ADMISSION DATE:  INPATIENT ADMISSION DATE: 7/24/23  2:03 PM   DISCHARGE DATE: 7/28/2023  5:49 PM   DISPOSITION:Home/Self Care    IMPORTANT INFORMATION:  Send all requests for admission clinical reviews, approved or denied determinations and any other requests to dedicated fax number below belonging to the campus where the patient is receiving treatment.  List of dedicated fax numbers:  [place] DENIALS (Administrative/Medical Necessity) [phone] [address] [facility] (Maternity/NICU/Pediatrics) [phone]   [facility] [phone]   [facility] [phone] [address] [facility] [phone]   [address] [place] [address] [phone]   [facility] [phone]   [address] [place] [address] [address] [place] [address] [phone]   [address] [place] [address] [phone] [address] [facility] [phone]   [address] [facility] [address] [phone]

## 2023-07-31 NOTE — TELEPHONE ENCOUNTER
-    Pt wishes to have STAT CT in order to schedule his CT asap  - Expected Date: 8/4/2023. Natty Luevano notified - we will call once req completed. Please advise       Pt left a vm earlier RE: STAT CT :   "Hi calling for [employer]. YOB: 1970. We were just there. He was supposed to call for an outpatient, [facility], [employer], [facility], some kind of CAT scan. And I called over the [facility] here in [place], the [address] [place] [address], and they told him because we're supposed to have it done for Friday. And they told me I cannot schedule it for Friday, that the doctor has to schedule it as a stat or I won't be able to get into September. So I'm hopefully you could call and put that in as a staff for me so I could get in for Friday. My phone number is [phone].  And it's Aviva Ugarte Thank you."

## 2023-07-31 NOTE — PROGRESS NOTES
Assessment & Plan     1. Superior mesenteric artery thrombosis [facility])  -     Ambulatory Referral to Hematology / Oncology; Future    2. Necrotizing pancreatitis    3. Primary hypertension  -     lisinopril (ZESTRIL) 10 mg tablet; Take 1 tablet (10 mg total) by mouth daily    Will have patient see hematology given the new blood clot. May need ongoing work-up. Follow-up repeat CT this Friday may need to follow-up with surgery based on results  BMI Counseling: Body mass index is 40.87 kg/m². The BMI is above normal. Nutrition recommendations include decreasing portion sizes, encouraging healthy choices of fruits and vegetables, decreasing fast food intake, consuming healthier snacks, limiting drinks that contain sugar and moderation in carbohydrate intake. Exercise recommendations include moderate physical activity 150 minutes/week, exercising 3-5 times per week and strength training exercises. No pharmacotherapy was ordered. Rationale for BMI follow-up plan is due to patient being overweight or obese. Depression Screening and Follow-up Plan: Patient was screened for depression during today's encounter. They screened negative with a PHQ-2 score of 0. Subjective     Transitional Care Management Review:   Aris Mcardle is a 46 y.o. male here for TCM follow up. During the TCM phone call patient stated:  TCM Call     Date and time call was made  7/31/2023  9:52 AM    Patient was hospitialized at  [address] [place] [address]    Date of Admission  07/24/23    Date of discharge  07/28/23    Diagnosis  Necrotizing Pancreatitis    Disposition  Home    Were the patients medications reviewed and updated  Yes    Current Symptoms  None      TCM Call     Post hospital issues  None    Should patient be enrolled in anticoag monitoring? Yes    Scheduled for follow up?   Yes    Did you obtain your prescribed medications  Yes    Do you need help managing your prescriptions or medications  No    Is transportation to your appointment needed  No    I have advised the patient to call PCP with any new or worsening symptoms  Diana Madison/MA    Living Arrangements  Spouse or Significiant other    Support System  Spouse    The type of support provided  Emotional; Physical    Do you have social support  Yes, as much as I need    Are you recieving any outpatient services  No    Are you recieving home care services  No    Are you using any community resources  No    Current waiver services  No    Have you fallen in the last 12 months  No    Interperter language line needed  No    Counseling  Patient    Counseling topics  instructions for management; patient and family education        Hospital discharge follow up, prancreatitis seen in [place] (possibly related to having alcohol). Then went back to the ER for continued pain, N/V. Dad  of pancreatic cancer, mom  of breast/bone cancer, half sister  of lung cancer (never smoked or toxic eposure)    Review of Systems   Constitutional: Negative for chills, fatigue and fever. HENT: Negative for rhinorrhea and sore throat. Eyes: Negative for visual disturbance. Respiratory: Negative for cough and shortness of breath. Cardiovascular: Negative for chest pain and palpitations. Gastrointestinal: Negative for abdominal pain, constipation, diarrhea, nausea and vomiting. Genitourinary: Negative for difficulty urinating, dysuria and frequency. Musculoskeletal: Negative for arthralgias and myalgias. Skin: Negative for color change and rash. Neurological: Negative for weakness and headaches. Objective     /72 (BP Location: Left arm, Patient Position: Sitting, Cuff Size: Standard)   Pulse (!) 120   Temp 99 °F (37.2 °C)   Ht 5' 11" (1.803 m)   Wt 133 kg (293 lb)   SpO2 98%   BMI 40.87 kg/m²      Physical Exam  Constitutional:       General: He is not in acute distress. Appearance: Normal appearance. He is not ill-appearing.    HENT:      Head: Normocephalic and atraumatic. Right Ear: Tympanic membrane, ear canal and external ear normal.      Left Ear: Tympanic membrane, ear canal and external ear normal.      Nose: Nose normal.      Mouth/Throat:      Mouth: Mucous membranes are moist.      Pharynx: Oropharynx is clear. No oropharyngeal exudate or posterior oropharyngeal erythema. Eyes:      General: No scleral icterus. Right eye: No discharge. Left eye: No discharge. Extraocular Movements: Extraocular movements intact. Conjunctiva/sclera: Conjunctivae normal.      Pupils: Pupils are equal, round, and reactive to light. Cardiovascular:      Rate and Rhythm: Normal rate and regular rhythm. Pulses: Normal pulses. Heart sounds: Normal heart sounds. No murmur heard. Pulmonary:      Effort: Pulmonary effort is normal. No respiratory distress. Breath sounds: Normal breath sounds. Abdominal:      General: Bowel sounds are normal. There is distension. Palpations: Abdomen is soft. Tenderness: There is no abdominal tenderness. Hernia: No hernia is present. Musculoskeletal:         General: Normal range of motion. Cervical back: Normal range of motion and neck supple. Lymphadenopathy:      Cervical: No cervical adenopathy. Skin:     General: Skin is warm and dry. Capillary Refill: Capillary refill takes less than 2 seconds. Neurological:      General: No focal deficit present. Mental Status: He is alert and oriented to person, place, and time. Mental status is at baseline. Cranial Nerves: No cranial nerve deficit.    Psychiatric:         Mood and Affect: Mood normal.       Medications have been reviewed by provider in current encounter    Herlinda Rodriges MD

## 2023-08-01 ENCOUNTER — TELEPHONE (OUTPATIENT)
Dept: HEMATOLOGY ONCOLOGY | Facility: CLINIC | Age: 53
End: 2023-08-01

## 2023-08-01 NOTE — TELEPHONE ENCOUNTER
Notified Kelvin Marina STAT CT was ordered -- she is going to contact central scheduling to schedule

## 2023-08-01 NOTE — DISCHARGE SUMMARY
Discharge Summary - Acute Care Surgery  Karlynn Nissen 46 y.o. male MRN: [id number]  Unit/Bed#: [facility] 826-01 Encounter: [id number]    Admission Date: 7/24/2023     Discharge Date: 7/28/2023    Attending: Monroe Savage DO    Consultants: Endocrinology    Admitting Diagnosis: Necrotizing pancreatitis [K85.91]    Principle/ Secondary Diagnosis:  History reviewed. No pertinent past medical history. Past Surgical History:   Procedure Laterality Date   • HERNIA REPAIR     • JOINT REPLACEMENT     • TONSILLECTOMY  1976       Procedures Performed: No orders of the defined types were placed in this encounter. No admission procedures for hospital encounter. Procedure name not found. Laboratory data at discharge:   Results from last 7 days   Lab Units 07/28/23  0807 07/27/23  0238 07/26/23  0557   WBC Thousand/uL 19.84* 28.97* 29.32*   HEMOGLOBIN g/dL 12.0 12.6 13.3   HEMATOCRIT % 37.8 37.7 39.8   PLATELETS Thousands/uL 297 278 272     Results from last 7 days   Lab Units 07/28/23  0807 07/27/23  0238 07/26/23  0557   POTASSIUM mmol/L 3.5 3.1* 3.1*   CHLORIDE mmol/L 103 101 102   CO2 mmol/L 24 27 26   BUN mg/dL 17 21 23   CREATININE mg/dL 0.88 1.16 1.22   CALCIUM mg/dL 7.5* 7.4* 7.2*     Results from last 7 days   Lab Units 07/28/23  0807 07/28/23  0129 07/27/23  1807   PTT seconds 52* 64* 53*           Discharge instructions/Information to patient and family:   See after visit summary for information provided to patient and family. Discharge Medications:  See after visit summary for reconciled discharge medications provided to patient and family. Hospital Course:   HPI: Per Emeli El PA-C "Karlynn Nissen is a 46year old male with PMH of HTN who presented to [facility] with complaints of Abdominal Pain, neausea, vomiting and weakness since 7/15.  Patient was seen at an outside hospital on 7/19 and diagnosed with pancreatitis with reccomendations for admission however patient elected to manage symtpoms at home with a bland diet. He represented on 7/24 to [facility] with continued abdominal pain, nausea, vomiting and weakness. CT scan revealed evidence of necrotizing pancreatitis therefore patient was transferred [facility] for management."    Patient was made NPO, blood cultures were taken and had a right upper quadrant ultrasound done showing nonobstructive cholelithiasis. Monitored off antibiotics, blood culture had no growth at 72 hours. Patient's diet was advanced as tolerated. Discharged home 7/28. Prior to discharge, the patient was given instructions for outpatient care and follow-up. The patient has been instructed to call w/ any questions, changes, or concerns for the medical condition. For further details of the hospitalization, please refer to the medical record. Condition at Discharge: fair     Provisions for Follow-Up Care:  See after visit summary for information related to follow-up care and any pertinent home health orders. Disposition: Home    Planned Readmission: No    Discharge Statement   I spent 25 minutes discharging the patient. This time was spent on the day of discharge. I had direct contact with the patient on the day of discharge. Additional documentation is required if more than 30 minutes were spent on discharge. Davida Perez MD  General Surgery Resident    This text is generated with voice recognition software. There may be translation, syntax,  or grammatical errors. If you have any questions, please contact the dictating provider.

## 2023-08-01 NOTE — TELEPHONE ENCOUNTER
I called Aric Marrow in response to a referral that was received for patient to establish care with Hematology. Outreach was made to schedule a consultation. .    I left a voicemail explaining the reason for my call and advised patient to call [facility] at [phone]. Another attempt will be made to contact patient.

## 2023-08-02 ENCOUNTER — TELEPHONE (OUTPATIENT)
Dept: HEMATOLOGY ONCOLOGY | Facility: CLINIC | Age: 53
End: 2023-08-02

## 2023-08-02 NOTE — TELEPHONE ENCOUNTER
I called Evelin Rodriguez in response to a referral that was received for patient to establish care with Hematology. Outreach was made to schedule a consultation. .    I left a voicemail explaining the reason for my call and advised patient to call [facility] at [phone]. Another attempt will be made to contact patient.

## 2023-08-03 ENCOUNTER — TELEPHONE (OUTPATIENT)
Dept: HEMATOLOGY ONCOLOGY | Facility: CLINIC | Age: 53
End: 2023-08-03

## 2023-08-03 ENCOUNTER — TELEPHONE (OUTPATIENT)
Dept: FAMILY MEDICINE CLINIC | Facility: CLINIC | Age: 53
End: 2023-08-03

## 2023-08-03 NOTE — TELEPHONE ENCOUNTER
I called Jeremiah Willis in response to a referral that was received for patient to establish care with Hematology. Outreach was made to schedule a consultation. .    I left a voicemail explaining the reason for my call and advised patient to call [facility] at [phone]. This is the third attempt to schedule patient unsuccessfully. The referral has been closed, a [email or web address] message has been sent to patient (if applicable) and a letter has been sent to the address on file.

## 2023-08-03 NOTE — TELEPHONE ENCOUNTER
Received FMLA paperwork via fax from [facility] Forms placed in provider's bin.     F# [phone]

## 2023-08-04 ENCOUNTER — TELEPHONE (OUTPATIENT)
Dept: FAMILY MEDICINE CLINIC | Facility: CLINIC | Age: 53
End: 2023-08-04

## 2023-08-04 ENCOUNTER — HOSPITAL ENCOUNTER (OUTPATIENT)
Dept: CT IMAGING | Facility: HOSPITAL | Age: 53
End: 2023-08-04
Payer: COMMERCIAL

## 2023-08-04 ENCOUNTER — APPOINTMENT (EMERGENCY)
Dept: RADIOLOGY | Facility: HOSPITAL | Age: 53
End: 2023-08-04
Payer: COMMERCIAL

## 2023-08-04 ENCOUNTER — HOSPITAL ENCOUNTER (INPATIENT)
Facility: HOSPITAL | Age: 53
LOS: 7 days | Discharge: HOME HEALTH | DRG: 438 | End: 2023-08-11
Attending: SURGERY | Admitting: SURGERY
Payer: COMMERCIAL

## 2023-08-04 ENCOUNTER — HOSPITAL ENCOUNTER (EMERGENCY)
Facility: HOSPITAL | Age: 53
End: 2023-08-04
Attending: EMERGENCY MEDICINE
Payer: COMMERCIAL

## 2023-08-04 VITALS
HEART RATE: 103 BPM | OXYGEN SATURATION: 96 % | RESPIRATION RATE: 20 BRPM | DIASTOLIC BLOOD PRESSURE: 59 MMHG | TEMPERATURE: 98.2 F | SYSTOLIC BLOOD PRESSURE: 118 MMHG

## 2023-08-04 DIAGNOSIS — N17.9 AKI (ACUTE KIDNEY INJURY) (HCC): ICD-10-CM

## 2023-08-04 DIAGNOSIS — A41.9 SEVERE SEPSIS (HCC): ICD-10-CM

## 2023-08-04 DIAGNOSIS — R65.20 SEVERE SEPSIS (HCC): ICD-10-CM

## 2023-08-04 DIAGNOSIS — Z80.42 FAMILY HISTORY OF PROSTATE CANCER IN FATHER: ICD-10-CM

## 2023-08-04 DIAGNOSIS — K85.91 NECROTIZING PANCREATITIS: Primary | ICD-10-CM

## 2023-08-04 DIAGNOSIS — K85.91 NECROTIZING PANCREATITIS: ICD-10-CM

## 2023-08-04 DIAGNOSIS — E87.20 LACTIC ACIDOSIS: ICD-10-CM

## 2023-08-04 DIAGNOSIS — R73.9 HYPERGLYCEMIA: ICD-10-CM

## 2023-08-04 DIAGNOSIS — K55.069 SUPERIOR MESENTERIC ARTERY THROMBOSIS (HCC): ICD-10-CM

## 2023-08-04 DIAGNOSIS — R82.90 ABNORMAL URINALYSIS: ICD-10-CM

## 2023-08-04 DIAGNOSIS — E86.0 DEHYDRATION: ICD-10-CM

## 2023-08-04 DIAGNOSIS — T83.9XXA DIFFICULT FOLEY CATHETER PLACEMENT (HCC): ICD-10-CM

## 2023-08-04 LAB
2HR DELTA HS TROPONIN: -3 NG/L
ALBUMIN SERPL BCP-MCNC: 1.5 G/DL (ref 3.5–5)
ALBUMIN SERPL BCP-MCNC: 2.4 G/DL (ref 3.5–5)
ALP SERPL-CCNC: 111 U/L (ref 46–116)
ALP SERPL-CCNC: 120 U/L (ref 34–104)
ALT SERPL W P-5'-P-CCNC: 53 U/L (ref 12–78)
ALT SERPL W P-5'-P-CCNC: 60 U/L (ref 7–52)
ANION GAP SERPL CALCULATED.3IONS-SCNC: 12 MMOL/L
ANION GAP SERPL CALCULATED.3IONS-SCNC: 9 MMOL/L
APTT PPP: 45 SECONDS (ref 23–37)
APTT PPP: 50 SECONDS (ref 23–37)
AST SERPL W P-5'-P-CCNC: 61 U/L (ref 5–45)
AST SERPL W P-5'-P-CCNC: 63 U/L (ref 13–39)
ATRIAL RATE: 108 BPM
BASE EX.OXY STD BLDV CALC-SCNC: 62.7 % (ref 60–80)
BASE EXCESS BLDV CALC-SCNC: 0.7 MMOL/L
BASOPHILS # BLD MANUAL: 0 THOUSAND/UL (ref 0–0.1)
BASOPHILS NFR MAR MANUAL: 0 % (ref 0–1)
BILIRUB SERPL-MCNC: 1.43 MG/DL (ref 0.2–1)
BILIRUB SERPL-MCNC: 2.34 MG/DL (ref 0.2–1)
BUN SERPL-MCNC: 27 MG/DL (ref 5–25)
BUN SERPL-MCNC: 27 MG/DL (ref 5–25)
CA-I BLD-SCNC: 1.02 MMOL/L (ref 1.12–1.32)
CALCIUM ALBUM COR SERPL-MCNC: 9.7 MG/DL (ref 8.3–10.1)
CALCIUM ALBUM COR SERPL-MCNC: 9.8 MG/DL (ref 8.3–10.1)
CALCIUM SERPL-MCNC: 7.7 MG/DL (ref 8.3–10.1)
CALCIUM SERPL-MCNC: 8.5 MG/DL (ref 8.4–10.2)
CARDIAC TROPONIN I PNL SERPL HS: 11 NG/L
CARDIAC TROPONIN I PNL SERPL HS: 8 NG/L
CHLORIDE SERPL-SCNC: 95 MMOL/L (ref 96–108)
CHLORIDE SERPL-SCNC: 98 MMOL/L (ref 96–108)
CO2 SERPL-SCNC: 26 MMOL/L (ref 21–32)
CO2 SERPL-SCNC: 28 MMOL/L (ref 21–32)
CREAT SERPL-MCNC: 1.65 MG/DL (ref 0.6–1.3)
CREAT SERPL-MCNC: 1.69 MG/DL (ref 0.6–1.3)
EOSINOPHIL # BLD MANUAL: 0 THOUSAND/UL (ref 0–0.4)
EOSINOPHIL NFR BLD MANUAL: 0 % (ref 0–6)
ERYTHROCYTE [DISTWIDTH] IN BLOOD BY AUTOMATED COUNT: 14.5 % (ref 11.6–15.1)
ERYTHROCYTE [DISTWIDTH] IN BLOOD BY AUTOMATED COUNT: 14.6 % (ref 11.6–15.1)
GFR SERPL CREATININE-BSD FRML MDRD: 45 ML/MIN/1.73SQ M
GFR SERPL CREATININE-BSD FRML MDRD: 47 ML/MIN/1.73SQ M
GLUCOSE SERPL-MCNC: 265 MG/DL (ref 65–140)
GLUCOSE SERPL-MCNC: 271 MG/DL (ref 65–140)
GLUCOSE SERPL-MCNC: 286 MG/DL (ref 65–140)
HCO3 BLDV-SCNC: 25.5 MMOL/L (ref 24–30)
HCT VFR BLD AUTO: 23.7 % (ref 36.5–49.3)
HCT VFR BLD AUTO: 29.1 % (ref 36.5–49.3)
HGB BLD-MCNC: 7.8 G/DL (ref 12–17)
HGB BLD-MCNC: 9.4 G/DL (ref 12–17)
INR PPP: 4.44 (ref 0.84–1.19)
LACTATE SERPL-SCNC: 2 MMOL/L (ref 0.5–2)
LACTATE SERPL-SCNC: 2.2 MMOL/L (ref 0.5–2)
LACTATE SERPL-SCNC: 5 MMOL/L (ref 0.5–2)
LIPASE SERPL-CCNC: 129 U/L (ref 11–82)
LIPASE SERPL-CCNC: 545 U/L (ref 73–393)
LYMPHOCYTES # BLD AUTO: 0.32 THOUSAND/UL (ref 0.6–4.47)
LYMPHOCYTES # BLD AUTO: 1 % (ref 14–44)
MAGNESIUM SERPL-MCNC: 1.8 MG/DL (ref 1.9–2.7)
MAGNESIUM SERPL-MCNC: 2.5 MG/DL (ref 1.6–2.6)
MCH RBC QN AUTO: 29.1 PG (ref 26.8–34.3)
MCH RBC QN AUTO: 29.1 PG (ref 26.8–34.3)
MCHC RBC AUTO-ENTMCNC: 32.3 G/DL (ref 31.4–37.4)
MCHC RBC AUTO-ENTMCNC: 32.9 G/DL (ref 31.4–37.4)
MCV RBC AUTO: 88 FL (ref 82–98)
MCV RBC AUTO: 90 FL (ref 82–98)
MONOCYTES # BLD AUTO: 0.64 THOUSAND/UL (ref 0–1.22)
MONOCYTES NFR BLD: 2 % (ref 4–12)
NEUTROPHILS # BLD MANUAL: 30.84 THOUSAND/UL (ref 1.85–7.62)
NEUTS SEG NFR BLD AUTO: 97 % (ref 43–75)
O2 CT BLDV-SCNC: 8.8 ML/DL
P AXIS: 61 DEGREES
PCO2 BLDV: 41.3 MM HG (ref 42–50)
PH BLDV: 7.41 [PH] (ref 7.3–7.4)
PHOSPHATE SERPL-MCNC: 5.3 MG/DL (ref 2.7–4.5)
PLATELET # BLD AUTO: 343 THOUSANDS/UL (ref 149–390)
PLATELET # BLD AUTO: 431 THOUSANDS/UL (ref 149–390)
PLATELET BLD QL SMEAR: ABNORMAL
PMV BLD AUTO: 10.1 FL (ref 8.9–12.7)
PMV BLD AUTO: 9.9 FL (ref 8.9–12.7)
PO2 BLDV: 34.5 MM HG (ref 35–45)
POTASSIUM SERPL-SCNC: 3.6 MMOL/L (ref 3.5–5.3)
POTASSIUM SERPL-SCNC: 3.8 MMOL/L (ref 3.5–5.3)
PR INTERVAL: 138 MS
PROCALCITONIN SERPL-MCNC: 1.73 NG/ML
PROCALCITONIN SERPL-MCNC: 2.35 NG/ML
PROT SERPL-MCNC: 5.6 G/DL (ref 6.4–8.4)
PROT SERPL-MCNC: 6.5 G/DL (ref 6.4–8.4)
PROTHROMBIN TIME: 41.3 SECONDS (ref 11.6–14.5)
QRS AXIS: 37 DEGREES
QRSD INTERVAL: 82 MS
QT INTERVAL: 362 MS
QTC INTERVAL: 485 MS
RBC # BLD AUTO: 2.68 MILLION/UL (ref 3.88–5.62)
RBC # BLD AUTO: 3.23 MILLION/UL (ref 3.88–5.62)
RBC MORPH BLD: NORMAL
SODIUM SERPL-SCNC: 133 MMOL/L (ref 135–147)
SODIUM SERPL-SCNC: 135 MMOL/L (ref 135–147)
T WAVE AXIS: 62 DEGREES
VENTRICULAR RATE: 108 BPM
WBC # BLD AUTO: 27.47 THOUSAND/UL (ref 4.31–10.16)
WBC # BLD AUTO: 31.79 THOUSAND/UL (ref 4.31–10.16)

## 2023-08-04 PROCEDURE — 85730 THROMBOPLASTIN TIME PARTIAL: CPT

## 2023-08-04 PROCEDURE — 83605 ASSAY OF LACTIC ACID: CPT | Performed by: EMERGENCY MEDICINE

## 2023-08-04 PROCEDURE — 99291 CRITICAL CARE FIRST HOUR: CPT | Performed by: EMERGENCY MEDICINE

## 2023-08-04 PROCEDURE — 99285 EMERGENCY DEPT VISIT HI MDM: CPT

## 2023-08-04 PROCEDURE — 36415 COLL VENOUS BLD VENIPUNCTURE: CPT | Performed by: EMERGENCY MEDICINE

## 2023-08-04 PROCEDURE — 85025 COMPLETE CBC W/AUTO DIFF WBC: CPT

## 2023-08-04 PROCEDURE — 84484 ASSAY OF TROPONIN QUANT: CPT | Performed by: EMERGENCY MEDICINE

## 2023-08-04 PROCEDURE — 80053 COMPREHEN METABOLIC PANEL: CPT | Performed by: EMERGENCY MEDICINE

## 2023-08-04 PROCEDURE — 74177 CT ABD & PELVIS W/CONTRAST: CPT

## 2023-08-04 PROCEDURE — 93005 ELECTROCARDIOGRAM TRACING: CPT

## 2023-08-04 PROCEDURE — 80053 COMPREHEN METABOLIC PANEL: CPT

## 2023-08-04 PROCEDURE — NC001 PR NO CHARGE: Performed by: SURGERY

## 2023-08-04 PROCEDURE — 82805 BLOOD GASES W/O2 SATURATION: CPT | Performed by: EMERGENCY MEDICINE

## 2023-08-04 PROCEDURE — 85730 THROMBOPLASTIN TIME PARTIAL: CPT | Performed by: EMERGENCY MEDICINE

## 2023-08-04 PROCEDURE — 96365 THER/PROPH/DIAG IV INF INIT: CPT

## 2023-08-04 PROCEDURE — 87040 BLOOD CULTURE FOR BACTERIA: CPT | Performed by: EMERGENCY MEDICINE

## 2023-08-04 PROCEDURE — 82948 REAGENT STRIP/BLOOD GLUCOSE: CPT

## 2023-08-04 PROCEDURE — 83605 ASSAY OF LACTIC ACID: CPT

## 2023-08-04 PROCEDURE — 85610 PROTHROMBIN TIME: CPT | Performed by: EMERGENCY MEDICINE

## 2023-08-04 PROCEDURE — 96368 THER/DIAG CONCURRENT INF: CPT

## 2023-08-04 PROCEDURE — 96375 TX/PRO/DX INJ NEW DRUG ADDON: CPT

## 2023-08-04 PROCEDURE — 83690 ASSAY OF LIPASE: CPT

## 2023-08-04 PROCEDURE — G1004 CDSM NDSC: HCPCS

## 2023-08-04 PROCEDURE — 85007 BL SMEAR W/DIFF WBC COUNT: CPT | Performed by: EMERGENCY MEDICINE

## 2023-08-04 PROCEDURE — 96366 THER/PROPH/DIAG IV INF ADDON: CPT

## 2023-08-04 PROCEDURE — 85027 COMPLETE CBC AUTOMATED: CPT | Performed by: EMERGENCY MEDICINE

## 2023-08-04 PROCEDURE — 83735 ASSAY OF MAGNESIUM: CPT

## 2023-08-04 PROCEDURE — 84145 PROCALCITONIN (PCT): CPT

## 2023-08-04 PROCEDURE — 83735 ASSAY OF MAGNESIUM: CPT | Performed by: EMERGENCY MEDICINE

## 2023-08-04 PROCEDURE — 93010 ELECTROCARDIOGRAM REPORT: CPT | Performed by: INTERNAL MEDICINE

## 2023-08-04 PROCEDURE — 84145 PROCALCITONIN (PCT): CPT | Performed by: EMERGENCY MEDICINE

## 2023-08-04 PROCEDURE — 99223 1ST HOSP IP/OBS HIGH 75: CPT | Performed by: SURGERY

## 2023-08-04 PROCEDURE — 84100 ASSAY OF PHOSPHORUS: CPT

## 2023-08-04 PROCEDURE — 82330 ASSAY OF CALCIUM: CPT

## 2023-08-04 PROCEDURE — 87040 BLOOD CULTURE FOR BACTERIA: CPT

## 2023-08-04 PROCEDURE — 71045 X-RAY EXAM CHEST 1 VIEW: CPT

## 2023-08-04 PROCEDURE — 83690 ASSAY OF LIPASE: CPT | Performed by: EMERGENCY MEDICINE

## 2023-08-04 RX ORDER — CEFEPIME HYDROCHLORIDE 2 G/50ML
2000 INJECTION, SOLUTION INTRAVENOUS ONCE
Status: COMPLETED | OUTPATIENT
Start: 2023-08-04 | End: 2023-08-04

## 2023-08-04 RX ORDER — OXYCODONE HYDROCHLORIDE 10 MG/1
10 TABLET ORAL EVERY 4 HOURS PRN
Status: DISCONTINUED | OUTPATIENT
Start: 2023-08-04 | End: 2023-08-11 | Stop reason: HOSPADM

## 2023-08-04 RX ORDER — INSULIN LISPRO 100 [IU]/ML
1-6 INJECTION, SOLUTION INTRAVENOUS; SUBCUTANEOUS EVERY 6 HOURS SCHEDULED
Status: DISCONTINUED | OUTPATIENT
Start: 2023-08-04 | End: 2023-08-05

## 2023-08-04 RX ORDER — ONDANSETRON 2 MG/ML
4 INJECTION INTRAMUSCULAR; INTRAVENOUS EVERY 6 HOURS PRN
Status: DISCONTINUED | OUTPATIENT
Start: 2023-08-04 | End: 2023-08-11 | Stop reason: HOSPADM

## 2023-08-04 RX ORDER — MAGNESIUM SULFATE HEPTAHYDRATE 40 MG/ML
2 INJECTION, SOLUTION INTRAVENOUS ONCE
Status: COMPLETED | OUTPATIENT
Start: 2023-08-04 | End: 2023-08-04

## 2023-08-04 RX ORDER — SODIUM CHLORIDE, SODIUM GLUCONATE, SODIUM ACETATE, POTASSIUM CHLORIDE, MAGNESIUM CHLORIDE, SODIUM PHOSPHATE, DIBASIC, AND POTASSIUM PHOSPHATE .53; .5; .37; .037; .03; .012; .00082 G/100ML; G/100ML; G/100ML; G/100ML; G/100ML; G/100ML; G/100ML
1000 INJECTION, SOLUTION INTRAVENOUS ONCE
Status: COMPLETED | OUTPATIENT
Start: 2023-08-04 | End: 2023-08-04

## 2023-08-04 RX ORDER — OXYCODONE HYDROCHLORIDE 5 MG/1
5 TABLET ORAL EVERY 4 HOURS PRN
Status: DISCONTINUED | OUTPATIENT
Start: 2023-08-04 | End: 2023-08-11 | Stop reason: HOSPADM

## 2023-08-04 RX ORDER — SODIUM CHLORIDE, SODIUM GLUCONATE, SODIUM ACETATE, POTASSIUM CHLORIDE, MAGNESIUM CHLORIDE, SODIUM PHOSPHATE, DIBASIC, AND POTASSIUM PHOSPHATE .53; .5; .37; .037; .03; .012; .00082 G/100ML; G/100ML; G/100ML; G/100ML; G/100ML; G/100ML; G/100ML
100 INJECTION, SOLUTION INTRAVENOUS CONTINUOUS
Status: DISCONTINUED | OUTPATIENT
Start: 2023-08-04 | End: 2023-08-07

## 2023-08-04 RX ORDER — HEPARIN SODIUM 10000 [USP'U]/100ML
3-30 INJECTION, SOLUTION INTRAVENOUS
Status: DISCONTINUED | OUTPATIENT
Start: 2023-08-04 | End: 2023-08-05

## 2023-08-04 RX ORDER — ACETAMINOPHEN 325 MG/1
975 TABLET ORAL EVERY 8 HOURS SCHEDULED
Status: DISCONTINUED | OUTPATIENT
Start: 2023-08-04 | End: 2023-08-09

## 2023-08-04 RX ORDER — LABETALOL HYDROCHLORIDE 5 MG/ML
10 INJECTION, SOLUTION INTRAVENOUS EVERY 6 HOURS PRN
Status: DISCONTINUED | OUTPATIENT
Start: 2023-08-04 | End: 2023-08-11 | Stop reason: HOSPADM

## 2023-08-04 RX ORDER — HYDROMORPHONE HCL/PF 1 MG/ML
0.5 SYRINGE (ML) INJECTION EVERY 4 HOURS PRN
Status: DISCONTINUED | OUTPATIENT
Start: 2023-08-04 | End: 2023-08-11 | Stop reason: HOSPADM

## 2023-08-04 RX ORDER — CHLORHEXIDINE GLUCONATE 0.12 MG/ML
15 RINSE ORAL EVERY 12 HOURS SCHEDULED
Status: DISCONTINUED | OUTPATIENT
Start: 2023-08-04 | End: 2023-08-11 | Stop reason: HOSPADM

## 2023-08-04 RX ORDER — HYDROMORPHONE HCL IN WATER/PF 6 MG/30 ML
0.2 PATIENT CONTROLLED ANALGESIA SYRINGE INTRAVENOUS ONCE
Status: COMPLETED | OUTPATIENT
Start: 2023-08-04 | End: 2023-08-04

## 2023-08-04 RX ADMIN — CEFEPIME HYDROCHLORIDE 2000 MG: 2 INJECTION, SOLUTION INTRAVENOUS at 15:24

## 2023-08-04 RX ADMIN — CHLORHEXIDINE GLUCONATE 15 ML: 1.2 SOLUTION ORAL at 21:24

## 2023-08-04 RX ADMIN — ACETAMINOPHEN 975 MG: 325 TABLET, FILM COATED ORAL at 21:26

## 2023-08-04 RX ADMIN — SODIUM CHLORIDE, SODIUM GLUCONATE, SODIUM ACETATE, POTASSIUM CHLORIDE, MAGNESIUM CHLORIDE, SODIUM PHOSPHATE, DIBASIC, AND POTASSIUM PHOSPHATE 1000 ML: .53; .5; .37; .037; .03; .012; .00082 INJECTION, SOLUTION INTRAVENOUS at 15:04

## 2023-08-04 RX ADMIN — SODIUM CHLORIDE, SODIUM GLUCONATE, SODIUM ACETATE, POTASSIUM CHLORIDE, MAGNESIUM CHLORIDE, SODIUM PHOSPHATE, DIBASIC, AND POTASSIUM PHOSPHATE 1000 ML: .53; .5; .37; .037; .03; .012; .00082 INJECTION, SOLUTION INTRAVENOUS at 15:02

## 2023-08-04 RX ADMIN — SODIUM CHLORIDE, SODIUM GLUCONATE, SODIUM ACETATE, POTASSIUM CHLORIDE, MAGNESIUM CHLORIDE, SODIUM PHOSPHATE, DIBASIC, AND POTASSIUM PHOSPHATE 1000 ML: .53; .5; .37; .037; .03; .012; .00082 INJECTION, SOLUTION INTRAVENOUS at 16:10

## 2023-08-04 RX ADMIN — INSULIN LISPRO 4 UNITS: 100 INJECTION, SOLUTION INTRAVENOUS; SUBCUTANEOUS at 21:24

## 2023-08-04 RX ADMIN — HEPARIN SODIUM 18 UNITS/KG/HR: 10000 INJECTION, SOLUTION INTRAVENOUS at 21:25

## 2023-08-04 RX ADMIN — SODIUM CHLORIDE, SODIUM GLUCONATE, SODIUM ACETATE, POTASSIUM CHLORIDE, MAGNESIUM CHLORIDE, SODIUM PHOSPHATE, DIBASIC, AND POTASSIUM PHOSPHATE 150 ML/HR: .53; .5; .37; .037; .03; .012; .00082 INJECTION, SOLUTION INTRAVENOUS at 21:25

## 2023-08-04 RX ADMIN — HYDROMORPHONE HYDROCHLORIDE 0.2 MG: 0.2 INJECTION, SOLUTION INTRAMUSCULAR; INTRAVENOUS; SUBCUTANEOUS at 16:47

## 2023-08-04 RX ADMIN — MAGNESIUM SULFATE HEPTAHYDRATE 2 G: 40 INJECTION, SOLUTION INTRAVENOUS at 16:18

## 2023-08-04 RX ADMIN — IOHEXOL 100 ML: 350 INJECTION, SOLUTION INTRAVENOUS at 11:30

## 2023-08-04 NOTE — TELEPHONE ENCOUNTER
Pt's spouse King Zhen) has been contacted and has been instructed to have pt transported back to the ER. Pt's spouse has expressed understanding.

## 2023-08-04 NOTE — TELEPHONE ENCOUNTER
[facility] radiology called with significant findings on pts CT abd/pelvis, which was completed today.

## 2023-08-04 NOTE — ED PROVIDER NOTES
Pt Name: Reyna Shoemaker  MRN: [id number]  [address] 1970  Age/Sex: 46 y.o. male  Date of evaluation: 8/4/2023  PCP: Courtney Mitchell MD    CHIEF COMPLAINT    Chief Complaint   Patient presents with   • Abdominal Pain     Patient " c/o abd pain, diarrhea x over 1 week, not eating, feels weak"         HPI    46 y.o. male presenting with abdominal pain, diarrhea, decreased appetite and generalized weakness. Patient states he recently had a diagnosis of necrotizing pancreatitis, was released from the hospital, was doing well but about a week ago went to a wedding and had several drinks afterwards. The next morning, he woke with the abdominal pain and diarrhea. The pain is intermittent, moderate in intensity, in the epigastric region, radiating throughout the abdomen, worse with eating and better at rest.  He also complains of loose stools, typically provoked by any attempt to eat or drink, nonbloody. He has had nausea but not vomiting. He denies fever, trauma, chest pain, shortness of breath, other symptoms. HPI      Past Medical and Surgical History    Past Medical History:   Diagnosis Date   • Pancreatitis        Past Surgical History:   Procedure Laterality Date   • HERNIA REPAIR     • JOINT REPLACEMENT     • TONSILLECTOMY  1976       Family History   Problem Relation Age of Onset   • Cancer Mother    • Multiple myeloma Mother    • Prostate cancer Father    • Cancer Father        Social History     Tobacco Use   • Smoking status: Never   • Smokeless tobacco: Current     Types: Chew   Vaping Use   • Vaping Use: Never used   Substance Use Topics   • Alcohol use: Yes     Alcohol/week: 6.0 standard drinks of alcohol     Types: 6 Cans of beer per week     Comment: Weekends   • Drug use: Never           Allergies    No Known Allergies    Home Medications    Prior to Admission medications    Medication Sig Start Date End Date Taking?  Authorizing Provider   apixaban (Eliquis) 5 mg Take 2 tablets (10 mg total) by mouth 2 (two) times a day for 7 days, THEN 1 tablet (5 mg total) 2 (two) times a day for 23 days. 7/28/23 8/27/23  Gemma Garcia MD   Blood Pressure Monitoring (Adult Blood Pressure Cuff Lg) KIT Use 3 (three) times a day  Patient not taking: Reported on 7/24/2023 2/8/22   Chayo Santacruz MD   lisinopril (ZESTRIL) 10 mg tablet Take 1 tablet (10 mg total) by mouth daily 7/31/23   Chayo Santacruz MD   naproxen sodium (ALEVE) 220 MG tablet Take 220 mg by mouth    Historical Provider, MD           Review of Systems    Review of Systems   Constitutional: Negative for appetite change, chills and diaphoresis. HENT: Negative for drooling, facial swelling, trouble swallowing and voice change. Respiratory: Negative for apnea, shortness of breath and wheezing. Cardiovascular: Negative for chest pain and leg swelling. Gastrointestinal: Positive for abdominal pain, diarrhea and nausea. Negative for abdominal distention and vomiting. Genitourinary: Negative for dysuria and urgency. Musculoskeletal: Negative for arthralgias, back pain, gait problem and neck pain. Skin: Negative for color change, rash and wound. Neurological: Negative for seizures, speech difficulty, weakness and headaches. Psychiatric/Behavioral: Negative for agitation, behavioral problems and dysphoric mood. The patient is not nervous/anxious. All other systems reviewed and negative. Physical Exam      ED Triage Vitals   Temperature Pulse Respirations Blood Pressure SpO2   08/04/23 1435 08/04/23 1435 08/04/23 1435 08/04/23 1435 08/04/23 1435   98.2 °F (36.8 °C) (!) 120 16 (!) 86/52 98 %      Temp src Heart Rate Source Patient Position - Orthostatic VS BP Location FiO2 (%)   -- 08/04/23 1435 08/04/23 1435 08/04/23 1435 --    Monitor Sitting Left arm       Pain Score       08/04/23 1647       6               Physical Exam  Vitals and nursing note reviewed. Constitutional:       General: He is in acute distress. Appearance: He is well-developed. He is ill-appearing. He is not toxic-appearing. HENT:      Head: Normocephalic and atraumatic. Right Ear: External ear normal.      Left Ear: External ear normal.      Nose: Nose normal. No congestion or rhinorrhea. Mouth/Throat:      Mouth: Mucous membranes are dry. Pharynx: Oropharynx is clear. Eyes:      Conjunctiva/sclera: Conjunctivae normal.      Pupils: Pupils are equal, round, and reactive to light. Neck:      Trachea: No tracheal deviation. Cardiovascular:      Rate and Rhythm: Regular rhythm. Tachycardia present. Pulses: Normal pulses. Heart sounds: Normal heart sounds. No murmur heard. Pulmonary:      Effort: Pulmonary effort is normal. No respiratory distress. Breath sounds: Normal breath sounds. No stridor. No wheezing or rales. Abdominal:      General: There is no distension. Palpations: Abdomen is soft. Tenderness: There is abdominal tenderness. There is no guarding or rebound. Comments: Tender to palpation in the epigastric region, no rebound or guarding   Musculoskeletal:         General: No deformity. Normal range of motion. Cervical back: Normal range of motion and neck supple. Right lower leg: Edema present. Left lower leg: Edema present. Comments: 1+ pitting edema bilateral lower EXTR   Skin:     General: Skin is warm and dry. Capillary Refill: Capillary refill takes less than 2 seconds. Findings: No rash. Neurological:      Mental Status: He is alert and oriented to person, place, and time. Psychiatric:         Behavior: Behavior normal.         Thought Content:  Thought content normal.         Judgment: Judgment normal.              Diagnostic Results  EKG Interpretation    Rate:  108  BPM  Rhythm: Sinus tachycardia  Axis:  Normal   Intervals: Normal, no blocks, QTc  485 ms  Q waves:  No pathologic Q waves   T waves:  Normal   ST segments:  No significant elevations or depressions     Impression: Sinus tachycardia without evidence of acute ischemia or significant arrhythmia      EKG for comparison: EKG dated 25 July 2023 similar in character no major changes    EKG interpreted by me. Labs:    Results Reviewed     Procedure Component Value Units Date/Time    HS Troponin I 2hr [[id number]]  (Normal) Collected: 08/04/23 1642    Lab Status: Final result Specimen: Blood from Arm, Right Updated: 08/04/23 1717     hs TnI 2hr 8 ng/L      Delta 2hr hsTnI -3 ng/L     Lactic acid 2 Hours [[id number]]  (Normal) Collected: 08/04/23 1642    Lab Status: Final result Specimen: Blood from Arm, Right Updated: 08/04/23 1706     LACTIC ACID 2.0 mmol/L     Narrative:      Result may be elevated if tourniquet was used during collection.     HS Troponin I 4hr [[id number]]     Lab Status: No result Specimen: Blood     CBC and differential [[id number]]  (Abnormal) Collected: 08/04/23 1454    Lab Status: Final result Specimen: Blood from Arm, Left Updated: 08/04/23 1602     WBC 31.79 Thousand/uL      RBC 3.23 Million/uL      Hemoglobin 9.4 g/dL      Hematocrit 29.1 %      MCV 90 fL      MCH 29.1 pg      MCHC 32.3 g/dL      RDW 14.5 %      MPV 10.1 fL      Platelets 610 Thousands/uL     RBC Morphology Reflex Test [[id number]] Collected: 08/04/23 1454    Lab Status: Final result Specimen: Blood from Arm, Left Updated: 08/04/23 1601    Manual Differential(PHLEBS Do Not Order) [[id number]]  (Abnormal) Collected: 08/04/23 1454    Lab Status: Final result Specimen: Blood from Arm, Left Updated: 08/04/23 1544     Segmented % 97 %      Lymphocytes % 1 %      Monocytes % 2 %      Eosinophils, % 0 %      Basophils % 0 %      Absolute Neutrophils 30.84 Thousand/uL      Lymphocytes Absolute 0.32 Thousand/uL      Monocytes Absolute 0.64 Thousand/uL      Eosinophils Absolute 0.00 Thousand/uL      Basophils Absolute 0.00 Thousand/uL      Total Counted --     RBC Morphology Normal     Platelet Estimate Increased Procalcitonin [[id number]]  (Abnormal) Collected: 08/04/23 1454    Lab Status: Final result Specimen: Blood from Arm, Left Updated: 08/04/23 1539     Procalcitonin 1.73 ng/ml     Lactic acid [[id number]]  (Abnormal) Collected: 08/04/23 1454    Lab Status: Final result Specimen: Blood from Arm, Left Updated: 08/04/23 1537     LACTIC ACID 5.0 mmol/L     Narrative:      Result may be elevated if tourniquet was used during collection.     HS Troponin 0hr (reflex protocol) [[id number]]  (Normal) Collected: 08/04/23 1454    Lab Status: Final result Specimen: Blood from Arm, Left Updated: 08/04/23 1536     hs TnI 0hr 11 ng/L     Lipase [[id number]]  (Abnormal) Collected: 08/04/23 1454    Lab Status: Final result Specimen: Blood from Arm, Left Updated: 08/04/23 1534     Lipase 129 u/L     Comprehensive metabolic panel [[id number]]  (Abnormal) Collected: 08/04/23 1454    Lab Status: Final result Specimen: Blood from Arm, Left Updated: 08/04/23 1534     Sodium 133 mmol/L      Potassium 3.8 mmol/L      Chloride 95 mmol/L      CO2 26 mmol/L      ANION GAP 12 mmol/L      BUN 27 mg/dL      Creatinine 1.69 mg/dL      Glucose 286 mg/dL      Calcium 8.5 mg/dL      Corrected Calcium 9.8 mg/dL      AST 63 U/L      ALT 60 U/L      Alkaline Phosphatase 120 U/L      Total Protein 6.5 g/dL      Albumin 2.4 g/dL      Total Bilirubin 2.34 mg/dL      eGFR 45 ml/min/1.73sq m     Narrative:      [place] guidelines for Chronic Kidney Disease (CKD):   •  Stage 1 with normal or high GFR (GFR > 90 mL/min/1.73 square meters)  •  Stage 2 Mild CKD (GFR = 60-89 mL/min/1.73 square meters)  •  Stage 3A Moderate CKD (GFR = 45-59 mL/min/1.73 square meters)  •  Stage 3B Moderate CKD (GFR = 30-44 mL/min/1.73 square meters)  •  Stage 4 Severe CKD (GFR = 15-29 mL/min/1.73 square meters)  •  Stage 5 End Stage CKD (GFR <15 mL/min/1.73 square meters)  Note: GFR calculation is accurate only with a steady state creatinine    Magnesium [[id number]]  (Abnormal) Collected: 08/04/23 1454    Lab Status: Final result Specimen: Blood from Arm, Left Updated: 08/04/23 1534     Magnesium 1.8 mg/dL     Protime-INR [[id number]]  (Abnormal) Collected: 08/04/23 1454    Lab Status: Final result Specimen: Blood from Arm, Left Updated: 08/04/23 1529     Protime 41.3 seconds      INR 4.44    APTT [[id number]]  (Abnormal) Collected: 08/04/23 1454    Lab Status: Final result Specimen: Blood from Arm, Left Updated: 08/04/23 1529     PTT 50 seconds     Blood gas, Venous [[id number]]  (Abnormal) Collected: 08/04/23 1454    Lab Status: Final result Specimen: Blood from Arm, Left Updated: 08/04/23 1521     pH, Hasmukh 7.408     pCO2, Hasmukh 41.3 mm Hg      pO2, Hasmukh 34.5 mm Hg      HCO3, Hasmukh 25.5 mmol/L      Base Excess, Hasmukh 0.7 mmol/L      O2 Content, Hasmukh 8.8 ml/dL      O2 HGB, VENOUS 62.7 %     Blood culture #1 [[id number]] Collected: 08/04/23 1454    Lab Status: In process Specimen: Blood from Arm, Left Updated: 08/04/23 1506    Blood culture #2 [[id number]] Collected: 08/04/23 1454    Lab Status: In process Specimen: Blood from Arm, Right Updated: 08/04/23 1504    UA w Reflex to Microscopic w Reflex to Culture [[id number]]     Lab Status: No result Specimen: Urine           All labs reviewed and utilized in the medical decision making process    Radiology:    XR chest portable   Final Result      No acute cardiopulmonary disease.                   Workstation performed: [id number]             All radiology studies independently viewed by me and interpreted by the radiologist.    Procedure    CriticalCare Time    Date/Time: 8/4/2023 6:34 PM    Performed by: Sherry Donald MD  Authorized by: Sherry Donald MD    Critical care provider statement:     Critical care time (minutes):  45    Critical care time was exclusive of:  Teaching time and separately billable procedures and treating other patients    Critical care was necessary to treat or prevent imminent or life-threatening deterioration of the following conditions:  Renal failure, shock and dehydration    Critical care was time spent personally by me on the following activities:  Obtaining history from patient or surrogate, development of treatment plan with patient or surrogate, discussions with consultants, examination of patient, evaluation of patient's response to treatment, ordering and performing treatments and interventions, ordering and review of laboratory studies, ordering and review of radiographic studies, re-evaluation of patient's condition and review of old charts            ED Course of Care and Re-Assessments      On arrival, patient appears dehydrated, given fluid resuscitation of 30 mL per KG of ideal body weight, also started on cefepime with concern for sepsis. On review of the electronic medical record, patient had a CT scan of the abdomen pelvis performed today that showed worsening necrotizing pancreatitis as well as unchanged clot in intra-abdominal blood vessels. Fortunately, hypotension improved and patient responded well to resuscitation with IV fluids. Magnesium noted below, repleted in ER. Given hydromorphone for pain. Discussed case with Dr. Bonnie Christie of critical care, agreed patient would benefit from transfer to [facility] for surgical intensive care unit. Discussed case with Dr. Kaba of general surgery, patient accepted by same, transfer to [facility].     Medications   multi-electrolyte (PLASMALYTE-A/ISOLYTE-S PH 7.4) IV solution 1,000 mL (0 mL Intravenous Stopped 8/4/23 1610)     Followed by   multi-electrolyte (PLASMALYTE-A/ISOLYTE-S PH 7.4) IV solution 1,000 mL (0 mL Intravenous Stopped 8/4/23 1632)     Followed by   multi-electrolyte (PLASMALYTE-A/ISOLYTE-S PH 7.4) IV solution 1,000 mL (1,000 mL Intravenous New Bag 8/4/23 1610)   cefepime (MAXIPIME) IVPB (premix in dextrose) 2,000 mg 50 mL (0 mg Intravenous Stopped 8/4/23 1607)   magnesium sulfate 2 g/50 mL IVPB (premix) 2 g (0 g Intravenous Stopped 8/4/23 1647)   HYDROmorphone HCl (DILAUDID) injection 0.2 mg (0.2 mg Intravenous Given 8/4/23 1647)           FINAL IMPRESSION    Final diagnoses:   Necrotizing pancreatitis   Dehydration   JERZY (acute kidney injury) ([address])   Severe sepsis (HCC)   Lactic acidosis         DISPOSITION/PLAN    Presentation as above with dehydration JERZY and lactic acidosis in the setting of necrotizing pancreatitis, with worsening of that condition suspected to be secondary to use of alcohol. Vital signs and examination as above. Resuscitated in emergency department with improvement of tachycardia and hypotension, treated symptomatically and started on broad-spectrum antibiotics based on concern for infection in the setting of necrotic tissue. Transferred to [facility] for surgical intensive care unit which is not available at this facility. Hemodynamically stable and comfortable at time of departure via ALS ambulance. Time reflects when diagnosis was documented in both MDM as applicable and the Disposition within this note     Time User Action Codes Description Comment    8/4/2023  3:45 PM Acosta Siu Add [K85.91] Necrotizing pancreatitis     8/4/2023  3:45 PM Laurel Juan C T Add [E86.0] Dehydration     8/4/2023  3:45 PM Laurel Juan C T Add [N17.9] JERZY (acute kidney injury) ([address])     8/4/2023  3:45 PM Laurel Juan C T Add [A41.9,  R65.20] Severe sepsis ([address])     8/4/2023  3:45 PM Acosta Siu Add [E87.20] Lactic acidosis       ED Disposition     ED Disposition   Transfer to Another Facility-In Network    Condition   --    Date/Time   Fri Aug 4, 2023  3:45 PM    Comment   Kedar Glover should be transferred out to [facility].            MD Documentation    [facility] Most Recent Value   Patient Condition An emergency transfer is being made prior to stabilization due to the need for definitive care and the benefit of transfer outweighs the risk Reason for Transfer Level of Care needed not available at this facility, No bed available at level of patient's needs   Benefits of Transfer Specialized equipment and/or services available at the receiving facility (Include comment)________________________  [SICU]   Risks of Transfer Potential for delay in receiving treatment, Potential deterioration of medical condition, Loss of IV, Possible worsening of condition or death during transfer, Increased discomfort during transfer   Accepting Physician Dr. Fredrick Esposito   [address] [facility] [address] Name, [facility] (Name & Tel number) PACS   Sending MD Tabatha De MD   Provider Certification Risk of worsening condition, Unanticipated needs of medical equipment and personnel during transport, General risk, such as traffic hazards, adverse weather conditions, rough terrain or turbulence, possible failure of equipment (including vehicle or aircraft), or consequences of actions of persons outside the control of the transport personnel, The possibility of a transport vehicle being unavailable      RN Documentation    [address] Name, [address]    (Name & Tel number) PACS   Transport Mode Ambulance   Level of Care Advanced life support      Follow-up Information    None           PATIENT REFERRED TO:    No follow-up provider specified.     DISCHARGE MEDICATIONS:    Discharge Medication List as of 8/4/2023  5:01 PM      CONTINUE these medications which have NOT CHANGED    Details   apixaban (Eliquis) 5 mg Multiple Dosages:Starting Fri 7/28/2023, Until Thu 8/3/2023 at 2359, THEN Starting Fri 8/4/2023, Until Sat 8/26/2023 at 2359Take 2 tablets (10 mg total) by mouth 2 (two) times a day for 7 days, THEN 1 tablet (5 mg total) 2 (two) times a day for 23 day s., Normal      Blood Pressure Monitoring (Adult Blood Pressure Cuff Lg) KIT Use 3 (three) times a day, Starting Tue 2/8/2022, Normal      lisinopril (ZESTRIL) 10 mg tablet Take 1 tablet (10 mg total) by mouth daily, Starting Mon 7/31/2023, Normal      naproxen sodium (ALEVE) 220 MG tablet Take 220 mg by mouth, Historical Med             No discharge procedures on file. Shirlene Odonnell MD    Portions of the record may have been created with voice recognition software. Occasional wrong word or "sound alike" substitutions may have occurred due to the inherent limitations of voice recognition software.   Please read the chart carefully and recognize, using context, where substitutions have occurred     Shirlene Odonnell MD  08/04/23 [id number]

## 2023-08-04 NOTE — TELEPHONE ENCOUNTER
Spoke to Tsering -- Pt is still not able to digest anything. She is concerned. She called and LVM. Hi, this is [place] ([facility]). I'm calling for [employer]. His YOB: 1970. First the he has an FMLA that was faxed over doctors Doctor James Anderson and that was needs to be filled out and send it back for him. Second, he just had a CT scan done over at the hospital if the doctor could see the results because we're not going to hear nothing from Darryn Romanokushalcurt for another week and he's really sick and he's not doing any better. And if he could give us a call to explain his results and what's going on and what we need to do for our next step, I would definitely appreciate it. My phone number is [phone]. And if you could call us up and tell us what's going on. And I do have a question for somebody with the team because he has pancreatitis and he can't, still cannot digest anything, goes right through and goes right to the bathroom. If I could give him some kind of supplement, if there's a supplement that could help for his digestive system with this, that he could take a lion or something like that. But I don't want to give him anything until somebody tells me what I can do. So thanks again. It's [place] ([facility]. Thank you. Wondering if she should take him to the ED, she said he doesn't want to go but she will try her best to get him to go.

## 2023-08-04 NOTE — CONSULTS
Consultation - General Surgery   Danelle Lou 46 y.o. 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 Known to the general surgery service as he was discharged 7/28 after a short admission for management of acute necrotizing pancreatitis. Patient was tolerating a diet with minimal to no pain, hemodynamically stable with improving labs on discharge. However, now returned to [facility] with PO intolerance, abdominal pain, and diarrhea. Transferred to [facility] for higher level of care. Na 133  Creatinine 1.69  Glucose 286  T bili 2.34  AST/ALT 63/60  Alk phos 120  Lipase 129    Lactic 2 (5)    WBC 31.79  Hgb 9.4    8/4 CT AP: Worsening necrotizing pancreatitis greater than 30% of the pancreas with extensive peripancreatic inflammatory and necrotic changes. New acute necrotic collection in the lesser sac exerting mass effect on the gastric body. Similar nonocclusive thrombus in the superior mesenteric vein. 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 Moderate abdominopelvic ascites and small bilateral pleural effusions, increased since prior study. Subtle nodularity of the liver surface, which may represent early cirrhotic changes.     Plan:  - admit to surgical ICU, appreciate care  - f/u blood cultures  - will discuss with GI, the patient's CT shows an organized collection that seems to be appropriate for cystogastrostomy, appreciate GI input regarding candidacy  - hold eliquis, heparin gtt while inpatient  - NPO w/sips and ice chips  - monitor off abx, no evidence of infection seen on CT  - will need feeding access, PICC/TPN vs post-pyloric keofed vs PEG-J pending clinical progression    History of Present Illness   HPI:  Danelle Lou is a 46 y.o. male who presents as a transfer from [facility] for necrotizing pancreatitis. He had a scheduled outpatient CT that showed worsening of his pancreatitis and his wife informed the patient's PCP that he has not been able to eat much. He was recently admitted for about 4 days and discharged on 7/28 for management of his necrotizing pancreatitis. By the day of discharge, he was tolerating a regular diet with minimal pain. He has been having diarrhea after each meal, but not vomiting. He has only been eating liquids and toast due to poor appetite. Denies fevers, chills, nausea, vomiting. Has been voiding, describes urine as bright yellow. Has been having diarrhea after every meal, but meals are minimal. Denies much abdominal pain, but did experience pain on the ride over. No previous surgeries  PMHx HTN      Consults    Review of Systems   Constitutional: Positive for appetite change. Negative for chills and fever. HENT: Negative. Respiratory: Negative. Cardiovascular: Negative. Gastrointestinal: Positive for diarrhea. Negative for abdominal pain, blood in stool, nausea and vomiting. Genitourinary: Negative. Musculoskeletal: Negative. Skin: Negative. Neurological: Negative. Psychiatric/Behavioral: Negative.         Historical Information   Past Medical History:   Diagnosis Date   • Pancreatitis      Past Surgical History:   Procedure Laterality Date   • HERNIA REPAIR     • JOINT REPLACEMENT     • TONSILLECTOMY  12     Social History   Social History     Substance and Sexual Activity   Alcohol Use Yes   • Alcohol/week: 6.0 standard drinks of alcohol   • Types: 6 Cans of beer per week    Comment: Weekends     Social History     Substance and Sexual Activity   Drug Use Never     E-Cigarette/Vaping   • E-Cigarette Use Never User      E-Cigarette/Vaping Substances   • Nicotine No    • THC No      Social History     Tobacco Use   Smoking Status Never   Smokeless Tobacco Current   • Types: Chew     Family History: non-contributory    Meds/Allergies   all current active meds have been reviewed  No Known Allergies    Objective   First Vitals:        Current Vitals:        No intake or output data in the 24 hours ending 08/04/23 1725    Invasive Devices     Peripheral Intravenous Line  Duration           Peripheral IV 08/04/23 Left Antecubital <1 day    Peripheral IV 08/04/23 Right Antecubital <1 day    Peripheral IV 08/04/23 Right;Ventral (anterior) Hand <1 day                Physical Exam:  General: No acute distress  Neuro: alert and oriented  HEENT: moist mucous membranes  CV: Well perfused, regular rate and rhythm  Lungs: Normal work of breathing, no increased respiratory effort  Abdomen: Soft, tender, significantly distended  Extremities: No edema, clubbing or cyanosis  Skin: Warm, dry    Lab Results:   CBC:   Lab Results   Component Value Date    WBC 31.79 (HH) 08/04/2023    HGB 9.4 (L) 08/04/2023    HCT 29.1 (L) 08/04/2023    MCV 90 08/04/2023     (H) 08/04/2023    RBC 3.23 (L) 08/04/2023    MCH 29.1 08/04/2023    MCHC 32.3 08/04/2023    RDW 14.5 08/04/2023    MPV 10.1 08/04/2023   , CMP:   Lab Results   Component Value Date    SODIUM 133 (L) 08/04/2023    K 3.8 08/04/2023    CL 95 (L) 08/04/2023    CO2 26 08/04/2023    BUN 27 (H) 08/04/2023    CREATININE 1.69 (H) 08/04/2023    CALCIUM 8.5 08/04/2023    AST 63 (H) 08/04/2023    ALT 60 (H) 08/04/2023    ALKPHOS 120 (H) 08/04/2023    EGFR 45 08/04/2023   , Lipase:   Lab Results   Component Value Date    LIPASE 129 (H) 08/04/2023     Imaging: I have personally reviewed pertinent films in PACS  EKG, Pathology, and Other Studies: I have personally reviewed pertinent films in PACS    Counseling / Coordination of Care  Total floor / unit time spent today 20 minutes. Greater than 50% of total time was spent with the patient and / or family counseling and / or coordination of care.        Keena Romero MD  General Surgery PGY2

## 2023-08-04 NOTE — TELEPHONE ENCOUNTER
Report reviewed with Dr. Brigitte Cote in the absence of Dr. Oedssa Hilton. Pt's spouse Siddharth Mazariegos) has been contacted and has been instructed to have pt transported back to the ER. Pt's spouse has expressed understanding.

## 2023-08-04 NOTE — CONSULTS
[address] [place] [address]  H&P: Critical Care  Name: Hesham Valdivia 46 y.o. male I MRN: [id number]  Unit/Bed#: CRB I Date of Admission: 8/4/2023   Date of Service: 8/4/2023 I Hospital Day: 0      Assessment/Plan   Neuro:   · Diagnosis: Acute pain  · Plan: Scheduled tylenol, PRN oxycodone 5/10, PRN dilaudid  · Diagnosis: Acute Nausea  · Plan: PRN Zofran  · ICU-CAM, delirium precautions, q2h neuro checks      CV:   · Diagnosis: Sinus tachycardia  · Plan: Likely 2/2 SIRS from necrotizing pancreatitis and hypovolemia from poor oral intake. Continue with fluid resuscitation. Monitor heart rate. 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 Monitor respiratory rate and SpO2. Supplemental oxygen for goal SpO2>92. Monitor respiratory status closely given severely distended abdomen. GI:   · Diagnosis: Necrotizing pancreatitis  · necrotic pancreatic collection with mass effect on gastric body  · Plan: NPO sips of clears. Continue fluid resuscitation with Plasmalyte@ 150 cc/hr. No indication for antibiotics currently. Serial abdominal exams, patient currently non-tender but severely distended, monitor for signs of abdominal compartment syndrome. · GI consulted for cystogastrostomy tube. :   No active issues. Trend urine output and serum creatinine. Monitor for signs of abdominal compartment syndrome, none-currently present. Urinary retention protocol. Low threshold for urinary catheter placement. F/E/N:    · Fluids: Plasmalyte @125 cc/hr  · Electrolytes: Replete for goal K>4, PO4>3, Mg>2  · Nutrition: sips of clears, NPO [place]      Heme/Onc:   · Diagnosis: Nonocclusive thrombus in SMV  · Plan: 2/2 adjacent necrotizing pancreatitis. On eliquis as outpatient. Start heparin gtt.       Endo: · Diagnosis: Hyperglycemia  · Plan: q6h glucose. SSI. Hypoglycemia protocol. ID:   No active issues. Follow up blood cultures. Trend WBC, fever curve. No current indication for antibiotics. Will attempt to obtain cultures of collection during cystogastrostomy procedure. MSK/Skin:   · Diagnosis: Physical deconditioning  · Plan: Frequent repositioning, PT/OT when appropriate. Disposition: Critical care       History of Present Illness     HPI: Rachna Mckay is a 46 y.o. who presents with history of necrotizing pancreatitis and recent admission for such (discharged 7/28) who presents with worsening necrotizing pancreatitis as evidence on outpatient CT as well as PO intolerance. Patient is endorsing nausea and diarrhea, but denies emesis, fevers, chills. Denies abdominal pain. He has only been tolerating liquids 2/2 poor appetite. CT AP with worsening necrotizing pancreatitis greater than 30% of the pancreas with extensive peripancreatic inflammatory and necrotic changes. New acute necrotic collection in the lesser sac exerting mass effect on the gastric body. Nonocclusive thrombus in the superior mesenteric vein. 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 Lactate 5 on admission, cleared to 2. WBC 32. Endorses Eliquis use for above listed SMV thrombus. No AP use. History obtained from chart review and the patient. Review of Systems   Constitutional: Positive for appetite change. Negative for chills and fever. HENT: Negative. Eyes: Negative. Respiratory: Negative. Cardiovascular: Negative. Gastrointestinal: Positive for abdominal distention, diarrhea and nausea. Negative for abdominal pain and vomiting. Endocrine: Negative. Genitourinary: Negative. Musculoskeletal: Negative. Skin: Negative.     Allergic/Immunologic: Negative. Neurological: Negative. Hematological: Negative. Psychiatric/Behavioral: Negative. Historical Information   Past Medical History:  No date: Pancreatitis Past Surgical History:  No date: HERNIA REPAIR  No date: JOINT REPLACEMENT  1976: TONSILLECTOMY   Current Outpatient Medications   Medication Instructions   • apixaban (Eliquis) 5 mg Take 2 tablets (10 mg total) by mouth 2 (two) times a day for 7 days, THEN 1 tablet (5 mg total) 2 (two) times a day for 23 days. • Blood Pressure Monitoring (Adult Blood Pressure Cuff Lg) KIT Does not apply, 3 times daily   • lisinopril (ZESTRIL) 10 mg, Oral, Daily   • naproxen sodium (ALEVE) 220 mg, Oral    No Known Allergies   Social History     Tobacco Use   • Smoking status: Never   • Smokeless tobacco: Current     Types: Chew   Vaping Use   • Vaping Use: Never used   Substance Use Topics   • Alcohol use: Yes     Alcohol/week: 6.0 standard drinks of alcohol     Types: 6 Cans of beer per week     Comment: Weekends   • Drug use: Never    Family History   Problem Relation Age of Onset   • Cancer Mother    • Multiple myeloma Mother    • Prostate cancer Father    • Cancer Father           Objective                            Vitals I/O      Most Recent Min/Max in 24hrs   Temp 98.8 °F (37.1 °C) Temp  Min: 98.2 °F (36.8 °C)  Max: 98.8 °F (37.1 °C)   Pulse (!) 112 Pulse  Min: 102  Max: 120   Resp 20 Resp  Min: 16  Max: 22   /53 BP  Min: 86/52  Max: 118/59   O2 Sat 98 % SpO2  Min: 95 %  Max: 98 %    No intake or output data in the 24 hours ending 08/04/23 1838      Diet NPO; Sips of clear liquids  Diet NPO; Sips with meds     Invasive Monitoring Physical exam    General: No acute distress, alert and oriented  Neuro: Alert and oriented, following commands, GCS 15  HEENT: EOMI,  CV: Well perfused, tachycardia  Lungs: Normal work of breathing, no increased respiratory effort, on room air  Abdomen: Soft, non-tender, very distended.  No rebound or guarding  Extremities: No edema, clubbing or cyanosis  Skin: Warm, dry         Diagnostic Studies      EKG: Reviewed   Imaging:  I have personally reviewed pertinent reports.    and I have personally reviewed pertinent films in PACS     Medications:  Scheduled PRN   acetaminophen, 975 mg, Q8H [address]  chlorhexidine, 15 mL, Q12H ALEX  heparin, , Once      HYDROmorphone, 0.5 mg, Q4H PRN  labetalol, 10 mg, Q6H PRN  ondansetron, 4 mg, Q6H PRN  oxyCODONE, 10 mg, Q4H PRN  oxyCODONE, 5 mg, Q4H PRN       Continuous    multi-electrolyte, 150 mL/hr         Labs:    CBC    Recent Labs     08/04/23  1454   WBC 31.79*   HGB 9.4*   HCT 29.1*   *     BMP    Recent Labs     08/04/23  1454   SODIUM 133*   K 3.8   CL 95*   CO2 26   AGAP 12   BUN 27*   CREATININE 1.69*   CALCIUM 8.5       Coags    Recent Labs     08/04/23  1454   INR 4.44*   PTT 50*        Additional Electrolytes  Recent Labs     08/04/23  1454   MG 1.8*          Blood Gas    No recent results  Recent Labs     08/04/23  1454   PHVEN 7.408*   EVP4XJU 41.3*   PO2VEN 34.5*   [id number] 25.5   BEVEN 0.7    LFTs  Recent Labs     08/04/23  1454   ALT 60*   AST 63*   ALKPHOS 120*   ALB 2.4*   TBILI 2.34*       Infectious  Recent Labs     08/04/23  1454   PROCALCITONI 1.73*     Glucose  Recent Labs     08/04/23  1454   GLUC 538 MD Noemi  General Surgery   08/04/23

## 2023-08-04 NOTE — EMTALA/ACUTE CARE TRANSFER
[address] [facility] [address]  Dept: [phone]      EMTALA TRANSFER CONSENT    NAME James RODRIGUES 1970                              MRN [id number]    I have been informed of my rights regarding examination, treatment, and transfer   by Dr. Chuck Saldana MD    Benefits: Specialized equipment and/or services available at the receiving facility (Include comment)________________________ (SICU)    Risks: Potential for delay in receiving treatment, Potential deterioration of medical condition, Loss of IV, Possible worsening of condition or death during transfer, Increased discomfort during transfer      Consent for Transfer:  I acknowledge that my medical condition has been evaluated and explained to me by the emergency department physician or other qualified medical person and/or my attending physician, who has recommended that I be transferred to the service of  Accepting Physician: Dr. Kaba at [address] Name, [facility] : [place]. The above potential benefits of such transfer, the potential risks associated with such transfer, and the probable risks of not being transferred have been explained to me, and I fully understand them. The doctor has explained that, in my case, the benefits of transfer outweigh the risks. I agree to be transferred. I authorize the performance of emergency medical procedures and treatments upon me in both transit and upon arrival at the receiving facility. Additionally, I authorize the release of any and all medical records to the receiving facility and request they be transported with me, if possible. I understand that the safest mode of transportation during a medical emergency is an ambulance and that the Hospital advocates the use of this mode of transport.  Risks of traveling to the receiving facility by car, including absence of medical control, life sustaining equipment, such as oxygen, and medical personnel has been explained to me and I fully understand them. (GEE CORRECT BOX BELOW)  [  ]  I consent to the stated transfer and to be transported by ambulance/helicopter. [  ]  I consent to the stated transfer, but refuse transportation by ambulance and accept full responsibility for my transportation by car. I understand the risks of non-ambulance transfers and I exonerate the Hospital and its staff from any deterioration in my condition that results from this refusal.    X___________________________________________    DATE  23  TIME________  Signature of patient or legally responsible individual signing on patient behalf           RELATIONSHIP TO PATIENT_________________________          Provider Certification    NAME Andree Rubio                                        [facility] 1970                              MRN [id number]    A medical screening exam was performed on the above named patient. Based on the examination:    Condition Necessitating Transfer The primary encounter diagnosis was Necrotizing pancreatitis. Diagnoses of Dehydration, JERZY (acute kidney injury) ([address]), Severe sepsis ([address]), and Lactic acidosis were also pertinent to this visit. Patient Condition:  An emergency transfer is being made prior to stabilization due to the need for definitive care and the benefit of transfer outweighs the risk    Reason for Transfer: Level of Care needed not available at this facility, No bed available at level of patient's needs    Transfer Requirements: [address]   · Space available and qualified personnel available for treatment as acknowledged by PACS  · Agreed to accept transfer and to provide appropriate medical treatment as acknowledged by       Dr. Kaba  · Appropriate medical records of the examination and treatment of the patient are provided at the time of transfer   [address] [facility] [address] [place] [address] _______  · Transfer will be performed by qualified personnel from    and appropriate transfer equipment as required, including the use of necessary and appropriate life support measures. Provider Certification: I have examined the patient and explained the following risks and benefits of being transferred/refusing transfer to the patient/family:  Risk of worsening condition, Unanticipated needs of medical equipment and personnel during transport, General risk, such as traffic hazards, adverse weather conditions, rough terrain or turbulence, possible failure of equipment (including vehicle or aircraft), or consequences of actions of persons outside the control of the transport personnel, The possibility of a transport vehicle being unavailable      Based on these reasonable risks and benefits to the patient and/or the unborn child(jorge), and based upon the information available at the time of the patient’s examination, I certify that the medical benefits reasonably to be expected from the provision of appropriate medical treatments at another medical facility outweigh the increasing risks, if any, to the individual’s medical condition, and in the case of labor to the unborn child, from effecting the transfer.     X____________________________________________ DATE 08/04/23        TIME_______      ORIGINAL - SEND TO MEDICAL RECORDS   COPY - SEND WITH PATIENT DURING TRANSFER

## 2023-08-04 NOTE — ED NOTES
TRANSFER INFORMATION    Going to [facility] ED   ETA 1630 with [facility]  Accepting Dr TO SURGERY  Report  Noé Diggs, RN  08/04/23 8848

## 2023-08-05 ENCOUNTER — APPOINTMENT (INPATIENT)
Dept: RADIOLOGY | Facility: HOSPITAL | Age: 53
DRG: 438 | End: 2023-08-05
Payer: COMMERCIAL

## 2023-08-05 ENCOUNTER — ANESTHESIA (INPATIENT)
Dept: PERIOP | Facility: HOSPITAL | Age: 53
DRG: 438 | End: 2023-08-05
Payer: COMMERCIAL

## 2023-08-05 ENCOUNTER — APPOINTMENT (INPATIENT)
Dept: RADIOLOGY | Facility: HOSPITAL | Age: 53
DRG: 438 | End: 2023-08-05
Attending: RADIOLOGY
Payer: COMMERCIAL

## 2023-08-05 ENCOUNTER — ANESTHESIA EVENT (INPATIENT)
Dept: PERIOP | Facility: HOSPITAL | Age: 53
DRG: 438 | End: 2023-08-05
Payer: COMMERCIAL

## 2023-08-05 LAB
ALBUMIN SERPL BCP-MCNC: 1.4 G/DL (ref 3.5–5)
ALP SERPL-CCNC: 101 U/L (ref 46–116)
ALT SERPL W P-5'-P-CCNC: 46 U/L (ref 12–78)
ANION GAP SERPL CALCULATED.3IONS-SCNC: 8 MMOL/L
APTT PPP: 109 SECONDS (ref 23–37)
AST SERPL W P-5'-P-CCNC: 51 U/L (ref 5–45)
BILIRUB SERPL-MCNC: 1.16 MG/DL (ref 0.2–1)
BUN SERPL-MCNC: 33 MG/DL (ref 5–25)
CA-I BLD-SCNC: 1 MMOL/L (ref 1.12–1.32)
CALCIUM ALBUM COR SERPL-MCNC: 9.8 MG/DL (ref 8.3–10.1)
CALCIUM SERPL-MCNC: 7.7 MG/DL (ref 8.3–10.1)
CHLORIDE SERPL-SCNC: 99 MMOL/L (ref 96–108)
CO2 SERPL-SCNC: 29 MMOL/L (ref 21–32)
CREAT SERPL-MCNC: 1.96 MG/DL (ref 0.6–1.3)
ERYTHROCYTE [DISTWIDTH] IN BLOOD BY AUTOMATED COUNT: 14.6 % (ref 11.6–15.1)
GFR SERPL CREATININE-BSD FRML MDRD: 38 ML/MIN/1.73SQ M
GLUCOSE SERPL-MCNC: 238 MG/DL (ref 65–140)
GLUCOSE SERPL-MCNC: 243 MG/DL (ref 65–140)
GLUCOSE SERPL-MCNC: 248 MG/DL (ref 65–140)
GLUCOSE SERPL-MCNC: 266 MG/DL (ref 65–140)
GLUCOSE SERPL-MCNC: 266 MG/DL (ref 65–140)
HCT VFR BLD AUTO: 23.1 % (ref 36.5–49.3)
HGB BLD-MCNC: 7.5 G/DL (ref 12–17)
LACTATE SERPL-SCNC: 1.8 MMOL/L (ref 0.5–2)
MAGNESIUM SERPL-MCNC: 2.4 MG/DL (ref 1.6–2.6)
MCH RBC QN AUTO: 29 PG (ref 26.8–34.3)
MCHC RBC AUTO-ENTMCNC: 32.5 G/DL (ref 31.4–37.4)
MCV RBC AUTO: 89 FL (ref 82–98)
PHOSPHATE SERPL-MCNC: 5.5 MG/DL (ref 2.7–4.5)
PLATELET # BLD AUTO: 351 THOUSANDS/UL (ref 149–390)
PMV BLD AUTO: 10 FL (ref 8.9–12.7)
POTASSIUM SERPL-SCNC: 3.4 MMOL/L (ref 3.5–5.3)
PROT SERPL-MCNC: 5.6 G/DL (ref 6.4–8.4)
RBC # BLD AUTO: 2.59 MILLION/UL (ref 3.88–5.62)
SODIUM SERPL-SCNC: 136 MMOL/L (ref 135–147)
WBC # BLD AUTO: 24.85 THOUSAND/UL (ref 4.31–10.16)

## 2023-08-05 PROCEDURE — 10030 IMG GID FLU COLL DRG SFT TIS: CPT

## 2023-08-05 PROCEDURE — 0F9G30Z DRAINAGE OF PANCREAS WITH DRAINAGE DEVICE, PERCUTANEOUS APPROACH: ICD-10-PCS | Performed by: RADIOLOGY

## 2023-08-05 PROCEDURE — 0T9B70Z DRAINAGE OF BLADDER WITH DRAINAGE DEVICE, VIA NATURAL OR ARTIFICIAL OPENING: ICD-10-PCS | Performed by: UROLOGY

## 2023-08-05 PROCEDURE — 0T7D8ZZ DILATION OF URETHRA, VIA NATURAL OR ARTIFICIAL OPENING ENDOSCOPIC: ICD-10-PCS | Performed by: UROLOGY

## 2023-08-05 PROCEDURE — C1758 CATHETER, URETERAL: HCPCS | Performed by: UROLOGY

## 2023-08-05 PROCEDURE — 77012 CT SCAN FOR NEEDLE BIOPSY: CPT

## 2023-08-05 PROCEDURE — 87205 SMEAR GRAM STAIN: CPT | Performed by: SURGERY

## 2023-08-05 PROCEDURE — 10030 IMG GID FLU COLL DRG SFT TIS: CPT | Performed by: RADIOLOGY

## 2023-08-05 PROCEDURE — 99223 1ST HOSP IP/OBS HIGH 75: CPT | Performed by: UROLOGY

## 2023-08-05 PROCEDURE — 0T9B80Z DRAINAGE OF BLADDER WITH DRAINAGE DEVICE, VIA NATURAL OR ARTIFICIAL OPENING ENDOSCOPIC: ICD-10-PCS | Performed by: UROLOGY

## 2023-08-05 PROCEDURE — 82330 ASSAY OF CALCIUM: CPT

## 2023-08-05 PROCEDURE — 80053 COMPREHEN METABOLIC PANEL: CPT

## 2023-08-05 PROCEDURE — 85730 THROMBOPLASTIN TIME PARTIAL: CPT

## 2023-08-05 PROCEDURE — 87075 CULTR BACTERIA EXCEPT BLOOD: CPT | Performed by: SURGERY

## 2023-08-05 PROCEDURE — 99024 POSTOP FOLLOW-UP VISIT: CPT | Performed by: RADIOLOGY

## 2023-08-05 PROCEDURE — 83735 ASSAY OF MAGNESIUM: CPT

## 2023-08-05 PROCEDURE — 87070 CULTURE OTHR SPECIMN AEROBIC: CPT | Performed by: SURGERY

## 2023-08-05 PROCEDURE — 85027 COMPLETE CBC AUTOMATED: CPT

## 2023-08-05 PROCEDURE — 82948 REAGENT STRIP/BLOOD GLUCOSE: CPT

## 2023-08-05 PROCEDURE — 99291 CRITICAL CARE FIRST HOUR: CPT | Performed by: EMERGENCY MEDICINE

## 2023-08-05 PROCEDURE — C1769 GUIDE WIRE: HCPCS

## 2023-08-05 PROCEDURE — 99222 1ST HOSP IP/OBS MODERATE 55: CPT | Performed by: INTERNAL MEDICINE

## 2023-08-05 PROCEDURE — 84100 ASSAY OF PHOSPHORUS: CPT

## 2023-08-05 PROCEDURE — BT141ZZ FLUOROSCOPY OF KIDNEYS, URETERS AND BLADDER USING LOW OSMOLAR CONTRAST: ICD-10-PCS | Performed by: UROLOGY

## 2023-08-05 PROCEDURE — 52276 CYSTOSCOPY AND TREATMENT: CPT | Performed by: UROLOGY

## 2023-08-05 PROCEDURE — NC001 PR NO CHARGE: Performed by: UROLOGY

## 2023-08-05 PROCEDURE — 74450 X-RAY URETHRA/BLADDER: CPT

## 2023-08-05 PROCEDURE — 83605 ASSAY OF LACTIC ACID: CPT

## 2023-08-05 RX ORDER — SODIUM CHLORIDE 9 MG/ML
10 INJECTION INTRAVENOUS DAILY
Qty: 300 ML | Refills: 3 | Status: SHIPPED | OUTPATIENT
Start: 2023-08-05 | End: 2023-12-03

## 2023-08-05 RX ORDER — ROCURONIUM BROMIDE 10 MG/ML
INJECTION, SOLUTION INTRAVENOUS AS NEEDED
Status: DISCONTINUED | OUTPATIENT
Start: 2023-08-05 | End: 2023-08-05

## 2023-08-05 RX ORDER — LIDOCAINE HYDROCHLORIDE 20 MG/ML
1 JELLY TOPICAL ONCE
Status: COMPLETED | OUTPATIENT
Start: 2023-08-05 | End: 2023-08-05

## 2023-08-05 RX ORDER — INSULIN LISPRO 100 [IU]/ML
2-12 INJECTION, SOLUTION INTRAVENOUS; SUBCUTANEOUS EVERY 6 HOURS SCHEDULED
Status: DISCONTINUED | OUTPATIENT
Start: 2023-08-05 | End: 2023-08-06

## 2023-08-05 RX ORDER — SODIUM CHLORIDE, SODIUM LACTATE, POTASSIUM CHLORIDE, CALCIUM CHLORIDE 600; 310; 30; 20 MG/100ML; MG/100ML; MG/100ML; MG/100ML
INJECTION, SOLUTION INTRAVENOUS CONTINUOUS PRN
Status: DISCONTINUED | OUTPATIENT
Start: 2023-08-05 | End: 2023-08-05

## 2023-08-05 RX ORDER — LIDOCAINE HYDROCHLORIDE 10 MG/ML
INJECTION, SOLUTION EPIDURAL; INFILTRATION; INTRACAUDAL; PERINEURAL AS NEEDED
Status: COMPLETED | OUTPATIENT
Start: 2023-08-05 | End: 2023-08-05

## 2023-08-05 RX ORDER — ALBUMIN, HUMAN INJ 5% 5 %
SOLUTION INTRAVENOUS CONTINUOUS PRN
Status: DISCONTINUED | OUTPATIENT
Start: 2023-08-05 | End: 2023-08-05

## 2023-08-05 RX ORDER — DEXAMETHASONE SODIUM PHOSPHATE 10 MG/ML
INJECTION, SOLUTION INTRAMUSCULAR; INTRAVENOUS AS NEEDED
Status: DISCONTINUED | OUTPATIENT
Start: 2023-08-05 | End: 2023-08-05

## 2023-08-05 RX ORDER — LIDOCAINE HYDROCHLORIDE 10 MG/ML
INJECTION, SOLUTION EPIDURAL; INFILTRATION; INTRACAUDAL; PERINEURAL AS NEEDED
Status: DISCONTINUED | OUTPATIENT
Start: 2023-08-05 | End: 2023-08-05

## 2023-08-05 RX ORDER — POTASSIUM CHLORIDE 14.9 MG/ML
20 INJECTION INTRAVENOUS
Status: COMPLETED | OUTPATIENT
Start: 2023-08-05 | End: 2023-08-05

## 2023-08-05 RX ORDER — SUCCINYLCHOLINE/SOD CL,ISO/PF 100 MG/5ML
SYRINGE (ML) INTRAVENOUS AS NEEDED
Status: DISCONTINUED | OUTPATIENT
Start: 2023-08-05 | End: 2023-08-05

## 2023-08-05 RX ORDER — PROPOFOL 10 MG/ML
INJECTION, EMULSION INTRAVENOUS AS NEEDED
Status: DISCONTINUED | OUTPATIENT
Start: 2023-08-05 | End: 2023-08-05

## 2023-08-05 RX ORDER — CALCIUM GLUCONATE 20 MG/ML
2 INJECTION, SOLUTION INTRAVENOUS ONCE
Status: COMPLETED | OUTPATIENT
Start: 2023-08-05 | End: 2023-08-05

## 2023-08-05 RX ORDER — SODIUM CHLORIDE, SODIUM GLUCONATE, SODIUM ACETATE, POTASSIUM CHLORIDE, MAGNESIUM CHLORIDE, SODIUM PHOSPHATE, DIBASIC, AND POTASSIUM PHOSPHATE .53; .5; .37; .037; .03; .012; .00082 G/100ML; G/100ML; G/100ML; G/100ML; G/100ML; G/100ML; G/100ML
1000 INJECTION, SOLUTION INTRAVENOUS ONCE
Status: COMPLETED | OUTPATIENT
Start: 2023-08-05 | End: 2023-08-05

## 2023-08-05 RX ORDER — CEFAZOLIN SODIUM 1 G/3ML
INJECTION, POWDER, FOR SOLUTION INTRAMUSCULAR; INTRAVENOUS AS NEEDED
Status: DISCONTINUED | OUTPATIENT
Start: 2023-08-05 | End: 2023-08-05

## 2023-08-05 RX ADMIN — INSULIN LISPRO 3 UNITS: 100 INJECTION, SOLUTION INTRAVENOUS; SUBCUTANEOUS at 05:47

## 2023-08-05 RX ADMIN — ACETAMINOPHEN 975 MG: 325 TABLET, FILM COATED ORAL at 21:26

## 2023-08-05 RX ADMIN — SUGAMMADEX 400 MG: 100 INJECTION, SOLUTION INTRAVENOUS at 15:23

## 2023-08-05 RX ADMIN — SODIUM CHLORIDE, SODIUM GLUCONATE, SODIUM ACETATE, POTASSIUM CHLORIDE, MAGNESIUM CHLORIDE, SODIUM PHOSPHATE, DIBASIC, AND POTASSIUM PHOSPHATE 150 ML/HR: .53; .5; .37; .037; .03; .012; .00082 INJECTION, SOLUTION INTRAVENOUS at 23:00

## 2023-08-05 RX ADMIN — CHLORHEXIDINE GLUCONATE 15 ML: 1.2 SOLUTION ORAL at 21:27

## 2023-08-05 RX ADMIN — ROCURONIUM BROMIDE 20 MG: 10 INJECTION, SOLUTION INTRAVENOUS at 14:05

## 2023-08-05 RX ADMIN — POTASSIUM CHLORIDE 20 MEQ: 14.9 INJECTION, SOLUTION INTRAVENOUS at 08:58

## 2023-08-05 RX ADMIN — ACETAMINOPHEN 975 MG: 325 TABLET, FILM COATED ORAL at 05:46

## 2023-08-05 RX ADMIN — NOREPINEPHRINE BITARTRATE 2 MCG/MIN: 1 INJECTION INTRAVENOUS at 14:01

## 2023-08-05 RX ADMIN — PROPOFOL 50 MG: 10 INJECTION, EMULSION INTRAVENOUS at 14:34

## 2023-08-05 RX ADMIN — PROPOFOL 200 MG: 10 INJECTION, EMULSION INTRAVENOUS at 13:32

## 2023-08-05 RX ADMIN — INSULIN LISPRO 6 UNITS: 100 INJECTION, SOLUTION INTRAVENOUS; SUBCUTANEOUS at 17:29

## 2023-08-05 RX ADMIN — INSULIN LISPRO 3 UNITS: 100 INJECTION, SOLUTION INTRAVENOUS; SUBCUTANEOUS at 00:12

## 2023-08-05 RX ADMIN — LIDOCAINE HYDROCHLORIDE 10 ML: 10 INJECTION, SOLUTION EPIDURAL; INFILTRATION; INTRACAUDAL; PERINEURAL at 15:03

## 2023-08-05 RX ADMIN — LIDOCAINE HYDROCHLORIDE 1 APPLICATION: 20 JELLY TOPICAL at 11:42

## 2023-08-05 RX ADMIN — POTASSIUM CHLORIDE 20 MEQ: 14.9 INJECTION, SOLUTION INTRAVENOUS at 07:49

## 2023-08-05 RX ADMIN — PHENYLEPHRINE HYDROCHLORIDE 50 MCG/MIN: 10 INJECTION INTRAVENOUS at 13:49

## 2023-08-05 RX ADMIN — LIDOCAINE HYDROCHLORIDE 100 MG: 10 INJECTION, SOLUTION EPIDURAL; INFILTRATION; INTRACAUDAL at 13:32

## 2023-08-05 RX ADMIN — SODIUM CHLORIDE, SODIUM GLUCONATE, SODIUM ACETATE, POTASSIUM CHLORIDE, MAGNESIUM CHLORIDE, SODIUM PHOSPHATE, DIBASIC, AND POTASSIUM PHOSPHATE 150 ML/HR: .53; .5; .37; .037; .03; .012; .00082 INJECTION, SOLUTION INTRAVENOUS at 08:58

## 2023-08-05 RX ADMIN — CHLORHEXIDINE GLUCONATE 15 ML: 1.2 SOLUTION ORAL at 08:09

## 2023-08-05 RX ADMIN — CEFAZOLIN 3000 MG: 1 INJECTION, POWDER, FOR SOLUTION INTRAMUSCULAR; INTRAVENOUS at 13:41

## 2023-08-05 RX ADMIN — CALCIUM GLUCONATE 2 G: 20 INJECTION, SOLUTION INTRAVENOUS at 07:49

## 2023-08-05 RX ADMIN — DEXAMETHASONE SODIUM PHOSPHATE 10 MG: 10 INJECTION, SOLUTION INTRAMUSCULAR; INTRAVENOUS at 13:36

## 2023-08-05 RX ADMIN — SODIUM CHLORIDE, SODIUM GLUCONATE, SODIUM ACETATE, POTASSIUM CHLORIDE, MAGNESIUM CHLORIDE, SODIUM PHOSPHATE, DIBASIC, AND POTASSIUM PHOSPHATE 150 ML/HR: .53; .5; .37; .037; .03; .012; .00082 INJECTION, SOLUTION INTRAVENOUS at 16:06

## 2023-08-05 RX ADMIN — ALBUMIN (HUMAN): 12.5 INJECTION, SOLUTION INTRAVENOUS at 13:43

## 2023-08-05 RX ADMIN — PROPOFOL 50 MG: 10 INJECTION, EMULSION INTRAVENOUS at 14:20

## 2023-08-05 RX ADMIN — ROCURONIUM BROMIDE 30 MG: 10 INJECTION, SOLUTION INTRAVENOUS at 13:48

## 2023-08-05 RX ADMIN — Medication 200 MG: at 13:32

## 2023-08-05 RX ADMIN — ROCURONIUM BROMIDE 50 MG: 10 INJECTION, SOLUTION INTRAVENOUS at 14:41

## 2023-08-05 RX ADMIN — PROPOFOL 50 MG: 10 INJECTION, EMULSION INTRAVENOUS at 14:26

## 2023-08-05 RX ADMIN — SUGAMMADEX 600 MG: 100 INJECTION, SOLUTION INTRAVENOUS at 15:19

## 2023-08-05 RX ADMIN — SODIUM CHLORIDE, SODIUM GLUCONATE, SODIUM ACETATE, POTASSIUM CHLORIDE, MAGNESIUM CHLORIDE, SODIUM PHOSPHATE, DIBASIC, AND POTASSIUM PHOSPHATE 1000 ML: .53; .5; .37; .037; .03; .012; .00082 INJECTION, SOLUTION INTRAVENOUS at 05:04

## 2023-08-05 RX ADMIN — SODIUM CHLORIDE, SODIUM LACTATE, POTASSIUM CHLORIDE, AND CALCIUM CHLORIDE: .6; .31; .03; .02 INJECTION, SOLUTION INTRAVENOUS at 13:24

## 2023-08-05 RX ADMIN — HEPARIN SODIUM 16 UNITS/KG/HR: 10000 INJECTION, SOLUTION INTRAVENOUS at 08:59

## 2023-08-05 NOTE — CONSULTS
UROLOGY CONSULTATION NOTE     Patient Identifiers: Jose Ratliff (MRN [id number])  Service Requesting Consultation: Trauma critical care  Service Providing Consultation:  Urology, Anna Maynard MD    Date of Service: 8/5/2023    Reason for Consultation: Suspected urethral stricture      ASSESSMENT:     1. Suspected  urethral stricture  - I am unable to place a urethral Saunders at the bedside    #2 need for urethral Saunders to monitor urinary output  given acute kidney injury    The trauma critical care service requires Saunders catheter to monitor ins and outs. Patient is also at an elevated risk of abdominal compartment syndrome per the primary team.  As such a Saunders catheter is medically requisite    Given the suspected urethral stricture, he will require operative intervention in the form of cystoscopy under anesthesia, dilation or incision of urethral stricture placed Saunders catheter. I have spoken with the ICU team and they feel that the benefits of this outweigh the risks despite the patient's concomitant medical issues and additional interventions scheduled for later today. PLAN:     -to OR for cystoscopy under anesthesia, direct vision internal urethrotomy/dilation of urethral stricture and Saunders catheter placement    I have spent 75 minutes with Patient and family today in which greater than 50% of this time was spent in counseling/coordination of care regarding Diagnostic results, Prognosis, Risks and benefits of tx options and Communicating with other healthcare professionals . Please note this time includes cumulative time on the day of encounter, including reviewing medical records and/or coordinating care among the patient's other specialists. Thank you for allowing me to participate in this patients’ care. Please do not hesitate to call with any additional questions.   Anna Maynard MD    History of Present Illness:     Jose Ratliff is a 46 y.o. old with a history of necrotizing pancreatitis    Past Medical, Past Surgical History:     Past Medical History:   Diagnosis Date   • Pancreatitis    :    Past Surgical History:   Procedure Laterality Date   • HERNIA REPAIR     • JOINT REPLACEMENT     • TONSILLECTOMY  1976   :    Medications, Allergies:     Current Facility-Administered Medications:   •  acetaminophen (TYLENOL) tablet 975 mg, 975 mg, Oral, Q8H [address], Keagan Estrada MD, 975 mg at 08/05/23 0546  •  chlorhexidine (PERIDEX) 0.12 % oral rinse 15 mL, 15 mL, Mouth/Throat, Q12H [address], Keagan Estrada MD, 15 mL at 08/05/23 0809  •  heparin (porcine) 25,000 units in 0.45% NaCl 250 mL infusion (premix), 3-30 Units/kg/hr (Order-Specific), Intravenous, Titrated, Keagan Estrada MD, Held at 08/05/23 0920  •  HYDROmorphone (DILAUDID) injection 0.5 mg, 0.5 mg, Intravenous, Q4H PRN, Keagan Estrada MD  •  insulin lispro (HumaLOG) 100 units/mL subcutaneous injection 2-12 Units, 2-12 Units, Subcutaneous, Q6H [address] **AND** Fingerstick Glucose (POCT), , , Q6H, Keagan Estrada MD  •  labetalol (NORMODYNE) injection 10 mg, 10 mg, Intravenous, Q6H PRN, Keagan Estrada MD  •  lidocaine (URO-JET) 2 % urethral/mucosal gel 1 Application, 1 Application, Urethral, Once, Mikayla Guadarrama, PA-C  •  multi-electrolyte (PLASMALYTE-A/ISOLYTE-S PH 7.4) IV solution, 150 mL/hr, Intravenous, Continuous, Keagan Estrada MD, Last Rate: 150 mL/hr at 08/05/23 0858, 150 mL/hr at 08/05/23 0858  •  ondansetron (ZOFRAN) injection 4 mg, 4 mg, Intravenous, Q6H PRN, Keagan Estrada MD  •  oxyCODONE (ROXICODONE) immediate release tablet 10 mg, 10 mg, Oral, Q4H PRN, Keagan Estrada MD  •  oxyCODONE (ROXICODONE) IR tablet 5 mg, 5 mg, Oral, Q4H PRN, Keagan Estrada MD    Allergies:  No Known Allergies:    Social and Family History:   Social History:   Social History     Tobacco Use   • Smoking status: Never Passive exposure: Past   • Smokeless tobacco: Former     Types: Chew     Quit date: 7/15/2023   • Tobacco comments:     Quit 7/15/23   Vaping Use   • Vaping Use: Never used   Substance Use Topics   • Alcohol use: Not Currently     Comment: quit 7/15/23 (previous 6 drinks per week)   • Drug use: Never   . Social History     Tobacco Use   Smoking Status Never   • Passive exposure: Past   Smokeless Tobacco Former   • Types: Chew   • Quit date: 7/15/2023   Tobacco Comments    Quit 7/15/23       Family History:  Family History   Problem Relation Age of Onset   • Cancer Mother    • Multiple myeloma Mother    • Prostate cancer Father    • Cancer Father    :     Review of Systems:     General: Fever, chills, or night sweats: negative  Cardiac: Negative for chest pain. Pulmonary: Negative for shortness of breath. Gastrointestinal: Abdominal pain positive. Nausea, vomiting, or diarrhea positive,  Genitourinary: See HPI above. Patient does not have hematuria. All other systems queried were negative. Physical Exam:   General: Patient is pleasant and in NAD. Awake and alert  /60 (BP Location: Left arm)   Pulse 98   Temp 98.7 °F (37.1 °C) (Oral)   Resp 21   Ht 5' 11" (1.803 m)   Wt 132 kg (291 lb 3.6 oz)   SpO2 96%   BMI 40.62 kg/m²   Cardiac: Peripheral edema: positive  Pulmonary: Non-labored breathing  Abdomen: Mildly tender, distended. No surgical scars. No masses, tenderness, hernias noted. Genitourinary: Negative CVA tenderness, negative suprapubic tenderness. (Male): Penis circumcised, phallus normal, meatus patent. Testicles descended into scrotum bilaterally without masses nor tenderness. No inguinal hernias bilaterally.         Labs:     Lab Results   Component Value Date    HGB 7.5 (L) 08/05/2023    HCT 23.1 (L) 08/05/2023    WBC 24.85 (H) 08/05/2023     08/05/2023   ]    Lab Results   Component Value Date    K 3.4 (L) 08/05/2023    CL 99 08/05/2023    CO2 29 08/05/2023 BUN 33 (H) 08/05/2023    CREATININE 1.96 (H) 08/05/2023    CALCIUM 7.7 (L) 08/05/2023   ]    Imaging:   I personally reviewed the images and report of the following studies, and reviewed them with the patient:    Procedure: XR chest portable    Result Date: 8/4/2023  Narrative: CHEST INDICATION:   abdominal pain. Feels weak. COMPARISON:  None EXAM PERFORMED/VIEWS:  XR CHEST PORTABLE FINDINGS: Cardiomediastinal silhouette appears unremarkable. Lung markings are crowded secondary to low lung volumes. Within limitations of this examination there is no focal airspace opacity to suggest pneumonia. No pneumothorax or pleural effusion. Osseous structures appear within normal limits for patient age. Impression: No acute cardiopulmonary disease. Workstation performed: [id number]     Procedure: CT abdomen pelvis w contrast    Result Date: 8/4/2023  Narrative: CT ABDOMEN AND PELVIS WITH IV CONTRAST INDICATION:   K85.91: Acute pancreatitis with uninfected necrosis, unspecified K55.069: Acute infarction of intestine, part and extent unspecified. Necrotizing pancreatitis, superior mesenteric artery thrombosis. Follow-up pancreatic pseudocyst/necrosis changes. COMPARISON: CT abdomen pelvis 7/24/2023. TECHNIQUE:  CT examination of the abdomen and pelvis was performed. Multiplanar 2D reformatted images were created from the source data. This examination, like all CT scans performed in the [facility], was performed utilizing techniques to minimize radiation dose exposure, including the use of iterative reconstruction and automated exposure control. Radiation dose length product (DLP) for this visit:  0322 mGy-cm IV Contrast:  100 mL of iohexol (OMNIPAQUE) Enteric Contrast:  Enteric contrast was not administered. FINDINGS: ABDOMEN LOWER CHEST: Small bilateral pleural effusions with adjacent compressive atelectasis, greater on the right, increased since prior study.  Stable small nonenlarged right cardiophrenic lymph nodes. LIVER/BILIARY TREE: Subtle nodularity of the liver surface, which may represent early cirrhotic changes. No suspicious hepatic mass identified. No biliary ductal dilatation. GALLBLADDER:  No calcified gallstones. No pericholecystic inflammatory change. SPLEEN:  Unremarkable. PANCREAS: There is evidence of acute pancreatitis, which will be described based on the revised Laurie Classification of Acute Pancreatitis: -TIME OF ONSET: less than or equal to 4 weeks -NECROSIS: There is evidence of pancreatic parenchyma necrosis, therefore this is necrotizing pancreatitis. Areas of decreased enhancement in the pancreatic tail, body, and pancreatic neck, greater than 30% of the pancreas. Extensive peripancreatic inflammatory and necrotic changes surrounding the pancreatic tail, pancreatic head, lesser sac, gastrohepatic ligament and seen extending into the bilateral pericolic gutters, increased since prior study. -LOCAL COMPLICATIONS: There is acute necrotic collection ([place].) Peripancreatic fluid collection in the lesser sac measuring 13.7 x 6.7 x 11.3 cm (3/64, 604/108) with mass effect on the gastric body, consistent with an acute necrotic collection. -INFECTION: There is no abnormal gas in or around the pancreas to suggest infection. ADRENAL GLANDS: Stable nodular thickening of left adrenal gland. The right adrenal gland is unremarkable. KIDNEYS/URETERS:  Unremarkable. No hydronephrosis. STOMACH AND BOWEL:  Mass effect on the gastric body from the adjacent lesser sac acute necrotic collection. No evidence of gastric outlet obstruction. Bowel otherwise unremarkable. APPENDIX:  No findings to suggest appendicitis. ABDOMINOPELVIC CAVITY:  Moderate abdominopelvic ascites, increased since prior study. No pneumoperitoneum. No lymphadenopathy. VESSELS: No abdominal aortic aneurysm. Severe narrowing of the main portal vein, superior mesenteric vein, and splenic vein from the adjacent necrotizing pancreatitis. Occlusion of the proximal splenic vein with reconstitution distally. Nonocclusive thrombus in the proximal superior mesenteric vein (3/81), similar to prior study. PELVIS REPRODUCTIVE ORGANS:  Unremarkable for patient's age. URINARY BLADDER: Underdistended, limiting its evaluation. ABDOMINAL WALL/INGUINAL REGIONS: Moderate body wall edema. OSSEOUS STRUCTURES:  No acute fracture or destructive osseous lesion. Spinal degenerative changes are noted. Bilateral hip arthroplasties. Impression: Worsening necrotizing pancreatitis greater than 30% of the pancreas with extensive peripancreatic inflammatory and necrotic changes. New acute necrotic collection in the lesser sac exerting mass effect on the gastric body. Similar nonocclusive thrombus in the superior mesenteric vein. Severe narrowing of the main portal vein, superior mesenteric vein, and splenic vein from the adjacent necrotizing pancreatitis with occlusion of the proximal splenic vein with reconstitution  distally. Moderate abdominopelvic ascites and small bilateral pleural effusions, increased since prior study. Subtle nodularity of the liver surface, which may represent early cirrhotic changes. The study was marked in [facility] for immediate notification. Workstation performed: [id number]     Procedure: US right upper quadrant    Result Date: 7/25/2023  Narrative: RIGHT UPPER QUADRANT ULTRASOUND INDICATION:     SMV thrombosis. COMPARISON: CT abdomen pelvis 7/24/2023 TECHNIQUE:   Real-time ultrasound of the right upper quadrant was performed with a curvilinear transducer with both volumetric sweeps and still imaging techniques. FINDINGS: PANCREAS: Majority of the pancreas is obscured by overlying bowel gas. Visualized portions of the pancreas are unremarkable. AORTA AND IVC: Obscured by bowel gas. LIVER: Size:  Within normal range. The liver measures 15.8 cm in the midclavicular line. Contour:  Surface contour is smooth. Parenchyma:  There is mild diffuse increased echogenicity with smooth echotexture, without significant beam attenuation or loss of periportal echogenicity. Most consistent with mild hepatic steatosis. No liver mass identified. Limited imaging of the main portal vein shows it to be patent and hepatopetal. BILIARY: The gallbladder is normal in caliber. No wall thickening or pericholecystic fluid. A 0.9 mm mobile, echogenic gallstone is noted. No sonographic Dunne's sign. No intrahepatic biliary dilatation. CBD measures 6.0 mm. No choledocholithiasis. KIDNEY: Difficult to visualize due to body habitus and overlying bowel gas. Right kidney measures 12.7 x 7.4 x 7.3 cm. Volume 359.8 mL Kidney within normal limits. ASCITES:   Trace ascites noted. Impression: 1. Mild hepatic steatosis. 2. Nonobstructive cholelithiasis with no additional sonographic evidence of acute cholecystitis or biliary duct dilation. 3. Trace ascites. 4. Suboptimally visualized pancreas due to body habitus and overlying bowel gas. Resident: Peter Degroot, the attending radiologist, have reviewed the images and agree with the final report above. Workstation performed: [id number]     Procedure: CT abdomen pelvis with contrast    Result Date: 7/24/2023  Narrative: CT ABDOMEN AND PELVIS WITH IV CONTRAST INDICATION:   mid-abdominal/epigastric pain. COMPARISON:  None. TECHNIQUE:  CT examination of the abdomen and pelvis was performed. Multiplanar 2D reformatted images were created from the source data. This examination, like all CT scans performed in the [facility], was performed utilizing techniques to minimize radiation dose exposure, including the use of iterative reconstruction and automated exposure control. Radiation dose length product (DLP) for this visit:  3032 mGy-cm IV Contrast:  100 mL of iohexol (OMNIPAQUE) Enteric Contrast:  Enteric contrast was not administered.  FINDINGS: ABDOMEN LOWER CHEST: Small left effusion seen Lymph nodes are seen in the right cardiophrenic angle region measuring about 8 mm, 9 mm LIVER/BILIARY TREE: Hepatic steatosis seen GALLBLADDER:  No calcified gallstones. No pericholecystic inflammatory change. SPLEEN:  Unremarkable. PANCREAS: Areas of decreased enhancement in the pancreatic tail measuring about 4 cm, image 97 series 601 Additional area of decreased enhancement in the pancreatic body, measuring 4.4 cm, image 71 series 601 Peripancreatic inflammatory changes seen with heterogeneity in the peripancreatic fat. The peripancreatic inflammatory changes are seen in the lesser sac, and the gastrohepatic ligament, there is thickening of the anterior pararenal fascia. There is thickening of the lateroconal fascia there is tracking of fluid and inflammatory exudate along the paracolic region into the pelvis Loculated fluid along the greater curvature of the stomach, in the omentum ADRENAL GLANDS: Right adrenal gland appears unremarkable Nodularity left adrenal gland seen KIDNEYS/URETERS:  Unremarkable. No hydronephrosis. STOMACH AND BOWEL: No abnormal dilatation of the small bowel loops Mild enhancement of the small bowel loops seen APPENDIX:  No findings to suggest appendicitis. ABDOMINOPELVIC CAVITY: Small amount of fluid in the pelvis. Inflammatory changes in the lesser sac, gastrocolic ligament, along the greater curvature of the stomach, omentum, with thickening of the lateroconal fascia, anterior and posterior pararenal fascia VESSELS: Celiac trunk is patent SMA is patent Nonocclusive thrombus is seen in the superior mesenteric vein just caudal to the confluence of the splenic vein and superior mesenteric vein, image 80 series 2 and image 79 series 601 PELVIS REPRODUCTIVE ORGANS:  Unremarkable for patient's age. URINARY BLADDER:  Unremarkable. ABDOMINAL WALL/INGUINAL REGIONS:  Unremarkable. OSSEOUS STRUCTURES: No acute compression collapse of the vetebra.  Bilateral hip arthroplasty seen     Impression: Necrotizing pancreatitis with areas of parenchymal pancreatic necrosis in the tail and body of the pancreas Peripancreatic fat necrosis in the lesser sac, gastrocolic ligament with tracking of inflammatory changes along the anterior posterior pararenal fascia, paracolic fascia into the pelvis Evolving nonocclusive thrombus in the superior mesenteric vein I personally discussed this study with [employer] on 7/24/2023 11:07 AM. The study was marked in [employer] for immediate notification.  Workstation performed: [id number]

## 2023-08-05 NOTE — BRIEF OP NOTE (RAD/CATH)
INTERVENTIONAL RADIOLOGY PROCEDURE NOTE    Date: 8/5/2023    Procedure:   Procedure Summary     Date: 08/05/23 Room / Location: [facility] ROOM 01 / [address] [facility]; [address] [place] [address] CAT Scan    Anesthesia Start: 9412 Anesthesia Stop:     Procedures:       CYSTOSCOPY. Saunders insertion (Bladder)      IR DRAINAGE TUBE PLACEMENT Diagnosis:       Necrotizing pancreatitis      Difficult Saunders catheter placement (HCC)      (Necrotizing pancreatitis [K85.91])      (Difficult Saunders catheter placement ([address]) [N83. 9XXA])      (drain)    Scheduled Providers: Jazmyne Doss MD Responsible Provider: Courtney aH MD    Anesthesia Type: general ASA Status: 4          Preoperative diagnosis:   1. Necrotizing pancreatitis    2. JERZY (acute kidney injury) ([address])    3. Abnormal urinalysis    4. Family history of prostate cancer in father    11. Difficult Saunders catheter placement [facility])         Postoperative diagnosis: Same. Surgeon: Katharina De Los Santos MD     Assistant: None. No qualified resident was available. Blood loss: 0    Specimens: aerobic anaerobic culture     Findings: 12 fr drain placement    400+ cc bloody material    Tube may traverse stomach wall - do not remove without IR check or until tract matures. Attention on future endoscopy    Complications: None immediate.     Anesthesia: general

## 2023-08-05 NOTE — ANESTHESIA POSTPROCEDURE EVALUATION
Post-Op Assessment Note    CV Status:  Stable  Pain Score: 0    Pain management: adequate     Mental Status:  Alert and awake   Hydration Status:  Euvolemic and stable   PONV Controlled:  None   Airway Patency:  Patent      Post Op Vitals Reviewed: Yes      Staff: CRNA   Comments: report to icu staff        No notable events documented.     /63 (08/05/23 1556)    Temp      Pulse 102 (08/05/23 1556)   Resp 20 (08/05/23 1556)    SpO2 98 % (08/05/23 1556)

## 2023-08-05 NOTE — SEDATION DOCUMENTATION
Pancreatic drainage tube placement performed by Dr. Magdaleno Lopez. Anesthesia present throughout case. 550 ml sanguineous returned from drain. Samples sent to lab. Report given to bedside RN.

## 2023-08-05 NOTE — CONSULTS
Consultation - Interventional Radiology  Jose Ratliff 46 y.o. male MRN: [id number]  Unit/Bed#: OR POOL Encounter: [id number]        Consults    ASSESSMENT/PLAN:      59-year-old with gallstone pancreatitis in the setting of obesity    Admitted with worsening pancreatitis and necrotic collections. CT was obtained and I personally reviewed the imaging    Large left upper quadrant collection. Gastroenterology team was consulted and believes the collection is not mature or walled off enough for cyst gastrostomy via endoscopy    However he is having gastric compression and obstruction which is resulting in inability to eat       we can offer percutaneous drainage of this collection to reduce mass effect    I discussed with the patient the risks of bleeding given that he has some Eliquis on board and was recently on heparin drip which we discontinued this morning    I also discussed the duration of drainage is indeterminant in the setting of pancreatitis which can take weeks or months to evolve    Initially earlier today I had planned for Drainage tube placement with sedation    Markedly abnormal INR yesterday likely a result of Eliquis  Heparin is now been off several hours    However since then attempts were made at Saunders catheter placement by the urology service which was unsuccessful. He will be going to the operating room for Saunders tube placement. In order to minimize the risks of anesthesia and sedation I have discussed extensively with urology and anesthesiology, we will have the patient have a single sedation event. He will undergo Saunders catheter  placement with cystoscopy in the operating room and then transferred to the CT suite    There was also discussion about feeding tube placement. I believe transfers to 2 rooms is probably appropriate and we will defer fluoroscopy guided feeding tube placement as this would need to happen in the angiography suite    I personally examined the patient.   I personally discussed risks benefits alternatives of the proposed procedure informed consent was obtained              Medical Problems     Problem List     * (Principal) Necrotizing pancreatitis    Primary hypertension    Proteinuria    Chronic pain of both hips    Family history of prostate cancer in father    Superior mesenteric artery thrombosis (HCC)    Pleural effusion, left    Hyponatremia    Abnormal urinalysis    Leukocytosis    JERZY (acute kidney injury) ([address])          Reason for Consult / Principal Problem:    HPI: Sakshi Weston is a 46y.o. year old male who presents with Necrotizing pancreatitis      Review of Systems    Positive for fever abdominal pain abdominal distention      Past Medical History:  Past Medical History:   Diagnosis Date   • Pancreatitis        Past Surgical History:  Past Surgical History:   Procedure Laterality Date   • HERNIA REPAIR     • JOINT REPLACEMENT     • TONSILLECTOMY  1976       Social History:  Social History     Substance and Sexual Activity   Alcohol Use Not Currently    Comment: quit 7/15/23 (previous 6 drinks per week)     Social History     Substance and Sexual Activity   Drug Use Never     Social History     Tobacco Use   Smoking Status Never   • Passive exposure: Past   Smokeless Tobacco Former   • Types: Chew   • Quit date: 7/15/2023   Tobacco Comments    Quit 7/15/23       Family History:  Family History   Problem Relation Age of Onset   • Cancer Mother    • Multiple myeloma Mother    • Prostate cancer Father    • Cancer Father        Allergies:  No Known Allergies    Medications:  Medications Prior to Admission   Medication   • apixaban (Eliquis) 5 mg   • Blood Pressure Monitoring (Adult Blood Pressure Cuff Lg) KIT   • lisinopril (ZESTRIL) 10 mg tablet   • naproxen sodium (ALEVE) 220 MG tablet     Current Facility-Administered Medications   Medication Dose Route Frequency   • [MAR Hold] acetaminophen (TYLENOL) tablet 975 mg  975 mg Oral Q8H [facility]   • ceFAZolin (ANCEF) 3,000 mg in dextrose 5% 100 ml IVPB  3,000 mg Intravenous On Call To OR   • [MAR Hold] chlorhexidine (PERIDEX) 0.12 % oral rinse 15 mL  15 mL Mouth/Throat Q12H [address]   • heparin (porcine) 25,000 units in 0.45% NaCl 250 mL infusion (premix)  3-30 Units/kg/hr (Order-Specific) Intravenous Titrated   • [MAR Hold] HYDROmorphone (DILAUDID) injection 0.5 mg  0.5 mg Intravenous Q4H PRN   • [MAR Hold] insulin lispro (HumaLOG) 100 units/mL subcutaneous injection 2-12 Units  2-12 Units Subcutaneous Q6H [address]   • [MAR Hold] labetalol (NORMODYNE) injection 10 mg  10 mg Intravenous Q6H PRN   • multi-electrolyte (PLASMALYTE-A/ISOLYTE-S PH 7.4) IV solution  150 mL/hr Intravenous Continuous   • [MAR Hold] ondansetron (ZOFRAN) injection 4 mg  4 mg Intravenous Q6H PRN   • [MAR Hold] oxyCODONE (ROXICODONE) immediate release tablet 10 mg  10 mg Oral Q4H PRN   • [MAR Hold] oxyCODONE (ROXICODONE) IR tablet 5 mg  5 mg Oral Q4H PRN     Facility-Administered Medications Ordered in Other Encounters   Medication Dose Route Frequency   • albumin human (FLEXBUMIN) 5 % injection   Intravenous Continuous PRN   • ceFAZolin (ANCEF) injection   Intravenous PRN   • dexamethasone (PF) (DECADRON) injection   Intravenous PRN   • lactated ringers infusion   Intravenous Continuous PRN   • lidocaine (PF) (XYLOCAINE-MPF) 1 % injection   Intravenous PRN   • norepinephrine (LEVOPHED) bolus from bag   Intravenous PRN   • phenylephrine bolus from bag   Intravenous PRN   • propofol (DIPRIVAN) 200 MG/20ML bolus injection   Intravenous PRN   • Succinylcholine Chloride 100 mg/5 mL syringe   Intravenous PRN       Vitals:  /60 (BP Location: Left arm)   Pulse 98   Temp 98.7 °F (37.1 °C) (Oral)   Resp 21   Ht 5' 11" (1.803 m)   Wt 132 kg (291 lb 3.6 oz)   SpO2 96%   BMI 40.62 kg/m²   Body mass index is 40.62 kg/m².   Weight (last 2 days)     Date/Time Weight    08/05/23 0600 132 (291.23)    08/04/23 1942 132 (291.23)          I/Os:    Intake/Output Summary (Last 24 hours) at 8/5/2023 1345  Last data filed at 8/5/2023 1000  Gross per 24 hour   Intake 2063.12 ml   Output 325 ml   Net 1738.12 ml       PHYSICAL EXAM  General appearance: alert and oriented, in no acute distress  Skin: Skin color, texture, turgor normal. No rashes or lesions  Head: Normocephalic, without obvious abnormality  Heart: Regular  Lungs: Room air  Abdomen:   Obese soft nontender particularly anterior and left flank    Back: negative  Rectal: deferred  Neurological: normal without focal findings, mental status, speech normal, alert and oriented x3 and ANTON     : No Saunders    Lab Results and Cultures:   CBC with diff:   Lab Results   Component Value Date    WBC 24.85 (H) 08/05/2023    HGB 7.5 (L) 08/05/2023    HCT 23.1 (L) 08/05/2023    MCV 89 08/05/2023     08/05/2023    RBC 2.59 (L) 08/05/2023    MCH 29.0 08/05/2023    MCHC 32.5 08/05/2023    RDW 14.6 08/05/2023    MPV 10.0 08/05/2023    NRBC 0 07/25/2023      BMP/CMP:  Lab Results   Component Value Date    K 3.4 (L) 08/05/2023    CL 99 08/05/2023    CO2 29 08/05/2023    BUN 33 (H) 08/05/2023    CREATININE 1.96 (H) 08/05/2023    CALCIUM 7.7 (L) 08/05/2023    AST 51 (H) 08/05/2023    ALT 46 08/05/2023    ALKPHOS 101 08/05/2023    EGFR 38 08/05/2023   ,     Coags:   Lab Results   Component Value Date     (H) 08/05/2023    INR 4.44 (H) 08/04/2023   ,   Results from last 7 days   Lab Units 08/05/23  0341 08/04/23  2103 08/04/23  1454   PTT seconds 109* 45* 50*   INR   --   --  4.44*        Lipid Panel: No results found for: "CHOL"  Lab Results   Component Value Date    HDL 23 (L) 07/24/2023     Lab Results   Component Value Date    HDL 23 (L) 07/24/2023     Lab Results   Component Value Date    LDLCALC 38 07/24/2023     Lab Results   Component Value Date    TRIG 115 07/24/2023       HgbA1c:   Lab Results   Component Value Date    HGBA1C 6.3 (H) 07/27/2023    HGBA1C 6.3 (H) 07/24/2023       Blood Culture:   Lab Results Component Value Date    BLOODCX Received in Microbiology Lab. Culture in Progress. 08/04/2023    BLOODCX Received in Microbiology Lab. Culture in Progress. 08/04/2023   ,   Urinalysis:   Lab Results   Component Value Date    COLORU Yellow 07/24/2023    CLARITYU Clear 07/24/2023    SPECGRAV 1.048 (H) 07/24/2023    PHUR 6.5 07/24/2023    LEUKOCYTESUR Negative 07/24/2023    NITRITE Negative 07/24/2023    GLUCOSEU 100 (1/10%) (A) 07/24/2023    KETONESU 10 (1+) (A) 07/24/2023    BILIRUBINUR Negative 07/24/2023    BLOODU Large (A) 07/24/2023   ,   Urine Culture:   Lab Results   Component Value Date    URINECX >100,000 cfu/ml Aerococcus urinae (A) 07/24/2023    URINECX >100,000 cfu/ml Streptococcus anginosus (A) 07/24/2023   ,   Wound Culure:  No results found for: "WOUNDCULT"    Imaging Studies: I have personally reviewed pertinent films in PACS    Counseling / Coordination of Care  Total time spent today  37 min. Greater than 50% of total time was spent with the patient and / or family counseling and / or coordination of care. Thank you for allowing me to participate in the care of Sakshi Weston. Please don't hesitate to contact us with any questions.

## 2023-08-05 NOTE — OP NOTE
Operative Note     PATIENT:  Rachna Mckay (MRN [id number])    DATE OF PROCEDURE:   8/5/2023    PRE-OP DIAGNOSIS:   1) urethral stricture  2 inability to place a Saunders catheter at bedside  3 complex intra-abdominal fluid collection requiring strict I's and O's  #4 acute kidney injury    POST-OP DIAGNOSIS:   1) distal urethral stricture status post prior hypospadias repair  2 inability to place a Saunders catheter at bedside  3 complex intra-abdominal fluid collection requiring strict I's and O's  #4 acute kidney injury    PROCEDURES PERFORMED:  1) Cystoscopy  #2 retrograde urethrogram with fluoroscopic interpretation  3) direct vision internal urethrotomy  4. Dilation of urethral stricture  5. Complex Saunders catheter placement over guidewire      SURGEON:  Jazmyne Doss MD    FINDINGS:  -Exam under anesthesia confirmed that the patient is status post hypospadias repair  - There is a distal urethral stricture and it was clear on cystoscopy the prior attempts to place Saunders catheter had introduced a rather large false passage within the anterior urethra  -The true lumen is identified and cannulated with a wire. The stricture is then managed using a combination of direct vision internal urethrotomy under vision as well as fluoroscopically guided coaxial dilation using Arabella dilators  -Ultimately a 12 [place] tip Saunders catheter was placed over the guidewire following dilation with confirmation of appropriate positioning radiographically  -Patient will require Saunders catheter drainage for 10 to 14 days. ANESTHESIA: Choice     COMPLICATIONS:   None    ANTIBIOTICS:  Ancef    INTRAOPERATIVE THROMBOEMBOLISM PROPHYLAXIS:  Pneumatic compression stockings       PROCEDURE IN DETAIL:   The patient was identified and brought to the OR. Antibiotic prophylaxis and DVT prophylaxis were administered. They were placed in the comfortable dorsal lithotomy position with care to pad all pressure points.   They were prepped and draped in the usual sterile fashion using hibiclens. A surgical time out was performed with all in the room in agreement with the correct patient, procedure, indications, and laterality. Exam under anesthesia demonstrates findings consistent with a prior hypospadias repair. The urethral meatus is in the orthotopic position and there is evidence of rotational skin flaps being performed along the penis. I began by gently dilating the urethral meatus to accommodate the that a large false passage had been induced in the anterior urethra secondary to attempted Saunders catheter placement. The true lumen was identified cystoscope. Once this was introduced it was clear and was cannulated under vision with a wire. I then utilized fluoroscopy to ensure that the wire was passed within the posterior urethra and was seen coiling in the bladder on fluoroscopy. Contrast was utilized throughout the remaining steps of the case    It was clear that due to the stenosis of the stricture, incision and dilation will be required. I began by using a urethrotome with a cold Bowling knife. It was difficult to perform the incision as the stricture was very distal.  With confirmation of wire positioning I then proceeded with dilation under fluoroscopic guidance using Arabella sounds. I dilated from 10 [place] up to 25 [place]    I then utilized fluoroscopy to guide Saunders catheter placement in which a 16 [place] tip Saunders catheter was placed. 10 cc inflated. ESTIMATED BLOOD LOSS:  Minimal      SPECIMENS:   No specimens collected during this procedure. IMPLANTS:   * No implants in log *     COMPLICATIONS: None    DISPOSITION: To IR, for planned abdominal drain placement    PLAN:    Saunders catheter must remain in place for 10 to 14 days to allow appropriate healing of the urethra. I will request follow-up in our office practice for Saunders removal and returning for PVR.     Patient appeared to be rather asymptomatic prior to hospitalization though if he develops any symptomatology going forward he would require referral to a reconstructive urologist.

## 2023-08-05 NOTE — PROGRESS NOTES
[facility]  Progress Note: Critical Care  Name: Carmine Cordero 46 y.o. male I MRN: [id number]  Unit/Bed#: MICU 15 I Date of Admission: 8/4/2023   Date of Service: 8/5/2023 I Hospital Day: 1    Assessment/Plan   Neuro:   · Diagnosis: Acute pain  · Plan: Scheduled tylenol, PRN oxycodone 5/10, PRN dilaudid  · Diagnosis: Acute Nausea  · Plan: PRN Zofran  · ICU-CAM, delirium precautions, transition to q4h neuro checks      CV:   · Diagnosis: Sinus tachycardia  · Improving. Likely 2/2 SIRS from necrotizing pancreatitis as well as hypovolemia. Continue fluid resuscitation. 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 Needs decompression. GI consulted for possible cystogastrostomy tube, if unable, will consult IR for drain placement. Pulm:  No active issues. Monitor respiratory rate and SpO2. Supplemental oxygen for goal SpO2>92. Monitor respiratory status closely given severely distended abdomen. GI:   · Diagnosis: Necrotizing pancreatitis  · necrotic pancreatic collection with mass effect on gastric body  · NPO. Plasmalyte @150 cc/hr. No current indication for antibiotics. Continue serial abdominal exams. · GI consulted for cystogastrostomy tube, if unable, will consult IR for drain placement. :   · Diagnosis: Oliguria  ·  Trend urine output and serum creatinine. Monitor for signs of abdominal compartment syndrome, none-currently present. Urinary retention protocol. · Cr:  1.96 from 1.65. UOP marginal. Will likely need a palafox placed.      F/E/N:   · Fluids: Plasmalyte @150 cc/hr  · Electrolytes: Replete for goal K>4, PO4>3, Mg>2; 3.4, 5.5, 2.4  · Nutrition: NPO, will add sips of no procedures planned today    Heme/Onc:   · Diagnosis: Nonocclusive thrombus in SMV  · Plan: 2/2 adjacent necrotizing pancreatitis. On eliquis as outpatient. Continue heparin gtt. · Diagnosis: Anemia, multifactorial  · Plan: Transfuse for Hgb <7 or if symptomatic. Trend H&H. Hgb 7.5 from 7.8 from 9.4      Endo:   · Diagnosis: Hyperglycemia  · Plan: SSI, increased to level 4. Q6h glucose checks. Hypoglycemia protocol  · B-286  · Insulin: 10units      ID:   · Diagnosis: Leukocytosis  · WBC 24.9 from 27.5  · Likely 2/2 SIRS response. No indication for antibiotics. Follow up blood cultures. · Monitor WBC, fever curve. Obtain cultures for pancreatic collection. MSK/Skin:   · Diagnosis: Physical deconditioning  · Plan: PT/OT. Frequent repositioning. Disposition: Stepdown Level 2    ICU Core Measures     A: Assess, Prevent, and Manage Pain · Has pain been assessed? Yes  · Need for changes to pain regimen? No   B: Both SAT/SAT  · N/A   C: Choice of Sedation · RASS Goal: 0 Alert and Calm  · Need for changes to sedation or analgesia regimen? No   D: Delirium · CAM-ICU: Negative   E: Early Mobility  · Plan for early mobility? Yes   F: Family Engagement · Plan for family engagement today? Yes         Prophylaxis:  VTE VTE covered by:  heparin (porcine), Intravenous, 16 Units/kg/hr at 23 0420       Stress Ulcer  not ordered          Subjective   HPI/24hr events: No acute events overnight. Afebrile, hemodynamically stable. No vomiting, fevers or chills. No abdominal pain. Remains very distended.           Objective                            Vitals I/O      Most Recent Min/Max in 24hrs   Temp 98.5 °F (36.9 °C) Temp  Min: 98.2 °F (36.8 °C)  Max: 98.8 °F (37.1 °C)   Pulse 102 Pulse  Min: 102  Max: 120   Resp 21 Resp  Min: 16  Max: 28   /56 BP  Min: 86/52  Max: 118/59   O2 Sat 96 % SpO2  Min: 95 %  Max: 98 %      Intake/Output Summary (Last 24 hours) at 2023 0422  Last data filed at 2023 0000  Gross per 24 hour   Intake 445.62 ml   Output 200 ml   Net 245.62 ml         Diet NPO; Sips with meds     Invasive Monitoring Physical exam    Physical Exam  Constitutional:       General: He is not in acute distress. Appearance: He is not toxic-appearing. HENT:      Head: Normocephalic and atraumatic. Right Ear: External ear normal.      Left Ear: External ear normal.      Nose: Nose normal.      Mouth/Throat:      Pharynx: Oropharynx is clear. Eyes:      Extraocular Movements: Extraocular movements intact. Cardiovascular:      Rate and Rhythm: Tachycardia present. Pulses: Normal pulses. Pulmonary:      Effort: Pulmonary effort is normal. No respiratory distress. Abdominal:      General: There is distension. Palpations: Abdomen is soft. Tenderness: There is no abdominal tenderness. There is no guarding. Musculoskeletal:         General: No tenderness. Cervical back: Neck supple. Skin:     General: Skin is warm. Neurological:      General: No focal deficit present. Mental Status: He is alert. Mental status is at baseline. Psychiatric:         Mood and Affect: Mood normal.         Behavior: Behavior normal.            Diagnostic Studies      EKG: Reviewed  Imaging:  I have personally reviewed pertinent reports.    and I have personally reviewed pertinent films in PACS     Medications:  Scheduled PRN   acetaminophen, 975 mg, Q8H [address]  chlorhexidine, 15 mL, Q12H [address]  insulin lispro, 1-6 Units, Q6H ALEX      HYDROmorphone, 0.5 mg, Q4H PRN  labetalol, 10 mg, Q6H PRN  ondansetron, 4 mg, Q6H PRN  oxyCODONE, 10 mg, Q4H PRN  oxyCODONE, 5 mg, Q4H PRN       Continuous    heparin (porcine), 3-30 Units/kg/hr (Order-Specific), Last Rate: 16 Units/kg/hr (08/05/23 0420)  multi-electrolyte, 150 mL/hr, Last Rate: 150 mL/hr (08/04/23 2125)         Labs:    CBC    Recent Labs     08/04/23  2103 08/05/23  0340   WBC 27.47* 24.85*   HGB 7.8* 7.5*   HCT 23.7* 23.1*    351     BMP    Recent Labs     08/04/23  1454 08/04/23  2103   SODIUM 133* 135   K 3.8 3.6   CL 95* 98   CO2 26 28   AGAP 12 9 BUN 27* 27*   CREATININE 1.69* 1.65*   CALCIUM 8.5 7.7*       Coags    Recent Labs     08/04/23  1454 08/04/23 2103 08/05/23  0341   INR 4.44*  --   --    PTT 50* 45* 109*        Additional Electrolytes  Recent Labs     08/04/23 1454 08/04/23 2103 08/05/23  0340   MG 1.8* 2.5  --    PHOS  --  5.3*  --    CAIONIZED  --  1.02* 1.00*          Blood Gas    No recent results  Recent Labs     08/04/23  1454   PHVEN 7.408*   AEW1IJX 41.3*   PO2VEN 34.5*   AAZ0TCL 25.5   BEVEN 0.7    LFTs  Recent Labs     08/04/23 1454 08/04/23 2103   ALT 60* 53   AST 63* 61*   ALKPHOS 120* 111   ALB 2.4* 1.5*   TBILI 2.34* 1.43*       Infectious  Recent Labs     08/04/23 1454 08/04/23 2103   PROCALCITONI 1.73* 2.35*     Glucose  Recent Labs     08/04/23 1454 08/04/23 2103   GLUC 286* [address] Darryn Jones MD

## 2023-08-05 NOTE — PROGRESS NOTES
Progress Note - General Surgery   Hesham Valdivia 46 y.o. male MRN: [id number]  Unit/Bed#: MICU 13 Encounter: [id number]    Assessment:  47yo M with necrotizing pancreatitis and non-occlusive SMV     Afebrile, improvement in tachycardia    +few unmeasured    Na 133 (136)  Creatinine 1.69 (1.96)  Glucose 286 (243)  T bili 2.34 (1.16)  AST/ALT 63/60 (51/46)  Alk phos 120 (101)  Lipase 129 (545)     Lactic 1.8 (2.2)     WBC (24.85) 31.79  Hgb 7.5 (9.4)    Plan:  - NPO w/sips and chips  - cont heparin gtt  - cont to monitor off abx  - appreciate GI input, collection is not yet organized enough for a cystgastrostomy, will cont to monitor and plan for repeat CT   - strict I/Os, monitor UOP  - IVF, titrate as needed for adequate resuscitation  - appreciate care per ICU    Subjective/Objective   Subjective:   No acute events overnight. Still having diarrhea, voiding the same amount as at home. Denies having any pain. No n/v.    Objective:     Blood pressure 109/53, pulse (!) 112, temperature 98.7 °F (37.1 °C), temperature source Oral, resp. rate 20, height 5' 11" (1.803 m), weight 132 kg (291 lb 3.6 oz), SpO2 98 %. ,Body mass index is 40.62 kg/m². No intake or output data in the 24 hours ending 08/04/23 2009    Invasive Devices     Peripheral Intravenous Line  Duration           Peripheral IV 08/04/23 Left Antecubital <1 day    Peripheral IV 08/04/23 Right Antecubital <1 day    Peripheral IV 08/04/23 Right;Ventral (anterior) Hand <1 day                Physical Exam:  General: No acute distress  Neuro: alert and oriented  HEENT: moist mucous membranes  CV: Well perfused, regular rate and rhythm  Lungs: Normal work of breathing, no increased respiratory effort  Abdomen: Soft, non-tender, significantly distended.    Extremities: No edema, clubbing or cyanosis  Skin: Warm, dry      Eileen Casarez MD  General Surgery PGY2

## 2023-08-05 NOTE — ANESTHESIA PREPROCEDURE EVALUATION
Procedure:  CYSTOSCOPY. Saunders insertion (Bladder)    Relevant Problems   CARDIO   (+) Primary hypertension      GI/HEPATIC   (+) Necrotizing pancreatitis      /RENAL   (+) JERZY (acute kidney injury) ([address])      PULMONARY   (+) Pleural effusion, left        Physical Exam    Airway    Mallampati score: II         Dental   No notable dental hx     Cardiovascular      Pulmonary      Other Findings        Anesthesia Plan  ASA Score- 4     Anesthesia Type- general with ASA Monitors. Additional Monitors:   Airway Plan: ETT. Comment: I, Dr. Kamlesh Christopher, the attending physician, have personally seen and evaluated the patient prior to anesthetic care. I have reviewed the pre-anesthetic record, and other medical records if appropriate to the anesthetic care. If a CRNA is involved in the case, I have reviewed the CRNA assessment, if present, and agree. The patient is in a suitable condition to proceed with my formulated anesthetic plan. .       Plan Factors-    Chart reviewed. Induction- intravenous. Postoperative Plan-     Informed Consent- Anesthetic plan and risks discussed with patient. I personally reviewed this patient with the CRNA. Discussed and agreed on the Anesthesia Plan with the CRNA. Yossi Gruber

## 2023-08-05 NOTE — QUICK NOTE
Acute Care Surgery   Post-Op Check Progress Note   James Hernandez 46 y.o. male MRN: [id number]  Unit/Bed#: MICU 13 Encounter: [id number]    Assessment and Plan:    51-year-old male with necrotizing pancreatitis, ureteral structure status post cystoscopy with palafox placement and IR drain placement   - P.R.N. Analgesia. - Encouraged incentive spirometry use. - Ordering KUB post-procedure   - monitor drain output      Subjective/Objective     Subjective: Patient seen and evaluated. No acute issues at this time. Pain well controlled. No fever, chills, nausea, emesis. Palafox in place. Discussed plan for follow-up imaging from his procedure. He and his significant other did not have further questions. Objective:     Blood pressure 119/63, pulse 102, temperature 98.7 °F (37.1 °C), temperature source Oral, resp. rate 20, height 5' 11" (1.803 m), weight 132 kg (291 lb 3.6 oz), SpO2 98 %. ,Body mass index is 40.62 kg/m². Intake/Output Summary (Last 24 hours) at 8/5/2023 1607  Last data filed at 8/5/2023 1559  Gross per 24 hour   Intake 3613.12 ml   Output 325 ml   Net 3288.12 ml       Invasive Devices     Peripheral Intravenous Line  Duration           Peripheral IV 08/04/23 Right Antecubital 1 day    Peripheral IV 08/04/23 Right;Ventral (anterior) Hand 1 day          Drain  Duration           Abscess Drain RUQ <1 day    Urethral Catheter Other (Comment) 16 Fr. <1 day                Physical Exam:    GENERAL APPEARANCE: Patient in no acute distress. HEENT: NCAT; PERRL, EOMs intact; Mucous membranes moist  CV: Regular rate and rhythm; palpable radial pulses  LUNGS: No acute respiratory distress, saturating well, equal rise and fall of chest wall  ABD: distended, no guarding, no rigidity, nonperitoneal, drain in LUQ  EXT: +2 pulses bilaterally upper & lower extremities; no clubbing/cyanosis/edema. NEURO: GCS 15; no focal neurologic deficits; neurovascularly intact.   SKIN: Warm, dry and well perfused Rony Alberto DO  8/5/2023

## 2023-08-05 NOTE — CONSULTS
Consultation - [facility]) [facility]  Kush Hernandez 46 y.o. male MRN: [id number]  [facility] En[facility]: [id number]              Inpatient consult to gastroenterology     Performed by  Yesenia Harris MD   Authorized by Jin Cox MD             Reason for Consult / Principal Problem:     Need for cyst gastrostomy peripancreatic fluid collection  Necrotizing pancreatitis      ASSESSMENT AND PLAN:    55-year-old male with history of HTN and recent admission for necrotizing pancreatitis who presented to the hospital with worsening abdominal distention and decreased appetite. On admission to the hospital patient was tachycardic but otherwise hemodynamically stable. Labs significant for sodium 133, BUN 27, creatinine 1.69, blood glucose 286, AST 63, ALT 60, , bilirubin 2.3, WBC 31.8, hemoglobin 9.4, . CT imaging revealed worsening necrotizing pancreatitis with new acute necrotic collection measuring 13.7 x 6.7 x 11.3 cm causing mass effect on the gastric body. Severe necrotizing pancreatitis  Peripancreatic fluid collection  C/b nonocclusive SMV thrombus    · BISAP 3  · Etiology: Most likely secondary to gallstone pancreatitis. Will need cholecystectomy once he is clinically improved  · Patient currently has a large necrotic collection causing mass effect on the gastric body which is probably causing him to have decreased appetite  · Unfortunately the fluid collection is not walled off for us to safely perform cyst gastrostomy. Discussed case with Dr Mary Morris   · If clinically indicated to perform drainage of fluid collection would recommend IR consultation  · Recommend getting repeat CT next week tentatively on Wednesday to evaluate for fluid collection and if it is walled off we will plan on cyst gastrostomy and subsequent necrosectomy  · Currently no signs of infection except for leukocytosis which could be reactive.   If planning on IR drain placement would recommend antibiotics otherwise okay to hold  · Currently n.p.o.  · Can perform EGD with postpyloric Dobbhoff placement. However if he is to undergo IR guided drainage he should have improvement in symptoms as his gastric outlet obstruction would be relieved  · Currently on heparin gtt for SMV thrombosis. Hold home Eliquis      JERZY    · Recommend checking urine electrolytes  · Most likely prerenal in etiology however no significant improvement with fluid resuscitation      Abnormal liver imaging  Cirrhosis? She has abdominal imaging is concerning for nodularity concerning for early cirrhosis. No previous imaging available for review. He does have mildly elevated INR on previous admission and on this admission his INR was supratherapeutic. Does not have any splenomegaly or signs of portal hypertension. · He denies any significant alcohol use and if he does have cirrhosis it could be related to MUSE  · Recommend follow-up in hepatology clinic for further evaluation of underlying cirrhosis  · We will check chronic hepatitis panel but further work-up to be done as outpatient  · He does have mildly elevated liver enzymes could be related to his current admission for severe pancreatitis and are improving  · Patient does have moderate abdominal pelvic ascites which could be in the setting of pancreatitis. If plan on drainage would recommend checking albumin, protein, fluid count and cultures. Will need calculation of SAAG. She is currently comfortable therefore no indication to perform paracentesis    Dayton Meyer MD   Gastroenterology Fellow     ______________________________________________________________________    HPI: 80-year-old male with history of HTN and recent admission for necrotizing pancreatitis who presented to the hospital with worsening abdominal distention and decreased appetite.     Patient had initially presented to the outside hospital on 7/19 with abdominal pain and diagnosed with pancreatitis. He states he only drinks alcohol socially and his pancreatitis is most likely secondary to gallstones as seen on ultrasound imaging. He had decided to manage his pancreatitis as outpatient but returned on 7/24 with worsening abdominal pain and was found to have necrotizing pancreatitis with nonocclusive thrombus in SMV. He was managed conservatively and his diet was advanced as tolerated and he was discharged on 7/28 with apixaban. Now presents with worsening abdominal pain and decreased appetite at home. He currently states that he is comfortable and denies any significant abdominal pain. He denies any nausea or vomiting. Denies constipation or diarrhea. Denies any fevers at home. REVIEW OF SYSTEMS:    CONSTITUTIONAL: Denies any fever, chills, rigors, and weight loss. HEENT: No earache or tinnitus. Denies hearing loss or visual disturbances. CARDIOVASCULAR: No chest pain or palpitations. RESPIRATORY: Denies any cough, hemoptysis, shortness of breath or dyspnea on exertion. GASTROINTESTINAL: As noted in the History of Present Illness. GENITOURINARY: No problems with urination. Denies any hematuria or dysuria. NEUROLOGIC: No dizziness or vertigo, denies headaches. MUSCULOSKELETAL: Denies any muscle or joint pain. SKIN: Denies skin rashes or itching. ENDOCRINE: Denies excessive thirst. Denies intolerance to heat or cold. PSYCHOSOCIAL: Denies depression or anxiety. Denies any recent memory loss.        Historical Information   Past Medical History:   Diagnosis Date   • Pancreatitis      Past Surgical History:   Procedure Laterality Date   • HERNIA REPAIR     • JOINT REPLACEMENT     • TONSILLECTOMY  12     Social History   Social History     Substance and Sexual Activity   Alcohol Use Not Currently    Comment: quit 7/15/23 (previous 6 drinks per week)     Social History     Substance and Sexual Activity   Drug Use Never     Social History     Tobacco Use   Smoking Status Never • Passive exposure: Past   Smokeless Tobacco Former   • Types: Chew   • Quit date: 7/15/2023   Tobacco Comments    Quit 7/15/23     Family History   Problem Relation Age of Onset   • Cancer Mother    • Multiple myeloma Mother    • Prostate cancer Father    • Cancer Father        Meds/Allergies     Medications Prior to Admission   Medication   • apixaban (Eliquis) 5 mg   • Blood Pressure Monitoring (Adult Blood Pressure Cuff Lg) KIT   • lisinopril (ZESTRIL) 10 mg tablet   • naproxen sodium (ALEVE) 220 MG tablet     Current Facility-Administered Medications   Medication Dose Route Frequency   • acetaminophen (TYLENOL) tablet 975 mg  975 mg Oral Q8H [address]   • chlorhexidine (PERIDEX) 0.12 % oral rinse 15 mL  15 mL Mouth/Throat Q12H [address]   • heparin (porcine) 25,000 units in 0.45% NaCl 250 mL infusion (premix)  3-30 Units/kg/hr (Order-Specific) Intravenous Titrated   • HYDROmorphone (DILAUDID) injection 0.5 mg  0.5 mg Intravenous Q4H PRN   • insulin lispro (HumaLOG) 100 units/mL subcutaneous injection 2-12 Units  2-12 Units Subcutaneous Q6H [address]   • labetalol (NORMODYNE) injection 10 mg  10 mg Intravenous Q6H PRN   • multi-electrolyte (PLASMALYTE-A/ISOLYTE-S PH 7.4) IV solution  150 mL/hr Intravenous Continuous   • ondansetron (ZOFRAN) injection 4 mg  4 mg Intravenous Q6H PRN   • oxyCODONE (ROXICODONE) immediate release tablet 10 mg  10 mg Oral Q4H PRN   • oxyCODONE (ROXICODONE) IR tablet 5 mg  5 mg Oral Q4H PRN       No Known Allergies        Objective     Blood pressure 104/60, pulse 98, temperature 98.7 °F (37.1 °C), temperature source Oral, resp. rate 21, height 5' 11" (1.803 m), weight 132 kg (291 lb 3.6 oz), SpO2 96 %. Body mass index is 40.62 kg/m².       Intake/Output Summary (Last 24 hours) at 8/5/2023 1232  Last data filed at 8/5/2023 1000  Gross per 24 hour   Intake 2063.12 ml   Output 325 ml   Net 1738.12 ml         PHYSICAL EXAM:      General Appearance:   Alert, cooperative, no distress   HEENT: Normocephalic, atraumatic, anicteric. Neck:  Supple, symmetrical, trachea midline   Lungs:   Clear to auscultation bilaterally; no rales, rhonchi or wheezing; respirations unlabored    Heart[de-identified]   Regular rate and rhythm; no murmur, rub, or gallop. Abdomen:   Soft, non-tender,+ distended    Genitalia:   Deferred    Rectal:   Deferred    Extremities:  No cyanosis, clubbing or edema    Pulses:  2+ and symmetric all extremities    Skin:  No jaundice, rashes, or lesions    Lymph nodes:  No palpable cervical lymphadenopathy        Lab Results:   Admission on 08/04/2023   Component Date Value   • WBC 08/04/2023 27.47 (H)    • RBC 08/04/2023 2.68 (L)    • Hemoglobin 08/04/2023 7.8 (L)    • Hematocrit 08/04/2023 23.7 (L)    • MCV 08/04/2023 88    • MCH 08/04/2023 29.1    • MCHC 08/04/2023 32.9    • RDW 08/04/2023 14.6    • MPV 08/04/2023 9.9    • Platelets 34/38/3481 343    • Sodium 08/04/2023 135    • Potassium 08/04/2023 3.6    • Chloride 08/04/2023 98    • CO2 08/04/2023 28    • ANION GAP 08/04/2023 9    • BUN 08/04/2023 27 (H)    • Creatinine 08/04/2023 1.65 (H)    • Glucose 08/04/2023 265 (H)    • Calcium 08/04/2023 7.7 (L)    • Corrected Calcium 08/04/2023 9.7    • AST 08/04/2023 61 (H)    • ALT 08/04/2023 53    • Alkaline Phosphatase 08/04/2023 111    • Total Protein 08/04/2023 5.6 (L)    • Albumin 08/04/2023 1.5 (L)    • Total Bilirubin 08/04/2023 1.43 (H)    • eGFR 08/04/2023 47    • LACTIC ACID 08/04/2023 2.2 (HH)    • Procalcitonin 08/04/2023 2.35 (H)    • Calcium, Ionized 08/04/2023 1.02 (L)    • Phosphorus 08/04/2023 5.3 (H)    • Magnesium 08/04/2023 2.5    • Lipase 08/04/2023 545 (H)    • PTT 08/04/2023 45 (H)    • Blood Culture 08/04/2023 Received in Microbiology Lab. Culture in Progress. • Blood Culture 08/04/2023 Received in Microbiology Lab. Culture in Progress.     • LACTIC ACID 08/05/2023 1.8    • POC Glucose 08/04/2023 271 (H)    • PTT 08/05/2023 109 (H)    • POC Glucose 08/05/2023 238 (H)    • Sodium 08/05/2023 136    • Potassium 08/05/2023 3.4 (L)    • Chloride 08/05/2023 99    • CO2 08/05/2023 29    • ANION GAP 08/05/2023 8    • BUN 08/05/2023 33 (H)    • Creatinine 08/05/2023 1.96 (H)    • Glucose 08/05/2023 243 (H)    • Calcium 08/05/2023 7.7 (L)    • Corrected Calcium 08/05/2023 9.8    • AST 08/05/2023 51 (H)    • ALT 08/05/2023 46    • Alkaline Phosphatase 08/05/2023 101    • Total Protein 08/05/2023 5.6 (L)    • Albumin 08/05/2023 1.4 (L)    • Total Bilirubin 08/05/2023 1.16 (H)    • eGFR 08/05/2023 38    • Magnesium 08/05/2023 2.4    • Phosphorus 08/05/2023 5.5 (H)    • Calcium, Ionized 08/05/2023 1.00 (L)    • WBC 08/05/2023 24.85 (H)    • RBC 08/05/2023 2.59 (L)    • Hemoglobin 08/05/2023 7.5 (L)    • Hematocrit 08/05/2023 23.1 (L)    • MCV 08/05/2023 89    • MCH 08/05/2023 29.0    • MCHC 08/05/2023 32.5    • RDW 08/05/2023 14.6    • MPV 08/05/2023 10.0    • Platelets 66/30/0843 351    • POC Glucose 08/05/2023 266 (H)    • POC Glucose 08/05/2023 248 (H)        Imaging Studies: I have personally reviewed pertinent imaging studies.

## 2023-08-05 NOTE — PLAN OF CARE
Problem: PAIN - ADULT  Goal: Verbalizes/displays adequate comfort level or baseline comfort level  Description: Interventions:  - Encourage patient to monitor pain and request assistance  - Assess pain using appropriate pain scale  - Administer analgesics based on type and severity of pain and evaluate response  - Implement non-pharmacological measures as appropriate and evaluate response  - Consider cultural and social influences on pain and pain management  - Notify physician/advanced practitioner if interventions unsuccessful or patient reports new pain  Outcome: Progressing     Problem: INFECTION - ADULT  Goal: Absence or prevention of progression during hospitalization  Description: INTERVENTIONS:  - Assess and monitor for signs and symptoms of infection  - Monitor lab/diagnostic results  - Monitor all insertion sites, i.e. indwelling lines, tubes, and drains  - Monitor endotracheal if appropriate and nasal secretions for changes in amount and color  - [place] appropriate cooling/warming therapies per order  - Administer medications as ordered  - Instruct and encourage patient and family to use good hand hygiene technique  - Identify and instruct in appropriate isolation precautions for identified infection/condition  Outcome: Progressing  Goal: Absence of fever/infection during neutropenic period  Description: INTERVENTIONS:  - Monitor WBC    Outcome: Progressing     Problem: SAFETY ADULT  Goal: Patient will remain free of falls  Description: INTERVENTIONS:  - Educate patient/family on patient safety including physical limitations  - Instruct patient to call for assistance with activity   - Consult OT/PT to assist with strengthening/mobility   - Keep Call bell within reach  - Keep bed low and locked with side rails adjusted as appropriate  - Keep care items and personal belongings within reach  - Initiate and maintain comfort rounds  - Make Fall Risk Sign visible to staff  - Apply yellow socks and bracelet for high fall risk patients  - Consider moving patient to room near nurses station  Outcome: Progressing  Goal: Maintain or return to baseline ADL function  Description: INTERVENTIONS:  -  Assess patient's ability to carry out ADLs; assess patient's baseline for ADL function and identify physical deficits which impact ability to perform ADLs (bathing, care of mouth/teeth, toileting, grooming, dressing, etc.)  - Assess/evaluate cause of self-care deficits   - Assess range of motion  - Assess patient's mobility; develop plan if impaired  - Assess patient's need for assistive devices and provide as appropriate  - Encourage maximum independence but intervene and supervise when necessary  - Involve family in performance of ADLs  - Assess for home care needs following discharge   - Consider OT consult to assist with ADL evaluation and planning for discharge  - Provide patient education as appropriate  Outcome: Progressing  Goal: Maintains/Returns to pre admission functional level  Description: INTERVENTIONS:  - Perform BMAT or MOVE assessment daily.   - Set and communicate daily mobility goal to care team and patient/family/caregiver.    - Collaborate with rehabilitation services on mobility goals if consulted  - Record patient progress and toleration of activity level   Outcome: Progressing     Problem: DISCHARGE PLANNING  Goal: Discharge to home or other facility with appropriate resources  Description: INTERVENTIONS:  - Identify barriers to discharge w/patient and caregiver  - Arrange for needed discharge resources and transportation as appropriate  - Identify discharge learning needs (meds, wound care, etc.)  - Arrange for interpretive services to assist at discharge as needed  - Refer to Case Management Department for coordinating discharge planning if the patient needs post-hospital services based on physician/advanced practitioner order or complex needs related to functional status, cognitive ability, or social support system  Outcome: Progressing     Problem: Knowledge Deficit  Goal: Patient/family/caregiver demonstrates understanding of disease process, treatment plan, medications, and discharge instructions  Description: Complete learning assessment and assess knowledge base.   Interventions:  - Provide teaching at level of understanding  - Provide teaching via preferred learning methods  Outcome: Progressing     Problem: Prexisting or High Potential for Compromised Skin Integrity  Goal: Skin integrity is maintained or improved  Description: INTERVENTIONS:  - Identify patients at risk for skin breakdown  - Assess and monitor skin integrity  - Assess and monitor nutrition and hydration status  - Monitor labs   - Assess for incontinence   - Turn and reposition patient  - Assist with mobility/ambulation  - Relieve pressure over bony prominences  - Avoid friction and shearing  - Provide appropriate hygiene as needed including keeping skin clean and dry  - Evaluate need for skin moisturizer/barrier cream  - Collaborate with interdisciplinary team   - Patient/family teaching  - Consider wound care consult   Outcome: Progressing

## 2023-08-05 NOTE — H&P
General Surgery  History and Physical  Mendel Rivas 46 y.o. male MRN: [id number]  Unit/Bed#: MICU 13 Encounter: [id number]    Assessment:  47yo M with necrotizing pancreatitis and non-occlusive SMV thrombus on Eliquis. Known to the general surgery service as he was discharged 7/28 after a short admission for management of acute necrotizing pancreatitis. Patient was tolerating a diet with minimal to no pain, hemodynamically stable with improving labs on discharge. However, now returned to [facility] with PO intolerance, abdominal pain, and diarrhea. Transferred to [facility] for higher level of care.     Na 133  Creatinine 1.69  Glucose 286  T bili 2.34  AST/ALT 63/60  Alk phos 120  Lipase 129     Lactic 2 (5)     WBC 31.79  Hgb 9.4     8/4 CT AP: Worsening necrotizing pancreatitis greater than 30% of the pancreas with extensive peripancreatic inflammatory and necrotic changes. New acute necrotic collection in the lesser sac exerting mass effect on the gastric body. Similar nonocclusive thrombus in the superior mesenteric vein. 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 Moderate abdominopelvic ascites and small bilateral pleural effusions, increased since prior study.  Subtle nodularity of the liver surface, which may represent early cirrhotic changes.     Plan:  - admit to surgical ICU, appreciate care  - f/u blood cultures  - will discuss with GI, the patient's CT shows an organized collection that seems to be appropriate for cystogastrostomy, appreciate GI input regarding candidacy  - hold eliquis, heparin gtt while inpatient  - NPO w/sips and ice chips  - monitor off abx, no evidence of infection seen on CT  - will need feeding access, PICC/TPN vs post-pyloric keofed vs PEG-J pending clinical progression     History of Present Illness         HPI:  Stanley Law Laura Garcia is a 46 y.o. male who presents as a transfer from [place] for necrotizing pancreatitis. He had a scheduled outpatient CT that showed worsening of his pancreatitis and his wife informed the patient's PCP that he has not been able to eat much. He was recently admitted for about 4 days and discharged on 7/28 for management of his necrotizing pancreatitis. By the day of discharge, he was tolerating a regular diet with minimal pain. He has been having diarrhea after each meal, but not vomiting. He has only been eating liquids and toast due to poor appetite. Denies fevers, chills, nausea, vomiting. Has been voiding, describes urine as bright yellow. Has been having diarrhea after every meal, but meals are minimal. Denies much abdominal pain, but did experience pain on the ride over.     No previous surgeries  PMHx HTN        Consults     Review of Systems   Constitutional: Positive for appetite change. Negative for chills and fever. HENT: Negative. Respiratory: Negative. Cardiovascular: Negative. Gastrointestinal: Positive for diarrhea. Negative for abdominal pain, blood in stool, nausea and vomiting. Genitourinary: Negative. Musculoskeletal: Negative. Skin: Negative. Neurological: Negative.     Psychiatric/Behavioral: Negative.          Historical Information         Medical History        Past Medical History:   Diagnosis Date   • Pancreatitis           Surgical History         Past Surgical History:   Procedure Laterality Date   • HERNIA REPAIR       • JOINT REPLACEMENT       • TONSILLECTOMY   12         Social History         Social History           Substance and Sexual Activity   Alcohol Use Yes   • Alcohol/week: 6.0 standard drinks of alcohol   • Types: 6 Cans of beer per week     Comment: Weekends      Social History          Substance and Sexual Activity   Drug Use Never            E-Cigarette/Vaping   • E-Cigarette Use Never User              E-Cigarette/Vaping Substances   • Nicotine No     • THC No        Social History           Tobacco Use   Smoking Status Never   Smokeless Tobacco Current   • Types: Chew      Family History: non-contributory     Meds/Allergies   all current active meds have been reviewed  No Known Allergies     Objective   First Vitals:      Current Vitals:      No intake or output data in the 24 hours ending 08/04/23 1725         Invasive Devices            Peripheral Intravenous Line  Duration             Peripheral IV 08/04/23 Left Antecubital <1 day     Peripheral IV 08/04/23 Right Antecubital <1 day     Peripheral IV 08/04/23 Right;Ventral (anterior) Hand <1 day                    Physical Exam:  General: No acute distress  Neuro: alert and oriented  HEENT: moist mucous membranes  CV: Well perfused, regular rate and rhythm  Lungs: Normal work of breathing, no increased respiratory effort  Abdomen: Soft, tender, significantly distended  Extremities: No edema, clubbing or cyanosis  Skin: Warm, dry     Lab Results:   CBC:         Lab Results   Component Value Date     WBC 31.79 (HH) 08/04/2023     HGB 9.4 (L) 08/04/2023     HCT 29.1 (L) 08/04/2023     MCV 90 08/04/2023      (H) 08/04/2023     RBC 3.23 (L) 08/04/2023     MCH 29.1 08/04/2023     MCHC 32.3 08/04/2023     RDW 14.5 08/04/2023     MPV 10.1 08/04/2023   , CMP:         Lab Results   Component Value Date     SODIUM 133 (L) 08/04/2023     K 3.8 08/04/2023     CL 95 (L) 08/04/2023     CO2 26 08/04/2023     BUN 27 (H) 08/04/2023     CREATININE 1.69 (H) 08/04/2023     CALCIUM 8.5 08/04/2023     AST 63 (H) 08/04/2023     ALT 60 (H) 08/04/2023     ALKPHOS 120 (H) 08/04/2023     EGFR 45 08/04/2023   , Lipase:         Lab Results   Component Value Date     LIPASE 129 (H) 08/04/2023      Imaging: I have personally reviewed pertinent films in PACS  EKG, Pathology, and Other Studies: I have personally reviewed pertinent films in PACS     Counseling / Coordination of Care  Total floor / unit time spent today 20 minutes.   Greater than 50% of total time was spent with the patient and / or family counseling and / or coordination of care.     Leonor Luu MD  General Surgery PGY2

## 2023-08-05 NOTE — DISCHARGE INSTRUCTIONS
TUBE CARE INSTRUCTIONS    Care after your procedure:    Resume your normal diet. Small sips of flat soda will help with nausea. 1. The properly functioning catheter should be forward flushed once (1x) daily with 10ml of normal saline using clean technique. You will be given a prescription for flushes. To flush the tube, clean both connections with alcohol swab. Twist off the drainage bag/ bulb  tubing and twist the saline syringe into the drainage tube and flush. Remove the syringe and twist the drainage bag / bulb tubing tubing back on.    2. The drainage bag/bulb may be emptied as necessary. Keep a record of the amount of fluid you drain from your tube. This should be done with clean technique as well. 3. A fresh dressing should be applied daily over the tube insertion site. 4. As the tube is secured to the skin with only a suture,try not to pull on your tube. Tub baths are not permitted. Showers are permitted if the patient's skin entry site is prevented from getting wet. Similarly, washcloth "baths" are acceptable. Contact Interventional Radiology at [phone] Ari[place] PATIENTS: Contact Interventional Radiology at [phone]) [place] PATIENTS: Contact Interventional Radiology at [phone]) if:    1. Leakage or large amounts of liquid around the catheter. 2. Fever of 101 degrees lasting several hours without other obvious cause (such as sore throat, flu, etc). 3. Persistent nausea or vomiting. 4. Diminished drainage, which may be associated with pressure or pain. Or when the     drainage from your tube is less than 10mls for 48 hours. 5. Catheter pulled back or falls out. The following pharmacies carry the flush syringes.        [facility] AND [address] [place]  Phone [phone]            Phone [phone] [address] [facility]                                [phone]  [address] [facility] [place]                      [address]  Phone [phone]            Phone [phone]                      Ernesto Massey                                                                                                          [phone]  [facility]  [address] [place] [address].   [address]                                                                               [address] [facility]  Phone [phone]

## 2023-08-05 NOTE — PROCEDURES
Universal Protocol:  Procedure performed by: Puma Pérez RN, present at bedside)  Consent: Verbal consent obtained. Consent given by: patient  Patient identity confirmed: verbally with patient and arm band      Bladder catheterization    Date/Time: 8/5/2023 10:05 AM    Performed by: Lucrecia Young PA-C  Authorized by: Lucrecia Young PA-C    Unsuccessful Attempt: unsuccessful attempt    Patient location:  Bedside  Consent:     Consent given by:  Patient  Universal protocol:     Patient identity confirmed:  Verbally with patient and arm band  Pre-procedure details:     Preparation: Patient was prepped and draped in usual sterile fashion    Procedure details:     Successful placement: no    Post-procedure details:     Patient tolerance of procedure: Tolerated well, no immediate complications        Saunders catheter requested by SCCM/ ACS team given necrotizing pancreatitis w/ c/f compartment syndrome. ? UO w/ elevated Cr. Placement needed for accurate I/Os. Nursing attempted catheter insertion w/ 18Fr latex & 16Fr Coude catheter w/o success. ? Hypospadius. Resistance met at approx 2cm insertion. Bladder scan 378ml    Pt denies prior urologic history. Denies prostate problems. +Nocturia about every 2 hours. + sensation of fully emptied bladder and appropriate stream. +difficulties w/ stopping urination. Abdomen distended. Tympanic. Circumcised male, Nl phallus. Scrotum wnl. Informed, verbal consent obtained from patient. Under sterile technique, after local anesthetic w/ topical Urojet, attempts w/ 60HZ 5ml ribbed silicone, 35JL 5cc Coude, and 46TG- 5ml ribbed silicone catheter placement unsuccessful-- resistance met at <1inch insertion. Suspect urethral stricture. Procedure abort. Dr. Chris Spears notified. Plan for cysto today.       Lucrecia Young  8/5/2023

## 2023-08-06 ENCOUNTER — APPOINTMENT (INPATIENT)
Dept: RADIOLOGY | Facility: HOSPITAL | Age: 53
DRG: 438 | End: 2023-08-06
Payer: COMMERCIAL

## 2023-08-06 ENCOUNTER — TELEPHONE (OUTPATIENT)
Dept: OTHER | Facility: HOSPITAL | Age: 53
End: 2023-08-06

## 2023-08-06 LAB
ABO GROUP BLD: NORMAL
ABO GROUP BLD: NORMAL
ALBUMIN SERPL BCP-MCNC: 1.4 G/DL (ref 3.5–5)
ALP SERPL-CCNC: 87 U/L (ref 46–116)
ALT SERPL W P-5'-P-CCNC: 24 U/L (ref 12–78)
ANION GAP SERPL CALCULATED.3IONS-SCNC: 9 MMOL/L
AST SERPL W P-5'-P-CCNC: 34 U/L (ref 5–45)
BILIRUB SERPL-MCNC: 0.61 MG/DL (ref 0.2–1)
BLD GP AB SCN SERPL QL: NEGATIVE
BUN SERPL-MCNC: 43 MG/DL (ref 5–25)
CALCIUM ALBUM COR SERPL-MCNC: 9.7 MG/DL (ref 8.3–10.1)
CALCIUM SERPL-MCNC: 7.6 MG/DL (ref 8.3–10.1)
CFFMA (FUNCTIONAL FIBRINOGEN MAX AMPLITUDE): 44.9 MM (ref 15–32)
CHLORIDE SERPL-SCNC: 101 MMOL/L (ref 96–108)
CKLY30: 0 % (ref 0–2.6)
CKR(REACTION TIME): 8.2 MIN (ref 4.6–9.1)
CO2 SERPL-SCNC: 28 MMOL/L (ref 21–32)
CREAT SERPL-MCNC: 1.73 MG/DL (ref 0.6–1.3)
CRTMA(RAPIDTEG MAX AMPLITUDE): 72.7 MM (ref 52–70)
ERYTHROCYTE [DISTWIDTH] IN BLOOD BY AUTOMATED COUNT: 14.6 % (ref 11.6–15.1)
ERYTHROCYTE [DISTWIDTH] IN BLOOD BY AUTOMATED COUNT: 14.8 % (ref 11.6–15.1)
GFR SERPL CREATININE-BSD FRML MDRD: 44 ML/MIN/1.73SQ M
GLUCOSE SERPL-MCNC: 234 MG/DL (ref 65–140)
GLUCOSE SERPL-MCNC: 237 MG/DL (ref 65–140)
GLUCOSE SERPL-MCNC: 244 MG/DL (ref 65–140)
GLUCOSE SERPL-MCNC: 251 MG/DL (ref 65–140)
GLUCOSE SERPL-MCNC: 357 MG/DL (ref 65–140)
GLUCOSE SERPL-MCNC: 386 MG/DL (ref 65–140)
HCT VFR BLD AUTO: 18.7 % (ref 36.5–49.3)
HCT VFR BLD AUTO: 21.9 % (ref 36.5–49.3)
HGB BLD-MCNC: 6 G/DL (ref 12–17)
HGB BLD-MCNC: 7.1 G/DL (ref 12–17)
INR PPP: 1.88 (ref 0.84–1.19)
MAGNESIUM SERPL-MCNC: 2.5 MG/DL (ref 1.6–2.6)
MCH RBC QN AUTO: 28.7 PG (ref 26.8–34.3)
MCH RBC QN AUTO: 29.1 PG (ref 26.8–34.3)
MCHC RBC AUTO-ENTMCNC: 32.1 G/DL (ref 31.4–37.4)
MCHC RBC AUTO-ENTMCNC: 32.4 G/DL (ref 31.4–37.4)
MCV RBC AUTO: 89 FL (ref 82–98)
MCV RBC AUTO: 91 FL (ref 82–98)
PHOSPHATE SERPL-MCNC: 6.6 MG/DL (ref 2.7–4.5)
PLATELET # BLD AUTO: 292 THOUSANDS/UL (ref 149–390)
PLATELET # BLD AUTO: 328 THOUSANDS/UL (ref 149–390)
PMV BLD AUTO: 10.2 FL (ref 8.9–12.7)
PMV BLD AUTO: 10.3 FL (ref 8.9–12.7)
POTASSIUM SERPL-SCNC: 3.7 MMOL/L (ref 3.5–5.3)
PROT SERPL-MCNC: 5.5 G/DL (ref 6.4–8.4)
PROTHROMBIN TIME: 21.8 SECONDS (ref 11.6–14.5)
RBC # BLD AUTO: 2.06 MILLION/UL (ref 3.88–5.62)
RBC # BLD AUTO: 2.47 MILLION/UL (ref 3.88–5.62)
RH BLD: POSITIVE
RH BLD: POSITIVE
SODIUM SERPL-SCNC: 138 MMOL/L (ref 135–147)
SPECIMEN EXPIRATION DATE: NORMAL
WBC # BLD AUTO: 22.48 THOUSAND/UL (ref 4.31–10.16)
WBC # BLD AUTO: 23.46 THOUSAND/UL (ref 4.31–10.16)

## 2023-08-06 PROCEDURE — C9113 INJ PANTOPRAZOLE SODIUM, VIA: HCPCS | Performed by: STUDENT IN AN ORGANIZED HEALTH CARE EDUCATION/TRAINING PROGRAM

## 2023-08-06 PROCEDURE — 83735 ASSAY OF MAGNESIUM: CPT

## 2023-08-06 PROCEDURE — 85027 COMPLETE CBC AUTOMATED: CPT

## 2023-08-06 PROCEDURE — 99233 SBSQ HOSP IP/OBS HIGH 50: CPT | Performed by: INTERNAL MEDICINE

## 2023-08-06 PROCEDURE — 99232 SBSQ HOSP IP/OBS MODERATE 35: CPT | Performed by: UROLOGY

## 2023-08-06 PROCEDURE — 85347 COAGULATION TIME ACTIVATED: CPT

## 2023-08-06 PROCEDURE — 83735 ASSAY OF MAGNESIUM: CPT | Performed by: NURSE PRACTITIONER

## 2023-08-06 PROCEDURE — 85576 BLOOD PLATELET AGGREGATION: CPT

## 2023-08-06 PROCEDURE — G1004 CDSM NDSC: HCPCS

## 2023-08-06 PROCEDURE — 86920 COMPATIBILITY TEST SPIN: CPT

## 2023-08-06 PROCEDURE — 82948 REAGENT STRIP/BLOOD GLUCOSE: CPT

## 2023-08-06 PROCEDURE — 85610 PROTHROMBIN TIME: CPT

## 2023-08-06 PROCEDURE — 99232 SBSQ HOSP IP/OBS MODERATE 35: CPT | Performed by: SURGERY

## 2023-08-06 PROCEDURE — P9016 RBC LEUKOCYTES REDUCED: HCPCS

## 2023-08-06 PROCEDURE — 84100 ASSAY OF PHOSPHORUS: CPT | Performed by: NURSE PRACTITIONER

## 2023-08-06 PROCEDURE — 85027 COMPLETE CBC AUTOMATED: CPT | Performed by: NURSE PRACTITIONER

## 2023-08-06 PROCEDURE — 85397 CLOTTING FUNCT ACTIVITY: CPT

## 2023-08-06 PROCEDURE — 74174 CTA ABD&PLVS W/CONTRAST: CPT

## 2023-08-06 PROCEDURE — 84100 ASSAY OF PHOSPHORUS: CPT

## 2023-08-06 PROCEDURE — 86850 RBC ANTIBODY SCREEN: CPT

## 2023-08-06 PROCEDURE — 86900 BLOOD TYPING SEROLOGIC ABO: CPT

## 2023-08-06 PROCEDURE — 80053 COMPREHEN METABOLIC PANEL: CPT | Performed by: NURSE PRACTITIONER

## 2023-08-06 PROCEDURE — 80048 BASIC METABOLIC PNL TOTAL CA: CPT

## 2023-08-06 PROCEDURE — 85384 FIBRINOGEN ACTIVITY: CPT

## 2023-08-06 PROCEDURE — 86901 BLOOD TYPING SEROLOGIC RH(D): CPT

## 2023-08-06 PROCEDURE — 99291 CRITICAL CARE FIRST HOUR: CPT | Performed by: EMERGENCY MEDICINE

## 2023-08-06 RX ORDER — INSULIN LISPRO 100 [IU]/ML
4-20 INJECTION, SOLUTION INTRAVENOUS; SUBCUTANEOUS EVERY 6 HOURS SCHEDULED
Status: DISCONTINUED | OUTPATIENT
Start: 2023-08-06 | End: 2023-08-07

## 2023-08-06 RX ORDER — PANTOPRAZOLE SODIUM 40 MG/10ML
40 INJECTION, POWDER, LYOPHILIZED, FOR SOLUTION INTRAVENOUS EVERY 12 HOURS SCHEDULED
Status: DISCONTINUED | OUTPATIENT
Start: 2023-08-06 | End: 2023-08-09

## 2023-08-06 RX ADMIN — ACETAMINOPHEN 975 MG: 325 TABLET, FILM COATED ORAL at 21:18

## 2023-08-06 RX ADMIN — INSULIN LISPRO 4 UNITS: 100 INJECTION, SOLUTION INTRAVENOUS; SUBCUTANEOUS at 06:04

## 2023-08-06 RX ADMIN — SODIUM CHLORIDE, SODIUM GLUCONATE, SODIUM ACETATE, POTASSIUM CHLORIDE, MAGNESIUM CHLORIDE, SODIUM PHOSPHATE, DIBASIC, AND POTASSIUM PHOSPHATE 150 ML/HR: .53; .5; .37; .037; .03; .012; .00082 INJECTION, SOLUTION INTRAVENOUS at 05:57

## 2023-08-06 RX ADMIN — CHLORHEXIDINE GLUCONATE 15 ML: 1.2 SOLUTION ORAL at 09:14

## 2023-08-06 RX ADMIN — ACETAMINOPHEN 975 MG: 325 TABLET, FILM COATED ORAL at 14:00

## 2023-08-06 RX ADMIN — SODIUM CHLORIDE, SODIUM GLUCONATE, SODIUM ACETATE, POTASSIUM CHLORIDE, MAGNESIUM CHLORIDE, SODIUM PHOSPHATE, DIBASIC, AND POTASSIUM PHOSPHATE 150 ML/HR: .53; .5; .37; .037; .03; .012; .00082 INJECTION, SOLUTION INTRAVENOUS at 20:31

## 2023-08-06 RX ADMIN — PANTOPRAZOLE SODIUM 40 MG: 40 INJECTION, POWDER, FOR SOLUTION INTRAVENOUS at 12:15

## 2023-08-06 RX ADMIN — INSULIN LISPRO 4 UNITS: 100 INJECTION, SOLUTION INTRAVENOUS; SUBCUTANEOUS at 11:39

## 2023-08-06 RX ADMIN — IOHEXOL 100 ML: 350 INJECTION, SOLUTION INTRAVENOUS at 09:08

## 2023-08-06 RX ADMIN — SODIUM CHLORIDE, SODIUM GLUCONATE, SODIUM ACETATE, POTASSIUM CHLORIDE, MAGNESIUM CHLORIDE, SODIUM PHOSPHATE, DIBASIC, AND POTASSIUM PHOSPHATE 150 ML/HR: .53; .5; .37; .037; .03; .012; .00082 INJECTION, SOLUTION INTRAVENOUS at 12:49

## 2023-08-06 RX ADMIN — INSULIN LISPRO 6 UNITS: 100 INJECTION, SOLUTION INTRAVENOUS; SUBCUTANEOUS at 00:09

## 2023-08-06 RX ADMIN — ACETAMINOPHEN 975 MG: 325 TABLET, FILM COATED ORAL at 06:02

## 2023-08-06 RX ADMIN — INSULIN LISPRO 16 UNITS: 100 INJECTION, SOLUTION INTRAVENOUS; SUBCUTANEOUS at 23:47

## 2023-08-06 RX ADMIN — PANTOPRAZOLE SODIUM 40 MG: 40 INJECTION, POWDER, FOR SOLUTION INTRAVENOUS at 21:19

## 2023-08-06 RX ADMIN — CHLORHEXIDINE GLUCONATE 15 ML: 1.2 SOLUTION ORAL at 21:19

## 2023-08-06 RX ADMIN — INSULIN LISPRO 10 UNITS: 100 INJECTION, SOLUTION INTRAVENOUS; SUBCUTANEOUS at 17:52

## 2023-08-06 NOTE — PROGRESS NOTES
Progress Note - General Surgery   Rachna Mckay 46 y.o. male MRN: [id number]  Unit/Bed#: MICU 13 Encounter: [id number]    Assessment:  47yo M with necrotizing pancreatitis and non-occlusive SMV s/p IR drainage 8/5    AVSS  UOP 1L  IR drain 660 cc    hgb 6 (7.5)    Plan:  - NPO w/sips and chips  - f/u stat CTA given drop in hgb  - holding heparin gtt  - cont to monitor off abx  - strict I/Os, monitor UOP  - IVF, titrate as needed for adequate resuscitation  - appreciate care per ICU    Subjective/Objective   Subjective:   No acute events overnight. BMs have slowed. Feeling hungry this AM after IR procedure. Objective:     Blood pressure 126/60, pulse 94, temperature 97.8 °F (36.6 °C), temperature source Oral, resp. rate 16, height 5' 11" (1.803 m), weight (!) 137 kg (301 lb 5.9 oz), SpO2 99 %. ,Body mass index is 42.03 kg/m². Intake/Output Summary (Last 24 hours) at 8/6/2023 0759  Last data filed at 8/6/2023 0600  Gross per 24 hour   Intake 4820.83 ml   Output 1685 ml   Net 3135.83 ml       Invasive Devices     Peripheral Intravenous Line  Duration           Peripheral IV 08/04/23 Right Arm 1 day    Peripheral IV 08/04/23 Right;Ventral (anterior) Hand 1 day          Drain  Duration           Abscess Drain RUQ <1 day    Urethral Catheter Other (Comment) 16 Fr. <1 day                Physical Exam:  General: No acute distress  Neuro: alert and oriented  HEENT: moist mucous membranes  CV: Well perfused, regular rate and rhythm  Lungs: Normal work of breathing, no increased respiratory effort  Abdomen: Soft, non-tender, significantly distended.   Extremities: No edema, clubbing or cyanosis  Skin: Warm, dry        Lamin Raya MD  General Surgery PGY2

## 2023-08-06 NOTE — PROGRESS NOTES
Progress Note -urology  Eusebia Sheth 46 y.o. male MRN: [id number]  Unit/Bed#: MICU 13 Encounter: [id number]    Assessment &Plan:  59-year-old male with history of necrotizing pancreatitis with difficult Saunders catheter insertion requiring OR for cystoscopy direct vision internal urethrotomy/dilatation of urethral stricture and Saunders catheter placement:    -Maintain urethral Saunders catheter at this time. Would recommend 10 days with urethral Saunders catheter we will plan for outpatient removal.  -Urethral Saunders catheter in place draining clear yellow urine.  -745 cc 24-hour urinary output  -Discussed with patient that he will require Saunders catheter for duration of time with outpatient follow-up. Discussed our office will arrange everything for him. Discussed with nursing staff will review Saunders catheter care. If any questions or concerns he may call our office. No further  intervention required this admission. Urology will sign off. We are is available for additional questions and concerns in regards to this patient. Subjective/Objective   Chief Complaint: None    Subjective: Patient sitting comfortably no acute distress. Denies any discomfort with urethral Saunders catheter. Only reports that he is hungry. Objective:     Blood pressure 108/65, pulse 96, temperature 98.1 °F (36.7 °C), resp. rate 17, height 5' 11" (1.803 m), weight 132 kg (291 lb 3.6 oz), SpO2 98 %. ,Body mass index is 40.62 kg/m².       Intake/Output Summary (Last 24 hours) at 8/6/2023 0241  Last data filed at 8/6/2023 0145  Gross per 24 hour   Intake 3937.5 ml   Output 1270 ml   Net 2667.5 ml       Invasive Devices     Peripheral Intravenous Line  Duration           Peripheral IV 08/04/23 Right Antecubital 1 day    Peripheral IV 08/04/23 Right;Ventral (anterior) Hand 1 day          Drain  Duration           Abscess Drain RUQ <1 day    Urethral Catheter Other (Comment) 16 Fr. <1 day                Physical Exam  Constitutional: General: He is not in acute distress. Appearance: He is obese. He is ill-appearing and toxic-appearing. He is not diaphoretic. HENT:      Head: Normocephalic and atraumatic. Right Ear: External ear normal.      Left Ear: External ear normal.   Cardiovascular:      Rate and Rhythm: Normal rate and regular rhythm. Pulses: Normal pulses. Heart sounds: No murmur heard. No friction rub. No gallop. Pulmonary:      Effort: Pulmonary effort is normal. No respiratory distress. Abdominal:      General: Bowel sounds are normal. There is distension. Palpations: Abdomen is soft. Tenderness: There is abdominal tenderness. Comments: IR drain in place   Genitourinary:     Comments: Saunders catheter clear yellow urine  Neurological:      Mental Status: He is alert. Lab, Imaging and other studies:I have personally reviewed pertinent lab results.     Lab Results   Component Value Date    WBC 24.85 (H) 08/05/2023    HGB 7.5 (L) 08/05/2023    HCT 23.1 (L) 08/05/2023    MCV 89 08/05/2023     08/05/2023     Lab Results   Component Value Date    SODIUM 136 08/05/2023    K 3.4 (L) 08/05/2023    CL 99 08/05/2023    CO2 29 08/05/2023    BUN 33 (H) 08/05/2023    CREATININE 1.96 (H) 08/05/2023    GLUC 243 (H) 08/05/2023    CALCIUM 7.7 (L) 08/05/2023       VTE Pharmacologic Prophylaxis: Reason for no pharmacologic prophylaxis Defer to primary team  VTE Mechanical Prophylaxis: sequential compression device      Ascencion Perdomo PA-C

## 2023-08-06 NOTE — UTILIZATION REVIEW
NOTIFICATION OF INPATIENT ADMISSION   AUTHORIZATION REQUEST   SERVICING FACILITY:   [address] [place] [address]  Address: [address] [place] [address]  Tax ID: [id number]  NPI: [id number] ATTENDING PROVIDER:  Attending Name and NPI#: Sherie Gleason [[id number]]  Address: [address] [place] [address]  Phone: [phone]   ADMISSION INFORMATION:  Place of Service: Inpatient [address] [facility]  Place of Service Code: 21  Inpatient Admission Date/Time: 8/4/23  6:33 PM  Discharge Date/Time: No discharge date for patient encounter. Admitting Diagnosis Code/Description:  Abdominal pain [R10.9]  Necrotizing pancreatitis [K85.91]     UTILIZATION REVIEW CONTACT:  Caty Levine Utilization   Network Utilization Review Department  Phone: [phone]  Fax: [phone]  Email: [email or web address]  Contact for approvals/pending authorizations, clinical reviews, and discharge. PHYSICIAN ADVISORY SERVICES:  Medical Necessity Denial & [employer] Review  Phone: [phone]  Fax: [phone]  Email: [email or web address]

## 2023-08-06 NOTE — UTILIZATION REVIEW
Initial Clinical Review    Admission: Date/Time/Statement:   Admission Orders (From admission, onward)     Ordered        08/04/23 1833  Inpatient Admission  Once                      Orders Placed This Encounter   Procedures   • Inpatient Admission     Standing Status:   Standing     Number of Occurrences:   1     Order Specific Question:   Level of Care     Answer:   Critical Care [15]     Order Specific Question:   Estimated length of stay     Answer:   More than 2 Midnights     Order Specific Question:   Certification     Answer:   I certify that inpatient services are medically necessary for this patient for a duration of greater than two midnights. See H&P and MD Progress Notes for additional information about the patient's course of treatment. ED Arrival Information     Expected   8/4/2023     Arrival   8/4/2023 17:40    Acuity   Emergent            Means of arrival   Ambulance    Escorted by   [employer])    Service   Surgery-General    Admission type   Emergency            Arrival complaint   necrotizing pancreatitis           Chief Complaint   Patient presents with   • Abdominal Pain     Transferred from [address] [facility] to see surgical team for [facility]. Pancreatitis. Initial Presentation: 46 y.o. male to ED via EMS [address] [facility] to higher level of care d/t   Present to ED with necrotizing pancreatitis and non-occlusive SMV thrombus on Eliquis. Known to the general surgery service as he was discharged 7/28 after a short admission for management of acute necrotizing pancreatitis. Patient was tolerating a diet with minimal to no pain, hemodynamically stable with improving labs on discharge. However, now returned to [facility] with PO intolerance, abdominal pain, and diarrhea. Transferred to [facility] for higher level of care.   PMHX: Pancreatitis  Admitted to ICU with DX: Necrotizing pancreatitis  on exam: tachy; tachypnea   CT AP: Worsening necrotizing pancreatitis greater than 30% of the pancreas with extensive peripancreatic inflammatory and necrotic changes. New acute necrotic collection in the lesser sac exerting mass effect on the gastric body. Similar nonocclusive thrombus in the superior mesenteric vein. 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 Moderate abdominopelvic ascites and small bilateral pleural effusions, increased since prior study. Subtle nodularity of the liver surface, which may represent early cirrhotic changes. PLAN: cont ivf; cont heparin gtt; monitor labs; Cardiopulmonary monitoring; neuro check; NPO; f/u blood cx; GI consult for cystogastrostomy tube; serial abdominal exams      Date: 8/5/23    Day 2  OP REPORT  DATE OF PROCEDURE: 8/5/2023  PROCEDURES PERFORMED:   1) Cystoscopy  #2 retrograde urethrogram with fluoroscopic interpretation  3) direct vision internal urethrotomy  4. Dilation of urethral stricture  5. Complex Saunders catheter placement over guidewire     FINDINGS:  -Exam under anesthesia confirmed that the patient is status post hypospadias repair  - There is a distal urethral stricture and it was clear on cystoscopy the prior attempts to place Saunders catheter had introduced a rather large false passage within the anterior urethra  -The true lumen is identified and cannulated with a wire.   The stricture is then managed using a combination of direct vision internal urethrotomy under vision as well as fluoroscopically guided coaxial dilation using Arabella dilators  -Ultimately a 12 [place] tip Saunders catheter was placed over the guidewire following dilation with confirmation of appropriate positioning radiographically  -Patient will require Saunders catheter drainage for 10 to 14 days.         ED Triage Vitals   Temperature Pulse Respirations Blood Pressure SpO2   08/04/23 1746 08/04/23 1746 08/04/23 1746 08/04/23 1746 08/04/23 1746   98.8 °F (37.1 °C) (!) 112 20 109/53 98 %      Temp Source Heart Rate Source Patient Position - Orthostatic VS BP Location FiO2 (%)   08/04/23 1746 08/04/23 2000 08/04/23 2000 08/04/23 2000 --   Oral Monitor Sitting Right arm       Pain Score       08/04/23 1746       No Pain          Wt Readings from Last 1 Encounters:   08/06/23 (!) 137 kg (301 lb 5.9 oz)     Additional Vital Signs:   Date/Time Temp Pulse Resp BP MAP (mmHg) SpO2 Calculated FIO2 (%) - Nasal Cannula Nasal Cannula O2 Flow Rate (L/min) O2 Device Patient Position - Orthostatic VS   08/05/23 2335 -- 96 12 107/56 70 99 % -- -- -- --   08/05/23 2235 -- 96 15 111/67 80 99 % -- -- -- --   08/05/23 2135 -- 96 17 108/65 78 98 % -- -- -- --   08/05/23 2100 -- -- -- -- -- -- 28 2 L/min Nasal cannula --   08/05/23 2045 -- 102 19 -- -- 90 % -- -- None (Room air) --   08/05/23 2035 98.1 °F (36.7 °C) 102 17 103/57 71 96 % -- -- -- --   08/05/23 2000 -- 100 14 -- -- 99 % 36 4 L/min Nasal cannula --   08/05/23 1600 97.7 °F (36.5 °C) 100 21 -- -- 100 % 36 4 L/min Nasal cannula --   08/05/23 1548 -- 102 32 Abnormal  -- -- 97 % -- -- -- --   08/05/23 1547 -- 98 27 Abnormal  -- -- 97 % -- -- -- --   08/05/23 1546 -- 100 24 Abnormal  -- -- 98 % -- -- -- --   08/05/23 1536 -- 99 24 Abnormal  99/59 -- 98 % 44 6 L/min Nasal cannula --   08/05/23 0800 98.7 °F (37.1 °C) 102 24 Abnormal  104/60 73 97 % -- -- None (Room air) Lying   08/04/23 2245 -- 106 Abnormal  22 106/60 74 96 % -- -- -- --   08/04/23 2145 -- 108 Abnormal  24 Abnormal  108/60 72 97 % -- -- -- --   08/04/23 2045 98.5 °F (36.9 °C) 106 Abnormal  25 Abnormal  112/57 77 96 % -- -- None (Room air) --   08/04/23 2000 98.8 °F (37.1 °C) 108 Abnormal  28 Abnormal  102/58 71 95 % -- -- None (Room air) Sitting   08/04/23 1746 98.8 °F (37.1 °C) 112 Abnormal  20 109/53 -- 98 % -- -- None (Room air) --         EKG: Sinus tachycardia  Cannot rule out Inferior infarct , age undetermined  Cannot rule out Anterior infarct (cited on or before 25-JUL-2023)  Abnormal ECG    Pertinent Labs/Diagnostic Test Results:   CTA abdomen pelvis w wo contrast   Final Result by King Trinidad MD (08/06 0945)      1. Interval decrease in size of acute necrotic collection (ANC) in the lesser sac status post percutaneous drainage, with improved but persistent mass effect on the gastric body. There is increased attenuation of the collection, in keeping with    hemorrhagic/proteinaceous contents. However, no active contrast extravasation is seen. 2. Similar appearance of acute necrotizing pancreatitis with extensive (>30%) parenchymal necrosis, tracking peripancreatic fluid and moderate volume ascites.          Workstation performed: [id number]         IR drainage tube placement    (Results Pending)   FL urethrocystogram retrograde    (Results Pending)   IR drainage tube check/change/reposition/reinsertion/upsize    (Results Pending)         Results from last 7 days   Lab Units 08/06/23 0435 08/05/23 0340 08/04/23 2103 08/04/23  1454   WBC Thousand/uL 22.48* 24.85* 27.47* 31.79*   HEMOGLOBIN g/dL 6.0* 7.5* 7.8* 9.4*   HEMATOCRIT % 18.7* 23.1* 23.7* 29.1*   PLATELETS Thousands/uL 292 351 343 431*         Results from last 7 days   Lab Units 08/06/23 0435 08/05/23 0340 08/04/23 2103 08/04/23  1454   SODIUM mmol/L 138 136 135 133*   POTASSIUM mmol/L 3.7 3.4* 3.6 3.8   CHLORIDE mmol/L 101 99 98 95*   CO2 mmol/L 28 29 28 26   ANION GAP mmol/L 9 8 9 12   BUN mg/dL 43* 33* 27* 27*   CREATININE mg/dL 1.73* 1.96* 1.65* 1.69*   EGFR ml/min/1.73sq m 44 38 47 45   CALCIUM mg/dL 7.6* 7.7* 7.7* 8.5   CALCIUM, IONIZED mmol/L  --  1.00* 1.02*  --    MAGNESIUM mg/dL 2.5 2.4 2.5 1.8*   PHOSPHORUS mg/dL 6.6* 5.5* 5.3*  --      Results from last 7 days   Lab Units 08/06/23 0435 08/05/23  0340 08/04/23  2103 08/04/23  1454   AST U/L 34 51* 61* 63*   ALT U/L 24 46 53 60*   ALK PHOS U/L 87 101 111 120*   TOTAL PROTEIN g/dL 5.5* 5.6* 5.6* 6.5   ALBUMIN g/dL 1.4* 1.4* 1.5* 2.4*   TOTAL BILIRUBIN mg/dL 0.61 1.16* 1.43* 2.34*     Results from last 7 days   Lab Units 08/06/23  1137 08/06/23  0003 08/05/23  1727 08/05/23  1153 08/05/23  0546 08/05/23  0011 08/04/23  2122   POC GLUCOSE mg/dl 234* 251* 266* 248* 266* 238* 271*     Results from last 7 days   Lab Units 08/06/23  0435 08/05/23  0340 08/04/23 2103 08/04/23  1454   GLUCOSE RANDOM mg/dL 237* 243* 265* 286*       Results from last 7 days   Lab Units 08/04/23  1454   PH WILLIAM  7.408*   PCO2 WILLIAM mm Hg 41.3*   PO2 WILLIAM mm Hg 34.5*   HCO3 WILLIAM mmol/L 25.5   BASE EXC WILLIAM mmol/L 0.7   O2 CONTENT WILLIAM ml/dL 8.8   O2 HGB, VENOUS % 62.7       Results from last 7 days   Lab Units 08/04/23  1642 08/04/23  1454   HS TNI 0HR ng/L  --  11   HS TNI 2HR ng/L 8  --    HSTNI D2 ng/L -3  --        Results from last 7 days   Lab Units 08/05/23  0341 08/04/23 2103 08/04/23  1454   PROTIME seconds  --   --  41.3*   INR   --   --  4.44*   PTT seconds 109* 45* 50*       Results from last 7 days   Lab Units 08/04/23 2103 08/04/23  1454   PROCALCITONIN ng/ml 2.35* 1.73*     Results from last 7 days   Lab Units 08/05/23  0008 08/04/23 2103 08/04/23  1642 08/04/23  1454   LACTIC ACID mmol/L 1.8 2.2* 2.0 5.0*       Results from last 7 days   Lab Units 08/06/23  0912   UNIT PRODUCT CODE  [id number]   UNIT NUMBER  [id number]   UNITABO  B   UNITRH  POS   CROSSMATCH  Compatible   UNIT DISPENSE STATUS  Issued   UNIT PRODUCT VOL ml 350         Results from last 7 days   Lab Units 08/04/23  2103 08/04/23  1454   LIPASE u/L 545* 129*       Results from last 7 days   Lab Units 08/04/23  2103 08/04/23  1454   BLOOD CULTURE  No Growth at 24 hrs. No Growth at 24 hrs. No Growth at 24 hrs. No Growth at 24 hrs.        Past Medical History:   Diagnosis Date   • Pancreatitis      Present on Admission:  • Necrotizing pancreatitis      Admitting Diagnosis: Abdominal pain [R10.9]  Necrotizing pancreatitis [K85.91]     Age/Sex: 46 y.o. male     Admission Orders: SCDs; I/O; Daily wts; Cardiopulmonary monitoring; neuro checks; NPO    Scheduled Medications:  acetaminophen, 975 mg, Oral, Q8H [address]  chlorhexidine, 15 mL, Mouth/Throat, Q12H [address]  insulin lispro, 2-12 Units, Subcutaneous, Q6H ALEX  pantoprazole, 40 mg, Intravenous, Q12H ALEX    calcium gluconate 2 g in sodium chloride 0.9% 100 mL (premix)  Dose: 2 g  Freq: Once Route: IV  Last Dose: 2 g (08/05/23 0749)  Start: 08/05/23 0715 End: 08/05/23 0849    potassium chloride 20 mEq IVPB (premix)  Dose: 20 mEq  Freq: Every 2 hours Route: IV  Last Dose: 20 mEq (08/05/23 0858)  Start: 08/05/23 0700 End: 08/05/23 1058    Continuous IV Infusions:  multi-electrolyte, 150 mL/hr, Intravenous, Continuous  heparin (porcine) 25,000 units in 0.45% NaCl 250 mL infusion (premix)    PRN Meds:  HYDROmorphone, 0.5 mg, Intravenous, Q4H PRN  labetalol, 10 mg, Intravenous, Q6H PRN  ondansetron, 4 mg, Intravenous, Q6H PRN  oxyCODONE, 10 mg, Oral, Q4H PRN  oxyCODONE, 5 mg, Oral, Q4H PRN        IP CONSULT TO CASE MANAGEMENT  IP CONSULT TO GASTROENTEROLOGY  INPATIENT CONSULT TO IR    Network Utilization Review Department  ATTENTION: Please call with any questions or concerns to [phone] and carefully listen to the prompts so that you are directed to the right person. All voicemails are confidential.  Libia Hardin all requests for admission clinical reviews, approved or denied determinations and any other requests to dedicated fax number below belonging to the campus where the patient is receiving treatment.  List of dedicated fax numbers for the Facilities:  [place] DENIALS (Administrative/Medical Necessity) [phone] [address] [facility] (Maternity/NICU/Pediatrics) [address] [facility] [phone]   [facility] [phone]   [address] [place] [address] [place] [address] [place] [address] [address] [place] [address] [phone]   [address] [facility] [address] [place] [address] [place] [phone]

## 2023-08-06 NOTE — PROGRESS NOTES
Progress Note - General Surgery   Aris Mcardle 46 y.o. male MRN: [id number]  Unit/Bed#: MICU 13 Encounter: [id number]    Assessment:  45yo M with necrotizing pancreatitis and non-occlusive SMV   s/p IR drainage 8/5      AVSS  UOP 1.8 L  IR drain 470 cc dark bloody    hgb 7 after 1u pRBC for hgb 6 yesterday    Plan:  - NPO w/sips and chips  - holding heparin gtt  - maintain IR drain  - cont insulin gtt for glycemic control  - cont to monitor off abx  - strict I/Os, monitor UOP  - IVF, titrate as needed for adequate resuscitation  - appreciate care per ICU    Subjective/Objective   Subjective:   No acute events overnight. Feeling well, no complaints. Drank 1 bottle of ensure yesterday, no nausea or vomiting. Diarrhea has improved, passing flatus. Objective:     Blood pressure 103/58, pulse 94, temperature 97.7 °F (36.5 °C), temperature source Oral, resp. rate 19, height 5' 11" (1.803 m), weight (!) 137 kg (301 lb 5.9 oz), SpO2 100 %. ,Body mass index is 42.03 kg/m². Intake/Output Summary (Last 24 hours) at 8/6/2023 1728  Last data filed at 8/6/2023 1600  Gross per 24 hour   Intake 5178.33 ml   Output 2380 ml   Net 2798.33 ml       Invasive Devices     Peripheral Intravenous Line  Duration           Peripheral IV 08/04/23 Right Arm 2 days    Peripheral IV 08/04/23 Right;Ventral (anterior) Hand 2 days          Drain  Duration           Abscess Drain RUQ 1 day    Urethral Catheter Other (Comment) 16 Fr. 1 day                Physical Exam:  General: No acute distress  Neuro: alert and oriented  HEENT: moist mucous membranes  CV: Well perfused, regular rate and rhythm  Lungs: Normal work of breathing, no increased respiratory effort  Abdomen: Soft, non-tender, significantly distended.  IR drain in place with bloody output  Extremities: No edema, clubbing or cyanosis  Skin: Warm, dry        Cherise Aburto MD  General Surgery PGY2

## 2023-08-06 NOTE — PLAN OF CARE
Problem: PAIN - ADULT  Goal: Verbalizes/displays adequate comfort level or baseline comfort level  Description: Interventions:  - Encourage patient to monitor pain and request assistance  - Assess pain using appropriate pain scale  - Administer analgesics based on type and severity of pain and evaluate response  - Implement non-pharmacological measures as appropriate and evaluate response  - Consider cultural and social influences on pain and pain management  - Notify physician/advanced practitioner if interventions unsuccessful or patient reports new pain  Outcome: Progressing     Problem: INFECTION - ADULT  Goal: Absence or prevention of progression during hospitalization  Description: INTERVENTIONS:  - Assess and monitor for signs and symptoms of infection  - Monitor lab/diagnostic results  - Monitor all insertion sites, i.e. indwelling lines, tubes, and drains  - Monitor endotracheal if appropriate and nasal secretions for changes in amount and color  - [place] appropriate cooling/warming therapies per order  - Administer medications as ordered  - Instruct and encourage patient and family to use good hand hygiene technique  - Identify and instruct in appropriate isolation precautions for identified infection/condition  Outcome: Progressing  Goal: Absence of fever/infection during neutropenic period  Description: INTERVENTIONS:  - Monitor WBC    Outcome: Progressing     Problem: SAFETY ADULT  Goal: Patient will remain free of falls  Description: INTERVENTIONS:  - Educate patient/family on patient safety including physical limitations  - Instruct patient to call for assistance with activity   - Consult OT/PT to assist with strengthening/mobility   - Keep Call bell within reach  - Keep bed low and locked with side rails adjusted as appropriate  - Keep care items and personal belongings within reach  - Initiate and maintain comfort rounds  - Make Fall Risk Sign visible to staff  - Apply yellow socks and bracelet for high fall risk patients  - Consider moving patient to room near nurses station  Outcome: Progressing  Goal: Maintain or return to baseline ADL function  Description: INTERVENTIONS:  -  Assess patient's ability to carry out ADLs; assess patient's baseline for ADL function and identify physical deficits which impact ability to perform ADLs (bathing, care of mouth/teeth, toileting, grooming, dressing, etc.)  - Assess/evaluate cause of self-care deficits   - Assess range of motion  - Assess patient's mobility; develop plan if impaired  - Assess patient's need for assistive devices and provide as appropriate  - Encourage maximum independence but intervene and supervise when necessary  - Involve family in performance of ADLs  - Assess for home care needs following discharge   - Consider OT consult to assist with ADL evaluation and planning for discharge  - Provide patient education as appropriate  Outcome: Progressing  Goal: Maintains/Returns to pre admission functional level  Description: INTERVENTIONS:  - Perform BMAT or MOVE assessment daily.   - Set and communicate daily mobility goal to care team and patient/family/caregiver.    - Collaborate with rehabilitation services on mobility goals if consulted  - Out of bed for toileting  - Record patient progress and toleration of activity level   Outcome: Progressing     Problem: DISCHARGE PLANNING  Goal: Discharge to home or other facility with appropriate resources  Description: INTERVENTIONS:  - Identify barriers to discharge w/patient and caregiver  - Arrange for needed discharge resources and transportation as appropriate  - Identify discharge learning needs (meds, wound care, etc.)  - Arrange for interpretive services to assist at discharge as needed  - Refer to Case Management Department for coordinating discharge planning if the patient needs post-hospital services based on physician/advanced practitioner order or complex needs related to functional status, cognitive ability, or social support system  Outcome: Progressing     Problem: Knowledge Deficit  Goal: Patient/family/caregiver demonstrates understanding of disease process, treatment plan, medications, and discharge instructions  Description: Complete learning assessment and assess knowledge base.   Interventions:  - Provide teaching at level of understanding  - Provide teaching via preferred learning methods  Outcome: Progressing     Problem: Prexisting or High Potential for Compromised Skin Integrity  Goal: Skin integrity is maintained or improved  Description: INTERVENTIONS:  - Identify patients at risk for skin breakdown  - Assess and monitor skin integrity  - Assess and monitor nutrition and hydration status  - Monitor labs   - Assess for incontinence   - Turn and reposition patient  - Assist with mobility/ambulation  - Relieve pressure over bony prominences  - Avoid friction and shearing  - Provide appropriate hygiene as needed including keeping skin clean and dry  - Evaluate need for skin moisturizer/barrier cream  - Collaborate with interdisciplinary team   - Patient/family teaching  - Consider wound care consult   Outcome: Progressing     Problem: MOBILITY - ADULT  Goal: Maintain or return to baseline ADL function  Description: INTERVENTIONS:  -  Assess patient's ability to carry out ADLs; assess patient's baseline for ADL function and identify physical deficits which impact ability to perform ADLs (bathing, care of mouth/teeth, toileting, grooming, dressing, etc.)  - Assess/evaluate cause of self-care deficits   - Assess range of motion  - Assess patient's mobility; develop plan if impaired  - Assess patient's need for assistive devices and provide as appropriate  - Encourage maximum independence but intervene and supervise when necessary  - Involve family in performance of ADLs  - Assess for home care needs following discharge   - Consider OT consult to assist with ADL evaluation and planning for discharge  - Provide patient education as appropriate  Outcome: Progressing  Goal: Maintains/Returns to pre admission functional level  Description: INTERVENTIONS:  - Perform BMAT or MOVE assessment daily.   - Set and communicate daily mobility goal to care team and patient/family/caregiver. - Collaborate with rehabilitation services on mobility goals if consulted  - Out of bed for toileting  - Record patient progress and toleration of activity level   Outcome: Progressing     Problem: Nutrition/Hydration-ADULT  Goal: Nutrient/Hydration intake appropriate for improving, restoring or maintaining nutritional needs  Description: Monitor and assess patient's nutrition/hydration status for malnutrition. Collaborate with interdisciplinary team and initiate plan and interventions as ordered. Monitor patient's weight and dietary intake as ordered or per policy. Utilize nutrition screening tool and intervene as necessary. Determine patient's food preferences and provide high-protein, high-caloric foods as appropriate.      INTERVENTIONS:  - Monitor oral intake, urinary output, labs, and treatment plans  - Assess nutrition and hydration status and recommend course of action  - Evaluate amount of meals eaten  - Assist patient with eating if necessary   - Allow adequate time for meals  - Recommend/ encourage appropriate diets, oral nutritional supplements, and vitamin/mineral supplements  - Order, calculate, and assess calorie counts as needed  - Recommend, monitor, and adjust tube feedings and TPN/PPN based on assessed needs  - Assess need for intravenous fluids  - Provide specific nutrition/hydration education as appropriate  - Include patient/family/caregiver in decisions related to nutrition  Outcome: Progressing

## 2023-08-06 NOTE — TELEPHONE ENCOUNTER
Status post DVIU/dilation of urethral stricture. Will require Saunders catheter x10 days with removal in [place] office. Please assist with scheduling once discharged.

## 2023-08-06 NOTE — PROGRESS NOTES
Progress Note- Andree Rubio 46 y.o. male MRN: [id number]    Unit/Bed#: MICU 13 Encounter: [id number]      Assessment and Plan:  25-year-old male with history of HTN and recent admission for necrotizing pancreatitis who presented to the hospital with worsening abdominal distention and decreased appetite. GI consulted for evaluation of peripancreatic fluid collections. Severe necrotizing pancreatitis  Peripancreatic fluid collection  C/b nonocclusive SMV thrombus     • BISAP 3  • Etiology: Most likely secondary to gallstone pancreatitis. Will need cholecystectomy once he is clinically improved  • Large necrotic collection causing mass effect on gastric body was drained by IR yesterday with removal of 400 cc of bloody output. 660 cc has been charted in the last 12 hours  • Discussed case with Dr. Svetlana Soliman when patient presented to the hospital and unfortunately the fluid collection is not walled off for us to safely perform cyst gastrostomy  • Currently no signs of infection except for leukocytosis which could be reactive. He has now improvement in leukocytosis  • advance diet as tolerated  • Patient has drop in hemoglobin and pain around the drain site. CTA did not reveal any active extravasation  • IR drain placed may be traversing the stomach per IR notes and therefore should not be removed without IR check  • Agree with anticoagulation for SMV thrombosis but hold heparin gtt  in the setting of hemoglobin drop and bloody drain output. Hold home Eliquis  • If patient continues to drop hemoglobin we will plan on EGD tomorrow however most likely his bleeding is from slow oozing due to severe pancreatitis. He has brown stools charted except for one bowel movement which was black. We will start him on IV PPI twice daily    JERZY     • Improving with fluid resuscitation        Abnormal liver imaging  Cirrhosis?     She has abdominal imaging is concerning for nodularity concerning for early cirrhosis.   No previous imaging available for review. He does have mildly elevated INR on previous admission and on this admission his INR was supratherapeutic. Does not have any splenomegaly or signs of portal hypertension.     • Should be followed up as outpatient       Disposition: GI will continue to follow.   ______________________________________________________________________    Subjective:     Denies any abdominal pain and would like to eat    Medication Administration - last 24 hours from 08/05/2023 0827 to 08/06/2023 0827       Date/Time Order Dose Route Action Action by     08/05/2023 2127 EDT chlorhexidine (PERIDEX) 0.12 % oral rinse 15 mL 15 mL Mouth/Throat Given Evelia Maynard RN     08/05/2023 1553 EDT chlorhexidine (PERIDEX) 0.12 % oral rinse 15 mL -- Mouth/Throat MAR Unhold GHASSAN Pradhan     08/05/2023 1255 EDT chlorhexidine (PERIDEX) 0.12 % oral rinse 15 mL -- Mouth/Throat MAR Hold Automatic Transfer Provider     08/06/2023 0557 EDT multi-electrolyte (PLASMALYTE-A/ISOLYTE-S PH 7.4) IV solution 150 mL/hr Intravenous [address]  Evelia Maynard RN     08/05/2023 2300 EDT multi-electrolyte (PLASMALYTE-A/ISOLYTE-S PH 7.4) IV solution 150 mL/hr Intravenous [address]  Evelia Maynard RN     08/05/2023 1606 EDT multi-electrolyte (PLASMALYTE-A/ISOLYTE-S PH 7.4) IV solution 150 mL/hr Intravenous [address] [place]     08/05/2023 1554 EDT multi-electrolyte (PLASMALYTE-A/ISOLYTE-S PH 7.4) IV solution -- Intravenous Anesthesia Volume Adjustment Yossi Lopez CRNA     08/05/2023 1324 EDT multi-electrolyte (PLASMALYTE-A/ISOLYTE-S PH 7.4) IV solution -- Intravenous Continue from Silviano Higuera CRNA     08/05/2023 0858 EDT multi-electrolyte (PLASMALYTE-A/ISOLYTE-S PH 7.4) IV solution 150 mL/hr Intravenous [address] [place], [place]     08/05/2023 1553 EDT labetalol (NORMODYNE) injection 10 mg -- Intravenous GHASSAN Estrella     08/05/2023 1255 EDT labetalol (NORMODYNE) injection 10 mg -- Intravenous MAR Hold Automatic Transfer Provider     08/06/2023 0602 EDT acetaminophen (TYLENOL) tablet 975 mg 975 mg Oral Given Azalia Miranda, ZACHARIAH     08/05/2023 2126 EDT acetaminophen (TYLENOL) tablet 975 mg 975 mg Oral Given Azalia Miranda, ZACHARIAH     08/05/2023 1553 EDT acetaminophen (TYLENOL) tablet 975 mg -- Oral GHASSAN Booth     08/05/2023 1400 EDT acetaminophen (TYLENOL) tablet 975 mg 975 mg Oral Not Given Estelle Jimenez RN     08/05/2023 1255 EDT acetaminophen (TYLENOL) tablet 975 mg -- Oral MAR Hold Automatic Transfer Provider     08/05/2023 1553 EDT oxyCODONE (ROXICODONE) IR tablet 5 mg -- Oral MAR GHASSAN Boudreaux     08/05/2023 1255 EDT oxyCODONE (ROXICODONE) IR tablet 5 mg -- Oral MAR Hold Automatic Transfer Provider     08/05/2023 1553 EDT oxyCODONE (ROXICODONE) immediate release tablet 10 mg -- Oral MAR Unhold GHASSAN Huntley     08/05/2023 1255 EDT oxyCODONE (ROXICODONE) immediate release tablet 10 mg -- Oral MAR Hold Automatic Transfer Provider     08/05/2023 1553 EDT HYDROmorphone (DILAUDID) injection 0.5 mg -- Intravenous MAR GHASSAN Boudreaux     08/05/2023 1255 EDT HYDROmorphone (DILAUDID) injection 0.5 mg -- Intravenous MAR Hold Automatic Transfer Provider     08/05/2023 1553 EDT ondansetron (ZOFRAN) injection 4 mg -- Intravenous MAR UnGHASSAN Fallon     08/05/2023 1255 EDT ondansetron (ZOFRAN) injection 4 mg -- Intravenous MAR Hold Automatic Transfer Provider     08/05/2023 2209 EDT heparin (porcine) 25,000 units in 0.45% NaCl 250 mL infusion (premix) 0 Units/kg/hr Intravenous Stopped Azalia Miranda RN     08/05/2023 2029 EDT heparin (porcine) 25,000 units in 0.45% NaCl 250 mL infusion (premix) 16 Units/kg/hr Intravenous Restarted Azalia Miranda, ZACHARIAH     08/05/2023 0920 EDT heparin (porcine) 25,000 units in 0.45% NaCl 250 mL infusion (premix) 0 Units/kg/hr Intravenous Hold Estelle Jimenez, ZACHARIAH     08/05/2023 1627 EDT heparin (porcine) 25,000 units in 0.45% NaCl 250 mL infusion (premix) 16 Units/kg/hr Intravenous [address] [place] [address]     08/05/2023 0023 EDT potassium chloride 20 mEq IVPB (premix) 20 mEq Intravenous [address] [place] [address], [address]     08/06/2023 2453 EDT insulin lispro (HumaLOG) 100 units/mL subcutaneous injection 2-12 Units 4 Units Subcutaneous Given Demond Irizarry RN     08/06/2023 0009 EDT insulin lispro (HumaLOG) 100 units/mL subcutaneous injection 2-12 Units 6 Units Subcutaneous Given Demond Irizarry RN     08/05/2023 1729 EDT insulin lispro (HumaLOG) 100 units/mL subcutaneous injection 2-12 Units 6 Units Subcutaneous Given More Parker RN     08/05/2023 1600 EDT insulin lispro (HumaLOG) 100 units/mL subcutaneous injection 2-12 Units -- Subcutaneous Not Given More Parker RN     08/05/2023 1553 EDT insulin lispro (HumaLOG) 100 units/mL subcutaneous injection 2-12 Units -- Subcutaneous MAR Unhold Jennifer Dillon, GHASSAN     08/05/2023 1255 EDT insulin lispro (HumaLOG) 100 units/mL subcutaneous injection 2-12 Units -- Subcutaneous MAR Hold Automatic Transfer Provider     08/05/2023 1010 EDT insulin lispro (HumaLOG) 100 units/mL subcutaneous injection 2-12 Units -- Subcutaneous Not Given More Parker RN     08/05/2023 1142 EDT lidocaine (URO-JET) 2 % urethral/mucosal gel 1 Application 1 Application Urethral Given by Other Yue Rodriguez RN     08/05/2023 1715 EDT ceFAZolin (ANCEF) 3,000 mg in dextrose 5% 100 ml IVPB -- Intravenous Continued from [address] [employer] [facility], RN     08/05/2023 1503 EDT lidocaine (PF) (XYLOCAINE-MPF) 1 % injection 10 mL Infiltration Given Kat Siddiqui MD          Objective:     Vitals: Blood pressure 126/60, pulse 94, temperature 97.8 °F (36.6 °C), temperature source Oral, resp. rate 16, height 5' 11" (1.803 m), weight (!) 137 kg (301 lb 5.9 oz), SpO2 99 %. ,Body mass index is 42.03 kg/m².       Intake/Output Summary (Last 24 hours) at 8/6/2023 0827  Last data filed at 8/6/2023 0600  Gross per 24 hour Intake 4470 ml   Output 1685 ml   Net 2785 ml       Physical Exam:     General Appearance:   Alert, cooperative, no distress   HEENT:   Normocephalic, atraumatic, anicteric. Neck:  Supple, symmetrical, trachea midline   Lungs:   Clear to auscultation bilaterally; no rales, rhonchi or wheezing; respirations unlabored    Heart[de-identified]   Regular rate and rhythm; no murmur, rub, or gallop.    Abdomen:   Soft, non-tender, non-distended; normal bowel sounds; no masses, no organomegaly    Genitalia:   Deferred    Rectal:   Deferred    Extremities:  No cyanosis, clubbing or edema    Pulses:  2+ and symmetric all extremities    Skin:  No jaundice, rashes, or lesions    Lymph nodes:  No palpable cervical lymphadenopathy        Invasive Devices     Peripheral Intravenous Line  Duration           Peripheral IV 08/04/23 Right Arm 1 day    Peripheral IV 08/04/23 Right;Ventral (anterior) Hand 1 day          Drain  Duration           Abscess Drain RUQ <1 day    Urethral Catheter Other (Comment) 16 Fr. <1 day                Lab Results:  Admission on 08/04/2023   Component Date Value   • WBC 08/04/2023 27.47 (H)    • RBC 08/04/2023 2.68 (L)    • Hemoglobin 08/04/2023 7.8 (L)    • Hematocrit 08/04/2023 23.7 (L)    • MCV 08/04/2023 88    • MCH 08/04/2023 29.1    • MCHC 08/04/2023 32.9    • RDW 08/04/2023 14.6    • MPV 08/04/2023 9.9    • Platelets 05/76/5216 343    • Sodium 08/04/2023 135    • Potassium 08/04/2023 3.6    • Chloride 08/04/2023 98    • CO2 08/04/2023 28    • ANION GAP 08/04/2023 9    • BUN 08/04/2023 27 (H)    • Creatinine 08/04/2023 1.65 (H)    • Glucose 08/04/2023 265 (H)    • Calcium 08/04/2023 7.7 (L)    • Corrected Calcium 08/04/2023 9.7    • AST 08/04/2023 61 (H)    • ALT 08/04/2023 53    • Alkaline Phosphatase 08/04/2023 111    • Total Protein 08/04/2023 5.6 (L)    • Albumin 08/04/2023 1.5 (L)    • Total Bilirubin 08/04/2023 1.43 (H)    • eGFR 08/04/2023 47    • LACTIC ACID 08/04/2023 2.2 (HH)    • Procalcitonin 08/04/2023 2.35 (H)    • Calcium, Ionized 08/04/2023 1.02 (L)    • Phosphorus 08/04/2023 5.3 (H)    • Magnesium 08/04/2023 2.5    • Lipase 08/04/2023 545 (H)    • PTT 08/04/2023 45 (H)    • Blood Culture 08/04/2023 No Growth at 24 hrs. • Blood Culture 08/04/2023 No Growth at 24 hrs.     • LACTIC ACID 08/05/2023 1.8    • POC Glucose 08/04/2023 271 (H)    • PTT 08/05/2023 109 (H)    • POC Glucose 08/05/2023 238 (H)    • Sodium 08/05/2023 136    • Potassium 08/05/2023 3.4 (L)    • Chloride 08/05/2023 99    • CO2 08/05/2023 29    • ANION GAP 08/05/2023 8    • BUN 08/05/2023 33 (H)    • Creatinine 08/05/2023 1.96 (H)    • Glucose 08/05/2023 243 (H)    • Calcium 08/05/2023 7.7 (L)    • Corrected Calcium 08/05/2023 9.8    • AST 08/05/2023 51 (H)    • ALT 08/05/2023 46    • Alkaline Phosphatase 08/05/2023 101    • Total Protein 08/05/2023 5.6 (L)    • Albumin 08/05/2023 1.4 (L)    • Total Bilirubin 08/05/2023 1.16 (H)    • eGFR 08/05/2023 38    • Magnesium 08/05/2023 2.4    • Phosphorus 08/05/2023 5.5 (H)    • Calcium, Ionized 08/05/2023 1.00 (L)    • WBC 08/05/2023 24.85 (H)    • RBC 08/05/2023 2.59 (L)    • Hemoglobin 08/05/2023 7.5 (L)    • Hematocrit 08/05/2023 23.1 (L)    • MCV 08/05/2023 89    • MCH 08/05/2023 29.0    • MCHC 08/05/2023 32.5    • RDW 08/05/2023 14.6    • MPV 08/05/2023 10.0    • Platelets 86/12/9257 351    • POC Glucose 08/05/2023 266 (H)    • POC Glucose 08/05/2023 248 (H)    • POC Glucose 08/05/2023 266 (H)    • POC Glucose 08/06/2023 251 (H)    • WBC 08/06/2023 22.48 (H)    • RBC 08/06/2023 2.06 (L)    • Hemoglobin 08/06/2023 6.0 (LL)    • Hematocrit 08/06/2023 18.7 (L)    • MCV 08/06/2023 91    • MCH 08/06/2023 29.1    • MCHC 08/06/2023 32.1    • RDW 08/06/2023 14.8    • Platelets 62/65/9955 292    • MPV 08/06/2023 10.2    • Sodium 08/06/2023 138    • Potassium 08/06/2023 3.7    • Chloride 08/06/2023 101    • CO2 08/06/2023 28    • ANION GAP 08/06/2023 9    • BUN 08/06/2023 43 (H)    • Creatinine 08/06/2023 1.73 (H)    • Glucose 08/06/2023 237 (H)    • Calcium 08/06/2023 7.6 (L)    • Corrected Calcium 08/06/2023 9.7    • AST 08/06/2023 34    • ALT 08/06/2023 24    • Alkaline Phosphatase 08/06/2023 87    • Total Protein 08/06/2023 5.5 (L)    • Albumin 08/06/2023 1.4 (L)    • Total Bilirubin 08/06/2023 0.61    • eGFR 08/06/2023 44    • Magnesium 08/06/2023 2.5    • Phosphorus 08/06/2023 6.6 (H)    • ABO Grouping 08/06/2023 B    • Rh Factor 08/06/2023 Positive        Imaging Studies: I have personally reviewed pertinent imaging studies.

## 2023-08-06 NOTE — PROGRESS NOTES
[address] [place] [address]  Progress Note: Critical Care  Name: Eusebia Sheth 46 y.o. male I MRN: [id number]  Unit/Bed#: MICU 15 I Date of Admission: 8/4/2023   Date of Service: 8/6/2023 I Hospital Day: 2    Assessment/Plan   Neuro:   · Diagnosis: Acute Nausea  · Plan:  · Prn Zofran  · Diagnosis: Acute Pain  · Plan:   · Scheduled tylenol  · Dilaudid 0.5mg q4hr prn for breakthrough  · Oxycodone 10mg q4hr prn for severe pain   · oxycodone 5mg q4hr prn for moderate pain   - CAM ICU, delirium precautions     CV:   · Diagnosis: Severe narrowing of main portal vein, SMV, splenic vein, occlusion of proximal splenic vein with distal reconstitution  · 2/2 necrotic pancreatic collection.    · GI consulted and following  · Pancreatic drainage tube placed by IR  · Plan:   · Monitor drain output  · Hold heparin gtt      Pulm:  Diagnosis:    - Maintain sp02 >92   - Encourage IS    GI:   · Diagnosis: Necrotizing pancreatitis  · 8/5- IR drain placement   · Hgb 6.0 from 7.5   · Patient with pain around drain; still serosang  · Plan:   · Monitor Drain output  · Consider trial feeds   · CT AP with contrast       :   · Diagnosis: Urethral stricture  · S/p OR for cystoscopy direct vision internal urethrotomy/dilation with palafox placement  · BUN 43 Crt 1.73 - isolated bun rise concerning for bleed  · Plan:   · Monitor I&O  · Monitor BUN: Crt  · UOP 24hrs 900mL      F/E/N:   F: 150mL/hr isolyte  E: replete as indicated  N: nPO; sips with meds    Heme/Onc:   · Diagnosis: Anemia  · Hgb 6.0 from 7.5  · Plan:  · Transfusing 1 unit PRBC  · Transfuse for hgb <7 or if symptomatic  · Trend H&H  · Diagnosis: 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 Yes  · Need for changes to pain regimen? No   B: Both SAT/SAT  · N/A   C: Choice of Sedation · RASS Goal: 0 Alert and Calm or N/A patient not on sedation  · Need for changes to sedation or analgesia regimen? NA   D: Delirium · CAM-ICU: Negative   E: Early Mobility  · Plan for early mobility? Yes   F: Family Engagement · Plan for family engagement today? Yes       Antibiotic Review: one time abx dose for op    Review of Invasive Devices: Palafox Plan: Continue for accurate I/O monitoring for 48 hours        Prophylaxis:  VTE VTE covered by:    Darya Farfan       Stress Ulcer  not ordered          Subjective   HPI/24hr events:     77-year-old male with necrotizing pancreatitis, ureteral structure status post cystoscopy with palafox placement and IR drain placement. Review of Systems   Constitutional: Positive for fatigue. Negative for activity change, chills and fever. HENT: Negative for ear pain and sore throat. Eyes: Negative for pain and visual disturbance. Respiratory: Negative for cough, chest tightness and shortness of breath. Cardiovascular: Negative for chest pain and palpitations. Gastrointestinal: Negative for abdominal pain, constipation, diarrhea, nausea and vomiting. Genitourinary: Negative for flank pain. Musculoskeletal: Negative for arthralgias and back pain. Skin: Negative for color change and rash. Neurological: Negative for dizziness, seizures, syncope, weakness, numbness and headaches. Psychiatric/Behavioral: Negative for agitation and behavioral problems. All other systems reviewed and are negative.          Objective                            Vitals I/O      Most Recent Min/Max in 24hrs   Temp 97.8 °F (36.6 °C) Temp  Min: 97.7 °F (36.5 °C) Max: 98.7 °F (37.1 °C)   Pulse 90 Pulse  Min: 90  Max: 102   Resp 15 Resp  Min: 12  Max: 32   /63 BP  Min: 99/59  Max: 122/61   O2 Sat 100 % SpO2  Min: 90 %  Max: 100 %      Intake/Output Summary (Last 24 hours) at 8/6/2023 0725  Last data filed at 8/6/2023 0600  Gross per 24 hour   Intake 4820.83 ml   Output 1685 ml   Net 3135.83 ml         Diet NPO; Sips with meds     Invasive Monitoring Physical exam   Arterial Line  Rody BP    No data recorded   MAP    No data recorded    Physical Exam  Vitals and nursing note reviewed. Constitutional:       Appearance: He is obese. He is ill-appearing. HENT:      Head: Normocephalic and atraumatic. Right Ear: External ear normal.      Left Ear: External ear normal.      Nose: Nose normal.      Mouth/Throat:      Mouth: Mucous membranes are moist.   Eyes:      Extraocular Movements: Extraocular movements intact. Cardiovascular:      Rate and Rhythm: Normal rate and regular rhythm. Heart sounds: Normal heart sounds. Pulmonary:      Effort: Pulmonary effort is normal.   Abdominal:      General: There is distension. Tenderness: There is abdominal tenderness. Comments: Drain in place, serosang fluid  Tenderness around drain to palpation   Musculoskeletal:         General: Normal range of motion. Cervical back: Normal range of motion. Skin:     General: Skin is warm. Capillary Refill: Capillary refill takes less than 2 seconds. Neurological:      General: No focal deficit present. Mental Status: He is alert and oriented to person, place, and time. Psychiatric:         Mood and Affect: Mood normal.         Behavior: Behavior normal.              Diagnostic Studies      EKG:   Imaging:  I have personally reviewed pertinent reports.    and I have personally reviewed pertinent films in PACS     Medications:  Scheduled PRN   acetaminophen, 975 mg, Q8H [address]  chlorhexidine, 15 mL, Q12H [address]  insulin lispro, 2-12 Units, Q6H [address] HYDROmorphone, 0.5 mg, Q4H PRN  labetalol, 10 mg, Q6H PRN  ondansetron, 4 mg, Q6H PRN  oxyCODONE, 10 mg, Q4H PRN  oxyCODONE, 5 mg, Q4H PRN       Continuous    multi-electrolyte, 150 mL/hr, Last Rate: 150 mL/hr (08/06/23 0557)         Labs:    CBC    Recent Labs     08/05/23 0340 08/06/23  0435   WBC 24.85* 22.48*   HGB 7.5* 6.0*   HCT 23.1* 18.7*    292     BMP    Recent Labs     08/05/23 0340 08/06/23  0435   SODIUM 136 138   K 3.4* 3.7   CL 99 101   CO2 29 28   AGAP 8 9   BUN 33* 43*   CREATININE 1.96* 1.73*   CALCIUM 7.7* 7.6*       Coags    Recent Labs     08/04/23 1454 08/04/23 2103 08/05/23  0341   INR 4.44*  --   --    PTT 50* 45* 109*        Additional Electrolytes  Recent Labs     08/04/23 2103 08/05/23 0340 08/06/23  0435   MG 2.5 2.4 2.5   PHOS 5.3* 5.5* 6.6*   CAIONIZED 1.02* 1.00*  --           Blood Gas    No recent results  Recent Labs     08/04/23  1454   PHVEN 7.408*   UKV1ECG 41.3*   PO2VEN 34.5*   VBY5VLC 25.5   BEVEN 0.7    LFTs  Recent Labs     08/05/23 0340 08/06/23  0435   ALT 46 24   AST 51* 34   ALKPHOS 101 87   ALB 1.4* 1.4*   TBILI 1.16* 0.61       Infectious  Recent Labs     08/04/23 1454 08/04/23 2103   PROCALCITONI 1.73* 2.35*     Glucose  Recent Labs     08/04/23 1454 08/04/23 2103 08/05/23 0340 08/06/23  0435   GLUC 286* 265* 243* 237*               Domi Esqueda, DO

## 2023-08-06 NOTE — QUICK NOTE
Hemoglobin drop noted, CTA obtained by the surgery team    I personally reviewed the imaging remotely    Drain does not traverse stomach wall. It does not traverse any major named vessel.     The prior collection  most compatible with organizing/old hematoma is decreased in size    There are persistent numerous enhancing abnormalities which likely represent degenerated portions of the spleen that still enhance    there is no active bleeding  There is no increase in size of hematoma along the drain tract    Recommendations  -Transfuse as needed  -Recheck coagulation parameters including INR (believed to be falsely elevated due to Eliquis)  -Stop heparin drip    If bright red blood comes out of the drain please CAP the drain and call IR    Suspect that there was a prior bleeding event with continued slow ooze related to markedly disrupted pancreatic parenchyma    Discussed with surgery Team

## 2023-08-07 ENCOUNTER — TELEPHONE (OUTPATIENT)
Dept: FAMILY MEDICINE CLINIC | Facility: CLINIC | Age: 53
End: 2023-08-07

## 2023-08-07 LAB
ABO GROUP BLD BPU: NORMAL
ALBUMIN SERPL BCP-MCNC: 1.4 G/DL (ref 3.5–5)
ALP SERPL-CCNC: 79 U/L (ref 46–116)
ALT SERPL W P-5'-P-CCNC: 21 U/L (ref 12–78)
ANION GAP SERPL CALCULATED.3IONS-SCNC: 5 MMOL/L
ANION GAP SERPL CALCULATED.3IONS-SCNC: 6 MMOL/L
AST SERPL W P-5'-P-CCNC: 25 U/L (ref 5–45)
BACTERIA BLD CULT: NORMAL
BACTERIA BLD CULT: NORMAL
BILIRUB SERPL-MCNC: 0.43 MG/DL (ref 0.2–1)
BPU ID: NORMAL
BUN SERPL-MCNC: 43 MG/DL (ref 5–25)
BUN SERPL-MCNC: 44 MG/DL (ref 5–25)
CALCIUM ALBUM COR SERPL-MCNC: 10.1 MG/DL (ref 8.3–10.1)
CALCIUM SERPL-MCNC: 7.7 MG/DL (ref 8.3–10.1)
CALCIUM SERPL-MCNC: 8 MG/DL (ref 8.3–10.1)
CHLORIDE SERPL-SCNC: 100 MMOL/L (ref 96–108)
CHLORIDE SERPL-SCNC: 103 MMOL/L (ref 96–108)
CO2 SERPL-SCNC: 30 MMOL/L (ref 21–32)
CO2 SERPL-SCNC: 30 MMOL/L (ref 21–32)
CREAT SERPL-MCNC: 1.54 MG/DL (ref 0.6–1.3)
CREAT SERPL-MCNC: 1.63 MG/DL (ref 0.6–1.3)
CROSSMATCH: NORMAL
ERYTHROCYTE [DISTWIDTH] IN BLOOD BY AUTOMATED COUNT: 15.2 % (ref 11.6–15.1)
ERYTHROCYTE [DISTWIDTH] IN BLOOD BY AUTOMATED COUNT: 15.6 % (ref 11.6–15.1)
EST. AVERAGE GLUCOSE BLD GHB EST-MCNC: 154 MG/DL
GFR SERPL CREATININE-BSD FRML MDRD: 47 ML/MIN/1.73SQ M
GFR SERPL CREATININE-BSD FRML MDRD: 51 ML/MIN/1.73SQ M
GLUCOSE SERPL-MCNC: 108 MG/DL (ref 65–140)
GLUCOSE SERPL-MCNC: 124 MG/DL (ref 65–140)
GLUCOSE SERPL-MCNC: 139 MG/DL (ref 65–140)
GLUCOSE SERPL-MCNC: 150 MG/DL (ref 65–140)
GLUCOSE SERPL-MCNC: 154 MG/DL (ref 65–140)
GLUCOSE SERPL-MCNC: 165 MG/DL (ref 65–140)
GLUCOSE SERPL-MCNC: 175 MG/DL (ref 65–140)
GLUCOSE SERPL-MCNC: 178 MG/DL (ref 65–140)
GLUCOSE SERPL-MCNC: 225 MG/DL (ref 65–140)
GLUCOSE SERPL-MCNC: 231 MG/DL (ref 65–140)
GLUCOSE SERPL-MCNC: 234 MG/DL (ref 65–140)
GLUCOSE SERPL-MCNC: 319 MG/DL (ref 65–140)
GLUCOSE SERPL-MCNC: 346 MG/DL (ref 65–140)
GLUCOSE SERPL-MCNC: 85 MG/DL (ref 65–140)
HBA1C MFR BLD: 7 %
HCT VFR BLD AUTO: 21.4 % (ref 36.5–49.3)
HCT VFR BLD AUTO: 21.9 % (ref 36.5–49.3)
HGB BLD-MCNC: 6.8 G/DL (ref 12–17)
HGB BLD-MCNC: 7 G/DL (ref 12–17)
HGB BLD-MCNC: 7 G/DL (ref 12–17)
HGB BLD-MCNC: 7.8 G/DL (ref 12–17)
HGB BLD-MCNC: 8.2 G/DL (ref 12–17)
MAGNESIUM SERPL-MCNC: 2.5 MG/DL (ref 1.6–2.6)
MAGNESIUM SERPL-MCNC: 2.6 MG/DL (ref 1.6–2.6)
MCH RBC QN AUTO: 28.8 PG (ref 26.8–34.3)
MCH RBC QN AUTO: 28.8 PG (ref 26.8–34.3)
MCHC RBC AUTO-ENTMCNC: 32 G/DL (ref 31.4–37.4)
MCHC RBC AUTO-ENTMCNC: 32.1 G/DL (ref 31.4–37.4)
MCV RBC AUTO: 90 FL (ref 82–98)
MCV RBC AUTO: 90 FL (ref 82–98)
NRBC BLD AUTO-RTO: 0 /100 WBCS
PHOSPHATE SERPL-MCNC: 4.7 MG/DL (ref 2.7–4.5)
PHOSPHATE SERPL-MCNC: 5.2 MG/DL (ref 2.7–4.5)
PLATELET # BLD AUTO: 375 THOUSANDS/UL (ref 149–390)
PLATELET # BLD AUTO: 391 THOUSANDS/UL (ref 149–390)
PMV BLD AUTO: 10 FL (ref 8.9–12.7)
PMV BLD AUTO: 9.9 FL (ref 8.9–12.7)
POTASSIUM SERPL-SCNC: 3.6 MMOL/L (ref 3.5–5.3)
POTASSIUM SERPL-SCNC: 3.6 MMOL/L (ref 3.5–5.3)
PROT SERPL-MCNC: 5.5 G/DL (ref 6.4–8.4)
RBC # BLD AUTO: 2.4 MILLION/UL (ref 3.88–5.62)
RBC # BLD AUTO: 2.43 MILLION/UL (ref 3.88–5.62)
SODIUM SERPL-SCNC: 135 MMOL/L (ref 135–147)
SODIUM SERPL-SCNC: 139 MMOL/L (ref 135–147)
UNIT DISPENSE STATUS: NORMAL
UNIT PRODUCT CODE: NORMAL
UNIT PRODUCT VOLUME: 350 ML
UNIT RH: NORMAL
WBC # BLD AUTO: 20.73 THOUSAND/UL (ref 4.31–10.16)
WBC # BLD AUTO: 20.78 THOUSAND/UL (ref 4.31–10.16)

## 2023-08-07 PROCEDURE — 99232 SBSQ HOSP IP/OBS MODERATE 35: CPT | Performed by: NURSE PRACTITIONER

## 2023-08-07 PROCEDURE — 84100 ASSAY OF PHOSPHORUS: CPT | Performed by: NURSE PRACTITIONER

## 2023-08-07 PROCEDURE — 83735 ASSAY OF MAGNESIUM: CPT | Performed by: NURSE PRACTITIONER

## 2023-08-07 PROCEDURE — P9016 RBC LEUKOCYTES REDUCED: HCPCS

## 2023-08-07 PROCEDURE — 97163 PT EVAL HIGH COMPLEX 45 MIN: CPT

## 2023-08-07 PROCEDURE — C9113 INJ PANTOPRAZOLE SODIUM, VIA: HCPCS | Performed by: STUDENT IN AN ORGANIZED HEALTH CARE EDUCATION/TRAINING PROGRAM

## 2023-08-07 PROCEDURE — 85027 COMPLETE CBC AUTOMATED: CPT | Performed by: NURSE PRACTITIONER

## 2023-08-07 PROCEDURE — 82948 REAGENT STRIP/BLOOD GLUCOSE: CPT

## 2023-08-07 PROCEDURE — 97166 OT EVAL MOD COMPLEX 45 MIN: CPT

## 2023-08-07 PROCEDURE — 99233 SBSQ HOSP IP/OBS HIGH 50: CPT | Performed by: INTERNAL MEDICINE

## 2023-08-07 PROCEDURE — 83036 HEMOGLOBIN GLYCOSYLATED A1C: CPT

## 2023-08-07 PROCEDURE — 99232 SBSQ HOSP IP/OBS MODERATE 35: CPT | Performed by: SURGERY

## 2023-08-07 PROCEDURE — 80053 COMPREHEN METABOLIC PANEL: CPT | Performed by: NURSE PRACTITIONER

## 2023-08-07 PROCEDURE — 99255 IP/OBS CONSLTJ NEW/EST HI 80: CPT | Performed by: INTERNAL MEDICINE

## 2023-08-07 PROCEDURE — 85018 HEMOGLOBIN: CPT | Performed by: NURSE PRACTITIONER

## 2023-08-07 PROCEDURE — 30233N1 TRANSFUSION OF NONAUTOLOGOUS RED BLOOD CELLS INTO PERIPHERAL VEIN, PERCUTANEOUS APPROACH: ICD-10-PCS | Performed by: SURGERY

## 2023-08-07 RX ORDER — ENOXAPARIN SODIUM 100 MG/ML
40 INJECTION SUBCUTANEOUS EVERY 12 HOURS SCHEDULED
Status: DISCONTINUED | OUTPATIENT
Start: 2023-08-07 | End: 2023-08-08

## 2023-08-07 RX ORDER — POTASSIUM CHLORIDE 20 MEQ/1
40 TABLET, EXTENDED RELEASE ORAL ONCE
Status: COMPLETED | OUTPATIENT
Start: 2023-08-07 | End: 2023-08-07

## 2023-08-07 RX ADMIN — PANTOPRAZOLE SODIUM 40 MG: 40 INJECTION, POWDER, FOR SOLUTION INTRAVENOUS at 08:43

## 2023-08-07 RX ADMIN — SODIUM CHLORIDE, SODIUM GLUCONATE, SODIUM ACETATE, POTASSIUM CHLORIDE, MAGNESIUM CHLORIDE, SODIUM PHOSPHATE, DIBASIC, AND POTASSIUM PHOSPHATE 150 ML/HR: .53; .5; .37; .037; .03; .012; .00082 INJECTION, SOLUTION INTRAVENOUS at 02:30

## 2023-08-07 RX ADMIN — CHLORHEXIDINE GLUCONATE 15 ML: 1.2 SOLUTION ORAL at 21:28

## 2023-08-07 RX ADMIN — SODIUM CHLORIDE, SODIUM GLUCONATE, SODIUM ACETATE, POTASSIUM CHLORIDE, MAGNESIUM CHLORIDE, SODIUM PHOSPHATE, DIBASIC, AND POTASSIUM PHOSPHATE 150 ML/HR: .53; .5; .37; .037; .03; .012; .00082 INJECTION, SOLUTION INTRAVENOUS at 08:59

## 2023-08-07 RX ADMIN — ACETAMINOPHEN 975 MG: 325 TABLET, FILM COATED ORAL at 06:07

## 2023-08-07 RX ADMIN — PANTOPRAZOLE SODIUM 40 MG: 40 INJECTION, POWDER, FOR SOLUTION INTRAVENOUS at 21:28

## 2023-08-07 RX ADMIN — SODIUM CHLORIDE 8 UNITS/HR: 9 INJECTION, SOLUTION INTRAVENOUS at 02:27

## 2023-08-07 RX ADMIN — ENOXAPARIN SODIUM 40 MG: 40 INJECTION SUBCUTANEOUS at 11:23

## 2023-08-07 RX ADMIN — ENOXAPARIN SODIUM 40 MG: 40 INJECTION SUBCUTANEOUS at 21:31

## 2023-08-07 RX ADMIN — POTASSIUM CHLORIDE 40 MEQ: 1500 TABLET, EXTENDED RELEASE ORAL at 01:11

## 2023-08-07 RX ADMIN — ACETAMINOPHEN 975 MG: 325 TABLET, FILM COATED ORAL at 21:28

## 2023-08-07 RX ADMIN — POTASSIUM CHLORIDE 40 MEQ: 1500 TABLET, EXTENDED RELEASE ORAL at 06:08

## 2023-08-07 RX ADMIN — CHLORHEXIDINE GLUCONATE 15 ML: 1.2 SOLUTION ORAL at 08:51

## 2023-08-07 NOTE — UTILIZATION REVIEW
Continued Stay Review    Date: 8/7/23                          Current Patient Class: IP  Current Level of Care: Critical Care    HPI:52 y.o. male initially admitted on 8/4     Assessment/Plan: with necrotizing pancreatitis and non-occlusive SMV s/p IR drainage 8/5. Continue NPO. Holding heparin drip. Maintain IR drain. Continue insulin drip. Continue to monitor off antibiotics. Continue IV fluids.      Vital Signs:     Date/Time Temp Pulse Resp BP MAP (mmHg) SpO2 Calculated FIO2 (%) - Nasal Cannula Nasal Cannula O2 Flow Rate (L/min) O2 Device   08/07/23 1100 -- 94 17 129/68 93 96 % -- -- --   08/07/23 1000 -- 90 14 125/64 78 95 % -- -- --   08/07/23 0900 -- 92 18 106/59 70 94 % -- -- --   08/07/23 0800 98.6 °F (37 °C) 90 17 105/71 81 97 % 28 2 L/min Nasal cannula   08/07/23 0700 -- 90 16 106/62 79 96 % -- -- --   08/07/23 0600 -- 86 16 111/66 82 95 % -- -- --   08/07/23 0500 -- 90 15 106/63 79 94 % -- -- --   08/07/23 0400 97.5 °F (36.4 °C) 88 13 121/67 87 97 % -- -- --   08/07/23 0355 -- -- -- -- -- -- 28 2 L/min Nasal cannula   08/07/23 0300 -- 84 14 106/62 76 99 % -- -- --   08/07/23 0241 -- 92 15 -- -- 82 % Abnormal  -- -- None (Room air)   08/07/23 0200 -- 88 14 112/62 74 99 % -- -- --   08/07/23 0115 -- 96 22 105/58 80 90 % -- -- --   08/07/23 0100 -- 100 18 -- -- 96 % 24 1 L/min Nasal cannula   08/07/23 0005 -- 88 17 -- -- 98 % -- -- --   08/07/23 0000 -- 90 16 117/64 81 99 % -- -- --   08/06/23 2300 -- 90 15 106/68 84 98 % -- -- --   08/06/23 2200 -- 90 16 112/68 89 98 % 28 2 L/min Nasal cannula   08/06/23 2100 -- 90 16 115/65 86 100 % -- -- --   08/06/23 2015 -- -- -- -- -- -- 32 3 L/min Nasal cannula   08/06/23 2000 98.2 °F (36.8 °C) 90 16 117/64 77 99 % -- -- --   08/06/23 1900 -- 100 21 103/63 75 98 % -- -- --   08/06/23 1800 -- 98 21 112/57 68 98 % -- -- --   08/06/23 1715 -- 94 19 110/60 68 100 % -- -- --   08/06/23 1700 -- 92 19 -- -- 99 % -- -- --   08/06/23 1615 -- 92 18 106/57 84 99 % -- -- -- 08/06/23 1600 -- 94 19 -- -- 100 % -- -- --   08/06/23 1515 -- 92 15 104/65 85 100 % -- -- --   08/06/23 1500 -- 94 15 -- -- 100 % -- -- --   08/06/23 1405 -- 98 19 103/58 77 100 % -- -- --   08/06/23 1400 -- 98 19 -- -- 100 % -- -- --   08/06/23 1305 -- 100 21 112/53 71 98 % -- -- --   08/06/23 1300 -- 102 18 -- -- 98 % -- -- --   08/06/23 1205 -- 98 21 126/61 80 97 % -- -- --   08/06/23 1200 97.7 °F (36.5 °C) 96 18 -- -- 97 % 28 2 L/min Nasal cannula   08/06/23 1145 -- 98 24 Abnormal  -- -- 98 % -- -- --   08/06/23 1130 -- 90 18 -- -- 98 % -- -- --   08/06/23 1100 -- 92 19 -- -- 96 % -- -- --   08/06/23 1045 -- 94 22 111/62 -- 97 % -- -- --   08/06/23 1030 -- 90 14 -- -- 98 % -- -- --   08/06/23 1015 -- 90 16 119/72 -- 98 % -- -- --   08/06/23 1000 -- 92 17 127/69 89 98 % -- -- --   08/06/23 0945 -- 96 23 Abnormal  119/62 -- 98 % -- -- --   08/06/23 0935 98 °F (36.7 °C) 92 18 123/65 -- 96 % 28 2 L/min Nasal cannula   08/06/23 0930 97.9 °F (36.6 °C) 92 18 121/65 -- 97 % 28 2 L/min Nasal cannula   08/06/23 0918 98 °F (36.7 °C) 95 19 117/65 -- 97 % 28 2 L/min Nasal cannula   08/06/23 0900 -- 96 17 -- -- 98 % -- -- --   08/06/23 0800 98 °F (36.7 °C) 92 15 -- -- 97 % 24 1 L/min Nasal cannula   08/06/23 0745 -- 94 16 126/60 -- 99 % -- -- --   08/06/23 0700 -- 92 19 -- -- 96 % -- -- --   08/06/23 0535 -- 90 15 110/63 77 100 % -- -- --   08/06/23 0435 -- 94 14 106/58 75 99 % -- -- --   08/06/23 0425 97.8 °F (36.6 °C) 90 12 -- -- 97 % -- -- --   08/06/23 0400 -- 90 15 -- -- 99 % -- -- --   08/06/23 0335 -- 90 16 119/59 72 98 % -- -- --   08/06/23 0310 -- -- -- -- -- -- 28 2 L/min Nasal cannula   08/06/23 0235 -- 94 15 111/65 77 90 % -- -- None (Room air)   08/06/23 0135 -- 90 12 105/61 70 99 % 28 2 L/min Nasal cannula   08/06/23 0035 -- 98 12 103/54 65 97 % -- -- --   08/05/23 2335 -- 96 12 107/56 70 99 % -- -- --   08/05/23 2235 -- 96 15 111/67 80 99 % -- -- --   08/05/23 2135 -- 96 17 108/65 78 98 % -- -- --   08/05/23 2100 -- -- -- -- -- -- 28 2 L/min Nasal cannula   08/05/23 2045 -- 102 19 -- -- 90 % -- -- None (Room air)   08/05/23 2035 98.1 °F (36.7 °C) 102 17 103/57 71 96 % -- -- --   08/05/23 2005 -- 98 14 119/56 70 99 % -- -- --   08/05/23 2000 -- 100 14 -- -- 99 % 36 4 L/min Nasal cannula       Pertinent Labs/Diagnostic Results:     8/6 CTA abd/pelvis:  IMPRESSION:     1. Interval decrease in size of acute necrotic collection (ANC) in the lesser sac status post percutaneous drainage, with improved but persistent mass effect on the gastric body. There is increased attenuation of the collection, in keeping with   hemorrhagic/proteinaceous contents. However, no active contrast extravasation is seen.     2. Similar appearance of acute necrotizing pancreatitis with extensive (>30%) parenchymal necrosis, tracking peripancreatic fluid and moderate volume ascites.       Results from last 7 days   Lab Units 08/07/23  1113 08/07/23  0435 08/06/23  2358 08/06/23  1400 08/06/23  0435 08/05/23  0340   WBC Thousand/uL  --  20.73* 20.78* 23.46* 22.48* 24.85*   HEMOGLOBIN g/dL 6.8* 7.0* 7.0* 7.1* 6.0* 7.5*   HEMATOCRIT %  --  21.9* 21.4* 21.9* 18.7* 23.1*   PLATELETS Thousands/uL  --  391* 375 328 292 351         Results from last 7 days   Lab Units 08/07/23  0435 08/06/23  2358 08/06/23  0435 08/05/23  0340 08/04/23  2103   SODIUM mmol/L 139 135 138 136 135   POTASSIUM mmol/L 3.6 3.6 3.7 3.4* 3.6   CHLORIDE mmol/L 103 100 101 99 98   CO2 mmol/L 30 30 28 29 28   ANION GAP mmol/L 6 5 9 8 9   BUN mg/dL 43* 44* 43* 33* 27*   CREATININE mg/dL 1.54* 1.63* 1.73* 1.96* 1.65*   EGFR ml/min/1.73sq m 51 47 44 38 47   CALCIUM mg/dL 8.0* 7.7* 7.6* 7.7* 7.7*   CALCIUM, IONIZED mmol/L  --   --   --  1.00* 1.02*   MAGNESIUM mg/dL 2.5 2.6 2.5 2.4 2.5   PHOSPHORUS mg/dL 4.7* 5.2* 6.6* 5.5* 5.3*     Results from last 7 days   Lab Units 08/07/23  0435 08/06/23  0435 08/05/23  0340 08/04/23  2103 08/04/23  1454   AST U/L 25 34 51* 61* 63*   ALT U/L 21 24 46 53 60*   ALK PHOS U/L 79 87 101 111 120*   TOTAL PROTEIN g/dL 5.5* 5.5* 5.6* 5.6* 6.5   ALBUMIN g/dL 1.4* 1.4* 1.4* 1.5* 2.4*   TOTAL BILIRUBIN mg/dL 0.43 0.61 1.16* 1.43* 2.34*     Results from last 7 days   Lab Units 08/07/23  1153 08/07/23  1001 08/07/23  0759 08/07/23  0600 08/07/23  0432 08/07/23  0227 08/06/23  2344 08/06/23  1753 08/06/23  1137 08/06/23  0603 08/06/23  0003 08/05/23  1727   POC GLUCOSE mg/dl 178* 231* 124 108 165* 319* 386* 357* 234* 244* 251* 266*     Results from last 7 days   Lab Units 08/07/23  0435 08/06/23  2358 08/06/23  0435 08/05/23  0340 08/04/23 2103 08/04/23  1454   GLUCOSE RANDOM mg/dL 150* 346* 237* 243* 265* 286*         Results from last 7 days   Lab Units 08/07/23  0435   HEMOGLOBIN A1C % 7.0*   EAG mg/dl 154           Results from last 7 days   Lab Units 08/04/23  1454   PH WILLIAM  7.408*   PCO2 WILLIAM mm Hg 41.3*   PO2 WILLIAM mm Hg 34.5*   HCO3 WILLIAM mmol/L 25.5   BASE EXC WILLIAM mmol/L 0.7   O2 CONTENT WILLIAM ml/dL 8.8   O2 HGB, VENOUS % 62.7             Results from last 7 days   Lab Units 08/04/23  1642 08/04/23  1454   HS TNI 0HR ng/L  --  11   HS TNI 2HR ng/L 8  --    HSTNI D2 ng/L -3  --          Results from last 7 days   Lab Units 08/06/23  1241 08/05/23  0341 08/04/23 2103 08/04/23  1454   PROTIME seconds 21.8*  --   --  41.3*   INR  1.88*  --   --  4.44*   PTT seconds  --  109* 45* 50*         Results from last 7 days   Lab Units 08/04/23 2103 08/04/23  1454   PROCALCITONIN ng/ml 2.35* 1.73*     Results from last 7 days   Lab Units 08/05/23  0008 08/04/23  2103 08/04/23  1642 08/04/23  1454   LACTIC ACID mmol/L 1.8 2.2* 2.0 5.0*         Results from last 7 days   Lab Units 08/07/23  1048 08/07/23  0555   UNIT PRODUCT CODE  [id number]   UNIT NUMBER  [id number]   UNITABO  B B   UNITRH  POS POS   CROSSMATCH  Compatible Compatible   UNIT DISPENSE STATUS  Crossmatched Presumed Trans   UNIT PRODUCT VOL mL 350 350         Results from last 7 days   Lab Units 08/04/23  2103 08/04/23  1454   LIPASE u/L 545* 129*         Results from last 7 days   Lab Units 08/05/23  1517 08/04/23  2103 08/04/23  1454   BLOOD CULTURE   --  No Growth at 48 hrs. No Growth at 48 hrs. No Growth at 48 hrs. No Growth at 48 hrs. GRAM STAIN RESULT  No Polys or Bacteria seen  --   --    BODY FLUID CULTURE, STERILE  No growth  --   --          Medications:   Scheduled Medications:  acetaminophen, 975 mg, Oral, Q8H [address]  chlorhexidine, 15 mL, Mouth/Throat, Q12H ALEX  enoxaparin, 40 mg, Subcutaneous, Q12H ALEX  pantoprazole, 40 mg, Intravenous, Q12H [address]      Continuous IV Infusions:  insulin regular (HumuLIN R,NovoLIN R) 1 Units/mL in sodium chloride 0.9 % 100 mL infusion, 0.3-21 Units/hr, Intravenous, Titrated      PRN Meds:  HYDROmorphone, 0.5 mg, Intravenous, Q4H PRN  labetalol, 10 mg, Intravenous, Q6H PRN  ondansetron, 4 mg, Intravenous, Q6H PRN  oxyCODONE, 10 mg, Oral, Q4H PRN  oxyCODONE, 5 mg, Oral, Q4H PRN        Discharge Plan: [facility]    Network Utilization Review Department  ATTENTION: Please call with any questions or concerns to [phone] and carefully listen to the prompts so that you are directed to the right person. All voicemails are confidential.  Jose Mohs all requests for admission clinical reviews, approved or denied determinations and any other requests to dedicated fax number below belonging to the campus where the patient is receiving treatment.  List of dedicated fax numbers for the Facilities:  [place] DENIALS (Administrative/Medical Necessity) [phone] [facility] [facility] [address] [place] [address] [phone]   [address] [phone] [address] [place] [address] [address]  [facility] [phone]

## 2023-08-07 NOTE — OCCUPATIONAL THERAPY NOTE
Occupational Therapy Evaluation     Patient Name: Shanna Harris  Today's Date: 8/7/2023  Problem List  Principal Problem:    Necrotizing pancreatitis    Past Medical History  Past Medical History:   Diagnosis Date    Pancreatitis      Past Surgical History  Past Surgical History:   Procedure Laterality Date    CYSTOSCOPY N/A 8/5/2023    Procedure: CYSTOSCOPY. Saunders insertion;  Surgeon: Ben Busch MD;  Location: [facility];  Service: Urology    FL RETROGRADE URETHROCYSTOGRAM  8/5/2023    HERNIA REPAIR      JOINT REPLACEMENT      TONSILLECTOMY  1976         08/07/23 1445   OT Last Visit   OT Visit Date 08/07/23   Note Type   Note type Evaluation   Pain Assessment   Pain Assessment Tool 0-10   Pain Score No Pain   Restrictions/Precautions   Weight Bearing Precautions Per Order No   Other Precautions Multiple lines;Telemetry;O2;Fall Risk  (1L O2; DONNY DRAIN)   Home Living   Type of [address] [place] [address] One level;Stairs to enter with rails  (2 REJI)   Bathroom Shower/Tub Walk-in shower   Bathroom Toilet Standard   Bathroom Equipment Grab bars in shower   Additional Comments NO USE OF DME AT BASELINE   Prior Function   Level of [place] Independent with ADLs; Independent with IADLS   Lives With Spouse   Receives Help From Family   IADLs Independent with driving; Independent with meal prep; Independent with medication management   Falls in the last 6 months 0   Vocational Full time employment   Lifestyle   Autonomy PT FULLY INDEPENDENT AT BASELINE   Reciprocal Relationships LIVES WITH SUPPORTIVE SPOUSE WHO IS ABLE TO BE HOME AND ASSIST AS NEEDED   Service to Others WORKS FULL TIME AS AN  AT [employer]   Intrinsic Gratification ENJOYS WITH HIS DOG   ADL   Eating Assistance 7  Independent   Grooming Assistance 7  Independent   UB Bathing Assistance 5  Supervision/Setup   LB Bathing Assistance 4  Minimal Assistance   UB Dressing Assistance 5  Supervision/Setup   LB Dressing Assistance 4 Minimal Assistance   Toileting Assistance  4  Minimal Assistance   Functional Assistance 4  Minimal Assistance   Bed Mobility   Supine to Sit 5  Supervision   Additional items Increased time required   Sit to Supine Unable to assess  (PT LEFT OOB WITH ALL NEEDS IN REACH)   Transfers   Sit to Stand 5  Supervision   Additional items Increased time required   Stand to Sit 5  Supervision   Additional items Increased time required   Functional Mobility   Functional Mobility 5  Supervision   Balance   Static Sitting Good   Static Standing Fair   Ambulatory Fair -   Activity Tolerance   Activity Tolerance Patient limited by fatigue   Medical Staff Made Aware PT SEEN FOR CO-SESSION WITH SKILLED PHYSICAL THERAPIST 2' CLINICALLY UNSTABLE PRESENTATION, NEW PRECAUTIONS/LIMITATIONS, LIMITED ACTIVITY TOLERANCE AND PRESENT IMPAIRMENTS WHICH ARE A REGRESSION FROM THE PT'S BASELINE AND IMPACTING OVERALL OCCUPATIONAL PERFORMANCE. Nurse Made Aware APPROPRIATE TO SEE   RUE Assessment   RUE Assessment WFL   LUE Assessment   LUE Assessment WFL   Cognition   Overall Cognitive Status WFL   Arousal/Participation Alert; Cooperative   Attention Within functional limits   Orientation Level Oriented X4   Memory Within functional limits   Following Commands Follows all commands and directions without difficulty   Comments PT IS PLEASANT AND COOPERATIVE   Assessment   Assessment 47 YO Male SEEN FOR INITIAL OCCUPATIONAL THERAPY EVALUATION FOLLOWING TXF FROM [facility] WITH NECROTIZING PANCREATITIS. PT IS S/P CYSTOSCOPY AND IR DRAINAGE TUBE PLACEMENT. PROBLEMS LIST/PMH INCLUDES PANCREATITIS, HTN, HYPONATREMIA, LEUKOCYTOSIS AND JERZY. PT IS FROM HOME WITH SPOUSE WHERE HE REPORTS BEING INDEPENDENT WITH ADLS/IADLS/DRIVING PTA. PT CURRENTLY REQUIRES OVERALL MIN A WITH ADLS SUPERVISION WITH TRANSFERS /FUNCTIONAL MOBILITY WITHOUT USE OF AD.  PT IS EXPERIENCING EXPECTED LIMITATIONS 2' PAIN, FATIGUE, IMPAIRED BALANCE, OVERALL WEAKNESS/DECONDITIONING, MULTIPLE LINES and OVERALL LIMITED ACTIVITY TOLERANCE. PT EDUCATED ON DEEP BREATHING TECHNIQUES T/O ACTIVITY, SLOWING OF PACE, ENERGY CONSERVATION TECHNIQUES FOR CARRY OVER UPON D/C, INCREASED FAMILY SUPPORT and CONTINUE PARTICIPATION IN SELF-CARE/MOBILITY WITH STAFF [address] [place] [address] . The patient's raw score on the AM-PAC Daily Activity Inpatient Short Form is 20. A raw score of greater than or equal to 19 suggests the patient may benefit from discharge to home. Please refer to the recommendation of the Occupational Therapist for safe discharge planning. FROM AN OCCUPATIONAL THERAPY PERSPECTIVE, PT CAN RETURN HOME WITH INCREASED FAMILY SUPPORT WHEN MEDICALLY CLEARED. DME RECS INCLUDE SC. ALL QUESTIONS/CONCERNS ADDRESSED. NO ADDITIONAL ACUTE CARE OT NEEDS. D/C OT.    Goals   Patient Goals TO USE THE BATHROOM   Recommendation   OT Discharge Recommendation No rehabilitation needs   Equipment Recommended Shower/Tub chair with back ($)   AM-PAC Daily Activity Inpatient   Lower Body Dressing 3   Bathing 3   Toileting 3   Upper Body Dressing 3   Grooming 4   Eating 4   Daily Activity Raw Score 20   Daily Activity Standardized Score (Calc for Raw Score >=11) 42.03   AM-PAC Applied Cognition Inpatient   Following a Speech/Presentation 4   Understanding Ordinary Conversation 4   Taking Medications 4   Remembering Where Things Are Placed or Put Away 4   Remembering List of 4-5 Errands 4   Taking Care of Complicated Tasks 4   Applied Cognition Raw Score 24   Applied Cognition Standardized Score 62.21       Documentation completed by SHIRA Fraser, OTR/L  [address] [place] [address] Certified ID# [id number]

## 2023-08-07 NOTE — CONSULTS
Consultation - Mauricio Mario 46 y.o. male MRN: [id number]    Unit/Bed#: MICU 13 Encounter: [id number]      Assessment/Plan     Assessment:  Mauricio Mario is a 46y.o. year old male with prediabetes, obesity, SMV thrombosis, hypertension, gallstone pancreatitis, OA s/p b/l hip arthroplasties who presented with abdominal pain and low appetite, found to have worsening of necrotizing pancreatitis with necrotic collection in the lesser sac now s/p IR guided percutaneous drainage. Endocrinology consulted for hyperglycemia. Plan:  Prediabetes  Hyperglycemia  HbA1c 7 August 2023, not reliable as his hematocrit 21  Home regimen: None  Inpatient regimen: Non-DKA protocol insulin drip    Recommendations:  - We recommend changing diet to carb controlled level 2 full liquid diet. .  Will reach out to the primary team to see if the Ensure clear can be changed to low-carb nutrition supplements such as Glucerna  - Recommend continuing with the insulin drip for now as the diet is being advanced. Will continue to monitor blood glucose and make adjustments as needed    CC: Hyperglycemia Consult    History of Present Illness     HPI: Mauricio Mario is a 46y.o. year old male with prediabetes, obesity, SMV thrombosis, hypertension, gallstone pancreatitis, OA s/p b/l hip arthroplasties who presented with abdominal pain and low appetite, found to have worsening of necrotizing pancreatitis with necrotic collection in the lesser sac now s/p IR guided percutaneous drainage. Endocrinology consulted for hyperglycemia. He denies any polyuria, polydipsia, nocturia and blurry vision. Denies to have checking blood glucose as outpatient. He reports that he does not have glucometer. Endorses that he is on [employer] and has lost 35 pounds in the last 6 weeks. Does not follow-up with PCP or endocrinologist as outpatient. There is no family history of type 2 diabetes mellitus, adrenal related disorders.   He denies any abdominal striae, easy bruising, recurrent infection, hx of spontaneous hypokalemia. Of note, he was recently discharged on 7/27/2023 and has not seen PCP or endocrinologist in the interim. He reports to drink 4-5 alcoholic drinks on the weekends. Denies any daily alcohol use  Upon evaluation today, patient reports to have tolerated the clear liquid diet well. His diet is going to be advanced to full liquid diet for lunch. He denies to have any abdominal pain        Inpatient consult to Endocrinology  Consult performed by: Benjamin Anderson MD  Consult ordered by: Josiane Patel MD          Review of Systems   Constitutional: Negative for activity change, appetite change, fatigue and unexpected weight change. HENT: Negative for congestion and sore throat. Eyes: Negative for redness and visual disturbance. Respiratory: Negative for cough and shortness of breath. Cardiovascular: Negative for palpitations and leg swelling. Gastrointestinal: Negative for abdominal pain, constipation, diarrhea, nausea and vomiting. Endocrine: Negative for cold intolerance, heat intolerance, polydipsia, polyphagia and polyuria. Genitourinary: Negative for difficulty urinating and dysuria. Musculoskeletal: Negative for gait problem and neck pain. Skin: Negative for color change. Neurological: Negative for dizziness and syncope. Psychiatric/Behavioral: Negative for agitation and behavioral problems. All other systems reviewed and are negative.       Historical Information   Past Medical History:   Diagnosis Date   • Pancreatitis      Past Surgical History:   Procedure Laterality Date   • HERNIA REPAIR     • JOINT REPLACEMENT     • TONSILLECTOMY  12     Social History   Social History     Substance and Sexual Activity   Alcohol Use Not Currently    Comment: quit 7/15/23 (previous 6 drinks per week)     Social History     Substance and Sexual Activity   Drug Use Never     Social History     Tobacco Use Smoking Status Never   • Passive exposure: Past   Smokeless Tobacco Former   • Types: Chew   • Quit date: 7/15/2023   Tobacco Comments    Quit 7/15/23     Family History:   Family History   Problem Relation Age of Onset   • Cancer Mother    • Multiple myeloma Mother    • Prostate cancer Father    • Cancer Father        Meds/Allergies   Current Facility-Administered Medications   Medication Dose Route Frequency Provider Last Rate Last Admin   • acetaminophen (TYLENOL) tablet 975 mg  975 mg Oral [facility] Jodean Pulse, CRNP   975 mg at 08/07/23 8793   • chlorhexidine (PERIDEX) 0.12 % oral rinse 15 mL  15 mL Mouth/Throat Q12H [address] [facility], CRNP   15 mL at 08/07/23 0851   • enoxaparin (LOVENOX) subcutaneous injection 40 mg  40 mg Subcutaneous Q12H [address] [facility], CRNP   40 mg at 08/07/23 1123   • HYDROmorphone (DILAUDID) injection 0.5 mg  0.5 mg Intravenous Q4H PRN Jodean Pulse, CRNP       • insulin regular (HumuLIN R,NovoLIN R) 1 Units/mL in sodium chloride 0.9 % 100 mL infusion  0.3-21 Units/hr Intravenous Titrated Eleni De La O MD 3 mL/hr at 08/07/23 1002 3 Units/hr at 08/07/23 1002   • labetalol (NORMODYNE) injection 10 mg  10 mg Intravenous Q6H PRN Jodean Pulse, CRNP       • ondansetron (ZOFRAN) injection 4 mg  4 mg Intravenous Q6H PRN Jodean Pulse, CRNP       • oxyCODONE (ROXICODONE) immediate release tablet 10 mg  10 mg Oral Q4H PRN Jodean Pulse, CRNP       • oxyCODONE (ROXICODONE) IR tablet 5 mg  5 mg Oral Q4H PRN Jodean Pulse, CRNP       • pantoprazole (PROTONIX) injection 40 mg  40 mg Intravenous Q12H [address]  Dayton Meyer MD   40 mg at 08/07/23 0843     No Known Allergies    Objective   Vitals: Blood pressure 129/68, pulse 94, temperature 98.6 °F (37 °C), temperature source Oral, resp. rate 17, height 5' 11" (1.803 m), weight (!) 140 kg (307 lb 12.2 oz), SpO2 96 %.     Intake/Output Summary (Last 24 hours) at 8/7/2023 1157  Last data filed at 8/7/2023 1030  Gross per 24 hour   Intake 5461.63 ml   Output 2195 ml   Net 3266.63 ml     Invasive Devices     Peripheral Intravenous Line  Duration           Peripheral IV 08/04/23 Right Arm 3 days    Peripheral IV 08/04/23 Right;Ventral (anterior) Hand 2 days          Drain  Duration           Abscess Drain RUQ 1 day    Urethral Catheter Other (Comment) 16 Fr. 1 day                Physical Exam  Constitutional:       Appearance: Normal appearance. HENT:      Head: Normocephalic and atraumatic. Cardiovascular:      Rate and Rhythm: Normal rate and regular rhythm. Pulses: Normal pulses. Heart sounds: Normal heart sounds. No murmur heard. No gallop. Pulmonary:      Effort: Pulmonary effort is normal.      Breath sounds: Normal breath sounds. No wheezing or rales. Abdominal:      Palpations: Abdomen is soft. Tenderness: There is no abdominal tenderness. Comments: Noted to have sanguinous drainage   Musculoskeletal:         General: No swelling. Cervical back: Normal range of motion. No tenderness. Right lower leg: No edema. Left lower leg: No edema. Lymphadenopathy:      Cervical: No cervical adenopathy. Skin:     General: Skin is warm. Neurological:      Mental Status: He is alert and oriented to person, place, and time. Mental status is at baseline. Psychiatric:         Mood and Affect: Mood normal.         Behavior: Behavior normal.         The history was obtained from the review of the chart, patient.     Lab Results:   Results from last 7 days   Lab Units 08/07/23  0435   HEMOGLOBIN A1C % 7.0*     Lab Results   Component Value Date    WBC 20.73 (H) 08/07/2023    HGB 6.8 (LL) 08/07/2023    HCT 21.9 (L) 08/07/2023    MCV 90 08/07/2023     (H) 08/07/2023     Lab Results   Component Value Date/Time    BUN 43 (H) 08/07/2023 04:35 AM    K 3.6 08/07/2023 04:35 AM     08/07/2023 04:35 AM    CO2 30 08/07/2023 04:35 AM    CREATININE 1.54 (H) 08/07/2023 04:35 AM AST 25 08/07/2023 04:35 AM    ALT 21 08/07/2023 04:35 AM    TP 5.5 (L) 08/07/2023 04:35 AM    ALB 1.4 (L) 08/07/2023 04:35 AM     No results for input(s): "CHOL", "HDL", "LDL", "TRIG", "VLDL" in the last 72 hours. No results found for: "Deena Simalexander", "BDXC41DDS"  POC Glucose (mg/dl)   Date Value   08/07/2023 231 (H)   08/07/2023 124   08/07/2023 108   08/07/2023 165 (H)   08/07/2023 319 (H)   08/06/2023 386 (H)   08/06/2023 357 (H)   08/06/2023 234 (H)   08/06/2023 244 (H)   08/06/2023 251 (H)       Imaging Studies: I have personally reviewed pertinent reports. Portions of the record may have been created with voice recognition software. Please Tigertext questions to the clinician covering the "[id number]" Role. Thank you.

## 2023-08-07 NOTE — PHYSICAL THERAPY NOTE
Physical Therapy Evaluation    Patient's Name: Cedric Ashby    Admitting Diagnosis  Abdominal pain [R10.9]  Necrotizing pancreatitis [K85.91]    Problem List  Patient Active Problem List   Diagnosis    Primary hypertension    Proteinuria    Chronic pain of both hips    Family history of prostate cancer in father    Necrotizing pancreatitis    Superior mesenteric artery thrombosis (HCC)    Pleural effusion, left    Hyponatremia    Abnormal urinalysis    Leukocytosis    JERZY (acute kidney injury) [facility])       Past Medical History  Past Medical History:   Diagnosis Date    Pancreatitis        Past Surgical History  Past Surgical History:   Procedure Laterality Date    CYSTOSCOPY N/A 8/5/2023    Procedure: CYSTOSCOPY. Saunders insertion;  Surgeon: Joceline Webster MD;  Location: [facility];  Service: Urology    FL RETROGRADE URETHROCYSTOGRAM  8/5/2023    HERNIA REPAIR      JOINT REPLACEMENT      TONSILLECTOMY  1976 08/07/23 1448   PT Last Visit   PT Visit Date 08/07/23   Note Type   Note type Evaluation   Pain Assessment   Pain Assessment Tool 0-10   Pain Score No Pain   Restrictions/Precautions   Weight Bearing Precautions Per Order No   Other Precautions Multiple lines;Telemetry;O2;Fall Risk  (1L/min)   Home Living   Type of Home House  (ranch)   Home Layout One level  (2 REJI)   Bathroom Shower/Tub Walk-in shower   Bathroom Toilet Standard   Bathroom Equipment Grab bars in shower   Prior Function   Level of [place] Independent with ADLs; Independent with functional mobility; Independent with IADLS  (I w/ ambulation w/o AD)   Lives With Spouse  (available to assist prn)   Receives Help From Family   IADLs Independent with driving; Independent with meal prep; Independent with medication management   Falls in the last 6 months 0   Vocational Full time employment  ( at [employer])   General   Family/Caregiver Present Yes  (spouse)   Cognition   Overall Cognitive Status WFL Arousal/Participation Alert   Orientation Level Oriented X4   Comments overall pleasant + cooperative   Subjective   Subjective Pt reported needing to use the bathroom. RLE Assessment   RLE Assessment WFL   LLE Assessment   LLE Assessment WFL   Coordination   Movements are Fluid and Coordinated 1   Bed Mobility   Supine to Sit 5  Supervision   Additional items Increased time required   Sit to Supine Unable to assess   Additional Comments Pt greeted in supine. Transfers   Sit to Stand 5  Supervision   Additional items Increased time required;Verbal cues   Stand to Sit 5  Supervision   Additional items Increased time required;Verbal cues   Additional Comments no AD   Ambulation/Elevation   Gait pattern Short stride; Excessively slow;Decreased foot clearance   Gait Assistance 5  Supervision  (MinAx1 -> CS)   Additional items Verbal cues   Assistive Device None   Distance 5' + 20'   Stair Management Assistance Not tested   Balance   Static Sitting Good   Dynamic Sitting Fair +   Static Standing Fair   Dynamic Standing Fair -   Ambulatory Fair -   Endurance Deficit   Endurance Deficit Yes   Endurance Deficit Description fatigue   Activity Tolerance   Activity Tolerance Patient limited by fatigue   Medical Staff Made Aware OT Chise   Nurse Made Aware yes - cleared for therapy   Assessment   Assessment Pt is 46 y.o. male seen for a PT evaluation s/p admit to [address] [place] [address] on 8/4/2023. Pt presenting w/ principal dx of necrotizing pancreatitis. Please see above for other active problem list / PMH. PT now consulted to assess functional mobility and needs for safe d/c planning. Prior to admission, pt was I + active w/o AD, lives w/ spouse in a [facility] w/ 2 REJI. Currently pt is S for bed skills; S for functional transfers; S for ambulation w/o AD. Pt is presents near baseline. No further skilled acute PT needs. Will d/c from caseload.    Barriers to Discharge None   Goals   Patient Goals go to the bathroom Recommendation   PT Discharge Recommendation No rehabilitation needs   AM-PAC Basic Mobility Inpatient   Turning in Flat Bed Without Bedrails 4   Lying on Back to Sitting on Edge of Flat Bed Without Bedrails 3   Moving Bed to Chair 3   Standing Up From Chair Using Arms 3   Walk in Room 3   Climb 3-5 Stairs With Railing 3   Basic Mobility Inpatient Raw Score 19   Basic Mobility Standardized Score 42.48   Highest Level Of Mobility   JH-HLM Goal 6: Walk 10 steps or more   JH-HLM Achieved 7: Walk 25 feet or more   End of Consult   Patient Position at End of Consult Bedside chair; All needs within reach  (all lines in tact, SCDs activated, spouse at bedside)     Aamir Rosales, PT, DPT

## 2023-08-07 NOTE — TELEPHONE ENCOUNTER
T/c from pt pts spouse Luan Raya - called to report / notify Hemphill Heather pt currently admitted to [address] [place] [address] aware / Luan Raya aware if better in a few days / discharged or sx's worsen to give our office a call / to schedule TCM. Agreed. Pts FMLA in providers bin for completion.        Please advise

## 2023-08-07 NOTE — CASE MANAGEMENT
Case Management Assessment    Patient name Karlynn Nissen  Location MICU 13/MICU 13 MRN [id number]  : 1970 Date 2023       Current Admission Date: 2023  Current Admission Diagnosis:Necrotizing pancreatitis   Patient Active Problem List    Diagnosis Date Noted   • Necrotizing pancreatitis 2023   • Superior mesenteric artery thrombosis ([address]) 2023   • Pleural effusion, left 2023   • Hyponatremia 2023   • Abnormal urinalysis 2023   • Leukocytosis 2023   • JERZY (acute kidney injury) ([address]) 2023   • Primary hypertension 2022   • Proteinuria 2022   • Chronic pain of both hips 2022   • Family history of prostate cancer in father 2022      LOS (days): 3  Geometric Mean LOS (GMLOS) (days):   Days to GMLOS:     OBJECTIVE:  PATIENT READMITTED TO HOSPITAL  Risk of Unplanned Readmission Score: 16.44         Current admission status: Inpatient       Preferred Pharmacy:   [facility]/pharmacy #[id number] [address]  Phone: [phone] Fax: [address] [address]  Phone: [phone] Fax: [phone]    Primary Care Provider: Juany Gates MD    Primary Insurance: Baldemar Martinez  Secondary Insurance:     ASSESSMENT:  [place] Proxies    There are no active Health Care Proxies on file.                  Readmission Root Cause  30 Day Readmission: Yes  Who directed you to return to the hospital?: Self  During previous admission, was a post-acute recommendation made?: No  Patient was readmitted due to: Necrotizing pancreatitis    Patient Information  Admitted from[de-identified] Home  Mental Status: Alert  During Assessment patient was accompanied by: Not accompanied during assessment  Assessment information provided by[de-identified] Patient  Primary Caregiver: Self  Support Systems: Self, Spouse/significant other  Home entry access options.  Select all that apply.: Stairs  Number of steps to enter home.: 2  Type of Current Residence: [place]  Homeless/housing insecurity resource given?: N/A  Living Arrangements: Lives w/ Spouse/significant other    Activities of Daily Living Prior to Admission  Functional Status: Independent  Completes ADLs independently?: Yes  Ambulates independently?: Yes  Does patient use assisted devices?: No  Does patient currently own DME?: No  Does patient have a history of Outpatient Therapy (PT/OT)?: No  Does the patient have a history of Short-Term Rehab?: No  Does patient have a history of HHC?: Yes  Does patient currently have [address]?: No         Patient Information Continued  Income Source: Employed  Food insecurity resource given?: N/A  Does patient receive dialysis treatments?: No  Does patient have a history of substance abuse?: No  Does patient have a history of Mental Health Diagnosis?: No         Means of Transportation  Means of Transport to [facility][de-identified] Drives Self  Was application for public transport provided?: N/A

## 2023-08-07 NOTE — PROGRESS NOTES
Progress Note- Rachana Hodges 46 y.o. male MRN: [id number]    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 Also seen on the CT abdomen where nodularity of liver surface concerning for early cirrhotic changes. Patient is now s/p IR drain placement on 8/5/2023 which resulted in nearly 400 cc bloody output. Over the last 48 hours continued to have drop in hemoglobin requiring blood transfusion and baseline hemoglobin around 13;, presented with hemoglobin of 9.4, dropped to 6.0 requiring 1 PRBC transfusion, since then hemoglobin has been stable around 7.0. Underwent CTA given bloody output through the peripancreatic drain which showed interval decrease in the acute necrotic collection, hemorrhagic contents with no active extravasation. 1. Acute necrotizing pancreatitis  Without any clear walled off necrosis. S/P IR perc drain placement with bloody output, resultant improvement in size of necrosis with clinical improvement. -- Repeat CT as needed but expect longer time required for wall formation which would preclude endoscopic drainage  -- at this time clinically not septic, no imaging findings for infection, ok with holding off antibiotics  -- tolerating clears well today    2. Acute blood loss anemia  Hemoglobin at baseline is normal around 12-13, downtrending initially likely in the setting of peripancreatic bleeding as evident by bloody output through the percutaneous drain. CTA was negative for any active extravasation however does confirm hemorrhagic content.   At this time hemoglobin has remained stable for the last 24 hours around 7.0.  -- Recommend rechecking hemoglobin later today, transfusion as needed, if he should develop any luminal signs of GI bleeding we will consider endoscopic evaluation given his mesenteric venous occlusion  -- If he continues to drop his hemoglobin without any signs of luminal bleeding will recommend IR angiography  -- Agree with holding anticoagulation for now given active bleeding    GI will follow  Discussed with surgical and ICU team   ______________________________________________________________________    Subjective:     Patient seen and examined at bedside. Patient reports that overall he feels like he is improving. Denies any abdominal pain however reports that he never had any significant abdominal pain. Has been able to tolerate clear liquid diet well. Reported to have 1 dark-colored bowel movement last night. Passing gas. Ambulating with assistance.     Medication Administration - last 24 hours from 08/06/2023 0922 to 08/07/2023 0690       Date/Time Order Dose Route Action Action by     08/07/2023 0851 EDT chlorhexidine (PERIDEX) 0.12 % oral rinse 15 mL 15 mL Mouth/Throat Given [employer], RN     08/06/2023 2119 EDT chlorhexidine (PERIDEX) 0.12 % oral rinse 15 mL 15 mL Mouth/Throat Given Angelica Felipe RN     08/07/2023 0859 EDT multi-electrolyte (PLASMALYTE-A/ISOLYTE-S PH 7.4) IV solution 150 mL/hr Intravenous [address]     08/07/2023 4702 EDT multi-electrolyte (PLASMALYTE-A/ISOLYTE-S PH 7.4) IV solution 0 mL/hr Intravenous Stopped [employer], RN     08/07/2023 0230 EDT multi-electrolyte (PLASMALYTE-A/ISOLYTE-S PH 7.4) IV solution 150 mL/hr Intravenous [address] Angelica Felipe RN     08/06/2023 2031 EDT multi-electrolyte (PLASMALYTE-A/ISOLYTE-S PH 7.4) IV solution 150 mL/hr Intravenous [address] Angelica Felipe RN     08/06/2023 1249 EDT multi-electrolyte (PLASMALYTE-A/ISOLYTE-S PH 7.4) IV solution 150 mL/hr Intravenous [address] [facility], [address] [place] [address]     08/07/2023 5533 EDT acetaminophen (TYLENOL) tablet 975 mg 975 mg Oral Given Angelica Felipe RN     08/06/2023 2118 EDT acetaminophen (TYLENOL) tablet 975 mg 975 mg Oral Given Lisa Kamlesh Nilsa Vazquez, RN     08/06/2023 1400 EDT acetaminophen (TYLENOL) tablet 975 mg 975 mg Oral Given Salvador Linder, ZACHARIAH     08/06/2023 1752 EDT insulin lispro (HumaLOG) 100 units/mL subcutaneous injection 2-12 Units 10 Units Subcutaneous Given aSlvador Linder, ZACHARIAH     08/06/2023 1139 EDT insulin lispro (HumaLOG) 100 units/mL subcutaneous injection 2-12 Units 4 Units Subcutaneous Given Ember Spears, ZACHARIAH     08/07/2023 9060 EDT ceFAZolin (ANCEF) 3,000 mg in dextrose 5% 100 ml IVPB 0 mg Intravenous Stopped Demetrius Hodgkin, ZACHARIAH     08/07/2023 1375 EDT pantoprazole (PROTONIX) injection 40 mg 40 mg Intravenous Given [employer], RN     08/06/2023 2119 EDT pantoprazole (PROTONIX) injection 40 mg 40 mg Intravenous Given Demetrius Hodgkin, RN     08/06/2023 1215 EDT pantoprazole (PROTONIX) injection 40 mg 40 mg Intravenous Given Salvador Linder, ZACHARIAH     08/06/2023 2347 EDT insulin lispro (HumaLOG) 100 units/mL subcutaneous injection 4-20 Units 16 Units Subcutaneous Given Demetrius Hodgkin, RN     08/06/2023 1847 EDT insulin lispro (HumaLOG) 100 units/mL subcutaneous injection 4-20 Units 0 Units Subcutaneous Hold Ember Spears RN     08/07/2023 9822 EDT insulin regular (HumuLIN R,NovoLIN R) 1 Units/mL in sodium chloride 0.9 % 100 mL infusion 0.5 Units/hr Intravenous Restarted Debi, RN     08/07/2023 3950 EDT insulin regular (HumuLIN R,NovoLIN R) 1 Units/mL in sodium chloride 0.9 % 100 mL infusion 0 Units/hr Intravenous Hold Demetrius Hodgkin, RN     08/07/2023 6214 EDT insulin regular (HumuLIN R,NovoLIN R) 1 Units/mL in sodium chloride 0.9 % 100 mL infusion 3 Units/hr Intravenous Rate/Dose Change Demetrius Hodgkin, RN     08/07/2023 0227 EDT insulin regular (HumuLIN R,NovoLIN R) 1 Units/mL in sodium chloride 0.9 % 100 mL infusion 8 Units/hr Intravenous [address] Demetrius Hodgkin, RN     08/07/2023 0111 EDT potassium chloride (K-DUR,KLOR-CON) CR tablet 40 mEq 40 mEq Oral Given Panchito Hodgkin, RN     08/07/2023 4506 EDT potassium chloride (K-DUR,KLOR-CON) CR tablet 40 mEq 40 mEq Oral Given Remi Wallace RN          Objective:     Vitals: Blood pressure 106/59, pulse 92, temperature 98.6 °F (37 °C), temperature source Oral, resp. rate 18, height 5' 11" (1.803 m), weight (!) 140 kg (307 lb 12.2 oz), SpO2 94 %. ,Body mass index is 42.92 kg/m².       Intake/Output Summary (Last 24 hours) at 8/7/2023 0172  Last data filed at 8/7/2023 0830  Gross per 24 hour   Intake 5462.18 ml   Output 2225 ml   Net 3237.18 ml       Physical Exam:   General Appearance: Awake and alert, in no acute distress  Abdomen: Soft, non-tender, non-distended; bowel sounds normal; no masses or no organomegaly    Invasive Devices     Peripheral Intravenous Line  Duration           Peripheral IV 08/04/23 Right Arm 2 days    Peripheral IV 08/04/23 Right;Ventral (anterior) Hand 2 days          Drain  Duration           Abscess Drain RUQ 1 day    Urethral Catheter Other (Comment) 16 Fr. 1 day                Lab Results:  Admission on 08/04/2023   Component Date Value   • WBC 08/04/2023 27.47 (H)    • RBC 08/04/2023 2.68 (L)    • Hemoglobin 08/04/2023 7.8 (L)    • Hematocrit 08/04/2023 23.7 (L)    • MCV 08/04/2023 88    • MCH 08/04/2023 29.1    • MCHC 08/04/2023 32.9    • RDW 08/04/2023 14.6    • MPV 08/04/2023 9.9    • Platelets 45/84/2562 343    • Sodium 08/04/2023 135    • Potassium 08/04/2023 3.6    • Chloride 08/04/2023 98    • CO2 08/04/2023 28    • ANION GAP 08/04/2023 9    • BUN 08/04/2023 27 (H)    • Creatinine 08/04/2023 1.65 (H)    • Glucose 08/04/2023 265 (H)    • Calcium 08/04/2023 7.7 (L)    • Corrected Calcium 08/04/2023 9.7    • AST 08/04/2023 61 (H)    • ALT 08/04/2023 53    • Alkaline Phosphatase 08/04/2023 111    • Total Protein 08/04/2023 5.6 (L)    • Albumin 08/04/2023 1.5 (L)    • Total Bilirubin 08/04/2023 1.43 (H)    • eGFR 08/04/2023 47    • LACTIC ACID 08/04/2023 2.2 (HH)    • Procalcitonin 08/04/2023 2.35 (H)    • Calcium, Ionized 08/04/2023 1.02 (L)    • Phosphorus 08/04/2023 5.3 (H) • Magnesium 08/04/2023 2.5    • Lipase 08/04/2023 545 (H)    • PTT 08/04/2023 45 (H)    • Blood Culture 08/04/2023 No Growth at 48 hrs. • Blood Culture 08/04/2023 No Growth at 48 hrs. • LACTIC ACID 08/05/2023 1.8    • POC Glucose 08/04/2023 271 (H)    • PTT 08/05/2023 109 (H)    • POC Glucose 08/05/2023 238 (H)    • Sodium 08/05/2023 136    • Potassium 08/05/2023 3.4 (L)    • Chloride 08/05/2023 99    • CO2 08/05/2023 29    • ANION GAP 08/05/2023 8    • BUN 08/05/2023 33 (H)    • Creatinine 08/05/2023 1.96 (H)    • Glucose 08/05/2023 243 (H)    • Calcium 08/05/2023 7.7 (L)    • Corrected Calcium 08/05/2023 9.8    • AST 08/05/2023 51 (H)    • ALT 08/05/2023 46    • Alkaline Phosphatase 08/05/2023 101    • Total Protein 08/05/2023 5.6 (L)    • Albumin 08/05/2023 1.4 (L)    • Total Bilirubin 08/05/2023 1.16 (H)    • eGFR 08/05/2023 38    • Magnesium 08/05/2023 2.4    • Phosphorus 08/05/2023 5.5 (H)    • Calcium, Ionized 08/05/2023 1.00 (L)    • WBC 08/05/2023 24.85 (H)    • RBC 08/05/2023 2.59 (L)    • Hemoglobin 08/05/2023 7.5 (L)    • Hematocrit 08/05/2023 23.1 (L)    • MCV 08/05/2023 89    • MCH 08/05/2023 29.0    • MCHC 08/05/2023 32.5    • RDW 08/05/2023 14.6    • MPV 08/05/2023 10.0    • Platelets 48/83/5296 351    • POC Glucose 08/05/2023 266 (H)    • POC Glucose 08/05/2023 248 (H)    • Anaerobic Culture 08/05/2023 Culture results to follow.     • Gram Stain Result 08/05/2023 No Polys or Bacteria seen    • POC Glucose 08/05/2023 266 (H)    • POC Glucose 08/06/2023 251 (H)    • WBC 08/06/2023 22.48 (H)    • RBC 08/06/2023 2.06 (L)    • Hemoglobin 08/06/2023 6.0 (LL)    • Hematocrit 08/06/2023 18.7 (L)    • MCV 08/06/2023 91    • MCH 08/06/2023 29.1    • MCHC 08/06/2023 32.1    • RDW 08/06/2023 14.8    • Platelets 59/45/5785 292    • MPV 08/06/2023 10.2    • Sodium 08/06/2023 138    • Potassium 08/06/2023 3.7    • Chloride 08/06/2023 101    • CO2 08/06/2023 28    • ANION GAP 08/06/2023 9    • BUN 08/06/2023 43 (H)    • Creatinine 08/06/2023 1.73 (H)    • Glucose 08/06/2023 237 (H)    • Calcium 08/06/2023 7.6 (L)    • Corrected Calcium 08/06/2023 9.7    • AST 08/06/2023 34    • ALT 08/06/2023 24    • Alkaline Phosphatase 08/06/2023 87    • Total Protein 08/06/2023 5.5 (L)    • Albumin 08/06/2023 1.4 (L)    • Total Bilirubin 08/06/2023 0.61    • eGFR 08/06/2023 44    • Magnesium 08/06/2023 2.5    • Phosphorus 08/06/2023 6.6 (H)    • ABO Grouping 08/06/2023 B    • Rh Factor 08/06/2023 Positive    • Antibody Screen 08/06/2023 Negative    • Specimen Expiration Date 08/06/2023 [id number]    • Unit Product Code 08/07/2023 [id number]    • Unit Number 08/07/2023 [id number]    • Unit ABO 08/07/2023 B    • Unit [facility] 08/07/2023 POS    • Crossmatch 08/07/2023 Compatible    • Unit Dispense Status 08/07/2023 Presumed Trans    • Unit Product Volume 08/07/2023 350    • ABO Grouping 08/06/2023 B    • Rh Factor 08/06/2023 Positive    • CKR(REACTION TIME) 08/06/2023 8.2    • CKLY30 08/06/2023 0.0    • CRTMA(RAPIDTEG MAX AMPLI* 08/06/2023 72.7 (H)    • CFFMA (FUNCTIONAL FIBRIN* 08/06/2023 44.9 (H)    • Protime 08/06/2023 21.8 (H)    • INR 08/06/2023 1.88 (H)    • POC Glucose 08/06/2023 234 (H)    • WBC 08/06/2023 23.46 (H)    • RBC 08/06/2023 2.47 (L)    • Hemoglobin 08/06/2023 7.1 (L)    • Hematocrit 08/06/2023 21.9 (L)    • MCV 08/06/2023 89    • MCH 08/06/2023 28.7    • MCHC 08/06/2023 32.4    • RDW 08/06/2023 14.6    • MPV 08/06/2023 10.3    • Platelets 15/58/7437 328    • POC Glucose 08/06/2023 244 (H)    • POC Glucose 08/06/2023 357 (H)    • WBC 08/06/2023 20.78 (H)    • RBC 08/06/2023 2.40 (L)    • Hemoglobin 08/06/2023 7.0 (L)    • Hematocrit 08/06/2023 21.4 (L)    • MCV 08/06/2023 90    • MCH 08/06/2023 28.8    • MCHC 08/06/2023 32.1    • RDW 08/06/2023 15.2 (H)    • MPV 08/06/2023 10.0    • Platelets 17/73/3623 375    • nRBC 08/06/2023 0    • Sodium 08/06/2023 135    • Potassium 08/06/2023 3.6    • Chloride 08/06/2023 100    • CO2 08/06/2023 30    • ANION GAP 08/06/2023 5    • BUN 08/06/2023 44 (H)    • Creatinine 08/06/2023 1.63 (H)    • Glucose 08/06/2023 346 (H)    • Calcium 08/06/2023 7.7 (L)    • eGFR 08/06/2023 47    • Magnesium 08/06/2023 2.6    • Phosphorus 08/06/2023 5.2 (H)    • POC Glucose 08/06/2023 386 (H)    • POC Glucose 08/07/2023 319 (H)    • WBC 08/07/2023 20.73 (H)    • RBC 08/07/2023 2.43 (L)    • Hemoglobin 08/07/2023 7.0 (L)    • Hematocrit 08/07/2023 21.9 (L)    • MCV 08/07/2023 90    • MCH 08/07/2023 28.8    • MCHC 08/07/2023 32.0    • RDW 08/07/2023 15.6 (H)    • Platelets 41/72/2138 391 (H)    • MPV 08/07/2023 9.9    • Sodium 08/07/2023 139    • Potassium 08/07/2023 3.6    • Chloride 08/07/2023 103    • CO2 08/07/2023 30    • ANION GAP 08/07/2023 6    • BUN 08/07/2023 43 (H)    • Creatinine 08/07/2023 1.54 (H)    • Glucose 08/07/2023 150 (H)    • Calcium 08/07/2023 8.0 (L)    • Corrected Calcium 08/07/2023 10.1    • AST 08/07/2023 25    • ALT 08/07/2023 21    • Alkaline Phosphatase 08/07/2023 79    • Total Protein 08/07/2023 5.5 (L)    • Albumin 08/07/2023 1.4 (L)    • Total Bilirubin 08/07/2023 0.43    • eGFR 08/07/2023 51    • Magnesium 08/07/2023 2.5    • Phosphorus 08/07/2023 4.7 (H)    • Hemoglobin A1C 08/07/2023 7.0 (H)    • EAG 08/07/2023 154    • POC Glucose 08/07/2023 165 (H)    • POC Glucose 08/07/2023 108    • POC Glucose 08/07/2023 124        Imaging Studies: I have personally reviewed pertinent imaging studies.

## 2023-08-07 NOTE — PROGRESS NOTES
Progress Note -Interventional Radiology NP  Meghna Swift 46 y.o. male MRN: [id number]  Unit/Bed#: [facility] 13 Encounter: [id number]    Assessment/Plan:    46year old male with gallstone pancreatitis, admitted with worsening pancreatitis and necrotic collections. Patient was taken to IR on 8/5 and 12f drain was placed into the LUQ fluid collection with immediate return of ~400 mL bloody material. Prelim culture results indicate no growth to date. Some concerns regarding regarding drop in hemoglobin post procedure (9.4 --> 6.0 at lowest point. CTA was obtained and no concerns of active bleeding, and the drain does not traverse the stomach wall. Patient received 1 UPRBCs on 8/6, hemoglobin is 7.0 today. Hemoglobin   Date Value Ref Range Status   08/07/2023 7.0 (L) 12.0 - 17.0 g/dL Final   08/06/2023 7.0 (L) 12.0 - 17.0 g/dL Final   08/06/2023 7.1 (L) 12.0 - 17.0 g/dL Final   08/06/2023 6.0 (LL) 12.0 - 17.0 g/dL Final     Comment:     Results verified by repeat        24 hour output: 420 mL bloody fluid    - continue to record I/O's  - flush tube with 10 mL normal saline BID   - outpatient procedure tube check already ordered  - home flushes ordered  - please reach out to IR if drainage is <10 mL/day for 2 days       Subjective: Meghna Swift is a 46 y.o. male who presented with necrotizing pancreatitis. Patient is feeling better since tube placement and has been tolerating a diet better since adequate drainage of collection.       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 Weight (last 2 days)     Date/Time Weight    08/07/23 0600 140 (307.76)    08/06/23 0600 137 (301.37)    08/05/23 0600 132 (291.23)          I/Os:    Intake/Output Summary (Last 24 hours) at 8/7/2023 1039  Last data filed at 8/7/2023 0830  Gross per 24 hour   Intake 5162.18 ml   Output 2065 ml   Net 3097.18 ml       Invasive Devices     Peripheral Intravenous Line  Duration           Peripheral IV 08/04/23 Right Arm 2 days    Peripheral IV 08/04/23 Right;Ventral (anterior) Hand 2 days          Drain  Duration           Abscess Drain RUQ 1 day    Urethral Catheter Other (Comment) 16 Fr. 1 day                Physical Exam:  Physical Exam  Vitals and nursing note reviewed. Constitutional:       General: He is not in acute distress. Appearance: He is well-developed. He is morbidly obese. Interventions: Nasal cannula in place. HENT:      Head: Normocephalic and atraumatic. Eyes:      Conjunctiva/sclera: Conjunctivae normal.   Pulmonary:      Effort: Pulmonary effort is normal. No respiratory distress. Abdominal:      Palpations: Abdomen is soft. Tenderness: There is no abdominal tenderness. Comments: LUQ drain with bloody output   Musculoskeletal:         General: No swelling. Cervical back: Neck supple. Skin:     General: Skin is warm and dry. Capillary Refill: Capillary refill takes less than 2 seconds. Neurological:      Mental Status: He is alert.    Psychiatric:         Mood and Affect: Mood normal.         Speech: Speech normal.         Behavior: Behavior normal.                  Lab Results and Cultures:   CBC with diff:   Lab Results   Component Value Date    WBC 20.73 (H) 08/07/2023    HGB 7.0 (L) 08/07/2023    HCT 21.9 (L) 08/07/2023    MCV 90 08/07/2023     (H) 08/07/2023    RBC 2.43 (L) 08/07/2023    MCH 28.8 08/07/2023    MCHC 32.0 08/07/2023    RDW 15.6 (H) 08/07/2023 MPV 9.9 08/07/2023    NRBC 0 08/06/2023      BMP/CMP:  Lab Results   Component Value Date    K 3.6 08/07/2023     08/07/2023    CO2 30 08/07/2023    BUN 43 (H) 08/07/2023    CREATININE 1.54 (H) 08/07/2023    CALCIUM 8.0 (L) 08/07/2023    AST 25 08/07/2023    ALT 21 08/07/2023    ALKPHOS 79 08/07/2023    EGFR 51 08/07/2023   ,     Coags:   Lab Results   Component Value Date     (H) 08/05/2023    INR 1.88 (H) 08/06/2023   ,   Results from last 7 days   Lab Units 08/06/23  1241 08/05/23  0341 08/04/23  2103 08/04/23  1454   PTT seconds  --  109* 45* 50*   INR  1.88*  --   --  4.44*        Lipid Panel: No results found for: "CHOL"  Lab Results   Component Value Date    HDL 23 (L) 07/24/2023     Lab Results   Component Value Date    HDL 23 (L) 07/24/2023     Lab Results   Component Value Date    LDLCALC 38 07/24/2023     Lab Results   Component Value Date    TRIG 115 07/24/2023       HgbA1c:   Lab Results   Component Value Date    HGBA1C 7.0 (H) 08/07/2023    HGBA1C 6.3 (H) 07/27/2023    HGBA1C 6.3 (H) 07/24/2023       Blood Culture:   Lab Results   Component Value Date    BLOODCX No Growth at 48 hrs. 08/04/2023    BLOODCX No Growth at 48 hrs. 08/04/2023   ,   Urinalysis:   Lab Results   Component Value Date    COLORU Yellow 07/24/2023    CLARITYU Clear 07/24/2023    SPECGRAV 1.048 (H) 07/24/2023    PHUR 6.5 07/24/2023    LEUKOCYTESUR Negative 07/24/2023    NITRITE Negative 07/24/2023    GLUCOSEU 100 (1/10%) (A) 07/24/2023    KETONESU 10 (1+) (A) 07/24/2023    BILIRUBINUR Negative 07/24/2023    BLOODU Large (A) 07/24/2023   ,   Urine Culture:   Lab Results   Component Value Date    URINECX >100,000 cfu/ml Aerococcus urinae (A) 07/24/2023    URINECX >100,000 cfu/ml Streptococcus anginosus (A) 07/24/2023   ,   Wound Culure:  No results found for: "WOUNDCULT"    VTE Pharmacologic Prophylaxis: Enoxaparin (Lovenox)      Thank you for allowing me to participate in the care of Kris Loza.  Please don't hesitate to call, text, email, or TigerText with any questions. This text is generated with voice recognition software. There may be translation, syntax,  or grammatical errors. If you have any questions, please contact the dictating provider.

## 2023-08-07 NOTE — PROGRESS NOTES
[address] [place] [address]  Progress Note: Critical Care  Name: [facility] [place] 46 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 Will restart this afternoon if hemoglobin remains stable      Pulm:  · Diagnosis: Hypoxemia requiring supplemental oxygen  · Encourage IS. Wean supplemental oxygen for goal SpO2>92. GI:   · Diagnosis: Necrotizing pancreatitis  · Necrotic pancreatic collection with mass effect on gastric body  · 8/5 IR drain placement, output: 445 ss  · Continue drain, monitor output  · Continue Protonix BID      :   · Diagnosis: Ureteral stricture, hx of hypospadias s/p repair. · S/p cystoscopy, dilation of urethral stricture, complex palafox placement on 8/5  · Urology consulted, appreciate recs. · BUN: 43 from 43  · Cr: 1.54 from 1.73  · UOP: 1640cc  · Continue palafox catheter, will be discharged with palafox in place and follow up with urology. · Trend BUN/Cr,  Urine output      F/E/N:   · Fluids: [place]te @150  · Electrolytes: Replace for goal K>4, PO4>3, Mg>2. 3.6,2.5,4.7  · Nutrition: CLD T/C. Will advance once hemoglobin is stable on AC. 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  Will restart this afternoon if hemoglobin stable. · Diagnosis: DVT ppx  · Currently not on DVT ppx. Start SQH until able to resume heparin gtt. Endo:   · Diagnosis: Hyperglycemia  · Possibly related to necrotizing pancreatitis. No prior history of diabetes  · Follow up Hgb A1C  · Blood glucose: 150-386  · Insulin: 61 units  · Continue insulin gtt  · Endocrinology consulted, follow up recs      ID:   · Diagnosis: Leukocytosis (improving)  · Likely SIRS response   · WBC: 20.7 from 20.8  · 8/4 Bcx: NGTD  · 8/5 IR cultures: No bacteria seen, follow cultures. · No indication for antibiotics    MSK/Skin:   · Diagnosis: Physical deconditioning  · PT/OT  · Frequent offloading, repositioning       Disposition: Critical care, will deescalate once hemoglobin stable on [facility]    ICU Core Measures     A: Assess, Prevent, and Manage Pain · Has pain been assessed? Yes  · Need for changes to pain regimen? No   B: Both SAT/SAT  · N/A   C: Choice of Sedation · RASS Goal: 0 Alert and Calm  · Need for changes to sedation or analgesia regimen? No   D: Delirium · CAM-ICU: Negative   E: Early Mobility  · Plan for early mobility? Yes   F: Family Engagement · Plan for family engagement today? Yes       Review of Invasive Devices: Saunders Plan: Saunders placed by urology. Removal plans per their team        Prophylaxis:  VTE    Stress Ulcer  covered bypantoprazole (PROTONIX) injection 40 mg [[id number]]          Subjective   HPI/24hr events: Episode of SVT to 170s, asymptomatic, electrolytes checked and unremarkable. . Blood pressures remained stable. Afebrile. No other events. Pain well controlled. No nausea, vomiting, fevers, or chills. Tolerating liquid oral intake.  Two bowel movements       Objective                            Vitals I/O      Most Recent Min/Max in 24hrs   Temp 97.5 °F (36.4 °C) Temp  Min: 97.5 °F (36.4 °C)  Max: 98.2 °F (36.8 °C)   Pulse 86 Pulse  Min: 84  Max: 102   Resp 16 Resp  Min: 13  Max: 24   /66 BP  Min: 103/63  Max: 127/69   O2 Sat 95 % SpO2  Min: 82 %  Max: 100 %      Intake/Output Summary (Last 24 hours) at 8/7/2023 0722  Last data filed at 8/7/2023 0615  Gross per 24 hour   Intake 5286.48 ml   Output 2235 ml   Net 3051.48 ml         Diet Surgical; Cl Liq Toast Crax; No Carbonation     Invasive Monitoring Physical exam    Physical Exam  Constitutional:       General: He is not in acute distress. Appearance: He is obese. HENT:      Head: Normocephalic and atraumatic. Right Ear: External ear normal.      Left Ear: External ear normal.      Nose: Nose normal.      Mouth/Throat:      Pharynx: Oropharynx is clear. Eyes:      Extraocular Movements: Extraocular movements intact. Cardiovascular:      Rate and Rhythm: Normal rate. Pulses: Normal pulses. Pulmonary:      Effort: Pulmonary effort is normal. No respiratory distress. Abdominal:      General: There is distension. Palpations: Abdomen is soft. Tenderness: There is no abdominal tenderness. There is no guarding or rebound. Comments: protuberant   Musculoskeletal:         General: No swelling. Cervical back: Neck supple. Skin:     General: Skin is warm. Neurological:      General: No focal deficit present. Mental Status: He is alert. Mental status is at baseline. Psychiatric:         Mood and Affect: Mood normal.         Behavior: Behavior normal.            Diagnostic Studies      EKG: Reviewed  Imaging:  I have personally reviewed pertinent reports.    and I have personally reviewed pertinent films in PACS     Medications:  Scheduled PRN   acetaminophen, 975 mg, Q8H [address]  chlorhexidine, 15 mL, Q12H ALEX  pantoprazole, 40 mg, Q12H ALEX      HYDROmorphone, 0.5 mg, Q4H PRN  labetalol, 10 mg, Q6H PRN  ondansetron, 4 mg, Q6H PRN  oxyCODONE, 10 mg, Q4H PRN  oxyCODONE, 5 mg, Q4H PRN       Continuous    insulin regular (HumuLIN R,NovoLIN R) 1 Units/mL in sodium chloride 0.9 % 100 mL infusion, 0.3-21 Units/hr, Last Rate: Stopped (08/07/23 5805)  multi-electrolyte, 150 mL/hr, Last Rate: 150 mL/hr (08/07/23 0230)         Labs:    CBC    Recent Labs     08/06/23 2358 08/07/23  0435   WBC 20.78* 20.73*   HGB 7.0* 7.0*   HCT 21.4* 21.9*    391*     BMP    Recent Labs     08/06/23 2358 08/07/23  0435   SODIUM 135 139   K 3.6 3.6    103   CO2 30 30   AGAP 5 6   BUN 44* 43*   CREATININE 1.63* 1.54*   CALCIUM 7.7* 8.0*       Coags    Recent Labs     08/06/23  1241   INR 1.88*        Additional Electrolytes  Recent Labs     08/06/23 2358 08/07/23  0435   MG 2.6 2.5   PHOS 5.2* 4.7*          Blood Gas    No recent results  No recent results LFTs  Recent Labs     08/06/23  0435 08/07/23  0435   ALT 24 21   AST 34 25   ALKPHOS 87 79   ALB 1.4* 1.4*   TBILI 0.61 0.43       Infectious  No recent results  Glucose  Recent Labs     08/06/23  0435 08/06/23 2358 08/07/23  0435   GLUC 237* 346* 150*                   Gwenevere Rinne, MD

## 2023-08-08 LAB
ABO GROUP BLD BPU: NORMAL
ANION GAP SERPL CALCULATED.3IONS-SCNC: 6 MMOL/L
APTT PPP: 35 SECONDS (ref 23–37)
APTT PPP: 90 SECONDS (ref 23–37)
BASOPHILS # BLD AUTO: 0.03 THOUSANDS/ÂΜL (ref 0–0.1)
BASOPHILS NFR BLD AUTO: 0 % (ref 0–1)
BPU ID: NORMAL
BUN SERPL-MCNC: 36 MG/DL (ref 5–25)
CA-I BLD-SCNC: 1.01 MMOL/L (ref 1.12–1.32)
CALCIUM SERPL-MCNC: 8.1 MG/DL (ref 8.3–10.1)
CHLORIDE SERPL-SCNC: 103 MMOL/L (ref 96–108)
CO2 SERPL-SCNC: 29 MMOL/L (ref 21–32)
CREAT SERPL-MCNC: 1.22 MG/DL (ref 0.6–1.3)
CROSSMATCH: NORMAL
EOSINOPHIL # BLD AUTO: 0.13 THOUSAND/ÂΜL (ref 0–0.61)
EOSINOPHIL NFR BLD AUTO: 1 % (ref 0–6)
ERYTHROCYTE [DISTWIDTH] IN BLOOD BY AUTOMATED COUNT: 16.5 % (ref 11.6–15.1)
GFR SERPL CREATININE-BSD FRML MDRD: 67 ML/MIN/1.73SQ M
GLUCOSE SERPL-MCNC: 107 MG/DL (ref 65–140)
GLUCOSE SERPL-MCNC: 134 MG/DL (ref 65–140)
GLUCOSE SERPL-MCNC: 134 MG/DL (ref 65–140)
GLUCOSE SERPL-MCNC: 146 MG/DL (ref 65–140)
GLUCOSE SERPL-MCNC: 152 MG/DL (ref 65–140)
GLUCOSE SERPL-MCNC: 153 MG/DL (ref 65–140)
GLUCOSE SERPL-MCNC: 162 MG/DL (ref 65–140)
GLUCOSE SERPL-MCNC: 168 MG/DL (ref 65–140)
GLUCOSE SERPL-MCNC: 169 MG/DL (ref 65–140)
GLUCOSE SERPL-MCNC: 170 MG/DL (ref 65–140)
GLUCOSE SERPL-MCNC: 187 MG/DL (ref 65–140)
GLUCOSE SERPL-MCNC: 211 MG/DL (ref 65–140)
HCT VFR BLD AUTO: 26.4 % (ref 36.5–49.3)
HGB BLD-MCNC: 7.7 G/DL (ref 12–17)
HGB BLD-MCNC: 8.4 G/DL (ref 12–17)
HGB BLD-MCNC: 8.4 G/DL (ref 12–17)
IMM GRANULOCYTES # BLD AUTO: 0.27 THOUSAND/UL (ref 0–0.2)
IMM GRANULOCYTES NFR BLD AUTO: 1 % (ref 0–2)
INR PPP: 1.34 (ref 0.84–1.19)
LYMPHOCYTES # BLD AUTO: 1.04 THOUSANDS/ÂΜL (ref 0.6–4.47)
LYMPHOCYTES NFR BLD AUTO: 5 % (ref 14–44)
MAGNESIUM SERPL-MCNC: 2.4 MG/DL (ref 1.6–2.6)
MCH RBC QN AUTO: 28.8 PG (ref 26.8–34.3)
MCHC RBC AUTO-ENTMCNC: 31.8 G/DL (ref 31.4–37.4)
MCV RBC AUTO: 90 FL (ref 82–98)
MONOCYTES # BLD AUTO: 1.1 THOUSAND/ÂΜL (ref 0.17–1.22)
MONOCYTES NFR BLD AUTO: 6 % (ref 4–12)
NEUTROPHILS # BLD AUTO: 17.43 THOUSANDS/ÂΜL (ref 1.85–7.62)
NEUTS SEG NFR BLD AUTO: 87 % (ref 43–75)
NRBC BLD AUTO-RTO: 0 /100 WBCS
PHOSPHATE SERPL-MCNC: 3.5 MG/DL (ref 2.7–4.5)
PLATELET # BLD AUTO: 412 THOUSANDS/UL (ref 149–390)
PMV BLD AUTO: 9.8 FL (ref 8.9–12.7)
POTASSIUM SERPL-SCNC: 4.2 MMOL/L (ref 3.5–5.3)
PROTHROMBIN TIME: 16.8 SECONDS (ref 11.6–14.5)
RBC # BLD AUTO: 2.92 MILLION/UL (ref 3.88–5.62)
SODIUM SERPL-SCNC: 138 MMOL/L (ref 135–147)
UNIT DISPENSE STATUS: NORMAL
UNIT PRODUCT CODE: NORMAL
UNIT PRODUCT VOLUME: 350 ML
UNIT RH: NORMAL
WBC # BLD AUTO: 20 THOUSAND/UL (ref 4.31–10.16)

## 2023-08-08 PROCEDURE — 85025 COMPLETE CBC W/AUTO DIFF WBC: CPT | Performed by: NURSE PRACTITIONER

## 2023-08-08 PROCEDURE — 85018 HEMOGLOBIN: CPT | Performed by: NURSE PRACTITIONER

## 2023-08-08 PROCEDURE — 85610 PROTHROMBIN TIME: CPT | Performed by: NURSE PRACTITIONER

## 2023-08-08 PROCEDURE — 99233 SBSQ HOSP IP/OBS HIGH 50: CPT | Performed by: INTERNAL MEDICINE

## 2023-08-08 PROCEDURE — C9113 INJ PANTOPRAZOLE SODIUM, VIA: HCPCS | Performed by: STUDENT IN AN ORGANIZED HEALTH CARE EDUCATION/TRAINING PROGRAM

## 2023-08-08 PROCEDURE — 82948 REAGENT STRIP/BLOOD GLUCOSE: CPT

## 2023-08-08 PROCEDURE — 83735 ASSAY OF MAGNESIUM: CPT | Performed by: NURSE PRACTITIONER

## 2023-08-08 PROCEDURE — 85730 THROMBOPLASTIN TIME PARTIAL: CPT | Performed by: SURGERY

## 2023-08-08 PROCEDURE — 85730 THROMBOPLASTIN TIME PARTIAL: CPT | Performed by: NURSE PRACTITIONER

## 2023-08-08 PROCEDURE — 99232 SBSQ HOSP IP/OBS MODERATE 35: CPT | Performed by: SURGERY

## 2023-08-08 PROCEDURE — 82330 ASSAY OF CALCIUM: CPT | Performed by: NURSE PRACTITIONER

## 2023-08-08 PROCEDURE — 80048 BASIC METABOLIC PNL TOTAL CA: CPT | Performed by: NURSE PRACTITIONER

## 2023-08-08 PROCEDURE — 84100 ASSAY OF PHOSPHORUS: CPT | Performed by: NURSE PRACTITIONER

## 2023-08-08 RX ORDER — INSULIN LISPRO 100 [IU]/ML
1-5 INJECTION, SOLUTION INTRAVENOUS; SUBCUTANEOUS
Status: DISCONTINUED | OUTPATIENT
Start: 2023-08-08 | End: 2023-08-11 | Stop reason: HOSPADM

## 2023-08-08 RX ORDER — CALCIUM GLUCONATE 20 MG/ML
2 INJECTION, SOLUTION INTRAVENOUS ONCE
Status: COMPLETED | OUTPATIENT
Start: 2023-08-08 | End: 2023-08-08

## 2023-08-08 RX ORDER — HEPARIN SODIUM 10000 [USP'U]/100ML
3-30 INJECTION, SOLUTION INTRAVENOUS
Status: DISCONTINUED | OUTPATIENT
Start: 2023-08-08 | End: 2023-08-10

## 2023-08-08 RX ORDER — INSULIN GLARGINE 100 [IU]/ML
14 INJECTION, SOLUTION SUBCUTANEOUS
Status: DISCONTINUED | OUTPATIENT
Start: 2023-08-08 | End: 2023-08-11 | Stop reason: HOSPADM

## 2023-08-08 RX ORDER — INSULIN LISPRO 100 [IU]/ML
5 INJECTION, SOLUTION INTRAVENOUS; SUBCUTANEOUS
Status: DISCONTINUED | OUTPATIENT
Start: 2023-08-08 | End: 2023-08-11 | Stop reason: HOSPADM

## 2023-08-08 RX ADMIN — INSULIN LISPRO 1 UNITS: 100 INJECTION, SOLUTION INTRAVENOUS; SUBCUTANEOUS at 17:33

## 2023-08-08 RX ADMIN — ACETAMINOPHEN 975 MG: 325 TABLET, FILM COATED ORAL at 22:09

## 2023-08-08 RX ADMIN — ACETAMINOPHEN 975 MG: 325 TABLET, FILM COATED ORAL at 05:21

## 2023-08-08 RX ADMIN — ACETAMINOPHEN 975 MG: 325 TABLET, FILM COATED ORAL at 15:43

## 2023-08-08 RX ADMIN — HEPARIN SODIUM 18 UNITS/KG/HR: 10000 INJECTION, SOLUTION INTRAVENOUS at 11:45

## 2023-08-08 RX ADMIN — PANTOPRAZOLE SODIUM 40 MG: 40 INJECTION, POWDER, FOR SOLUTION INTRAVENOUS at 22:09

## 2023-08-08 RX ADMIN — CALCIUM GLUCONATE 2 G: 20 INJECTION, SOLUTION INTRAVENOUS at 08:23

## 2023-08-08 RX ADMIN — ENOXAPARIN SODIUM 40 MG: 40 INJECTION SUBCUTANEOUS at 08:23

## 2023-08-08 RX ADMIN — CHLORHEXIDINE GLUCONATE 15 ML: 1.2 SOLUTION ORAL at 22:09

## 2023-08-08 RX ADMIN — HEPARIN SODIUM 18 UNITS/KG/HR: 10000 INJECTION, SOLUTION INTRAVENOUS at 21:56

## 2023-08-08 RX ADMIN — CHLORHEXIDINE GLUCONATE 15 ML: 1.2 SOLUTION ORAL at 08:23

## 2023-08-08 RX ADMIN — SODIUM CHLORIDE 2 UNITS/HR: 9 INJECTION, SOLUTION INTRAVENOUS at 15:53

## 2023-08-08 RX ADMIN — INSULIN GLARGINE 14 UNITS: 100 INJECTION, SOLUTION SUBCUTANEOUS at 22:08

## 2023-08-08 RX ADMIN — PANTOPRAZOLE SODIUM 40 MG: 40 INJECTION, POWDER, FOR SOLUTION INTRAVENOUS at 08:23

## 2023-08-08 NOTE — TELEPHONE ENCOUNTER
Completed fmla, there was a form that needs to be completed prior to returning to work. I did not complete this as it is being requested to be completed no sooner than 1 week prior to return to work.

## 2023-08-08 NOTE — PROGRESS NOTES
[address] [place] [address]  Progress Note: Critical Care  Name: Stu Goddard 46 y.o. male I MRN: [id number]  Unit/Bed#: MICU 15 I Date of Admission: 8/4/2023   Date of Service: 8/8/2023 I Hospital Day: 4    Assessment/Plan   Neuro:   · Diagnosis: Acute nausea  · Plan: Zofran as needed  · Acute pain  · Scheduled Tylenol  · Oxy as needed for moderate pain  · Dilaudid as needed for severe pain    CV:   · Diagnosis: Severe narrowing of main portal vein, SMV, splenic vein and occlusion of proximal splenic vein with distal reconstitution, necrotic pancreatic collection  · GI consulted, appreciate continued recommendations  · S/p IR percutaneous drain placement resulted in improvement in size of necrosis with clinical improvement  · Latest Hgb 8.2; will plan to resume heparin GTT       Pulm:   · Diagnosis: Hypoxemia requiring supplemental oxygen  · Plan: Wean supplemental O2 for goal SpO2 >92  · Current O2 requirements 2L NC      GI:   · Diagnosis: Necrotizing pancreatitis; collection causing mass effect on gastric body  · Plan: 8/5 IR 12F percutaneous drain placement, output 345cc ss  · Continue to monitor drain output character and quantity  · Gastric ulcer PPx  · PPI twice daily    :   · Diagnosis: Ureteral stricture, history of hypospadias s/p repair  · S/p cystoscopy, dilation of urethral stricture, complex Saunders placement on 8/5  · Urology consulted appreciate continued recommendations  · Monitor BUN/Cr  · UOP 1575cc  · Continue Saunders catheter, will be discharged with Saunders in place and will follow-up with urology      F/E/N:   · No continuous fluids  · Electrolytes: Replace for goal K>4, PO4>3, Mg>2  · Nutrition: FLD - carb controlled level 2      Heme/Onc:   · Diagnosis: Acute blood loss anemia  · Trend H&H  · Transfuse hgb >7 or if symptomatic   · Hgb today pending  · Nonocclusive thrombus in SMV  · 2/2 necrotizing pancreatitis  · Heparin gtt.  currently held for Hgb drop, will tentatively plan on restarting latest Hgb stable    Endo:   · Diagnosis: Hyperglycemia  · Endocrinology following  · Possibly related to necrotizing pancreatitis, history of prediabetes  · Follow-up HbA1c  · Latest    · Goal -180      ID:   · Diagnosis: Leukocytosis   · Plan: Likely SIRS response  · WBC today 20 from 20  · 8/4 BCx - NGTD  · 8/5 IR cx - NGTD  · Currently no indication for antimicrobials      MSK/Skin:   · Diagnosis: Physical deconditioning  · PT/OT  · Frequent offloading and repositioning      Disposition: Critical care    ICU Core Measures     A: Assess, Prevent, and Manage Pain · Has pain been assessed? Yes  · Need for changes to pain regimen? No   B: Both SAT/SAT  · N/A   C: Choice of Sedation · RASS Goal: 0 Alert and Calm  · Need for changes to sedation or analgesia regimen? No   D: Delirium · CAM-ICU: Negative   E: Early Mobility  · Plan for early mobility? Yes   F: Family Engagement · Plan for family engagement today? Yes       Review of Invasive Devices: Saunders Plan: Saunders placed by urology, removal plans per their team        Prophylaxis:  VTE VTE covered by:  enoxaparin, Subcutaneous, 40 mg at 08/07/23 2131       Stress Ulcer  covered bypantoprazole (PROTONIX) injection 40 mg [[id number]]          Subjective   HPI/24hr events: Patient seen and examined at bedside. No acute events overnight. States that overall he feels like he is improving. Denies any abdominal pain at this time. Denies N/V, fever/chills, chest pains, shortness of breath. States that he has been tolerating clear liquid diet. Passing flatus. Ambulating with assistance.        Objective                            Vitals I/O      Most Recent Min/Max in 24hrs   Temp 100.2 °F (37.9 °C) Temp  Min: 98.5 °F (36.9 °C)  Max: 100.2 °F (37.9 °C)   Pulse 94 Pulse  Min: 86  Max: 102   Resp 19 Resp  Min: 14  Max: 23   /63 BP  Min: 100/69  Max: 133/67   O2 Sat 98 % SpO2  Min: 91 %  Max: 100 %      Intake/Output Summary (Last 24 hours) at 8/8/2023 0454  Last data filed at 8/8/2023 0400  Gross per 24 hour   Intake 1918.59 ml   Output 2070 ml   Net -151.41 ml         Diet Surgical; Full Liquid; No Carbonation, Consistent Carbohydrate Diet Level 2 (5 carb servings/75 grams CHO/meal)     Invasive Monitoring Physical exam    Physical Exam  Constitutional:       Appearance: He is obese. HENT:      Head: Normocephalic and atraumatic. Nose: Nose normal.   Eyes:      Pupils: Pupils are equal, round, and reactive to light. Cardiovascular:      Rate and Rhythm: Normal rate. Pulmonary:      Effort: Pulmonary effort is normal.   Abdominal:      Comments: Soft, protuberant abdomen, nontender. No palpable masses. Nonperitoneal on exam.  LUQ 12 F IR drain in place SS fluid in bulb   Musculoskeletal:      Cervical back: Normal range of motion. Neurological:      General: No focal deficit present. Mental Status: He is alert. Psychiatric:         Mood and Affect: Mood normal.         Behavior: Behavior normal.            Diagnostic Studies      EKG: Reviewed  Imaging:  I have personally reviewed pertinent films in PACS     Medications:  Scheduled PRN   acetaminophen, 975 mg, Q8H [facility]  chlorhexidine, 15 mL, Q12H ALEX  enoxaparin, 40 mg, Q12H ALEX  pantoprazole, 40 mg, Q12H ALEX      HYDROmorphone, 0.5 mg, Q4H PRN  labetalol, 10 mg, Q6H PRN  ondansetron, 4 mg, Q6H PRN  oxyCODONE, 10 mg, Q4H PRN  oxyCODONE, 5 mg, Q4H PRN       Continuous    insulin regular (HumuLIN R,NovoLIN R) 1 Units/mL in sodium chloride 0.9 % 100 mL infusion, 0.3-21 Units/hr, Last Rate: 0.5 Units/hr (08/08/23 0400)         Labs:    CBC    Recent Labs     08/06/23  2358 08/07/23  0435 08/07/23  1113 08/07/23  1642 08/07/23  2207   WBC 20.78* 20.73*  --   --   --    HGB 7.0* 7.0*   < > 7.8* 8.2*   HCT 21.4* 21.9*  --   --   --     391*  --   --   --     < > = values in this interval not displayed.      BMP    Recent Labs     08/06/23  0238 08/07/23  0435   SODIUM 135 139   K 3.6 3.6    103   CO2 30 30   AGAP 5 6   BUN 44* 43*   CREATININE 1.63* 1.54*   CALCIUM 7.7* 8.0*       Coags    Recent Labs     08/06/23  1241   INR 1.88*        Additional Electrolytes  Recent Labs     08/06/23  2358 08/07/23  0435   MG 2.6 2.5   PHOS 5.2* 4.7*          Blood Gas    No recent results  No recent results LFTs  Recent Labs     08/07/23  0435   ALT 21   AST 25   ALKPHOS 79   ALB 1.4*   TBILI 0.43       Infectious  No recent results  Glucose  Recent Labs     08/06/23  2358 08/07/23  0435   GLUC 346* 150*            Kayleigh Mccoy DO

## 2023-08-08 NOTE — PROGRESS NOTES
Progress Note - General Surgery   Hesham Valdivia 46 y.o. male MRN: [id number]  Unit/Bed#: MICU 13 Encounter: [id number]    Assessment:  47yo M with necrotizing pancreatitis and non-occlusive SMV   s/p IR drainage 8/5      AVSS  UOP 1.8 L  IR drain 470 cc dark bloody    hgb 8.4 after 1u pRBC for hgb 6.8 yesterday    Plan:  - FLD w/glucerna  - restarted heparin gtt, monitor hgb  - maintain IR drain   - cont insulin gtt for glycemic control, may be able to transition to SSI  - cont to monitor off abx  - strict I/Os, monitor UOP  - IVF, titrate as needed for adequate resuscitation  - appreciate care per ICU    Subjective/Objective   Subjective:   No acute events overnight. Trying to eat more but has been given mixed information about what he can eat. We discussed he can drink glucerna given his recent diabetes diagnosis and blood sugars in the 200s. otherwsie doing well denies nausea and vomiting. Diarrhea has slowed down, less frequent and smaller volume. Still not having any pain    Objective:     Blood pressure 114/56, pulse 94, temperature 100.2 °F (37.9 °C), temperature source Oral, resp. rate 18, height 5' 11" (1.803 m), weight (!) 140 kg (308 lb 6.8 oz), SpO2 99 %. ,Body mass index is 43.02 kg/m². Intake/Output Summary (Last 24 hours) at 8/8/2023 0559  Last data filed at 8/8/2023 0400  Gross per 24 hour   Intake 1918.59 ml   Output 2145 ml   Net -226.41 ml       Invasive Devices     Peripheral Intravenous Line  Duration           Peripheral IV 08/04/23 Right Arm 3 days    Peripheral IV 08/04/23 Right;Ventral (anterior) Hand 3 days          Drain  Duration           Abscess Drain RUQ 2 days    Urethral Catheter Other (Comment) 16 Fr. 2 days                Physical Exam:  General: No acute distress  Neuro: alert and oriented  HEENT: moist mucous membranes  CV: Well perfused, regular rate and rhythm  Lungs: Normal work of breathing, no increased respiratory effort  Abdomen: Soft, non-tender, distended.  IR drain with bloody output  Extremities: No edema, clubbing or cyanosis  Skin: Warm, dry          Yin Cai MD  General Surgery PGY2

## 2023-08-08 NOTE — PLAN OF CARE
Problem: PAIN - ADULT  Goal: Verbalizes/displays adequate comfort level or baseline comfort level  Description: Interventions:  - Encourage patient to monitor pain and request assistance  - Assess pain using appropriate pain scale  - Administer analgesics based on type and severity of pain and evaluate response  - Implement non-pharmacological measures as appropriate and evaluate response  - Consider cultural and social influences on pain and pain management  - Notify physician/advanced practitioner if interventions unsuccessful or patient reports new pain  Outcome: Progressing     Problem: INFECTION - ADULT  Goal: Absence or prevention of progression during hospitalization  Description: INTERVENTIONS:  - Assess and monitor for signs and symptoms of infection  - Monitor lab/diagnostic results  - Monitor all insertion sites, i.e. indwelling lines, tubes, and drains  - Monitor endotracheal if appropriate and nasal secretions for changes in amount and color  - [place] appropriate cooling/warming therapies per order  - Administer medications as ordered  - Instruct and encourage patient and family to use good hand hygiene technique  - Identify and instruct in appropriate isolation precautions for identified infection/condition  Outcome: Progressing  Goal: Absence of fever/infection during neutropenic period  Description: INTERVENTIONS:  - Monitor WBC    Outcome: Progressing     Problem: SAFETY ADULT  Goal: Patient will remain free of falls  Description: INTERVENTIONS:  - Educate patient/family on patient safety including physical limitations  - Instruct patient to call for assistance with activity   - Consult OT/PT to assist with strengthening/mobility   - Keep Call bell within reach  - Keep bed low and locked with side rails adjusted as appropriate  - Keep care items and personal belongings within reach  - Initiate and maintain comfort rounds  - Make Fall Risk Sign visible to staff  - Offer Toileting every 2 Hours, in advance of need  - Initiate/Maintain bed alarm  - Apply yellow socks and bracelet for high fall risk patients  - Consider moving patient to room near nurses station  Outcome: Progressing  Goal: Maintain or return to baseline ADL function  Description: INTERVENTIONS:  -  Assess patient's ability to carry out ADLs; assess patient's baseline for ADL function and identify physical deficits which impact ability to perform ADLs (bathing, care of mouth/teeth, toileting, grooming, dressing, etc.)  - Assess/evaluate cause of self-care deficits   - Assess range of motion  - Assess patient's mobility; develop plan if impaired  - Assess patient's need for assistive devices and provide as appropriate  - Encourage maximum independence but intervene and supervise when necessary  - Involve family in performance of ADLs  - Assess for home care needs following discharge   - Consider OT consult to assist with ADL evaluation and planning for discharge  - Provide patient education as appropriate  Outcome: Progressing  Goal: Maintains/Returns to pre admission functional level  Description: INTERVENTIONS:  - Perform BMAT or MOVE assessment daily.   - Set and communicate daily mobility goal to care team and patient/family/caregiver. - Collaborate with rehabilitation services on mobility goals if consulted  - Perform Range of Motion 3 times a day. - Reposition patient every 2 hours.   - Dangle patient 1 times a day  - Stand patient 1 times a day  - Ambulate patient 1 times a day  - Out of bed to chair 1 times a day   - Out of bed for meals 1 times a day  - Out of bed for toileting  - Record patient progress and toleration of activity level   Outcome: Progressing     Problem: DISCHARGE PLANNING  Goal: Discharge to home or other facility with appropriate resources  Description: INTERVENTIONS:  - Identify barriers to discharge w/patient and caregiver  - Arrange for needed discharge resources and transportation as appropriate  - Identify discharge learning needs (meds, wound care, etc.)  - Arrange for interpretive services to assist at discharge as needed  - Refer to Case Management Department for coordinating discharge planning if the patient needs post-hospital services based on physician/advanced practitioner order or complex needs related to functional status, cognitive ability, or social support system  Outcome: Progressing     Problem: Knowledge Deficit  Goal: Patient/family/caregiver demonstrates understanding of disease process, treatment plan, medications, and discharge instructions  Description: Complete learning assessment and assess knowledge base.   Interventions:  - Provide teaching at level of understanding  - Provide teaching via preferred learning methods  Outcome: Progressing     Problem: Prexisting or High Potential for Compromised Skin Integrity  Goal: Skin integrity is maintained or improved  Description: INTERVENTIONS:  - Identify patients at risk for skin breakdown  - Assess and monitor skin integrity  - Assess and monitor nutrition and hydration status  - Monitor labs   - Assess for incontinence   - Turn and reposition patient  - Assist with mobility/ambulation  - Relieve pressure over bony prominences  - Avoid friction and shearing  - Provide appropriate hygiene as needed including keeping skin clean and dry  - Evaluate need for skin moisturizer/barrier cream  - Collaborate with interdisciplinary team   - Patient/family teaching  - Consider wound care consult   Outcome: Progressing     Problem: MOBILITY - ADULT  Goal: Maintain or return to baseline ADL function  Description: INTERVENTIONS:  -  Assess patient's ability to carry out ADLs; assess patient's baseline for ADL function and identify physical deficits which impact ability to perform ADLs (bathing, care of mouth/teeth, toileting, grooming, dressing, etc.)  - Assess/evaluate cause of self-care deficits   - Assess range of motion  - Assess patient's mobility; develop plan if impaired  - Assess patient's need for assistive devices and provide as appropriate  - Encourage maximum independence but intervene and supervise when necessary  - Involve family in performance of ADLs  - Assess for home care needs following discharge   - Consider OT consult to assist with ADL evaluation and planning for discharge  - Provide patient education as appropriate  Outcome: Progressing  Goal: Maintains/Returns to pre admission functional level  Description: INTERVENTIONS:  - Perform BMAT or MOVE assessment daily.   - Set and communicate daily mobility goal to care team and patient/family/caregiver. - Collaborate with rehabilitation services on mobility goals if consulted  - Perform Range of Motion 3 times a day. - Reposition patient every 2 hours. - Dangle patient 1 times a day  - Stand patient 1 times a day  - Ambulate patient 1 times a day  - Out of bed to chair 1 times a day   - Out of bed for meals 1 times a day  - Out of bed for toileting  - Record patient progress and toleration of activity level   Outcome: Progressing     Problem: Nutrition/Hydration-ADULT  Goal: Nutrient/Hydration intake appropriate for improving, restoring or maintaining nutritional needs  Description: Monitor and assess patient's nutrition/hydration status for malnutrition. Collaborate with interdisciplinary team and initiate plan and interventions as ordered. Monitor patient's weight and dietary intake as ordered or per policy. Utilize nutrition screening tool and intervene as necessary. Determine patient's food preferences and provide high-protein, high-caloric foods as appropriate.      INTERVENTIONS:  - Monitor oral intake, urinary output, labs, and treatment plans  - Assess nutrition and hydration status and recommend course of action  - Evaluate amount of meals eaten  - Assist patient with eating if necessary   - Allow adequate time for meals  - Recommend/ encourage appropriate diets, oral nutritional supplements, and vitamin/mineral supplements  - Order, calculate, and assess calorie counts as needed  - Recommend, monitor, and adjust tube feedings and TPN/PPN based on assessed needs  - Assess need for intravenous fluids  - Provide specific nutrition/hydration education as appropriate  - Include patient/family/caregiver in decisions related to nutrition  Outcome: Progressing

## 2023-08-08 NOTE — PROGRESS NOTES
[address] [place] [address]  Progress Note: Critical Care  Name: Viktoriya Calhoun 46 y.o. 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 with distal reconstitution  · 2/2 necrotic pancreatic collection  · S/p IR drain placement on 8/5  · On heparin gtt      Pulm:  · Diagnosis: hypoxemia requiring supplemental oxygen  · Plan: Wean supplemental oxygen for goal SpO2>92  · Encourage IS, OOB, ambulation      GI:   · Diagnosis: Necrotizing pancreatitis  · Collection causing mass effect on gastric body, improved after IR drainage  · IR drain in place: 200 ss output  · Monitor drain quality and quantity  · Diagnosis: Gastric ulcer ppx  · Plan: PPI BID      :   · Diagnosis: Ureteral stricture, history of hypospadias s/p repair  · S/p cystoscopy, dilation of urethral stricture, complex Palafox placement on 8/5  · Urology consulted, appreciate recommendations  · Continue palafox, will be discharged with palafox in place and outpatient urology follow up  · Trend Cr  · Cr: 0.87 from 1.22  · UOP: 1275cc      F/E/N:   · Fluids: None  · Electrolytes: Replace for goal K>4, PO4>3, Mg>2. 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  Likely 2/2 necrotizing pancreatitis  · HgbA1c: 7  · Endocrinology consulted, appreciate recs  · Insulin gtt discontinued, now on Lantus 14 units QD, Humalog 5 units with meals, SSI  · B-157 (157)       ID:   · Diagnosis: Leukocytosis  · Likely 2/2 SIRS   · 8/4 BCx: NGTD  · 8/5 IR Cx: NGTD  · WBC: 17.6 from 20  · No current indication for antibiotics. Continue to trend WBC and fever curve. MSK/Skin:   · Diagnosis: Physical deconditioning  · PT/OT- No acute PT/OT needs, cleared  · Encourage OOB, ambulation, frequent repositioning      Disposition: Stepdown Level 2    ICU Core Measures     A: Assess, Prevent, and Manage Pain · Has pain been assessed? Yes  · Need for changes to pain regimen? No   B: Both SAT/SAT  · N/A   C: Choice of Sedation · RASS Goal: 0 Alert and Calm  · Need for changes to sedation or analgesia regimen? No   D: Delirium · CAM-ICU: Negative   E: Early Mobility  · Plan for early mobility? Yes   F: Family Engagement · Plan for family engagement today? Yes       Review of Invasive Devices: Saunders Plan: Saunders placed by urology. Removal plans per their team        Prophylaxis:  VTE VTE covered by:  heparin (porcine), Intravenous, 16 Units/kg/hr at 23 0200       Stress Ulcer  covered bypantoprazole (PROTONIX) injection 40 mg [[id number]]          Subjective   HPI/24hr events: No acute events overnight. Afebrile, hemodynamically stable, tachycardic to 100s. Tolerating diet. No nausea, or vomiting, fevers or chills. Denies abdominal pain       Objective                            Vitals I/O      Most Recent Min/Max in 24hrs   Temp 98.6 °F (37 °C) Temp  Min: 98.6 °F (37 °C)  Max: 98.6 °F (37 °C)   Pulse 96 Pulse  Min: 94  Max: 104   Resp 17 Resp  Min: 17  Max: 22   /74 BP  Min: 114/70  Max: 136/69   O2 Sat 91 % SpO2  Min: 91 %  Max: 96 %      Intake/Output Summary (Last 24 hours) at 2023 0652  Last data filed at 2023 0644  Gross per 24 hour   Intake 828.25 ml   Output 1475 ml   Net -646.75 ml         Diet Surgical; Full Liquid;  No Carbonation, Consistent Carbohydrate Diet Level 2 (5 carb servings/75 grams CHO/meal)     Invasive Monitoring Physical exam    Physical Exam  Constitutional:       Appearance: He is obese. HENT:      Head: Normocephalic and atraumatic. Right Ear: External ear normal.      Left Ear: External ear normal.      Nose: Nose normal.      Mouth/Throat:      Pharynx: Oropharynx is clear. Eyes:      Extraocular Movements: Extraocular movements intact. Cardiovascular:      Rate and Rhythm: Normal rate. Pulses: Normal pulses. Pulmonary:      Effort: Pulmonary effort is normal. No respiratory distress. Abdominal:      General: There is distension. Palpations: Abdomen is soft. Tenderness: There is no abdominal tenderness. There is no guarding or rebound. Genitourinary:     Comments: Saunders in place  Musculoskeletal:         General: Normal range of motion. Cervical back: Neck supple. Right lower leg: Edema present. Left lower leg: Edema present. Skin:     General: Skin is warm. Neurological:      General: No focal deficit present. Mental Status: Mental status is at baseline. Psychiatric:         Mood and Affect: Mood normal.         Thought Content: Thought content normal.            Diagnostic Studies      EKG: Reviewed  Imaging:  I have personally reviewed pertinent reports.    and I have personally reviewed pertinent films in PACS     Medications:  Scheduled PRN   acetaminophen, 975 mg, Q8H [address]  calcium gluconate, 2 g, Once  chlorhexidine, 15 mL, Q12H ALEX  insulin glargine, 14 Units, HS  insulin lispro, 1-5 Units, TID AC  insulin lispro, 1-5 Units, HS  insulin lispro, 5 Units, TID With Meals  pantoprazole, 40 mg, Q12H ALEX      HYDROmorphone, 0.5 mg, Q4H PRN  labetalol, 10 mg, Q6H PRN  ondansetron, 4 mg, Q6H PRN  oxyCODONE, 10 mg, Q4H PRN  oxyCODONE, 5 mg, Q4H PRN       Continuous    heparin (porcine), 3-30 Units/kg/hr (Order-Specific), Last Rate: 16 Units/kg/hr (08/09/23 0200)         Labs:    CBC    Recent Labs 08/08/23  0554 08/08/23  1547 08/08/23  2213 08/09/23  0511   WBC 20.00*  --   --  17.60*   HGB 8.4*   < > 7.7* 8.1*   HCT 26.4*  --   --  24.7*   *  --   --  369    < > = values in this interval not displayed.      BMP    Recent Labs     08/08/23  0554 08/09/23  0548   SODIUM 138 138   K 4.2 4.1    103   CO2 29 33*   AGAP 6 2   BUN 36* 28*   CREATININE 1.22 0.87   CALCIUM 8.1* 7.9*       Coags    Recent Labs     08/08/23  1117 08/08/23  1750 08/09/23  0102   INR 1.34*  --   --    PTT 35 90* 94*        Additional Electrolytes  Recent Labs     08/08/23  0554 08/09/23  0501   MG 2.4  --    PHOS 3.5  --    CAIONIZED 1.01* 0.99*          Blood Gas    No recent results  No recent results LFTs  Recent Labs     08/09/23  0548   ALT 18   AST 27   ALKPHOS 72   ALB 1.4*   TBILI 0.52       Infectious  No recent results  Glucose  Recent Labs     08/08/23  0554 08/09/23  0548   GLUC 153* 161*                 Urszula Rucker MD

## 2023-08-08 NOTE — PROGRESS NOTES
Progress Note - Meghna Swift 46 y.o. male MRN: [id number]    Unit/Bed#: MICU 13 Encounter: [id number]      CC: diabetes f/u    Subjective:   Meghna Swift is a 46y.o. year old male with PMH of  prediabetes, obesity, SMV thrombosis, hypertension, gallstone pancreatitis, OA s/p b/l hip arthroplasties who presented with abdominal pain and low appetite, found to have worsening of necrotizing pancreatitis with necrotic collection in the lesser sac now s/p IR guided percutaneous drainage. Endocrinology consulted for new onset diabetes mellitus. Feels well. No complaints. No hypoglycemia. He reports to be tolerating 100% of his full liquid diet. Denies any abdominal pain, nausea, vomiting    Objective:     Vitals: Blood pressure 109/62, pulse 98, temperature 98.5 °F (36.9 °C), temperature source Oral, resp. rate 18, height 5' 11" (1.803 m), weight (!) 140 kg (308 lb 6.8 oz), SpO2 92 %. ,Body mass index is 43.02 kg/m². Intake/Output Summary (Last 24 hours) at 8/8/2023 1255  Last data filed at 8/8/2023 1150  Gross per 24 hour   Intake 464.38 ml   Output 1990 ml   Net -1525.62 ml       Physical Exam:  General Appearance: awake, appears stated age and cooperative  Head: Normocephalic, without obvious abnormality, atraumatic  Extremities: moves all extremities  Skin: Skin color and temperature normal.   Pulm: no labored breathing    Lab, Imaging and other studies: I have personally reviewed pertinent reports.       POC Glucose (mg/dl)   Date Value   08/08/2023 168 (H)   08/08/2023 211 (H)   08/08/2023 170 (H)   08/08/2023 152 (H)   08/08/2023 134   08/08/2023 107   08/07/2023 85   08/07/2023 175 (H)   08/07/2023 139   08/07/2023 154 (H)       Assessment and plan:    Diabetes Mellitus  HbA1c 7 August 2023, not reliable as his hematocrit 21  Home regimen: None  Inpatient regimen: Non-DKA protocol insulin drip    Recommendations:  - We recommend transitioning to Lantus 14 units at bedtime with 1 hour insulin drip overlap  - Start Humalog 5 units with meals as well as correctional scale algorithm 1 with meals and at bedtime starting tomorrow morning. Plan of care relayed to ICU team  - We will continue to monitor blood glucose and make adjustments as needed  - Upon discharge, we recommend avoiding GLP-1 receptor agonist given history of necrotizing pancreatitis    Portions of the record may have been created with voice recognition software. Please Tigertext questions to the clinician covering the "AFS[employer]" Role. Thank you.

## 2023-08-08 NOTE — TELEPHONE ENCOUNTER
LVM FMLA is complete except for the 1 page that has to be done 1 wk prior to returning to work. Asked for RCB. Forms placed in p/u folder on side 1 - copies made for chart.

## 2023-08-09 LAB
ALBUMIN SERPL BCP-MCNC: 1.4 G/DL (ref 3.5–5)
ALP SERPL-CCNC: 72 U/L (ref 46–116)
ALT SERPL W P-5'-P-CCNC: 18 U/L (ref 12–78)
ANION GAP SERPL CALCULATED.3IONS-SCNC: 2 MMOL/L
APTT PPP: 57 SECONDS (ref 23–37)
APTT PPP: 63 SECONDS (ref 23–37)
APTT PPP: 86 SECONDS (ref 23–37)
APTT PPP: 94 SECONDS (ref 23–37)
AST SERPL W P-5'-P-CCNC: 27 U/L (ref 5–45)
BACTERIA BLD CULT: NORMAL
BACTERIA BLD CULT: NORMAL
BACTERIA SPEC ANAEROBE CULT: NO GROWTH
BACTERIA SPEC BFLD CULT: NO GROWTH
BASOPHILS # BLD AUTO: 0.02 THOUSANDS/ÂΜL (ref 0–0.1)
BASOPHILS NFR BLD AUTO: 0 % (ref 0–1)
BILIRUB SERPL-MCNC: 0.52 MG/DL (ref 0.2–1)
BUN SERPL-MCNC: 28 MG/DL (ref 5–25)
CA-I BLD-SCNC: 0.99 MMOL/L (ref 1.12–1.32)
CALCIUM ALBUM COR SERPL-MCNC: 10 MG/DL (ref 8.3–10.1)
CALCIUM SERPL-MCNC: 7.9 MG/DL (ref 8.3–10.1)
CHLORIDE SERPL-SCNC: 103 MMOL/L (ref 96–108)
CO2 SERPL-SCNC: 33 MMOL/L (ref 21–32)
CREAT SERPL-MCNC: 0.87 MG/DL (ref 0.6–1.3)
EOSINOPHIL # BLD AUTO: 0.22 THOUSAND/ÂΜL (ref 0–0.61)
EOSINOPHIL NFR BLD AUTO: 1 % (ref 0–6)
ERYTHROCYTE [DISTWIDTH] IN BLOOD BY AUTOMATED COUNT: 15.9 % (ref 11.6–15.1)
GFR SERPL CREATININE-BSD FRML MDRD: 99 ML/MIN/1.73SQ M
GLUCOSE SERPL-MCNC: 157 MG/DL (ref 65–140)
GLUCOSE SERPL-MCNC: 157 MG/DL (ref 65–140)
GLUCOSE SERPL-MCNC: 161 MG/DL (ref 65–140)
GLUCOSE SERPL-MCNC: 167 MG/DL (ref 65–140)
GLUCOSE SERPL-MCNC: 186 MG/DL (ref 65–140)
GLUCOSE SERPL-MCNC: 203 MG/DL (ref 65–140)
GLUCOSE SERPL-MCNC: 279 MG/DL (ref 65–140)
GRAM STN SPEC: NORMAL
HCT VFR BLD AUTO: 24.7 % (ref 36.5–49.3)
HGB BLD-MCNC: 8.1 G/DL (ref 12–17)
IMM GRANULOCYTES # BLD AUTO: 0.32 THOUSAND/UL (ref 0–0.2)
IMM GRANULOCYTES NFR BLD AUTO: 2 % (ref 0–2)
LYMPHOCYTES # BLD AUTO: 1.12 THOUSANDS/ÂΜL (ref 0.6–4.47)
LYMPHOCYTES NFR BLD AUTO: 6 % (ref 14–44)
MCH RBC QN AUTO: 29.3 PG (ref 26.8–34.3)
MCHC RBC AUTO-ENTMCNC: 32.8 G/DL (ref 31.4–37.4)
MCV RBC AUTO: 90 FL (ref 82–98)
MONOCYTES # BLD AUTO: 1.05 THOUSAND/ÂΜL (ref 0.17–1.22)
MONOCYTES NFR BLD AUTO: 6 % (ref 4–12)
NEUTROPHILS # BLD AUTO: 14.87 THOUSANDS/ÂΜL (ref 1.85–7.62)
NEUTS SEG NFR BLD AUTO: 85 % (ref 43–75)
NRBC BLD AUTO-RTO: 0 /100 WBCS
PLATELET # BLD AUTO: 369 THOUSANDS/UL (ref 149–390)
PMV BLD AUTO: 10.1 FL (ref 8.9–12.7)
POTASSIUM SERPL-SCNC: 4.1 MMOL/L (ref 3.5–5.3)
PROT SERPL-MCNC: 5.4 G/DL (ref 6.4–8.4)
RBC # BLD AUTO: 2.76 MILLION/UL (ref 3.88–5.62)
SODIUM SERPL-SCNC: 138 MMOL/L (ref 135–147)
WBC # BLD AUTO: 17.6 THOUSAND/UL (ref 4.31–10.16)

## 2023-08-09 PROCEDURE — 82948 REAGENT STRIP/BLOOD GLUCOSE: CPT

## 2023-08-09 PROCEDURE — 82330 ASSAY OF CALCIUM: CPT | Performed by: NURSE PRACTITIONER

## 2023-08-09 PROCEDURE — 85730 THROMBOPLASTIN TIME PARTIAL: CPT | Performed by: SURGERY

## 2023-08-09 PROCEDURE — 85025 COMPLETE CBC W/AUTO DIFF WBC: CPT | Performed by: NURSE PRACTITIONER

## 2023-08-09 PROCEDURE — 80053 COMPREHEN METABOLIC PANEL: CPT | Performed by: NURSE PRACTITIONER

## 2023-08-09 PROCEDURE — 99232 SBSQ HOSP IP/OBS MODERATE 35: CPT | Performed by: SURGERY

## 2023-08-09 PROCEDURE — C9113 INJ PANTOPRAZOLE SODIUM, VIA: HCPCS | Performed by: STUDENT IN AN ORGANIZED HEALTH CARE EDUCATION/TRAINING PROGRAM

## 2023-08-09 PROCEDURE — 85730 THROMBOPLASTIN TIME PARTIAL: CPT

## 2023-08-09 RX ORDER — PANTOPRAZOLE SODIUM 40 MG/1
40 TABLET, DELAYED RELEASE ORAL EVERY 12 HOURS SCHEDULED
Status: DISCONTINUED | OUTPATIENT
Start: 2023-08-09 | End: 2023-08-11 | Stop reason: HOSPADM

## 2023-08-09 RX ORDER — CALCIUM GLUCONATE 20 MG/ML
2 INJECTION, SOLUTION INTRAVENOUS ONCE
Status: COMPLETED | OUTPATIENT
Start: 2023-08-09 | End: 2023-08-09

## 2023-08-09 RX ORDER — ACETAMINOPHEN 325 MG/1
975 TABLET ORAL EVERY 8 HOURS PRN
Status: DISCONTINUED | OUTPATIENT
Start: 2023-08-09 | End: 2023-08-11 | Stop reason: HOSPADM

## 2023-08-09 RX ADMIN — PANTOPRAZOLE SODIUM 40 MG: 40 INJECTION, POWDER, FOR SOLUTION INTRAVENOUS at 08:47

## 2023-08-09 RX ADMIN — CALCIUM GLUCONATE 2 G: 20 INJECTION, SOLUTION INTRAVENOUS at 06:11

## 2023-08-09 RX ADMIN — INSULIN LISPRO 5 UNITS: 100 INJECTION, SOLUTION INTRAVENOUS; SUBCUTANEOUS at 08:36

## 2023-08-09 RX ADMIN — INSULIN LISPRO 1 UNITS: 100 INJECTION, SOLUTION INTRAVENOUS; SUBCUTANEOUS at 08:36

## 2023-08-09 RX ADMIN — INSULIN LISPRO 5 UNITS: 100 INJECTION, SOLUTION INTRAVENOUS; SUBCUTANEOUS at 17:11

## 2023-08-09 RX ADMIN — HEPARIN SODIUM 18 UNITS/KG/HR: 10000 INJECTION, SOLUTION INTRAVENOUS at 21:50

## 2023-08-09 RX ADMIN — HEPARIN SODIUM 16 UNITS/KG/HR: 10000 INJECTION, SOLUTION INTRAVENOUS at 10:24

## 2023-08-09 RX ADMIN — INSULIN LISPRO 2 UNITS: 100 INJECTION, SOLUTION INTRAVENOUS; SUBCUTANEOUS at 21:45

## 2023-08-09 RX ADMIN — INSULIN LISPRO 1 UNITS: 100 INJECTION, SOLUTION INTRAVENOUS; SUBCUTANEOUS at 12:06

## 2023-08-09 RX ADMIN — PANTOPRAZOLE SODIUM 40 MG: 40 TABLET, DELAYED RELEASE ORAL at 21:41

## 2023-08-09 RX ADMIN — INSULIN GLARGINE 14 UNITS: 100 INJECTION, SOLUTION SUBCUTANEOUS at 21:45

## 2023-08-09 RX ADMIN — ACETAMINOPHEN 975 MG: 325 TABLET, FILM COATED ORAL at 06:11

## 2023-08-09 RX ADMIN — CHLORHEXIDINE GLUCONATE 15 ML: 1.2 SOLUTION ORAL at 08:47

## 2023-08-09 RX ADMIN — INSULIN LISPRO 5 UNITS: 100 INJECTION, SOLUTION INTRAVENOUS; SUBCUTANEOUS at 12:06

## 2023-08-09 RX ADMIN — INSULIN LISPRO 1 UNITS: 100 INJECTION, SOLUTION INTRAVENOUS; SUBCUTANEOUS at 17:10

## 2023-08-09 NOTE — PROGRESS NOTES
Nutrition Follow Up/Recommendations:    Discontinued Gelatein per pt dislike. Order for calorie count noted this morning. Discussed with nursing in MICU, pt, and wife and posted sheets for data collection. At least 48 hrs of data is preferred to calculate calorie count, therefore will plan to collect on Friday 8/11. Suggest adding low fat restriction to diet order given pancreatitis.

## 2023-08-09 NOTE — PROGRESS NOTES
Pastoral Care Progress Note    2023  Patient: Neva Irwin : 1970  Admission Date & Time: 2023 1740  MRN: [id number] CSN: [id number]          Introduced self/pastoral care to Pt and spouse. They were thankful, but had no needs at present.  remains available.

## 2023-08-09 NOTE — PROGRESS NOTES
Progress Note - General Surgery   Sahra Dockery 46 y.o. male MRN: [id number]  Unit/Bed#: MICU 13 Encounter: [id number]    Assessment:  47yo M with necrotizing pancreatitis and non-occlusive SMV   s/p IR drainage 8/5    AVSS on 2L NC overnight with spo2 >95%  UOP 1.3 L  IR drain 200 cc dark bloody    hgb 8.1 (8.4)    Plan:  - FLD w/glucerna  - calorie count  - cont heparin gtt  - maintain IR drain   - appreciate endo recs for insulin regimen  - will need insulin teaching for home  - cont to monitor off abx  - strict I/Os, monitor UOP  - IVF, titrate as needed for adequate resuscitation  - stable for downgrade today    Subjective/Objective   Subjective:   No acute events overnight. Doing well overall, says he is eating more than he did at home. Denies nausea and vomiting. BMs have become more solid, less diarrhea. Still not having any pain    Objective:     Blood pressure 136/73, pulse 92, temperature 98.6 °F (37 °C), temperature source Oral, resp. rate 17, height 5' 11" (1.803 m), weight (!) 142 kg (313 lb 11.4 oz), SpO2 92 %. ,Body mass index is 43.75 kg/m². Intake/Output Summary (Last 24 hours) at 8/9/2023 0819  Last data filed at 8/9/2023 0644  Gross per 24 hour   Intake 828.25 ml   Output 1475 ml   Net -646.75 ml       Invasive Devices     Peripheral Intravenous Line  Duration           Peripheral IV 08/04/23 Right Arm 4 days    Peripheral IV 08/04/23 Right;Ventral (anterior) Hand 4 days    Peripheral IV 08/08/23 Dorsal (posterior); Left Wrist 1 day          Drain  Duration           Abscess Drain RUQ 3 days    Urethral Catheter Other (Comment) 16 Fr. 3 days                Physical Exam:  General: No acute distress  Neuro: alert and oriented  HEENT: moist mucous membranes  CV: Well perfused, regular rate and rhythm  Lungs: Normal work of breathing, no increased respiratory effort  Abdomen: Soft, non-tender, distended.  IR drain with dark bloody output  Extremities: No edema, clubbing or cyanosis  : palafox in place  Skin: Warm, dry            Kevan MD [place]  General Surgery PGY2

## 2023-08-09 NOTE — QUICK NOTE
GI quick note:    Being followed by GI team for necrotizing pancreatitis requiring IR drain which initially showed bloody output, course complicated by anemia requiring blood transfusion, last transfusion on 8/7/2023. Since then hemoglobin has remained stable, on chart review the drain output now showing serosanguineous drainage. Will recommend obtaining repeat CT next week as inpatient or outpatient depending upon clinical progression to assess for maturation of walled off necrosis. GI team will sign off for now.   Discussed with primary team.

## 2023-08-10 LAB
ANION GAP SERPL CALCULATED.3IONS-SCNC: 3 MMOL/L
ANISOCYTOSIS BLD QL SMEAR: PRESENT
APTT PPP: 86 SECONDS (ref 23–37)
BACTERIA BLD CULT: NORMAL
BACTERIA BLD CULT: NORMAL
BASOPHILS # BLD MANUAL: 0 THOUSAND/UL (ref 0–0.1)
BASOPHILS NFR MAR MANUAL: 0 % (ref 0–1)
BUN SERPL-MCNC: 21 MG/DL (ref 5–25)
CA-I BLD-SCNC: 1.07 MMOL/L (ref 1.12–1.32)
CALCIUM SERPL-MCNC: 8.1 MG/DL (ref 8.3–10.1)
CHLORIDE SERPL-SCNC: 104 MMOL/L (ref 96–108)
CO2 SERPL-SCNC: 33 MMOL/L (ref 21–32)
CREAT SERPL-MCNC: 0.84 MG/DL (ref 0.6–1.3)
EOSINOPHIL # BLD MANUAL: 0.34 THOUSAND/UL (ref 0–0.4)
EOSINOPHIL NFR BLD MANUAL: 2 % (ref 0–6)
ERYTHROCYTE [DISTWIDTH] IN BLOOD BY AUTOMATED COUNT: 15.4 % (ref 11.6–15.1)
GFR SERPL CREATININE-BSD FRML MDRD: 100 ML/MIN/1.73SQ M
GLUCOSE SERPL-MCNC: 134 MG/DL (ref 65–140)
GLUCOSE SERPL-MCNC: 141 MG/DL (ref 65–140)
GLUCOSE SERPL-MCNC: 150 MG/DL (ref 65–140)
GLUCOSE SERPL-MCNC: 152 MG/DL (ref 65–140)
GLUCOSE SERPL-MCNC: 161 MG/DL (ref 65–140)
HCT VFR BLD AUTO: 27.5 % (ref 36.5–49.3)
HGB BLD-MCNC: 8.6 G/DL (ref 12–17)
LYMPHOCYTES # BLD AUTO: 0 % (ref 14–44)
LYMPHOCYTES # BLD AUTO: 0.17 THOUSAND/UL (ref 0.6–4.47)
MACROCYTES BLD QL AUTO: PRESENT
MAGNESIUM SERPL-MCNC: 1.7 MG/DL (ref 1.6–2.6)
MCH RBC QN AUTO: 28.2 PG (ref 26.8–34.3)
MCHC RBC AUTO-ENTMCNC: 31.3 G/DL (ref 31.4–37.4)
MCV RBC AUTO: 90 FL (ref 82–98)
MICROCYTES BLD QL AUTO: PRESENT
MONOCYTES # BLD AUTO: 0.86 THOUSAND/UL (ref 0–1.22)
MONOCYTES NFR BLD: 5 % (ref 4–12)
NEUTROPHILS # BLD MANUAL: 15.86 THOUSAND/UL (ref 1.85–7.62)
NEUTS BAND NFR BLD MANUAL: 2 % (ref 0–8)
NEUTS SEG NFR BLD AUTO: 90 % (ref 43–75)
PHOSPHATE SERPL-MCNC: 2.6 MG/DL (ref 2.7–4.5)
PLATELET # BLD AUTO: 413 THOUSANDS/UL (ref 149–390)
PLATELET BLD QL SMEAR: ABNORMAL
PMV BLD AUTO: 9.6 FL (ref 8.9–12.7)
POLYCHROMASIA BLD QL SMEAR: PRESENT
POTASSIUM SERPL-SCNC: 4.2 MMOL/L (ref 3.5–5.3)
RBC # BLD AUTO: 3.05 MILLION/UL (ref 3.88–5.62)
RBC MORPH BLD: PRESENT
SODIUM SERPL-SCNC: 140 MMOL/L (ref 135–147)
VARIANT LYMPHS # BLD AUTO: 1 %
WBC # BLD AUTO: 17.24 THOUSAND/UL (ref 4.31–10.16)

## 2023-08-10 PROCEDURE — 99232 SBSQ HOSP IP/OBS MODERATE 35: CPT | Performed by: SURGERY

## 2023-08-10 PROCEDURE — 82330 ASSAY OF CALCIUM: CPT

## 2023-08-10 PROCEDURE — 85730 THROMBOPLASTIN TIME PARTIAL: CPT | Performed by: SURGERY

## 2023-08-10 PROCEDURE — 85027 COMPLETE CBC AUTOMATED: CPT

## 2023-08-10 PROCEDURE — 84100 ASSAY OF PHOSPHORUS: CPT

## 2023-08-10 PROCEDURE — 85007 BL SMEAR W/DIFF WBC COUNT: CPT

## 2023-08-10 PROCEDURE — 80048 BASIC METABOLIC PNL TOTAL CA: CPT

## 2023-08-10 PROCEDURE — 83735 ASSAY OF MAGNESIUM: CPT

## 2023-08-10 PROCEDURE — 82948 REAGENT STRIP/BLOOD GLUCOSE: CPT

## 2023-08-10 RX ORDER — FUROSEMIDE 10 MG/ML
20 INJECTION INTRAMUSCULAR; INTRAVENOUS ONCE
Status: COMPLETED | OUTPATIENT
Start: 2023-08-10 | End: 2023-08-10

## 2023-08-10 RX ADMIN — INSULIN LISPRO 5 UNITS: 100 INJECTION, SOLUTION INTRAVENOUS; SUBCUTANEOUS at 07:30

## 2023-08-10 RX ADMIN — CHLORHEXIDINE GLUCONATE 15 ML: 1.2 SOLUTION ORAL at 09:15

## 2023-08-10 RX ADMIN — CHLORHEXIDINE GLUCONATE 15 ML: 1.2 SOLUTION ORAL at 21:28

## 2023-08-10 RX ADMIN — APIXABAN 5 MG: 5 TABLET, FILM COATED ORAL at 18:26

## 2023-08-10 RX ADMIN — PANCRELIPASE 6000 UNITS: 30000; 6000; 19000 CAPSULE, DELAYED RELEASE PELLETS ORAL at 18:26

## 2023-08-10 RX ADMIN — PANTOPRAZOLE SODIUM 40 MG: 40 TABLET, DELAYED RELEASE ORAL at 09:15

## 2023-08-10 RX ADMIN — INSULIN LISPRO 1 UNITS: 100 INJECTION, SOLUTION INTRAVENOUS; SUBCUTANEOUS at 21:28

## 2023-08-10 RX ADMIN — PANCRELIPASE 6000 UNITS: 30000; 6000; 19000 CAPSULE, DELAYED RELEASE PELLETS ORAL at 12:04

## 2023-08-10 RX ADMIN — FUROSEMIDE 20 MG: 10 INJECTION, SOLUTION INTRAMUSCULAR; INTRAVENOUS at 17:42

## 2023-08-10 RX ADMIN — INSULIN LISPRO 1 UNITS: 100 INJECTION, SOLUTION INTRAVENOUS; SUBCUTANEOUS at 11:37

## 2023-08-10 RX ADMIN — HEPARIN SODIUM 18 UNITS/KG/HR: 10000 INJECTION, SOLUTION INTRAVENOUS at 09:15

## 2023-08-10 RX ADMIN — INSULIN GLARGINE 14 UNITS: 100 INJECTION, SOLUTION SUBCUTANEOUS at 21:28

## 2023-08-10 RX ADMIN — INSULIN LISPRO 1 UNITS: 100 INJECTION, SOLUTION INTRAVENOUS; SUBCUTANEOUS at 17:42

## 2023-08-10 RX ADMIN — INSULIN LISPRO 5 UNITS: 100 INJECTION, SOLUTION INTRAVENOUS; SUBCUTANEOUS at 17:43

## 2023-08-10 RX ADMIN — PANTOPRAZOLE SODIUM 40 MG: 40 TABLET, DELAYED RELEASE ORAL at 21:28

## 2023-08-10 RX ADMIN — INSULIN LISPRO 5 UNITS: 100 INJECTION, SOLUTION INTRAVENOUS; SUBCUTANEOUS at 11:37

## 2023-08-10 NOTE — UTILIZATION REVIEW
Continued Stay Review    Date: 8/9                          Current Patient Class: Inpatient  Current Level of Care: Med Surg    HPI:52 y.o. male initially admitted on 8/4    Assessment/Plan: Necrotizing pancreatitis and non-occlusive SMV, gastric outlet obstruction from infected peripancreatic fluid collection, s/p IR drainage  S/p IR Drain placement on 8/5 with outpt of 200 ml. Continues on Iv Heparin drip. Pain/ nausea control prn. Continue palafox cath. Trend creat. Hgb 8.1 from 8.4. S/p Bld transfusion on 8/6 & 8/7. Insulin drip d/c.      Per General-Surg; Continue Iv Heparin drip for SMV thrombus. Maintain IR drain. Diet as tolerated, calorie count. Vital Signs:   08/10/23 0900 -- -- -- -- -- 91 % -- -- None (Room air) --   08/10/23 07:21:11 99.4 °F (37.4 °C) 104 16 127/82 97 91 % -- -- --      Pertinent Labs/Diagnostic Results:       Results from last 7 days   Lab Units 08/10/23  0419 08/09/23  0511 08/08/23  2213 08/08/23  1547 08/08/23  0554 08/07/23  1113 08/07/23  0435 08/06/23  2358   WBC Thousand/uL 17.24* 17.60*  --   --  20.00*  --  20.73* 20.78*   HEMOGLOBIN g/dL 8.6* 8.1* 7.7* 8.4* 8.4*   < > 7.0* 7.0*   HEMATOCRIT % 27.5* 24.7*  --   --  26.4*  --  21.9* 21.4*   PLATELETS Thousands/uL 413* 369  --   --  412*  --  391* 375   NEUTROS ABS Thousands/µL  --  14.87*  --   --  17.43*  --   --   --    BANDS PCT % 2  --   --   --   --   --   --   --     < > = values in this interval not displayed.          Results from last 7 days   Lab Units 08/10/23  0419 08/09/23  0548 08/09/23  0501 08/08/23  0554 08/07/23  0435 08/06/23  2358 08/06/23  0435 08/05/23  0340 08/04/23  2103   SODIUM mmol/L 140 138  --  138 139 135 138 136 135   POTASSIUM mmol/L 4.2 4.1  --  4.2 3.6 3.6 3.7 3.4* 3.6   CHLORIDE mmol/L 104 103  --  103 103 100 101 99 98   CO2 mmol/L 33* 33*  --  29 30 30 28 29 28   ANION GAP mmol/L 3 2  --  6 6 5 9 8 9   BUN mg/dL 21 28*  --  36* 43* 44* 43* 33* 27*   CREATININE mg/dL 0.84 0.87  --  1.22 1.54* 1.63* 1.73* 1.96* 1.65*   EGFR ml/min/1.73sq m 100 99  --  67 51 47 44 38 47   CALCIUM mg/dL 8.1* 7.9*  --  8.1* 8.0* 7.7* 7.6* 7.7* 7.7*   CALCIUM, IONIZED mmol/L 1.07*  --  0.99* 1.01*  --   --   --  1.00* 1.02*   MAGNESIUM mg/dL 1.7  --   --  2.4 2.5 2.6 2.5 2.4 2.5   PHOSPHORUS mg/dL 2.6*  --   --  3.5 4.7* 5.2* 6.6* 5.5* 5.3*     Results from last 7 days   Lab Units 08/09/23  0548 08/07/23  0435 08/06/23  0435 08/05/23  0340 08/04/23  2103   AST U/L 27 25 34 51* 61*   ALT U/L 18 21 24 46 53   ALK PHOS U/L 72 79 87 101 111   TOTAL PROTEIN g/dL 5.4* 5.5* 5.5* 5.6* 5.6*   ALBUMIN g/dL 1.4* 1.4* 1.4* 1.4* 1.5*   TOTAL BILIRUBIN mg/dL 0.52 0.43 0.61 1.16* 1.43*     Results from last 7 days   Lab Units 08/10/23  1135 08/10/23  0722 08/09/23  2051 08/09/23  1802 08/09/23  1658 08/09/23  1128 08/09/23  0941 08/09/23  0617 08/09/23  0445 08/09/23  0016 08/08/23  2214 08/08/23 2008   POC GLUCOSE mg/dl 161* 141* 279* 203* 186* 186* 186* 157* 167* 157* 134 146*     Results from last 7 days   Lab Units 08/10/23  0419 08/09/23  0548 08/08/23  0554 08/07/23  0435 08/06/23  2358 08/06/23  0435 08/05/23  0340 08/04/23  2103 08/04/23  1454   GLUCOSE RANDOM mg/dL 134 161* 153* 150* 346* 237* 243* 265* 286*         Results from last 7 days   Lab Units 08/07/23  0435   HEMOGLOBIN A1C % 7.0*   EAG mg/dl 154       Results from last 7 days   Lab Units 08/04/23  1454   PH WILLIAM  7.408*   PCO2 WILLIAM mm Hg 41.3*   PO2 WILLIAM mm Hg 34.5*   HCO3 WILLIAM mmol/L 25.5   BASE EXC WILLIAM mmol/L 0.7   O2 CONTENT WILLIAM ml/dL 8.8   O2 HGB, VENOUS % 62.7             Results from last 7 days   Lab Units 08/04/23  1642 08/04/23  1454   HS TNI 0HR ng/L  --  11   HS TNI 2HR ng/L 8  --    HSTNI D2 ng/L -3  --          Results from last 7 days   Lab Units 08/10/23  0419 08/09/23  2131 08/09/23  1401 08/08/23  1750 08/08/23  1117 08/06/23  1241 08/04/23  2103 08/04/23  1454   PROTIME seconds  --   --   --   --  16.8* 21.8*  --  41.3*   INR   --   --   --   -- 1.34* 1.88*  --  4.44*   PTT seconds 86* 86* 57*   < > 35  --    < > 50*    < > = values in this interval not displayed. Results from last 7 days   Lab Units 08/04/23  2103 08/04/23  1454   PROCALCITONIN ng/ml 2.35* 1.73*     Results from last 7 days   Lab Units 08/05/23  0008 08/04/23  2103 08/04/23  1642 08/04/23  1454   LACTIC ACID mmol/L 1.8 2.2* 2.0 5.0*       Results from last 7 days   Lab Units 08/08/23  0555 08/07/23  0555   UNIT PRODUCT CODE  [id number]   UNIT NUMBER  [id number]   Marcela Mercedes SKYLER   UNITRH  POS POS   CROSSMATCH  Compatible Compatible   UNIT DISPENSE STATUS  Presumed Trans Presumed Trans   UNIT PRODUCT VOL mL 350 350         Results from last 7 days   Lab Units 08/04/23  2103 08/04/23  1454   LIPASE u/L 545* 129*         Results from last 7 days   Lab Units 08/05/23  1517 08/04/23  2103 08/04/23  1454   BLOOD CULTURE   --  No Growth After 5 Days. No Growth After 5 Days. No Growth After 5 Days. No Growth After 5 Days.    GRAM STAIN RESULT  No Polys or Bacteria seen  --   --    BODY FLUID CULTURE, STERILE  No growth  --   --        Medications:   Scheduled Medications:  chlorhexidine, 15 mL, Mouth/Throat, Q12H [facility]  insulin glargine, 14 Units, Subcutaneous, HS  insulin lispro, 1-5 Units, Subcutaneous, TID AC  insulin lispro, 1-5 Units, Subcutaneous, HS  insulin lispro, 5 Units, Subcutaneous, TID With Meals  pancrelipase (Lip-Prot-Amyl), 6,000 Units, Oral, TID With Meals  pantoprazole, 40 mg, Oral, Q12H [facility]      Continuous IV Infusions:  heparin (porcine), 3-30 Units/kg/hr (Order-Specific), Intravenous, Titrated      PRN Meds:  acetaminophen, 975 mg, Oral, Q8H PRN  HYDROmorphone, 0.5 mg, Intravenous, Q4H PRN  labetalol, 10 mg, Intravenous, Q6H PRN  ondansetron, 4 mg, Intravenous, Q6H PRN  oxyCODONE, 10 mg, Oral, Q4H PRN  oxyCODONE, 5 mg, Oral, Q4H PRN        Discharge Plan: No acute PT/OT needs, cleared    Network Utilization Review Department  ATTENTION: Please call with any questions or concerns to [phone] and carefully listen to the prompts so that you are directed to the right person. All voicemails are confidential.  Clayton Bravo all requests for admission clinical reviews, approved or denied determinations and any other requests to dedicated fax number below belonging to the campus where the patient is receiving treatment.  List of dedicated fax numbers for the Facilities:  [place] DENIALS (Administrative/Medical Necessity) [phone] [address] [facility] (Maternity/NICU/Pediatrics) [phone]   [address] [place] [address] [phone]   [facility] [address] [facility] [phone]   [facility] [address] [place] [address] [phone]   [address] [place] [address] [phone]

## 2023-08-10 NOTE — RESTORATIVE TECHNICIAN NOTE
Restorative Technician Note      Patient Name: Jing Santoyo     Restorative Tech Visit Date: 08/10/23  Note Type: Mobility  Patient Position Upon Consult: Other (Comment) (in BR)  Activity Performed: Ambulated  Assistive Device: Other (Comment) (IV pole)  Education Provided: Yes  Patient Position at End of Consult: Supine; All needs within reach;  Other (comment) (ambulated back to bed)    Brian Hall  DPT, Restorative Technician

## 2023-08-10 NOTE — PLAN OF CARE
Problem: PAIN - ADULT  Goal: Verbalizes/displays adequate comfort level or baseline comfort level  Description: Interventions:  - Encourage patient to monitor pain and request assistance  - Assess pain using appropriate pain scale  - Administer analgesics based on type and severity of pain and evaluate response  - Implement non-pharmacological measures as appropriate and evaluate response  - Consider cultural and social influences on pain and pain management  - Notify physician/advanced practitioner if interventions unsuccessful or patient reports new pain  Outcome: Progressing     Problem: INFECTION - ADULT  Goal: Absence or prevention of progression during hospitalization  Description: INTERVENTIONS:  - Assess and monitor for signs and symptoms of infection  - Monitor lab/diagnostic results  - Monitor all insertion sites, i.e. indwelling lines, tubes, and drains  - Monitor endotracheal if appropriate and nasal secretions for changes in amount and color  - [place] appropriate cooling/warming therapies per order  - Administer medications as ordered  - Instruct and encourage patient and family to use good hand hygiene technique  - Identify and instruct in appropriate isolation precautions for identified infection/condition  Outcome: Progressing  Goal: Absence of fever/infection during neutropenic period  Description: INTERVENTIONS:  - Monitor WBC    Outcome: Progressing     Problem: SAFETY ADULT  Goal: Patient will remain free of falls  Description: INTERVENTIONS:  - Educate patient/family on patient safety including physical limitations  - Instruct patient to call for assistance with activity   - Consult OT/PT to assist with strengthening/mobility   - Keep Call bell within reach  - Keep bed low and locked with side rails adjusted as appropriate  - Keep care items and personal belongings within reach  - Initiate and maintain comfort rounds  - Make Fall Risk Sign visible to staff  - Offer Toileting every 2 Hours, in advance of need  - Consider moving patient to room near nurses station  Outcome: Progressing  Goal: Maintain or return to baseline ADL function  Description: INTERVENTIONS:  -  Assess patient's ability to carry out ADLs; assess patient's baseline for ADL function and identify physical deficits which impact ability to perform ADLs (bathing, care of mouth/teeth, toileting, grooming, dressing, etc.)  - Assess/evaluate cause of self-care deficits   - Assess range of motion  - Assess patient's mobility; develop plan if impaired  - Assess patient's need for assistive devices and provide as appropriate  - Encourage maximum independence but intervene and supervise when necessary  - Involve family in performance of ADLs  - Assess for home care needs following discharge   - Consider OT consult to assist with ADL evaluation and planning for discharge  - Provide patient education as appropriate  Outcome: Progressing  Goal: Maintains/Returns to pre admission functional level  Description: INTERVENTIONS:  - Perform BMAT or MOVE assessment daily.   - Set and communicate daily mobility goal to care team and patient/family/caregiver. - Collaborate with rehabilitation services on mobility goals if consulted  - Reposition patient every 2 hours.   - Stand patient 3 times a day  - Ambulate patient 3 times a day  - Out of bed to chair 3 times a day   - Out of bed for meals 3 times a day  - Out of bed for toileting  - Record patient progress and toleration of activity level   Outcome: Progressing     Problem: DISCHARGE PLANNING  Goal: Discharge to home or other facility with appropriate resources  Description: INTERVENTIONS:  - Identify barriers to discharge w/patient and caregiver  - Arrange for needed discharge resources and transportation as appropriate  - Identify discharge learning needs (meds, wound care, etc.)  - Arrange for interpretive services to assist at discharge as needed  - Refer to Case Management Department for coordinating discharge planning if the patient needs post-hospital services based on physician/advanced practitioner order or complex needs related to functional status, cognitive ability, or social support system  Outcome: Progressing     Problem: Knowledge Deficit  Goal: Patient/family/caregiver demonstrates understanding of disease process, treatment plan, medications, and discharge instructions  Description: Complete learning assessment and assess knowledge base.   Interventions:  - Provide teaching at level of understanding  - Provide teaching via preferred learning methods  Outcome: Progressing     Problem: Prexisting or High Potential for Compromised Skin Integrity  Goal: Skin integrity is maintained or improved  Description: INTERVENTIONS:  - Identify patients at risk for skin breakdown  - Assess and monitor skin integrity  - Assess and monitor nutrition and hydration status  - Monitor labs   - Assess for incontinence   - Turn and reposition patient  - Assist with mobility/ambulation  - Relieve pressure over bony prominences  - Avoid friction and shearing  - Provide appropriate hygiene as needed including keeping skin clean and dry  - Evaluate need for skin moisturizer/barrier cream  - Collaborate with interdisciplinary team   - Patient/family teaching  - Consider wound care consult   Outcome: Progressing     Problem: MOBILITY - ADULT  Goal: Maintain or return to baseline ADL function  Description: INTERVENTIONS:  -  Assess patient's ability to carry out ADLs; assess patient's baseline for ADL function and identify physical deficits which impact ability to perform ADLs (bathing, care of mouth/teeth, toileting, grooming, dressing, etc.)  - Assess/evaluate cause of self-care deficits   - Assess range of motion  - Assess patient's mobility; develop plan if impaired  - Assess patient's need for assistive devices and provide as appropriate  - Encourage maximum independence but intervene and supervise when necessary  - Involve family in performance of ADLs  - Assess for home care needs following discharge   - Consider OT consult to assist with ADL evaluation and planning for discharge  - Provide patient education as appropriate  Outcome: Progressing  Goal: Maintains/Returns to pre admission functional level  Description: INTERVENTIONS:  - Perform BMAT or MOVE assessment daily.   - Set and communicate daily mobility goal to care team and patient/family/caregiver. - Collaborate with rehabilitation services on mobility goals if consulted  - Reposition patient every 2 hours. - Stand patient 3 times a day  - Ambulate patient 3 times a day  - Out of bed to chair 3 times a day   - Out of bed for meals 3 times a day  - Out of bed for toileting  - Record patient progress and toleration of activity level   Outcome: Progressing     Problem: Nutrition/Hydration-ADULT  Goal: Nutrient/Hydration intake appropriate for improving, restoring or maintaining nutritional needs  Description: Monitor and assess patient's nutrition/hydration status for malnutrition. Collaborate with interdisciplinary team and initiate plan and interventions as ordered. Monitor patient's weight and dietary intake as ordered or per policy. Utilize nutrition screening tool and intervene as necessary. Determine patient's food preferences and provide high-protein, high-caloric foods as appropriate.      INTERVENTIONS:  - Monitor oral intake, urinary output, labs, and treatment plans  - Assess nutrition and hydration status and recommend course of action  - Evaluate amount of meals eaten  - Assist patient with eating if necessary   - Allow adequate time for meals  - Recommend/ encourage appropriate diets, oral nutritional supplements, and vitamin/mineral supplements  - Order, calculate, and assess calorie counts as needed  - Recommend, monitor, and adjust tube feedings and TPN/PPN based on assessed needs  - Assess need for intravenous fluids  - Provide specific nutrition/hydration education as appropriate  - Include patient/family/caregiver in decisions related to nutrition  Outcome: Progressing

## 2023-08-10 NOTE — PROGRESS NOTES
***UPDATE TIME*** ***UPDATE TIME*** ***UPDATE TIME*** ***UPDATE TIME***    Progress Note - General Surgery   James Hernandez 46 y.o. male MRN: [id number]  Unit/Bed#: [facility] 823-01 Encounter: [id number]    Assessment:  46year old male with necrotizing pancreatitis and non-occlusive SMV, s/p IR drainage 8/5. AVSS on RA ***  UOP: *** cc  RUQ drain: *** cc    WBC *** (17.24)  Hgb *** (8.6)  Cr *** (0.84)    Plan:  - CCD, ensures, calorie count, pancrelipase  - Eliquis restarted, continue  - Maintain IR drain   - Insulin regimen per endo recs  - Encourage ambulation, IS  - Pain and nausea control PRN    Subjective/Objective     No acute events overnight. Pain *** Patient denies nausea, vomiting, fever, chills, shortness of breath, chest pain. *** *** *** ***    Objective:     Blood pressure 130/83, pulse 101, temperature 99.9 °F (37.7 °C), resp. rate 20, height 5' 11" (1.803 m), weight (!) 141 kg (311 lb 4.6 oz), SpO2 93 %. ,Body mass index is 43.42 kg/m². Intake/Output Summary (Last 24 hours) at 8/10/2023 1955  Last data filed at 8/10/2023 1700  Gross per 24 hour   Intake 768.29 ml   Output 1155 ml   Net -386.71 ml       Invasive Devices     Peripheral Intravenous Line  Duration           Peripheral IV 08/08/23 Dorsal (posterior); Left Wrist 2 days          Drain  Duration           Abscess Drain RUQ 5 days    Urethral Catheter Other (Comment) 16 Fr. 5 days                Physical Exam:  General: No acute distress  Neuro: Alert and oriented  HEENT: Moist mucous membranes  CV: Well perfused, regular rate  Lungs: Normal work of breathing, no increased respiratory effort on RA  Abdomen: Soft, mildly tender ***, distended ***, IR drain in place   Extremities: No edema, clubbing or cyanosis  : Saunders in place  Skin: Warm, dry    ---  Ye Wilkes MD  General Surgery PGY-I 25

## 2023-08-10 NOTE — PROGRESS NOTES
Progress Note - General Surgery   Neva Irwin 46 y.o. male MRN: [id number]  Unit/Bed#: [facility] 823-01 Encounter: [id number]    Assessment:  47yo M with necrotizing pancreatitis and non-occlusive SMV   s/p IR drainage 8/5    Tachycardic to 110 with spo2 92- 95% RA   cc  RUQ drain 125 cc murky sanguinous  WBC 17.24 (17.60)  Hgb 8.6 (8.1)  Plt 413 (369)  K 4.2 (4.1)  Cr 0.84 (0.81)    Plan:  - FLD w/glucerna  - calorie count  - pancreatic enzymes for aid with fat digestion  - cont heparin gtt  - maintain IR drain   - appreciate endo recs for insulin regimen  - will need insulin teaching for home  - cont to monitor off abx  - strict I/Os, monitor UOP  - IVF, titrate as needed for adequate resuscitation  - pain/Nausea prn management  - encourage OOB, IS    Subjective/Objective     No acute events overnight. Not in acute distress. Patient reports tolerating nutritional shakes. Patient reported passing flatus as well as 2 episodes of diarrhea. No nausea, vomiting, fevers, chills, chest pain or sob. Objective:     Blood pressure 117/82, pulse 104, temperature 99.2 °F (37.3 °C), resp. rate (!) 23, height 5' 11" (1.803 m), weight (!) 142 kg (313 lb 11.4 oz), SpO2 93 %. ,Body mass index is 43.75 kg/m². Intake/Output Summary (Last 24 hours) at 8/10/2023 0559  Last data filed at 8/10/2023 0507  Gross per 24 hour   Intake 412.29 ml   Output 1155 ml   Net -742.71 ml       Invasive Devices     Peripheral Intravenous Line  Duration           Peripheral IV 08/08/23 Dorsal (posterior); Left Wrist 1 day          Drain  Duration           Abscess Drain RUQ 4 days    Urethral Catheter Other (Comment) 16 Fr. 4 days                Physical Exam:  General: No acute distress  Neuro: alert and oriented  HEENT: moist mucous membranes  CV: Well perfused, regular rate and rhythm  Lungs: Normal work of breathing, no increased respiratory effort  Abdomen: Soft, non-tender, distended.  IR drain with murky dark sanguinous output  Extremities: No edema, clubbing or cyanosis  : palafox in place  Skin: Warm, dry      Cristin Hopper MD  8/10/2023 6:00 AM

## 2023-08-10 NOTE — RESTORATIVE TECHNICIAN NOTE
Restorative Technician Note      Patient Name: Anthony Rubio     [facility] Visit Date: 08/10/23  Note Type: Mobility  Patient Position Upon Consult: Supine  Education Provided: Yes  Patient Position at End of Consult: Supine; All needs within reach    Pt educated to the max about the benefits of mobility (ambulating and sitting OOB) with his recovery and current status; pt adamently refusing to participate at this time - Braulio notified. Pt states that he will consider OOB to recliner in an hour or two but not now. Pt aware restorative will follow up tomorrow and attempt ambulation again.     Soto Sin  DPT, Restorative Technician

## 2023-08-10 NOTE — PLAN OF CARE
Problem: PAIN - ADULT  Goal: Verbalizes/displays adequate comfort level or baseline comfort level  Description: Interventions:  - Encourage patient to monitor pain and request assistance  - Assess pain using appropriate pain scale  - Administer analgesics based on type and severity of pain and evaluate response  - Implement non-pharmacological measures as appropriate and evaluate response  - Consider cultural and social influences on pain and pain management  - Notify physician/advanced practitioner if interventions unsuccessful or patient reports new pain  Outcome: Progressing     Problem: INFECTION - ADULT  Goal: Absence or prevention of progression during hospitalization  Description: INTERVENTIONS:  - Assess and monitor for signs and symptoms of infection  - Monitor lab/diagnostic results  - Monitor all insertion sites, i.e. indwelling lines, tubes, and drains  - Monitor endotracheal if appropriate and nasal secretions for changes in amount and color  - [place] appropriate cooling/warming therapies per order  - Administer medications as ordered  - Instruct and encourage patient and family to use good hand hygiene technique  - Identify and instruct in appropriate isolation precautions for identified infection/condition  Outcome: Progressing  Goal: Absence of fever/infection during neutropenic period  Description: INTERVENTIONS:  - Monitor WBC    Outcome: Progressing     Problem: SAFETY ADULT  Goal: Patient will remain free of falls  Description: INTERVENTIONS:  - Educate patient/family on patient safety including physical limitations  - Instruct patient to call for assistance with activity   - Consult OT/PT to assist with strengthening/mobility   - Keep Call bell within reach  - Keep bed low and locked with side rails adjusted as appropriate  - Keep care items and personal belongings within reach  - Initiate and maintain comfort rounds  - Make Fall Risk Sign visible to staff  - Offer Toileting every 2 Hours, in advance of need  - Consider moving patient to room near nurses station  Outcome: Progressing  Goal: Maintain or return to baseline ADL function  Description: INTERVENTIONS:  -  Assess patient's ability to carry out ADLs; assess patient's baseline for ADL function and identify physical deficits which impact ability to perform ADLs (bathing, care of mouth/teeth, toileting, grooming, dressing, etc.)  - Assess/evaluate cause of self-care deficits   - Assess range of motion  - Assess patient's mobility; develop plan if impaired  - Assess patient's need for assistive devices and provide as appropriate  - Encourage maximum independence but intervene and supervise when necessary  - Involve family in performance of ADLs  - Assess for home care needs following discharge   - Consider OT consult to assist with ADL evaluation and planning for discharge  - Provide patient education as appropriate  Outcome: Progressing  Goal: Maintains/Returns to pre admission functional level  Description: INTERVENTIONS:  - Perform BMAT or MOVE assessment daily.   - Set and communicate daily mobility goal to care team and patient/family/caregiver. - Collaborate with rehabilitation services on mobility goals if consulted  - Reposition patient every 2 hours.   - Stand patient 3 times a day  - Ambulate patient 3 times a day  - Out of bed to chair 3 times a day   - Out of bed for meals 3 times a day  - Out of bed for toileting  - Record patient progress and toleration of activity level   Outcome: Progressing     Problem: DISCHARGE PLANNING  Goal: Discharge to home or other facility with appropriate resources  Description: INTERVENTIONS:  - Identify barriers to discharge w/patient and caregiver  - Arrange for needed discharge resources and transportation as appropriate  - Identify discharge learning needs (meds, wound care, etc.)  - Arrange for interpretive services to assist at discharge as needed  - Refer to Case Management Department for coordinating discharge planning if the patient needs post-hospital services based on physician/advanced practitioner order or complex needs related to functional status, cognitive ability, or social support system  Outcome: Progressing     Problem: Knowledge Deficit  Goal: Patient/family/caregiver demonstrates understanding of disease process, treatment plan, medications, and discharge instructions  Description: Complete learning assessment and assess knowledge base.   Interventions:  - Provide teaching at level of understanding  - Provide teaching via preferred learning methods  Outcome: Progressing     Problem: Prexisting or High Potential for Compromised Skin Integrity  Goal: Skin integrity is maintained or improved  Description: INTERVENTIONS:  - Identify patients at risk for skin breakdown  - Assess and monitor skin integrity  - Assess and monitor nutrition and hydration status  - Monitor labs   - Assess for incontinence   - Turn and reposition patient  - Assist with mobility/ambulation  - Relieve pressure over bony prominences  - Avoid friction and shearing  - Provide appropriate hygiene as needed including keeping skin clean and dry  - Evaluate need for skin moisturizer/barrier cream  - Collaborate with interdisciplinary team   - Patient/family teaching  - Consider wound care consult   Outcome: Progressing     Problem: MOBILITY - ADULT  Goal: Maintain or return to baseline ADL function  Description: INTERVENTIONS:  -  Assess patient's ability to carry out ADLs; assess patient's baseline for ADL function and identify physical deficits which impact ability to perform ADLs (bathing, care of mouth/teeth, toileting, grooming, dressing, etc.)  - Assess/evaluate cause of self-care deficits   - Assess range of motion  - Assess patient's mobility; develop plan if impaired  - Assess patient's need for assistive devices and provide as appropriate  - Encourage maximum independence but intervene and supervise when necessary  - Involve family in performance of ADLs  - Assess for home care needs following discharge   - Consider OT consult to assist with ADL evaluation and planning for discharge  - Provide patient education as appropriate  Outcome: Progressing  Goal: Maintains/Returns to pre admission functional level  Description: INTERVENTIONS:  - Perform BMAT or MOVE assessment daily.   - Set and communicate daily mobility goal to care team and patient/family/caregiver. - Collaborate with rehabilitation services on mobility goals if consulted  - Reposition patient every 2 hours. - Stand patient 3 times a day  - Ambulate patient 3 times a day  - Out of bed to chair 3 times a day   - Out of bed for meals 3 times a day  - Out of bed for toileting  - Record patient progress and toleration of activity level   Outcome: Progressing     Problem: Nutrition/Hydration-ADULT  Goal: Nutrient/Hydration intake appropriate for improving, restoring or maintaining nutritional needs  Description: Monitor and assess patient's nutrition/hydration status for malnutrition. Collaborate with interdisciplinary team and initiate plan and interventions as ordered. Monitor patient's weight and dietary intake as ordered or per policy. Utilize nutrition screening tool and intervene as necessary. Determine patient's food preferences and provide high-protein, high-caloric foods as appropriate.      INTERVENTIONS:  - Monitor oral intake, urinary output, labs, and treatment plans  - Assess nutrition and hydration status and recommend course of action  - Evaluate amount of meals eaten  - Assist patient with eating if necessary   - Allow adequate time for meals  - Recommend/ encourage appropriate diets, oral nutritional supplements, and vitamin/mineral supplements  - Order, calculate, and assess calorie counts as needed  - Recommend, monitor, and adjust tube feedings and TPN/PPN based on assessed needs  - Assess need for intravenous fluids  - Provide specific nutrition/hydration education as appropriate  - Include patient/family/caregiver in decisions related to nutrition  Outcome: Progressing

## 2023-08-10 NOTE — QUICK NOTE
Wife of andie has some concerns. Spent 35 minutes discussing lola with her to best of my knowledge. She is concerned he does not want to get oob. I placed OOB order and sent restorative to get him OOB to a chair. Did walk with assistance to bathroom today. Wife also concerned his weight is up and she feels his abdomen and legs are getting more swollen. Will encourage movement and continue to monitor. Restorative attempted to get OOB in a chair and patient strongly refused. This is not acceptable and I discussed with him that he needs to move.

## 2023-08-11 ENCOUNTER — TELEPHONE (OUTPATIENT)
Dept: GASTROENTEROLOGY | Facility: CLINIC | Age: 53
End: 2023-08-11

## 2023-08-11 VITALS
DIASTOLIC BLOOD PRESSURE: 83 MMHG | HEIGHT: 71 IN | WEIGHT: 306.88 LBS | RESPIRATION RATE: 16 BRPM | BODY MASS INDEX: 42.96 KG/M2 | HEART RATE: 98 BPM | SYSTOLIC BLOOD PRESSURE: 136 MMHG | OXYGEN SATURATION: 92 % | TEMPERATURE: 100.3 F

## 2023-08-11 DIAGNOSIS — K85.91 NECROTIZING PANCREATITIS: ICD-10-CM

## 2023-08-11 LAB
ALBUMIN SERPL BCP-MCNC: 1.4 G/DL (ref 3.5–5)
ALP SERPL-CCNC: 72 U/L (ref 46–116)
ALT SERPL W P-5'-P-CCNC: 22 U/L (ref 12–78)
ANION GAP SERPL CALCULATED.3IONS-SCNC: 4 MMOL/L
APTT PPP: 37 SECONDS (ref 23–37)
AST SERPL W P-5'-P-CCNC: 35 U/L (ref 5–45)
BILIRUB SERPL-MCNC: 0.53 MG/DL (ref 0.2–1)
BUN SERPL-MCNC: 15 MG/DL (ref 5–25)
CALCIUM ALBUM COR SERPL-MCNC: 9.9 MG/DL (ref 8.3–10.1)
CALCIUM SERPL-MCNC: 7.8 MG/DL (ref 8.3–10.1)
CHLORIDE SERPL-SCNC: 105 MMOL/L (ref 96–108)
CO2 SERPL-SCNC: 32 MMOL/L (ref 21–32)
CREAT SERPL-MCNC: 0.69 MG/DL (ref 0.6–1.3)
ERYTHROCYTE [DISTWIDTH] IN BLOOD BY AUTOMATED COUNT: 15.4 % (ref 11.6–15.1)
GFR SERPL CREATININE-BSD FRML MDRD: 109 ML/MIN/1.73SQ M
GLUCOSE SERPL-MCNC: 109 MG/DL (ref 65–140)
GLUCOSE SERPL-MCNC: 110 MG/DL (ref 65–140)
GLUCOSE SERPL-MCNC: 111 MG/DL (ref 65–140)
GLUCOSE SERPL-MCNC: 146 MG/DL (ref 65–140)
GLUCOSE SERPL-MCNC: 189 MG/DL (ref 65–140)
HCT VFR BLD AUTO: 25.3 % (ref 36.5–49.3)
HGB BLD-MCNC: 7.9 G/DL (ref 12–17)
MCH RBC QN AUTO: 27.9 PG (ref 26.8–34.3)
MCHC RBC AUTO-ENTMCNC: 31.2 G/DL (ref 31.4–37.4)
MCV RBC AUTO: 89 FL (ref 82–98)
PLATELET # BLD AUTO: 421 THOUSANDS/UL (ref 149–390)
PMV BLD AUTO: 10.1 FL (ref 8.9–12.7)
POTASSIUM SERPL-SCNC: 3.9 MMOL/L (ref 3.5–5.3)
PROT SERPL-MCNC: 5.6 G/DL (ref 6.4–8.4)
RBC # BLD AUTO: 2.83 MILLION/UL (ref 3.88–5.62)
SODIUM SERPL-SCNC: 141 MMOL/L (ref 135–147)
WBC # BLD AUTO: 13.8 THOUSAND/UL (ref 4.31–10.16)

## 2023-08-11 PROCEDURE — NC001 PR NO CHARGE: Performed by: SURGERY

## 2023-08-11 PROCEDURE — 85027 COMPLETE CBC AUTOMATED: CPT | Performed by: NURSE PRACTITIONER

## 2023-08-11 PROCEDURE — 85730 THROMBOPLASTIN TIME PARTIAL: CPT | Performed by: SURGERY

## 2023-08-11 PROCEDURE — 80053 COMPREHEN METABOLIC PANEL: CPT | Performed by: NURSE PRACTITIONER

## 2023-08-11 PROCEDURE — 82948 REAGENT STRIP/BLOOD GLUCOSE: CPT

## 2023-08-11 PROCEDURE — 99238 HOSP IP/OBS DSCHRG MGMT 30/<: CPT | Performed by: PHYSICIAN ASSISTANT

## 2023-08-11 RX ORDER — INSULIN GLARGINE 100 [IU]/ML
14 INJECTION, SOLUTION SUBCUTANEOUS
Qty: 15 ML | Refills: 0 | Status: SHIPPED | OUTPATIENT
Start: 2023-08-11

## 2023-08-11 RX ORDER — ACETAMINOPHEN 325 MG/1
650 TABLET ORAL EVERY 6 HOURS PRN
Refills: 0
Start: 2023-08-11

## 2023-08-11 RX ORDER — PEN NEEDLE, DIABETIC 32GX 5/32"
NEEDLE, DISPOSABLE MISCELLANEOUS
Qty: 100 EACH | Refills: 0 | Status: SHIPPED | OUTPATIENT
Start: 2023-08-11

## 2023-08-11 RX ORDER — BLOOD-GLUCOSE METER
KIT MISCELLANEOUS
Qty: 1 KIT | Refills: 0 | Status: SHIPPED | OUTPATIENT
Start: 2023-08-11

## 2023-08-11 RX ORDER — INSULIN LISPRO 100 [IU]/ML
5 INJECTION, SOLUTION INTRAVENOUS; SUBCUTANEOUS
Refills: 0 | OUTPATIENT
Start: 2023-08-11

## 2023-08-11 RX ORDER — INSULIN LISPRO 100 [IU]/ML
5 INJECTION, SOLUTION INTRAVENOUS; SUBCUTANEOUS
Qty: 15 ML | Refills: 0 | Status: SHIPPED | OUTPATIENT
Start: 2023-08-11

## 2023-08-11 RX ORDER — BLOOD SUGAR DIAGNOSTIC
STRIP MISCELLANEOUS
Qty: 200 EACH | Refills: 0 | Status: SHIPPED | OUTPATIENT
Start: 2023-08-11

## 2023-08-11 RX ORDER — GLUCOSAMINE HCL/CHONDROITIN SU 500-400 MG
CAPSULE ORAL
Qty: 200 EACH | Refills: 0 | Status: SHIPPED | OUTPATIENT
Start: 2023-08-11

## 2023-08-11 RX ORDER — OXYCODONE HYDROCHLORIDE 5 MG/1
5 TABLET ORAL EVERY 4 HOURS PRN
Qty: 18 TABLET | Refills: 0 | Status: SHIPPED | OUTPATIENT
Start: 2023-08-11 | End: 2023-08-21

## 2023-08-11 RX ORDER — LANCETS 33 GAUGE
EACH MISCELLANEOUS
Qty: 200 EACH | Refills: 0 | Status: SHIPPED | OUTPATIENT
Start: 2023-08-11

## 2023-08-11 RX ORDER — INSULIN DEGLUDEC INJECTION 100 U/ML
INJECTION, SOLUTION SUBCUTANEOUS
Refills: 0 | OUTPATIENT
Start: 2023-08-11

## 2023-08-11 RX ORDER — FUROSEMIDE 10 MG/ML
20 INJECTION INTRAMUSCULAR; INTRAVENOUS ONCE
Status: COMPLETED | OUTPATIENT
Start: 2023-08-11 | End: 2023-08-11

## 2023-08-11 RX ADMIN — CHLORHEXIDINE GLUCONATE 15 ML: 1.2 SOLUTION ORAL at 08:40

## 2023-08-11 RX ADMIN — FUROSEMIDE 20 MG: 10 INJECTION, SOLUTION INTRAMUSCULAR; INTRAVENOUS at 08:40

## 2023-08-11 RX ADMIN — INSULIN LISPRO 1 UNITS: 100 INJECTION, SOLUTION INTRAVENOUS; SUBCUTANEOUS at 12:14

## 2023-08-11 RX ADMIN — INSULIN LISPRO 5 UNITS: 100 INJECTION, SOLUTION INTRAVENOUS; SUBCUTANEOUS at 12:14

## 2023-08-11 RX ADMIN — PANCRELIPASE 6000 UNITS: 30000; 6000; 19000 CAPSULE, DELAYED RELEASE PELLETS ORAL at 09:29

## 2023-08-11 RX ADMIN — APIXABAN 5 MG: 5 TABLET, FILM COATED ORAL at 08:39

## 2023-08-11 RX ADMIN — PANCRELIPASE 6000 UNITS: 30000; 6000; 19000 CAPSULE, DELAYED RELEASE PELLETS ORAL at 12:14

## 2023-08-11 RX ADMIN — PANTOPRAZOLE SODIUM 40 MG: 40 TABLET, DELAYED RELEASE ORAL at 08:39

## 2023-08-11 RX ADMIN — INSULIN LISPRO 5 UNITS: 100 INJECTION, SOLUTION INTRAVENOUS; SUBCUTANEOUS at 08:40

## 2023-08-11 NOTE — INCIDENTAL FINDINGS
The following findings require follow up:  Radiographic finding   Findin. Lungs: LOWER CHEST: Small bilateral pleural effusions with adjacent compressive atelectasis, greater on the right, increased since prior study. Stable small nonenlarged right cardiophrenic lymph nodes. 2. LIVER/BILIARY TREE: Subtle nodularity of the liver surface, which may represent early cirrhotic changes. 3. ADRENAL GLANDS: Stable nodular thickening of left adrenal gland. 4. GALLBLADDER: Hyperdense sludge. Follow up required: Yes   Follow up should be done within 2-4 week(s)    Please notify the following clinician to assist with the follow up:   Primary Care Provider. Discussed with patient at bedside. He expressed verbal understanding of the above findings.

## 2023-08-11 NOTE — DISCHARGE SUMMARY
Discharge Summary - Reyna Shoemaker 46 y.o. male MRN: [id number]    Unit/Bed#: [facility] 823-01 Encounter: [id number]    Admission Date:   Admission Orders (From admission, onward)     Ordered        08/04/23 1833  Inpatient Admission  Once                        Admitting Diagnosis: Abdominal pain [R10.9]  Necrotizing pancreatitis [K85.91]    HPI: Per Wendi Kraus, "Rip Cortes a 46 y. o. male who presents as a transfer from [facility] for necrotizing pancreatitis. He had a scheduled outpatient CT that showed worsening of his pancreatitis and his wife informed the patient's PCP that he has not been able to eat much. He was recently admitted for about 4 days and discharged on 7/28 for management of his necrotizing pancreatitis. By the day of discharge, he was tolerating a regular diet with minimal pain.  He has been having diarrhea after each meal, but not vomiting. He has only been eating liquids and toast due to poor appetite. Denies fevers, chills, nausea, vomiting. Has been voiding, describes urine as bright yellow. Has been having diarrhea after every meal, but meals are minimal. Denies much abdominal pain, but did experience pain on the ride over."    Procedures Performed:   Orders Placed This Encounter   Procedures   • Bladder catheterization       Summary of Hospital Course: Patient is a 54-year-old male who comes in for evaluation of necrotizing pancreatitis and nonocclusive SMV thrombosis is now status post IR drainage placement on 8/5/2023. Patient was monitored in the ICU and did well. Patient was restarted on anticoagulation. During the hospital course patient had consultations with endocrinology, urology, and gastroenterology. The patient was brought to the floor and subsequently did well. Upon discharge the patient will be sent home on Lantus 14 units nightly and 5 units of Humalog 3 times daily with meals.   This was confirmed with pharmacy and adjustments were made to the brand names of the insulin secondary to pricing. Patient would have an outpatient short interval follow-up with gastroenterology with repeat CT scan. The patient would also be discharged with Saunders and have a repeat follow-up appointment for voiding trial on 8/15/2023. The patient on discharge will resume home medications. Home medications were confirmed at pharmacy. The patient had all incidental findings discussed at bedside prior to discharge. They expressed verbal understanding of the incidental findings prior to discharge. Significant Findings, Care, Treatment and Services Provided:   IR drainage tube placement    Result Date: 8/8/2023  Impression: Impression: CT-guided placement of a 12 Malaysian drain into a large peripancreatic hemorrhagic collection compressing the stomach Workstation performed: [id number]     FL urethrocystogram retrograde    Result Date: 8/7/2023  Impression: Fluoroscopic guidance provided for procedure guidance. Please refer to the separate procedure notes for additional details. Workstation performed: [id number]     CTA abdomen pelvis w wo contrast    Result Date: 8/6/2023  Impression: 1. Interval decrease in size of acute necrotic collection (ANC) in the lesser sac status post percutaneous drainage, with improved but persistent mass effect on the gastric body. There is increased attenuation of the collection, in keeping with hemorrhagic/proteinaceous contents. However, no active contrast extravasation is seen. 2. Similar appearance of acute necrotizing pancreatitis with extensive (>30%) parenchymal necrosis, tracking peripancreatic fluid and moderate volume ascites. Workstation performed: [id number]     XR chest portable    Result Date: 8/4/2023  Impression: No acute cardiopulmonary disease.  Workstation performed: [id number]     CT abdomen pelvis w contrast    Result Date: 8/4/2023  Impression: Worsening necrotizing pancreatitis greater than 30% of the pancreas with extensive peripancreatic inflammatory and necrotic changes. New acute necrotic collection in the lesser sac exerting mass effect on the gastric body. Similar nonocclusive thrombus in the superior mesenteric vein. Severe narrowing of the main portal vein, superior mesenteric vein, and splenic vein from the adjacent necrotizing pancreatitis with occlusion of the proximal splenic vein with reconstitution  distally. Moderate abdominopelvic ascites and small bilateral pleural effusions, increased since prior study. Subtle nodularity of the liver surface, which may represent early cirrhotic changes. The study was marked in [facility] for immediate notification. Workstation performed: [id number]       Complications: no complications    Discharge Diagnosis:   Patient Active Problem List   Diagnosis   • Primary hypertension   • Proteinuria   • Chronic pain of both hips   • Family history of prostate cancer in father   • Necrotizing pancreatitis   • Superior mesenteric artery thrombosis (HCC)   • Pleural effusion, left   • Hyponatremia   • Abnormal urinalysis   • Leukocytosis   • JERZY (acute kidney injury) [facility])         Medical Problems     Resolved Problems  Date Reviewed: 8/7/2023   None         Condition at Discharge: good         Discharge instructions/Information to patient and family:   See after visit summary for information provided to patient and family. Provisions for Follow-Up Care:  See after visit summary for information related to follow-up care and any pertinent home health orders. PCP: Fe Rose MD    Disposition: Home    Planned Readmission: No      Discharge Statement   I spent 23 minutes discharging the patient. This time was spent on the day of discharge. I had direct contact with the patient on the day of discharge. Additional documentation is required if more than 30 minutes were spent on discharge. Discharge Medications:  See after visit summary for reconciled discharge medications provided to patient and family.

## 2023-08-11 NOTE — TELEPHONE ENCOUNTER
----- Message from Misti Cabrera DO sent at 8/11/2023 11:51 AM EDT -----  Regarding: Hospital Follow up  Miguel,     . Julio Rucker was seen in consultation during recent [facility] hospitalization. He should have a repeat CT scan which has been ordered in approximately one week and should then follow up with one of the advanced endoscopists. Please call to schedule a follow up appointment. Thank you.      Heather Brenner

## 2023-08-11 NOTE — RESTORATIVE TECHNICIAN NOTE
Restorative Technician Note      Patient Name: Erendira Molina     Restorative Tech Visit Date: 08/11/23  Note Type: Mobility  Patient Position Upon Consult: Bedside chair  Activity Performed: Ambulated; reviewed home setup, pt with no questions/concerns  Assistive Device: Roller walker; has a RW at home  Patient Position at End of Consult: Bedside chair;  All needs within reach    Emil Pugaitative Technician

## 2023-08-11 NOTE — DISCHARGE INSTR - AVS FIRST PAGE
Acute Care Surgery Discharge Instructions    Please follow-up as instructed or on an as needed basis. If you need a follow-up appointment, please call the office when you leave to schedule an appointment. Activity:  - You may resume activity as tolerated. Diet:    - You may resume diabetic diet    Medications:  - Please take insulin as follows:  Lantus or long acting medication (Levemir) 14 units at bedtime  Humlaog or short acting medication (Humalog) 5 units with meals  Check blood sugars 4 times daily; before meals and at bedtime  Please keep sugar log     Additional Instructions:  - May shower daily and/or bathe normally. - If you have any questions or concerns after discharge please call the office.  - Call office or return to ER if fever greater than 101, chills, persistent nausea/vomiting, and/or worsening/uncontrollable pain. Urology Instruction:   -Please continue with Saunders catheter until seen in the urology office. Interventional radiology instruction:  -Please continue to trend DONNY drain output  -Continue flushing at the discretion of the IR instructions as below  -Monitor output; please call with any questions or concerns    Gastroenterology instruction  -Please follow-up in 1 to 2 weeks  -Please repeat CT scan in 1 week  -Call office with any concerns or questions regarding CT scan imaging    Endocrinology instruction:  -Please see above  -Take insulin as instructed      You have been diagnosed with - or have a history of - diabetes during your hospitalization. Review the following to help you manage your diabetes. Discharge Medications:  Taking your medications as prescribed is one of the most important aspects of maintaining your blood sugar in the target range established by your physician. It is important to know the names of your medication, how they work, how much to take, and when to take them.   Do not stop your prescribed medications or begin taking over-the counter or herbal medications without first speaking with your physician. Hypoglycemia (Low Blood Sugar)  Too little glucose (sugar) in your blood is called hypoglycemia or low blood sugar. Diabetes itself does not cause low blood sugar. But some of the treatments for diabetes, such as pills or insulin, may increase your risk for it. In severe cases, low blood sugar may cause you to lose consciousness or have a seizure. Low blood sugar is typically defined as a level below 70 mg/dL, (below 60 mg/dL if pregnant). Signs of low blood sugar (you may have one or more of these symptoms): shakiness or dizziness; cold/clammy skin or sweating; hunger;  headache; nervousness; hard/heavy heartbeat; weakness; confusion/irritability; blurred vision. Always treat low blood sugar right away, but do not overeat. What you should do if blood sugar too low:   First, check your blood sugar. If it is not possible to check your blood sugar, treat for low blood sugar anyway. If blood sugar is below 70 mg/dL (60 mg/dL if pregnant) OR  If unable to check blood sugar level and you have signs of low blood sugar,  eat or drink 15 to 20 grams of fast-acting sugar. This may be 3-4 glucose tablets or 4 oz. (half a cup) fruit juice or 4 oz. (half a cup) regular (non-diet) soda or 8 oz. (one cup) fat free milk, or 1 tablespoon of honey. Do not take more than this, or your blood sugar may go too high. Wait 15 minutes. Then recheck your blood sugar if you can. If your blood sugar is still too low, repeat the steps above and check your blood sugar again. If your blood sugar still has not returned to your target range, contact your health care provider or seek emergency care. Once your blood sugar returns to target range, eat a snack or meal.  Preventing low blood sugar  Follow diabetic diet. Do not skip meals or snacks. Take your medications at the prescribed times.   Always carry a source of fast-acting sugar and a snack when you are away from home.  Seek further medical help if you have repeated  low or high blood sugars    Keep/call your physician for follow-up appointments for the diabetes. Diabetes Education Classes: You are encouraged to learn how to best manage your diabetes by attending diabetes education classes. Please call Evy for more information: [phone].

## 2023-08-11 NOTE — CASE MANAGEMENT
Case Management Discharge Planning Note    Patient name Mary Tong  Location [address]/[facility] 731-59 MRN [id number]  : 1970 Date 2023       Current Admission Date: 2023  Current Admission Diagnosis:Necrotizing pancreatitis   Patient Active Problem List    Diagnosis Date Noted   • Necrotizing pancreatitis 2023   • Superior mesenteric artery thrombosis ([address]) 2023   • Pleural effusion, left 2023   • Hyponatremia 2023   • Abnormal urinalysis 2023   • Leukocytosis 2023   • JERZY (acute kidney injury) ([address]) 2023   • Primary hypertension 2022   • Proteinuria 2022   • Chronic pain of both hips 2022   • Family history of prostate cancer in father 2022      LOS (days): 7  Geometric Mean LOS (GMLOS) (days):   Days to GMLOS:     OBJECTIVE:  Risk of Unplanned Readmission Score: 13.48         Current admission status: Inpatient   Preferred Pharmacy:   [facility] - [address] [address]  Phone: [phone] Fax: [address] [facility] [address] [address]  Phone: [phone] Fax: [phone]    Primary Care Provider: Oleksandr Mazariegos MD    Primary Insurance: Jose Busby  Secondary Insurance:     DISCHARGE DETAILS:      [address]         Is the patient interested in [address] at discharge?: Yes  [facility] requested[de-identified] [facility] Name[de-identified] [facility] External Referral Reason (only applicable if external HHA name selected): Patient refused suggestion for recommended provider  [facility] Provider[de-identified] PCP  Home Health Services Needed[de-identified] Wound/Ostomy Care  Homebound Criteria Met[de-identified] Requires the Assistance of Another Person for Safe Ambulation or to Leave the Home  Supporting Clincal Findings[de-identified] Fatigues Easliy in [employer], Limited Endurance         Other Referral/Resources/Interventions Provided:  Interventions: [facility]  Referral Comments: CM met with pt at bedside and provided patient choice list for [facility]. Pt chose Chantal Hernandez. Bayda reserved in [address] [facility] Tuesday 8/15.          Treatment Team Recommendation: Home with [address]  Discharge Destination Plan[de-identified] Home with [address] [facility] [address] at Discharge : Family

## 2023-08-11 NOTE — RESTORATIVE TECHNICIAN NOTE
Restorative Technician Note      Patient Name: Mauricio Mario     Restorative Tech Visit Date: 08/11/23  Note Type: Mobility  Patient Position Upon Consult: Supine  Activity Performed: Ambulated; Transferred; Stood  Assistive Device: Roller walker; Other (Comment) (distance limited due to endurance level)  Patient Position at End of Consult: Bedside chair;  All needs within reach    Freddie Plants, Restoritative Technician

## 2023-08-11 NOTE — PLAN OF CARE
Problem: PAIN - ADULT  Goal: Verbalizes/displays adequate comfort level or baseline comfort level  Description: Interventions:  - Encourage patient to monitor pain and request assistance  - Assess pain using appropriate pain scale  - Administer analgesics based on type and severity of pain and evaluate response  - Implement non-pharmacological measures as appropriate and evaluate response  - Consider cultural and social influences on pain and pain management  - Notify physician/advanced practitioner if interventions unsuccessful or patient reports new pain  Outcome: Progressing     Problem: INFECTION - ADULT  Goal: Absence or prevention of progression during hospitalization  Description: INTERVENTIONS:  - Assess and monitor for signs and symptoms of infection  - Monitor lab/diagnostic results  - Monitor all insertion sites, i.e. indwelling lines, tubes, and drains  - Monitor endotracheal if appropriate and nasal secretions for changes in amount and color  - [place] appropriate cooling/warming therapies per order  - Administer medications as ordered  - Instruct and encourage patient and family to use good hand hygiene technique  - Identify and instruct in appropriate isolation precautions for identified infection/condition  Outcome: Progressing

## 2023-08-11 NOTE — PROGRESS NOTES
Progress Note -General surgery  : Red surgery Resident on Funmi Leung 46 y.o. male MRN: [id number]  Unit/Bed#: [facility] 823-01 Encounter: [id number]    Assessment:  46 y.o. male with necrotizing pancreatitis and nonocclusive SMV thrombosis, now status post IR drain placement on 8/5. Patient improving. vital signs within normal limits and stable on room air    Urine output 1315 cc  IR drain 140 cc dark pancreatic necrotic fluid    WBC pending from 17.2  Hemoglobin pending from 8.6  Creatinine pending from 0.84     Plan:  Continue normal diet as tolerated  Documented caloric intake  Monitor I's and O's  Heparin drip discontinued, now on Xarelto  Pain control as needed  Diabetic as needed  Encourage out of bed  Plan for discharge today    Subjective/Objective     Subjective: No acute events overnight. Patient seen and examined at bedside. Patient reports no acute concerns with team this morning. Patient is tolerating his p.o. diet and reports he is intaking about 65% of meals. Last documented calorie count was 75% of breakfast on 8/10. Patient denies nausea vomiting fevers chills and shortness of breath at this time. Patient reports he is ready to go home today. Objective:     Physical Exam:  GEN: No acute distress  HEENT: MMM  CV: Well-perfused regular rate  Lung: Work of breathing on room air  Ab: Soft mildly distended nontender  Extrem: Bilateral lower extremity edema, improving from previous examinations  Neuro: alert and oriented x 3    Vitals: Temp:  [99.4 °F (37.4 °C)-100.4 °F (38 °C)] 100.4 °F (38 °C)  HR:  [] 99  Resp:  [14-20] 14  BP: (127-134)/(82-83) 134/83  Body mass index is 43.42 kg/m². I/O       08/09 0701  08/10 0700 08/10 0701  08/11 0700    P. O.  356    I.V. (mL/kg) 412.3 (2.9)     Total Intake(mL/kg) 412.3 (2.9) 356 (2.5)    Urine (mL/kg/hr) 905 (0.3) 1315 (0.4)    Drains 125 140    Stool 0     Total Output 1030 1455    Net -617.7 -1099 Unmeasured Stool Occurrence 1 x             Lab, Imaging and other studies: I have personally reviewed pertinent reports.   , CBC with diff: No results found for: "WBC", "HGB", "HCT", "MCV", "PLT", "ADJUSTEDWBC", "RBC", "MCH", "MCHC", "RDW", "MPV", "NRBC", BMP/CMP: No results found for: "SODIUM", "K", "CL", "CO2", "ANIONGAP", "BUN", "CREATININE", "GLUCOSE", "CALCIUM", "AST", "ALT", "ALKPHOS", "PROT", "BILITOT", "EGFR"  VTE Pharmacologic Prophylaxis: Xarelto  VTE Mechanical Prophylaxis: sequential compression device    UpdateTime 8291    Nancy Mcclelland MD  8/11/2023 6:08 AM

## 2023-08-11 NOTE — PROGRESS NOTES
-- Patient:  -- MRN: [id number]  -- Aidin Request ID: [id number]  -- Level of care reserved: [address]  -- Partner Reserved: [address] [phone]  -- Clinical needs requested:  -- Geography searched: [id number]  -- Start of Service:  -- Request sent: 10:34am EDT on 8/11/2023 by Yanna Dean  -- Partner reserved: 12:25pm EDT on 8/11/2023 by Yanna Dean  -- Choice list shared: 12:04pm EDT on 8/11/2023 by Yanna Dean

## 2023-08-14 ENCOUNTER — TELEPHONE (OUTPATIENT)
Dept: FAMILY MEDICINE CLINIC | Facility: CLINIC | Age: 53
End: 2023-08-14

## 2023-08-14 ENCOUNTER — NURSE TRIAGE (OUTPATIENT)
Age: 53
End: 2023-08-14

## 2023-08-14 ENCOUNTER — TRANSITIONAL CARE MANAGEMENT (OUTPATIENT)
Dept: FAMILY MEDICINE CLINIC | Facility: CLINIC | Age: 53
End: 2023-08-14

## 2023-08-14 DIAGNOSIS — R60.9 EDEMA, UNSPECIFIED TYPE: Primary | ICD-10-CM

## 2023-08-14 RX ORDER — FUROSEMIDE 20 MG/1
20 TABLET ORAL DAILY
Qty: 30 TABLET | Refills: 0 | Status: SHIPPED | OUTPATIENT
Start: 2023-08-14

## 2023-08-14 NOTE — TELEPHONE ENCOUNTER
I spoke with patient's wife, scheduled follow up with Dr. Annie MEDEROS 8/30/23 (notes state to schedule with advanced endoscopist for hospital follow up). Patient is scheduled for CT 8/18/23 and I did advise depending on results appointment may change. Patient is on waitlist for earlier, she did state they are signing on for [address] [place] [address].

## 2023-08-14 NOTE — TELEPHONE ENCOUNTER
Confirmed palafox removal and void trial with patient for tomorrow, [facility] Confirmed location.

## 2023-08-14 NOTE — TELEPHONE ENCOUNTER
----- Message from Minh Miranda sent at 8/14/2023 10:32 AM EDT -----  Patient's wife Glen Dhillon to schedule Hospital FU in [facility] by [facility] Physician to call if any issues getting in soon.   Scheduled soonest available 9/6/23, added to wait list.

## 2023-08-14 NOTE — TELEPHONE ENCOUNTER
Post Op Note    Kristin Robles is a 46 y.o. male s/p  CYSTOSCOPY.  Saunders insertion (Bladder)      [facility] performed 8/5/23. Kristin Robles is a patient of Dr. Merlin Castaneda and is seen at the [facility] office. VM left to confirm appt for void trial tomorrow at [facility] office and to see how patient is feeling. Requested call back    When patient calls back please confirm void trial tomorrow at [facility] office and make sure he has the address.

## 2023-08-14 NOTE — UTILIZATION REVIEW
NOTIFICATION OF ADMISSION DISCHARGE   This is a Notification of Discharge from [address] [place] [address]. Please be advised that this patient has been discharge from our facility. Below you will find the admission and discharge date and time including the patient’s disposition. UTILIZATION REVIEW CONTACT:  Aayush Bauer  Utilization   Network Utilization Review Department  Phone: [phone] x carefully listen to the prompts. All voicemails are confidential.  Email: [email or web address]     ADMISSION INFORMATION  PRESENTATION DATE: 8/4/2023  5:40 PM  OBERVATION ADMISSION DATE:   INPATIENT ADMISSION DATE: 8/4/23  6:33 PM   DISCHARGE DATE: 8/11/2023  5:08 PM   DISPOSITION:Home with Home Health Care    IMPORTANT INFORMATION:  Send all requests for admission clinical reviews, approved or denied determinations and any other requests to dedicated fax number below belonging to the campus where the patient is receiving treatment.  List of dedicated fax numbers:  [place] DENIALS (Administrative/Medical Necessity) [phone] [facility] (Maternity/NICU/Pediatrics) [phone]   [facility] [phone]   [facility] [phone] [address] [phone] [facility] [phone]   [facility] [phone]   [address] [place] [address] [address] [place] [address] [phone]   [address] [place] [address] [phone]   [address] [facility] [phone] [facility] 3000 [facility] [phone]

## 2023-08-14 NOTE — TELEPHONE ENCOUNTER
T/c from [address] [employer] - re: pts disability and FMLA ppwrk. Spoke w [address] completed FMLA ppwrk for pt for July Hospital Stay, (copy under media). [employer] is stating pt filed disability claim - need to verify fax # and hospital stay, lov and next scheduled appt. [employer] will be faxing new ppwrk for pts August Hospital Stay - fax # verified. @[phone]    Please keep a lookout and place on Dr Kasey Mcpherson desk.     Please advise

## 2023-08-14 NOTE — TELEPHONE ENCOUNTER
T/c from Serjio (visiting nurse @ [facility] )  Maki Douglass saw pt yesterday for the first time after pts Hospital discharge - Serjio reports pt has edema of both legs - notes someone will return to see him this afternoon. Pt scheduled TCM for 08/15/2023. Serjio is advising pt may need a little diuretics to reduce edema - pt uses  on [address].      Please advise     Serjio can be reached @ [phone] or contact Abilio Anderson

## 2023-08-15 ENCOUNTER — PROCEDURE VISIT (OUTPATIENT)
Dept: UROLOGY | Facility: CLINIC | Age: 53
End: 2023-08-15

## 2023-08-15 ENCOUNTER — OFFICE VISIT (OUTPATIENT)
Dept: FAMILY MEDICINE CLINIC | Facility: CLINIC | Age: 53
End: 2023-08-15
Payer: COMMERCIAL

## 2023-08-15 VITALS
HEIGHT: 71 IN | HEART RATE: 117 BPM | DIASTOLIC BLOOD PRESSURE: 82 MMHG | WEIGHT: 306 LBS | OXYGEN SATURATION: 94 % | SYSTOLIC BLOOD PRESSURE: 130 MMHG | BODY MASS INDEX: 42.84 KG/M2

## 2023-08-15 VITALS
HEART RATE: 118 BPM | DIASTOLIC BLOOD PRESSURE: 76 MMHG | BODY MASS INDEX: 42.84 KG/M2 | TEMPERATURE: 98.6 F | HEIGHT: 71 IN | WEIGHT: 306 LBS | OXYGEN SATURATION: 93 % | SYSTOLIC BLOOD PRESSURE: 126 MMHG

## 2023-08-15 DIAGNOSIS — T83.9XXA DIFFICULT FOLEY CATHETER PLACEMENT (HCC): Primary | ICD-10-CM

## 2023-08-15 DIAGNOSIS — Z09 HOSPITAL DISCHARGE FOLLOW-UP: ICD-10-CM

## 2023-08-15 DIAGNOSIS — N17.9 AKI (ACUTE KIDNEY INJURY) (HCC): ICD-10-CM

## 2023-08-15 DIAGNOSIS — I10 PRIMARY HYPERTENSION: ICD-10-CM

## 2023-08-15 DIAGNOSIS — J90 PLEURAL EFFUSION, LEFT: ICD-10-CM

## 2023-08-15 DIAGNOSIS — D72.829 LEUKOCYTOSIS, UNSPECIFIED TYPE: ICD-10-CM

## 2023-08-15 DIAGNOSIS — R80.9 PROTEINURIA, UNSPECIFIED TYPE: ICD-10-CM

## 2023-08-15 DIAGNOSIS — R60.9 EDEMA, UNSPECIFIED TYPE: Primary | ICD-10-CM

## 2023-08-15 DIAGNOSIS — E87.1 HYPONATREMIA: ICD-10-CM

## 2023-08-15 DIAGNOSIS — R73.9 ELEVATED BLOOD SUGAR: ICD-10-CM

## 2023-08-15 DIAGNOSIS — K85.91 NECROTIZING PANCREATITIS: ICD-10-CM

## 2023-08-15 PROCEDURE — 99496 TRANSJ CARE MGMT HIGH F2F 7D: CPT | Performed by: STUDENT IN AN ORGANIZED HEALTH CARE EDUCATION/TRAINING PROGRAM

## 2023-08-15 PROCEDURE — 99024 POSTOP FOLLOW-UP VISIT: CPT

## 2023-08-15 RX ORDER — INSULIN ASPART 100 [IU]/ML
INJECTION, SOLUTION INTRAVENOUS; SUBCUTANEOUS
COMMUNITY
Start: 2023-08-11

## 2023-08-15 RX ORDER — INSULIN DETEMIR 100 [IU]/ML
14 INJECTION, SOLUTION SUBCUTANEOUS
COMMUNITY
Start: 2023-08-11

## 2023-08-15 NOTE — PROGRESS NOTES
8/15/2023    Andree Rubio  1970  [id number]    Diagnosis  Chief Complaint    Difficult Saunders catheter placement          Patient presents for void trial managed by [facility]  -patient too tired to come back for PVR this afternoon. Patient rescheduled for tomorrow at 3pm.    Procedure Saunders removal/voiding trial    Saunders catheter removed after deflation of an intact balloon. Patient tolerated well. Encouraged patient to hydrate well and return this afternoon for post void residual.  he knows he may return early if uncomfortable and unable to urinate. Patient agrees to this plan. Patient's wife called stating patient is too tired to come back this afternoon and would like to reschedule for tomorrow. Per wife, patient has urinated a few times. Denies any symptoms. Reviewed s/s to look out for if unable to urinate. Patient's wife verbalized understanding.     Vitals:    08/15/23 0834   BP: 130/82   Pulse: (!) 117   SpO2: 94%   Weight: (!) 139 kg (306 lb)   Height: 5' 11" (1.803 m)           Tamela Nielsen LPN

## 2023-08-15 NOTE — PROGRESS NOTES
I supervised the Advanced Practitioner. I reviewed the Advanced Practitioner note and agree.     Pritesh Matthews MD 08/15/23

## 2023-08-15 NOTE — PROGRESS NOTES
Assessment & Plan     1. Edema, unspecified type  -     Echo complete w/ contrast if indicated; Future; Expected date: 08/15/2023    2. Elevated blood sugar    3. Hospital discharge follow-up    4. Primary hypertension    5. Proteinuria, unspecified type    6. Necrotizing pancreatitis    7. Hyponatremia    8. Pleural effusion, left    9. JERZY (acute kidney injury) ([address])    10. Leukocytosis, unspecified type         Subjective     Transitional Care Management Review:   James Hernandez is a 46 y.o. male here for TCM follow up. During the TCM phone call patient stated:  TCM Call     Date and time call was made  8/14/2023  4:36 PM    Hospital care reviewed  Records reviewed    Patient was hospitialized at  [facility]    Date of Admission  08/04/23    Date of discharge  08/11/23    Diagnosis  Necrotizing pancreatitis    Disposition  Home    Were the patients medications reviewed and updated  Yes    Current Symptoms  None      TCM Call     Post hospital issues  None    Should patient be enrolled in anticoag monitoring? No    Scheduled for follow up?   Yes    Did you obtain your prescribed medications  Yes    Do you need help managing your prescriptions or medications  No    Is transportation to your appointment needed  No    I have advised the patient to call PCP with any new or worsening symptoms  [facility] or [facility] other    Support System  Family    The type of support provided  Emotional    Do you have social support  Yes, as much as I need    Are you recieving any outpatient services  No    Are you recieving home care services  No    Are you using any community resources  No    Current waiver services  No    Have you fallen in the last 12 months  No    Interperter language line needed  No    Counseling  Patient    Counseling topics  Activities of daily living; patient and family education    Comments  TCM scheduled for 8/15/2023 at 11:40 amTip MACEDO HPI  Review of Systems   Constitutional: Positive for fatigue. Negative for chills and fever. HENT: Negative for rhinorrhea and sore throat. Eyes: Negative for visual disturbance. Respiratory: Negative for cough and shortness of breath. Cardiovascular: Negative for chest pain and palpitations. Gastrointestinal: Negative for abdominal pain, constipation, diarrhea, nausea and vomiting. Genitourinary: Negative for difficulty urinating, dysuria and frequency. Musculoskeletal: Negative for arthralgias and myalgias. Skin: Negative for color change and rash. Neurological: Negative for weakness and headaches. Objective     /76 (BP Location: Left arm, Patient Position: Sitting, Cuff Size: Standard)   Pulse (!) 118   Temp 98.6 °F (37 °C)   Ht 5' 11" (1.803 m)   Wt (!) 139 kg (306 lb)   SpO2 93%   BMI 42.68 kg/m²      Physical Exam  Constitutional:       General: He is not in acute distress. Appearance: Normal appearance. He is not ill-appearing. HENT:      Head: Normocephalic and atraumatic. Right Ear: Tympanic membrane, ear canal and external ear normal.      Left Ear: Tympanic membrane, ear canal and external ear normal.      Nose: Nose normal.      Mouth/Throat:      Mouth: Mucous membranes are moist.      Pharynx: Oropharynx is clear. No oropharyngeal exudate or posterior oropharyngeal erythema. Eyes:      General: No scleral icterus. Right eye: No discharge. Left eye: No discharge. Extraocular Movements: Extraocular movements intact. Conjunctiva/sclera: Conjunctivae normal.      Pupils: Pupils are equal, round, and reactive to light. Cardiovascular:      Rate and Rhythm: Normal rate and regular rhythm. Pulses: Normal pulses. Heart sounds: Normal heart sounds. No murmur heard. Pulmonary:      Effort: Pulmonary effort is normal. No respiratory distress. Breath sounds: Normal breath sounds.    Abdominal:      General: Bowel sounds are normal. There is distension. Palpations: Abdomen is soft. Tenderness: There is no abdominal tenderness. Hernia: No hernia is present. Comments: Port in place draining blood serosanguinous discharge    Musculoskeletal:         General: Normal range of motion. Cervical back: Normal range of motion and neck supple. Lymphadenopathy:      Cervical: No cervical adenopathy. Skin:     General: Skin is warm and dry. Capillary Refill: Capillary refill takes less than 2 seconds. Neurological:      General: No focal deficit present. Mental Status: He is alert and oriented to person, place, and time. Mental status is at baseline. Cranial Nerves: No cranial nerve deficit.    Psychiatric:         Mood and Affect: Mood normal.       Medications have been reviewed by provider in current encounter    Best Francois MD

## 2023-08-17 ENCOUNTER — TELEPHONE (OUTPATIENT)
Dept: FAMILY MEDICINE CLINIC | Facility: CLINIC | Age: 53
End: 2023-08-17

## 2023-08-17 NOTE — TELEPHONE ENCOUNTER
T/c from pts spouse - asking if she can up the dose of lasix to 2 pills (40 mg total) daily for Armand's worsening edema.        Please notify Luan Raya @ [phone]    Please advise

## 2023-08-18 ENCOUNTER — HOSPITAL ENCOUNTER (OUTPATIENT)
Dept: RADIOLOGY | Facility: MEDICAL CENTER | Age: 53
Discharge: HOME | End: 2023-08-18
Payer: COMMERCIAL

## 2023-08-18 DIAGNOSIS — K85.91 NECROTIZING PANCREATITIS: ICD-10-CM

## 2023-08-18 PROCEDURE — 74177 CT ABD & PELVIS W/CONTRAST: CPT

## 2023-08-18 RX ADMIN — IOHEXOL 100 ML: 350 INJECTION, SOLUTION INTRAVENOUS at 11:34

## 2023-08-18 NOTE — LETTER
[address] [place] [address]  [address] [facility] [address]      August 23, 2023    MRN: [id number]     Phone: [phone]     Dear Mr. Toi Flores recently had a(n) Cat Scan performed on 8/18/2023 at  [address] [place] [address] that was requested by [employer]. The study was reviewed by a radiologist, which is a physician who specializes in medical imaging. The radiologist issued a report describing his or her findings. In that report there was a finding that the radiologist felt warranted further discussion with your health care provider and that discussion would be beneficial to you. The results were sent to [employer] on 08/18/2023  1:13 PM. We recommend that you contact [employer] at [phone] or set up an appointment to discuss the results of the imaging test. If you have already heard from [employer] regarding the results of your study, you can disregard this letter. This letter is not meant to alarm you, but intended to encourage you to follow-up on your results with the provider that sent you for the imaging study. In addition, we have enclosed answers to frequently asked questions by other patients who have also received a letter to review results with their health care provider (see page two). Thank you for choosing [address] [place] [address] for your medical imaging needs. FREQUENTLY ASKED QUESTIONS    Why am I receiving this letter? [facility] requires us to notify patients who have findings on imaging exams that may require more testing or follow-up with a health professional within the next 3 months. How serious is the finding on the imaging test?  This letter is sent to all patients who may need follow-up or more testing within the next 3 months. Receiving this letter does not necessarily mean you have a life-threatening imaging finding or disease. Recommendations in the radiologist’s imaging report are general in nature and it is up to your healthcare provider to say whether those recommendations make sense for your situation. You are strongly encouraged to talk to your health care provider about the results and ask whether additional steps need to be taken. Where can I get a copy of the final report for my recent radiology exam?  To get a full copy of the report you can access your records online at [email or web address] or please contact [facility] Medical Records Department at [phone] Monday through Friday between 8 am and 6 pm.         What do I need to do now? Please contact your health care provider who requested the imaging study to discuss what further actions (if any) are needed. You may have already heard from (your ordering provider) in regard to this test in which case you can disregard this letter. NOTICE IN ACCORDANCE WITH THE [place] STATE “PATIENT TEST RESULT INFORMATION ACT OF 2018”    You are receiving this notice as a result of a determination by your diagnostic imaging service that further discussions of your test results are warranted and would be beneficial to you. The complete results of your test or tests have been or will be sent to the health care practitioner that ordered the test or tests. It is recommended that you contact your health care practitioner to discuss your results as soon as possible.

## 2023-08-19 NOTE — RESULT ENCOUNTER NOTE
I sent patient a Shweeb message regarding the CT results perfomed on 8/18/23. Has follow-up visit with Dr. Leonora Granado later this month and have encouraged him to keep up with the same.

## 2023-08-21 ENCOUNTER — OFFICE VISIT (OUTPATIENT)
Dept: SURGERY | Facility: CLINIC | Age: 53
End: 2023-08-21
Payer: COMMERCIAL

## 2023-08-21 VITALS — BODY MASS INDEX: 38.54 KG/M2 | WEIGHT: 275.3 LBS | HEIGHT: 71 IN | TEMPERATURE: 98.1 F

## 2023-08-21 DIAGNOSIS — K85.91 NECROTIZING PANCREATITIS: ICD-10-CM

## 2023-08-21 PROCEDURE — 99213 OFFICE O/P EST LOW 20 MIN: CPT | Performed by: SURGERY

## 2023-08-21 RX ORDER — SODIUM CHLORIDE 9 MG/ML
10 INJECTION INTRAVENOUS DAILY
Qty: 300 ML | Refills: 0 | Status: SHIPPED | OUTPATIENT
Start: 2023-08-21 | End: 2023-12-19

## 2023-08-21 NOTE — ASSESSMENT & PLAN NOTE
47 yo male presents for follow up for hospitalization for necrotizing pancreas. Discharge don 8/11/2023. Outpatient CT 8/18 showed resolving acute necrotizing pancreatitis with interval decrease in the size of the peripancreatic necrotizing collection in the lesser sac compared to 8/6/2023. Patient is tolerating regular diet with no nausea or vomiting, passing flatus and bowel movements. Next IR drain check 9/11.     Plan  -Keep DONNY drain and monitor output  -Increase drain flushes TID  -If drain is clogged or no output call office  -Follow up in 2 weeks  -Return precautions discussed with patient

## 2023-08-21 NOTE — PROGRESS NOTES
Office Visit - General Surgery  Leonardo Reaves MRN: [id number]  Encounter: [id number]    Assessment and Plan  Problem List Items Addressed This Visit        Digestive    Necrotizing pancreatitis     47 yo male presents for follow up for hospitalization for necrotizing pancreas. Discharge don 8/11/2023. Outpatient CT 8/18 showed resolving acute necrotizing pancreatitis with interval decrease in the size of the peripancreatic necrotizing collection in the lesser sac compared to 8/6/2023. Patient is tolerating regular diet with no nausea or vomiting, passing flatus and bowel movements. Next IR drain check 9/11. Plan  -Keep DONNY drain and monitor output  -Increase drain flushes TID  -If drain is clogged or no output call office  -Follow up in 2 weeks  -Return precautions discussed with patient            Chief Complaint:  Leonardo Reaves is a 46 y.o. male who presents for Follow-up (F/u pancreas drain.)    Subjective  Patient is doing well and only concern is change in drain fluid color from brown to gray and murky. Patient is tolerating diet, passing urine, flatus and bowel movements. Patient is able to ambulate with no issues. Patient denies nausea, vomiting, fevers, chills, chest pain or sob. Past Medical History:   Diagnosis Date   • Pancreatitis        Past Surgical History:   Procedure Laterality Date   • CYSTOSCOPY N/A 8/5/2023    Procedure: CYSTOSCOPY.   Saunders insertion;  Surgeon: Tonio Florian MD;  Location: [facility];  Service: Urology   • FL RETROGRADE URETHROCYSTOGRAM  8/5/2023   • HERNIA REPAIR     • IR DRAINAGE TUBE PLACEMENT  8/5/2023   • JOINT REPLACEMENT     • TONSILLECTOMY  1976       Family History   Problem Relation Age of Onset   • Cancer Mother    • Multiple myeloma Mother    • Prostate cancer Father    • Cancer Father        Social History     Tobacco Use   • Smoking status: Never     Passive exposure: Past   • Smokeless tobacco: Former     Types: Chew     Quit date: 7/15/2023   • Tobacco comments:     Quit 7/15/23   Vaping Use   • Vaping Use: Never used   Substance Use Topics   • Alcohol use: Not Currently     Comment: quit 7/15/23 (previous 6 drinks per week)   • Drug use: Never        Medications  Current Outpatient Medications on File Prior to Visit   Medication Sig Dispense Refill   • acetaminophen (TYLENOL) 325 mg tablet Take 2 tablets (650 mg total) by mouth every 6 (six) hours as needed for mild pain, headaches or fever  0   • Alcohol Swabs 70 % PADS May substitute brand based on insurance coverage. Check glucose ACHS. 200 each 0   • apixaban (Eliquis) 5 mg Take 2 tablets (10 mg total) by mouth 2 (two) times a day for 7 days, THEN 1 tablet (5 mg total) 2 (two) times a day for 23 days. 74 tablet 0   • BD PosiFlush 0.9 % SOLN      • Blood Glucose Monitoring Suppl (OneTouch Verio Reflect) w/Device KIT May substitute brand based on insurance coverage. Check glucose ACHS. 1 kit 0   • Blood Pressure Monitoring (Adult Blood Pressure Cuff Lg) KIT Use 3 (three) times a day 1 kit 0   • furosemide (LASIX) 20 mg tablet Take 1 tablet (20 mg total) by mouth daily 30 tablet 0   • glucose blood (OneTouch Verio) test strip May substitute brand based on insurance coverage. Check glucose ACHS. 200 each 0   • Insulin Glargine Solostar (Lantus SoloStar) 100 UNIT/ML SOPN Inject 0.14 mL (14 Units total) under the skin daily at bedtime 15 mL 0   • insulin lispro (HumaLOG KwikPen) 100 units/mL injection pen Inject 5 Units under the skin 3 (three) times a day with meals 15 mL 0   • Insulin Pen Needle (BD Pen Needle Lupe 2nd Gen) 32G X 4 MM MISC For use with insulin pen. Pharmacy may dispense brand covered by insurance. 100 each 0   • Insulin Pen Needle (BD Pen Needle Lupe 2nd Gen) 32G X 4 MM MISC For use with insulin pen. Pharmacy may dispense brand covered by insurance.  100 each 0   • Levemir FlexPen 100 units/mL injection pen 14 Units daily at bedtime     • lisinopril (ZESTRIL) 10 mg tablet Take 1 tablet (10 mg total) by mouth daily 90 tablet 1   • NovoLOG FlexPen 100 units/mL injection pen INJECT 5 UNITS UNDER SKIN 3 TIMES DAILY WITH MEALS     • OneTouch Delica Lancets 92W MISC May substitute brand based on insurance coverage. Check glucose ACHS. 200 each 0   • oxyCODONE (ROXICODONE) 5 immediate release tablet Take 1 tablet (5 mg total) by mouth every 4 (four) hours as needed for moderate pain for up to 10 days Max Daily Amount: 30 mg 18 tablet 0   • pancrelipase, Lip-Prot-Amyl, (CREON) 6,000 units delayed release capsule Take 1 capsule (6,000 Units total) by mouth 3 (three) times a day with meals 90 capsule 0   • sodium chloride, PF, 0.9 % 10 mL by Intracatheter route daily for 120 doses Intracatheter flushing daily. May substitute prefilled syringe with normal saline 10 mL vials, 10 mL syringes, and 18 g blunt needles 300 mL 3     No current facility-administered medications on file prior to visit. Allergies  No Known Allergies    Review of Systems   Constitutional: Positive for unexpected weight change. Negative for appetite change, chills and fever. Respiratory: Negative for shortness of breath. Cardiovascular: Negative for chest pain. Gastrointestinal: Negative for abdominal pain, nausea and vomiting. Objective  Vitals:    08/21/23 1012   Temp: 98.1 °F (36.7 °C)       Physical Exam  Constitutional:       General: He is not in acute distress. Appearance: He is not ill-appearing. HENT:      Head: Normocephalic and atraumatic. Cardiovascular:      Rate and Rhythm: Normal rate. Pulmonary:      Effort: Pulmonary effort is normal. No respiratory distress. Abdominal:      General: There is distension. Palpations: Abdomen is soft. Tenderness: There is no abdominal tenderness. There is no guarding or rebound. Musculoskeletal:      Right lower leg: Edema present. Left lower leg: Edema present. Neurological:      Mental Status: He is alert.

## 2023-08-22 DIAGNOSIS — K85.91 NECROTIZING PANCREATITIS: ICD-10-CM

## 2023-08-23 RX ORDER — BLOOD SUGAR DIAGNOSTIC
STRIP MISCELLANEOUS
Qty: 200 STRIP | OUTPATIENT
Start: 2023-08-23

## 2023-08-30 ENCOUNTER — TELEPHONE (OUTPATIENT)
Dept: GASTROENTEROLOGY | Facility: MEDICAL CENTER | Age: 53
End: 2023-08-30

## 2023-08-30 ENCOUNTER — OFFICE VISIT (OUTPATIENT)
Dept: GASTROENTEROLOGY | Facility: MEDICAL CENTER | Age: 53
End: 2023-08-30
Payer: COMMERCIAL

## 2023-08-30 VITALS
DIASTOLIC BLOOD PRESSURE: 75 MMHG | SYSTOLIC BLOOD PRESSURE: 171 MMHG | WEIGHT: 275.3 LBS | TEMPERATURE: 98.2 F | HEIGHT: 71 IN | BODY MASS INDEX: 38.54 KG/M2 | HEART RATE: 80 BPM

## 2023-08-30 DIAGNOSIS — K85.91 NECROTIZING PANCREATITIS: ICD-10-CM

## 2023-08-30 DIAGNOSIS — K55.069 SUPERIOR MESENTERIC ARTERY THROMBOSIS (HCC): Primary | ICD-10-CM

## 2023-08-30 PROCEDURE — 99214 OFFICE O/P EST MOD 30 MIN: CPT | Performed by: INTERNAL MEDICINE

## 2023-08-30 NOTE — PROGRESS NOTES
Outpatient Follow up  [facility] [address] [place] [address] Janeth Santa.  [address]  [place] [address]  Homero Rangel MD  Ph : [phone]  Fax : [phone]  Mobile : [phone]  Email : [email or web address]  Also available on [employer]    Azeem Soriano 46 y.o. male MRN: [id number]    PCP: Roula Gurrola MD  Referring: No referring provider defined for this encounter. Azeem Soriano presented for a follow up visit. My recommendations are included. Please do not hesitate to contact me with any questions you may have. ASSESSMENT AND PLAN:      No problem-specific Assessment & Plan notes found for this encounter. 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 He is at home at this time and flushes the drain which produces purulent/necrotic material.  His appetite though has improved still limited. I had a long discussion with him and his wife. Discussed the natural course of acute pancreatitis with necrosis. This may take several weeks to months to heal completely. However at this time would evaluate his collections again with imaging. His last CT scan on 15 August still showed significant inflammation around the area though improved. The size of the collection had improved as well. I will repeat a CT scan next week prior to him seeing the surgical team.  I have also sent a message to Dr. Ange Barr.   I discussed with the patient that if the imaging shows that the collections are still present and have matured then we may consider doing endoscopic drainage. However we may just be able to continue with the IR drain as well. Surgery would be as a last option. Last blood work showed increased leukocytosis. I would recommend to repeat that. His albumin was quite low on his last blood work and would repeat that. I encouraged him to have more p.o. intake and have encouraged him to liberate his diet as well. He will eventually require gallbladder surgery. I will see him in the office or via virtual visit in a month.  ______________________________________________________________________    SUBJECTIVE: Fredy Farfan is a 80-year-old gentleman who was recently in the hospital regards to possible gallstone pancreatitis. This was complicated by necrotizing pancreatitis and walled off necrosis and nonocclusive SMV thrombosis. He had a gastric compression from this as well. For this reason a percutaneous drain was placed by IR since the collection was not mature enough for endoscopic drainage. It does not appear that this was a infected collection. There was a complication of bleeding as well. However no source of bleeding was identified. He was then restarted on heparin. He is presently on Eliquis. He has been able to tolerate some food. He is able to get 3-4 bottles of Ensure a day. This amounts to approximately 602,000 nikki a day. He also gets some other food by mouth however his appetite is very limited. He has had no fevers or chills. He has no nausea or vomiting. He does have some mild abdominal pain. They are flushing the catheter regularly. REVIEW OF SYSTEMS IS OTHERWISE NEGATIVE. Historical Information   Past Medical History:   Diagnosis Date   • Pancreatitis      Past Surgical History:   Procedure Laterality Date   • CYSTOSCOPY N/A 8/5/2023    Procedure: CYSTOSCOPY.   Saunders insertion;  Surgeon: Justin Ugarte MD;  Location: [facility];  Service: Urology   • FL RETROGRADE URETHROCYSTOGRAM  8/5/2023   • HERNIA REPAIR     • IR DRAINAGE TUBE PLACEMENT  8/5/2023   • JOINT REPLACEMENT     • TONSILLECTOMY  1976     Social History   Social History     Substance and Sexual Activity   Alcohol Use Not Currently    Comment: quit 7/15/23 (previous 6 drinks per week)     Social History     Substance and Sexual Activity   Drug Use Never     Social History     Tobacco Use   Smoking Status Never   • Passive exposure: Past   Smokeless Tobacco Former   • Types: Chew   • Quit date: 7/15/2023   Tobacco Comments    Quit 7/15/23     Family History   Problem Relation Age of Onset   • Cancer Mother    • Multiple myeloma Mother    • Pancreatic cancer Father    • Cancer Father        Meds/Allergies       Current Outpatient Medications:   •  acetaminophen (TYLENOL) 325 mg tablet  •  Alcohol Swabs 70 % PADS  •  BD PosiFlush 0.9 % SOLN  •  Blood Glucose Monitoring Suppl (OneTouch Verio Reflect) w/Device KIT  •  Blood Pressure Monitoring (Adult Blood Pressure Cuff Lg) KIT  •  furosemide (LASIX) 20 mg tablet  •  glucose blood (OneTouch Verio) test strip  •  Insulin Glargine Solostar (Lantus SoloStar) 100 UNIT/ML SOPN  •  insulin lispro (HumaLOG KwikPen) 100 units/mL injection pen  •  Insulin Pen Needle (BD Pen Needle Lupe 2nd Gen) 32G X 4 MM MISC  •  Insulin Pen Needle (BD Pen Needle Lupe 2nd Gen) 32G X 4 MM MISC  •  Levemir FlexPen 100 units/mL injection pen  •  lisinopril (ZESTRIL) 10 mg tablet  •  NovoLOG FlexPen 100 units/mL injection pen  •  OneTouch Delica Lancets 56E MISC  •  pancrelipase, Lip-Prot-Amyl, (CREON) 6,000 units delayed release capsule  •  sodium chloride, PF, 0.9 %  •  apixaban (Eliquis) 5 mg    No Known Allergies        Objective     Blood pressure (!) 171/75, pulse 80, temperature 98.2 °F (36.8 °C), temperature source Tympanic, height 5' 11" (1.803 m), weight 125 kg (275 lb 4.8 oz). Body mass index is 38.4 kg/m².       PHYSICAL EXAM:      Physical Exam  Constitutional:       Appearance: Normal appearance. He is well-developed. HENT:      Head: Normocephalic and atraumatic. Eyes:      General: No scleral icterus. Conjunctiva/sclera: Conjunctivae normal.      Pupils: Pupils are equal, round, and reactive to light. Cardiovascular:      Rate and Rhythm: Normal rate and regular rhythm. Heart sounds: Normal heart sounds. Pulmonary:      Effort: Pulmonary effort is normal. No respiratory distress. Breath sounds: Normal breath sounds. Abdominal:      General: Bowel sounds are normal. There is no distension. Palpations: Abdomen is soft. There is no mass. Tenderness: There is no abdominal tenderness. Hernia: No hernia is present. Comments: Left upper quadrant IR guided drain. There is purulent material seen within the drain. Musculoskeletal:         General: Normal range of motion. Cervical back: Normal range of motion. Lymphadenopathy:      Cervical: No cervical adenopathy. Skin:     General: Skin is warm. Neurological:      Mental Status: He is alert and oriented to person, place, and time. Psychiatric:         Behavior: Behavior normal.         Thought Content: Thought content normal.         Lab Results:   No visits with results within 1 Day(s) from this visit. Latest known visit with results is:   No results displayed because visit has over 200 results. Radiology Results:   CT abdomen pelvis w contrast    Result Date: 8/18/2023  Narrative: CT ABDOMEN AND PELVIS WITH IV CONTRAST INDICATION:   K85.91: Acute pancreatitis with uninfected necrosis, unspecified. COMPARISON: Prior CT abdomen pelvis examinations dating back to 8/4/2023 TECHNIQUE:  CT examination of the abdomen and pelvis was performed. Scanning through the abdomen was performed in arterial, venous and delayed phases according a protocol specifically designed to evaluate upper abdominal viscera. Multiplanar 2D reformatted images were created from the source data.  This examination, like all CT scans performed in the [facility], was performed utilizing techniques to minimize radiation dose exposure, including the use of iterative reconstruction and automated exposure control. Radiation dose length product (DLP) for this visit:  6012 mGy-cm IV Contrast:  100 mL of iohexol (OMNIPAQUE) Enteric Contrast:  Enteric contrast was not administered. FINDINGS: ABDOMEN LOWER CHEST: Partially visualized at least small bilateral pleural effusions. A 3 mm left lower lobe pulmonary nodule (4/8). Bibasilar subsegmental atelectasis. LIVER/BILIARY TREE:  Unremarkable. GALLBLADDER:  No calcified gallstones. No pericholecystic inflammatory change. SPLEEN:  Unremarkable. PANCREAS: There is evidence of acute pancreatitis, which will be described based on the revised Laurie Classification of Acute Pancreatitis: -TIME OF ONSET: less than or equal to 4 weeks -NECROSIS: There is evidence of pancreatic parenchyma necrosis (more than 30%), therefore this is necrotizing pancreatitis. There is decreasing peripancreatic fat stranding and fluid tracking compared to 8/6/2023. Again seen is small volume ascites, minimally decreased compared to 3/9/5444. -LOCAL COMPLICATIONS: Interval decrease in the size of acute necrotizing collection in the lesser sac, currently being 4.1 x 7.4 cm (series 4 image 64), previously 11.4 x 6.1 cm. It contains a tendon heterogenous material in the bladder drainage catheter  lies in the superficial portion of this collection The main portal vein, splenic vein and the intermesenteric vein are patent with mild mass effect on the main proximal splenic vein, improved since 8/6/2023. -INFECTION: There is no abnormal gas in or around the pancreas to suggest infection. ADRENAL GLANDS:  Unremarkable. KIDNEYS/URETERS:  Unremarkable. No hydronephrosis. STOMACH AND BOWEL: No abnormal bowel dilation. Suboptimal evaluation rectum due to streak artifact from arthroplasties.  APPENDIX:  No findings to suggest appendicitis. ABDOMINOPELVIC CAVITY:  No ascites. No pneumoperitoneum. No lymphadenopathy. VESSELS:  Unremarkable for patient's age. PELVIS REPRODUCTIVE ORGANS: Suboptimal evaluation of the prostate with streak artifact bilateral hip arthroplasties. Blanca Fanti URINARY BLADDER:  Unremarkable. ABDOMINAL WALL/INGUINAL REGIONS:  Unremarkable. OSSEOUS STRUCTURES:  No acute fracture or destructive osseous lesion. Bilateral total hip arthroplasty     Impression: Resolving acute necrotizing pancreatitis with interval decrease in the size of the peripancreatic necrotizing collection in the lesser sac compared to 8/6/2023. Persistent high attenuation material within the peripancreatic fluid collection with tip of the drainage catheter in its superficial aspect. Mildly improved ascites. At least small bilateral pleural effusions. A 3 mm left lower lobe pulmonary nodule. Attention on follow-up imaging versus optional follow-up CT chest in 12 months is recommended to assess for stability, as per Fleischner criteria. The study was marked in EPIC for significant notification. Workstation performed: [id number]     IR drainage tube placement    Result Date: 8/8/2023  Narrative: Abscess Drainage abdomen History: Pancreatitis. Large central fluid collection compressing the stomach resulting in limited ability to take food by mouth Anesthesia: General Procedure: After explaining the risks and benefits of the procedure to the patient, informed consent was obtained earlier in the day. After arranging with urology and the anesthesia team the patient was first taken to the operating room for cystoscopy and Saunders catheter placement. He was then transported under anesthesia support to the CT scanner for drainage. CT was used to localize the fluid collection. Radiation dose length product (DLP) for this visit:  1601.49 mGy .   This examination, like all CT scans performed in the [address] [place] [address], was performed utilizing techniques to minimize radiation dose exposure, including the use of iterative reconstruction and automated exposure control. Procedure: The patient was identified verbally and by wristband. Timeout was performed. Following obtaining informed consent, the patient was prepped and draped in the usual sterile fashion. Using a Cisneros guidance access was gained to the patient's abdominal collection using an 18 gauge needle. The wall of the stomach was avoided as well as a small vessel coursing near the stomach body Bloody material was obtained A specimen was aspirated, placed in a sterile container and sent to microbiology for culture and sensitivity. A 0.038 wire was advanced into the cavity using fluoroscopic guidance. After tract dilation, 12 Colombian all-purpose catheter was advanced into the cavity. Fluid was evacuated. The catheter was secured in place and placed to suction bulb drainage. The patient tolerated the procedure well without apparent immediate complications. Findings: Large fluid collection adjacent to the pancreas compressing the stomach. Needle then wire then catheter within the collection. Route between the transverse colon and the stomach wall utilized. Specimens: 530 cc dark bloody fluid removed Culture aerobic, anaerobic sent     Impression: Impression: CT-guided placement of a 12 Colombian drain into a large peripancreatic hemorrhagic collection compressing the stomach Workstation performed: [id number]     FL urethrocystogram retrograde    Result Date: 8/7/2023  Narrative: C-ARM -retrograde urethra cystogram INDICATION: Difficult Saunders catheter placement ([address]) [U44. 9XXA. Procedure guidance. TECHNIQUE: Fluoroscopic guidance provided. COMPARISON: CT abdomen pelvis 8/4/2023. FLUOROSCOPY TIME:   2:10 minutes 7 FLUOROSCOPIC IMAGES FINDINGS: Fluoroscopic guidance provided for procedure guidance. Leakage of the contrast material instilled into the bladder is not apparent.  Osseous and soft tissue detail limited by technique. Impression: Fluoroscopic guidance provided for procedure guidance. Please refer to the separate procedure notes for additional details. Workstation performed: [id number]     CTA abdomen pelvis w wo contrast    Result Date: 8/6/2023  Narrative: CT ANGIOGRAM OF THE ABDOMEN AND PELVIS WITH AND WITHOUT IV CONTRAST INDICATION:   LUQ abdominal pain Hgb drop and pain around drain. COMPARISON: August 4, 2023 TECHNIQUE:  CT angiogram examination of the abdomen and pelvis was performed according to standard protocol. This examination, like all CT scans performed in the [facility], was performed utilizing techniques to minimize radiation dose exposure, including the use of iterative reconstruction and automated exposure control. Contrast as well as noncontrast images were obtained. Rad dose 6415.95 mGy-cm IV Contrast:  100 mL of iohexol (OMNIPAQUE) Enteric Contrast:  Enteric contrast was not administered. FINDINGS: PANCREAS: Again seen are findings of acute necrotizing pancreatitis, similar to prior exam. This is described based on the revised Laurie Classification as follows: -TIME OF ONSET: less than or equal to 4 weeks -NECROSIS: Present, greater than 30%, predominantly involving the neck, body and tail. Diffuse peripancreatic edema and tracking fluid extending into the pararenal spaces, lateral conal fascia and along paracolic gutters bilaterally, similar to prior exam. -LOCAL COMPLICATIONS: Status post percutaneous drainage of the acute necrotic collection (ANC) in the lesser sac, with interval decrease in size of the collection, currently measuring 11.4 x 6.1 cm (3/55), previously 14.0 x 10.8 cm when measured similarly. There is persistent, but improved mass effect on the gastric body. Pigtail tip of the drainage catheter is seen at the center of the collection.  There is increased attenuation of the collection (2/27), in keeping with hemorrhagic/proteinaceous contents. However, no active extravasation of contrast is seen. -INFECTION: Small foci of gas adjacent to the the pigtail tip of the drainage catheter, likely postprocedural. No other foci of gas are seen to suggest superinfection. VASCULAR STRUCTURES: No extravasation of contrast. The aorta, celiac axis, and SMA are patent. Persistent narrowing of the splenic and portal veins. Previously described SMV thrombus is not adequately evaluated due to phase of imaging. OTHER FINDINGS ABDOMEN LOWER CHEST: Stable small bilateral pleural effusions with bibasilar atelectasis. LIVER/BILIARY TREE: Subtle surface nodularity of the liver, similar to prior exam. No focal mass. No biliary ductal dilatation. GALLBLADDER: Hyperdense sludge versus vicarious excretion of contrast. SPLEEN:  Unremarkable. ADRENAL GLANDS: Stable nodular thickening of left adrenal gland. The right adrenal gland is unremarkable. KIDNEYS/URETERS:  Unremarkable. No hydronephrosis. STOMACH AND BOWEL: Mild gaseous distention of the transverse colon, likely due to adynamic ileus. Persistent but improved mass effect on the gastric body from the acute necrotic collection in the lesser sac. APPENDIX:  No findings to suggest appendicitis. ABDOMINOPELVIC CAVITY: Moderate volume abdominopelvic ascites. No pneumoperitoneum. No enlarged lymph nodes. PELVIS REPRODUCTIVE ORGANS:  Unremarkable for patient's age. URINARY BLADDER: Decompressed with Saunders catheter. ABDOMINAL WALL/INGUINAL REGIONS: Moderate body wall edema. OSSEOUS STRUCTURES:  No acute fracture or destructive osseous lesion. Spinal degenerative changes are noted. Bilateral hip arthroplasties. Impression: 1. Interval decrease in size of acute necrotic collection (ANC) in the lesser sac status post percutaneous drainage, with improved but persistent mass effect on the gastric body. There is increased attenuation of the collection, in keeping with hemorrhagic/proteinaceous contents.  However, no active contrast extravasation is seen. 2. Similar appearance of acute necrotizing pancreatitis with extensive (>30%) parenchymal necrosis, tracking peripancreatic fluid and moderate volume ascites. Workstation performed: [id number]     XR chest portable    Result Date: 8/4/2023  Narrative: CHEST INDICATION:   abdominal pain. Feels weak. COMPARISON:  None EXAM PERFORMED/VIEWS:  XR CHEST PORTABLE FINDINGS: Cardiomediastinal silhouette appears unremarkable. Lung markings are crowded secondary to low lung volumes. Within limitations of this examination there is no focal airspace opacity to suggest pneumonia. No pneumothorax or pleural effusion. Osseous structures appear within normal limits for patient age. Impression: No acute cardiopulmonary disease. Workstation performed: [id number]     CT abdomen pelvis w contrast    Result Date: 8/4/2023  Narrative: CT ABDOMEN AND PELVIS WITH IV CONTRAST INDICATION:   K85.91: Acute pancreatitis with uninfected necrosis, unspecified K55.069: Acute infarction of intestine, part and extent unspecified. Necrotizing pancreatitis, superior mesenteric artery thrombosis. Follow-up pancreatic pseudocyst/necrosis changes. COMPARISON: CT abdomen pelvis 7/24/2023. TECHNIQUE:  CT examination of the abdomen and pelvis was performed. Multiplanar 2D reformatted images were created from the source data. This examination, like all CT scans performed in the [facility], was performed utilizing techniques to minimize radiation dose exposure, including the use of iterative reconstruction and automated exposure control. Radiation dose length product (DLP) for this visit:  1696 mGy-cm IV Contrast:  100 mL of iohexol (OMNIPAQUE) Enteric Contrast:  Enteric contrast was not administered. FINDINGS: ABDOMEN LOWER CHEST: Small bilateral pleural effusions with adjacent compressive atelectasis, greater on the right, increased since prior study.  Stable small nonenlarged right cardiophrenic lymph nodes. LIVER/BILIARY TREE: Subtle nodularity of the liver surface, which may represent early cirrhotic changes. No suspicious hepatic mass identified. No biliary ductal dilatation. GALLBLADDER:  No calcified gallstones. No pericholecystic inflammatory change. SPLEEN:  Unremarkable. PANCREAS: There is evidence of acute pancreatitis, which will be described based on the revised Laurie Classification of Acute Pancreatitis: -TIME OF ONSET: less than or equal to 4 weeks -NECROSIS: There is evidence of pancreatic parenchyma necrosis, therefore this is necrotizing pancreatitis. Areas of decreased enhancement in the pancreatic tail, body, and pancreatic neck, greater than 30% of the pancreas. Extensive peripancreatic inflammatory and necrotic changes surrounding the pancreatic tail, pancreatic head, lesser sac, gastrohepatic ligament and seen extending into the bilateral pericolic gutters, increased since prior study. -LOCAL COMPLICATIONS: There is acute necrotic collection ([place].) Peripancreatic fluid collection in the lesser sac measuring 13.7 x 6.7 x 11.3 cm (3/64, 604/108) with mass effect on the gastric body, consistent with an acute necrotic collection. -INFECTION: There is no abnormal gas in or around the pancreas to suggest infection. ADRENAL GLANDS: Stable nodular thickening of left adrenal gland. The right adrenal gland is unremarkable. KIDNEYS/URETERS:  Unremarkable. No hydronephrosis. STOMACH AND BOWEL:  Mass effect on the gastric body from the adjacent lesser sac acute necrotic collection. No evidence of gastric outlet obstruction. Bowel otherwise unremarkable. APPENDIX:  No findings to suggest appendicitis. ABDOMINOPELVIC CAVITY:  Moderate abdominopelvic ascites, increased since prior study. No pneumoperitoneum. No lymphadenopathy. VESSELS: No abdominal aortic aneurysm. Severe narrowing of the main portal vein, superior mesenteric vein, and splenic vein from the adjacent necrotizing pancreatitis.  Occlusion of the proximal splenic vein with reconstitution distally. Nonocclusive thrombus in the proximal superior mesenteric vein (3/81), similar to prior study. PELVIS REPRODUCTIVE ORGANS:  Unremarkable for patient's age. URINARY BLADDER: Underdistended, limiting its evaluation. ABDOMINAL WALL/INGUINAL REGIONS: Moderate body wall edema. OSSEOUS STRUCTURES:  No acute fracture or destructive osseous lesion. Spinal degenerative changes are noted. Bilateral hip arthroplasties. Impression: Worsening necrotizing pancreatitis greater than 30% of the pancreas with extensive peripancreatic inflammatory and necrotic changes. New acute necrotic collection in the lesser sac exerting mass effect on the gastric body. Similar nonocclusive thrombus in the superior mesenteric vein. Severe narrowing of the main portal vein, superior mesenteric vein, and splenic vein from the adjacent necrotizing pancreatitis with occlusion of the proximal splenic vein with reconstitution  distally. Moderate abdominopelvic ascites and small bilateral pleural effusions, increased since prior study. Subtle nodularity of the liver surface, which may represent early cirrhotic changes. The study was marked in [facility] for immediate notification.  Workstation performed: [id number]

## 2023-08-30 NOTE — TELEPHONE ENCOUNTER
Patient left during scheduling. I called them and informed them of CT scheduled for 9 AM on Tuesday at [place]. I informed them of instructions and to  barium. Patient stated that he is not able to drink that barium. I also informed them of the virtual visit.

## 2023-09-02 DIAGNOSIS — R60.9 EDEMA, UNSPECIFIED TYPE: ICD-10-CM

## 2023-09-02 DIAGNOSIS — K85.91 NECROTIZING PANCREATITIS: ICD-10-CM

## 2023-09-02 RX ORDER — FUROSEMIDE 20 MG/1
20 TABLET ORAL DAILY
Qty: 30 TABLET | Refills: 0 | Status: ON HOLD | OUTPATIENT
Start: 2023-09-02

## 2023-09-04 ENCOUNTER — APPOINTMENT (INPATIENT)
Dept: RADIOLOGY | Facility: HOSPITAL | Age: 53
DRG: 853 | End: 2023-09-04
Attending: RADIOLOGY
Payer: COMMERCIAL

## 2023-09-04 ENCOUNTER — HOSPITAL ENCOUNTER (INPATIENT)
Facility: HOSPITAL | Age: 53
LOS: 15 days | Discharge: HOME HEALTH | DRG: 853 | End: 2023-09-19
Attending: EMERGENCY MEDICINE | Admitting: SURGERY
Payer: COMMERCIAL

## 2023-09-04 ENCOUNTER — APPOINTMENT (EMERGENCY)
Dept: RADIOLOGY | Facility: HOSPITAL | Age: 53
DRG: 853 | End: 2023-09-04
Payer: COMMERCIAL

## 2023-09-04 DIAGNOSIS — R57.1 HYPOVOLEMIC SHOCK (HCC): ICD-10-CM

## 2023-09-04 DIAGNOSIS — L89.150 PRESSURE INJURY OF SACRAL REGION, UNSTAGEABLE (HCC): ICD-10-CM

## 2023-09-04 DIAGNOSIS — R78.81 BACTEREMIA: ICD-10-CM

## 2023-09-04 DIAGNOSIS — K56.609 COLONIC OBSTRUCTION (HCC): ICD-10-CM

## 2023-09-04 DIAGNOSIS — K85.91 NECROTIZING PANCREATITIS: Primary | ICD-10-CM

## 2023-09-04 LAB
2HR DELTA HS TROPONIN: -5 NG/L
ABO GROUP BLD: NORMAL
ALBUMIN SERPL BCP-MCNC: 2.2 G/DL (ref 3.5–5)
ALBUMIN SERPL BCP-MCNC: 2.5 G/DL (ref 3.5–5)
ALP SERPL-CCNC: 153 U/L (ref 34–104)
ALP SERPL-CCNC: 52 U/L (ref 34–104)
ALT SERPL W P-5'-P-CCNC: 18 U/L (ref 7–52)
ALT SERPL W P-5'-P-CCNC: 25 U/L (ref 7–52)
AMMONIA PLAS-SCNC: 44 UMOL/L (ref 18–72)
ANION GAP SERPL CALCULATED.3IONS-SCNC: 12 MMOL/L
ANION GAP SERPL CALCULATED.3IONS-SCNC: 9 MMOL/L
ANISOCYTOSIS BLD QL SMEAR: PRESENT
APPEARANCE FLD: ABNORMAL
APTT PPP: 37 SECONDS (ref 23–37)
AST SERPL W P-5'-P-CCNC: 26 U/L (ref 13–39)
AST SERPL W P-5'-P-CCNC: 40 U/L (ref 13–39)
ATRIAL RATE: 110 BPM
BACTERIA UR QL AUTO: ABNORMAL /HPF
BASOPHILS # BLD MANUAL: 0 THOUSAND/UL (ref 0–0.1)
BASOPHILS NFR MAR MANUAL: 0 % (ref 0–1)
BILIRUB SERPL-MCNC: 0.61 MG/DL (ref 0.2–1)
BILIRUB SERPL-MCNC: 0.73 MG/DL (ref 0.2–1)
BILIRUB UR QL STRIP: NEGATIVE
BLD GP AB SCN SERPL QL: NEGATIVE
BUN SERPL-MCNC: 103 MG/DL (ref 5–25)
BUN SERPL-MCNC: 97 MG/DL (ref 5–25)
CA-I BLD-SCNC: 0.96 MMOL/L (ref 1.12–1.32)
CALCIUM ALBUM COR SERPL-MCNC: 8.5 MG/DL (ref 8.3–10.1)
CALCIUM ALBUM COR SERPL-MCNC: 9.2 MG/DL (ref 8.3–10.1)
CALCIUM SERPL-MCNC: 7.1 MG/DL (ref 8.4–10.2)
CALCIUM SERPL-MCNC: 8 MG/DL (ref 8.4–10.2)
CARDIAC TROPONIN I PNL SERPL HS: 11 NG/L
CARDIAC TROPONIN I PNL SERPL HS: 6 NG/L
CHLORIDE SERPL-SCNC: 92 MMOL/L (ref 96–108)
CHLORIDE SERPL-SCNC: 95 MMOL/L (ref 96–108)
CLARITY UR: CLEAR
CO2 SERPL-SCNC: 28 MMOL/L (ref 21–32)
CO2 SERPL-SCNC: 28 MMOL/L (ref 21–32)
COLOR FLD: ABNORMAL
COLOR UR: YELLOW
CREAT SERPL-MCNC: 2.09 MG/DL (ref 0.6–1.3)
CREAT SERPL-MCNC: 2.61 MG/DL (ref 0.6–1.3)
EOSINOPHIL # BLD MANUAL: 0.12 THOUSAND/UL (ref 0–0.4)
EOSINOPHIL NFR BLD MANUAL: 1 % (ref 0–6)
ERYTHROCYTE [DISTWIDTH] IN BLOOD BY AUTOMATED COUNT: 16.6 % (ref 11.6–15.1)
ERYTHROCYTE [DISTWIDTH] IN BLOOD BY AUTOMATED COUNT: 16.6 % (ref 11.6–15.1)
GFR SERPL CREATININE-BSD FRML MDRD: 27 ML/MIN/1.73SQ M
GFR SERPL CREATININE-BSD FRML MDRD: 35 ML/MIN/1.73SQ M
GLUCOSE FLD-MCNC: 100 MG/DL
GLUCOSE SERPL-MCNC: 100 MG/DL (ref 65–140)
GLUCOSE SERPL-MCNC: 114 MG/DL (ref 65–140)
GLUCOSE SERPL-MCNC: 130 MG/DL (ref 65–140)
GLUCOSE UR STRIP-MCNC: NEGATIVE MG/DL
GRAN CASTS #/AREA URNS LPF: ABNORMAL /[LPF]
HCT VFR BLD AUTO: 23.2 % (ref 36.5–49.3)
HCT VFR BLD AUTO: 25.1 % (ref 36.5–49.3)
HGB BLD-MCNC: 7.3 G/DL (ref 12–17)
HGB BLD-MCNC: 7.9 G/DL (ref 12–17)
HGB UR QL STRIP.AUTO: NEGATIVE
HYALINE CASTS #/AREA URNS LPF: ABNORMAL /LPF
INR PPP: 2 (ref 0.84–1.19)
KETONES UR STRIP-MCNC: NEGATIVE MG/DL
LACTATE SERPL-SCNC: 1.7 MMOL/L (ref 0.5–2)
LACTATE SERPL-SCNC: 2.4 MMOL/L (ref 0.5–2)
LDH FLD L TO P-CCNC: 216 U/L
LEUKOCYTE ESTERASE UR QL STRIP: NEGATIVE
LIPASE SERPL-CCNC: 28 U/L (ref 11–82)
LYMPHOCYTES # BLD AUTO: 0.12 THOUSAND/UL (ref 0.6–4.47)
LYMPHOCYTES # BLD AUTO: 1 % (ref 14–44)
LYMPHOCYTES NFR BLD AUTO: 50 %
MAGNESIUM SERPL-MCNC: 2.3 MG/DL (ref 1.9–2.7)
MCH RBC QN AUTO: 26.6 PG (ref 26.8–34.3)
MCH RBC QN AUTO: 26.6 PG (ref 26.8–34.3)
MCHC RBC AUTO-ENTMCNC: 31.5 G/DL (ref 31.4–37.4)
MCHC RBC AUTO-ENTMCNC: 31.5 G/DL (ref 31.4–37.4)
MCV RBC AUTO: 85 FL (ref 82–98)
MCV RBC AUTO: 85 FL (ref 82–98)
MONOCYTES # BLD AUTO: 0.74 THOUSAND/UL (ref 0–1.22)
MONOCYTES NFR BLD AUTO: 6 %
MONOCYTES NFR BLD: 6 % (ref 4–12)
MUCOUS THREADS UR QL AUTO: ABNORMAL
NEUTROPHILS # BLD MANUAL: 11.4 THOUSAND/UL (ref 1.85–7.62)
NEUTS BAND NFR BLD MANUAL: 14 % (ref 0–8)
NEUTS BAND NFR FLD MANUAL: 2 %
NEUTS SEG NFR BLD AUTO: 42 %
NEUTS SEG NFR BLD AUTO: 78 % (ref 43–75)
NITRITE UR QL STRIP: NEGATIVE
NON-SQ EPI CELLS URNS QL MICRO: ABNORMAL /HPF
P AXIS: 31 DEGREES
PH BODY FLUID: 7.6
PH UR STRIP.AUTO: 5 [PH]
PHOSPHATE SERPL-MCNC: 5.2 MG/DL (ref 2.7–4.5)
PLATELET # BLD AUTO: 428 THOUSANDS/UL (ref 149–390)
PLATELET # BLD AUTO: 493 THOUSANDS/UL (ref 149–390)
PLATELET BLD QL SMEAR: ABNORMAL
PMV BLD AUTO: 9.4 FL (ref 8.9–12.7)
PMV BLD AUTO: 9.6 FL (ref 8.9–12.7)
POTASSIUM SERPL-SCNC: 4.4 MMOL/L (ref 3.5–5.3)
POTASSIUM SERPL-SCNC: 4.9 MMOL/L (ref 3.5–5.3)
PR INTERVAL: 134 MS
PROCALCITONIN SERPL-MCNC: 2.58 NG/ML
PROT FLD-MCNC: 3.6 G/DL
PROT SERPL-MCNC: 5.4 G/DL (ref 6.4–8.4)
PROT SERPL-MCNC: 6.4 G/DL (ref 6.4–8.4)
PROT UR STRIP-MCNC: ABNORMAL MG/DL
PROTHROMBIN TIME: 22.9 SECONDS (ref 11.6–14.5)
QRS AXIS: 36 DEGREES
QRSD INTERVAL: 82 MS
QT INTERVAL: 352 MS
QTC INTERVAL: 476 MS
RBC # BLD AUTO: 2.74 MILLION/UL (ref 3.88–5.62)
RBC # BLD AUTO: 2.97 MILLION/UL (ref 3.88–5.62)
RBC #/AREA URNS AUTO: ABNORMAL /HPF
RBC MORPH BLD: PRESENT
RH BLD: POSITIVE
SITE: ABNORMAL
SODIUM SERPL-SCNC: 132 MMOL/L (ref 135–147)
SODIUM SERPL-SCNC: 132 MMOL/L (ref 135–147)
SP GR UR STRIP.AUTO: 1.02 (ref 1–1.03)
SPECIMEN EXPIRATION DATE: NORMAL
T WAVE AXIS: 58 DEGREES
TOTAL CELLS COUNTED SPEC: 100
TOXIC GRANULES BLD QL SMEAR: PRESENT
UROBILINOGEN UR STRIP-ACNC: <2 MG/DL
VENTRICULAR RATE: 110 BPM
WBC # BLD AUTO: 12.39 THOUSAND/UL (ref 4.31–10.16)
WBC # BLD AUTO: 8.64 THOUSAND/UL (ref 4.31–10.16)
WBC # FLD MANUAL: 1939 /UL
WBC #/AREA URNS AUTO: ABNORMAL /HPF

## 2023-09-04 PROCEDURE — 86923 COMPATIBILITY TEST ELECTRIC: CPT

## 2023-09-04 PROCEDURE — 99254 IP/OBS CNSLTJ NEW/EST MOD 60: CPT | Performed by: RADIOLOGY

## 2023-09-04 PROCEDURE — 99291 CRITICAL CARE FIRST HOUR: CPT | Performed by: EMERGENCY MEDICINE

## 2023-09-04 PROCEDURE — 87070 CULTURE OTHR SPECIMN AEROBIC: CPT | Performed by: SURGERY

## 2023-09-04 PROCEDURE — 83615 LACTATE (LD) (LDH) ENZYME: CPT | Performed by: SURGERY

## 2023-09-04 PROCEDURE — 75984 XRAY CONTROL CATHETER CHANGE: CPT | Performed by: RADIOLOGY

## 2023-09-04 PROCEDURE — 96367 TX/PROPH/DG ADDL SEQ IV INF: CPT

## 2023-09-04 PROCEDURE — 87186 SC STD MICRODIL/AGAR DIL: CPT | Performed by: SURGERY

## 2023-09-04 PROCEDURE — 87205 SMEAR GRAM STAIN: CPT | Performed by: SURGERY

## 2023-09-04 PROCEDURE — 87040 BLOOD CULTURE FOR BACTERIA: CPT

## 2023-09-04 PROCEDURE — 87076 CULTURE ANAEROBE IDENT EACH: CPT | Performed by: SURGERY

## 2023-09-04 PROCEDURE — 96368 THER/DIAG CONCURRENT INF: CPT

## 2023-09-04 PROCEDURE — 87186 SC STD MICRODIL/AGAR DIL: CPT

## 2023-09-04 PROCEDURE — 93005 ELECTROCARDIOGRAM TRACING: CPT

## 2023-09-04 PROCEDURE — 82948 REAGENT STRIP/BLOOD GLUCOSE: CPT

## 2023-09-04 PROCEDURE — 84145 PROCALCITONIN (PCT): CPT

## 2023-09-04 PROCEDURE — 81001 URINALYSIS AUTO W/SCOPE: CPT

## 2023-09-04 PROCEDURE — 87075 CULTR BACTERIA EXCEPT BLOOD: CPT | Performed by: SURGERY

## 2023-09-04 PROCEDURE — 0W9B3ZZ DRAINAGE OF LEFT PLEURAL CAVITY, PERCUTANEOUS APPROACH: ICD-10-PCS | Performed by: RADIOLOGY

## 2023-09-04 PROCEDURE — 84100 ASSAY OF PHOSPHORUS: CPT | Performed by: SURGERY

## 2023-09-04 PROCEDURE — 85027 COMPLETE CBC AUTOMATED: CPT | Performed by: EMERGENCY MEDICINE

## 2023-09-04 PROCEDURE — 87077 CULTURE AEROBIC IDENTIFY: CPT | Performed by: SURGERY

## 2023-09-04 PROCEDURE — 86901 BLOOD TYPING SEROLOGIC RH(D): CPT | Performed by: SURGERY

## 2023-09-04 PROCEDURE — 85027 COMPLETE CBC AUTOMATED: CPT | Performed by: SURGERY

## 2023-09-04 PROCEDURE — 85007 BL SMEAR W/DIFF WBC COUNT: CPT | Performed by: EMERGENCY MEDICINE

## 2023-09-04 PROCEDURE — 74177 CT ABD & PELVIS W/CONTRAST: CPT

## 2023-09-04 PROCEDURE — 32555 ASPIRATE PLEURA W/ IMAGING: CPT

## 2023-09-04 PROCEDURE — 87077 CULTURE AEROBIC IDENTIFY: CPT

## 2023-09-04 PROCEDURE — 96365 THER/PROPH/DIAG IV INF INIT: CPT

## 2023-09-04 PROCEDURE — 85730 THROMBOPLASTIN TIME PARTIAL: CPT

## 2023-09-04 PROCEDURE — 84157 ASSAY OF PROTEIN OTHER: CPT | Performed by: SURGERY

## 2023-09-04 PROCEDURE — 32555 ASPIRATE PLEURA W/ IMAGING: CPT | Performed by: RADIOLOGY

## 2023-09-04 PROCEDURE — 80053 COMPREHEN METABOLIC PANEL: CPT | Performed by: SURGERY

## 2023-09-04 PROCEDURE — G1004 CDSM NDSC: HCPCS

## 2023-09-04 PROCEDURE — 85610 PROTHROMBIN TIME: CPT

## 2023-09-04 PROCEDURE — 71045 X-RAY EXAM CHEST 1 VIEW: CPT

## 2023-09-04 PROCEDURE — 93010 ELECTROCARDIOGRAM REPORT: CPT | Performed by: INTERNAL MEDICINE

## 2023-09-04 PROCEDURE — 84484 ASSAY OF TROPONIN QUANT: CPT | Performed by: EMERGENCY MEDICINE

## 2023-09-04 PROCEDURE — 49423 EXCHANGE DRAINAGE CATHETER: CPT

## 2023-09-04 PROCEDURE — 99152 MOD SED SAME PHYS/QHP 5/>YRS: CPT

## 2023-09-04 PROCEDURE — 89051 BODY FLUID CELL COUNT: CPT | Performed by: SURGERY

## 2023-09-04 PROCEDURE — 80053 COMPREHEN METABOLIC PANEL: CPT | Performed by: EMERGENCY MEDICINE

## 2023-09-04 PROCEDURE — 82140 ASSAY OF AMMONIA: CPT

## 2023-09-04 PROCEDURE — 82945 GLUCOSE OTHER FLUID: CPT | Performed by: SURGERY

## 2023-09-04 PROCEDURE — 88112 CYTOPATH CELL ENHANCE TECH: CPT | Performed by: PATHOLOGY

## 2023-09-04 PROCEDURE — 36415 COLL VENOUS BLD VENIPUNCTURE: CPT

## 2023-09-04 PROCEDURE — 88305 TISSUE EXAM BY PATHOLOGIST: CPT | Performed by: PATHOLOGY

## 2023-09-04 PROCEDURE — 99153 MOD SED SAME PHYS/QHP EA: CPT

## 2023-09-04 PROCEDURE — 82330 ASSAY OF CALCIUM: CPT | Performed by: SURGERY

## 2023-09-04 PROCEDURE — 83690 ASSAY OF LIPASE: CPT

## 2023-09-04 PROCEDURE — 99285 EMERGENCY DEPT VISIT HI MDM: CPT

## 2023-09-04 PROCEDURE — 99222 1ST HOSP IP/OBS MODERATE 55: CPT | Performed by: SURGERY

## 2023-09-04 PROCEDURE — 83986 ASSAY PH BODY FLUID NOS: CPT | Performed by: SURGERY

## 2023-09-04 PROCEDURE — 99152 MOD SED SAME PHYS/QHP 5/>YRS: CPT | Performed by: RADIOLOGY

## 2023-09-04 PROCEDURE — 49423 EXCHANGE DRAINAGE CATHETER: CPT | Performed by: RADIOLOGY

## 2023-09-04 PROCEDURE — 87185 SC STD ENZYME DETCJ PER NZM: CPT | Performed by: SURGERY

## 2023-09-04 PROCEDURE — 86850 RBC ANTIBODY SCREEN: CPT | Performed by: SURGERY

## 2023-09-04 PROCEDURE — 75984 XRAY CONTROL CATHETER CHANGE: CPT

## 2023-09-04 PROCEDURE — 83605 ASSAY OF LACTIC ACID: CPT

## 2023-09-04 PROCEDURE — 0W2GX0Z CHANGE DRAINAGE DEVICE IN PERITONEAL CAVITY, EXTERNAL APPROACH: ICD-10-PCS | Performed by: RADIOLOGY

## 2023-09-04 PROCEDURE — 87181 SC STD AGAR DILUTION PER AGT: CPT

## 2023-09-04 PROCEDURE — 87154 CUL TYP ID BLD PTHGN 6+ TRGT: CPT

## 2023-09-04 PROCEDURE — 86900 BLOOD TYPING SEROLOGIC ABO: CPT | Performed by: SURGERY

## 2023-09-04 PROCEDURE — 83735 ASSAY OF MAGNESIUM: CPT | Performed by: SURGERY

## 2023-09-04 PROCEDURE — 96375 TX/PRO/DX INJ NEW DRUG ADDON: CPT

## 2023-09-04 PROCEDURE — 99291 CRITICAL CARE FIRST HOUR: CPT | Performed by: SURGERY

## 2023-09-04 RX ORDER — CALCIUM GLUCONATE 20 MG/ML
2 INJECTION, SOLUTION INTRAVENOUS ONCE
Status: COMPLETED | OUTPATIENT
Start: 2023-09-04 | End: 2023-09-04

## 2023-09-04 RX ORDER — FENTANYL CITRATE 50 UG/ML
INJECTION, SOLUTION INTRAMUSCULAR; INTRAVENOUS AS NEEDED
Status: COMPLETED | OUTPATIENT
Start: 2023-09-04 | End: 2023-09-04

## 2023-09-04 RX ORDER — HYDROMORPHONE HCL/PF 1 MG/ML
1 SYRINGE (ML) INJECTION ONCE
Status: DISCONTINUED | OUTPATIENT
Start: 2023-09-04 | End: 2023-09-04

## 2023-09-04 RX ORDER — HYDROMORPHONE HCL/PF 1 MG/ML
0.5 SYRINGE (ML) INJECTION EVERY 4 HOURS PRN
Status: DISCONTINUED | OUTPATIENT
Start: 2023-09-04 | End: 2023-09-19 | Stop reason: HOSPADM

## 2023-09-04 RX ORDER — MIDAZOLAM HYDROCHLORIDE 2 MG/2ML
2 INJECTION, SOLUTION INTRAMUSCULAR; INTRAVENOUS ONCE
Status: COMPLETED | OUTPATIENT
Start: 2023-09-04 | End: 2023-09-04

## 2023-09-04 RX ORDER — HYDROMORPHONE HCL IN WATER/PF 6 MG/30 ML
0.2 PATIENT CONTROLLED ANALGESIA SYRINGE INTRAVENOUS
Status: DISCONTINUED | OUTPATIENT
Start: 2023-09-04 | End: 2023-09-06

## 2023-09-04 RX ORDER — ACETAMINOPHEN 325 MG/1
650 TABLET ORAL EVERY 6 HOURS PRN
Status: DISCONTINUED | OUTPATIENT
Start: 2023-09-04 | End: 2023-09-04

## 2023-09-04 RX ORDER — SODIUM CHLORIDE, SODIUM GLUCONATE, SODIUM ACETATE, POTASSIUM CHLORIDE, MAGNESIUM CHLORIDE, SODIUM PHOSPHATE, DIBASIC, AND POTASSIUM PHOSPHATE .53; .5; .37; .037; .03; .012; .00082 G/100ML; G/100ML; G/100ML; G/100ML; G/100ML; G/100ML; G/100ML
1000 INJECTION, SOLUTION INTRAVENOUS ONCE
Status: COMPLETED | OUTPATIENT
Start: 2023-09-04 | End: 2023-09-04

## 2023-09-04 RX ORDER — HYDROMORPHONE HCL/PF 1 MG/ML
0.5 SYRINGE (ML) INJECTION
Status: DISCONTINUED | OUTPATIENT
Start: 2023-09-04 | End: 2023-09-06

## 2023-09-04 RX ORDER — ALBUMIN, HUMAN INJ 5% 5 %
25 SOLUTION INTRAVENOUS ONCE
Status: COMPLETED | OUTPATIENT
Start: 2023-09-04 | End: 2023-09-04

## 2023-09-04 RX ORDER — LIDOCAINE HYDROCHLORIDE 10 MG/ML
INJECTION, SOLUTION EPIDURAL; INFILTRATION; INTRACAUDAL; PERINEURAL AS NEEDED
Status: COMPLETED | OUTPATIENT
Start: 2023-09-04 | End: 2023-09-04

## 2023-09-04 RX ORDER — LIDOCAINE HYDROCHLORIDE 10 MG/ML
10 INJECTION, SOLUTION EPIDURAL; INFILTRATION; INTRACAUDAL; PERINEURAL ONCE
Status: COMPLETED | OUTPATIENT
Start: 2023-09-04 | End: 2023-09-04

## 2023-09-04 RX ORDER — IODIXANOL 320 MG/ML
100 INJECTION, SOLUTION INTRAVASCULAR
Status: COMPLETED | OUTPATIENT
Start: 2023-09-04 | End: 2023-09-04

## 2023-09-04 RX ORDER — ACETAMINOPHEN 650 MG/1
650 SUPPOSITORY RECTAL EVERY 6 HOURS PRN
Status: DISCONTINUED | OUTPATIENT
Start: 2023-09-04 | End: 2023-09-06

## 2023-09-04 RX ORDER — SODIUM CHLORIDE, SODIUM GLUCONATE, SODIUM ACETATE, POTASSIUM CHLORIDE, MAGNESIUM CHLORIDE, SODIUM PHOSPHATE, DIBASIC, AND POTASSIUM PHOSPHATE .53; .5; .37; .037; .03; .012; .00082 G/100ML; G/100ML; G/100ML; G/100ML; G/100ML; G/100ML; G/100ML
75 INJECTION, SOLUTION INTRAVENOUS CONTINUOUS
Status: DISCONTINUED | OUTPATIENT
Start: 2023-09-04 | End: 2023-09-05

## 2023-09-04 RX ORDER — MIDAZOLAM HYDROCHLORIDE 2 MG/2ML
INJECTION, SOLUTION INTRAMUSCULAR; INTRAVENOUS AS NEEDED
Status: COMPLETED | OUTPATIENT
Start: 2023-09-04 | End: 2023-09-04

## 2023-09-04 RX ORDER — NOREPINEPHRINE BITARTRATE 1 MG/ML
INJECTION, SOLUTION INTRAVENOUS
Status: DISPENSED
Start: 2023-09-04 | End: 2023-09-05

## 2023-09-04 RX ADMIN — SODIUM CHLORIDE, SODIUM GLUCONATE, SODIUM ACETATE, POTASSIUM CHLORIDE, MAGNESIUM CHLORIDE, SODIUM PHOSPHATE, DIBASIC, AND POTASSIUM PHOSPHATE 1000 ML: .53; .5; .37; .037; .03; .012; .00082 INJECTION, SOLUTION INTRAVENOUS at 16:59

## 2023-09-04 RX ADMIN — FENTANYL CITRATE 25 MCG: 50 INJECTION, SOLUTION INTRAMUSCULAR; INTRAVENOUS at 19:02

## 2023-09-04 RX ADMIN — SODIUM CHLORIDE, SODIUM GLUCONATE, SODIUM ACETATE, POTASSIUM CHLORIDE, MAGNESIUM CHLORIDE, SODIUM PHOSPHATE, DIBASIC, AND POTASSIUM PHOSPHATE 1000 ML: .53; .5; .37; .037; .03; .012; .00082 INJECTION, SOLUTION INTRAVENOUS at 15:18

## 2023-09-04 RX ADMIN — VANCOMYCIN HYDROCHLORIDE 2000 MG: 10 INJECTION, POWDER, LYOPHILIZED, FOR SOLUTION INTRAVENOUS at 16:09

## 2023-09-04 RX ADMIN — CALCIUM GLUCONATE 2 G: 20 INJECTION, SOLUTION INTRAVENOUS at 21:00

## 2023-09-04 RX ADMIN — ACETAMINOPHEN 650 MG: 650 SUPPOSITORY RECTAL at 22:25

## 2023-09-04 RX ADMIN — CEFEPIME 2000 MG: 2 INJECTION, POWDER, FOR SOLUTION INTRAVENOUS at 15:22

## 2023-09-04 RX ADMIN — ALBUMIN (HUMAN) 25 G: 12.5 INJECTION, SOLUTION INTRAVENOUS at 22:24

## 2023-09-04 RX ADMIN — MIDAZOLAM 2 MG: 1 INJECTION INTRAMUSCULAR; INTRAVENOUS at 23:20

## 2023-09-04 RX ADMIN — SODIUM CHLORIDE, SODIUM GLUCONATE, SODIUM ACETATE, POTASSIUM CHLORIDE, MAGNESIUM CHLORIDE, SODIUM PHOSPHATE, DIBASIC, AND POTASSIUM PHOSPHATE 1000 ML: .53; .5; .37; .037; .03; .012; .00082 INJECTION, SOLUTION INTRAVENOUS at 20:24

## 2023-09-04 RX ADMIN — IOHEXOL 10 ML: 350 INJECTION, SOLUTION INTRAVENOUS at 19:35

## 2023-09-04 RX ADMIN — LIDOCAINE HYDROCHLORIDE 10 ML: 10 INJECTION, SOLUTION EPIDURAL; INFILTRATION; INTRACAUDAL; PERINEURAL at 18:16

## 2023-09-04 RX ADMIN — IODIXANOL 100 ML: 320 INJECTION, SOLUTION INTRAVASCULAR at 16:40

## 2023-09-04 RX ADMIN — MEROPENEM 1000 MG: 1 INJECTION, POWDER, FOR SOLUTION INTRAVENOUS at 21:38

## 2023-09-04 RX ADMIN — SODIUM CHLORIDE, SODIUM GLUCONATE, SODIUM ACETATE, POTASSIUM CHLORIDE, MAGNESIUM CHLORIDE, SODIUM PHOSPHATE, DIBASIC, AND POTASSIUM PHOSPHATE 1000 ML: .53; .5; .37; .037; .03; .012; .00082 INJECTION, SOLUTION INTRAVENOUS at 15:42

## 2023-09-04 RX ADMIN — MIDAZOLAM 2 MG: 1 INJECTION INTRAMUSCULAR; INTRAVENOUS at 23:00

## 2023-09-04 RX ADMIN — MIDAZOLAM 0.5 MG: 1 INJECTION INTRAMUSCULAR; INTRAVENOUS at 19:02

## 2023-09-04 RX ADMIN — PHYTONADIONE 10 MG: 10 INJECTION, EMULSION INTRAMUSCULAR; INTRAVENOUS; SUBCUTANEOUS at 16:51

## 2023-09-04 RX ADMIN — SODIUM CHLORIDE, SODIUM GLUCONATE, SODIUM ACETATE, POTASSIUM CHLORIDE, MAGNESIUM CHLORIDE, SODIUM PHOSPHATE, DIBASIC, AND POTASSIUM PHOSPHATE 150 ML/HR: .53; .5; .37; .037; .03; .012; .00082 INJECTION, SOLUTION INTRAVENOUS at 21:35

## 2023-09-04 RX ADMIN — LIDOCAINE HYDROCHLORIDE 10 ML: 10 INJECTION, SOLUTION EPIDURAL; INFILTRATION; INTRACAUDAL at 23:54

## 2023-09-04 NOTE — CONSULTS
Consultation - General Surgery   Bridger Coulter 46 y.o. male MRN: [id number]  Unit/Bed#: ED 08 Encounter: [id number]    Assessment/Plan     Assessment:  45 yo M who was previously admitted for necrotizing pancreatitis and non-occlusive SMV thrombus presents with signs and symptoms of septic shock. Patient is currently tachycardic and hypotensive with evidence of leukocytosis on labs. WBC 12.39    Plan:  -Will obtain CT abd/pelvis  -Will begin antibiotics   -Fluid resuscitation  -Pain Control  -Daily labs to monitor infection, replete electrolytes prn    History of Present Illness     HPI:  Bridger Coulter is a 46 y.o. male who was previously admitted to the acute care surgery service from 8/5/23 to 8/11/23 for necrotizing pancreatitis and non-occlusive SMV thrombus for which he is now on Eliquis (last dose 11AM 9/4). He had also previously been admitted for a short duration on 7/24/23 for management of symptoms of acute necrotizing pancreatitis. Patient presented to ED today with concerns of draining and redness around his 12 fr DONNY drain that was placed in the LUQ. His symptoms began yesterday morning when he began having increasing fatigue, abdominal distension, pain, redness, and clear drainage around the drain. He denies N/V, constipation, diarrhea, fevers, chills. He does report some SOB. Has been having regular bowel movements daily. He currently only drinks 2-3 ensures per day and occasionally eats some cereal.     Patient also reports that he has a "bed sore" on his right upper buttocks that is causing discomfort. Consult to surgery general  Consult performed by: Katelynn Marrero  Consult ordered by: Martín Ramos MD       Consult performed by Katelynn Marrero, Medical student under the supervision of [facility] team residents    Review of Systems   Constitutional: Positive for appetite change and fatigue. Negative for fever. Respiratory: Positive for shortness of breath.     Cardiovascular: Positive for leg swelling. Negative for chest pain. Gastrointestinal: Positive for abdominal distention and abdominal pain. Negative for constipation, diarrhea, nausea and vomiting. DONNY drain site draining serous fluid. Redness located around DONNY tube. Genitourinary: Negative for difficulty urinating. Skin: Positive for wound (R upper buttock wound). Historical Information   Past Medical History:   Diagnosis Date   • Pancreatitis      Past Surgical History:   Procedure Laterality Date   • CYSTOSCOPY N/A 8/5/2023    Procedure: CYSTOSCOPY. Saunders insertion;  Surgeon: Latanya Chávez MD;  Location: [facility];  Service: Urology   • FL RETROGRADE URETHROCYSTOGRAM  8/5/2023   • HERNIA REPAIR     • IR DRAINAGE TUBE PLACEMENT  8/5/2023   • JOINT REPLACEMENT     • TONSILLECTOMY  1976     Social History   Social History     Substance and Sexual Activity   Alcohol Use Not Currently    Comment: quit 7/15/23 (previous 6 drinks per week)     Social History     Substance and Sexual Activity   Drug Use Never     E-Cigarette/Vaping   • E-Cigarette Use Never User      E-Cigarette/Vaping Substances   • Nicotine No    • THC No    • CBD No    • Flavoring No    • Other No    • Unknown No      Social History     Tobacco Use   Smoking Status Never   • Passive exposure: Past   Smokeless Tobacco Former   • Types: Chew   • Quit date: 7/15/2023   Tobacco Comments    Quit 7/15/23     Family History: non-contributory    Meds/Allergies   current meds:   No current facility-administered medications for this encounter. and PTA meds:   Prior to Admission Medications   Prescriptions Last Dose Informant Patient Reported? Taking? Alcohol Swabs 70 % PADS  Self No No   Sig: May substitute brand based on insurance coverage. Check glucose ACHS. BD PosiFlush 0.9 % SOLN  Self Yes No   Blood Glucose Monitoring Suppl (OneTouch Verio Reflect) w/Device KIT  Self No No   Sig: May substitute brand based on insurance coverage.  Check glucose ACHS. Blood Pressure Monitoring (Adult Blood Pressure Cuff Lg) KIT  Self No No   Sig: Use 3 (three) times a day   Insulin Glargine Solostar (Lantus SoloStar) 100 UNIT/ML SOPN  Self No No   Sig: Inject 0.14 mL (14 Units total) under the skin daily at bedtime   Insulin Pen Needle (BD Pen Needle Lupe 2nd Gen) 32G X 4 MM MISC  Self No No   Sig: For use with insulin pen. Pharmacy may dispense brand covered by insurance. Insulin Pen Needle (BD Pen Needle Lupe 2nd Gen) 32G X 4 MM MISC  Self No No   Sig: For use with insulin pen. Pharmacy may dispense brand covered by insurance. Levemir FlexPen 100 units/mL injection pen  Self Yes No   Si Units daily at bedtime   NovoLOG FlexPen 100 units/mL injection pen  Self Yes No   Sig: INJECT 5 UNITS UNDER SKIN 3 TIMES DAILY WITH MEALS   OneTouch Delica Lancets 47C MISC  Self No No   Sig: May substitute brand based on insurance coverage. Check glucose ACHS. acetaminophen (TYLENOL) 325 mg tablet  Self No No   Sig: Take 2 tablets (650 mg total) by mouth every 6 (six) hours as needed for mild pain, headaches or fever   apixaban (Eliquis) 5 mg  Self No No   Sig: Take 2 tablets (10 mg total) by mouth 2 (two) times a day for 7 days, THEN 1 tablet (5 mg total) 2 (two) times a day for 23 days. furosemide (LASIX) 20 mg tablet   No No   Sig: TAKE 1 TABLET BY MOUTH EVERY DAY   glucose blood (OneTouch Verio) test strip  Self No No   Sig: May substitute brand based on insurance coverage. Check glucose ACHS.    insulin lispro (HumaLOG KwikPen) 100 units/mL injection pen  Self No No   Sig: Inject 5 Units under the skin 3 (three) times a day with meals   lisinopril (ZESTRIL) 10 mg tablet  Self No No   Sig: Take 1 tablet (10 mg total) by mouth daily   pancrelipase, Lip-Prot-Amyl, (CREON) 6,000 units delayed release capsule  Self No No   Sig: Take 1 capsule (6,000 Units total) by mouth 3 (three) times a day with meals   sodium chloride, PF, 0.9 %   No No   Sig: 10 mL by Intracatheter route daily for 120 doses Intracatheter flushing daily. May substitute prefilled syringe with normal saline 10 mL vials, 10 mL syringes, and 18 g blunt needles      Facility-Administered Medications: None     No Known Allergies    Objective   First Vitals:   Blood Pressure: (!) 87/51 (09/04/23 1350)  Pulse: (!) 114 (09/04/23 1350)  Temperature: 98.2 °F (36.8 °C) (09/04/23 1350)  Temp Source: Oral (09/04/23 1350)  Respirations: 18 (09/04/23 1350)  SpO2: 94 % (09/04/23 1350)    Current Vitals:   Blood Pressure: (!) 87/51 (09/04/23 1350)  Pulse: (!) 114 (09/04/23 1350)  Temperature: 98.2 °F (36.8 °C) (09/04/23 1350)  Temp Source: Oral (09/04/23 1350)  Respirations: 18 (09/04/23 1350)  SpO2: 94 % (09/04/23 1350)    No intake or output data in the 24 hours ending 09/04/23 1509    Invasive Devices     Peripheral Intravenous Line  Duration           Peripheral IV 08/08/23 Dorsal (posterior); Left Wrist 27 days    Peripheral IV 09/04/23 Distal;Right;Upper;Ventral (anterior) Arm <1 day          Drain  Duration           Abscess Drain RUQ 29 days                Physical Exam  Constitutional:       Appearance: He is obese. HENT:      Head: Normocephalic and atraumatic. Cardiovascular:      Rate and Rhythm: Tachycardia present. Pulmonary:      Effort: Pulmonary effort is normal.   Abdominal:      General: There is distension. Tenderness: There is abdominal tenderness (tenderness around DONNY, otherwise non-tender). There is no guarding or rebound. Skin:     General: Skin is warm and dry. Neurological:      Mental Status: He is alert and oriented to person, place, and time. Psychiatric:         Mood and Affect: Mood normal.         Behavior: Behavior normal.         Lab Results: I have personally reviewed pertinent lab results. Imaging: I have personally reviewed pertinent reports.    and I have personally reviewed pertinent films in PACS  EKG, Pathology, and Other Studies: I have personally reviewed pertinent reports.    and I have personally reviewed pertinent films in PACS

## 2023-09-04 NOTE — BRIEF OP NOTE (RAD/CATH)
INTERVENTIONAL RADIOLOGY PROCEDURE NOTE    Date: 9/4/2023    Procedure:   IR DRAIN UPSIZE  IR THORACENTESIS  Procedure Summary     Date:  Room / Location:     Anesthesia Start:  Anesthesia Stop:     Procedure:  Diagnosis:     Scheduled Providers:  Responsible Provider:     Anesthesia Type: Not recorded ASA Status: Not recorded          Preoperative diagnosis:   1. Necrotizing pancreatitis         Postoperative diagnosis: Same. Surgeon: Soledad Dunbar MD     Assistant: None. No qualified resident was available. Blood loss: 0    Specimens:     Thora labs    abd fluid for culture aerobic anaerobic    Findings:   Left thoracentesis 1.2 L    Pancreatic collection drain upsized to 16 Lao reposition slightly more deep into the collection  Thick turbid material    Complications: None immediate.     Anesthesia: conscious sedation

## 2023-09-04 NOTE — ED RE-EVALUATION NOTE
Pt gfr is 32, but spoke with Dr. Colton Sierra of radiology and he approves of ct a/p with iv contrast. Pt getting ivf for septic shock but also believe gfr reduced due to prerenal etiology.      Martín Ramos MD  09/04/23 2730

## 2023-09-04 NOTE — ED NOTES
Attempted to call report at this time. Requested 10min. Will try again.       Marbella Fonseca RN  09/04/23 9222

## 2023-09-04 NOTE — SEDATION DOCUMENTATION
Thoracentesis and drainage tube upsize successfully completed without complications. Tolerated well. Report called to MICU RN.

## 2023-09-04 NOTE — ED ATTENDING ATTESTATION
9/4/2023  IElysia MD, saw and evaluated the patient. I have discussed the patient with the resident/non-physician practitioner and agree with the resident's/non-physician practitioner's findings, Plan of Care, and MDM as documented in the resident's/non-physician practitioner's note, except where noted. All available labs and Radiology studies were reviewed. I was present for key portions of any procedure(s) performed by the resident/non-physician practitioner and I was immediately available to provide assistance. At this point I agree with the current assessment done in the Emergency Department. I have conducted an independent evaluation of this patient a history and physical is as follows:    ED Course         Critical Care Time  CriticalCare Time    Date/Time: 9/7/2023 9:59 PM    Performed by: Elysia Gilbert MD  Authorized by: Elysia Gilbert MD    Critical care provider statement:     Critical care time (minutes):  36    Critical care time was exclusive of:  Separately billable procedures and treating other patients and teaching time    Critical care was necessary to treat or prevent imminent or life-threatening deterioration of the following conditions:  Shock, dehydration and endocrine crisis    Critical care was time spent personally by me on the following activities:  Development of treatment plan with patient or surrogate, obtaining history from patient or surrogate, evaluation of patient's response to treatment, examination of patient, re-evaluation of patient's condition, review of old charts and ordering and performing treatments and interventions        45 yo male with hx of necrotizing pancreatitis with betzy drain, noted increased abdominal pain and distention with drainage around drain. Pt with no cp. Pt feeling sob. No vomiting. No diarrhea. Vss, afebrile, tachy, low bp, jaundice, lungs cta, rrr, abdomen soft diffusely drained, purulent drainage in drain and around.   Pt with hx of clot in pancreas and on plavix . Septic workup, ct a/p. Ivf, 30 cc/kg and discuss with surgery.

## 2023-09-04 NOTE — H&P
H&P - [facility]  : [facility] Resident role on Penny Plain 46 y.o. male MRN: [id number]  Unit/Bed#: ED 08 Encounter: [id number]        Assessment:  46y.o. year old male who was previously admitted for necrotizing pancreatitis and non-occlusive SMV thrombus presents with signs and symptoms of septic shock. Patient is currently tachycardic and hypotensive with evidence of leukocytosis on labs. WBC 12.39  Lactate 2.4    Plan:  -Discussed CT with IR, will plan on Thoracentesis and DONNY drain upsize.   -Will begin antibiotics with Meropenem  -Fluid resuscitation  -Pain Control  -Daily labs to monitor infection, replete electrolytes prn  -DVT ppx  -Incentive spirometry    HPI:  Edwardo Turpin is a 46 y.o. male who was previously admitted to the acute care surgery service from 8/5/23 to 8/11/23 for necrotizing pancreatitis and non-occlusive SMV thrombus for which he is now on Eliquis (last dose 11AM 9/4). He had also previously been admitted for a short duration on 7/24/23 for management of symptoms of acute necrotizing pancreatitis.       Patient presented to ED today with concerns of draining and redness around his 12 fr DONNY drain that was placed in the LUQ. His symptoms began yesterday morning when he began having increasing fatigue, abdominal distension, pain, redness, and clear drainage around the drain. He denies N/V, constipation, diarrhea, fevers, chills. He does report some SOB. Has been having regular bowel movements daily. He currently only drinks 2-3 ensures per day and occasionally eats some cereal.      Patient also reports that he has a "bed sore" on his right upper buttocks that is causing discomfort. Physical Exam  Vitals reviewed. Constitutional:       Appearance: He is ill-appearing. HENT:      Head: Normocephalic and atraumatic. Cardiovascular:      Rate and Rhythm: Tachycardia present.    Pulmonary:      Effort: Pulmonary effort is normal. Comments: SOB when lying flat. On 2.5L NC  Abdominal:      General: There is distension. Tenderness: There is abdominal tenderness (tender around DONNY site). There is no guarding or rebound. Comments:  DONNY drain site draining serous fluid. Redness located around DONNY tube. Skin:     General: Skin is warm and dry. Neurological:      Mental Status: He is alert and oriented to person, place, and time. Psychiatric:         Mood and Affect: Mood normal.         Behavior: Behavior normal.            Review of Systems   Constitutional: Positive for appetite change and fatigue. Negative for fever. Respiratory: Positive for shortness of breath. Cardiovascular: Positive for leg swelling. Negative for chest pain. Gastrointestinal: Positive for abdominal distention and abdominal pain. Negative for constipation, diarrhea, nausea and vomiting. Genitourinary: Negative for difficulty urinating. Skin: Positive for wound (R upper buttock wound). Objective         Intake/Output Summary (Last 24 hours) at 9/4/2023 1729  Last data filed at 9/4/2023 1724  Gross per 24 hour   Intake 2140 ml   Output 500 ml   Net 1640 ml       First Vitals:   Blood Pressure: (!) 87/51 (09/04/23 1350)  Pulse: (!) 114 (09/04/23 1350)  Temperature: 98.2 °F (36.8 °C) (09/04/23 1350)  Temp Source: Oral (09/04/23 1350)  Respirations: 18 (09/04/23 1350)  Height: 5' 11" (180.3 cm) (09/04/23 1530)  Weight - Scale: 125 kg (275 lb 9.2 oz) (09/04/23 1530)  SpO2: 94 % (09/04/23 1350)    Current Vitals:   Blood Pressure: (!) 92/48 (09/04/23 1545)  Pulse: 100 (09/04/23 1545)  Temperature: 98.2 °F (36.8 °C) (09/04/23 1350)  Temp Source: Oral (09/04/23 1350)  Respirations: 20 (09/04/23 1545)  Height: 5' 11" (180.3 cm) (09/04/23 1530)  Weight - Scale: 125 kg (275 lb 9.2 oz) (09/04/23 1530)  SpO2: 96 % (09/04/23 1545)    Invasive Devices     Peripheral Intravenous Line  Duration           Peripheral IV 08/08/23 Dorsal (posterior); Left Wrist 27 days Peripheral IV 09/04/23 Distal;Left;Upper;Ventral (anterior) Arm <1 day    Peripheral IV 09/04/23 Distal;Right;Upper;Ventral (anterior) Arm <1 day          Drain  Duration           Abscess Drain RUQ 30 days    NG/OG/Enteral Tube Nasogastric 18 Fr Right nare <1 day                Imaging: I have personally reviewed pertinent reports. 9/4 CT abd/pelvis: Read pending    EKG, Pathology, and Other Studies: I have personally reviewed pertinent reports. Historical Information   Past Medical History:   Diagnosis Date   • Pancreatitis      Past Surgical History:   Procedure Laterality Date   • CYSTOSCOPY N/A 8/5/2023    Procedure: CYSTOSCOPY. Saunders insertion;  Surgeon: Isha Balderas MD;  Location: [facility];  Service: Urology   • FL RETROGRADE URETHROCYSTOGRAM  8/5/2023   • HERNIA REPAIR     • IR DRAINAGE TUBE PLACEMENT  8/5/2023   • JOINT REPLACEMENT     • TONSILLECTOMY  1976     Social History   Social History     Substance and Sexual Activity   Alcohol Use Not Currently    Comment: quit 7/15/23 (previous 6 drinks per week)     Social History     Substance and Sexual Activity   Drug Use Never     Social History     Tobacco Use   Smoking Status Never   • Passive exposure: Past   Smokeless Tobacco Former   • Types: Chew   • Quit date: 7/15/2023   Tobacco Comments    Quit 7/15/23     Family History   Problem Relation Age of Onset   • Cancer Mother    • Multiple myeloma Mother    • Pancreatic cancer Father    • Cancer Father        Meds/Allergies   all current active meds have been reviewed, current meds:   Current Facility-Administered Medications   Medication Dose Route Frequency   • meropenem (MERREM) 1,000 mg in sodium chloride 0.9 % 100 mL IVPB  1,000 mg Intravenous Q12H   • vancomycin (VANCOCIN) 2,000 mg in sodium chloride 0.9 % 500 mL IVPB  2,000 mg Intravenous Once    and PTA meds:   Prior to Admission Medications   Prescriptions Last Dose Informant Patient Reported? Taking?    Alcohol Swabs 70 % PADS  Self No No   Sig: May substitute brand based on insurance coverage. Check glucose ACHS. BD PosiFlush 0.9 % SOLN  Self Yes No   Blood Glucose Monitoring Suppl (OneTouch Verio Reflect) w/Device KIT  Self No No   Sig: May substitute brand based on insurance coverage. Check glucose ACHS. Blood Pressure Monitoring (Adult Blood Pressure Cuff Lg) KIT  Self No No   Sig: Use 3 (three) times a day   Insulin Glargine Solostar (Lantus SoloStar) 100 UNIT/ML SOPN  Self No No   Sig: Inject 0.14 mL (14 Units total) under the skin daily at bedtime   Insulin Pen Needle (BD Pen Needle Lupe 2nd Gen) 32G X 4 MM MISC  Self No No   Sig: For use with insulin pen. Pharmacy may dispense brand covered by insurance. Insulin Pen Needle (BD Pen Needle Lupe 2nd Gen) 32G X 4 MM MISC  Self No No   Sig: For use with insulin pen. Pharmacy may dispense brand covered by insurance. Levemir FlexPen 100 units/mL injection pen  Self Yes No   Si Units daily at bedtime   NovoLOG FlexPen 100 units/mL injection pen  Self Yes No   Sig: INJECT 5 UNITS UNDER SKIN 3 TIMES DAILY WITH MEALS   OneTouch Delica Lancets 06K MISC  Self No No   Sig: May substitute brand based on insurance coverage. Check glucose ACHS. acetaminophen (TYLENOL) 325 mg tablet  Self No No   Sig: Take 2 tablets (650 mg total) by mouth every 6 (six) hours as needed for mild pain, headaches or fever   apixaban (Eliquis) 5 mg  Self No No   Sig: Take 2 tablets (10 mg total) by mouth 2 (two) times a day for 7 days, THEN 1 tablet (5 mg total) 2 (two) times a day for 23 days. furosemide (LASIX) 20 mg tablet   No No   Sig: TAKE 1 TABLET BY MOUTH EVERY DAY   glucose blood (OneTouch Verio) test strip  Self No No   Sig: May substitute brand based on insurance coverage. Check glucose ACHS.    insulin lispro (HumaLOG KwikPen) 100 units/mL injection pen  Self No No   Sig: Inject 5 Units under the skin 3 (three) times a day with meals   lisinopril (ZESTRIL) 10 mg tablet  Self No No Sig: Take 1 tablet (10 mg total) by mouth daily   pancrelipase, Lip-Prot-Amyl, (CREON) 6,000 units delayed release capsule  Self No No   Sig: Take 1 capsule (6,000 Units total) by mouth 3 (three) times a day with meals   sodium chloride, PF, 0.9 %   No No   Sig: 10 mL by Intracatheter route daily for 120 doses Intracatheter flushing daily. May substitute prefilled syringe with normal saline 10 mL vials, 10 mL syringes, and 18 g blunt needles      Facility-Administered Medications: None     No Known Allergies    Lab Results: I have personally reviewed pertinent lab results.   , CBC:   Lab Results   Component Value Date    WBC 12.39 (H) 09/04/2023    HGB 7.9 (L) 09/04/2023    HCT 25.1 (L) 09/04/2023    MCV 85 09/04/2023     (H) 09/04/2023    RBC 2.97 (L) 09/04/2023    MCH 26.6 (L) 09/04/2023    MCHC 31.5 09/04/2023    RDW 16.6 (H) 09/04/2023    MPV 9.6 09/04/2023   , CMP:   Lab Results   Component Value Date    SODIUM 132 (L) 09/04/2023    K 4.9 09/04/2023    CL 92 (L) 09/04/2023    CO2 28 09/04/2023     (H) 09/04/2023    CREATININE 2.61 (H) 09/04/2023    CALCIUM 8.0 (L) 09/04/2023    AST 40 (H) 09/04/2023    ALT 25 09/04/2023    ALKPHOS 153 (H) 09/04/2023    EGFR 27 09/04/2023         Consult to surgery general  Consult performed by: Vivek Aguilar  Consult ordered by: Fredy Mckeon MD      Consult performed by Vviek Aguilar, Medical Student under the supervision of the residents of [facility] team    Vivek Aguilar  9/4/2023 5:29 PM

## 2023-09-04 NOTE — DISCHARGE INSTRUCTIONS
Abdominal Paracentesis     WHAT YOU NEED TO KNOW:   Abdominal paracentesis is a procedure to remove abnormal fluid buildup in your abdomen. Fluid builds up because of liver problems, such as swelling and scarring. Heart failure, kidney disease, a mass, or problems with your pancreas may also cause fluid buildup. DISCHARGE INSTRUCTIONS:     Follow up with your healthcare provider as directed: Write down your questions so you remember to ask them during your visits. Wound care: Remove dressing after 24 hours. Leave glue in place. Return to your normal activities    Contact Interventional Radiology at [phone] Ariel PATIENTS: Contact Interventional Radiology at [phone]) Sumeet Hinton PATIENTS: Contact Interventional Radiology at [phone]) if:  You have a fever and your wound is red and swollen. You have yellow, green, or bad-smelling discharge coming from your wound. You have pain or swelling in your abdomen. You have an upset stomach or you vomit. You have sudden, sharp pain in your abdomen. You urinate very little or not at all. You feel confused and more tired than usual.   Your arm or leg feels warm, tender, and painful. It may look swollen and red. You suddenly feel lightheaded and have trouble breathing. Thoracentesis   WHAT YOU NEED TO KNOW:   A thoracentesis is a procedure to remove extra fluid or air from between your lungs and your inner chest wall. Air or fluid buildup may make it hard for you to breathe. A thoracentesis allows your lungs to expand fully so you can breathe more easily. DISCHARGE INSTRUCTIONS:   Medicines:   Pain medicine: You may be given a prescription medicine to decrease pain. Do not wait until the pain is severe before you take your medicine. Antibiotics: This medicine helps fight or prevent an infection. Take your medicine as directed. Call your healthcare provider if you think your medicine is not helping or if you have side effects. Tell him if you are allergic to any medicine. Keep a list of the medicines, vitamins, and herbs you take. Include the amounts, and when and why you take them. Bring the list or the pill bottles to follow-up visits. Carry your medicine list with you in case of an emergency. Follow up with your healthcare provider as directed:  Write down your questions so you remember to ask them during your visits. Rest:  Rest when you feel it is needed. Slowly start to do more each day. Return to your daily activities as directed. Do not smoke: If you smoke, it is never too late to quit. Ask for information about how to stop smoking if you need help. Contact your healthcare provider if:   You have a fever. Your puncture site is red, warm, swollen, or draining pus. You have questions or concerns about your procedure, medicine, or care. If you have any questions regarding, call the IR department @ [phone]. Seek care immediately or call 911 if:   Blood soaks through your bandage. There is blood in your spit. TUBE CARE INSTRUCTIONS    Care after your procedure:    Resume your normal diet. Small sips of flat soda will help with nausea. 1. The properly functioning catheter should be forward flushed once (1x) daily with 10ml of normal saline using clean technique. You will be given a prescription for flushes. To flush the tube, clean both connections with alcohol swab. Twist off the drainage bag/ bulb  tubing and twist the saline syringe into the drainage tube and flush. Remove the syringe and twist the drainage bag / bulb tubing tubing back on.    2. The drainage bag/bulb may be emptied as necessary. Keep a record of the amount of fluid you drain from your tube. This should be done with clean technique as well. 3. A fresh dressing should be applied daily over the tube insertion site. 4. As the tube is secured to the skin with only a suture,try not to pull on your tube.  Tub baths are not permitted. Showers are permitted if the patient's skin entry site is prevented from getting wet. Similarly, washcloth "baths" are acceptable. Contact Interventional Radiology at [phone] Ariel PATIENTS: Contact Interventional Radiology at [phone]) Mi Shaw PATIENTS: Contact Interventional Radiology at [phone]) if:    1. Leakage or large amounts of liquid around the catheter. 2. Fever of 101 degrees lasting several hours without other obvious cause (such as sore throat, flu, etc). 3. Persistent nausea or vomiting. 4. Diminished drainage, which may be associated with pressure or pain. Or when the     drainage from your tube is less than 10mls for 48 hours. 5. Catheter pulled back or falls out. The following pharmacies carry the flush syringes. [facility]                         [address] [facility]  Phone [phone]            Phone [phone] [address]                                [phone]  [address] [place] [address]                      [address] [facility]  Phone [phone]            Phone [phone]                      Luz Elena Pontiff                                                                                                          [phone]  [facility]  [address] [facility].   [facility]  Phone [phone] Procedural Sedation   WHAT YOU NEED TO KNOW:   Procedural sedation is medicine used during procedures to help you feel relaxed and calm. You will remember little to none of the procedure. After sedation you may feel tired, weak, or unsteady on your feet. You may also have trouble concentrating or short-term memory loss. These symptoms should go away in 24 hours or less. DISCHARGE INSTRUCTIONS:     Call 911 or have someone else call for any of the following: You have sudden trouble breathing. You cannot be woken. Contact Interventional Radiology at [phone]   Ariel PATIENTS: Contact Interventional Radiology at [phone]) Laura Pires PATIENTS: Contact Interventional Radiology at [phone]) if any of the following occur: You have a severe headache or dizziness. Your heart is beating faster than usual.    You have a fever or chills. Your skin is itchy, swollen, or you have a rash. You have nausea or are vomiting for more than 8 hours after the procedure. You have questions or concerns about your condition or care. Self-care:   Have someone stay with you for 24 hours. This person can drive you to errands and help you do things around the house. This person can also watch for problems. Rest and do quiet activities for 24 hours. Do not exercise, ride a bike, or play sports. Stand up slowly to prevent dizziness and falls. Take short walks around the house with another person. Slowly return to your usual activities the next day. Do not drive or use dangerous machines or tools for 24 hours. You may injure yourself or others. Examples include a lawnmower, saw, or drill. Do not return to work for 24 hours if you use dangerous machines or tools for work. Do not make important decisions for 24 hours. For example, do not sign important papers or invest money. Drink liquids as directed. Liquids help flush the sedation medicine out of your body. Ask how much liquid to drink each day and which liquids are best for you.       Eat small, frequent meals to prevent nausea and vomiting. Start with clear liquids such as juice or broth. If you do not vomit after clear liquids, you can eat your usual foods. Do not drink alcohol or take medicines that make you drowsy. This includes medicines that help you sleep and anxiety medicines. Ask your healthcare provider if it is safe for you to take pain medicine. Follow up with your healthcare provider as directed: Write down your questions so you remember to ask them during your visits.

## 2023-09-04 NOTE — CONSULTS
Consultation - Interventional Radiology  Jaime Valle 46 y.o. 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    Discharged home but last several days he has done very poorly with no appetite and weakness      Now in septic shock with hypotension    CT was obtained I personally reviewed the imaging    Prior 12 [place] drain at the edge of the collection  Pus in the drain    Also with large pleural effusions, ascites    We will sedate he now has NG tube with empty stomach    Plan for image guided drain exchange/upsize    Plan for thoracentesis    Risks benefits alternatives reviewed with the patient    Discussed with surgical and  critical care team    I discussed anticoagulation reversal given that he is on Eliquis but believe that this is not warranted for drain upsizing thoracentesis    mp2 asa3  informed consent obtained          Medical Problems     Problem List     Primary hypertension    Proteinuria    Chronic pain of both hips    Family history of prostate cancer in father    Necrotizing pancreatitis    Superior mesenteric artery thrombosis (HCC)    Pleural effusion, left    Hyponatremia    Abnormal urinalysis    Leukocytosis    JERZY (acute kidney injury) ([address])          Reason for Consult / Principal Problem:    HPI: Jaime Valle is a 46y.o. year old male who presents with septic shock and pancreatitis      Review of Systems  Positive for nausea anorexia      Past Medical History:  Past Medical History:   Diagnosis Date   • Pancreatitis        Past Surgical History:  Past Surgical History:   Procedure Laterality Date   • CYSTOSCOPY N/A 8/5/2023    Procedure: CYSTOSCOPY.   Saunders insertion;  Surgeon: Anish Lizama MD;  [facility];  Service: Urology   • FL RETROGRADE URETHROCYSTOGRAM  8/5/2023   • HERNIA REPAIR     • IR DRAINAGE TUBE PLACEMENT 8/5/2023   • JOINT REPLACEMENT     • TONSILLECTOMY  1976       Social History:  Social History     Substance and Sexual Activity   Alcohol Use Not Currently    Comment: quit 7/15/23 (previous 6 drinks per week)     Social History     Substance and Sexual Activity   Drug Use Never     Social History     Tobacco Use   Smoking Status Never   • Passive exposure: Past   Smokeless Tobacco Former   • Types: Chew   • Quit date: 7/15/2023   Tobacco Comments    Quit 7/15/23       Family History:  Family History   Problem Relation Age of Onset   • Cancer Mother    • Multiple myeloma Mother    • Pancreatic cancer Father    • Cancer Father        Allergies:  No Known Allergies    Medications:  (Not in a hospital admission)    Current Facility-Administered Medications   Medication Dose Route Frequency   • meropenem (MERREM) 1,000 mg in sodium chloride 0.9 % 100 mL IVPB  1,000 mg Intravenous Q12H   • vancomycin (VANCOCIN) 2,000 mg in sodium chloride 0.9 % 500 mL IVPB  2,000 mg Intravenous Once       Vitals:  BP 99/51 (BP Location: Right arm)   Pulse 102   Temp 98.2 °F (36.8 °C) (Oral)   Resp 17   Ht 5' 11" (1.803 m)   Wt 125 kg (275 lb 9.2 oz)   SpO2 98%   BMI 38.43 kg/m²   Body mass index is 38.43 kg/m².   Weight (last 2 days)     Date/Time Weight    09/04/23 1530 125 (275.58)          I/Os:    Intake/Output Summary (Last 24 hours) at 9/4/2023 1806  Last data filed at 9/4/2023 1724  Gross per 24 hour   Intake 2140 ml   Output 500 ml   Net 1640 ml       PHYSICAL EXAM    Awake alert obese anterior drain with pus in it    Moving all extremities quite anxious    Shirt soaked in gastric juice with NG tube in place    Wearing oxygen breathing heavily not tachycardic      Lab Results and Cultures:   CBC with diff:   Lab Results   Component Value Date    WBC 12.39 (H) 09/04/2023    HGB 7.9 (L) 09/04/2023    HCT 25.1 (L) 09/04/2023    MCV 85 09/04/2023     (H) 09/04/2023    RBC 2.97 (L) 09/04/2023    MCH 26.6 (L) 09/04/2023 MCHC 31.5 09/04/2023    RDW 16.6 (H) 09/04/2023    MPV 9.6 09/04/2023    NRBC 0 08/09/2023      BMP/CMP:  Lab Results   Component Value Date    K 4.9 09/04/2023    CL 92 (L) 09/04/2023    CO2 28 09/04/2023     (H) 09/04/2023    CREATININE 2.61 (H) 09/04/2023    CALCIUM 8.0 (L) 09/04/2023    AST 40 (H) 09/04/2023    ALT 25 09/04/2023    ALKPHOS 153 (H) 09/04/2023    EGFR 27 09/04/2023   ,     Coags:   Lab Results   Component Value Date    PTT 37 09/04/2023    INR 2.00 (H) 09/04/2023   ,   Results from last 7 days   Lab Units 09/04/23  1502   PTT seconds 37   INR  2.00*        Lipid Panel: No results found for: "CHOL"  Lab Results   Component Value Date    HDL 23 (L) 07/24/2023     Lab Results   Component Value Date    HDL 23 (L) 07/24/2023     Lab Results   Component Value Date    LDLCALC 38 07/24/2023     Lab Results   Component Value Date    TRIG 115 07/24/2023       HgbA1c:   Lab Results   Component Value Date    HGBA1C 7.0 (H) 08/07/2023    HGBA1C 6.3 (H) 07/27/2023    HGBA1C 6.3 (H) 07/24/2023       Blood Culture:   Lab Results   Component Value Date    BLOODCX No Growth After 5 Days. 08/04/2023    BLOODCX No Growth After 5 Days. 08/04/2023   ,   Urinalysis:   Lab Results   Component Value Date    COLORU Yellow 07/24/2023    CLARITYU Clear 07/24/2023    SPECGRAV 1.048 (H) 07/24/2023    PHUR 6.5 07/24/2023    LEUKOCYTESUR Negative 07/24/2023    NITRITE Negative 07/24/2023    GLUCOSEU 100 (1/10%) (A) 07/24/2023    KETONESU 10 (1+) (A) 07/24/2023    BILIRUBINUR Negative 07/24/2023    BLOODU Large (A) 07/24/2023   ,   Urine Culture:   Lab Results   Component Value Date    URINECX >100,000 cfu/ml Aerococcus urinae (A) 07/24/2023    URINECX >100,000 cfu/ml Streptococcus anginosus (A) 07/24/2023   ,   Wound Culure:  No results found for: "WOUNDCULT"    Imaging Studies: I have personally reviewed pertinent films in PACS    Counseling / Coordination of Care  Total time spent today  46 min.  Greater than 50% of total time was spent with the patient and / or family counseling and / or coordination of care. Thank you for allowing me to participate in the care of Estela Mcbride. Please don't hesitate to contact us with any questions.

## 2023-09-05 ENCOUNTER — APPOINTMENT (INPATIENT)
Dept: RADIOLOGY | Facility: HOSPITAL | Age: 53
DRG: 853 | End: 2023-09-05
Payer: COMMERCIAL

## 2023-09-05 ENCOUNTER — APPOINTMENT (INPATIENT)
Dept: NON INVASIVE DIAGNOSTICS | Facility: HOSPITAL | Age: 53
DRG: 853 | End: 2023-09-05
Payer: COMMERCIAL

## 2023-09-05 DIAGNOSIS — R60.9 EDEMA, UNSPECIFIED TYPE: ICD-10-CM

## 2023-09-05 LAB
ALBUMIN SERPL BCP-MCNC: 2.4 G/DL (ref 3.5–5)
ALP SERPL-CCNC: 45 U/L (ref 34–104)
ALT SERPL W P-5'-P-CCNC: 16 U/L (ref 7–52)
ANION GAP SERPL CALCULATED.3IONS-SCNC: 15 MMOL/L
AORTIC ROOT: 3.4 CM
APICAL FOUR CHAMBER EJECTION FRACTION: 68 %
ASCENDING AORTA: 3 CM
AST SERPL W P-5'-P-CCNC: 22 U/L (ref 13–39)
ATRIAL RATE: 102 BPM
BASE EX.OXY STD BLDV CALC-SCNC: 66.4 % (ref 60–80)
BASE EXCESS BLDA CALC-SCNC: -0.8 MMOL/L
BASE EXCESS BLDV CALC-SCNC: 1.1 MMOL/L
BILIRUB SERPL-MCNC: 0.88 MG/DL (ref 0.2–1)
BUN SERPL-MCNC: 91 MG/DL (ref 5–25)
CA-I BLD-SCNC: 0.99 MMOL/L (ref 1.12–1.32)
CALCIUM ALBUM COR SERPL-MCNC: 8.9 MG/DL (ref 8.3–10.1)
CALCIUM SERPL-MCNC: 7.6 MG/DL (ref 8.4–10.2)
CHLORIDE SERPL-SCNC: 96 MMOL/L (ref 96–108)
CO2 SERPL-SCNC: 24 MMOL/L (ref 21–32)
CREAT SERPL-MCNC: 2.15 MG/DL (ref 0.6–1.3)
E WAVE DECELERATION TIME: 142 MS
ERYTHROCYTE [DISTWIDTH] IN BLOOD BY AUTOMATED COUNT: 16.6 % (ref 11.6–15.1)
FRACTIONAL SHORTENING: 35 % (ref 28–44)
GFR SERPL CREATININE-BSD FRML MDRD: 34 ML/MIN/1.73SQ M
GLUCOSE SERPL-MCNC: 105 MG/DL (ref 65–140)
GLUCOSE SERPL-MCNC: 112 MG/DL (ref 65–140)
GLUCOSE SERPL-MCNC: 134 MG/DL (ref 65–140)
GLUCOSE SERPL-MCNC: 157 MG/DL (ref 65–140)
GLUCOSE SERPL-MCNC: 258 MG/DL (ref 65–140)
HCO3 BLDA-SCNC: 22.8 MMOL/L (ref 22–28)
HCO3 BLDV-SCNC: 25.6 MMOL/L (ref 24–30)
HCT VFR BLD AUTO: 22.7 % (ref 36.5–49.3)
HGB BLD-MCNC: 7.3 G/DL (ref 12–17)
INTERVENTRICULAR SEPTUM IN DIASTOLE (PARASTERNAL SHORT AXIS VIEW): 0.7 CM
INTERVENTRICULAR SEPTUM: 0.7 CM (ref 0.6–1.1)
KLEBSIELLA SP DNA BLD POS QL NAA+NON-PRB: DETECTED
LAAS-AP2: 18.1 CM2
LAAS-AP4: 18.7 CM2
LACTATE SERPL-SCNC: 1.3 MMOL/L (ref 0.5–2)
LEFT ATRIUM SIZE: 3.4 CM
LEFT ATRIUM VOLUME (MOD BIPLANE): 51 ML
LEFT INTERNAL DIMENSION IN SYSTOLE: 3.6 CM (ref 2.1–4)
LEFT VENTRICULAR INTERNAL DIMENSION IN DIASTOLE: 5.5 CM (ref 3.5–6)
LEFT VENTRICULAR POSTERIOR WALL IN END DIASTOLE: 1.1 CM
LEFT VENTRICULAR STROKE VOLUME: 96 ML
LVSV (TEICH): 96 ML
MAGNESIUM SERPL-MCNC: 2.3 MG/DL (ref 1.9–2.7)
MCH RBC QN AUTO: 26.8 PG (ref 26.8–34.3)
MCHC RBC AUTO-ENTMCNC: 32.2 G/DL (ref 31.4–37.4)
MCV RBC AUTO: 84 FL (ref 82–98)
MV E'TISSUE VEL-SEP: 15 CM/S
MV PEAK A VEL: 0.99 M/S
MV PEAK E VEL: 95 CM/S
MV STENOSIS PRESSURE HALF TIME: 41 MS
MV VALVE AREA P 1/2 METHOD: 5.37 CM2
NASAL CANNULA: 0
NASAL CANNULA: 2
NON VENT ROOM AIR: 94 %
NON VENT ROOM AIR: 95 %
O2 CT BLDA-SCNC: 10.2 ML/DL (ref 16–23)
O2 CT BLDV-SCNC: 7.3 ML/DL
OXYHGB MFR BLDA: 91.3 % (ref 94–97)
P AXIS: 0 DEGREES
PCO2 BLDA: 32.6 MM HG (ref 36–44)
PCO2 BLDV: 40.4 MM HG (ref 42–50)
PH BLDA: 7.46 [PH] (ref 7.35–7.45)
PH BLDV: 7.42 [PH] (ref 7.3–7.4)
PHOSPHATE SERPL-MCNC: 5.3 MG/DL (ref 2.7–4.5)
PLATELET # BLD AUTO: 482 THOUSANDS/UL (ref 149–390)
PMV BLD AUTO: 9.4 FL (ref 8.9–12.7)
PO2 BLDA: 65.2 MM HG (ref 75–129)
PO2 BLDV: 36.3 MM HG (ref 35–45)
POTASSIUM SERPL-SCNC: 4.4 MMOL/L (ref 3.5–5.3)
PR INTERVAL: 142 MS
PROT SERPL-MCNC: 5.5 G/DL (ref 6.4–8.4)
QRS AXIS: 99 DEGREES
QRSD INTERVAL: 83 MS
QT INTERVAL: 333 MS
QTC INTERVAL: 434 MS
RBC # BLD AUTO: 2.72 MILLION/UL (ref 3.88–5.62)
RIGHT ATRIUM AREA SYSTOLE A4C: 19.2 CM2
RIGHT VENTRICLE ID DIMENSION: 4.2 CM
SL CV LEFT ATRIUM LENGTH A2C: 5.1 CM
SL CV LV EF: 60
SL CV PED ECHO LEFT VENTRICLE DIASTOLIC VOLUME (MOD BIPLANE) 2D: 149 ML
SL CV PED ECHO LEFT VENTRICLE SYSTOLIC VOLUME (MOD BIPLANE) 2D: 53 ML
SODIUM SERPL-SCNC: 135 MMOL/L (ref 135–147)
SPECIMEN SOURCE: ABNORMAL
T WAVE AXIS: 21 DEGREES
TR MAX PG: 39 MMHG
TR PEAK VELOCITY: 3.1 M/S
TRICUSPID ANNULAR PLANE SYSTOLIC EXCURSION: 2.5 CM
TRICUSPID VALVE PEAK REGURGITATION VELOCITY: 3.11 M/S
VENTRICULAR RATE: 102 BPM
WBC # BLD AUTO: 13.83 THOUSAND/UL (ref 4.31–10.16)

## 2023-09-05 PROCEDURE — 84100 ASSAY OF PHOSPHORUS: CPT | Performed by: SURGERY

## 2023-09-05 PROCEDURE — 36556 INSERT NON-TUNNEL CV CATH: CPT | Performed by: SURGERY

## 2023-09-05 PROCEDURE — 93010 ELECTROCARDIOGRAM REPORT: CPT | Performed by: INTERNAL MEDICINE

## 2023-09-05 PROCEDURE — 83605 ASSAY OF LACTIC ACID: CPT | Performed by: SURGERY

## 2023-09-05 PROCEDURE — 99255 IP/OBS CONSLTJ NEW/EST HI 80: CPT | Performed by: INTERNAL MEDICINE

## 2023-09-05 PROCEDURE — 82948 REAGENT STRIP/BLOOD GLUCOSE: CPT

## 2023-09-05 PROCEDURE — 99221 1ST HOSP IP/OBS SF/LOW 40: CPT | Performed by: FAMILY MEDICINE

## 2023-09-05 PROCEDURE — 82805 BLOOD GASES W/O2 SATURATION: CPT | Performed by: STUDENT IN AN ORGANIZED HEALTH CARE EDUCATION/TRAINING PROGRAM

## 2023-09-05 PROCEDURE — 80053 COMPREHEN METABOLIC PANEL: CPT | Performed by: SURGERY

## 2023-09-05 PROCEDURE — 99291 CRITICAL CARE FIRST HOUR: CPT | Performed by: STUDENT IN AN ORGANIZED HEALTH CARE EDUCATION/TRAINING PROGRAM

## 2023-09-05 PROCEDURE — 36620 INSERTION CATHETER ARTERY: CPT | Performed by: SURGERY

## 2023-09-05 PROCEDURE — 85730 THROMBOPLASTIN TIME PARTIAL: CPT | Performed by: STUDENT IN AN ORGANIZED HEALTH CARE EDUCATION/TRAINING PROGRAM

## 2023-09-05 PROCEDURE — 71045 X-RAY EXAM CHEST 1 VIEW: CPT

## 2023-09-05 PROCEDURE — 74018 RADEX ABDOMEN 1 VIEW: CPT

## 2023-09-05 PROCEDURE — NC001 PR NO CHARGE: Performed by: SURGERY

## 2023-09-05 PROCEDURE — 93306 TTE W/DOPPLER COMPLETE: CPT

## 2023-09-05 PROCEDURE — 85027 COMPLETE CBC AUTOMATED: CPT | Performed by: SURGERY

## 2023-09-05 PROCEDURE — 82330 ASSAY OF CALCIUM: CPT | Performed by: SURGERY

## 2023-09-05 PROCEDURE — 83735 ASSAY OF MAGNESIUM: CPT | Performed by: SURGERY

## 2023-09-05 PROCEDURE — NC001 PR NO CHARGE: Performed by: PHYSICIAN ASSISTANT

## 2023-09-05 PROCEDURE — 93306 TTE W/DOPPLER COMPLETE: CPT | Performed by: INTERNAL MEDICINE

## 2023-09-05 PROCEDURE — 99223 1ST HOSP IP/OBS HIGH 75: CPT | Performed by: INTERNAL MEDICINE

## 2023-09-05 PROCEDURE — 03HY32Z INSERTION OF MONITORING DEVICE INTO UPPER ARTERY, PERCUTANEOUS APPROACH: ICD-10-PCS | Performed by: SURGERY

## 2023-09-05 PROCEDURE — 36620 INSERTION CATHETER ARTERY: CPT | Performed by: PHYSICIAN ASSISTANT

## 2023-09-05 PROCEDURE — 4A133J1 MONITORING OF ARTERIAL PULSE, PERIPHERAL, PERCUTANEOUS APPROACH: ICD-10-PCS | Performed by: SURGERY

## 2023-09-05 PROCEDURE — 99233 SBSQ HOSP IP/OBS HIGH 50: CPT | Performed by: STUDENT IN AN ORGANIZED HEALTH CARE EDUCATION/TRAINING PROGRAM

## 2023-09-05 PROCEDURE — 4A133B1 MONITORING OF ARTERIAL PRESSURE, PERIPHERAL, PERCUTANEOUS APPROACH: ICD-10-PCS | Performed by: SURGERY

## 2023-09-05 PROCEDURE — 02HV33Z INSERTION OF INFUSION DEVICE INTO SUPERIOR VENA CAVA, PERCUTANEOUS APPROACH: ICD-10-PCS | Performed by: SURGERY

## 2023-09-05 RX ORDER — FUROSEMIDE 20 MG/1
20 TABLET ORAL DAILY
Qty: 90 TABLET | Refills: 1 | OUTPATIENT
Start: 2023-09-05

## 2023-09-05 RX ORDER — METRONIDAZOLE 500 MG/100ML
500 INJECTION, SOLUTION INTRAVENOUS EVERY 8 HOURS
Status: DISPENSED | OUTPATIENT
Start: 2023-09-05 | End: 2023-09-17

## 2023-09-05 RX ORDER — ALBUMIN, HUMAN INJ 5% 5 %
25 SOLUTION INTRAVENOUS ONCE
Status: COMPLETED | OUTPATIENT
Start: 2023-09-05 | End: 2023-09-05

## 2023-09-05 RX ORDER — INSULIN LISPRO 100 [IU]/ML
2-12 INJECTION, SOLUTION INTRAVENOUS; SUBCUTANEOUS EVERY 6 HOURS SCHEDULED
Status: DISCONTINUED | OUTPATIENT
Start: 2023-09-05 | End: 2023-09-06

## 2023-09-05 RX ORDER — CALCIUM GLUCONATE 20 MG/ML
2 INJECTION, SOLUTION INTRAVENOUS ONCE
Status: COMPLETED | OUTPATIENT
Start: 2023-09-05 | End: 2023-09-05

## 2023-09-05 RX ORDER — HEPARIN SODIUM 10000 [USP'U]/100ML
3-30 INJECTION, SOLUTION INTRAVENOUS
Status: DISCONTINUED | OUTPATIENT
Start: 2023-09-05 | End: 2023-09-06

## 2023-09-05 RX ORDER — CHLORHEXIDINE GLUCONATE ORAL RINSE 1.2 MG/ML
15 SOLUTION DENTAL EVERY 12 HOURS SCHEDULED
Status: DISCONTINUED | OUTPATIENT
Start: 2023-09-05 | End: 2023-09-19 | Stop reason: HOSPADM

## 2023-09-05 RX ORDER — SODIUM CHLORIDE, SODIUM GLUCONATE, SODIUM ACETATE, POTASSIUM CHLORIDE, MAGNESIUM CHLORIDE, SODIUM PHOSPHATE, DIBASIC, AND POTASSIUM PHOSPHATE .53; .5; .37; .037; .03; .012; .00082 G/100ML; G/100ML; G/100ML; G/100ML; G/100ML; G/100ML; G/100ML
75 INJECTION, SOLUTION INTRAVENOUS CONTINUOUS
Status: DISCONTINUED | OUTPATIENT
Start: 2023-09-05 | End: 2023-09-07

## 2023-09-05 RX ORDER — HEPARIN SODIUM 10000 [USP'U]/100ML
3-20 INJECTION, SOLUTION INTRAVENOUS
Status: DISCONTINUED | OUTPATIENT
Start: 2023-09-05 | End: 2023-09-05

## 2023-09-05 RX ORDER — HEPARIN SODIUM 5000 [USP'U]/ML
5000 INJECTION, SOLUTION INTRAVENOUS; SUBCUTANEOUS EVERY 8 HOURS SCHEDULED
Status: DISCONTINUED | OUTPATIENT
Start: 2023-09-05 | End: 2023-09-05 | Stop reason: ALTCHOICE

## 2023-09-05 RX ORDER — PEN NEEDLE, DIABETIC 32GX 5/32"
NEEDLE, DISPOSABLE MISCELLANEOUS
Qty: 100 EACH | Refills: 0 | OUTPATIENT
Start: 2023-09-05

## 2023-09-05 RX ADMIN — SODIUM CHLORIDE, SODIUM GLUCONATE, SODIUM ACETATE, POTASSIUM CHLORIDE, MAGNESIUM CHLORIDE, SODIUM PHOSPHATE, DIBASIC, AND POTASSIUM PHOSPHATE 75 ML/HR: .53; .5; .37; .037; .03; .012; .00082 INJECTION, SOLUTION INTRAVENOUS at 20:40

## 2023-09-05 RX ADMIN — CEFEPIME 1000 MG: 1 INJECTION, POWDER, FOR SOLUTION INTRAMUSCULAR; INTRAVENOUS at 13:28

## 2023-09-05 RX ADMIN — VASOPRESSIN 0.04 UNITS/MIN: 20 INJECTION INTRAVENOUS at 02:28

## 2023-09-05 RX ADMIN — MEROPENEM 1000 MG: 1 INJECTION, POWDER, FOR SOLUTION INTRAVENOUS at 06:17

## 2023-09-05 RX ADMIN — VASOPRESSIN 0.04 UNITS/MIN: 20 INJECTION INTRAVENOUS at 11:00

## 2023-09-05 RX ADMIN — NOREPINEPHRINE BITARTRATE 8 MCG/MIN: 1 SOLUTION INTRAVENOUS at 06:23

## 2023-09-05 RX ADMIN — PERFLUTREN 0.6 ML/MIN: 6.52 INJECTION, SUSPENSION INTRAVENOUS at 11:40

## 2023-09-05 RX ADMIN — INSULIN LISPRO 4 UNITS: 100 INJECTION, SOLUTION INTRAVENOUS; SUBCUTANEOUS at 23:59

## 2023-09-05 RX ADMIN — HEPARIN SODIUM 5000 UNITS: 5000 INJECTION INTRAVENOUS; SUBCUTANEOUS at 05:25

## 2023-09-05 RX ADMIN — CHLORHEXIDINE GLUCONATE 15 ML: 1.2 SOLUTION ORAL at 20:10

## 2023-09-05 RX ADMIN — CALCIUM GLUCONATE 2 G: 20 INJECTION, SOLUTION INTRAVENOUS at 05:14

## 2023-09-05 RX ADMIN — METRONIDAZOLE 500 MG: 500 INJECTION, SOLUTION INTRAVENOUS at 20:10

## 2023-09-05 RX ADMIN — INSULIN LISPRO 2 UNITS: 100 INJECTION, SOLUTION INTRAVENOUS; SUBCUTANEOUS at 12:49

## 2023-09-05 RX ADMIN — NOREPINEPHRINE BITARTRATE 5 MCG/MIN: 1 SOLUTION INTRAVENOUS at 00:50

## 2023-09-05 RX ADMIN — CHLORHEXIDINE GLUCONATE 15 ML: 1.2 SOLUTION ORAL at 08:31

## 2023-09-05 RX ADMIN — METRONIDAZOLE 500 MG: 500 INJECTION, SOLUTION INTRAVENOUS at 12:48

## 2023-09-05 RX ADMIN — NOREPINEPHRINE BITARTRATE 6 MCG/MIN: 1 SOLUTION INTRAVENOUS at 19:17

## 2023-09-05 RX ADMIN — CHLORHEXIDINE GLUCONATE 15 ML: 1.2 SOLUTION ORAL at 02:27

## 2023-09-05 RX ADMIN — HEPARIN SODIUM 18 UNITS/KG/HR: 10000 INJECTION, SOLUTION INTRAVENOUS at 23:18

## 2023-09-05 RX ADMIN — ALBUMIN (HUMAN) 25 G: 12.5 INJECTION, SOLUTION INTRAVENOUS at 02:28

## 2023-09-05 RX ADMIN — SODIUM CHLORIDE, SODIUM GLUCONATE, SODIUM ACETATE, POTASSIUM CHLORIDE, MAGNESIUM CHLORIDE, SODIUM PHOSPHATE, DIBASIC, AND POTASSIUM PHOSPHATE 75 ML/HR: .53; .5; .37; .037; .03; .012; .00082 INJECTION, SOLUTION INTRAVENOUS at 18:43

## 2023-09-05 NOTE — CONSULTS
Consultation - Infectious Disease   Angie Luna 46 y.o. male MRN: [id number]  Unit/Bed#: [facility] 07 Encounter: [id number]      IMPRESSION & RECOMMENDATIONS:   Impression/Recommendations:  Septic shock, POA. Fever, WBC, refractory hypotension. Due to bacteremia, pancreatic abscess as below. No other appreciable source. Improved since admission but remains critically ill. Rec:  · Continue antibiotics as below  · Follow temperatures closely  · Recheck CBC in AM  · Supportive care as per the primary service    Polymicrobial bacteremia. GNR, GPC chains. Likely due to pancreatic abscess as below. No other appreciable source. Rec:  · Narrow antibiotics to cefepime/Flagyl  · Follow up formal ID/susceptibility and tailor antibiotics as indicated  · Check repeat blood cultures in AM  · Follow up TTE    Pancreatic abscess. Due to superinfection of necrosis as below. Rec:  · Continue cefepime/Flagyl  · Follow up fluid cultures and tailor antibiotics as indicated  · Follow drain output closely    Necrotizing pancreatitis. Presumed alcoholic. Current CT shows replacing most of pancreatic parenchyma, extending to pararenal spaces, causing left colonic obstruction, gastric outlet obstruction. Rec:  · Close Surgery follow-up ongoing  · GI consult pending    JERZY. Likely multifactorial.  Rec:  · Follow creatinine closely and dose-adjust antibiotics as indicated  · Recheck BMP in AM    Large bowel obstruction. Due to compression from pancreatitis as above. Gastric outlet obstruction. Due to compression from pancreatitis as above. Bilateral pleural effusions. Likely reactive to pancreatitis as above. Status post left thoracentesis yielding 1200 cc clear yellow fluid. Fluid cultures no growth    Ascites. Likely reactive to pancreatitis as above. Rec:  · Await paracentesis per Surgery    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       Antibiotics:  Meropenem #1  Antibiotics #2    Thank you for this consultation. We will follow along with you. HISTORY OF PRESENT ILLNESS:  Reason for Consult: Bacteremia    HPI: Quyen Harris is a 46 y.o. male previously healthy male initially presenting late July with acute necrotizing pancreatitis, presumed alcoholic. Readmitted 8/4-8/11 with increased pain. Found to have worsening necrosis compressing the stomach, also nonocclusive SMV thrombus. Underwent IR drain 8/5 yielding dark bloody fluid. Cultures negative. Started on anticoagulation. Also evaluated by Urology after inability for nursing to place Saunders. Found to have urethral stricture related to prior hypospadias repair and underwent cystoscopy, urethral dilation, Saunders placement. Returned to the ED yesterday with leaking around his drain. Upon presentation found to be afebrile but had tachycardia and hypotension. Labs revealed leukocytosis and lactic acidosis. A CT showed significant worsening of necrotizing pancreatitis with large collection replacing pancreatic parenchyma and extending into the pararenal/paracolic spaces causing gastric outlet and colonic obstruction. Also showed moderate pleural effusions and ascites. An NG tube was placed. He underwent left thoracentesis yielding clear yellow fluid. Underwent catheter upsizing which yield infected hemorrhagic material with particulate matter. Admitted to the ICU and started on antibiotics, pressors. Blood cultures now growing gram-negative rods, gram-positive cocci in chains. We are asked to comment on further evaluation and management. In performing this consult, I have reviewed prior admission and outpatient visit records in detail. REVIEW OF SYSTEMS:  Denies abdominal pain. Endorses abdominal bloating. A complete system-based review of systems is otherwise negative.     PAST MEDICAL HISTORY:  Past Medical History:   Diagnosis Date   • Pancreatitis      Past Surgical History:   Procedure Laterality Date   • CYSTOSCOPY N/A 2023    Procedure: CYSTOSCOPY. Saunders insertion;  Surgeon: Isha Balderas MD;  Location[facility];  Service: Urology   • FL RETROGRADE URETHROCYSTOGRAM  2023   • HERNIA REPAIR     • IR DRAINAGE TUBE CHECK/CHANGE/REPOSITION/REINSERTION/UPSIZE  2023   • IR DRAINAGE TUBE PLACEMENT  2023   • IR THORACENTESIS  2023   • JOINT REPLACEMENT     • TONSILLECTOMY  1976       FAMILY HISTORY:  Non-contributory    SOCIAL HISTORY:  Social History     Substance and Sexual Activity   Alcohol Use Not Currently    Comment: quit 7/15/23 (previous 6 drinks per week)     Social History     Substance and Sexual Activity   Drug Use Never     Social History     Tobacco Use   Smoking Status Never   • Passive exposure: Past   Smokeless Tobacco Former   • Types: Chew   • Quit date: 7/15/2023   Tobacco Comments    Quit 7/15/23       ALLERGIES:  No Known Allergies    MEDICATIONS:  All current active medications have been reviewed. PHYSICAL EXAM:  Vitals:  Temp:  [98.2 °F (36.8 °C)-100.9 °F (38.3 °C)] 98.6 °F (37 °C)  HR:  [] 98  Resp:  [16-33] 26  BP: ()/(40-81) 97/42  SpO2:  [91 %-100 %] 98 %  Temp (24hrs), Av.2 °F (37.3 °C), Min:98.2 °F (36.8 °C), Max:100.9 °F (38.3 °C)  Current: Temperature: 98.6 °F (37 °C)     Physical Exam:  General:  Well-nourished, well-developed, in no acute distress  Eyes:  Conjunctive clear with no hemorrhages or effusions  Oropharynx:  No ulcers, no lesions  Neck:  Supple, no lymphadenopathy  Lungs:  Normal respiratory excursion, no accessory muscle use  Cardiac:  Tachycardia with a regular rhythm, extremities well perfused  Abdomen:  Soft, distended with ascites, drain with gray-red cloudy fluid  Extremities:  No peripheral cyanosis, clubbing, or edema  Skin:  No rashes, no ulcers  Neurological:  Moves all four extremities spontaneously, sensation grossly intact    LABS, IMAGING, & OTHER STUDIES:  Lab Results:  I have personally reviewed pertinent labs.   Results from last 7 days   Lab Units 09/05/23 0229 09/04/23 2021 09/04/23  1444   POTASSIUM mmol/L 4.4 4.4 4.9   CHLORIDE mmol/L 96 95* 92*   CO2 mmol/L 24 28 28   BUN mg/dL 91* 97* 103*   CREATININE mg/dL 2.15* 2.09* 2.61*   EGFR ml/min/1.73sq m 34 35 27   CALCIUM mg/dL 7.6* 7.1* 8.0*   AST U/L 22 26 40*   ALT U/L 16 18 25   ALK PHOS U/L 45 52 153*     Results from last 7 days   Lab Units 09/05/23 0229 09/04/23 2021 09/04/23  1444   WBC Thousand/uL 13.83* 8.64 12.39*   HEMOGLOBIN g/dL 7.3* 7.3* 7.9*   PLATELETS Thousands/uL 482* 428* 493*     Results from last 7 days   Lab Units 09/04/23 1928 09/04/23  1502   BLOOD CULTURE   --  Received in Microbiology Lab. Culture in Progress. GRAM STAIN RESULT  1+ Polys*  3+ Gram positive cocci in pairs*  3+ Gram variable rods*  No Polys or Bacteria seen Gram negative rods*  Gram positive cocci in chains*       Imaging Studies:   I have personally reviewed pertinent imaging study reports and images in PACS. CT A/P reviewed personally necrosis replacing most of pancreatic parenchyma, extending to pararenal spaces, causing left colonic obstruction, gastric outlet obstruction    EKG, Pathology, and Other Studies:   I have personally reviewed pertinent reports.

## 2023-09-05 NOTE — PROGRESS NOTES
[address] [place] [address]  Progress Note: Critical Care  Name: Matt Moss 46 y.o. male I MRN: [id number]  Unit/Bed#: MICU 07 I Date of Admission: 9/4/2023   Date of Service: 9/5/2023 I Hospital Day: 1    Assessment/Plan   Neuro:   · Diagnosis: Pain  · Plan:   · Dilaudid 0.2/0.5mg q3 for mod/severe pain  · Delirium precautions, maintain sleep-wake cycle, CAM-ICU      CV:   · Diagnosis: Septic shock  · Plan:   · Maintain MAP>65  · Wean pressors as able, continue to monitor and trend endpoints  · Continue fluid resusication  · See ID    Pulm:  · Diagnosis: Acute hypoxic respiratory insufficiency likely 2/2 sepsis and reactive b/l pleural effusions  · Plan:   · Maintain spO2>92%  · Airway clearance protocol  · IS and aggressive pulmonary toilet  · Diagnosis: B/l pleural effusions likely reactive 2/2 pancreatitis  · Plan:  · S/p L IR thoracentesis of 1.2L  · Monitor CXR      GI:   · Diagnosis: Infected necrotizing pancreatitis  · 9/5 CTAP-Significant worsening of necrotizing pancreatitis w/ large acute collection extening to pararenal spaces containing large amount of gas concerning for infection  · Plan:   · NPO/NGT  · Monitor abd exam  · General surgery c/s  · Abx as described in ID  · Now s/p IR upsize to 16F.   · Monitor drain output and character  · F/u collections  · Diagnosis:Large bowel obstruction  · 9/5 CTAP-Inflammatory mass along the left lateral pararenal gutter w/ involvement of proximal to mid descending colon  · Plan  · GI c/s  · Continue NGT  · Monitor abd exam      :   · Diagnosis: JERZY  · Plan:   · Saunders placement  · Strict I/Os  · Monitor BUN/Cr      F/E/N:   · [email or web address]  · E: replace prn K>4, Mg>2, P>3  · N: NPO      Heme/Onc:   · Diagnosis: non-occlusive SMV thrombus  · Plan:   · On eliquis (last dose 9/4) not reversed  · Consider hep gtt in AM  · Diagnosis: Anemia  · Plan  · Monitor hgb  · Monitor s/s of bleeding  · Transfuse for hgb <7  · Diagnosis: Thrombocytosis   · Plan  · Likely reactive 2/2 pancreatitis  · Monitor  · Diagnosis: DVT ppx  · Plan  · SQH, SCDs      Endo:   · Diagnosis: Hyperglycemia  · Plan:   · SSI  · BS goal 140-180      ID:   · Diagnosis: Infected necrotizing pancreatitis  · Plan:   · Continue meropenum   · F/u IR and blood cultures  · Consider ID c/s      MSK/Skin:   · Pressure point offloading, PT/OT, turns/repositioning      Disposition: Critical care    ICU Core Measures         Antibiotic Review: Awaiting culture results. and Continue broad spectrum secondary to severity of illness. Review of Invasive Devices: Saunders Plan: Continue for accurate I/O monitoring for 48 hours  Central access plan: Medications requiring central line Hemodynamic monitoring  [place] Plan: Keep arterial line for hemodynamic monitoring, frequent ABGs and frequent labs    Prophylaxis:  VTE VTE covered by:  heparin (porcine), Subcutaneous       Stress Ulcer  not ordered          Subjective   HPI/24hr events:   52M w/ hx of necrotizing pancreatitis and non-occlusive SMV thrombus. Patient has been admitted multiple times dating back to 7/24/2023. Originally patient presented today 2/2 concerns for leakage around his previous IR drain site. He reports that he has had fatigue, abdominal pain, and bloating since yesterday morning. He states he has not had any appetite for over a week. He reports he is really not able to keep anything down. Endorses flatus and bowel movements daily. Currently on eliquis for his smv thrombus, last dose was this AM.    Review of Systems   Constitutional: Positive for chills, fatigue and fever. HENT: Negative. Eyes: Negative. Respiratory: Positive for shortness of breath. Cardiovascular: Negative for chest pain. Gastrointestinal: Positive for abdominal distention, abdominal pain and diarrhea. Negative for nausea and vomiting. Endocrine: Negative.     Genitourinary: Positive for decreased urine volume and difficulty urinating. Musculoskeletal: Negative. Skin: Positive for wound. Allergic/Immunologic: Negative. Neurological: Positive for weakness. Hematological: Negative. Psychiatric/Behavioral: Negative. Objective                            Vitals I/O      Most Recent Min/Max in 24hrs   Temp 100 °F (37.8 °C) Temp  Min: 98.2 °F (36.8 °C)  Max: 100.9 °F (38.3 °C)   Pulse (!) 112 Pulse  Min: 98  Max: 130   Resp (!) 29 Resp  Min: 16  Max: 33   BP (!) 97/42 BP  Min: 77/45  Max: 114/81   O2 Sat 98 % SpO2  Min: 91 %  Max: 99 %      Intake/Output Summary (Last 24 hours) at 9/5/2023 0202  Last data filed at 9/5/2023 0200  Gross per 24 hour   Intake 2996.01 ml   Output 2870 ml   Net 126.01 ml         Diet NPO     Invasive Monitoring Physical exam   Arterial Line  [place] BP 94/42  Arterial Line BP  Min: 94/42  Max: 94/42   MAP (!) 56 mmHg  Arterial Line MAP (mmHg)  Min: 56 mmHg  Max: 56 mmHg    Physical Exam  Eyes:      General: No scleral icterus. Extraocular Movements: Extraocular movements intact. Conjunctiva/sclera: Conjunctivae normal.   Skin:     General: Skin is warm. HENT:      Head: Normocephalic and atraumatic. Mouth/Throat:      Mouth: Mucous membranes are dry. Cardiovascular:      Rate and Rhythm: Regular rhythm. Tachycardia present. Abdominal:      General: There is distension. Palpations: Abdomen is soft. Tenderness: There is abdominal tenderness (epigastric). Comments: IR drain in place with dark brown output   Constitutional:       General: He is in acute distress. Appearance: He is ill-appearing. Pulmonary:      Effort: Tachypnea present. No respiratory distress. Neurological:      General: No focal deficit present. Mental Status: He is alert. He is disoriented to situation. Comments: Confused. GCS 14   Genitourinary/Anorectal:  FoleyVitals and nursing note reviewed.           Diagnostic Studies      EKG:   Imaging:  I have personally reviewed pertinent films in PACS     Medications:  Scheduled PRN   Albumin 5%, 25 g, Once  chlorhexidine, 15 mL, Q12H ALEX  heparin (porcine), 5,000 Units, Q8H [address]  insulin lispro, 2-12 Units, Q6H ALEX  meropenem, 1,000 mg, Q12H  norepinephrine, ,       acetaminophen, 650 mg, Q6H PRN  HYDROmorphone, 0.5 mg, Q3H PRN  HYDROmorphone, 0.5 mg, Q4H PRN  HYDROmorphone, 0.2 mg, Q3H PRN  norepinephrine, ,        Continuous    multi-electrolyte, 150 mL/hr, Last Rate: 150 mL/hr (09/04/23 2135)  norepinephrine, 1-30 mcg/min, Last Rate: 8 mcg/min (09/05/23 0058)  vasopressin, 0.04 Units/min         Labs:    CBC    Recent Labs     09/04/23  1444 09/04/23 2021   WBC 12.39* 8.64   HGB 7.9* 7.3*   HCT 25.1* 23.2*   * 428*   BANDSPCT 14*  --      BMP    Recent Labs     09/04/23  1444 09/04/23 2021   SODIUM 132* 132*   K 4.9 4.4   CL 92* 95*   CO2 28 28   AGAP 12 9   * 97*   CREATININE 2.61* 2.09*   CALCIUM 8.0* 7.1*       Coags    Recent Labs     09/04/23  1502   INR 2.00*   PTT 37        Additional Electrolytes  Recent Labs     09/04/23 2021   MG 2.3   PHOS 5.2*   CAIONIZED 0.96*          Blood Gas    Recent Labs     09/05/23  0102   PHART 7.462*   ZEQ9ZTY 32.6*   PO2ART 65.2*   WSS8ZKI 22.8   BEART -0.8   SOURCE Line, Arterial     Recent Labs     09/05/23  0011 09/05/23  0102   PHVEN 7.420*  --    DNU1GYI 40.4*  --    PO2VEN 36.3  --    LEV8QZD 25.6  --    BEVEN 1.1  --    SOURCE  --  Line, Arterial    LFTs  Recent Labs     09/04/23  1444 09/04/23 2021   ALT 25 18   AST 40* 26   ALKPHOS 153* 52   ALB 2.5* 2.2*   TBILI 0.73 0.61       Infectious  Recent Labs     09/04/23  1502   PROCALCITONI 2.58*     Glucose  Recent Labs     09/04/23  1444 09/04/23  2021   GLUC 114 100               Critical Care Time Delivered: Upon my evaluation, this patient had a high probability of imminent or life-threatening deterioration due to septic shock, infected necrotizing pancreatitis, which required my direct attention, intervention, and personal management. I have personally provided 90 minutes of critical care time, exclusive of procedures, teaching, family meetings, and any prior time recorded by providers other than myself.      Dung Keepers, DO

## 2023-09-05 NOTE — DISCHARGE INSTR - OTHER ORDERS
Skin care plans:  1-Right buttock- Cleanse sacro-buttocks with soap and water, pat dry. Apply triad paste over open wound bed and cover with Allevyn foam dressing. Change every other day and as needed. 2-Cleanse abdominal pannus with NSS, pat dry, and apply maxorb rope to the wound beds. Change daily and PRN for soilage/dislodgement.

## 2023-09-05 NOTE — H&P (VIEW-ONLY)
Consultation - [facility]. 46 y.o. male MRN: [id number]  [facility]: [id number]    Assessment:  Pressure injury of the sacral region, unstageable   Open wound of the abdominal area   Intertrigo   Ambulatory dysfunction   Obesity       Plan:  • MASD/IAD to the abdominal area - areas of full thickness skin loss. o Will recommend maxorb rope daily and PRN   o Reviewed report of chest abdomen and pelvis from 9/4/23, no abscess   • B/l sacro-buttocks with unstageable pressure injury- POA. Likely mixed etiology of pressure and moisture due to fecal incontinence.  o Will recommend TRIAD paste TID and PRN   o Patient is on low air loss critical care mattress   • No s/s of infection present  •  A1C results reviewed with the patient today. • Will recommend preventative nursing skin care measures, including foam dressings to the b/l heels. • Pressure relief- offloading of pressure with turning/repositioning as patient medically tolerates, heel elevation, foam wedges for offloading/repositioning, and waffle cushion to chair. • Nutrition consult placed  • Patient verbalized understanding of plan of care. • [place] text wound care team with questions or concerns. • Routine wound care follow-up while admitted. AVS updated. • Follow-up with the [facility] as an outpatient, ambulatory referral placed. History of Present Illness:  Patient is a 46year old male who is admitted to the hospital with septic shock and necrotizing pancreatitis. History of - alcohol use, anemia, hyperglycemia. Wound care consulted for sacral and abdominal wounds. Patient seen in MICU, critically ill, alert and oriented x 4 cooperative and agreeable for the assessment. Patient is on 2 vasopressors, has NGT in place and is currently NPO. Patient confirms POA status of the wounds to the sacrum and abdomen, stating, "I have had those wounds", but is unsure of when they began.  Patient seen with the primary RN. Wounds open to air at time of the assessment. Denies increased pain related to the wounds. Subjective:    Review of Systems   Constitutional: Positive for appetite change, fatigue and fever. Negative for chills. HENT: Negative for congestion and sneezing. Respiratory: Negative for cough. Musculoskeletal: Positive for gait problem. Skin: Positive for wound. Psychiatric/Behavioral: Negative for agitation. Historical Information   Past Medical History:   Diagnosis Date   • Pancreatitis      Past Surgical History:   Procedure Laterality Date   • CYSTOSCOPY N/A 8/5/2023    Procedure: CYSTOSCOPY.   Saunders insertion;  Surgeon: Yari Pardo MD;  Location: [facility];  Service: Urology   • FL RETROGRADE URETHROCYSTOGRAM  8/5/2023   • HERNIA REPAIR     • IR DRAINAGE TUBE CHECK/CHANGE/REPOSITION/REINSERTION/UPSIZE  9/4/2023   • IR DRAINAGE TUBE PLACEMENT  8/5/2023   • IR THORACENTESIS  9/4/2023   • JOINT REPLACEMENT     • TONSILLECTOMY  1976     Social History   Social History     Substance and Sexual Activity   Alcohol Use Not Currently    Comment: quit 7/15/23 (previous 6 drinks per week)     Social History     Substance and Sexual Activity   Drug Use Never     E-Cigarette/Vaping   • E-Cigarette Use Never User      E-Cigarette/Vaping Substances   • Nicotine No    • THC No    • CBD No    • Flavoring No    • Other No    • Unknown No      Social History     Tobacco Use   Smoking Status Never   • Passive exposure: Past   Smokeless Tobacco Former   • Types: Chew   • Quit date: 7/15/2023   Tobacco Comments    Quit 7/15/23     Family History:   Family History   Problem Relation Age of Onset   • Cancer Mother    • Multiple myeloma Mother    • Pancreatic cancer Father    • Cancer Father        Meds/Allergies   current meds:   Current Facility-Administered Medications   Medication Dose Route Frequency   • acetaminophen (TYLENOL) rectal suppository 650 mg  650 mg Rectal Q6H PRN   • cefepime (MAXIPIME) 1,000 mg in dextrose 5 % 50 mL IVPB  1,000 mg Intravenous Q12H   • chlorhexidine (PERIDEX) 0.12 % oral rinse 15 mL  15 mL Mouth/Throat Q12H [address]   • HYDROmorphone (DILAUDID) injection 0.5 mg  0.5 mg Intravenous Q3H PRN   • HYDROmorphone (DILAUDID) injection 0.5 mg  0.5 mg Intravenous Q4H PRN   • HYDROmorphone HCl (DILAUDID) injection 0.2 mg  0.2 mg Intravenous Q3H PRN   • insulin lispro (HumaLOG) 100 units/mL subcutaneous injection 2-12 Units  2-12 Units Subcutaneous Q6H [address]   • metroNIDAZOLE (FLAGYL) IVPB (premix) 500 mg 100 mL  500 mg Intravenous Q8H   • multi-electrolyte (PLASMALYTE-A/ISOLYTE-S PH 7.4) IV solution  75 mL/hr Intravenous Continuous   • norepinephrine (LEVOPHED) 4 mg (STANDARD CONCENTRATION) IV in sodium chloride 0.9% 250 mL  1-30 mcg/min Intravenous Titrated   • vasopressin (PITRESSIN) 20 Units in sodium chloride 0.9 % 100 mL infusion  0.04 Units/min Intravenous Continuous     No Known Allergies    Objective   Vitals: Blood pressure (!) 97/42, pulse 102, temperature 98.5 °F (36.9 °C), resp. rate (!) 25, height 5' 11" (1.803 m), weight 122 kg (268 lb), SpO2 94 %. Wounds:     Wound 09/04/23 Pressure Injury Buttocks (Active)   Wound Image   09/05/23 1329   Wound Length (cm) 6 cm 09/05/23 1329   Wound Width (cm) 4 cm 09/05/23 1329   Wound Depth (cm) 0.2 cm 09/05/23 1329   Wound 09/05/23 MASD Abdomen Anterior; Left (Active)   Wound Image   09/05/23 1328   Wound Length (cm) 1 cm 09/05/23 1328   Wound Width (cm) 20 cm 09/05/23 1328   Wound Depth (cm) 0.2 cm 09/05/23 1328         Physical Exam  Constitutional:       General: He is awake. He is not in acute distress. Appearance: He is obese. He is ill-appearing. He is not diaphoretic. HENT:      Head: Normocephalic and atraumatic.       Right Ear: External ear normal.      Left Ear: External ear normal.      Nose:      Comments: NG tube in place  Eyes:      Conjunctiva/sclera: Conjunctivae normal.   Pulmonary:      Effort: Pulmonary effort is normal. No respiratory distress. Genitourinary:     Comments: Saunders catheter present. Incontinent of bowel. Musculoskeletal:      Comments: Dependent for care. Max assist of two with turning for the assessment. Foam wedges are in use for repositioning. Skin:     General: Skin is warm and dry. Findings: Wound present. No erythema or rash. Comments: 1. Abdominal pannus with scattered irregular shaped full thickness wounds noted along the base of the skin fold- all measured together. Wound beds with mixed pink/yellow tissue, producing small amount of serous sanguinous drainage. Edges fragile and attached, no maceration. Radha-wounds are dry and intact with hyperpigmented skin. Wounds/radha-wounds are non-tender. 2. B/l sacro-buttock - unstageable pressure injury with mixed etiology of MASD. All aspects measured together. Presents as well defined shaped full and partial thickness skin loss with approx: 80% moist yellow slough and 20% moist pink/red tissue, producing small amount of serous sanguineous drainage. Edges fragile and attached without maceration. Periwound's are dry and intact with blanchable pink-colored skin. Wounds tender with cleansing. 3. B/l heels are dry and intact without redness or wounds. No induration, fluctuance, odor, warmth/temperature differences, redness, or purulence noted to the above mentioned wounds and skin areas assessed. New dressings applied as noted above. Patient tolerated assessment well- denies pain and no s/s of non-verbal pain or discomfort observed during the encounter. Neurological:      Mental Status: He is alert and oriented to person, place, and time. Gait: Gait abnormal.   Psychiatric:         Behavior: Behavior is cooperative. Lab, Imaging and other studies: I have personally reviewed pertinent reports.       Code Status: Level 1 - Full Code      Counseling / Coordination of Care  Total time spent today:    Total time (face-to-face and non-face-to-face) spent on today's visit was 32 minutes. This includes preparation for the visits (H&P on 9/4/23, and critical care note on 9/5/23) performance of a medically appropriate history and examination, and orders for medications/treatments or testing. Discussed assessment findings, and plan of care/recommendations with patients RN. GHASSAN Medina, ABRAMP-C, DEIDRA      Portions of the record may have been created with voice recognition software. Occasional wrong word or "sound a like" substitutions may have occurred due to the inherent limitations of voice recognition software.   Read the chart carefully and recognize, using context, where substitutions have occurred

## 2023-09-05 NOTE — UTILIZATION REVIEW
Initial Clinical Review    Admission: Date/Time/Statement:   Admission Orders (From admission, onward)     Ordered        09/04/23 1624  INPATIENT ADMISSION  Once                      Orders Placed This Encounter   Procedures   • INPATIENT ADMISSION     Standing Status:   Standing     Number of Occurrences:   1     Order Specific Question:   Level of Care     Answer:   Critical Care [15]     Order Specific Question:   Bed request comments     Answer:   Surgical critical care service / Red surgery. Not a trauma patient. Order Specific Question:   Estimated length of stay     Answer:   More than 2 Midnights     Order Specific Question:   Certification     Answer:   I certify that inpatient services are medically necessary for this patient for a duration of greater than two midnights. See H&P and MD Progress Notes for additional information about the patient's course of treatment. ED Arrival Information     Expected   -    Arrival   9/4/2023 13:47    Acuity   Emergent            Means of arrival   Wheelchair    Escorted by   [address] [place] [address]/ICU    Admission type   Emergency            Arrival complaint   abdominal pain            Chief Complaint   Patient presents with   • Wound Drain Evaluation     Pt reports he has a LUQ "pancreatic drain" that is draining around the site       Initial Presentation: 46 y.o. male recently admitted on 08/05-08/11 for necrotizing pancreatitis and non-occlusive SMV thrombus presented to the ED from home w/ symptoms of increasing fatigue, abdominal distension, pain, redness, and clear drainage around the DONNY drain that started yesterday am. Reports draining and redness around his 12 fr DONNY drain that was placed in the LUQ. Also reports bed sore in his R upper buttocks that cause discomfort. In the ED, tachycardic w/ HI at 114, hypotensive- BP 87/51, WBC 12.39, LA 2.4.    On exam, ill appearing, tachycardia, SOB when lying flat, on 2.5 L NC, abdominal distension and tenderness, DONNY drain site draining serous fluid. Redness located around DONNY tube. Given 3L IVF bolus, IV Vanco, IV Phytonadione. Admitted as Inpatient for septic shock, acute hypoxic resp insufficiency. Plan: IR consulted, plan for thoracentesis and DONNY drain upsize. Start Meropenem. IVF. Pain control. Daily labs to monitor infection, replete electrolytes prn. DVT ppx. Incentive spirometry. 09/04 IR Consult: septic shock with hypotension. CT reviewed: Prior 15 Georgian drain at the edge of the collection, Pus in the drain, large pleural effusions, ascites. Currently has NGT w/ empty stomach. Plan for image guided drain exchange/upsize,thoracentesis     Brief IR OP Note:  Date: 9/4/2023   Procedure: IR DRAIN UPSIZE, IR THORACENTESIS  Anesthesia: conscious sedation  Findings:   Left thoracentesis 1.2 L     Pancreatic collection drain upsized to 16 Georgian reposition slightly more deep into the collection  Thick turbid material    Date: 09/05 Day 2: Dx: Infected necrotizing pancreatitis, GNR bacteremia, JERZY, large bowel obstruction. Pt received a total of 4L crystalloid and 1L albumin yesterday. Started on levo/vaso. CXR improving. On 2L NC. Cont NPO/NGT. Monitor abd exam. Cont abx. Mon drain output and character. Pain control. F/u pleural cxs. Wean pressors as able- on vaso/levo. IVF. Mon hgb, s/s of bleeding. DVT ppx. Strict I/Os. Monitor BUN/Cr. GI consulted. PE: MM dry, tachycardia, abdominal distension, IR drain w/ brown purulent output, tachypnea, on 3L NC, palafox.     ED Triage Vitals   Temperature Pulse Respirations Blood Pressure SpO2   09/04/23 1350 09/04/23 1350 09/04/23 1350 09/04/23 1350 09/04/23 1350   98.2 °F (36.8 °C) (!) 114 18 (!) 87/51 94 %      Temp Source Heart Rate Source Patient Position - Orthostatic VS BP Location FiO2 (%)   09/04/23 1350 09/04/23 1350 09/04/23 1515 09/04/23 1350 09/04/23 1921   Oral Monitor Sitting Left arm 21      Pain Score       09/04/23 2000       No Pain          Wt Readings from Last 1 Encounters:   09/05/23 122 kg (268 lb 4.8 oz)     Additional Vital Signs:   Date/Time Temp Pulse Resp BP MAP (mmHg) Arterial Line BP MAP SpO2 FiO2 (%) Calculated FIO2 (%) - Nasal Cannula Nasal Cannula O2 Flow Rate (L/min) O2 Device Patient Position - Orthostatic VS   09/05/23 0730 -- 98 26 Abnormal  -- -- 128/48 72 mmHg 98 % 25 -- -- -- --   09/05/23 0700 -- 92 19 -- -- 114/42 62 mmHg Abnormal  99 % 96 -- -- -- --   09/05/23 0610 -- 100 19 -- -- 140/46 70 mmHg 98 % -- 28 2 L/min Nasal cannula --   09/05/23 0600 -- 96 23 Abnormal  -- -- 118/44 64 mmHg Abnormal  97 % -- -- -- -- --   09/05/23 0554 -- -- -- -- -- -- -- 100 % -- -- -- -- --   09/05/23 0510 98.6 °F (37 °C) 104 26 Abnormal  -- -- 128/50 70 mmHg 100 % -- -- -- -- --   09/05/23 0415 -- 104 26 Abnormal  -- -- 132/52 74 mmHg 98 % 21 -- -- -- --   09/05/23 0400 -- 110 Abnormal  24 Abnormal  -- -- 114/46 64 mmHg Abnormal  97 % -- 32 3 L/min Nasal cannula --   09/05/23 0300 -- 108 Abnormal  31 Abnormal  -- -- 106/44 60 mmHg Abnormal  98 % -- -- -- -- --   09/05/23 0200 -- 112 Abnormal  29 Abnormal  -- -- 94/42 56 mmHg Abnormal  98 % 21 -- -- -- --   09/05/23 0100 -- 116 Abnormal  28 Abnormal  97/42 Abnormal  58 Abnormal  -- -- 94 % 21 -- -- -- --   09/05/23 0010 -- 120 Abnormal  31 Abnormal  97/53 75 -- -- 95 % -- -- -- None (Room air) --   09/04/23 2355 100 °F (37.8 °C) 122 Abnormal  31 Abnormal  97/45 Abnormal  65 -- -- 95 % -- 28 2 L/min Nasal cannula --   09/04/23 2300 -- 120 Abnormal  29 Abnormal  90/52 74 -- -- 97 % 21 -- -- -- --   09/04/23 2225 -- 130 Abnormal  27 Abnormal  77/45 Abnormal  53 Abnormal  -- -- 98 % 21 -- -- -- --   09/04/23 2210 -- 122 Abnormal  29 Abnormal  114/81 99 -- -- 97 % 2 -- -- Nasal cannula --   09/04/23 2145 100.9 °F (38.3 °C) Abnormal  120 Abnormal  33 Abnormal  82/56 Abnormal  75 -- -- 97 % 3 -- -- Nasal cannula --   09/04/23 2100 -- 98 24 Abnormal  102/46 Abnormal  61 Abnormal  -- -- 99 % 21 -- -- -- -- 09/04/23 2020 -- 102 -- 80/40 Abnormal  50 Abnormal  -- -- 99 % -- 44 6 L/min Nasal cannula --   09/04/23 2000 98.2 °F (36.8 °C) -- -- 96/44 Abnormal  61 Abnormal  -- -- -- 21 -- -- -- --   09/04/23 1921 -- 104 16 85/52 Abnormal  64 Abnormal  -- -- 98 % 21 44 6 L/min Nasal cannula --   09/04/23 1916 -- 101 -- 97/51 -- -- -- 91 % -- 36 4 L/min Nasal cannula --   09/04/23 1911 -- 100 -- 91/53 -- -- -- 92 % -- 32 3 L/min Nasal cannula --   09/04/23 1906 -- 100 -- 91/51 -- -- -- 92 % -- 32 3 L/min Nasal cannula --   09/04/23 1901 -- 99 -- 91/52 -- -- -- 94 % -- 32 3 L/min Nasal cannula --   09/04/23 1856 -- 99 -- 91/55 -- -- -- 93 % -- 32 3 L/min Nasal cannula --   09/04/23 1730 -- 102 17 99/51 71 -- -- 98 % -- -- -- None (Room air) Lying   09/04/23 1545 -- 100 20 92/48 Abnormal  67 -- -- 96 % -- -- -- None (Room air) Lying   09/04/23 1530 -- 102 18 96/54 70 -- -- 98 % -- -- -- None (Room air) Lying   09/04/23 1515 -- 102 -- 85/48 Abnormal  61 Abnormal  -- -- 98 % -- -- -- None (Room air) Sitting         Pertinent Labs/Diagnostic Test Results:   XR chest portable ICU   Final Result by Megan Hess MD (09/05 0117)      Persistent medial right lung base opacity suspicious for pneumonia      No pneumothorax      Workstation performed: [id number]         IR drainage tube check/change/reposition/reinsertion/upsize   Final Result by Cindy Ball MD (09/05 8425)   Impression:      Upsize and reposition of a pancreatic necrotic collection drain to a 12 Swazi pigtail catheter      Patient also has ascites leaking around the catheter, he may be a candidate for paracentesis or a peritoneal drain to simply divert and reduce the ascites      Workstation performed: [id number]         IR thoracentesis   Final Result by Cindy Ball MD (09/05 2158)   Impression:      Ultrasound-guided left thoracentesis      Workstation performed: [id number]         CT abdomen pelvis w contrast   Final Result by Geovany Gómez DO (09/04 1753)      1. Significant worsening of necrotizing pancreatitis with large acute necrotic collection largely replacing the pancreatic parenchyma and extending into the anterior pararenal spaces (left greater than right) with the collection now containing a large    amount of gas concerning for infected necrosis. Additionally, there is an inflammatory phlegmonous mass along the left lateral pararenal space/paracolic gutter with involvement of the proximal to mid descending colon causing associated colonic    obstruction. This degree of bowel distention could pose a risk for bowel perforation. Surgical consultation is advised. 2. Progression of inflammatory changes extending into the laurent hepatis with mass effect and narrowing of the portal vein and associated proximal vessels about the portal confluence as detailed above but no evidence for overt venous occlusion. 3. Mild fluid-filled gastric distention. Collapse and/or thickening of the gastric antrum and descending duodenum, the latter is likely 180 degrees enveloped by the inflammatory collection. Secondary antritis/duodenitis with element of gastric outlet    obstruction is not excluded. 4. Moderate to large amount of abdominopelvic free fluid, increasing from prior study. 5. Enlarging, moderate bilateral pleural effusions with mild bibasilar compressive atelectasis. I personally discussed this study with [address] [place] [address] on 9/4/2023 at 5:47 PM.                  Workstation performed: [id number]         XR chest 1 view portable   ED Interpretation by Justine Mckinnon MD (09/04 1278)   The CXR was interpreted by me independently. On my read, it appears without acute abnormalities:  - The  cardiomediastinal  silhouette  is  unremarkable.    - The  lungs  are  clear. - No  pleural  effusions.  - No  pneumothorax.   - The  pulmonary  vasculature  is  within  normal  limits.    - The  trachea  is  midline.    - Bony  thorax  is  unremarkable.            XR abdomen 1 view kub    (Results Pending)     09/04 EKG result:   Sinus tachycardia  Low voltage QRS  Poor R wave progression      Results from last 7 days   Lab Units 09/05/23 0229 09/04/23 2021 09/04/23  1444   WBC Thousand/uL 13.83* 8.64 12.39*   HEMOGLOBIN g/dL 7.3* 7.3* 7.9*   HEMATOCRIT % 22.7* 23.2* 25.1*   PLATELETS Thousands/uL 482* 428* 493*   BANDS PCT %  --   --  14*         Results from last 7 days   Lab Units 09/05/23 0229 09/05/23 0228 09/04/23 2021 09/04/23  1444   SODIUM mmol/L 135  --  132* 132*   POTASSIUM mmol/L 4.4  --  4.4 4.9   CHLORIDE mmol/L 96  --  95* 92*   CO2 mmol/L 24  --  28 28   ANION GAP mmol/L 15  --  9 12   BUN mg/dL 91*  --  97* 103*   CREATININE mg/dL 2.15*  --  2.09* 2.61*   EGFR ml/min/1.73sq m 34  --  35 27   CALCIUM mg/dL 7.6*  --  7.1* 8.0*   CALCIUM, IONIZED mmol/L  --  0.99* 0.96*  --    MAGNESIUM mg/dL 2.3  --  2.3  --    PHOSPHORUS mg/dL 5.3*  --  5.2*  --      Results from last 7 days   Lab Units 09/05/23 0229 09/04/23 2021 09/04/23  1502 09/04/23  1444   AST U/L 22 26  --  40*   ALT U/L 16 18  --  25   ALK PHOS U/L 45 52  --  153*   TOTAL PROTEIN g/dL 5.5* 5.4*  --  6.4   ALBUMIN g/dL 2.4* 2.2*  --  2.5*   TOTAL BILIRUBIN mg/dL 0.88 0.61  --  0.73   AMMONIA umol/L  --   --  44  --      Results from last 7 days   Lab Units 09/05/23  0556 09/05/23 0211 09/04/23  1436   POC GLUCOSE mg/dl 134 105 130     Results from last 7 days   Lab Units 09/05/23 0229 09/04/23  2021 09/04/23  1444   GLUCOSE RANDOM mg/dL 112 100 114     Results from last 7 days   Lab Units 09/05/23  0102   PH ART  7.462*   PCO2 ART mm Hg 32.6*   PO2 ART mm Hg 65.2*   HCO3 ART mmol/L 22.8   BASE EXC ART mmol/L -0.8   O2 CONTENT ART mL/dL 10.2*   O2 HGB, ARTERIAL % 91.3*   ABG SOURCE  Line, Arterial     Results from last 7 days   Lab Units 09/05/23  0011   PH WILLIAM  7.420*   PCO2 WILLIAM mm Hg 40.4*   PO2 WILLIAM mm Hg 36.3   HCO3 WILLIAM mmol/L 25.6   BASE EXC WILLIAM mmol/L 1.1   O2 CONTENT WILLIAM ml/dL 7.3   O2 HGB, VENOUS % 66.4             Results from last 7 days   Lab Units 09/04/23  1702 09/04/23  1444   HS TNI 0HR ng/L  --  11   HS TNI 2HR ng/L 6  --    HSTNI D2 ng/L -5  --          Results from last 7 days   Lab Units 09/04/23  1502   PROTIME seconds 22.9*   INR  2.00*   PTT seconds 37         Results from last 7 days   Lab Units 09/04/23  1502   PROCALCITONIN ng/ml 2.58*     Results from last 7 days   Lab Units 09/05/23  0212 09/04/23 2021 09/04/23  1502   LACTIC ACID mmol/L 1.3 1.7 2.4*           Results from last 7 days   Lab Units 09/04/23  1444   LIPASE u/L 28       Results from last 7 days   Lab Units 09/04/23  2118   CLARITY UA  Clear   COLOR UA  Yellow   SPEC GRAV UA  1.024   PH UA  5.0   GLUCOSE UA mg/dl Negative   KETONES UA mg/dl Negative   BLOOD UA  Negative   PROTEIN UA mg/dl Trace*   NITRITE UA  Negative   BILIRUBIN UA  Negative   UROBILINOGEN UA (BE) mg/dl <2.0   LEUKOCYTES UA  Negative   WBC UA /hpf 2-4*   RBC UA /hpf 1-2   BACTERIA UA /hpf Occasional   EPITHELIAL CELLS WET PREP /hpf Occasional   MUCUS THREADS  Occasional*           Results from last 7 days   Lab Units 09/04/23  1502   BLOOD CULTURE  Received in Microbiology Lab. Culture in Progress.    GRAM STAIN RESULT  Gram negative rods*     Results from last 7 days   Lab Units 09/04/23  1928   TOTAL COUNTED  100   WBC FLUID /ul 1,939               ED Treatment:   Medication Administration from 09/04/2023 1347 to 09/04/2023 1956       Date/Time Order Dose Route Action     09/04/2023 1543 EDT multi-electrolyte (PLASMALYTE-A/ISOLYTE-S PH 7.4) IV solution 1,000 mL 0 mL Intravenous Stopped     09/04/2023 1518 EDT multi-electrolyte (PLASMALYTE-A/ISOLYTE-S PH 7.4) IV solution 1,000 mL 1,000 mL Intravenous New Bag     09/04/2023 1615 EDT multi-electrolyte (PLASMALYTE-A/ISOLYTE-S PH 7.4) IV solution 1,000 mL 0 mL Intravenous Stopped     09/04/2023 1542 EDT multi-electrolyte (PLASMALYTE-A/ISOLYTE-S PH 7.4) IV solution 1,000 mL 1,000 mL Intravenous New Bag     09/04/2023 1815 EDT multi-electrolyte (PLASMALYTE-A/ISOLYTE-S PH 7.4) IV solution 1,000 mL 0 mL Intravenous Stopped     09/04/2023 1659 EDT multi-electrolyte (PLASMALYTE-A/ISOLYTE-S PH 7.4) IV solution 1,000 mL 1,000 mL Intravenous New Bag     09/04/2023 1616 EDT cefepime (MAXIPIME) 2 g/50 mL dextrose IVPB 0 mg Intravenous Stopped     09/04/2023 1522 EDT cefepime (MAXIPIME) 2 g/50 mL dextrose IVPB 2,000 mg Intravenous New Bag     09/04/2023 1815 EDT vancomycin (VANCOCIN) 2,000 mg in sodium chloride 0.9 % 500 mL IVPB 0 mg Intravenous Stopped     09/04/2023 1609 EDT vancomycin (VANCOCIN) 2,000 mg in sodium chloride 0.9 % 500 mL IVPB 2,000 mg Intravenous New Bag     09/04/2023 1659 EDT phytonadione (AQUA-MEPHYTON) 10 mg/mL 10 mg in sodium chloride 0.9 % 50 mL IVPB 0 mg Intravenous Stopped     09/04/2023 1651 EDT phytonadione (AQUA-MEPHYTON) 10 mg/mL 10 mg in sodium chloride 0.9 % 50 mL IVPB 10 mg Intravenous New Bag     09/04/2023 1640 EDT iodixanol (VISIPAQUE) 320 MG/ML injection 100 mL 100 mL Intravenous Given     09/04/2023 1816 EDT lidocaine (PF) (XYLOCAINE-MPF) 1 % injection 10 mL Infiltration Given     09/04/2023 1902 EDT fentanyl citrate (PF) 100 MCG/2ML 25 mcg Intravenous Given     09/04/2023 1902 EDT midazolam (VERSED) injection 0.5 mg Intravenous Given     09/04/2023 1935 EDT iohexol (OMNIPAQUE) 350 MG/ML injection (SINGLE-DOSE) 50 mL 10 mL Intravenous Given        Past Medical History:   Diagnosis Date   • Pancreatitis      Present on Admission:  **None**      Admitting Diagnosis: Abdominal pain [R10.9]  Necrotizing pancreatitis [K85.91]  Wound drainage [L24. A9]  Age/Sex: 46 y.o. male  Admission Orders:  Scheduled Medications:  albumin human (FLEXBUMIN) 5 % injection 25 g IV 09/04 x 1, 09/05 x 1  calcium gluconate 2 g in sodium chloride 0.9% 100 mL (premix) IV 09/04 x 1, 09/05 x 1  chlorhexidine, 15 mL, Mouth/Throat, Q12H ALEX  heparin (porcine), 5,000 Units, Subcutaneous, Q8H [facility]  insulin lispro, 2-12 Units, Subcutaneous, Q6H [facility]  meropenem, 1,000 mg, Intravenous, Q12H  midazolam (VERSED) injection 2 mg IV 09/04 x 2  multi-electrolyte (ISOLYTE-S PH 7.4) bolus 1,000 mL IV obce      Continuous IV Infusions:  multi-electrolyte, 150 mL/hr, Intravenous, Continuous  norepinephrine, 1-30 mcg/min, Intravenous, Titrated  vasopressin, 0.04 Units/min, Intravenous, Continuous      PRN Meds:  acetaminophen, 650 mg, Rectal, Q6H PRN 09/04 x 1  HYDROmorphone, 0.5 mg, Intravenous, Q3H PRN  HYDROmorphone, 0.5 mg, Intravenous, Q4H PRN  HYDROmorphone, 0.2 mg, Intravenous, Q3H PRN        IP CONSULT TO ACUTE CARE SURGERY  INPATIENT CONSULT TO IR  IP CONSULT TO GASTROENTEROLOGY  IP CONSULT TO CASE MANAGEMENT  IP CONSULT TO INFECTIOUS DISEASES    Network Utilization Review Department  ATTENTION: Please call with any questions or concerns to [phone] and carefully listen to the prompts so that you are directed to the right person. All voicemails are confidential.  Manasa Shrestha all requests for admission clinical reviews, approved or denied determinations and any other requests to dedicated fax number below belonging to the campus where the patient is receiving treatment.  List of dedicated fax numbers for the Facilities:  [place] DENIALS (Administrative/Medical Necessity) [phone] [address] [facility] (Maternity/NICU/Pediatrics) [phone]   [address] [place] [address] Drive [phone]   [facility] [address] [facility] [phone] [address] 5220 [facility] [address] [address] [facility] [phone] [address] [facility] [address] [phone]   [address] [place] [address] [phone] 1150 [facility]. [address] [facility] [phone]

## 2023-09-05 NOTE — NURSING NOTE
Received PICC request. However, there are a few contraindications to PICC placement at this time. Pt has GFR 34 and positive blood cultures from 9/4 (gram neg rods). Pt also currently has stable central access. This was discussed with Dr Verona Senior via TT. Updated that consult will be completed at this time.

## 2023-09-05 NOTE — CONSULTS
Consultation - [phone]  Maura Stephen 46 y.o. male MRN: [id number]  Unit/Bed#: MICU 07 Encounter: [id number]              Inpatient consult to gastroenterology     Performed by  Shirley Cook DO   Authorized by Lizette Montoya DO             Reason for Consult / Principal Problem:     Dilated colon      ASSESSMENT AND PLAN:      51-year-old male with past medical history of recent admission for gallstone necrotizing pancreatitis complicated by fluid collection status post IR drain placement, obesity who was admitted for sepsis. GI is consulted for management of necrotizing pancreatitis as well as dilated colon noted on imaging. 1. Necrotizing gallstone pancreatitis  2. Infected necrosis  3. Bacteremia  4. Abdominal pain  5. Septic shock  6. SMV thrombus  Blood cultures growing gram-negative rods. Also on low-dose Levophed/vasopressin. CT shows worsening fluid collection. IR drain output minimal.  Suspect infected necrosis as source of sepsis given enlarging collection and gram-negative bacteremia. Mass effect from phlegmonous mass likely contributing to gastric distention. · Plan for EUS/cystogastrostomy once patient becomes more hemodynamically stable off pressor therapy. · Agree with meropenem. · Consider infectious disease consult  · Follow-up final blood cultures. · Okay to progress diet from GI perspective. · NG tube trial per surgery  · IV fluids per primary team.  · Monitor hemodynamics  · Monitor CBC    7. Dilated colon  Likely secondary to mass effect from extensive inflammatory changes noted around the pancreas. Fortunately patient displays benign exam and having bowel movements making obstruction unlikely. Transverse colon maximal diameter is 13 on CT scan. Appears to be 12 cm today which is improved.   Lactic acid normal.  · No indication for decompressive colonoscopy at this time  · Recommend aggressive bowel regimen with MiraLAX twice daily  · Dulcolax rectal suppositories as needed  · Mineral oil enemas as needed. Rest of care per primary team.  Thank you for this consultation. ______________________________________________________________________    HPI:  63-year-old male with past medical history of recent admission for gallstone necrotizing pancreatitis complicated by fluid collection status post IR drain placement, obesity who was admitted for sepsis. GI is consulted for management of necrotizing pancreatitis as well as dilated colon noted on imaging. Patient states that he felt weak prior to presentation. Met sepsis criteria on presentation. Was having distention of his stomach. Had 3 bowel movements today so far. Denies any significant abdominal pain, nausea, vomiting. Requesting diet. Rest of ROS was negative. Of note he was admitted in early August for acute gallstone pancreatitis complicated by necrotic fluid collection. Had IR drain placed due to concerns for gastric compression. REVIEW OF SYSTEMS:    Review of Systems   Constitutional: Negative for chills and fever. HENT: Negative for congestion and sinus pressure. Respiratory: Negative for cough and shortness of breath. Cardiovascular: Negative for chest pain, palpitations and leg swelling. Gastrointestinal: Positive for abdominal distention. Negative for abdominal pain, diarrhea, nausea and vomiting. Genitourinary: Negative for dysuria and hematuria. Musculoskeletal: Negative for arthralgias and back pain. Skin: Negative for color change and rash. Neurological: Negative for dizziness and headaches. Psychiatric/Behavioral: Negative for agitation and confusion. All other systems reviewed and are negative. Historical Information   Past Medical History:   Diagnosis Date   • Pancreatitis      Past Surgical History:   Procedure Laterality Date   • CYSTOSCOPY N/A 8/5/2023    Procedure: CYSTOSCOPY.   Saunders insertion;  Surgeon: Vandana Vidales MD; Location: [facility];  Service: Urology   • FL RETROGRADE URETHROCYSTOGRAM  8/5/2023   • HERNIA REPAIR     • IR DRAINAGE TUBE CHECK/CHANGE/REPOSITION/REINSERTION/UPSIZE  9/4/2023   • IR DRAINAGE TUBE PLACEMENT  8/5/2023   • IR THORACENTESIS  9/4/2023   • JOINT REPLACEMENT     • TONSILLECTOMY  1976     Social History   Social History     Substance and Sexual Activity   Alcohol Use Not Currently    Comment: quit 7/15/23 (previous 6 drinks per week)     Social History     Substance and Sexual Activity   Drug Use Never     Social History     Tobacco Use   Smoking Status Never   • Passive exposure: Past   Smokeless Tobacco Former   • Types: Chew   • Quit date: 7/15/2023   Tobacco Comments    Quit 7/15/23     Family History   Problem Relation Age of Onset   • Cancer Mother    • Multiple myeloma Mother    • Pancreatic cancer Father    • Cancer Father        Meds/Allergies     Medications Prior to Admission   Medication   • acetaminophen (TYLENOL) 325 mg tablet   • Alcohol Swabs 70 % PADS   • apixaban (Eliquis) 5 mg   • BD PosiFlush 0.9 % SOLN   • Blood Glucose Monitoring Suppl (OneTouch Verio Reflect) w/Device KIT   • Blood Pressure Monitoring (Adult Blood Pressure Cuff Lg) KIT   • furosemide (LASIX) 20 mg tablet   • glucose blood (OneTouch Verio) test strip   • Insulin Glargine Solostar (Lantus SoloStar) 100 UNIT/ML SOPN   • insulin lispro (HumaLOG KwikPen) 100 units/mL injection pen   • Insulin Pen Needle (BD Pen Needle Lupe 2nd Gen) 32G X 4 MM MISC   • Insulin Pen Needle (BD Pen Needle Lupe 2nd Gen) 32G X 4 MM MISC   • Levemir FlexPen 100 units/mL injection pen   • lisinopril (ZESTRIL) 10 mg tablet   • NovoLOG FlexPen 100 units/mL injection pen   • OneTouch Delica Lancets 95F MISC   • pancrelipase, Lip-Prot-Amyl, (CREON) 6,000 units delayed release capsule   • sodium chloride, PF, 0.9 %     Current Facility-Administered Medications   Medication Dose Route Frequency   • acetaminophen (TYLENOL) rectal suppository 650 mg 650 mg Rectal Q6H PRN   • chlorhexidine (PERIDEX) 0.12 % oral rinse 15 mL  15 mL Mouth/Throat Q12H [address]   • heparin (porcine) subcutaneous injection 5,000 Units  5,000 Units Subcutaneous Q8H [address]   • HYDROmorphone (DILAUDID) injection 0.5 mg  0.5 mg Intravenous Q3H PRN   • HYDROmorphone (DILAUDID) injection 0.5 mg  0.5 mg Intravenous Q4H PRN   • HYDROmorphone HCl (DILAUDID) injection 0.2 mg  0.2 mg Intravenous Q3H PRN   • insulin lispro (HumaLOG) 100 units/mL subcutaneous injection 2-12 Units  2-12 Units Subcutaneous Q6H [address]   • meropenem (MERREM) 1,000 mg in sodium chloride 0.9 % 100 mL IVPB  1,000 mg Intravenous Q12H   • multi-electrolyte (PLASMALYTE-A/ISOLYTE-S PH 7.4) IV solution  150 mL/hr Intravenous Continuous   • norepinephrine (LEVOPHED) 4 mg (STANDARD CONCENTRATION) IV in sodium chloride 0.9% 250 mL  1-30 mcg/min Intravenous Titrated   • vasopressin (PITRESSIN) 20 Units in sodium chloride 0.9 % 100 mL infusion  0.04 Units/min Intravenous Continuous       No Known Allergies        Objective     Blood pressure (!) 97/42, pulse 98, temperature 98.6 °F (37 °C), temperature source Axillary, resp. rate (!) 26, height 5' 11" (1.803 m), weight 122 kg (268 lb 4.8 oz), SpO2 98 %. Body mass index is 37.42 kg/m². Intake/Output Summary (Last 24 hours) at 9/5/2023 0916  Last data filed at 9/5/2023 9610  Gross per 24 hour   Intake 5020.14 ml   Output 3350 ml   Net 1670.14 ml         PHYSICAL EXAM:      Physical Exam  Vitals and nursing note reviewed. Constitutional:       General: He is not in acute distress. Appearance: Normal appearance. He is well-developed. He is obese. He is ill-appearing. Interventions: Nasal cannula in place. Comments: NG tube in place. HENT:      Head: Normocephalic and atraumatic. Mouth/Throat:      Mouth: Mucous membranes are moist.   Eyes:      Extraocular Movements: Extraocular movements intact.       Conjunctiva/sclera: Conjunctivae normal.   Cardiovascular: Rate and Rhythm: Normal rate. Pulses: Normal pulses. Pulmonary:      Effort: Pulmonary effort is normal.   Abdominal:      General: Abdomen is flat. Bowel sounds are normal. There is distension. Palpations: Abdomen is soft. Tenderness: There is no abdominal tenderness. There is no guarding. Musculoskeletal:      Cervical back: Neck supple. Right lower leg: No edema. Left lower leg: No edema. Skin:     General: Skin is warm and dry. Neurological:      General: No focal deficit present. Mental Status: He is alert and oriented to person, place, and time.    Psychiatric:         Mood and Affect: Mood normal.         Behavior: Behavior normal.          Lab Results:   Admission on 09/04/2023   Component Date Value   • WBC 09/04/2023 12.39 (H)    • RBC 09/04/2023 2.97 (L)    • Hemoglobin 09/04/2023 7.9 (L)    • Hematocrit 09/04/2023 25.1 (L)    • MCV 09/04/2023 85    • MCH 09/04/2023 26.6 (L)    • MCHC 09/04/2023 31.5    • RDW 09/04/2023 16.6 (H)    • MPV 09/04/2023 9.6    • Platelets 34/45/7124 493 (H)    • Sodium 09/04/2023 132 (L)    • Potassium 09/04/2023 4.9    • Chloride 09/04/2023 92 (L)    • CO2 09/04/2023 28    • ANION GAP 09/04/2023 12    • BUN 09/04/2023 103 (H)    • Creatinine 09/04/2023 2.61 (H)    • Glucose 09/04/2023 114    • Calcium 09/04/2023 8.0 (L)    • Corrected Calcium 09/04/2023 9.2    • AST 09/04/2023 40 (H)    • ALT 09/04/2023 25    • Alkaline Phosphatase 09/04/2023 153 (H)    • Total Protein 09/04/2023 6.4    • Albumin 09/04/2023 2.5 (L)    • Total Bilirubin 09/04/2023 0.73    • eGFR 09/04/2023 27    • hs TnI 0hr 09/04/2023 11    • Ventricular Rate 09/04/2023 110    • Atrial Rate 09/04/2023 110    • AK Interval 09/04/2023 134    • QRSD Interval 09/04/2023 82    • QT Interval 09/04/2023 352    • QTC Interval 09/04/2023 476    • P Axis 09/04/2023 31    • QRS Axis 09/04/2023 36    • T Wave Axis 09/04/2023 58    • POC Glucose 09/04/2023 130    • LACTIC ACID 09/04/2023 2.4 (HH)    • Procalcitonin 09/04/2023 2.58 (H)    • Protime 09/04/2023 22.9 (H)    • INR 09/04/2023 2.00 (H)    • PTT 09/04/2023 37    • Gram Stain Result 09/04/2023 Gram negative rods (A)    • Blood Culture 09/04/2023 Received in Microbiology Lab. Culture in Progress.     • Color, UA 09/04/2023 Yellow    • Clarity, UA 09/04/2023 Clear    • Specific Gravity, UA 09/04/2023 1.024    • pH, UA 09/04/2023 5.0    • Leukocytes, UA 09/04/2023 Negative    • Nitrite, UA 09/04/2023 Negative    • Protein, UA 09/04/2023 Trace (A)    • Glucose, UA 09/04/2023 Negative    • Ketones, UA 09/04/2023 Negative    • Urobilinogen, UA 09/04/2023 <2.0    • Bilirubin, UA 09/04/2023 Negative    • Occult Blood, UA 09/04/2023 Negative    • Ammonia 09/04/2023 44    • Lipase 09/04/2023 28    • hs TnI 2hr 09/04/2023 6    • Delta 2hr hsTnI 09/04/2023 -5    • LACTIC ACID 09/04/2023 1.7    • Segmented % 09/04/2023 78 (H)    • Bands % 09/04/2023 14 (H)    • Lymphocytes % 09/04/2023 1 (L)    • Monocytes % 09/04/2023 6    • Eosinophils, % 09/04/2023 1    • Basophils % 09/04/2023 0    • Absolute Neutrophils 09/04/2023 11.40 (H)    • Lymphocytes Absolute 09/04/2023 0.12 (L)    • Monocytes Absolute 09/04/2023 0.74    • Eosinophils Absolute 09/04/2023 0.12    • Basophils Absolute 09/04/2023 0.00    • Toxic Granulation 09/04/2023 Present    • RBC Morphology 09/04/2023 Present    • Platelet Estimate 26/42/9509 Increased (A)    • Anisocytosis 09/04/2023 Present    • ABO Grouping 09/04/2023 B    • Rh Factor 09/04/2023 Positive    • Antibody Screen 09/04/2023 Negative    • Specimen Expiration Date 09/04/2023 [id number]    • Site 09/04/2023 Pleural    • Color, Fluid 09/04/2023 Red (A)    • Clarity, Fluid 09/04/2023 Cloudy (A)    • WBC, Fluid 09/04/2023 1,939    • Gram Stain Result 09/04/2023 No Polys or Bacteria seen    • Glucose, Fluid 09/04/2023 100    • LD, Fluid 09/04/2023 216    • PH BODY FLUID 09/04/2023 7.6    • Protein, Fluid 09/04/2023 3.6    • Gram Stain Result 09/04/2023 1+ Polys (A)    • Gram Stain Result 09/04/2023 3+ Gram positive cocci in pairs (A)    • Gram Stain Result 09/04/2023 3+ Gram variable rods (A)    • Anaerobic Culture 09/04/2023 Culture results to follow.     • WBC 09/04/2023 8.64    • RBC 09/04/2023 2.74 (L)    • Hemoglobin 09/04/2023 7.3 (L)    • Hematocrit 09/04/2023 23.2 (L)    • MCV 09/04/2023 85    • MCH 09/04/2023 26.6 (L)    • MCHC 09/04/2023 31.5    • RDW 09/04/2023 16.6 (H)    • MPV 09/04/2023 9.4    • Platelets 41/25/6049 428 (H)    • Sodium 09/04/2023 132 (L)    • Potassium 09/04/2023 4.4    • Chloride 09/04/2023 95 (L)    • CO2 09/04/2023 28    • ANION GAP 09/04/2023 9    • BUN 09/04/2023 97 (H)    • Creatinine 09/04/2023 2.09 (H)    • Glucose 09/04/2023 100    • Calcium 09/04/2023 7.1 (L)    • Corrected Calcium 09/04/2023 8.5    • AST 09/04/2023 26    • ALT 09/04/2023 18    • Alkaline Phosphatase 09/04/2023 52    • Total Protein 09/04/2023 5.4 (L)    • Albumin 09/04/2023 2.2 (L)    • Total Bilirubin 09/04/2023 0.61    • eGFR 09/04/2023 35    • Magnesium 09/04/2023 2.3    • Calcium, Ionized 09/04/2023 0.96 (L)    • Phosphorus 09/04/2023 5.2 (H)    • Ventricular Rate 09/04/2023 102    • Atrial Rate 09/04/2023 102    • ME Interval 09/04/2023 142    • QRSD Interval 09/04/2023 83    • QT Interval 09/04/2023 333    • QTC Interval 09/04/2023 434    • P Axis 09/04/2023 0    • QRS Axis 09/04/2023 99    • T Wave Axis 09/04/2023 21    • RBC, UA 09/04/2023 1-2    • WBC, UA 09/04/2023 2-4 (A)    • Epithelial Cells 09/04/2023 Occasional    • Bacteria, UA 09/04/2023 Occasional    • MUCUS THREADS 09/04/2023 Occasional (A)    • Hyaline Casts, UA 09/04/2023 10-25 (A)    • Granular Casts, UA 09/04/2023 0-3 (A)    • Total Counted 09/04/2023 100    • Neutrophils % (Fluid) 09/04/2023 42    • Lymphs % (Fluid) 09/04/2023 50    • Bands % (Fluid) 09/04/2023 2    • Monocytes % (Fluid) 09/04/2023 6    • pH, Hasmukh 09/05/2023 7.420 (H)    • pCO2, Hasmukh 09/05/2023 40.4 (L)    • pO2, Hasmukh 09/05/2023 36.3    • HCO3, Hasmukh 09/05/2023 25.6    • Base Excess, Hasmukh 09/05/2023 1.1    • O2 Content, Hasmukh 09/05/2023 7.3    • O2 HGB, VENOUS 09/05/2023 66.4    • ROOM AIR FIO2 09/05/2023 95    • Nasal Cannula 09/05/2023 2    • pH, Arterial 09/05/2023 7.462 (H)    • pCO2, Arterial 09/05/2023 32.6 (L)    • pO2, Arterial 09/05/2023 65.2 (L)    • HCO3, Arterial 09/05/2023 22.8    • Base Excess, Arterial 09/05/2023 -0.8    • O2 Content, Arterial 09/05/2023 10.2 (L)    • O2 HGB,Arterial  09/05/2023 91.3 (L)    • SOURCE 09/05/2023 Line, Arterial    • ROOM AIR FIO2 09/05/2023 94    • Nasal Cannula 09/05/2023 0    • LACTIC ACID 09/05/2023 1.3    • POC Glucose 09/05/2023 105    • WBC 09/05/2023 13.83 (H)    • RBC 09/05/2023 2.72 (L)    • Hemoglobin 09/05/2023 7.3 (L)    • Hematocrit 09/05/2023 22.7 (L)    • MCV 09/05/2023 84    • MCH 09/05/2023 26.8    • MCHC 09/05/2023 32.2    • RDW 09/05/2023 16.6 (H)    • MPV 09/05/2023 9.4    • Platelets 67/84/8631 482 (H)    • Sodium 09/05/2023 135    • Potassium 09/05/2023 4.4    • Chloride 09/05/2023 96    • CO2 09/05/2023 24    • ANION GAP 09/05/2023 15    • BUN 09/05/2023 91 (H)    • Creatinine 09/05/2023 2.15 (H)    • Glucose 09/05/2023 112    • Calcium 09/05/2023 7.6 (L)    • Corrected Calcium 09/05/2023 8.9    • AST 09/05/2023 22    • ALT 09/05/2023 16    • Alkaline Phosphatase 09/05/2023 45    • Total Protein 09/05/2023 5.5 (L)    • Albumin 09/05/2023 2.4 (L)    • Total Bilirubin 09/05/2023 0.88    • eGFR 09/05/2023 34    • Magnesium 09/05/2023 2.3    • Phosphorus 09/05/2023 5.3 (H)    • Calcium, Ionized 09/05/2023 0.99 (L)    • Klebsiella aerogenes 09/04/2023 Detected (A)    • POC Glucose 09/05/2023 134        Imaging Studies: I have personally reviewed pertinent imaging studies. [address] Henok LEBRON   Gastroenterology Fellow  PGY-5  Available via [employer]  9/5/2023 9:16 AM

## 2023-09-05 NOTE — PROCEDURES
Arterial Line Insertion    Date/Time: 9/5/2023 1:02 AM    Performed by: Checo Douglas DO  Authorized by: Checo Douglas DO    Patient location:  ICU  Other Assisting Provider: No    Consent:     Consent obtained:  Verbal    Consent given by:  Patient    Risks discussed:  Bleeding, ischemia, repeat procedure, infection and pain  Universal protocol:     Patient identity confirmed:  Verbally with patient, hospital-assigned identification number and arm band  Indications:     Indications: hemodynamic monitoring, multiple ABGs and continuous blood pressure monitoring    Pre-procedure details:     Skin preparation:  Chlorhexidine    Preparation: Patient was prepped and draped in sterile fashion    Anesthesia (see MAR for exact dosages): Anesthesia method:  Local infiltration    Local anesthetic:  Lidocaine 1% w/o epi  Procedure details:     Location / Tip of Catheter:  Radial    Laterality:  Right    Jasvir's test performed: yes      Placement technique:  Percutaneous    Number of attempts:  1    Successful placement: yes      Transducer: waveform confirmed    Post-procedure details:     Post-procedure:  Biopatch applied, sterile dressing applied and sutured    CMS:  Unchanged    Patient tolerance of procedure:   Tolerated well, no immediate complications

## 2023-09-05 NOTE — RESPIRATORY THERAPY NOTE
RT Protocol Note  Adriana Pena 46 y.o. male MRN: [id number]  Unit/Bed#: MICU 07 Encounter: [id number]    Assessment    Active Problems: There are no active Hospital Problems. Home Pulmonary Medications:  None per chart hx and Pt. Past Medical History:   Diagnosis Date    Pancreatitis      Social History     Socioeconomic History    Marital status: /Civil Union     Spouse name: None    Number of children: None    Years of education: None    Highest education level: None   Occupational History    None   Tobacco Use    Smoking status: Never     Passive exposure: Past    Smokeless tobacco: Former     Types: Chew     Quit date: 7/15/2023    Tobacco comments:     Quit 7/15/23   Vaping Use    Vaping Use: Never used   Substance and Sexual Activity    Alcohol use: Not Currently     Comment: quit 7/15/23 (previous 6 drinks per week)    Drug use: Never    Sexual activity: None   Other Topics Concern    None   Social History Narrative    None     Social Determinants of Health     Financial Resource Strain: Not on file   Food Insecurity: No Food Insecurity (7/26/2023)    Hunger Vital Sign     Worried About Running Out of Food in the Last Year: Never true     Ran Out of Food in the Last Year: Never true   Transportation Needs: No Transportation Needs (7/26/2023)    PRAPARE - Transportation     Lack of Transportation (Medical): No     Lack of Transportation (Non-Medical):  No   Physical Activity: Not on file   Stress: Not on file   Social Connections: Not on file   Intimate Partner Violence: Not on file   Housing Stability: Unknown (7/26/2023)    Housing Stability Vital Sign     Unable to Pay for Housing in the Last Year: No     Number of Places Lived in the Last Year: Not on file     Unstable Housing in the Last Year: No       Subjective         Objective    Physical Exam:   Assessment Type: (P) Assess only  Bilateral Breath Sounds: (P) Clear  Cough: (P) None    Vitals:  Blood pressure (!) 97/42, pulse 104, temperature 98.6 °F (37 °C), temperature source Axillary, resp. rate (!) 26, height 5' 11" (1.803 m), weight 122 kg (268 lb 4.8 oz), SpO2 (P) 100 %. Results from last 7 days   Lab Units 09/05/23  0102   PH ART  7.462*   PCO2 ART mm Hg 32.6*   PO2 ART mm Hg 65.2*   HCO3 ART mmol/L 22.8   BASE EXC ART mmol/L -0.8   O2 CONTENT ART mL/dL 10.2*   O2 HGB, ARTERIAL % 91.3*   ABG SOURCE  Line, Arterial   NON VENT ROOM AIR % 94       Imaging and other studies: I have personally reviewed pertinent reports. Plan    Respiratory Plan: (P) Discontinue Protocol  Airway Clearance Plan: (P) Discontinue Protocol     Resp Comments: (P) Pt. evaluated for respiratory protocol. Bs=clear and well aerated. Pt. in no distress on 3lpm nasal cannula. SpO2=97%. Pt. uses no respiratory medications at home. No indication for respiratory intervention at this time. Protocol D/C.

## 2023-09-05 NOTE — PLAN OF CARE
Problem: PAIN - ADULT  Goal: Verbalizes/displays adequate comfort level or baseline comfort level  Description: Interventions:  - Encourage patient to monitor pain and request assistance  - Assess pain using appropriate pain scale  - Administer analgesics based on type and severity of pain and evaluate response  - Implement non-pharmacological measures as appropriate and evaluate response  - Consider cultural and social influences on pain and pain management  - Notify physician/advanced practitioner if interventions unsuccessful or patient reports new pain  Outcome: Progressing     Problem: INFECTION - ADULT  Goal: Absence or prevention of progression during hospitalization  Description: INTERVENTIONS:  - Assess and monitor for signs and symptoms of infection  - Monitor lab/diagnostic results  - Monitor all insertion sites, i.e. indwelling lines, tubes, and drains  - Monitor endotracheal if appropriate and nasal secretions for changes in amount and color  - [place] appropriate cooling/warming therapies per order  - Administer medications as ordered  - Instruct and encourage patient and family to use good hand hygiene technique  - Identify and instruct in appropriate isolation precautions for identified infection/condition  Outcome: Progressing  Goal: Absence of fever/infection during neutropenic period  Description: INTERVENTIONS:  - Monitor WBC    Outcome: Progressing     Problem: SAFETY ADULT  Goal: Patient will remain free of falls  Description: INTERVENTIONS:  - Educate patient/family on patient safety including physical limitations  - Instruct patient to call for assistance with activity   - Consult OT/PT to assist with strengthening/mobility   - Keep Call bell within reach  - Keep bed low and locked with side rails adjusted as appropriate  - Keep care items and personal belongings within reach  - Initiate and maintain comfort rounds  - Make Fall Risk Sign visible to staff  - Offer Toileting every 2 Hours, in advance of need  - Initiate/Maintain alarm  - Obtain necessary fall risk management equipment  - Apply yellow socks and bracelet for high fall risk patients  - Consider moving patient to room near nurses station  Outcome: Progressing  Goal: Maintain or return to baseline ADL function  Description: INTERVENTIONS:  -  Assess patient's ability to carry out ADLs; assess patient's baseline for ADL function and identify physical deficits which impact ability to perform ADLs (bathing, care of mouth/teeth, toileting, grooming, dressing, etc.)  - Assess/evaluate cause of self-care deficits   - Assess range of motion  - Assess patient's mobility; develop plan if impaired  - Assess patient's need for assistive devices and provide as appropriate  - Encourage maximum independence but intervene and supervise when necessary  - Involve family in performance of ADLs  - Assess for home care needs following discharge   - Consider OT consult to assist with ADL evaluation and planning for discharge  - Provide patient education as appropriate  Outcome: Progressing  Goal: Maintains/Returns to pre admission functional level  Description: INTERVENTIONS:  - Perform BMAT or MOVE assessment daily.   - Set and communicate daily mobility goal to care team and patient/family/caregiver. - Collaborate with rehabilitation services on mobility goals if consulted  - Perform Range of Motion 3 times a day. - Reposition patient every 2 hours.   - Dangle patient 3 times a day  - Stand patient 3 times a day  - Ambulate patient 3 times a day  - Out of bed to chair 3 times a day   - Out of bed for meals 3 times a day  - Out of bed for toileting  - Record patient progress and toleration of activity level   Outcome: Progressing     Problem: DISCHARGE PLANNING  Goal: Discharge to home or other facility with appropriate resources  Description: INTERVENTIONS:  - Identify barriers to discharge w/patient and caregiver  - Arrange for needed discharge resources and transportation as appropriate  - Identify discharge learning needs (meds, wound care, etc.)  - Arrange for interpretive services to assist at discharge as needed  - Refer to Case Management Department for coordinating discharge planning if the patient needs post-hospital services based on physician/advanced practitioner order or complex needs related to functional status, cognitive ability, or social support system  Outcome: Progressing     Problem: Knowledge Deficit  Goal: Patient/family/caregiver demonstrates understanding of disease process, treatment plan, medications, and discharge instructions  Description: Complete learning assessment and assess knowledge base.   Interventions:  - Provide teaching at level of understanding  - Provide teaching via preferred learning methods  Outcome: Progressing

## 2023-09-05 NOTE — CASE MANAGEMENT
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   • Pleural effusion, left 2023   • Hyponatremia 2023   • Abnormal urinalysis 2023   • Leukocytosis 2023   • JERZY (acute kidney injury) ([address]) 2023   • Primary hypertension 2022   • Proteinuria 2022   • Chronic pain of both hips 2022   • Family history of prostate cancer in father 2022      LOS (days): 1  Geometric Mean LOS (GMLOS) (days):   Days to GMLOS:     OBJECTIVE:  PATIENT READMITTED TO HOSPITAL  Risk of Unplanned Readmission Score: 32.18         Current admission status: Inpatient       Preferred Pharmacy:   [facility]/pharmacy #[id number]- [place], [place] - [address] [place] [address]  Phone: [phone] Fax: [address] [address]  Phone: [phone] Fax: [phone]    Primary Care Provider: Wendy Prajapati MD    Primary Insurance: Juan Manuel Francois  Secondary Insurance:     ASSESSMENT:  [place] Proxies    There are no active Health Care Proxies on file.                  Readmission Root Cause  30 Day Readmission: Yes  Who directed you to return to the hospital?: Self  Did you understand whom to contact if you had questions or problems?: Yes  Did you get your prescriptions before you left the hospital?: Yes  Were you able to get your prescriptions filled when you left the hospital?: Yes  Did you take your medications as prescribed?: Yes  Were you able to get to your follow-up appointments?: Yes  During previous admission, was a post-acute recommendation made?: Yes  What post-acute resources were offered?: [address]  Patient was readmitted due to: Necrotizing pancreatitis. Action Plan: Medical and medication management. Patient Information  Admitted from[de-identified] Home  Mental Status: Alert  During Assessment patient was accompanied by: Not accompanied during assessment  Assessment information provided by[de-identified] Patient  Primary Caregiver: Self  Support Systems: Self, Spouse/significant other  [place] of Residence: [address] [place] [address] do you live in?: [facility] entry access options. Select all that apply.: Stairs  Number of steps to enter home.: 2  Do the steps have railings?: Yes  Type of Current Residence: [place]  In the last 12 months, was there a time when you were not able to pay the mortgage or rent on time?: No  In the last 12 months, was there a time when you did not have a steady place to sleep or slept in a shelter (including now)?: No  Homeless/housing insecurity resource given?: N/A  Living Arrangements: Lives w/ Spouse/significant other  Is patient a ?: Yes  Is patient active with [facility])?: No    Activities of Daily Living Prior to Admission  Functional Status: Independent  Completes ADLs independently?: No  Level of ADL dependence: Assistance  Ambulates independently?: No  Level of ambulatory dependence: Assistance  Does patient use assisted devices?: Yes  Assisted Devices (DME) used: [employer] Stafford  Does patient currently own DME?: Yes  What DME does the patient currently own?: Sosa Stafford  Does patient have a history of Outpatient Therapy (PT/OT)?: No  Does the patient have a history of Short-Term Rehab?: Yes (Pt had STR at [facility].  Unsure what year he went to STR.)  Does patient have a history of HHC?: Yes  Does patient currently have [address]?: Yes    Current Home Health Care  Type of Current Home Care Services: Home PT, Home OT, Nurse visit  [facility][de-identified] Walter Provider[de-identified] PCP    Patient Information Continued  Income Source: Employed  Does patient have prescription coverage?: Yes  Within the past 12 months, you worried that your food would run out before you got the money to buy more.: Never true  Within the past 12 months, the food you bought just didn't last and you didn't have money to get more.: Never true  Food insecurity resource given?: N/A  Does patient receive dialysis treatments?: No  Does patient have a history of substance abuse?: No  Does patient have a history of Mental Health Diagnosis?: No         Means of Transportation  Means of Transport to Appts[de-identified] Drives Self  In the past 12 months, has lack of transportation kept you from medical appointments or from getting medications?: No  In the past 12 months, has lack of transportation kept you from meetings, work, or from getting things needed for daily living?: No  Was application for public transport provided?: N/A        DISCHARGE DETAILS:    Discharge planning discussed with[de-identified] Patient at bedside. Freedom of Choice: Yes  Comments - Freedom of Choice: Pt would like to continue [facility] services with [facility]. Referral sent in [address]   CM contacted family/caregiver?: Yes  Were Treatment Team discharge recommendations reviewed with patient/caregiver?: Yes  Did patient/caregiver verbalize understanding of patient care needs?: Yes  Were patient/caregiver advised of the risks associated with not following Treatment Team discharge recommendations?: Yes    Contacts  Patient Contacts: Socorropt's wife  Relationship to Patient[de-identified] Family  Contact Method: Phone  Phone Number: [phone]  Reason/Outcome: Continuity of Care, Discharge Planning, Emergency [address]         Is the patient interested in [facility] at discharge?: Yes  [facility] requested[de-identified] Occupational Therapy, Physical Therapy, Nursing  Home Health Agency Name[de-identified] [facility] External Referral Reason (only applicable if external HHA name selected): Patient has established relationship with provider  [facility] Provider[de-identified] PCP  Home Health Services Needed[de-identified] Strengthening/Theraputic Exercises to Improve Function, Evaluate Functional Status and Safety, Wound/Ostomy Care  Homebound Criteria Met[de-identified] Uses an Assist Device (i.e. cane, walker, etc)  Supporting Clincal Findings[de-identified] Limited Endurance, Fatigues Easliy in [facility] [employer]         Other Referral/Resources/Interventions Provided:  Interventions: [facility]  Referral Comments: Referral sent to [facility] for resumption of care.          Treatment Team Recommendation: Home with [address]  Discharge Destination Plan[de-identified] Home with [address] at Discharge : Family

## 2023-09-05 NOTE — CONSULTS
Consultation - [facility]. 46 y.o. male MRN: [id number]  [facility]: [id number]    Assessment:  Pressure injury of the sacral region, unstageable   Open wound of the abdominal area   Intertrigo   Ambulatory dysfunction   Obesity       Plan:  • MASD/IAD to the abdominal area - areas of full thickness skin loss. o Will recommend maxorb rope daily and PRN   o Reviewed report of chest abdomen and pelvis from 9/4/23, no abscess   • B/l sacro-buttocks with unstageable pressure injury- POA. Likely mixed etiology of pressure and moisture due to fecal incontinence.  o Will recommend TRIAD paste TID and PRN   o Patient is on low air loss critical care mattress   • No s/s of infection present  •  A1C results reviewed with the patient today. • Will recommend preventative nursing skin care measures, including foam dressings to the b/l heels. • Pressure relief- offloading of pressure with turning/repositioning as patient medically tolerates, heel elevation, foam wedges for offloading/repositioning, and waffle cushion to chair. • Nutrition consult placed  • Patient verbalized understanding of plan of care. • [place] text wound care team with questions or concerns. • Routine wound care follow-up while admitted. AVS updated. • Follow-up with the [facility] wound care center as an outpatient, ambulatory referral placed. History of Present Illness:  Patient is a 46year old male who is admitted to the hospital with septic shock and necrotizing pancreatitis. History of - alcohol use, anemia, hyperglycemia. Wound care consulted for sacral and abdominal wounds. Patient seen in MICU, critically ill, alert and oriented x 4 cooperative and agreeable for the assessment. Patient is on 2 vasopressors, has NGT in place and is currently NPO. Patient confirms POA status of the wounds to the sacrum and abdomen, stating, "I have had those wounds", but is unsure of when they began.  Patient seen with the primary RN. Wounds open to air at time of the assessment. Denies increased pain related to the wounds. Subjective:    Review of Systems   Constitutional: Positive for appetite change, fatigue and fever. Negative for chills. HENT: Negative for congestion and sneezing. Respiratory: Negative for cough. Musculoskeletal: Positive for gait problem. Skin: Positive for wound. Psychiatric/Behavioral: Negative for agitation. Historical Information   Past Medical History:   Diagnosis Date   • Pancreatitis      Past Surgical History:   Procedure Laterality Date   • CYSTOSCOPY N/A 8/5/2023    Procedure: CYSTOSCOPY.   Saunders insertion;  Surgeon: James Palma MD;  Location: [facility];  Service: Urology   • FL RETROGRADE URETHROCYSTOGRAM  8/5/2023   • HERNIA REPAIR     • IR DRAINAGE TUBE CHECK/CHANGE/REPOSITION/REINSERTION/UPSIZE  9/4/2023   • IR DRAINAGE TUBE PLACEMENT  8/5/2023   • IR THORACENTESIS  9/4/2023   • JOINT REPLACEMENT     • TONSILLECTOMY  1976     Social History   Social History     Substance and Sexual Activity   Alcohol Use Not Currently    Comment: quit 7/15/23 (previous 6 drinks per week)     Social History     Substance and Sexual Activity   Drug Use Never     E-Cigarette/Vaping   • E-Cigarette Use Never User      E-Cigarette/Vaping Substances   • Nicotine No    • THC No    • CBD No    • Flavoring No    • Other No    • Unknown No      Social History     Tobacco Use   Smoking Status Never   • Passive exposure: Past   Smokeless Tobacco Former   • Types: Chew   • Quit date: 7/15/2023   Tobacco Comments    Quit 7/15/23     Family History:   Family History   Problem Relation Age of Onset   • Cancer Mother    • Multiple myeloma Mother    • Pancreatic cancer Father    • Cancer Father        Meds/Allergies   current meds:   Current Facility-Administered Medications   Medication Dose Route Frequency   • acetaminophen (TYLENOL) rectal suppository 650 mg  650 mg Rectal Q6H PRN   • cefepime (MAXIPIME) 1,000 mg in dextrose 5 % 50 mL IVPB  1,000 mg Intravenous Q12H   • chlorhexidine (PERIDEX) 0.12 % oral rinse 15 mL  15 mL Mouth/Throat Q12H [address]   • HYDROmorphone (DILAUDID) injection 0.5 mg  0.5 mg Intravenous Q3H PRN   • HYDROmorphone (DILAUDID) injection 0.5 mg  0.5 mg Intravenous Q4H PRN   • HYDROmorphone HCl (DILAUDID) injection 0.2 mg  0.2 mg Intravenous Q3H PRN   • insulin lispro (HumaLOG) 100 units/mL subcutaneous injection 2-12 Units  2-12 Units Subcutaneous Q6H [address]   • metroNIDAZOLE (FLAGYL) IVPB (premix) 500 mg 100 mL  500 mg Intravenous Q8H   • multi-electrolyte (PLASMALYTE-A/ISOLYTE-S PH 7.4) IV solution  75 mL/hr Intravenous Continuous   • norepinephrine (LEVOPHED) 4 mg (STANDARD CONCENTRATION) IV in sodium chloride 0.9% 250 mL  1-30 mcg/min Intravenous Titrated   • vasopressin (PITRESSIN) 20 Units in sodium chloride 0.9 % 100 mL infusion  0.04 Units/min Intravenous Continuous     No Known Allergies    Objective   Vitals: Blood pressure (!) 97/42, pulse 102, temperature 98.5 °F (36.9 °C), resp. rate (!) 25, height 5' 11" (1.803 m), weight 122 kg (268 lb), SpO2 94 %. Wounds:     Wound 09/04/23 Pressure Injury Buttocks (Active)   Wound Image   09/05/23 1329   Wound Length (cm) 6 cm 09/05/23 1329   Wound Width (cm) 4 cm 09/05/23 1329   Wound Depth (cm) 0.2 cm 09/05/23 1329   Wound 09/05/23 MASD Abdomen Anterior; Left (Active)   Wound Image   09/05/23 1328   Wound Length (cm) 1 cm 09/05/23 1328   Wound Width (cm) 20 cm 09/05/23 1328   Wound Depth (cm) 0.2 cm 09/05/23 1328         Physical Exam  Constitutional:       General: He is awake. He is not in acute distress. Appearance: He is obese. He is ill-appearing. He is not diaphoretic. HENT:      Head: Normocephalic and atraumatic.       Right Ear: External ear normal.      Left Ear: External ear normal.      Nose:      Comments: NG tube in place  Eyes:      Conjunctiva/sclera: Conjunctivae normal.   Pulmonary:      Effort: Pulmonary effort is normal. No respiratory distress. Genitourinary:     Comments: Saunders catheter present. Incontinent of bowel. Musculoskeletal:      Comments: Dependent for care. Max assist of two with turning for the assessment. Foam wedges are in use for repositioning. Skin:     General: Skin is warm and dry. Findings: Wound present. No erythema or rash. Comments: 1. Abdominal pannus with scattered irregular shaped full thickness wounds noted along the base of the skin fold- all measured together. Wound beds with mixed pink/yellow tissue, producing small amount of serous sanguinous drainage. Edges fragile and attached, no maceration. Radha-wounds are dry and intact with hyperpigmented skin. Wounds/radha-wounds are non-tender. 2. B/l sacro-buttock - unstageable pressure injury with mixed etiology of MASD. All aspects measured together. Presents as well defined shaped full and partial thickness skin loss with approx: 80% moist yellow slough and 20% moist pink/red tissue, producing small amount of serous sanguineous drainage. Edges fragile and attached without maceration. Periwound's are dry and intact with blanchable pink-colored skin. Wounds tender with cleansing. 3. B/l heels are dry and intact without redness or wounds. No induration, fluctuance, odor, warmth/temperature differences, redness, or purulence noted to the above mentioned wounds and skin areas assessed. New dressings applied as noted above. Patient tolerated assessment well- denies pain and no s/s of non-verbal pain or discomfort observed during the encounter. Neurological:      Mental Status: He is alert and oriented to person, place, and time. Gait: Gait abnormal.   Psychiatric:         Behavior: Behavior is cooperative. Lab, Imaging and other studies: I have personally reviewed pertinent reports.       Code Status: Level 1 - Full Code      Counseling / Coordination of Care  Total time spent today:    Total time (face-to-face and non-face-to-face) spent on today's visit was 32 minutes. This includes preparation for the visits (H&P on 9/4/23, and critical care note on 9/5/23) performance of a medically appropriate history and examination, and orders for medications/treatments or testing. Discussed assessment findings, and plan of care/recommendations with patients RN. GHASSAN Callahan, LISSETTEC, DEIDRA      Portions of the record may have been created with voice recognition software. Occasional wrong word or "sound a like" substitutions may have occurred due to the inherent limitations of voice recognition software.   Read the chart carefully and recognize, using context, where substitutions have occurred

## 2023-09-05 NOTE — PROGRESS NOTES
Progress Note - General Surgery   Quyen Harris 46 y.o. male MRN: [id number]  Unit/Bed#: MICU 07 Encounter: [id number]    Assessment:  Patient is a 55-year-old man who presented to the ER with leakage around his IR drainage tube. He was admitted from 8/4 to 8/11 for necrotizing pancreatitis and was discharged with an IR drainage tube in the area. 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 He was doing well at home until he started to have increased drainage and was feeling generally weak so he came into the ER where he was found to have sepsis and significantly worsening necrotizing pancreatitis with a necrotic collection on CT scan on 9/4. He also had severe gastric distention and colonic distention likely due to inflammatory and phlegmon mechanical compression. Patient was treated for sepsis with IV fluids and meropenem. His IR drain was upsized to a 16 [place] tube on 9/4. Also he had a left thoracentesis performed with 1.2 L of fluid removed by IR. Overnight, patient improved with resuscitation. His lactate cleared to 1.9 from 2.4 yesterday. He is afebrile but remains mildly tachycardic. He also has remained hypotensive and is still requiring Levophed 8, and vasopressin 0.4. KUB this morning shows increased bowel edema and distention. His white blood cell count is up to 13.8 from 8.4 yesterday but his hemoglobin is stable at 7.3. He also has an JERZY with a creatinine of 2.15 with his last creatinine at his last admission being 0.9. Patient did have delirium overnight and did not sleep much so he is very lethargic this morning. We will continue to monitor his mental status. Blood cultures are positive for Klebsiella aerogenes.      Plan:  Continue multimodal pain control  Continue vasopressors to maintain MAP greater than or equal to 65  Wean nasal cannula oxygen as patient is not oxygen dependent at home  Continue antibiotics, currently on meropenem  Consulted infectious disease for further recommendations  Consider restarting therapeutic heparin in light of his SMV thrombosis if no procedures are planned today  Would likely benefit from a paracentesis when more stable due to his large volume ascites  Consulted GI for decompressive colonoscopy, awaiting recommendations  Continue Isolyte at 150/h while n.p.o. Continue NG tube drainage as it put out 975 overnight  Monitor urine output in light of his acute renal failure  Monitor abscess drain output  Continuous central venous catheter, arterial line, and coudé catheter during resuscitative phase of his illness  Monitor fever curve and white blood cell count  Continue sliding scale insulin for glucose control    Subjective/Objective   Chief Complaint: Swelling, abdominal discomfort, drainage from around his IR drain site    Subjective: Patient is very lethargic this morning and will only open his eyes to very loud verbal stimuli and was able to mumble some words but otherwise is very lethargic. Apparently he did not sleep at all last night due to his delirium. We will continue to evaluate his mental status to ensure that it improves throughout the course of the morning after he is able to sleep some. Objective:     Blood pressure (!) 97/42, pulse 98, temperature 98.6 °F (37 °C), temperature source Axillary, resp. rate (!) 26, height 5' 11" (1.803 m), weight 122 kg (268 lb 4.8 oz), SpO2 98 %. ,Body mass index is 37.42 kg/m². Intake/Output Summary (Last 24 hours) at 9/5/2023 0937  Last data filed at 9/5/2023 0800  Gross per 24 hour   Intake 5404.14 ml   Output 3685 ml   Net 1719.14 ml       Invasive Devices     Central Venous Catheter Line  Duration           CVC Central Lines 09/04/23 Triple Left Femoral <1 day          Peripheral Intravenous Line  Duration           Peripheral IV 08/08/23 Dorsal (posterior); Left Wrist 28 days    Peripheral IV 09/04/23 Distal;Left;Upper;Ventral (anterior) Arm 1 day    Peripheral IV 09/04/23 Distal;Right;Upper;Ventral (anterior) Arm <1 day          Arterial Line  Duration           Arterial Line 09/05/23 Radial <1 day          Drain  Duration           Abscess Drain RUQ <1 day    NG/OG/Enteral Tube Nasogastric 18 Fr Right nare <1 day    Urethral Catheter Coude <1 day                Physical Exam: BP (!) 97/42   Pulse 98   Temp 98.6 °F (37 °C) (Axillary)   Resp (!) 26   Ht 5' 11" (1.803 m)   Wt 122 kg (268 lb 4.8 oz)   SpO2 98%   BMI 37.42 kg/m²     General appearance: Obese, lethargic, opens eyes to loud verbal stimuli  HEENT: Normocephalic, atraumatic, pupils equal, round, reactive, no jaundice, mucous membranes moist, no JVD noted  Cardiac: Tachycardia, no murmurs  Lungs: Clear to auscultation bilaterally, on 3 L of oxygen  Abdomen: Obese, soft, distended, IR drain site clean, dry, and intact extremities: Normal, atraumatic, 2+ pitting edema in all extremities  Skin: Pale, cool, no rashes or other wounds:  Neuro: Lethargic, GCS 12      Lab, Imaging and other studies:I have personally reviewed pertinent lab results.     VTE Pharmacologic Prophylaxis: Reason for no pharmacologic prophylaxis Possible procedure today  VTE Mechanical Prophylaxis: sequential compression device     Tila Bowens MD

## 2023-09-05 NOTE — PROCEDURES
Arterial Line Insertion    Date/Time: 9/5/2023 12:14 AM    Performed by: Mariajose Roman PA-C  Authorized by: Mariajose Roman PA-C    Patient location:  Bedside  Consent:     Consent obtained:  Verbal    Consent given by:  Patient  Universal protocol:     Patient identity confirmed:  Verbally with patient and hospital-assigned identification number  Indications:     Indications: hemodynamic monitoring and multiple ABGs    Pre-procedure details:     Skin preparation:  Chlorhexidine    Preparation: Patient was prepped and draped in sterile fashion    Anesthesia (see MAR for exact dosages): Anesthesia method:  Local infiltration    Local anesthetic:  Lidocaine 1% w/o epi (3 mL)  Procedure details:     Location / Tip of Catheter:  Radial    Laterality:  Left    Placement technique:  Percutaneous    Number of attempts:  1    Successful placement: yes      Transducer: waveform confirmed    Post-procedure details:     Post-procedure:  Biopatch applied, sutured and wrist guard applied    Patient tolerance of procedure:   Tolerated well, no immediate complications

## 2023-09-05 NOTE — PROGRESS NOTES
[address] [facility]  Progress Note: Critical Care  Name: Edwardo Turpin 46 y.o. male I MRN: [id number]  Unit/Bed#: MICU 07 I Date of Admission: 9/4/2023   Date of Service: 9/5/2023 I Hospital Day: 1    Assessment/Plan   Neuro:   • Diagnosis: Pain  ? Plan:   - Dilaudid 0.2/0.5mg q3 for mod/severe pain  ? Delirium precautions, maintain sleep-wake cycle, CAM-ICU        CV:   • Diagnosis: Septic shock  ? Plan:   - Maintain MAP>65  - Lactic acid cleared  - On levo/vaso, wean pressors as able  - Continue to monitor endpoints,improving     Pulm:  • Diagnosis: Acute hypoxic respiratory insufficiency likely 2/2 sepsis and reactive b/l pleural effusions  ? Plan:   - Maintain spO2>92%  - Airway clearance protocol  - IS and aggressive pulmonary toilet  • Diagnosis: B/l pleural effusions likely reactive 2/2 pancreatitis  ? Plan:  - S/p L IR thoracentesis of 1.2L  - F/u pleural cultures  - CXR improving, continue to monitor        GI:   • Diagnosis: Infected necrotizing pancreatitis  ? 9/5 CTAP-Significant worsening of necrotizing pancreatitis w/ large acute collection extening to pararenal spaces containing large amount of gas concerning for infection  ? Plan:   - NPO/NGT  - Monitor abd exam  - General surgery c/s  - Abx as described in ID  - Now s/p IR upsize to 16F.  - Monitor drain output and character  • Diagnosis:Large bowel obstruction  ? 9/5 CTAP-Inflammatory mass along the left lateral pararenal gutter w/ involvement of proximal to mid descending colon  ? Plan  - GI c/s->discuss possibility of decompression  - KUB with largely distended colon >12cm  - Continue NGT  - Monitor abd exam        :   • Diagnosis: JERZY  ? Plan:   - Saunders placement  - Strict I/Os  - Monitor BUN/Cr        F/E/N:   ? [email or web address]  ? E: replace prn K>4, Mg>2, P>3  ? N: NPO        Heme/Onc:   • Diagnosis: non-occlusive SMV thrombus  ?  Plan:   - On eliquis (last dose 9/4) not reversed  - Consider restarting hep gtt given stable hgb  • Diagnosis: Anemia  ? Plan  - Monitor hgb  - Monitor s/s of bleeding  - Transfuse for hgb <7  • Diagnosis: Thrombocytosis   ? Plan  - Likely reactive 2/2 pancreatitis  - Monitor  • Diagnosis: DVT ppx  ? Plan  - SQH, SCDs        Endo:   • Diagnosis: Hyperglycemia  ? Plan:   - SSI  - BS goal 140-180        ID:   • Diagnosis: Infected necrotizing pancreatitis, GNR bactremia  ? Plan:   - Continue meropenum   - F/u IR and blood cultures  - Consider ID c/s        MSK/Skin:   ? Pressure point offloading, PT/OT, turns/repositioning  Disposition: Critical care    ICU Core Measures     A: Assess, Prevent, and Manage Pain · Has pain been assessed? Yes  · Need for changes to pain regimen? No   B: Both SAT/SAT  · N/A   C: Choice of Sedation · RASS Goal: 0 Alert and Calm or N/A patient not on sedation  · Need for changes to sedation or analgesia regimen? NA   D: Delirium · CAM-ICU: Negative   E: Early Mobility  · Plan for early mobility? Yes   F: Family Engagement · Plan for family engagement today? Yes       Antibiotic Review: Awaiting culture results. and Continue broad spectrum secondary to severity of illness. Review of Invasive Devices: Nicky Plan: Continue for accurate I/O monitoring for 48 hours  Central access plan: Medications requiring central line Hemodynamic monitoring  Rody Plan: Keep arterial line for hemodynamic monitoring, frequent ABGs and frequent labs    Prophylaxis:  VTE VTE covered by:  heparin (porcine), Subcutaneous, 5,000 Units at 09/05/23 0737       Stress Ulcer  not ordered          Subjective   HPI/24hr events: Received 4L total crystalloid and 1L albumin yesterday. Started on levo/vaso. Denies any pain this morning. No nausea or vomiting. Passing flatus    . Review of Systems   Constitutional: Positive for chills and fever. Respiratory: Negative for shortness of breath. Cardiovascular: Negative for chest pain.    Gastrointestinal: Positive for abdominal distention. Negative for abdominal pain, nausea and vomiting. Neurological: Positive for weakness. All other systems reviewed and are negative. Objective                            Vitals I/O      Most Recent Min/Max in 24hrs   Temp 98.6 °F (37 °C) Temp  Min: 98.2 °F (36.8 °C)  Max: 100.9 °F (38.3 °C)   Pulse 104 Pulse  Min: 98  Max: 130   Resp (!) 26 Resp  Min: 16  Max: 33   BP (!) 97/42 BP  Min: 77/45  Max: 114/81   O2 Sat 100 % SpO2  Min: 91 %  Max: 100 %      Intake/Output Summary (Last 24 hours) at 9/5/2023 0536  Last data filed at 9/5/2023 0400  Gross per 24 hour   Intake 4493.39 ml   Output 3045 ml   Net 1448.39 ml         Diet NPO     Invasive Monitoring Physical exam   Arterial Line  [place] /50  Arterial Line BP  Min: 94/42  Max: 132/52   MAP 70 mmHg  Arterial Line MAP (mmHg)  Min: 56 mmHg  Max: 74 mmHg    Physical Exam  Eyes:      Extraocular Movements: Extraocular movements intact. Conjunctiva/sclera: Conjunctivae normal.   Skin:     General: Skin is warm. HENT:      Head: Normocephalic and atraumatic. Mouth/Throat:      Mouth: Mucous membranes are dry. Cardiovascular:      Rate and Rhythm: Tachycardia present. Abdominal:      General: There is distension. Palpations: Abdomen is soft. Tenderness: There is no abdominal tenderness. There is no guarding. Comments: IR drain with brown/purulent output   Constitutional:       General: He is not in acute distress. Appearance: He is ill-appearing. Pulmonary:      Effort: Tachypnea present. Comments: 3L NC    Neurological:      General: No focal deficit present. Mental Status: He is alert and oriented to person, place and time. Genitourinary/Anorectal:  FoleyVitals and nursing note reviewed.           Diagnostic Studies      EKG:   Imaging:  I have personally reviewed pertinent films in PACS     Medications:  Scheduled PRN   calcium gluconate, 2 g, Once  chlorhexidine, 15 mL, Q12H ALEX  heparin (porcine), 5,000 Units, Q8H [address]  insulin lispro, 2-12 Units, Q6H ALEX  meropenem, 1,000 mg, Q12H  norepinephrine, ,       acetaminophen, 650 mg, Q6H PRN  HYDROmorphone, 0.5 mg, Q3H PRN  HYDROmorphone, 0.5 mg, Q4H PRN  HYDROmorphone, 0.2 mg, Q3H PRN  norepinephrine, ,        Continuous    multi-electrolyte, 150 mL/hr, Last Rate: 150 mL/hr (09/04/23 2135)  norepinephrine, 1-30 mcg/min, Last Rate: 8 mcg/min (09/05/23 0453)  vasopressin, 0.04 Units/min, Last Rate: 0.04 Units/min (09/05/23 0228)         Labs:    CBC    Recent Labs     09/04/23  1444 09/04/23 2021 09/05/23 0229   WBC 12.39* 8.64 13.83*   HGB 7.9* 7.3* 7.3*   HCT 25.1* 23.2* 22.7*   * 428* 482*   BANDSPCT 14*  --   --      BMP    Recent Labs     09/04/23 2021 09/05/23 0229   SODIUM 132* 135   K 4.4 4.4   CL 95* 96   CO2 28 24   AGAP 9 15   BUN 97* 91*   CREATININE 2.09* 2.15*   CALCIUM 7.1* 7.6*       Coags    Recent Labs     09/04/23  1502   INR 2.00*   PTT 37        Additional Electrolytes  Recent Labs     09/04/23 2021 09/05/23 0228 09/05/23 0229   MG 2.3  --  2.3   PHOS 5.2*  --  5.3*   CAIONIZED 0.96* 0.99*  --           Blood Gas    Recent Labs     09/05/23 0102   PHART 7.462*   VKT8XKG 32.6*   PO2ART 65.2*   OQO4TED 22.8   BEART -0.8   SOURCE Line, Arterial     Recent Labs     09/05/23  0011 09/05/23 0102   PHVEN 7.420*  --    LSD5SKZ 40.4*  --    PO2VEN 36.3  --    YBY2QCB 25.6  --    BEVEN 1.1  --    SOURCE  --  Line, Arterial    LFTs  Recent Labs     09/04/23 2021 09/05/23  0229   ALT 18 16   AST 26 22   ALKPHOS 52 45   ALB 2.2* 2.4*   TBILI 0.61 0.88       Infectious  Recent Labs     09/04/23  1502   PROCALCITONI 2.58*     Glucose  Recent Labs     09/04/23  1444 09/04/23 2021 09/05/23  0229   GLUC 114 100 112               Critical Care Time Delivered: Upon my evaluation, this patient had a high probability of imminent or life-threatening deterioration due to septic shock, infected necrotizing pancreatitis, which required my direct attention, intervention, and personal management. I have personally provided 45 minutes of critical care time, exclusive of procedures, teaching, family meetings, and any prior time recorded by providers other than myself.      Estefany Reynolds, DO

## 2023-09-06 ENCOUNTER — ANESTHESIA (INPATIENT)
Dept: RADIOLOGY | Facility: HOSPITAL | Age: 53
End: 2023-09-06
Payer: COMMERCIAL

## 2023-09-06 ENCOUNTER — APPOINTMENT (INPATIENT)
Dept: RADIOLOGY | Facility: HOSPITAL | Age: 53
DRG: 853 | End: 2023-09-06
Payer: COMMERCIAL

## 2023-09-06 ENCOUNTER — APPOINTMENT (INPATIENT)
Dept: RADIOLOGY | Facility: HOSPITAL | Age: 53
DRG: 853 | End: 2023-09-06
Attending: RADIOLOGY
Payer: COMMERCIAL

## 2023-09-06 ENCOUNTER — ANESTHESIA EVENT (INPATIENT)
Dept: RADIOLOGY | Facility: HOSPITAL | Age: 53
End: 2023-09-06
Payer: COMMERCIAL

## 2023-09-06 LAB
ALBUMIN FLD-MCNC: <1.5 G/DL
ALBUMIN SERPL BCP-MCNC: 2.4 G/DL (ref 3.5–5)
ALP SERPL-CCNC: 43 U/L (ref 34–104)
ALT SERPL W P-5'-P-CCNC: 10 U/L (ref 7–52)
ANION GAP SERPL CALCULATED.3IONS-SCNC: 11 MMOL/L
ANION GAP SERPL CALCULATED.3IONS-SCNC: 8 MMOL/L
APPEARANCE FLD: ABNORMAL
APTT PPP: 41 SECONDS (ref 23–37)
APTT PPP: 46 SECONDS (ref 23–37)
AST SERPL W P-5'-P-CCNC: 13 U/L (ref 13–39)
BACTERIA SPEC ANAEROBE CULT: ABNORMAL
BACTERIA SPEC ANAEROBE CULT: ABNORMAL
BILIRUB SERPL-MCNC: 0.54 MG/DL (ref 0.2–1)
BUN SERPL-MCNC: 62 MG/DL (ref 5–25)
BUN SERPL-MCNC: 79 MG/DL (ref 5–25)
CALCIUM ALBUM COR SERPL-MCNC: 9.1 MG/DL (ref 8.3–10.1)
CALCIUM SERPL-MCNC: 7.6 MG/DL (ref 8.4–10.2)
CALCIUM SERPL-MCNC: 7.8 MG/DL (ref 8.4–10.2)
CHLORIDE SERPL-SCNC: 102 MMOL/L (ref 96–108)
CHLORIDE SERPL-SCNC: 98 MMOL/L (ref 96–108)
CO2 SERPL-SCNC: 28 MMOL/L (ref 21–32)
CO2 SERPL-SCNC: 29 MMOL/L (ref 21–32)
COLOR FLD: YELLOW
CREAT SERPL-MCNC: 1.3 MG/DL (ref 0.6–1.3)
CREAT SERPL-MCNC: 1.62 MG/DL (ref 0.6–1.3)
ERYTHROCYTE [DISTWIDTH] IN BLOOD BY AUTOMATED COUNT: 16.6 % (ref 11.6–15.1)
ERYTHROCYTE [DISTWIDTH] IN BLOOD BY AUTOMATED COUNT: 16.6 % (ref 11.6–15.1)
ERYTHROCYTE [DISTWIDTH] IN BLOOD BY AUTOMATED COUNT: 16.8 % (ref 11.6–15.1)
GFR SERPL CREATININE-BSD FRML MDRD: 48 ML/MIN/1.73SQ M
GFR SERPL CREATININE-BSD FRML MDRD: 62 ML/MIN/1.73SQ M
GLUCOSE FLD-MCNC: 155 MG/DL
GLUCOSE SERPL-MCNC: 151 MG/DL (ref 65–140)
GLUCOSE SERPL-MCNC: 155 MG/DL (ref 65–140)
GLUCOSE SERPL-MCNC: 156 MG/DL (ref 65–140)
GLUCOSE SERPL-MCNC: 192 MG/DL (ref 65–140)
GLUCOSE SERPL-MCNC: 211 MG/DL (ref 65–140)
GLUCOSE SERPL-MCNC: 220 MG/DL (ref 65–140)
GLUCOSE SERPL-MCNC: 220 MG/DL (ref 65–140)
GLUCOSE SERPL-MCNC: <20 MG/DL (ref 65–140)
HCT VFR BLD AUTO: 21.6 % (ref 36.5–49.3)
HCT VFR BLD AUTO: 24.4 % (ref 36.5–49.3)
HCT VFR BLD AUTO: 24.7 % (ref 36.5–49.3)
HCT VFR BLD AUTO: 25.5 % (ref 36.5–49.3)
HGB BLD-MCNC: 6.7 G/DL (ref 12–17)
HGB BLD-MCNC: 7.5 G/DL (ref 12–17)
HGB BLD-MCNC: 7.6 G/DL (ref 12–17)
HGB BLD-MCNC: 7.8 G/DL (ref 12–17)
LACTATE SERPL-SCNC: 1.3 MMOL/L (ref 0.5–2)
LDH FLD L TO P-CCNC: 240 U/L
LYMPHOCYTES NFR BLD AUTO: 1 %
MAGNESIUM SERPL-MCNC: 2.3 MG/DL (ref 1.9–2.7)
MCH RBC QN AUTO: 25.8 PG (ref 26.8–34.3)
MCH RBC QN AUTO: 25.9 PG (ref 26.8–34.3)
MCH RBC QN AUTO: 26.4 PG (ref 26.8–34.3)
MCHC RBC AUTO-ENTMCNC: 30.4 G/DL (ref 31.4–37.4)
MCHC RBC AUTO-ENTMCNC: 30.6 G/DL (ref 31.4–37.4)
MCHC RBC AUTO-ENTMCNC: 31 G/DL (ref 31.4–37.4)
MCV RBC AUTO: 84 FL (ref 82–98)
MCV RBC AUTO: 85 FL (ref 82–98)
MCV RBC AUTO: 85 FL (ref 82–98)
MONOCYTES NFR BLD AUTO: 8 %
NEUTS SEG NFR BLD AUTO: 91 %
PHOSPHATE SERPL-MCNC: 4.7 MG/DL (ref 2.7–4.5)
PLATELET # BLD AUTO: 330 THOUSANDS/UL (ref 149–390)
PLATELET # BLD AUTO: 344 THOUSANDS/UL (ref 149–390)
PLATELET # BLD AUTO: 396 THOUSANDS/UL (ref 149–390)
PMV BLD AUTO: 9 FL (ref 8.9–12.7)
PMV BLD AUTO: 9.1 FL (ref 8.9–12.7)
PMV BLD AUTO: 9.6 FL (ref 8.9–12.7)
POTASSIUM SERPL-SCNC: 3.1 MMOL/L (ref 3.5–5.3)
POTASSIUM SERPL-SCNC: 3.7 MMOL/L (ref 3.5–5.3)
PROT FLD-MCNC: 3.1 G/DL
PROT SERPL-MCNC: 5.5 G/DL (ref 6.4–8.4)
RBC # BLD AUTO: 2.54 MILLION/UL (ref 3.88–5.62)
RBC # BLD AUTO: 2.9 MILLION/UL (ref 3.88–5.62)
RBC # BLD AUTO: 3.02 MILLION/UL (ref 3.88–5.62)
SITE: ABNORMAL
SODIUM SERPL-SCNC: 137 MMOL/L (ref 135–147)
SODIUM SERPL-SCNC: 139 MMOL/L (ref 135–147)
TOTAL CELLS COUNTED SPEC: 100
WBC # BLD AUTO: 7.96 THOUSAND/UL (ref 4.31–10.16)
WBC # BLD AUTO: 8.77 THOUSAND/UL (ref 4.31–10.16)
WBC # BLD AUTO: 9.56 THOUSAND/UL (ref 4.31–10.16)
WBC # FLD MANUAL: 3404 /UL

## 2023-09-06 PROCEDURE — C1769 GUIDE WIRE: HCPCS

## 2023-09-06 PROCEDURE — 87205 SMEAR GRAM STAIN: CPT

## 2023-09-06 PROCEDURE — 85730 THROMBOPLASTIN TIME PARTIAL: CPT | Performed by: STUDENT IN AN ORGANIZED HEALTH CARE EDUCATION/TRAINING PROGRAM

## 2023-09-06 PROCEDURE — 76604 US EXAM CHEST: CPT | Performed by: RADIOLOGY

## 2023-09-06 PROCEDURE — 49083 ABD PARACENTESIS W/IMAGING: CPT | Performed by: NURSE PRACTITIONER

## 2023-09-06 PROCEDURE — 83605 ASSAY OF LACTIC ACID: CPT | Performed by: STUDENT IN AN ORGANIZED HEALTH CARE EDUCATION/TRAINING PROGRAM

## 2023-09-06 PROCEDURE — 74018 RADEX ABDOMEN 1 VIEW: CPT

## 2023-09-06 PROCEDURE — 82948 REAGENT STRIP/BLOOD GLUCOSE: CPT

## 2023-09-06 PROCEDURE — 99233 SBSQ HOSP IP/OBS HIGH 50: CPT | Performed by: STUDENT IN AN ORGANIZED HEALTH CARE EDUCATION/TRAINING PROGRAM

## 2023-09-06 PROCEDURE — 85027 COMPLETE CBC AUTOMATED: CPT | Performed by: STUDENT IN AN ORGANIZED HEALTH CARE EDUCATION/TRAINING PROGRAM

## 2023-09-06 PROCEDURE — 87205 SMEAR GRAM STAIN: CPT | Performed by: SURGERY

## 2023-09-06 PROCEDURE — 99291 CRITICAL CARE FIRST HOUR: CPT | Performed by: STUDENT IN AN ORGANIZED HEALTH CARE EDUCATION/TRAINING PROGRAM

## 2023-09-06 PROCEDURE — 82150 ASSAY OF AMYLASE: CPT | Performed by: STUDENT IN AN ORGANIZED HEALTH CARE EDUCATION/TRAINING PROGRAM

## 2023-09-06 PROCEDURE — 82945 GLUCOSE OTHER FLUID: CPT

## 2023-09-06 PROCEDURE — 99232 SBSQ HOSP IP/OBS MODERATE 35: CPT | Performed by: INTERNAL MEDICINE

## 2023-09-06 PROCEDURE — 83615 LACTATE (LD) (LDH) ENZYME: CPT

## 2023-09-06 PROCEDURE — P9016 RBC LEUKOCYTES REDUCED: HCPCS

## 2023-09-06 PROCEDURE — 87070 CULTURE OTHR SPECIMN AEROBIC: CPT | Performed by: SURGERY

## 2023-09-06 PROCEDURE — 30233N1 TRANSFUSION OF NONAUTOLOGOUS RED BLOOD CELLS INTO PERIPHERAL VEIN, PERCUTANEOUS APPROACH: ICD-10-PCS | Performed by: STUDENT IN AN ORGANIZED HEALTH CARE EDUCATION/TRAINING PROGRAM

## 2023-09-06 PROCEDURE — C1894 INTRO/SHEATH, NON-LASER: HCPCS

## 2023-09-06 PROCEDURE — 49406 IMAGE CATH FLUID PERI/RETRO: CPT | Performed by: RADIOLOGY

## 2023-09-06 PROCEDURE — 87040 BLOOD CULTURE FOR BACTERIA: CPT | Performed by: INTERNAL MEDICINE

## 2023-09-06 PROCEDURE — 87186 SC STD MICRODIL/AGAR DIL: CPT | Performed by: SURGERY

## 2023-09-06 PROCEDURE — 85018 HEMOGLOBIN: CPT | Performed by: STUDENT IN AN ORGANIZED HEALTH CARE EDUCATION/TRAINING PROGRAM

## 2023-09-06 PROCEDURE — 84100 ASSAY OF PHOSPHORUS: CPT | Performed by: STUDENT IN AN ORGANIZED HEALTH CARE EDUCATION/TRAINING PROGRAM

## 2023-09-06 PROCEDURE — 87070 CULTURE OTHR SPECIMN AEROBIC: CPT

## 2023-09-06 PROCEDURE — C1729 CATH, DRAINAGE: HCPCS

## 2023-09-06 PROCEDURE — 87077 CULTURE AEROBIC IDENTIFY: CPT | Performed by: SURGERY

## 2023-09-06 PROCEDURE — 89051 BODY FLUID CELL COUNT: CPT

## 2023-09-06 PROCEDURE — 87076 CULTURE ANAEROBE IDENT EACH: CPT | Performed by: SURGERY

## 2023-09-06 PROCEDURE — 87075 CULTR BACTERIA EXCEPT BLOOD: CPT | Performed by: SURGERY

## 2023-09-06 PROCEDURE — 88112 CYTOPATH CELL ENHANCE TECH: CPT | Performed by: PATHOLOGY

## 2023-09-06 PROCEDURE — 49083 ABD PARACENTESIS W/IMAGING: CPT

## 2023-09-06 PROCEDURE — 83735 ASSAY OF MAGNESIUM: CPT | Performed by: STUDENT IN AN ORGANIZED HEALTH CARE EDUCATION/TRAINING PROGRAM

## 2023-09-06 PROCEDURE — 82042 OTHER SOURCE ALBUMIN QUAN EA: CPT

## 2023-09-06 PROCEDURE — 88305 TISSUE EXAM BY PATHOLOGIST: CPT | Performed by: PATHOLOGY

## 2023-09-06 PROCEDURE — 87185 SC STD ENZYME DETCJ PER NZM: CPT | Performed by: SURGERY

## 2023-09-06 PROCEDURE — 85014 HEMATOCRIT: CPT | Performed by: STUDENT IN AN ORGANIZED HEALTH CARE EDUCATION/TRAINING PROGRAM

## 2023-09-06 PROCEDURE — 80048 BASIC METABOLIC PNL TOTAL CA: CPT | Performed by: STUDENT IN AN ORGANIZED HEALTH CARE EDUCATION/TRAINING PROGRAM

## 2023-09-06 PROCEDURE — 0W9G30Z DRAINAGE OF PERITONEAL CAVITY WITH DRAINAGE DEVICE, PERCUTANEOUS APPROACH: ICD-10-PCS | Performed by: RADIOLOGY

## 2023-09-06 PROCEDURE — 84157 ASSAY OF PROTEIN OTHER: CPT

## 2023-09-06 PROCEDURE — 0W9G3ZZ DRAINAGE OF PERITONEAL CAVITY, PERCUTANEOUS APPROACH: ICD-10-PCS | Performed by: RADIOLOGY

## 2023-09-06 PROCEDURE — 80053 COMPREHEN METABOLIC PANEL: CPT | Performed by: STUDENT IN AN ORGANIZED HEALTH CARE EDUCATION/TRAINING PROGRAM

## 2023-09-06 RX ORDER — ROCURONIUM BROMIDE 10 MG/ML
INJECTION, SOLUTION INTRAVENOUS AS NEEDED
Status: DISCONTINUED | OUTPATIENT
Start: 2023-09-06 | End: 2023-09-06

## 2023-09-06 RX ORDER — PROPOFOL 10 MG/ML
INJECTION, EMULSION INTRAVENOUS AS NEEDED
Status: DISCONTINUED | OUTPATIENT
Start: 2023-09-06 | End: 2023-09-06

## 2023-09-06 RX ORDER — LIDOCAINE HYDROCHLORIDE 10 MG/ML
INJECTION, SOLUTION EPIDURAL; INFILTRATION; INTRACAUDAL; PERINEURAL AS NEEDED
Status: COMPLETED | OUTPATIENT
Start: 2023-09-06 | End: 2023-09-06

## 2023-09-06 RX ORDER — INSULIN LISPRO 100 [IU]/ML
2-12 INJECTION, SOLUTION INTRAVENOUS; SUBCUTANEOUS
Status: DISCONTINUED | OUTPATIENT
Start: 2023-09-06 | End: 2023-09-19 | Stop reason: HOSPADM

## 2023-09-06 RX ORDER — OXYCODONE HYDROCHLORIDE 5 MG/1
5 TABLET ORAL EVERY 6 HOURS PRN
Status: DISCONTINUED | OUTPATIENT
Start: 2023-09-06 | End: 2023-09-19 | Stop reason: HOSPADM

## 2023-09-06 RX ORDER — ONDANSETRON 2 MG/ML
INJECTION INTRAMUSCULAR; INTRAVENOUS AS NEEDED
Status: DISCONTINUED | OUTPATIENT
Start: 2023-09-06 | End: 2023-09-06

## 2023-09-06 RX ORDER — VASOPRESSIN 20 U/ML
INJECTION PARENTERAL AS NEEDED
Status: DISCONTINUED | OUTPATIENT
Start: 2023-09-06 | End: 2023-09-06

## 2023-09-06 RX ORDER — ALBUMIN (HUMAN) 12.5 G/50ML
25 SOLUTION INTRAVENOUS ONCE AS NEEDED
Status: COMPLETED | OUTPATIENT
Start: 2023-09-06 | End: 2023-09-06

## 2023-09-06 RX ORDER — SODIUM CHLORIDE 9 MG/ML
INJECTION, SOLUTION INTRAVENOUS CONTINUOUS PRN
Status: DISCONTINUED | OUTPATIENT
Start: 2023-09-06 | End: 2023-09-06

## 2023-09-06 RX ORDER — SUCCINYLCHOLINE/SOD CL,ISO/PF 100 MG/5ML
SYRINGE (ML) INTRAVENOUS AS NEEDED
Status: DISCONTINUED | OUTPATIENT
Start: 2023-09-06 | End: 2023-09-06

## 2023-09-06 RX ORDER — OXYCODONE HYDROCHLORIDE 10 MG/1
10 TABLET ORAL EVERY 6 HOURS PRN
Status: DISCONTINUED | OUTPATIENT
Start: 2023-09-06 | End: 2023-09-19 | Stop reason: HOSPADM

## 2023-09-06 RX ORDER — ACETAMINOPHEN 160 MG/5ML
650 SUSPENSION ORAL EVERY 4 HOURS PRN
Status: DISCONTINUED | OUTPATIENT
Start: 2023-09-06 | End: 2023-09-06

## 2023-09-06 RX ORDER — SODIUM CHLORIDE 9 MG/ML
10 INJECTION INTRAVENOUS DAILY
Qty: 900 ML | Refills: 0 | Status: SHIPPED | OUTPATIENT
Start: 2023-09-06 | End: 2023-12-05

## 2023-09-06 RX ORDER — INSULIN LISPRO 100 [IU]/ML
5 INJECTION, SOLUTION INTRAVENOUS; SUBCUTANEOUS
Status: DISCONTINUED | OUTPATIENT
Start: 2023-09-06 | End: 2023-09-19 | Stop reason: HOSPADM

## 2023-09-06 RX ORDER — POTASSIUM CHLORIDE 29.8 MG/ML
40 INJECTION INTRAVENOUS ONCE
Status: COMPLETED | OUTPATIENT
Start: 2023-09-06 | End: 2023-09-07

## 2023-09-06 RX ORDER — ACETAMINOPHEN 325 MG/1
650 TABLET ORAL EVERY 6 HOURS PRN
Status: DISCONTINUED | OUTPATIENT
Start: 2023-09-06 | End: 2023-09-19 | Stop reason: HOSPADM

## 2023-09-06 RX ADMIN — NOREPINEPHRINE BITARTRATE 7 MCG/MIN: 1 SOLUTION INTRAVENOUS at 22:03

## 2023-09-06 RX ADMIN — ONDANSETRON 4 MG: 2 INJECTION INTRAMUSCULAR; INTRAVENOUS at 20:51

## 2023-09-06 RX ADMIN — SUGAMMADEX 400 MG: 100 INJECTION, SOLUTION INTRAVENOUS at 21:05

## 2023-09-06 RX ADMIN — POTASSIUM CHLORIDE 40 MEQ: 29.8 INJECTION, SOLUTION INTRAVENOUS at 23:00

## 2023-09-06 RX ADMIN — METRONIDAZOLE 500 MG: 500 INJECTION, SOLUTION INTRAVENOUS at 04:37

## 2023-09-06 RX ADMIN — PROPOFOL 80 MG: 10 INJECTION, EMULSION INTRAVENOUS at 19:20

## 2023-09-06 RX ADMIN — ROCURONIUM BROMIDE 20 MG: 10 INJECTION, SOLUTION INTRAVENOUS at 19:26

## 2023-09-06 RX ADMIN — VASOPRESSIN 1 UNITS: 20 INJECTION, SOLUTION INTRAMUSCULAR; SUBCUTANEOUS at 19:40

## 2023-09-06 RX ADMIN — METRONIDAZOLE 500 MG: 500 INJECTION, SOLUTION INTRAVENOUS at 12:59

## 2023-09-06 RX ADMIN — LIDOCAINE HYDROCHLORIDE 20 ML: 10 INJECTION, SOLUTION EPIDURAL; INFILTRATION; INTRACAUDAL; PERINEURAL at 19:58

## 2023-09-06 RX ADMIN — CHLORHEXIDINE GLUCONATE 15 ML: 1.2 SOLUTION ORAL at 08:03

## 2023-09-06 RX ADMIN — CEFEPIME 2000 MG: 2 INJECTION, POWDER, FOR SOLUTION INTRAVENOUS at 11:43

## 2023-09-06 RX ADMIN — SODIUM CHLORIDE, SODIUM GLUCONATE, SODIUM ACETATE, POTASSIUM CHLORIDE, MAGNESIUM CHLORIDE, SODIUM PHOSPHATE, DIBASIC, AND POTASSIUM PHOSPHATE 75 ML/HR: .53; .5; .37; .037; .03; .012; .00082 INJECTION, SOLUTION INTRAVENOUS at 08:07

## 2023-09-06 RX ADMIN — INSULIN DETEMIR 7 UNITS: 100 INJECTION, SOLUTION SUBCUTANEOUS at 22:38

## 2023-09-06 RX ADMIN — ALBUMIN (HUMAN) 25 G: 0.25 INJECTION, SOLUTION INTRAVENOUS at 15:53

## 2023-09-06 RX ADMIN — LIDOCAINE HYDROCHLORIDE 10 ML: 10 INJECTION, SOLUTION EPIDURAL; INFILTRATION; INTRACAUDAL; PERINEURAL at 14:50

## 2023-09-06 RX ADMIN — Medication 120 MG: at 19:20

## 2023-09-06 RX ADMIN — INSULIN LISPRO 4 UNITS: 100 INJECTION, SOLUTION INTRAVENOUS; SUBCUTANEOUS at 05:56

## 2023-09-06 RX ADMIN — INSULIN LISPRO 2 UNITS: 100 INJECTION, SOLUTION INTRAVENOUS; SUBCUTANEOUS at 22:01

## 2023-09-06 RX ADMIN — NOREPINEPHRINE BITARTRATE 7 MCG/MIN: 1 SOLUTION INTRAVENOUS at 04:38

## 2023-09-06 RX ADMIN — CEFEPIME 2000 MG: 2 INJECTION, POWDER, FOR SOLUTION INTRAVENOUS at 18:10

## 2023-09-06 RX ADMIN — INSULIN LISPRO 2 UNITS: 100 INJECTION, SOLUTION INTRAVENOUS; SUBCUTANEOUS at 16:35

## 2023-09-06 RX ADMIN — ROCURONIUM BROMIDE 20 MG: 10 INJECTION, SOLUTION INTRAVENOUS at 19:44

## 2023-09-06 RX ADMIN — CHLORHEXIDINE GLUCONATE 15 ML: 1.2 SOLUTION ORAL at 22:38

## 2023-09-06 RX ADMIN — PHENYLEPHRINE HYDROCHLORIDE 100 MCG: 10 INJECTION INTRAVENOUS at 19:20

## 2023-09-06 RX ADMIN — PHENYLEPHRINE HYDROCHLORIDE 100 MCG: 10 INJECTION INTRAVENOUS at 19:43

## 2023-09-06 RX ADMIN — CEFEPIME 1000 MG: 1 INJECTION, POWDER, FOR SOLUTION INTRAMUSCULAR; INTRAVENOUS at 00:43

## 2023-09-06 RX ADMIN — SODIUM CHLORIDE: 0.9 INJECTION, SOLUTION INTRAVENOUS at 19:16

## 2023-09-06 RX ADMIN — INSULIN LISPRO 4 UNITS: 100 INJECTION, SOLUTION INTRAVENOUS; SUBCUTANEOUS at 11:38

## 2023-09-06 NOTE — PHYSICAL THERAPY NOTE
Physical Therapy Cancellation Note       09/06/23 1052   PT Last Visit   PT Visit Date 09/06/23   Note Type   Note type Cancelled Session; Evaluation   Cancel Reasons Medical status  (Per physican patient is planned for procedure today for upsizing of drain.  PT to be held until post procedure.)     Froylan Haq PT, DPT

## 2023-09-06 NOTE — PLAN OF CARE
Problem: PAIN - ADULT  Goal: Verbalizes/displays adequate comfort level or baseline comfort level  Description: Interventions:  - Encourage patient to monitor pain and request assistance  - Assess pain using appropriate pain scale  - Administer analgesics based on type and severity of pain and evaluate response  - Implement non-pharmacological measures as appropriate and evaluate response  - Consider cultural and social influences on pain and pain management  - Notify physician/advanced practitioner if interventions unsuccessful or patient reports new pain  Outcome: Progressing     Problem: INFECTION - ADULT  Goal: Absence or prevention of progression during hospitalization  Description: INTERVENTIONS:  - Assess and monitor for signs and symptoms of infection  - Monitor lab/diagnostic results  - Monitor all insertion sites, i.e. indwelling lines, tubes, and drains  - Monitor endotracheal if appropriate and nasal secretions for changes in amount and color  - Seagraves appropriate cooling/warming therapies per order  - Administer medications as ordered  - Instruct and encourage patient and family to use good hand hygiene technique  - Identify and instruct in appropriate isolation precautions for identified infection/condition  Outcome: Progressing  Goal: Absence of fever/infection during neutropenic period  Description: INTERVENTIONS:  - Monitor WBC    Outcome: Progressing     Problem: SAFETY ADULT  Goal: Patient will remain free of falls  Description: INTERVENTIONS:  - Educate patient/family on patient safety including physical limitations  - Instruct patient to call for assistance with activity   - Consult OT/PT to assist with strengthening/mobility   - Keep Call bell within reach  - Keep bed low and locked with side rails adjusted as appropriate  - Keep care items and personal belongings within reach  - Initiate and maintain comfort rounds  - Make Fall Risk Sign visible to staff  - Offer Toileting every 2 Hours, in advance of need  - Initiate/Maintain alarm  - Obtain necessary fall risk management equipment  - Apply yellow socks and bracelet for high fall risk patients  - Consider moving patient to room near nurses station  Outcome: Progressing  Goal: Maintain or return to baseline ADL function  Description: INTERVENTIONS:  -  Assess patient's ability to carry out ADLs; assess patient's baseline for ADL function and identify physical deficits which impact ability to perform ADLs (bathing, care of mouth/teeth, toileting, grooming, dressing, etc.)  - Assess/evaluate cause of self-care deficits   - Assess range of motion  - Assess patient's mobility; develop plan if impaired  - Assess patient's need for assistive devices and provide as appropriate  - Encourage maximum independence but intervene and supervise when necessary  - Involve family in performance of ADLs  - Assess for home care needs following discharge   - Consider OT consult to assist with ADL evaluation and planning for discharge  - Provide patient education as appropriate  Outcome: Progressing  Goal: Maintains/Returns to pre admission functional level  Description: INTERVENTIONS:  - Perform BMAT or MOVE assessment daily.   - Set and communicate daily mobility goal to care team and patient/family/caregiver. - Collaborate with rehabilitation services on mobility goals if consulted  - Perform Range of Motion 3 times a day. - Reposition patient every 2 hours.   - Dangle patient 3 times a day  - Stand patient 3 times a day  - Ambulate patient 3 times a day  - Out of bed to chair 3 times a day   - Out of bed for meals 3 times a day  - Out of bed for toileting  - Record patient progress and toleration of activity level   Outcome: Progressing     Problem: DISCHARGE PLANNING  Goal: Discharge to home or other facility with appropriate resources  Description: INTERVENTIONS:  - Identify barriers to discharge w/patient and caregiver  - Arrange for needed discharge resources and transportation as appropriate  - Identify discharge learning needs (meds, wound care, etc.)  - Arrange for interpretive services to assist at discharge as needed  - Refer to Case Management Department for coordinating discharge planning if the patient needs post-hospital services based on physician/advanced practitioner order or complex needs related to functional status, cognitive ability, or social support system  Outcome: Progressing     Problem: Knowledge Deficit  Goal: Patient/family/caregiver demonstrates understanding of disease process, treatment plan, medications, and discharge instructions  Description: Complete learning assessment and assess knowledge base. Interventions:  - Provide teaching at level of understanding  - Provide teaching via preferred learning methods  Outcome: Progressing     Problem: MOBILITY - ADULT  Goal: Maintain or return to baseline ADL function  Description: INTERVENTIONS:  -  Assess patient's ability to carry out ADLs; assess patient's baseline for ADL function and identify physical deficits which impact ability to perform ADLs (bathing, care of mouth/teeth, toileting, grooming, dressing, etc.)  - Assess/evaluate cause of self-care deficits   - Assess range of motion  - Assess patient's mobility; develop plan if impaired  - Assess patient's need for assistive devices and provide as appropriate  - Encourage maximum independence but intervene and supervise when necessary  - Involve family in performance of ADLs  - Assess for home care needs following discharge   - Consider OT consult to assist with ADL evaluation and planning for discharge  - Provide patient education as appropriate  Outcome: Progressing  Goal: Maintains/Returns to pre admission functional level  Description: INTERVENTIONS:  - Perform BMAT or MOVE assessment daily.   - Set and communicate daily mobility goal to care team and patient/family/caregiver.    - Collaborate with rehabilitation services on mobility goals if consulted  - Perform Range of Motion 3 times a day. - Reposition patient every 2 hours.   - Dangle patient 3 times a day  - Stand patient 3 times a day  - Ambulate patient 3 times a day  - Out of bed to chair 3 times a day   - Out of bed for meals 3 times a day  - Out of bed for toileting  - Record patient progress and toleration of activity level   Outcome: Progressing     Problem: Prexisting or High Potential for Compromised Skin Integrity  Goal: Skin integrity is maintained or improved  Description: INTERVENTIONS:  - Identify patients at risk for skin breakdown  - Assess and monitor skin integrity  - Assess and monitor nutrition and hydration status  - Monitor labs   - Assess for incontinence   - Turn and reposition patient  - Assist with mobility/ambulation  - Relieve pressure over bony prominences  - Avoid friction and shearing  - Provide appropriate hygiene as needed including keeping skin clean and dry  - Evaluate need for skin moisturizer/barrier cream  - Collaborate with interdisciplinary team   - Patient/family teaching  - Consider wound care consult   Outcome: Progressing

## 2023-09-06 NOTE — BRIEF OP NOTE (RAD/CATH)
IR PARACENTESIS Procedure Note    PATIENT NAME: Leonardo Reaves  : 1970  MRN: [id number]    Pre-op Diagnosis:   1. Necrotizing pancreatitis    2. Colonic obstruction ([address])    3. Bacteremia    4. Pressure injury of sacral region, unstageable ([address])      Post-op Diagnosis:   1. Necrotizing pancreatitis    2. Colonic obstruction ([address])    3. Bacteremia    4.  Pressure injury of sacral region, unstageable [facility])        Provider:   Dave Shaver, [address] [place] [address]  Assistants:     No qualified resident was available, Resident is only observing    Estimated Blood Loss: none     Findings: 2900 mL serous ascites, RLQ    Specimens: sent for labs as ordered     Complications:  None immediate     Anesthesia: local    GHASSAN Jolly     Date: 2023  Time: 3:59 PM

## 2023-09-06 NOTE — SEDATION DOCUMENTATION
Bedside paracentesis completed by Nan Sandifer. Patient tolerated procedure well. 2900mL of cloudy yellow fluid drained. Band-aid over abdominal puncture site. Specimen sent to lab.

## 2023-09-06 NOTE — ANESTHESIA PREPROCEDURE EVALUATION
Procedure:  IR DRAINAGE TUBE PLACEMENT    Pancreatic collection s/p drain 9/4, now for secondary drain  - superinfection of necrosis w/ refractory septic shock    Lines  - Radial A-line, Left triple lumen CVC, 20G PIV x2    Airway  - Pleural effusion s/p pleurocentesis   - Recollection bilateral - stable  - Tachypneic, but SpO2 91-94% on RA    Meds  NEpi gtt 4 mcg/min    Relevant Problems   CARDIO   (+) Primary hypertension      GI/HEPATIC   (+) Necrotizing pancreatitis      /RENAL   (+) JERZY (acute kidney injury) ([address])      PULMONARY   (+) Pleural effusion, left        Physical Exam    Airway    Mallampati score: III  TM Distance: >3 FB  Neck ROM: full     Dental   No notable dental hx     Cardiovascular  Rhythm: regular, Rate: normal, Cardiovascular exam normal    Pulmonary  Pulmonary exam normal Breath sounds clear to auscultation,     Other Findings        Anesthesia Plan  ASA Score- 4 Emergent    Anesthesia Type- general with ASA Monitors. Additional Monitors:   Airway Plan: ETT. Comment: Risks of general anesthesia discussed including likely possibility of PONV and sore throat, as well as the rare possibilities of aspiration, dental/oropharyngeal/ocular injuries, or grave/life threatening anesthetic and surgical emergencies. .       Plan Factors-Exercise tolerance (METS): >4 METS. Chart reviewed. Patient summary reviewed. Patient instructed to abstain from smoking on day of procedure. Patient did not smoke on day of surgery. Induction- intravenous. Postoperative Plan- Plan for postoperative opioid use. Planned trial extubation    Informed Consent- Anesthetic plan and risks discussed with patient. I personally reviewed this patient with the CRNA. Discussed and agreed on the Anesthesia Plan with the CRNA. Guille Alejandre

## 2023-09-06 NOTE — QUICK NOTE
IR has been previously consulted for this patient during hospital stay. Ongoing discussions today between IR GI and surgical critical care regarding best management of this patient's peripancreatic collection. After in person review of imaging and the case between IR attendings and critical care team, plan to move forward with second IR guided drain placement for pancreatic collection.

## 2023-09-06 NOTE — PROGRESS NOTES
[address] [facility]  Progress Note: Critical Care  Name: Mihai Duckworth 46 y.o. male I MRN: [id number]  Unit/Bed#: MICU 07 I Date of Admission: 9/4/2023   Date of Service: 9/6/2023 I Hospital Day: 2    Assessment/Plan   Neuro:   • Diagnosis: Pain  ? Plan:   ? Tylenol 650 q6 PRN for mild pain  ? Dilaudid 0.5mg q3PRN for severe pain  ? Dilaudid 0.5 mg q4 PRN for breakthrough pain  ? Dilaudid 0.2mg q3 PRN for moderate pain  ? Delirium precautions, maintain sleep-wake cycle, CAM-ICU    CV:   • Diagnosis: Septic shock  ? Plan:   - Maintain MAP>65  - Lactic acid cleared  - On levo, wean as able  - Continue to monitor endpoints    Pulm:  • Diagnosis: Acute hypoxic respiratory insufficiency likely 2/2 sepsis and reactive b/l pleural effusions  ? Plan:   - Maintain spO2>92%  - Airway clearance protocol  - IS and aggressive pulmonary toilet  • Diagnosis: B/l pleural effusions likely reactive 2/2 pancreatitis  ? Plan:  - S/p L IR thoracentesis of 1.2L  - F/u pleural cultures  - CXR improving, continue to monitor    GI:   • Diagnosis: Infected necrotizing pancreatitis  ? 9/5 CTAP-Significant worsening of necrotizing pancreatitis w/ large acute collection extening to pararenal spaces containing large amount of gas concerning for infection  ? Plan:   - NPO/NGT  - Monitor abd exam  - General surgery c/s  - Abx as described in ID  - Now s/p IR upsize to 16F.  - Monitor drain output and character  • Diagnosis:Large bowel obstruction  ? 9/5 CTAP-Inflammatory mass along the left lateral pararenal gutter w/ involvement of proximal to mid descending colon  ? Plan  - GI c/s->discuss possibility of decompression  - KUB with largely distended colon >12cm  - Continue NGT  - Monitor abd exam    :   • Diagnosis: JERZY  ? Plan:   - Saunders placement  - Strict I/Os  - Monitor BUN/Cr  - Improved from 2.15 to 1.62 on 9/6    F/E/N:   ? F: [email or web address]  ? E: replace prn K>4, Mg>2, P>3  ?  N: clear liquid diet    Heme/Onc:   • Diagnosis: non-occlusive SMV thrombus  ? Plan:   - On eliquis (last dose 9/4) not reversed  - Consider restarting hep gtt given stable hgb  • Diagnosis: Anemia  ? Plan  - Monitor hgb  - Decreased from 7.3 to 6.7 on 9/6, will transfuse  - Monitor s/s of bleeding  - Transfuse for hgb <7  • Diagnosis: Thrombocytosis   ? Plan  - Likely reactive 2/2 pancreatitis  - Monitor  - Improved from 482 to 396 on 9/6  • Diagnosis: DVT ppx  ? Plan  - SQH, SCDs    Endo:   • Diagnosis: Hyperglycemia  ? Plan:   - SSI  - BS goal 140-180    ID:   • Diagnosis: Infected necrotizing pancreatitis, GNR bactremia  ? Plan:   - Cefepime 1 g q12 hours  - Flagyl 500 mg q8 hours  - F/u IR and blood cultures    MSK/Skin:   ? Pressure point offloading, PT/OT, turns/repositioning    Disposition: Critical care    ICU Core Measures     A: Assess, Prevent, and Manage Pain · Has pain been assessed? Yes  · Need for changes to pain regimen? No   B: Both SAT/SAT  · N/A   C: Choice of Sedation · RASS Goal: 0 Alert and Calm  · Need for changes to sedation or analgesia regimen? No   D: Delirium · CAM-ICU: Negative   E: Early Mobility  · Plan for early mobility? Yes   F: Family Engagement · Plan for family engagement today? Yes       Antibiotic Review: Awaiting culture results. Review of Invasive Devices: Nicky Plan: Continue for accurate I/O monitoring for 48 hours  Central access plan: Medications requiring central line Hemodynamic monitoring  Rody Plan: Keep arterial line for hemodynamic monitoring, frequent ABGs and frequent labs    Prophylaxis:  VTE VTE covered by:  heparin (porcine), Intravenous, 20 Units/kg/hr at 09/06/23 0546       Stress Ulcer  not ordered          Subjective   HPI:  52M w/ hx of necrotizing pancreatitis and non-occlusive SMV thrombus. Patient has been admitted multiple times dating back to 7/24/2023. Originally patient presented 9/4 for concerns for leakage around his previous IR drain site.  He reports that he has had fatigue, abdominal pain, and bloating since previous morning. He states he has not had any appetite for over a week. He reports he is really not able to keep anything down. Endorses flatus and bowel movements daily. Currently on eliquis for his smv thrombus. 24hr events:   Restarted heparin overnight, morning hemoglobin dropped to 6.7    Review of Systems   Respiratory: Negative for shortness of breath. Cardiovascular: Positive for leg swelling. Negative for chest pain. Gastrointestinal: Positive for abdominal distention. Negative for abdominal pain. All other systems reviewed and are negative. Objective                            Vitals I/O      Most Recent Min/Max in 24hrs   Temp 99.3 °F (37.4 °C) Temp  Min: 98.5 °F (36.9 °C)  Max: 99.3 °F (37.4 °C)   Pulse 100 Pulse  Min: 92  Max: 102   Resp (!) 25 Resp  Min: 18  Max: 29   BP (!) 135/48 BP  Min: 97/42  Max: 135/48   O2 Sat 98 % SpO2  Min: 93 %  Max: 99 %      Intake/Output Summary (Last 24 hours) at 2023 0738  Last data filed at 2023 0600  Gross per 24 hour   Intake 4044.04 ml   Output 3485 ml   Net 559.04 ml         Diet Clear Liquid     Invasive Monitoring Physical exam   Arterial Line  Rody /50  Arterial Line BP  Min: 116/44  Max: 146/48   MAP 72 mmHg  Arterial Line MAP (mmHg)  Min: 56 mmHg  Max: 74 mmHg    Physical Exam  Eyes:      Extraocular Movements: Extraocular movements intact. Pupils: Pupils are equal, round, and reactive to light. HENT:      Head: Normocephalic and atraumatic. Cardiovascular:      Rate and Rhythm: Normal rate. Pulses: Normal pulses. Musculoskeletal:         General: Swelling present. Right lower le+ Edema present. Left lower le+ Edema present. Abdominal:      General: There is distension. Tenderness: There is no abdominal tenderness.       Comments: IR drain left upper abdomen   Constitutional:       Interventions: He is not sedated and not intubated. Pulmonary:      Effort: He is not intubated. Neurological:      General: No focal deficit present. Mental Status: He is alert and oriented to person, place and time. Genitourinary/Anorectal:  FoleyVitals and nursing note reviewed. Diagnostic Studies      EKG:   Imaging: No new imaging I have personally reviewed pertinent reports. and I have personally reviewed pertinent films in PACS     Medications:  Scheduled PRN   cefepime, 1,000 mg, Q12H  chlorhexidine, 15 mL, Q12H [facility]  insulin lispro, 2-12 Units, Q6H [facility]  metroNIDAZOLE, 500 mg, Q8H      acetaminophen, 650 mg, Q6H PRN  HYDROmorphone, 0.5 mg, Q3H PRN  HYDROmorphone, 0.5 mg, Q4H PRN  HYDROmorphone, 0.2 mg, Q3H PRN       Continuous    heparin (porcine), 3-30 Units/kg/hr (Order-Specific), Last Rate: 20 Units/kg/hr (09/06/23 0546)  multi-electrolyte, 75 mL/hr, Last Rate: 75 mL/hr (09/05/23 2040)  norepinephrine, 1-30 mcg/min, Last Rate: 7 mcg/min (09/06/23 0438)  vasopressin, 0.04 Units/min, Last Rate: Stopped (09/05/23 1601)         Labs:    CBC    Recent Labs     09/04/23  1444 09/04/23 2021 09/05/23 0229 09/06/23  0450   WBC 12.39*   < > 13.83* 9.56   HGB 7.9*   < > 7.3* 6.7*   HCT 25.1*   < > 22.7* 21.6*   *   < > 482* 396*   BANDSPCT 14*  --   --   --     < > = values in this interval not displayed.      BMP    Recent Labs     09/05/23 0229 09/06/23 0450   SODIUM 135 137   K 4.4 3.7   CL 96 98   CO2 24 28   AGAP 15 11   BUN 91* 79*   CREATININE 2.15* 1.62*   CALCIUM 7.6* 7.8*       Coags    Recent Labs     09/04/23  1502 09/05/23  2319 09/06/23  0450   INR 2.00*  --   --    PTT 37 41* 46*        Additional Electrolytes  Recent Labs     09/04/23 2021 09/05/23 0228 09/05/23 0229 09/06/23 0450   MG 2.3  --  2.3 2.3   PHOS 5.2*  --  5.3* 4.7*   CAIONIZED 0.96* 0.99*  --   --           Blood Gas    Recent Labs     09/05/23  0102   PHART 7.462*   [id number] 32.6*   PO2ART 65.2*   PWO2MQZ 22.8   BEART -0.8   SOURCE Line, Arterial     Recent Labs     09/05/23  0011 09/05/23  0102   PHVEN 7.420*  --    DQZ8SPC 40.4*  --    PO2VEN 36.3  --    ZSW1QEN 25.6  --    BEVEN 1.1  --    SOURCE  --  Line, Arterial    LFTs  Recent Labs     09/05/23 0229 09/06/23  0450   ALT 16 10   AST 22 13   ALKPHOS 45 43   ALB 2.4* 2.4*   TBILI 0.88 0.54       Infectious  Recent Labs     09/04/23  1502   PROCALCITONI 2.58*     Glucose  Recent Labs     09/04/23  1444 09/04/23  2021 09/05/23 0229 09/06/23  0450   GLUC 114 100 112 192*                   Loraine Cunningham, DO

## 2023-09-06 NOTE — OCCUPATIONAL THERAPY NOTE
OT CANCEL NOTE    OT orders received. Chart reviewed. Pt is pending IR for drain upsize; requested to hold OT prior to procedure. Will hold initial OT evaluation. Will continue to follow pt on caseload and see pt when medically stable and as clinically appropriate. 09/06/23 1108   OT Last Visit   OT Visit Date 09/06/23   Note Type   Note type Evaluation; Cancelled Session   Cancel Reasons Medical status       Domingo Green MS, OTR/L

## 2023-09-06 NOTE — PROGRESS NOTES
Progress Note - General Surgery   Raghavendra Langford 46 y.o. male MRN: [id number]  Unit/Bed#: MICU 07 Encounter: [id number]    Assessment:  Patient is a 68-year-old man who presented to the ER with leakage around his IR drainage tube. He was admitted from 8/4 to 8/11 for necrotizing pancreatitis and was discharged with an IR drainage tube in the area. 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 He was doing well at home until he started to have increased drainage and was feeling generally weak so he came into the ER where he was found to have sepsis and significantly worsening necrotizing pancreatitis with a necrotic collection on CT scan on 9/4. He also had severe gastric distention and colonic distention likely due to inflammatory and phlegmon mechanical compression. Patient was treated for sepsis with IV fluids and meropenem. His IR drain was upsized to a 16 [place] tube on 9/4. Also he had a left thoracentesis performed with 1.2 L of fluid removed by IR. Overnight, patient has improved. He is significantly more awake and alert. Vasopressin is now off, but he remains on Levophed 7 mcg/min. He continues to have abdominal distention which is slightly worse than it was yesterday but he has had 4 small bowel movements. Urine output is 2310 in the past 24 hours. Abscess drain is putting out 320 of brownish drainage. His JERZY is improving with a creatinine of 1.62 today (2.15 yesterday), and a BUN of 79. His white blood cell count is downtrending at 9.6 (13.8 yesterday). Patient does have an acute anemia with a hemoglobin of 6.7 (7.3 yesterday). Blood cultures are positive for Klebsiella aerogenes.    Thoracentesis cultures are no growth to date  Abdominal drain cultures are growing gram-positive cocci and gram-negative bacteria    Plan:  Transfuse 1 unit of packed red blood cells  Continue multimodal pain control  Continue vasopressors to maintain MAP greater than or equal to 65  --Hope to wean Levophed after blood transfusion  Wean nasal cannula oxygen as patient is not oxygen dependent at home  Continue antibiotics, currently on cefepime and Flagyl  --Appreciate infectious disease recommendations   Restarted heparin drip for his SMV thrombosis  IR consultation for paracentesis  GI declined doing a decompressive colonoscopy but would like to perform a cyst gastrostomy when the patient is more stable and off vasopressors. Continue clear liquid diet  Monitor urine output in light of his acute renal failure  Monitor abscess drain output  Continuous central venous catheter, arterial line, and coudé catheter during resuscitative phase of his illness  Monitor fever curve and white blood cell count  Continue sliding scale insulin for glucose control    Subjective/Objective   Chief Complaint: Abdominal discomfort, bloating    Subjective: Patient is significantly more awake this morning. He does complain of increased abdominal distention with some mild discomfort but no appreciable pain. He denies any chest pain or shortness of breath. Objective:     Blood pressure (!) 135/48, pulse 100, temperature 99.3 °F (37.4 °C), temperature source Oral, resp. rate (!) 23, height 5' 11" (1.803 m), weight 122 kg (269 lb 10 oz), SpO2 98 %. ,Body mass index is 37.6 kg/m². Intake/Output Summary (Last 24 hours) at 9/6/2023 0840  Last data filed at 9/6/2023 0600  Gross per 24 hour   Intake 3660.04 ml   Output 3150 ml   Net 510.04 ml       Invasive Devices     Central Venous Catheter Line  Duration           CVC Central Lines 09/04/23 Triple Left Femoral 1 day          Peripheral Intravenous Line  Duration           Peripheral IV 08/08/23 Dorsal (posterior); Left Wrist 29 days    Peripheral IV 09/04/23 Distal;Left;Upper;Ventral (anterior) Arm 2 days    Peripheral IV 09/04/23 Distal;Right;Upper;Ventral (anterior) Arm 1 day          Arterial Line  Duration Arterial Line 09/05/23 Radial 1 day          Drain  Duration           Abscess Drain RUQ 1 day    Urethral Catheter Coude 1 day                Physical Exam: BP (!) 135/48   Pulse 100   Temp 99.3 °F (37.4 °C) (Oral)   Resp (!) 23   Ht 5' 11" (1.803 m)   Wt 122 kg (269 lb 10 oz)   SpO2 98%   BMI 37.60 kg/m²     General appearance: Obese, lethargic, opens eyes to loud verbal stimuli  HEENT: Normocephalic, atraumatic, pupils equal, round, reactive, no jaundice, mucous membranes moist, no JVD noted  Cardiac: Tachycardia, no murmurs  Lungs: Clear to auscultation bilaterally, on 3 L of oxygen  Abdomen: Obese, soft, distended, IR drain site clean, dry, and intact extremities: Normal, atraumatic, 2+ pitting edema in all extremities  Skin: Pale, cool, no rashes or other wounds:  Neuro: A&O x4      Lab, Imaging and other studies:I have personally reviewed pertinent lab results.     VTE Pharmacologic Prophylaxis: Sequential compression device (Venodyne)  and Heparin  VTE Mechanical Prophylaxis: sequential compression device     Yuly Castillo MD

## 2023-09-06 NOTE — PROGRESS NOTES
[facility] - Inpatient Progress Note    PATIENT INFORMATION      Jami Theodore 46 y.o. male MRN: [id number]  Unit/Bed#: MICU 07 Encounter: [id number]    ASSESSMENT & PLAN   60-year-old male with past medical history of recent admission for gallstone necrotizing pancreatitis complicated by fluid collection status post IR drain placement, obesity who was admitted for sepsis. GI is consulted for management of necrotizing pancreatitis as well as dilated colon noted on imaging.     1. Necrotizing gallstone pancreatitis  2. Infected necrosis  3. Bacteremia  4. Abdominal pain  5. Septic shock  6. SMV thrombus  Blood cultures growing gram-negative rods. Remains on low-dose Levophed. CT shows worsening fluid collection. IR drain output minimal.  Suspect infected necrosis as source of sepsis given enlarging collection and gram-negative bacteremia. Mass effect from phlegmonous mass likely contributing to gastric distention. • Plan for EUS/cystogastrostomy once patient becomes more hemodynamically stable off pressor therapy. • Appreciate ID consult - agree with meropenem  • Recommend discussion with IR in regards to potentially upsizing vs repositioning tube with increased size of collection and lack of output  • Blood cultures positive for 1/2 with Klebsiella and Strep  • Repeat blood cultures pending  • Okay to progress diet from GI perspective. • NG tube trial per surgery  • IV fluids per primary team.  • Monitor hemodynamics  • Monitor CBC     7. Dilated colon  Likely secondary to mass effect from extensive inflammatory changes noted around the pancreas. Fortunately patient displays benign exam and having bowel movements making obstruction unlikely. Transverse colon maximal diameter is 13 on CT scan. Appears to be 12 cm today which is improved.   Lactic acid now normal.  • No indication for decompressive colonoscopy at this time  • Repeat abdominal XR today  • Recommend aggressive bowel regimen with MiraLAX twice daily  • Dulcolax rectal suppositories as needed  • Mineral oil enemas as needed. 8. Anemia  Most recent baseline Hgb 7-8 throughout hospitalization in setting of necrotic pancreatitis  · Hgb this AM 6.7 and got 1U PRBC with appropriate response  · No over S/S of GIB  · Possibly component of necrotizing pancreatitis  · Continue to monitor and transfuse for <7      SUBJECTIVE     Patient seen and evaluated at bedside. Feeling improved.  Abdominal distension improved    MEDICATIONS & ALLERGIES       Medications:   Medications Prior to Admission   Medication   • acetaminophen (TYLENOL) 325 mg tablet   • Alcohol Swabs 70 % PADS   • apixaban (Eliquis) 5 mg   • BD PosiFlush 0.9 % SOLN   • Blood Glucose Monitoring Suppl (OneTouch Verio Reflect) w/Device KIT   • Blood Pressure Monitoring (Adult Blood Pressure Cuff Lg) KIT   • furosemide (LASIX) 20 mg tablet   • glucose blood (OneTouch Verio) test strip   • Insulin Glargine Solostar (Lantus SoloStar) 100 UNIT/ML SOPN   • insulin lispro (HumaLOG KwikPen) 100 units/mL injection pen   • Insulin Pen Needle (BD Pen Needle Lupe 2nd Gen) 32G X 4 MM MISC   • Insulin Pen Needle (BD Pen Needle Lupe 2nd Gen) 32G X 4 MM MISC   • Levemir FlexPen 100 units/mL injection pen   • lisinopril (ZESTRIL) 10 mg tablet   • NovoLOG FlexPen 100 units/mL injection pen   • OneTouch Delica Lancets 36Q MISC   • pancrelipase, Lip-Prot-Amyl, (CREON) 6,000 units delayed release capsule   • sodium chloride, PF, 0.9 %     Current Facility-Administered Medications   Medication Dose Route Frequency   • acetaminophen (TYLENOL) rectal suppository 650 mg  650 mg Rectal Q6H PRN   • cefepime (MAXIPIME) 1,000 mg in dextrose 5 % 50 mL IVPB  1,000 mg Intravenous Q12H   • chlorhexidine (PERIDEX) 0.12 % oral rinse 15 mL  15 mL Mouth/Throat Q12H ALEX   • heparin (porcine) 25,000 units in 0.45% NaCl 250 mL infusion (premix)  3-30 Units/kg/hr (Order-Specific) Intravenous Titrated   • HYDROmorphone (DILAUDID) injection 0.5 mg  0.5 mg Intravenous Q3H PRN   • HYDROmorphone (DILAUDID) injection 0.5 mg  0.5 mg Intravenous Q4H PRN   • HYDROmorphone HCl (DILAUDID) injection 0.2 mg  0.2 mg Intravenous Q3H PRN   • insulin lispro (HumaLOG) 100 units/mL subcutaneous injection 2-12 Units  2-12 Units Subcutaneous Q6H [address]   • metroNIDAZOLE (FLAGYL) IVPB (premix) 500 mg 100 mL  500 mg Intravenous Q8H   • multi-electrolyte (PLASMALYTE-A/ISOLYTE-S PH 7.4) IV solution  75 mL/hr Intravenous Continuous   • norepinephrine (LEVOPHED) 4 mg (STANDARD CONCENTRATION) IV in sodium chloride 0.9% 250 mL  1-30 mcg/min Intravenous Titrated   • vasopressin (PITRESSIN) 20 Units in sodium chloride 0.9 % 100 mL infusion  0.04 Units/min Intravenous Continuous       Allergies:   No Known Allergies    PHYSICAL EXAM     Objective   Blood pressure (!) 135/48, pulse 100, temperature 99.2 °F (37.3 °C), temperature source Oral, resp. rate (!) 28, height 5' 11" (1.803 m), weight 122 kg (269 lb 10 oz), SpO2 98 %. Body mass index is 37.6 kg/m². Intake/Output Summary (Last 24 hours) at 9/6/2023 0901  Last data filed at 9/6/2023 0843  Gross per 24 hour   Intake 3960.04 ml   Output 3150 ml   Net 810.04 ml       General Appearance:   Alert, cooperative, no distress   HEENT:   Normocephalic, atraumatic, anicteric     Neck:   Supple, symmetrical, trachea midline   Lungs:   Equal chest rise, respirations unlabored    Heart:   Regular rate and rhythm   Abdomen:   Distended, no tenderness; normal bowel sounds; no masses, no organomegaly    Rectal:   Deferred    Extremities:   No cyanosis, clubbing. + edema    Neuro:    Moves all 4 extremities    Skin:   No jaundice, rashes, or lesions      ADDITIONAL DATA     Lab Results:     Results from last 7 days   Lab Units 09/06/23  0450 09/04/23 2021 09/04/23  1444   WBC Thousand/uL 9.56   < > 12.39*   HEMOGLOBIN g/dL 6.7*   < > 7.9*   HEMATOCRIT % 21.6*   < > 25.1*   PLATELETS Thousands/uL 396*   < > 493*   LYMPHO PCT %  --   --  1*   MONO PCT %  --   --  6   EOS PCT %  --   --  1    < > = values in this interval not displayed. Results from last 7 days   Lab Units 09/06/23  0450   POTASSIUM mmol/L 3.7   CHLORIDE mmol/L 98   CO2 mmol/L 28   BUN mg/dL 79*   CREATININE mg/dL 1.62*   CALCIUM mg/dL 7.8*   ALK PHOS U/L 43   ALT U/L 10   AST U/L 13     Results from last 7 days   Lab Units 09/04/23  1502   INR  2.00*       Imaging:    XR abdomen 1 view kub    Result Date: 9/5/2023  Narrative: ABDOMEN INDICATION:   monitor colonic distention. COMPARISON: CT abdomen pelvis with contrast 9/4/2023. VIEWS:  AP supine Images: 3 FINDINGS: Nonweighted enteric tube sidehole overlies distal stomach and tip in proximal second portion of duodenal region. Surgical drain overlies the epigastric region. Probable Saunders catheter overlies pelvic region. Left femoral approach central venous catheter tip overlies left iliac vessel region. There is a nonobstructive bowel gas pattern. Similar persistent gaseous distention of visualized ascending and transverse colon. No discernible free air on this supine study. Upright or left lateral decubitus imaging is more sensitive to detect subtle free air in the appropriate setting. No pathologic calcifications or soft tissue masses. Bibasilar atelectasis (left worse than right). Bilateral total hip arthroplasty. Spinal degenerative changes. Impression: Devices as detailed below: -Nonweighted enteric tube sidehole overlies distal stomach and tip in proximal second portion of duodenal region. -Surgical drain overlies the epigastric region. -Probable Saunders catheter overlies pelvic region. -Left femoral approach central venous catheter tip overlies left iliac vessel region. Similar persistent gaseous distention of visualized ascending and transverse colon. Bibasilar atelectasis (left worse than right).  Workstation performed: [id number]     Echo complete w/ contrast if indicated    Result Date: 9/5/2023  Narrative: •  Left Ventricle: Left ventricular cavity size is normal. Wall thickness is upper normal. The left ventricular ejection fraction is 60%. Systolic function is normal. Wall motion is normal. Diastolic function is normal. •  Right Ventricle: Right ventricular cavity size is mildly dilated. Systolic function is normal. •  Mitral Valve: There is trace regurgitation. •  Pulmonary Artery: The pulmonary artery systolic pressure is mildly increased. XR chest 1 view portable    Result Date: 9/5/2023  Narrative: CHEST INDICATION:   CP. COMPARISON: Chest radiograph from August 4, 2023 EXAM PERFORMED/VIEWS:  XR CHEST PORTABLE FINDINGS: Cardiomediastinal silhouette appears unremarkable. Subsegmental atelectasis in the perihilar regions and lung bases. Small layering pleural effusions. No pneumothorax. Osseous structures appear within normal limits for patient age. Impression: Small pleural effusions and bibasilar atelectasis. Workstation performed: [id number]     IR drainage tube check/change/reposition/reinsertion/upsize    Result Date: 9/5/2023  Narrative: Examination: Abscess catheter tube reposition/upsize History: Pancreatitis, septic shock, respiratory failure, bilateral pleural effusions larger on the left Patient requires respiratory optimization in order to lay on the fluoroscopy table for drain upsize Contrast type: omnipaque 300   Contrast dose: 10 cc Fluoroscopy time: 1.1 minutes Moderate sedation time: 30 minutes Technique: Risks benefits alternatives discussed informed consent obtained. Risk of bleeding discussed on Eliquis. The patient was identified verbally, and by wrist band." Time out" was performed. Initially patient was optimized with left thoracentesis which is dictated separately. The existing tube was prepped and draped in usual sterile fashion. Lidocaine was given as local anesthesia. Lidocaine was also administered within the tube.  Contrast was injected. The system was opacified. The existing tube was removed over  an Amplatz wire. The tract was dilated and a new 12 [place] skater all-purpose drain was formed Given the patient's body habitus and the depth of the collection the catheter is hubbed to the skin Specimens: Culture aerobic, anaerobic Findings: Ill-defined cavity in the mid abdomen containing previously placed drain. Catheter positioned more deeply and upsized to 12 Irish 30-50 cc of chunky hemorrhagic infected material removed NG tube noted decompressing the stomach with gaseous distention of the transverse colon     Impression: Impression: Upsize and reposition of a pancreatic necrotic collection drain to a 16 Irish pigtail catheter Patient also has ascites leaking around the catheter, he may be a candidate for paracentesis or a peritoneal drain to simply divert and reduce the ascites Workstation performed: [id number]     IR thoracentesis    Result Date: 9/5/2023  Narrative: Ultrasound-guided thoracentesis Clinical History: Pancreatitis, septic shock, respiratory failure, bilateral pleural effusions larger on the left Patient requires respiratory optimization in order to lay on the fluoroscopy table for drain upsize procedure: After explaining the risks and benefits of the procedure to the patient, informed consent was obtained. Ultrasound was used to localize the pleural effusion. The overlying skin was prepped and draped in usual sterile fashion and local anesthesia was obtained with the 1% lidocaine solution. A 5-Irish Yueh needle was advanced until fluid was aspirated under live ultrasound guidance. Approximately  1200   cc of clear, yellow fluid was aspirated.  Specimens: Multiple The patient tolerated the procedure well, and attention was turned to drain upsize which is dictated separately     Impression: Impression: Ultrasound-guided left thoracentesis Workstation performed: [id number]     XR chest portable ICU    Result Date: 9/5/2023  Narrative: CHEST INDICATION:   evaluation after IJ attempted. COMPARISON: 9/4/2023 EXAM PERFORMED/VIEWS:  XR CHEST PORTABLE ICU 2 views FINDINGS: Interval placement of nasogastric tube which terminates below the diaphragm in the mid stomach Cardiomediastinal silhouette appears unremarkable. Persistent opacity at medial right lung base suspicious for pneumonia No pneumothorax or pleural effusion. Osseous structures appear within normal limits for patient age. Impression: Persistent medial right lung base opacity suspicious for pneumonia No pneumothorax Workstation performed: [id number]     CT abdomen pelvis w contrast    Result Date: 9/4/2023  Narrative: CT ABDOMEN AND PELVIS WITH IV CONTRAST INDICATION:   Abdominal abscess/infection suspected r/o peripancreatic abscess, infection. COMPARISON: CT abdomen pelvis 8/18/2023 TECHNIQUE:  CT examination of the abdomen and pelvis was performed. Multiplanar 2D reformatted images were created from the source data. This examination, like all CT scans performed in the [facility], was performed utilizing techniques to minimize radiation dose exposure, including the use of iterative reconstruction and automated exposure control. Radiation dose length product (DLP) for this visit:  3543.98 mGy-cm IV Contrast:  100 mL of iodixanol (VISIPAQUE) Enteric Contrast:  Enteric contrast was not administered. FINDINGS: ABDOMEN LOWER CHEST: Moderate bilateral pleural effusions, increased from prior study with mild bilateral lower lobe compressive atelectasis. Shotty lymph nodes are seen in the bilateral cardiophrenic angles. 2 mm subpleural right middle lobe nodule, unchanged. Small focal airspace opacity within the lingula, new from previous study could represent atelectasis or early pneumonia. LIVER/BILIARY TREE:  Unremarkable. GALLBLADDER: No calcified gallstones.  Pericholecystic inflammatory changes are favored to be secondary to extension from pancreatic primary process. SPLEEN: Small crescentic hypodensity is seen along the medial spleen, unchanged in retrospect. Unclear if this is related to a small infarct or possibly a small component of subcapsular fluid. PANCREAS: There has been development of a significant amount of gas seen within a large acute necrotic collection which is largely replacing the pancreatic parenchyma and extends laterally along the anterior pararenal space bilaterally, left greater than  right. This collection measures about 21 x 5.6 x 18.9 cm transverse, AP and craniocaudal dimensions respectively (series 4 image 74 and 603/96). Pigtail left upper quadrant drainage catheter is seen along the anterior margin of the collection. However, there is a significant amount of gas associated with this collection and elsewhere in the retroperitoneum more so than expected from catheter manipulation alone and is concerning for infected necrosis. The collection extends into the lateral paracolic gutter more caudally and may cross the midline as well such as series 4 image 89. On the left, the collection is associated with thickening of the lateral conal fascia and causes masslike thickening of the proximal to mid descending colon (series 4 images 91- 115). This is believed to represent phlegmonous mass. There is dilatation of the transverse colon up to 12.1 cm craniocaudal dimension (601/43) and 8.8 cm AP dimension, consistent with colonic obstruction. This degree of distention could pose a risk for bowel perforation. Surgical consultation is advised. The above collection involves the lesser sac and extends cephalad on the right interposed between the caudate lobe liver and reina of the diaphragm to the gastrohepatic ligament. Inflammatory changes are noted within the laurent hepatis surrounding the vessels and causing slitlike narrowing of the main portal vein such as series 4 image 63. The vein is grossly patent.  Similar findings are noted involving the splenic vein and SMV. These findings have also progressed from the prior study. Inflammatory tissue also surrounds the vessels about the celiac bifurcation and branches of the common hepatic artery. There is minimal if any appreciable enhancing pancreatic parenchyma. ADRENAL GLANDS:  Unremarkable. KIDNEYS/URETERS:  Unremarkable. No hydronephrosis. STOMACH AND BOWEL: Please see comments regarding the colon under the above pancreas section. The stomach is mildly distended and fluid-filled. There is collapse and/or thickening of the gastric antrum and descending duodenum, the latter is likely 180 degrees enveloped by the inflammatory collection. Secondary antritis/duodenitis with element of gastric outlet obstruction is not excluded. Moderate stool in the rectum. APPENDIX:  No findings to suggest appendicitis. ABDOMINOPELVIC CAVITY: Moderate to large amount of abdominal pelvic free fluid which is greater in the pelvis, also slightly increased. No pneumoperitoneum. No lymphadenopathy. VESSELS: See comments above. PELVIS soft tissue detail of the pelvis is degraded by beam hardening artifact from bilateral total hip arthroplasties. REPRODUCTIVE ORGANS:  Unremarkable for patient's age. URINARY BLADDER:  Unremarkable. ABDOMINAL WALL/INGUINAL REGIONS:  Unremarkable. OSSEOUS STRUCTURES:  No acute fracture or destructive osseous lesion. Bilateral total hip arthroplasties. Degenerative changes of the spine. Impression: 1. Significant worsening of necrotizing pancreatitis with large acute necrotic collection largely replacing the pancreatic parenchyma and extending into the anterior pararenal spaces (left greater than right) with the collection now containing a large amount of gas concerning for infected necrosis. Additionally, there is an inflammatory phlegmonous mass along the left lateral pararenal space/paracolic gutter with involvement of the proximal to mid descending colon causing associated colonic obstruction. This degree of bowel distention could pose a risk for bowel perforation. Surgical consultation is advised. 2. Progression of inflammatory changes extending into the laurent hepatis with mass effect and narrowing of the portal vein and associated proximal vessels about the portal confluence as detailed above but no evidence for overt venous occlusion. 3. Mild fluid-filled gastric distention. Collapse and/or thickening of the gastric antrum and descending duodenum, the latter is likely 180 degrees enveloped by the inflammatory collection. Secondary antritis/duodenitis with element of gastric outlet obstruction is not excluded. 4. Moderate to large amount of abdominopelvic free fluid, increasing from prior study. 5. Enlarging, moderate bilateral pleural effusions with mild bibasilar compressive atelectasis. I personally discussed this study with [address] [place] [address] Vasiliy on 9/4/2023 at 5:47 PM. Workstation performed: [id number]     CT abdomen pelvis w contrast    Result Date: 8/18/2023  Narrative: CT ABDOMEN AND PELVIS WITH IV CONTRAST INDICATION:   K85.91: Acute pancreatitis with uninfected necrosis, unspecified. COMPARISON: Prior CT abdomen pelvis examinations dating back to 8/4/2023 TECHNIQUE:  CT examination of the abdomen and pelvis was performed. Scanning through the abdomen was performed in arterial, venous and delayed phases according a protocol specifically designed to evaluate upper abdominal viscera. Multiplanar 2D reformatted images were created from the source data. This examination, like all CT scans performed in the [facility], was performed utilizing techniques to minimize radiation dose exposure, including the use of iterative reconstruction and automated exposure control. Radiation dose length product (DLP) for this visit:  3112 mGy-cm IV Contrast:  100 mL of iohexol (OMNIPAQUE) Enteric Contrast:  Enteric contrast was not administered.  FINDINGS: ABDOMEN LOWER CHEST: Partially visualized at least small bilateral pleural effusions. A 3 mm left lower lobe pulmonary nodule (4/8). Bibasilar subsegmental atelectasis. LIVER/BILIARY TREE:  Unremarkable. GALLBLADDER:  No calcified gallstones. No pericholecystic inflammatory change. SPLEEN:  Unremarkable. PANCREAS: There is evidence of acute pancreatitis, which will be described based on the revised Laurie Classification of Acute Pancreatitis: -TIME OF ONSET: less than or equal to 4 weeks -NECROSIS: There is evidence of pancreatic parenchyma necrosis (more than 30%), therefore this is necrotizing pancreatitis. There is decreasing peripancreatic fat stranding and fluid tracking compared to 8/6/2023. Again seen is small volume ascites, minimally decreased compared to 6/7/3246. -LOCAL COMPLICATIONS: Interval decrease in the size of acute necrotizing collection in the lesser sac, currently being 4.1 x 7.4 cm (series 4 image 64), previously 11.4 x 6.1 cm. It contains a tendon heterogenous material in the bladder drainage catheter  lies in the superficial portion of this collection The main portal vein, splenic vein and the intermesenteric vein are patent with mild mass effect on the main proximal splenic vein, improved since 8/6/2023. -INFECTION: There is no abnormal gas in or around the pancreas to suggest infection. ADRENAL GLANDS:  Unremarkable. KIDNEYS/URETERS:  Unremarkable. No hydronephrosis. STOMACH AND BOWEL: No abnormal bowel dilation. Suboptimal evaluation rectum due to streak artifact from arthroplasties. APPENDIX:  No findings to suggest appendicitis. ABDOMINOPELVIC CAVITY:  No ascites. No pneumoperitoneum. No lymphadenopathy. VESSELS:  Unremarkable for patient's age. PELVIS REPRODUCTIVE ORGANS: Suboptimal evaluation of the prostate with streak artifact bilateral hip arthroplasties. Janadarsha Smack URINARY BLADDER:  Unremarkable. ABDOMINAL WALL/INGUINAL REGIONS:  Unremarkable. OSSEOUS STRUCTURES:  No acute fracture or destructive osseous lesion.  Bilateral total hip arthroplasty     Impression: Resolving acute necrotizing pancreatitis with interval decrease in the size of the peripancreatic necrotizing collection in the lesser sac compared to 8/6/2023. Persistent high attenuation material within the peripancreatic fluid collection with tip of the drainage catheter in its superficial aspect. Mildly improved ascites. At least small bilateral pleural effusions. A 3 mm left lower lobe pulmonary nodule. Attention on follow-up imaging versus optional follow-up CT chest in 12 months is recommended to assess for stability, as per Fleischner criteria. The study was marked in EPIC for significant notification. Workstation performed: [id number]       EKG, Pathology, and Other Studies Reviewed on Admission:   · EKG: Reviewed      Counseling / Coordination of Care Time: 30 total mins spent n consult. Greater than 50% of total time spent on patient counseling and coordination of care. Graciela Leong DO  Gastroenterology Fellow  [address]  Division of Gastroenterology and Hepatology  Available on [employer]  . ..............................................................................................................................................  ** Please Note: This note is constructed using a voice recognition dictation system.  **

## 2023-09-06 NOTE — INTERVAL H&P NOTE
Update: (This section must be completed if the H&P was completed greater than 24 hrs to procedure or admission)    H&P reviewed. After examining the patient, I find no changed to the H&P since it had been written. 52M in septic shock with evolving necrotizing pancreatitis. I upsized his anterior drain several days ago and also performed thoracentesis    He is in persistent septic shock requiring pressors. NG tube has been removed     after multidisciplinary discussion we will place a new Large bore retroperitoneal/lateral drain to facilitate drainage    He is up-to-date on antibiotics    Heparin drip has been held    Imaging reviewed with the patient    Risks benefits alternatives discussed including risk of bleeding worsening sepsis damage to adjacent organs. Notably the splenic artery is at the posterior margin of the collection    Appreciate anesthesia support. Patient is high risk for any procedure given needs for vasopressor support, respiratory failure particularly when lying flat, ileus, ongoing sepsis     informed consent obtained        Patient re-evaluated.  Accept as history and physical.    Elizabeth Davila MD/September 6, 2023/7:20 PM

## 2023-09-06 NOTE — PROGRESS NOTES
Progress Note - Infectious Disease   Raghavendra Pi 46 y.o. male MRN: [id number]  Unit/Bed#: MICU 07 Encounter: [id number]      Impression/Recommendations:  Septic shock, POA. Fever, WBC, refractory hypotension. Due to bacteremia, pancreatic abscess as below. No other appreciable source. Improving since admission but remains critically ill on pressors. Rec:  • Continue antibiotics as below  • Follow temperatures closely  • Recheck CBC in AM  • Supportive care as per the primary service     Polymicrobial bacteremia. Klebsiella, alpha-Strep. Likely due to pancreatic abscess as below. No other appreciable source. Repeat blood cultures 9/6 pending. TTE (technically difficult) negative. Rec:  • Continue cefepime/Flagyl for now  • Follow up susceptibilities and tailor antibiotics as indicated  • Follow up repeat blood cultures     Pancreatic abscess. Due to superinfection of necrosis as below. Status post drain upsizing 9/4 which yielded infected hemorrhagic material.  Cultures with GNRs. Rec:  • Continue cefepime/Flagyl  • Follow up fluid cultures and tailor antibiotics as indicated  • Follow drain output closely     Necrotizing pancreatitis. Presumed alcoholic. Status post IR drain 8/5 yielding dark bloody fluid. Cultures negative. Current CT shows replacing most of pancreatic parenchyma, extending to pararenal spaces, causing left colonic obstruction, gastric outlet obstruction. Status post drain upsizing 9/4. Rec:  • Drain per IR  • Close Surgery follow-up ongoing  • Possible eventual cyst gastrostomy when collection more mature     JERZY. Likely multifactorial.  Improving. Rec:  • Follow creatinine closely and dose-adjust antibiotics as indicated  • Recheck BMP in AM     Large bowel obstruction. Due to compression from pancreatitis as above.     Gastric outlet obstruction. Due to compression from pancreatitis as above.     Bilateral pleural effusions.   Likely reactive to pancreatitis as above. Status post left thoracentesis yielding 1200 cc clear yellow fluid. Fluid cultures negative.     Ascites. Likely reactive to pancreatitis as above. 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       Antibiotics:  Cefepime  Antibiotics #3    Subjective:  Patient seen on AM rounds. Denies pain. 24 Hour Events:  No documented fevers, chills, sweats, nausea, vomiting, or diarrhea. Improving pressor requirements. Objective:  Vitals:  Temp:  [98.5 °F (36.9 °C)-99.3 °F (37.4 °C)] 99.2 °F (37.3 °C)  HR:  [] 100  Resp:  [18-33] 21  BP: ()/(42-48) 135/48  SpO2:  [93 %-99 %] 98 %  Temp (24hrs), Av.9 °F (37.2 °C), Min:98.5 °F (36.9 °C), Max:99.3 °F (37.4 °C)  Current: Temperature: 99.2 °F (37.3 °C)    Physical Exam:   General:  No acute distress  Psychiatric:  Awake and alert  Pulmonary:  Normal respiratory excursion without accessory muscle use  Abdomen:  Soft, distended with ascites  Extremities:  No edema  Skin:  No rashes    Lab Results:  I have personally reviewed pertinent labs. Results from last 7 days   Lab Units 23   POTASSIUM mmol/L 3.7 4.4 4.4   CHLORIDE mmol/L 98 96 95*   CO2 mmol/L 28 24 28   BUN mg/dL 79* 91* 97*   CREATININE mg/dL 1.62* 2.15* 2.09*   EGFR ml/min/1.73sq m 48 34 35   CALCIUM mg/dL 7.8* 7.6* 7.1*   AST U/L 13 22 26   ALT U/L 10 16 18   ALK PHOS U/L 43 45 52     Results from last 7 days   Lab Units 23  0939 23   WBC Thousand/uL  --  9.56 13.83* 8.64   HEMOGLOBIN g/dL 7.6* 6.7* 7.3* 7.3*   PLATELETS Thousands/uL  --  396* 482* 428*     Results from last 7 days   Lab Units 23  1928 23  1502   BLOOD CULTURE   --  Klebsiella aerogenes*  Alpha Hemolytic Streptococcus NOT Enterococcus*  No Growth at 24 hrs.    GRAM STAIN RESULT  1+ Polys*  3+ Gram positive cocci in pairs*  3+ Gram variable rods*  No Polys or Bacteria seen Gram negative rods*  Gram positive cocci in chains*   BODY FLUID CULTURE, STERILE  4+ Growth of Gram Negative Aamir Enteric Like*  No growth  --        Imaging Studies:   I have personally reviewed pertinent imaging study reports and images in PACS. EKG, Pathology, and Other Studies:   I have personally reviewed pertinent reports.

## 2023-09-07 ENCOUNTER — APPOINTMENT (INPATIENT)
Dept: RADIOLOGY | Facility: HOSPITAL | Age: 53
DRG: 853 | End: 2023-09-07
Payer: COMMERCIAL

## 2023-09-07 LAB
ABO GROUP BLD BPU: NORMAL
ALBUMIN SERPL BCP-MCNC: 2.5 G/DL (ref 3.5–5)
ALP SERPL-CCNC: 42 U/L (ref 34–104)
ALT SERPL W P-5'-P-CCNC: 9 U/L (ref 7–52)
AMYLASE FLD QL: 17 U/L
ANION GAP SERPL CALCULATED.3IONS-SCNC: 7 MMOL/L
APTT PPP: 33 SECONDS (ref 23–37)
AST SERPL W P-5'-P-CCNC: 11 U/L (ref 13–39)
BACTERIA SPEC BFLD CULT: NO GROWTH
BILIRUB SERPL-MCNC: 0.58 MG/DL (ref 0.2–1)
BPU ID: NORMAL
BUN SERPL-MCNC: 57 MG/DL (ref 5–25)
CALCIUM ALBUM COR SERPL-MCNC: 8.9 MG/DL (ref 8.3–10.1)
CALCIUM SERPL-MCNC: 7.7 MG/DL (ref 8.4–10.2)
CHLORIDE SERPL-SCNC: 104 MMOL/L (ref 96–108)
CO2 SERPL-SCNC: 30 MMOL/L (ref 21–32)
CREAT SERPL-MCNC: 1.4 MG/DL (ref 0.6–1.3)
CROSSMATCH: NORMAL
ERYTHROCYTE [DISTWIDTH] IN BLOOD BY AUTOMATED COUNT: 16.7 % (ref 11.6–15.1)
GFR SERPL CREATININE-BSD FRML MDRD: 57 ML/MIN/1.73SQ M
GLUCOSE SERPL-MCNC: 118 MG/DL (ref 65–140)
GLUCOSE SERPL-MCNC: 128 MG/DL (ref 65–140)
GLUCOSE SERPL-MCNC: 139 MG/DL (ref 65–140)
GLUCOSE SERPL-MCNC: 142 MG/DL (ref 65–140)
GLUCOSE SERPL-MCNC: 154 MG/DL (ref 65–140)
GLUCOSE SERPL-MCNC: 154 MG/DL (ref 65–140)
GRAM STN SPEC: NORMAL
HCT VFR BLD AUTO: 25.3 % (ref 36.5–49.3)
HGB BLD-MCNC: 7.5 G/DL (ref 12–17)
MCH RBC QN AUTO: 25.8 PG (ref 26.8–34.3)
MCHC RBC AUTO-ENTMCNC: 29.6 G/DL (ref 31.4–37.4)
MCV RBC AUTO: 87 FL (ref 82–98)
PLATELET # BLD AUTO: 348 THOUSANDS/UL (ref 149–390)
PMV BLD AUTO: 9.1 FL (ref 8.9–12.7)
POTASSIUM SERPL-SCNC: 3.6 MMOL/L (ref 3.5–5.3)
PROT SERPL-MCNC: 5.6 G/DL (ref 6.4–8.4)
RBC # BLD AUTO: 2.91 MILLION/UL (ref 3.88–5.62)
SODIUM SERPL-SCNC: 141 MMOL/L (ref 135–147)
STREPTOCOCCUS DNA BLD POS NAA+NON-PROBE: DETECTED
UNIT DISPENSE STATUS: NORMAL
UNIT PRODUCT CODE: NORMAL
UNIT PRODUCT VOLUME: 300 ML
UNIT RH: NORMAL
WBC # BLD AUTO: 9.82 THOUSAND/UL (ref 4.31–10.16)

## 2023-09-07 PROCEDURE — 99232 SBSQ HOSP IP/OBS MODERATE 35: CPT | Performed by: INTERNAL MEDICINE

## 2023-09-07 PROCEDURE — 71045 X-RAY EXAM CHEST 1 VIEW: CPT

## 2023-09-07 PROCEDURE — 99232 SBSQ HOSP IP/OBS MODERATE 35: CPT | Performed by: STUDENT IN AN ORGANIZED HEALTH CARE EDUCATION/TRAINING PROGRAM

## 2023-09-07 PROCEDURE — 97163 PT EVAL HIGH COMPLEX 45 MIN: CPT

## 2023-09-07 PROCEDURE — 85027 COMPLETE CBC AUTOMATED: CPT | Performed by: STUDENT IN AN ORGANIZED HEALTH CARE EDUCATION/TRAINING PROGRAM

## 2023-09-07 PROCEDURE — 85730 THROMBOPLASTIN TIME PARTIAL: CPT

## 2023-09-07 PROCEDURE — 99291 CRITICAL CARE FIRST HOUR: CPT | Performed by: STUDENT IN AN ORGANIZED HEALTH CARE EDUCATION/TRAINING PROGRAM

## 2023-09-07 PROCEDURE — 82948 REAGENT STRIP/BLOOD GLUCOSE: CPT

## 2023-09-07 PROCEDURE — 97167 OT EVAL HIGH COMPLEX 60 MIN: CPT

## 2023-09-07 PROCEDURE — 80053 COMPREHEN METABOLIC PANEL: CPT | Performed by: STUDENT IN AN ORGANIZED HEALTH CARE EDUCATION/TRAINING PROGRAM

## 2023-09-07 RX ORDER — HEPARIN SODIUM 10000 [USP'U]/100ML
3-20 INJECTION, SOLUTION INTRAVENOUS
Status: DISCONTINUED | OUTPATIENT
Start: 2023-09-07 | End: 2023-09-09

## 2023-09-07 RX ADMIN — INSULIN LISPRO 5 UNITS: 100 INJECTION, SOLUTION INTRAVENOUS; SUBCUTANEOUS at 07:48

## 2023-09-07 RX ADMIN — CEFEPIME 2000 MG: 2 INJECTION, POWDER, FOR SOLUTION INTRAVENOUS at 11:39

## 2023-09-07 RX ADMIN — METRONIDAZOLE 500 MG: 500 INJECTION, SOLUTION INTRAVENOUS at 05:06

## 2023-09-07 RX ADMIN — CHLORHEXIDINE GLUCONATE 15 ML: 1.2 SOLUTION ORAL at 20:32

## 2023-09-07 RX ADMIN — CHLORHEXIDINE GLUCONATE 15 ML: 1.2 SOLUTION ORAL at 09:12

## 2023-09-07 RX ADMIN — INSULIN LISPRO 2 UNITS: 100 INJECTION, SOLUTION INTRAVENOUS; SUBCUTANEOUS at 22:44

## 2023-09-07 RX ADMIN — METRONIDAZOLE 500 MG: 500 INJECTION, SOLUTION INTRAVENOUS at 12:36

## 2023-09-07 RX ADMIN — CEFEPIME 2000 MG: 2 INJECTION, POWDER, FOR SOLUTION INTRAVENOUS at 03:09

## 2023-09-07 RX ADMIN — INSULIN LISPRO 2 UNITS: 100 INJECTION, SOLUTION INTRAVENOUS; SUBCUTANEOUS at 16:19

## 2023-09-07 RX ADMIN — INSULIN LISPRO 5 UNITS: 100 INJECTION, SOLUTION INTRAVENOUS; SUBCUTANEOUS at 16:20

## 2023-09-07 RX ADMIN — NOREPINEPHRINE BITARTRATE 4 MCG/MIN: 1 SOLUTION INTRAVENOUS at 22:04

## 2023-09-07 RX ADMIN — METRONIDAZOLE 500 MG: 500 INJECTION, SOLUTION INTRAVENOUS at 20:34

## 2023-09-07 RX ADMIN — HEPARIN SODIUM 11.1 UNITS/KG/HR: 10000 INJECTION, SOLUTION INTRAVENOUS at 21:36

## 2023-09-07 RX ADMIN — CEFEPIME 2000 MG: 2 INJECTION, POWDER, FOR SOLUTION INTRAVENOUS at 22:47

## 2023-09-07 RX ADMIN — NOREPINEPHRINE BITARTRATE 5 MCG/MIN: 1 SOLUTION INTRAVENOUS at 08:39

## 2023-09-07 NOTE — ED PROVIDER NOTES
History  Chief Complaint   Patient presents with   • Wound Drain Evaluation     Pt reports he has a LUQ "pancreatic drain" that is draining around the site     59-year-old male with history of necrotizing pancreatitis presents to ED with worsening diffuse abdominal pain and new purulent drainage around his DONNY drain. Patient was recently hospitalized for necrotizing pancreatitis and discharged after medical management and DONNY drain placement. Prior to Admission Medications   Prescriptions Last Dose Informant Patient Reported? Taking? Alcohol Swabs 70 % PADS  Self No No   Sig: May substitute brand based on insurance coverage. Check glucose ACHS. BD PosiFlush 0.9 % SOLN  Self Yes No   Blood Glucose Monitoring Suppl (OneTouch Verio Reflect) w/Device KIT  Self No No   Sig: May substitute brand based on insurance coverage. Check glucose ACHS. Blood Pressure Monitoring (Adult Blood Pressure Cuff Lg) KIT  Self No No   Sig: Use 3 (three) times a day   Insulin Glargine Solostar (Lantus SoloStar) 100 UNIT/ML SOPN  Self No No   Sig: Inject 0.14 mL (14 Units total) under the skin daily at bedtime   Insulin Pen Needle (BD Pen Needle Lupe 2nd Gen) 32G X 4 MM MISC  Self No No   Sig: For use with insulin pen. Pharmacy may dispense brand covered by insurance. Insulin Pen Needle (BD Pen Needle Lupe 2nd Gen) 32G X 4 MM MISC  Self No No   Sig: For use with insulin pen. Pharmacy may dispense brand covered by insurance. Levemir FlexPen 100 units/mL injection pen  Self Yes No   Si Units daily at bedtime   NovoLOG FlexPen 100 units/mL injection pen  Self Yes No   Sig: INJECT 5 UNITS UNDER SKIN 3 TIMES DAILY WITH MEALS   OneTouch Delica Lancets 42T MISC  Self No No   Sig: May substitute brand based on insurance coverage. Check glucose ACHS.    acetaminophen (TYLENOL) 325 mg tablet  Self No No   Sig: Take 2 tablets (650 mg total) by mouth every 6 (six) hours as needed for mild pain, headaches or fever   apixaban (Eliquis) 5 mg  Self No No   Sig: Take 2 tablets (10 mg total) by mouth 2 (two) times a day for 7 days, THEN 1 tablet (5 mg total) 2 (two) times a day for 23 days. furosemide (LASIX) 20 mg tablet   No No   Sig: TAKE 1 TABLET BY MOUTH EVERY DAY   glucose blood (OneTouch Verio) test strip  Self No No   Sig: May substitute brand based on insurance coverage. Check glucose ACHS. insulin lispro (HumaLOG KwikPen) 100 units/mL injection pen  Self No No   Sig: Inject 5 Units under the skin 3 (three) times a day with meals   lisinopril (ZESTRIL) 10 mg tablet  Self No No   Sig: Take 1 tablet (10 mg total) by mouth daily   pancrelipase, Lip-Prot-Amyl, (CREON) 6,000 units delayed release capsule  Self No No   Sig: Take 1 capsule (6,000 Units total) by mouth 3 (three) times a day with meals   sodium chloride, PF, 0.9 %   No No   Sig: 10 mL by Intracatheter route daily for 120 doses Intracatheter flushing daily. May substitute prefilled syringe with normal saline 10 mL vials, 10 mL syringes, and 18 g blunt needles      Facility-Administered Medications: None       Past Medical History:   Diagnosis Date   • Pancreatitis        Past Surgical History:   Procedure Laterality Date   • CYSTOSCOPY N/A 8/5/2023    Procedure: CYSTOSCOPY. Saunders insertion;  Surgeon: Navi Mcintyre MD;  Location: [facility];  Service: Urology   • FL RETROGRADE URETHROCYSTOGRAM  8/5/2023   • HERNIA REPAIR     • IR DRAINAGE TUBE CHECK/CHANGE/REPOSITION/REINSERTION/UPSIZE  9/4/2023   • IR DRAINAGE TUBE PLACEMENT  8/5/2023   • IR DRAINAGE TUBE PLACEMENT  9/6/2023   • IR THORACENTESIS  9/4/2023   • JOINT REPLACEMENT     • TONSILLECTOMY  1976       Family History   Problem Relation Age of Onset   • Cancer Mother    • Multiple myeloma Mother    • Pancreatic cancer Father    • Cancer Father      I have reviewed and agree with the history as documented.     E-Cigarette/Vaping   • E-Cigarette Use Never User      E-Cigarette/Vaping Substances   • Nicotine No • THC No    • CBD No    • Flavoring No    • Other No    • Unknown No      Social History     Tobacco Use   • Smoking status: Never     Passive exposure: Past   • Smokeless tobacco: Former     Types: Chew     Quit date: 7/15/2023   • Tobacco comments:     Quit 7/15/23   Vaping Use   • Vaping Use: Never used   Substance Use Topics   • Alcohol use: Not Currently     Comment: quit 7/15/23 (previous 6 drinks per week)   • Drug use: Never        Review of Systems   Constitutional: Negative for chills and fever. HENT: Negative for ear pain and sore throat. Eyes: Negative for pain and visual disturbance. Respiratory: Negative for cough and shortness of breath. Cardiovascular: Negative for chest pain and palpitations. Gastrointestinal: Positive for abdominal pain and nausea. Negative for vomiting. Genitourinary: Negative for dysuria and hematuria. Musculoskeletal: Negative for arthralgias and back pain. Skin: Negative for color change and rash. Neurological: Negative for seizures and syncope. All other systems reviewed and are negative. Physical Exam  ED Triage Vitals   Temperature Pulse Respirations Blood Pressure SpO2   09/04/23 1350 09/04/23 1350 09/04/23 1350 09/04/23 1350 09/04/23 1350   98.2 °F (36.8 °C) (!) 114 18 (!) 87/51 94 %      Temp Source Heart Rate Source Patient Position - Orthostatic VS BP Location FiO2 (%)   09/04/23 1350 09/04/23 1350 09/04/23 1515 09/04/23 1350 09/04/23 1921   Oral Monitor Sitting Left arm 21      Pain Score       09/04/23 2000       No Pain             Orthostatic Vital Signs  Vitals:    09/07/23 1600 09/07/23 1700 09/07/23 1800 09/07/23 2000   BP: 115/65   108/65   Pulse: 96 98 96 96   Patient Position - Orthostatic VS:    Lying       Physical Exam  Vitals and nursing note reviewed. Constitutional:       General: He is in acute distress. Appearance: He is well-developed. He is ill-appearing and toxic-appearing. He is not diaphoretic.    HENT: Head: Normocephalic and atraumatic. Right Ear: External ear normal.      Left Ear: External ear normal.      Nose: Nose normal.      Mouth/Throat:      Mouth: Mucous membranes are moist.   Eyes:      General: Scleral icterus present. Conjunctiva/sclera: Conjunctivae normal.   Cardiovascular:      Rate and Rhythm: Regular rhythm. Tachycardia present. Heart sounds: No murmur heard. Pulmonary:      Effort: Pulmonary effort is normal. No respiratory distress. Breath sounds: Normal breath sounds. No wheezing or rhonchi. Abdominal:      General: There is distension. Tenderness: There is abdominal tenderness. There is no right CVA tenderness, left CVA tenderness or guarding. Musculoskeletal:         General: No swelling. Cervical back: Normal range of motion and neck supple. No rigidity. Skin:     General: Skin is warm and dry. Capillary Refill: Capillary refill takes less than 2 seconds. Coloration: Skin is jaundiced. Neurological:      Mental Status: He is alert and oriented to person, place, and time.    Psychiatric:         Mood and Affect: Mood normal.         Behavior: Behavior normal.         ED Medications  Medications   norepinephrine (LEVOPHED) 1 mg/mL injection **ADS Override Pull** (0 mg  Hold 9/4/23 2223)   HYDROmorphone (DILAUDID) injection 0.5 mg (has no administration in time range)   norepinephrine (LEVOPHED) 4 mg (STANDARD CONCENTRATION) IV in sodium chloride 0.9% 250 mL (4 mcg/min Intravenous Rate/Dose Change 9/7/23 1430)   chlorhexidine (PERIDEX) 0.12 % oral rinse 15 mL (15 mL Mouth/Throat Given 9/7/23 0912)   metroNIDAZOLE (FLAGYL) IVPB (premix) 500 mg 100 mL (0 mg Intravenous Stopped 9/7/23 1306)   insulin lispro (HumaLOG) 100 units/mL subcutaneous injection 2-12 Units (2 Units Subcutaneous Given 9/7/23 1619)   insulin lispro (HumaLOG) 100 units/mL subcutaneous injection 2-12 Units (2 Units Subcutaneous Given 9/6/23 2201)   insulin lispro (HumaLOG) 100 units/mL subcutaneous injection 5 Units (5 Units Subcutaneous Given 9/7/23 1620)   acetaminophen (TYLENOL) tablet 650 mg (has no administration in time range)   oxyCODONE (ROXICODONE) IR tablet 5 mg (has no administration in time range)     Or   oxyCODONE (ROXICODONE) immediate release tablet 10 mg (has no administration in time range)   cefepime (MAXIPIME) 2 g/50 mL dextrose IVPB (has no administration in time range)   heparin (porcine) 25,000 units in 0.45% NaCl 250 mL infusion (premix) (0 Units/kg/hr × 90 kg (Order-Specific) Intravenous Hold 9/7/23 1551)   multi-electrolyte (PLASMALYTE-A/ISOLYTE-S PH 7.4) IV solution 1,000 mL (0 mL Intravenous Stopped 9/4/23 1543)     Followed by   multi-electrolyte (PLASMALYTE-A/ISOLYTE-S PH 7.4) IV solution 1,000 mL (0 mL Intravenous Stopped 9/4/23 1615)     Followed by   multi-electrolyte (PLASMALYTE-A/ISOLYTE-S PH 7.4) IV solution 1,000 mL (0 mL Intravenous Stopped 9/4/23 1815)   cefepime (MAXIPIME) 2 g/50 mL dextrose IVPB (0 mg Intravenous Stopped 9/4/23 1616)   vancomycin (VANCOCIN) 2,000 mg in sodium chloride 0.9 % 500 mL IVPB (0 mg Intravenous Stopped 9/4/23 1815)   phytonadione (AQUA-MEPHYTON) 10 mg/mL 10 mg in sodium chloride 0.9 % 50 mL IVPB (0 mg Intravenous Stopped 9/4/23 1659)   iodixanol (VISIPAQUE) 320 MG/ML injection 100 mL (100 mL Intravenous Given 9/4/23 1640)   lidocaine (PF) (XYLOCAINE-MPF) 1 % injection (10 mL Infiltration Given 9/4/23 1816)   fentanyl citrate (PF) 100 MCG/2ML (25 mcg Intravenous Given 9/4/23 1902)   midazolam (VERSED) injection (0.5 mg Intravenous Given 9/4/23 1902)   iohexol (OMNIPAQUE) 350 MG/ML injection (SINGLE-DOSE) 50 mL (10 mL Intravenous Given 9/4/23 1935)   multi-electrolyte (ISOLYTE-S PH 7.4) bolus 1,000 mL (0 mL Intravenous Stopped 9/4/23 2130)   calcium gluconate 2 g in sodium chloride 0.9% 100 mL (premix) (0 g Intravenous Stopped 9/4/23 2220)   albumin human (FLEXBUMIN) 5 % injection 25 g (0 g Intravenous Stopped 9/4/23 2252) lidocaine (PF) (XYLOCAINE-MPF) 1 % injection 10 mL (10 mL Infiltration Given 9/4/23 2354)   midazolam (VERSED) injection 2 mg (2 mg Intravenous Given 9/4/23 2300)   midazolam (VERSED) injection 2 mg (2 mg Intravenous Given 9/4/23 2320)   albumin human (FLEXBUMIN) 5 % injection 25 g (0 g Intravenous Stopped 9/5/23 0330)   calcium gluconate 2 g in sodium chloride 0.9% 100 mL (premix) (0 g Intravenous Stopped 9/5/23 0623)   perflutren lipid microsphere (DEFINITY) injection (0.6 mL/min Intravenous Given 9/5/23 1140)   albumin human (FLEXBUMIN) 25 % injection 25 g (0 g Intravenous Stopped 9/6/23 1623)   lidocaine (PF) (XYLOCAINE-MPF) 1 % injection (10 mL Infiltration Given 9/6/23 1450)   lidocaine (PF) (XYLOCAINE-MPF) 1 % injection (20 mL Infiltration Given 9/6/23 1958)   potassium chloride 40 mEq IVPB (premix) (0 mEq Intravenous Stopped 9/7/23 0330)       Diagnostic Studies  Results Reviewed     Procedure Component Value Units Date/Time    Blood culture #2 [[id number]] Collected: 09/04/23 1502    Lab Status: Preliminary result Specimen: Blood from Line, Venous Updated: 09/07/23 1801     Blood Culture No Growth at 72 hrs. Body fluid culture and Gram stain [[id number]]  (Abnormal)  (Susceptibility) Collected: 09/04/23 1928    Lab Status: Preliminary result Specimen:  Body Fluid from Other Updated: 09/07/23 1359     Body Fluid Culture, Sterile 4+ Growth of Citrobacter freundii      4+ Growth of Escherichia coli      2+ Growth of Gram Negative Aamir     Gram Stain Result 1+ Polys      3+ Gram positive cocci in pairs      3+ Gram variable rods    Susceptibility     Citrobacter freundii (1)     Antibiotic Interpretation Microscan   Method Status    Amoxicillin + Clavulanate Resistant >16/8 ug/ml ARNOL Preliminary    Ampicillin ($$) Resistant 16.00 ug/ml ARNOL Preliminary    Ampicillin + Sulbactam ($) Resistant <=4/2 ug/ml ARNOL Preliminary    Aztreonam ($$$)  Susceptible <=4 ug/ml ARNOL Preliminary    Cefazolin ($) Resistant >16.00 ug/ml ARNOL Preliminary    Ceftazidime ($$) Susceptible <=1 ug/ml ARNOL Preliminary    Ceftriaxone ($$) Susceptible <=1.00 ug/ml ARNOL Preliminary    Cefuroxime ($$) Resistant <=4 ug/ml ARNOL Preliminary    Ciprofloxacin ($)  Susceptible <=0.25 ug/ml ARNOL Preliminary    Ertapenem ($$$) Susceptible <=0.5 ug/ml ARNOL Preliminary    Gentamicin ($$) Susceptible <=2 ug/ml ARNOL Preliminary    Levofloxacin ($) Susceptible <=0.50 ug/ml ARNOL Preliminary    Piperacillin + Tazobactam ($$$) Susceptible <=8 ug/ml ARNOL Preliminary    Tetracycline Susceptible <=4 ug/ml ARNOL Preliminary    Tobramycin ($) Susceptible <=2 ug/ml ARNOL Preliminary    Trimethoprim + Sulfamethoxazole ($$$) Susceptible <=0.5/9.5 ug/ml ARNOL Preliminary                   Blood culture #1 [[id number]]  (Abnormal)  (Susceptibility) Collected: 09/04/23 1502    Lab Status: Edited Result - FINAL Specimen: Blood from Line, Venous Updated: 09/07/23 1303     Blood Culture Klebsiella aerogenes      Streptococcus mitis oralis group      Staphylococcus epidermidis      Staphylococcus hominis     Gram Stain Result Gram negative rods      Gram positive cocci in chains    Narrative:      Staphylococcus epidermidis and Staphylococcus hominis:-Susceptibility testing will not be performed as this organism, when isolated from a single set of blood cultures, represents probable skin puneet contamination. Please call the Microbiology Laboratory within 5 days at [phone] if further workup is required.       Susceptibility     Staphylococcus hominis (4)     Antibiotic Interpretation Microscan   Method Status    ZID Performed  Yes  ARNOL Final          Klebsiella aerogenes (1)     Antibiotic Interpretation Microscan   Method Status    Amoxicillin + Clavulanate Resistant >16/8 ug/ml ARNOL Final    Ampicillin ($$) Resistant <=8.00 ug/ml ARNOL Final    Ampicillin + Sulbactam ($) Resistant 8/4 ug/ml ARNOL Final    Aztreonam ($$$)  Susceptible <=4 ug/ml ARNOL Final    Cefazolin ($) Resistant >16.00 ug/ml ARNOL Final    Ceftriaxone ($$) Susceptible <=1.00 ug/ml ARNOL Final    Cefuroxime ($$) Susceptible 8 ug/ml ARNOL Final    Ciprofloxacin ($)  Susceptible <=0.25 ug/ml ARNOL Final    Ertapenem ($$$) Susceptible <=0.5 ug/ml ARNOL Final    Gentamicin ($$) Susceptible <=2 ug/ml ARNOL Final    Imipenem Intermediate 2 ug/ml ARNOL Final    Levofloxacin ($) Susceptible <=0.50 ug/ml ARNOL Final    Meropenem ($$) Susceptible <=1.00 ug/ml ARNOL Final    Piperacillin + Tazobactam ($$$) Susceptible <=8 ug/ml ARNOL Final    Tetracycline Susceptible <=4 ug/ml ARNOL Final    Tobramycin ($) Susceptible <=2 ug/ml ARNOL Final    Trimethoprim + Sulfamethoxazole ($$$) Susceptible <=0.5/9.5 ug/ml ARNOL Final          Streptococcus mitis oralis group (2)     Antibiotic Interpretation Microscan   Method Status    ZID Performed  Yes  ARNOL Final    Penicillin Intermediate 0.500 ug/ml ARNOL Final    Ceftriaxone ($$) Susceptible 0.38 ug/ml ARNOL Final    Vancomycin ($) Susceptible 0.38 ug/ml ARNOL Final          Staphylococcus epidermidis (3)     Antibiotic Interpretation Microscan   Method Status    ZID Performed  Yes  ARNOL Final                   Blood Culture Identification Panel [[id number]]  (Abnormal) Collected: 09/04/23 1502    Lab Status: Final result Specimen: Blood from Line, Venous Updated: 09/07/23 1301     Streptococcus Detected    Narrative:      Routine culture and susceptiblity to follow for confirmation. Film Array panel tests for 11 gram positive organisms, 15 gram negative organisms, 7 yeast species and 10 resistance genes. Body fluid culture (Pleural Fluid Culture) and Gram stain [[id number]] Collected: 09/04/23 1928    Lab Status: Final result Specimen: Body Fluid from Pleural, Left Updated: 09/07/23 0758     Body Fluid Culture, Sterile No growth     Gram Stain Result No Polys or Bacteria seen    Anaerobic culture and Gram stain [[id number]]  (Abnormal)  (Susceptibility) Collected: 09/04/23 1928    Lab Status: Final result Specimen:  Body Fluid from Abscess Updated: 09/06/23 1406     Anaerobic Culture 4+ Growth of      4+ Growth of Bacteroides fragilis    Susceptibility     Bacteroides fragilis (2)     Antibiotic Interpretation Microscan Method Status    ZID Performed  Yes ARNOL Final    Beta Lactamase  Negative Not Specified Final                   Blood Culture Identification Panel [[id number]]  (Abnormal) Collected: 09/04/23 1502    Lab Status: Final result Specimen: Blood from Line, Venous Updated: 09/05/23 1048     Klebsiella aerogenes Detected    Narrative:      Routine culture and susceptiblity to follow for confirmation. Film Array panel tests for 11 gram positive organisms, 15 gram negative organisms, 7 yeast species and 10 resistance genes. Body Fluid Diff [[id number]] Collected: 09/04/23 1928    Lab Status: Final result Specimen: Body Fluid from Pleural, Left Updated: 09/04/23 2257     Total Counted 100     Neutrophils % (Fluid) 42 %      Lymphs % (Fluid) 50 %      Bands % (Fluid) 2 %      Monocytes % (Fluid) 6 %     Body fluid white cell count with differential [[id number]]  (Abnormal) Collected: 09/04/23 1928    Lab Status: Final result Specimen: Body Fluid from Pleural, Left Updated: 09/04/23 2257     Site Pleural     Color, Fluid Red     Clarity, Fluid Cloudy     WBC, Fluid 1,939 /ul     pH, body fluid [[id number]] Collected: 09/04/23 1928    Lab Status: Final result Specimen:  Body Fluid from Other Updated: 09/04/23 2234     PH BODY FLUID 7.6    UA w Reflex to Microscopic w Reflex to Culture [[id number]]  (Abnormal) Collected: 09/04/23 2118    Lab Status: Final result Specimen: Urine, Clean Catch Updated: 09/04/23 2209     Color, UA Yellow     Clarity, UA Clear     Specific Gravity, UA 1.024     pH, UA 5.0     Leukocytes, UA Negative     Nitrite, UA Negative     Protein, UA Trace mg/dl      Glucose, UA Negative mg/dl      Ketones, UA Negative mg/dl      Urobilinogen, UA <2.0 mg/dl      Bilirubin, UA Negative     Occult Blood, UA Negative    Lactic acid 2 Hours [[id number]]  (Normal) Collected: 09/04/23 2021    Lab Status: Final result Specimen: Blood from Arm, Left Updated: 09/04/23 2049     LACTIC ACID 1.7 mmol/L     Narrative:      Result may be elevated if tourniquet was used during collection. LD (LDH), Body Fluid [[id number]] Collected: 09/04/23 1928    Lab Status: Final result Specimen: Body Fluid from Other Updated: 09/04/23 2032     LD, Fluid 216 U/L     Total Protein, body fluid [[id number]] Collected: 09/04/23 1928    Lab Status: Final result Specimen: Body Fluid from Other Updated: 09/04/23 2032     Protein, Fluid 3.6 g/dL     Glucose, body fluid [[id number]] Collected: 09/04/23 1932    Lab Status: Final result Specimen:  Body Fluid from Other Updated: 09/04/23 2031     Glucose, Fluid 100 mg/dL     HS Troponin I 2hr [[id number]]  (Normal) Collected: 09/04/23 1702    Lab Status: Final result Specimen: Blood from Arm, Left Updated: 09/04/23 1737     hs TnI 2hr 6 ng/L      Delta 2hr hsTnI -5 ng/L     RBC Morphology Reflex Test [[id number]] Collected: 09/04/23 1444    Lab Status: Final result Specimen: Blood from Arm, Right Updated: 09/04/23 1701    Lipase [[id number]]  (Normal) Collected: 09/04/23 1444    Lab Status: Final result Specimen: Blood from Arm, Right Updated: 09/04/23 1646     Lipase 28 u/L     Manual Differential(PHLEBS Do Not Order) [[id number]]  (Abnormal) Collected: 09/04/23 1444    Lab Status: Final result Specimen: Blood from Arm, Right Updated: 09/04/23 1624     Segmented % 78 %      Bands % 14 %      Lymphocytes % 1 %      Monocytes % 6 %      Eosinophils, % 1 %      Basophils % 0 %      Absolute Neutrophils 11.40 Thousand/uL      Lymphocytes Absolute 0.12 Thousand/uL      Monocytes Absolute 0.74 Thousand/uL      Eosinophils Absolute 0.12 Thousand/uL      Basophils Absolute 0.00 Thousand/uL      Total Counted --     Toxic Granulation Present     RBC Morphology Present     Platelet Estimate Increased     Anisocytosis Present    CBC and differential [[id number]]  (Abnormal) Collected: 09/04/23 1444    Lab Status: Final result Specimen: Blood from Arm, Right Updated: 09/04/23 1624     WBC 12.39 Thousand/uL      RBC 2.97 Million/uL      Hemoglobin 7.9 g/dL      Hematocrit 25.1 %      MCV 85 fL      MCH 26.6 pg      MCHC 31.5 g/dL      RDW 16.6 %      MPV 9.6 fL      Platelets 960 Thousands/uL     Narrative: This is an appended report. These results have been appended to a previously verified report. Lactic acid [[id number]]  (Abnormal) Collected: 09/04/23 1502    Lab Status: Final result Specimen: Blood from Arm, Right Updated: 09/04/23 1549     LACTIC ACID 2.4 mmol/L     Narrative:      Result may be elevated if tourniquet was used during collection.     Procalcitonin [[id number]]  (Abnormal) Collected: 09/04/23 1502    Lab Status: Final result Specimen: Blood from Arm, Left Updated: 09/04/23 1549     Procalcitonin 2.58 ng/ml     Ammonia [[id number]]  (Normal) Collected: 09/04/23 1502    Lab Status: Final result Specimen: Blood from Arm, Left Updated: 09/04/23 1543     Ammonia 44 umol/L     Protime-INR [[id number]]  (Abnormal) Collected: 09/04/23 1502    Lab Status: Final result Specimen: Blood from Arm, Left Updated: 09/04/23 1535     Protime 22.9 seconds      INR 2.00    APTT [[id number]]  (Normal) Collected: 09/04/23 1502    Lab Status: Final result Specimen: Blood from Arm, Left Updated: 09/04/23 1535     PTT 37 seconds     HS Troponin 0hr (reflex protocol) [[id number]]  (Normal) Collected: 09/04/23 1444    Lab Status: Final result Specimen: Blood from Arm, Right Updated: 09/04/23 1526     hs TnI 0hr 11 ng/L     Comprehensive metabolic panel [[id number]]  (Abnormal) Collected: 09/04/23 1444    Lab Status: Final result Specimen: Blood from Arm, Right Updated: 09/04/23 1519     Sodium 132 mmol/L      Potassium 4.9 mmol/L      Chloride 92 mmol/L      CO2 28 mmol/L      ANION GAP 12 mmol/L       mg/dL      Creatinine 2.61 mg/dL      Glucose 114 mg/dL      Calcium 8.0 mg/dL      Corrected Calcium 9.2 mg/dL      AST 40 U/L      ALT 25 U/L      Alkaline Phosphatase 153 U/L      Total Protein 6.4 g/dL      Albumin 2.5 g/dL      Total Bilirubin 0.73 mg/dL      eGFR 27 ml/min/1.73sq m     Narrative:      [facility] guidelines for Chronic Kidney Disease (CKD):   •  Stage 1 with normal or high GFR (GFR > 90 mL/min/1.73 square meters)  •  Stage 2 Mild CKD (GFR = 60-89 mL/min/1.73 square meters)  •  Stage 3A Moderate CKD (GFR = 45-59 mL/min/1.73 square meters)  •  Stage 3B Moderate CKD (GFR = 30-44 mL/min/1.73 square meters)  •  Stage 4 Severe CKD (GFR = 15-29 mL/min/1.73 square meters)  •  Stage 5 End Stage CKD (GFR <15 mL/min/1.73 square meters)  Note: GFR calculation is accurate only with a steady state creatinine    Fingerstick Glucose (POCT) [[id number]]  (Normal) Collected: 09/04/23 1436    Lab Status: Final result Updated: 09/04/23 1437     POC Glucose 130 mg/dl                  XR chest portable   Final Result by May Briscoe MD (09/07 1112)      Unchanged right medial lung base opacity. Small left and trace right pleural effusions. No pneumothorax. Resident: Kandice Elizabeth, the attending radiologist, have reviewed the images and agree with the final report above. Workstation performed: [id number]         IR drainage tube placement   Final Result by Elizabeth Davlia MD (09/07 1992)   Impression:      CT-guided placement of a large-bore 29 Palestinian drain into a retroperitoneal necrotic pancreatic collection      CT-guided placement of a 12 Palestinian drain into a more inferior loculated collection      Note:   The large 29 Palestinian drain was placed at an oblique angle to allow for  possible future necrosectomy/VARDS. Drainage of this very thick necrotic material will be best with continuous large diameter tubing, such as a chest tube box.  Not all sideholes are    in the collection due to tube length, do not flush with large volumes as this will spill material into the flank. Would hold any anticoagulation at least 12-24 hours due to hemorrhagic nature of the material and proximity of 1 needle pass to the spleen         Workstation performed: [id number]         XR abdomen 1 view kub   Final Result by Marvin Frank MD (09/07 1109)      Slightly worse distention of the transverse colon. However, small amount of air may now be present in the distal descending colon adjacent to the left iliac crest. Unchanged gaseous distention of the ascending colon. Resident: Keron Carrasco, the attending radiologist, have reviewed the images and agree with the final report above. Workstation performed: [id number]         XR abdomen 1 view kub   Final Result by Real Louis MD (09/05 6749)      Devices as detailed below:   -Nonweighted enteric tube sidehole overlies distal stomach and tip in proximal second portion of duodenal region.   -Surgical drain overlies the epigastric region.   -Probable Saunders catheter overlies pelvic region.   -Left femoral approach central venous catheter tip overlies left iliac vessel region. Similar persistent gaseous distention of visualized ascending and transverse colon. Bibasilar atelectasis (left worse than right).       Workstation performed: [id number]         XR chest portable ICU   Final Result by Marlyn Mosqueda MD (09/05 4624)      Persistent medial right lung base opacity suspicious for pneumonia      No pneumothorax      Workstation performed: [id number]         IR drainage tube check/change/reposition/reinsertion/upsize   Final Result by Ulises Valdivia MD (09/05 4488)   Impression:      Upsize and reposition of a pancreatic necrotic collection drain to a 12 [place] pigtail catheter      Patient also has ascites leaking around the catheter, he may be a candidate for paracentesis or a peritoneal drain to simply divert and reduce the ascites Workstation performed: [id number]         IR thoracentesis   Final Result by Yumi Ann MD (65/83 0302)   Impression:      Ultrasound-guided left thoracentesis      Workstation performed: [id number]         CT abdomen pelvis w contrast   Final Result by Veronica Carmen DO (09/04 4253)      1. Significant worsening of necrotizing pancreatitis with large acute necrotic collection largely replacing the pancreatic parenchyma and extending into the anterior pararenal spaces (left greater than right) with the collection now containing a large    amount of gas concerning for infected necrosis. Additionally, there is an inflammatory phlegmonous mass along the left lateral pararenal space/paracolic gutter with involvement of the proximal to mid descending colon causing associated colonic    obstruction. This degree of bowel distention could pose a risk for bowel perforation. Surgical consultation is advised. 2. Progression of inflammatory changes extending into the laurent hepatis with mass effect and narrowing of the portal vein and associated proximal vessels about the portal confluence as detailed above but no evidence for overt venous occlusion. 3. Mild fluid-filled gastric distention. Collapse and/or thickening of the gastric antrum and descending duodenum, the latter is likely 180 degrees enveloped by the inflammatory collection. Secondary antritis/duodenitis with element of gastric outlet    obstruction is not excluded. 4. Moderate to large amount of abdominopelvic free fluid, increasing from prior study. 5. Enlarging, moderate bilateral pleural effusions with mild bibasilar compressive atelectasis. I personally discussed this study with [address] [place] [address] on 9/4/2023 at 5:47 PM.                  Workstation performed: [id number]         XR chest 1 view portable   ED Interpretation by Harshil Philip MD (09/04 7499)   The CXR was interpreted by me independently.   On my read, it appears without acute abnormalities:  - The  cardiomediastinal  silhouette  is  unremarkable.    - The  lungs  are  clear. - No  pleural  effusions.  - No  pneumothorax. - The  pulmonary  vasculature  is  within  normal  limits.    - The  trachea  is  midline.    - Bony  thorax  is  unremarkable.            Final Result by Stacey De Leon MD (09/05 1213)      Small pleural effusions and bibasilar atelectasis. Workstation performed: [id number]         IR INPATIENT Paracentesis    (Results Pending)   CT abdomen pelvis wo contrast    (Results Pending)         Procedures  US Guided Peripheral IV    Date/Time: 9/7/2023 1:43 PM    Performed by: Rosa Jones MD  Authorized by: Rosa Jones MD    Patient location:  ED  Performed by:  Resident  Other Assisting Provider: No    Indications:     Indications: difficulty obtaining IV access      Image availability:  Not saved  Procedure details:     Patient evaluated for contraindications to access (i.e. fistula, thrombosis, etc): Yes      Standard clean technique used for ultrasound access: Yes      Location:  Left arm    Catheter size:  18 gauge    Number of attempts:  1    Successful placement: yes    Post-procedure details:     Post-procedure:  Dressing applied    Assessment: free fluid flow and no signs of infiltration      Post-procedure complications: none      Patient tolerance of procedure: Tolerated well, no immediate complications      Conscious Sedation Assessment    Flowsheet Row Classification Score   ASA Scale Assessment --  [as per anesthesia] filed at 09/06/2023 1858   Mallampati Classification Class III: soft palate, base of uvula visible - Moderate Difficulty filed at 09/04/2023 1759          ED Course                          Initial Sepsis Screening     Row Name 09/07/23 2736                Is the patient's history suggestive of a new or worsening infection?  Yes (Proceed)  -PG        Suspected source of infection -- User Key  (r) = Recorded By, (t) = Taken By, (c) = Cosigned By    [facility] Name Provider Type    PG Bryanna Bah MD Resident                          Medical Decision Making  60-year-old male with history of necrotizing pancreatitis presents to ED with diffuse abdominal pain. Differential diagnosis includes necrotizing pancreatitis, intra-abdominal infection, abdominal compartment syndrome. Plan: Sepsis alert called, antibiotics, surgery consult    IV fluid resuscitation based on ideal body weight. Necrotizing pancreatitis: acute illness or injury  Amount and/or Complexity of Data Reviewed  Labs: ordered. Radiology: ordered and independent interpretation performed. Risk  Prescription drug management. Disposition  Final diagnoses:   Necrotizing pancreatitis     Time reflects when diagnosis was documented in both MDM as applicable and the Disposition within this note     Time User Action Codes Description Comment    9/4/2023  3:18 PM Padmaja Andrea Add [K85.91] Necrotizing pancreatitis     9/4/2023 11:27 PM Amie Lovelace [K62.229] Colonic obstruction ([address])     9/5/2023  7:11 AM Dorrine Peat Add [R78.81] Bacteremia     9/5/2023  7:11 AM Dorrine Peat Modify [R78.81] Bacteremia Klebsiella aerogenes    9/5/2023  3:10 PM Fehnel-Young, Virginia Cranker Add [L89.150] Pressure injury of sacral region, unstageable [facility])       ED Disposition     None      Follow-up Information     Follow up With Specialties Details Why [id number] [address] Follow up  [address]            Current Discharge Medication List      START taking these medications    Details   !! sodium chloride, PF, 0.9 % 10 mL by Intracatheter route daily Intracatheter flushing daily.  May substitute prefilled syringe with normal saline 10 mL vials, 10 mL syringes, and 18 g blunt needles  Qty: 900 mL, Refills: 0    Associated Diagnoses: Necrotizing pancreatitis; Pressure injury of sacral region, unstageable [facility])       ! ! - Potential duplicate medications found. Please discuss with provider. CONTINUE these medications which have NOT CHANGED    Details   acetaminophen (TYLENOL) 325 mg tablet Take 2 tablets (650 mg total) by mouth every 6 (six) hours as needed for mild pain, headaches or fever  Refills: 0    Associated Diagnoses: Necrotizing pancreatitis      Alcohol Swabs 70 % PADS May substitute brand based on insurance coverage. Check glucose ACHS. Qty: 200 each, Refills: 0    Associated Diagnoses: Necrotizing pancreatitis      apixaban (Eliquis) 5 mg Take 2 tablets (10 mg total) by mouth 2 (two) times a day for 7 days, THEN 1 tablet (5 mg total) 2 (two) times a day for 23 days. 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 Check glucose ACHS. Qty: 1 kit, Refills: 0    Associated Diagnoses: Necrotizing pancreatitis      Blood Pressure Monitoring (Adult Blood Pressure Cuff Lg) KIT Use 3 (three) times a day  Qty: 1 kit, Refills: 0    Associated Diagnoses: Primary hypertension      furosemide (LASIX) 20 mg tablet TAKE 1 TABLET BY MOUTH EVERY DAY  Qty: 30 tablet, Refills: 0    Comments: DX Code Needed  PT REQUESTED REFILLS. Associated Diagnoses: Edema, unspecified type      glucose blood (OneTouch Verio) test strip May substitute brand based on insurance coverage. Check glucose ACHS.   Qty: 200 each, Refills: 0    Associated Diagnoses: Necrotizing pancreatitis      Insulin Glargine Solostar (Lantus SoloStar) 100 UNIT/ML SOPN Inject 0.14 mL (14 Units total) under the skin daily at bedtime  Qty: 15 mL, Refills: 0    Associated Diagnoses: Necrotizing pancreatitis      insulin lispro (HumaLOG KwikPen) 100 units/mL injection pen Inject 5 Units under the skin 3 (three) times a day with meals  Qty: 15 mL, Refills: 0    Associated Diagnoses: Necrotizing pancreatitis      !! Insulin Pen Needle (BD Pen Needle Lupe 2nd Gen) 32G X 4 MM MISC For use with insulin pen. Pharmacy may dispense brand covered by insurance. Qty: 100 each, Refills: 0    Associated Diagnoses: Necrotizing pancreatitis      !! Insulin Pen Needle (BD Pen Needle Lupe 2nd Gen) 32G X 4 MM MISC For use with insulin pen. Pharmacy may dispense brand covered by insurance. Qty: 100 each, Refills: 0    Associated Diagnoses: Necrotizing pancreatitis      Levemir FlexPen 100 units/mL injection pen 14 Units daily at bedtime      lisinopril (ZESTRIL) 10 mg tablet Take 1 tablet (10 mg total) by mouth daily  Qty: 90 tablet, Refills: 1    Associated Diagnoses: Primary hypertension      NovoLOG FlexPen 100 units/mL injection pen INJECT 5 UNITS UNDER SKIN 3 TIMES DAILY WITH MEALS      OneTouch Delica Lancets 62T MISC May substitute brand based on insurance coverage. Check glucose ACHS. Qty: 200 each, Refills: 0    Associated Diagnoses: Necrotizing pancreatitis      pancrelipase, Lip-Prot-Amyl, (CREON) 6,000 units delayed release capsule Take 1 capsule (6,000 Units total) by mouth 3 (three) times a day with meals  Qty: 90 capsule, Refills: 0    Associated Diagnoses: Necrotizing pancreatitis      !! sodium chloride, PF, 0.9 % 10 mL by Intracatheter route daily for 120 doses Intracatheter flushing daily. May substitute prefilled syringe with normal saline 10 mL vials, 10 mL syringes, and 18 g blunt needles  Qty: 300 mL, Refills: 0    Associated Diagnoses: Necrotizing pancreatitis       ! ! - Potential duplicate medications found. Please discuss with provider. PDMP Review       Value Time User    PDMP Reviewed  Yes 8/11/2023  2:07 PM Ciara Garza PA-C           ED Provider  Attending physically available and evaluated Matt Moss.  I managed the patient along with the ED Attending.     Electronically Signed by         Simon Garnett MD  09/07/23 2016

## 2023-09-07 NOTE — PHYSICAL THERAPY NOTE
PHYSICAL THERAPY EVALUATION  NAME:  Matt Moss  DATE: 09/07/23    AGE:   46 y.o. Mrn:   [id number]  ADMIT DX:  Abdominal pain [R10.9]  Necrotizing pancreatitis [K85.91]  Wound drainage [L24. A9]    Past Medical History:   Diagnosis Date    Pancreatitis      Past Surgical History:   Procedure Laterality Date    CYSTOSCOPY N/A 8/5/2023    Procedure: CYSTOSCOPY. Saunders insertion;  Surgeon: Elvis Souza MD;  Location: [facility];  Service: Urology    FL RETROGRADE URETHROCYSTOGRAM  8/5/2023    HERNIA REPAIR      IR DRAINAGE TUBE CHECK/CHANGE/REPOSITION/REINSERTION/UPSIZE  9/4/2023    IR DRAINAGE TUBE PLACEMENT  8/5/2023    IR DRAINAGE TUBE PLACEMENT  9/6/2023    IR THORACENTESIS  9/4/2023    JOINT REPLACEMENT      TONSILLECTOMY  1976       Length Of Stay: 3  PHYSICAL THERAPY EVALUATION :    09/07/23 1110   PT Last Visit   PT Visit Date 09/07/23   Note Type   Note type Evaluation   Pain Assessment   Pain Assessment Tool 0-10   Pain Score 6   Pain Location/Orientation Location: Abdomen   Pain Radiating Towards non   Restrictions/Precautions   Weight Bearing Precautions Per Order No   Other Precautions Chair Alarm; Bed Alarm;Multiple lines;Telemetry;O2;Fall Risk;Pain  (drains; 3Lo2;)   Home Living   Type of [address] [place] [address] One level  (2-3STE w/ HR)   Bathroom Shower/Tub Walk-in shower   Bathroom Toilet Standard   Bathroom Equipment Shower chair   Home Equipment Walker   Prior Function   Level of [place] Independent with ADLs; Independent with functional mobility   Lives With Spouse   Receives Help From Family   IADLs Independent with driving; Independent with meal prep; Independent with medication management  (prior to last hospitalization)   Falls in the last 6 months 0   Vocational   (pt typically works full time. computer work/manager)   Comments Prior to admission, pt was living w his wife in a one story home.  Prior to August when he was hospitalized for necrotizing pancreatitis, he was fully independent w self care, mobility, working etc. Since returning home from hospital, he regained independence, used a RW for mobility. General   Family/Caregiver Present Yes  (spouse present for conclusion of PT/OT eval only- all questions answered and updates given in presence of pt)   Cognition   Overall Cognitive Status WFL   Attention Within functional limits   Orientation Level Oriented X4   Memory Within functional limits   Following Commands Follows all commands and directions without difficulty   RUE Assessment   RUE Assessment WFL   LUE Assessment   LUE Assessment WFL   RLE Assessment   RLE Assessment WFL  (5/5- significant pedal edema noted)   LLE Assessment   LLE Assessment WFL  (5/5- significant pedal edema noted)   Bed Mobility   Supine to Sit 4  Minimal assistance   Additional items Assist x 1;HOB elevated; Increased time required;Verbal cues;LE management   Additional Comments sits EOB w/ F+ balance and w/o dizziness on 3Lo2- Spo2 93% w/ mobility- pt does endorse some SOB CERON w/ activity and requried seated rest b/t transition. Transfers   Sit to Stand 4  Minimal assistance   Additional items Assist x 1; Increased time required;Verbal cues   Stand to Sit 4  Minimal assistance   Additional items Assist x 1; Increased time required;Verbal cues   Ambulation/Elevation   Gait pattern Forward Flexion;Decreased foot clearance; Excessively slow   Gait Assistance 4  Minimal assist   Additional items Assist x 1;Verbal cues   Assistive Device Rolling walker   Distance 5' advance retreat + turn to recliner w/ RW use and Hebert + assist for lines   Stair Management Assistance Not tested   Balance   Static Sitting Good   Dynamic Sitting Fair +   Static Standing Fair -   Dynamic Standing Poor +  (rw)   Endurance Deficit   Endurance Deficit Yes   Activity Tolerance   Activity Tolerance Patient tolerated treatment well   Medical Staff Made Aware Pt was seen in conjunction w/ OT 2* unstable presentation; medical complexity;  new precautions/ limitations + limited activity tolerance and regression from baseline functional mobility. Nurse Made Aware cleared for PT eval and OOB/ mobilization   Assessment   Prognosis Good   Problem List Decreased strength;Decreased range of motion;Decreased endurance; Impaired balance;Decreased mobility;Obesity; Decreased skin integrity;Pain   Assessment Pt is 46 y.o. male seen for PT evaluation s/p admit to [facility] on 9/4/2023. Pt presenting w/ ABDOMINAL PAIN AND BLOATING; HX OF NECROTIZING PANCREATITIS NOW; DX W/ SEPTIC SHOCK; PANCREATIC ABCESS; NECROTIZING PANCREATITIS; S/P PARACENTESIS +  UPSIZE OF DRAIN 2 MORE DRAINS PLACED BY IR. Pt  Obesity (BMI 36), HTN, chronic pain, pleural effusion. Due to acute medical issues, ongoing medical workup for primary dx; pain, fall risk, increased reliance on more restrictive AD compared to baseline;  decreased activity tolerance compared to baseline, increased assistance needed from caregiver at current time, continuous telemetry monitoring, multiple lines, drains;  decline in overall functional mobility status; health management issues; note unstable clinical picture (high complexity)   Prior to admission, pt was living w his wife in a one story home. Prior to August when he was hospitalized for necrotizing pancreatitis, he was fully independent w self care, mobility, working etc. Since returning home from hospital, he regained independence, used a RW for mobility. Currently pt  is requiring Hebert A for bed skills; Hebert for functional transfers and Hebert for ambulation w/ RW 5' bed> chair limited by fatigue; lines + pain.    Pt presents functioning below baseline and currently w/ overall mobility deficits 2* to: decreased LE strength/AROM; limited flexibility; obesity; pain;   generalized weakness/ deconditioning; decreased endurance; decreased activity tolerance; ; impaired balance; gait deviations; decreased safety awareness; SOB/CERON; fatigue; limited insight into current deficits; bed/ chair alarms; multiple lines;  (Please find additional objective findings from PT assessment regarding body systems outlined above.) Pt will continue to benefit from skilled PT interventions to address stated impairments; to maximize functional potential; for ongoing pt/ family training; and DME needs. Anticipate that with continued progress pt to d/c home with family support RW use + family/ spouse support when medically cleared. Pt/ family agreeable to plan and goals as stated on evaluation. Goals   Patient Goals to get OOB to the chair   STG Expiration Date 09/21/23   Short Term Goal #1 In 14 days pt will complete: 1) Bed mobility skills with MI to facilitate safe return to previous living environment and decrease burden on caregivers. 2) Functional transfers with MI  to facilitate safe return to previous living environment  3) Ambulation with least restrictive > 360'  AD MI' without LOB and stable vitals for safe ambulation in home/ community environment. 4) Stair training up/ down 2 step/s with  rail/s  and MI for safe access to previous living environment and to increase community access. 5) Improve balance by 1 grade in order to decrease fall risk. 6) Improve LE strength grades by 1 to increase independence w/ all functional mobility, transfers and gait. 7) PT for ongoing pt and family education; DME needs and D/C planning to promote highest level of function in least restrictive environment. PT Treatment Day 0   Plan   Treatment/Interventions ADL retraining;Functional transfer training;LE strengthening/ROM; Elevations; Therapeutic exercise; Endurance training;Patient/family training;Equipment eval/education; Bed mobility;Gait training;Spoke to nursing;Spoke to case management;OT;Family   PT Frequency 3-5x/wk   Recommendation   PT Discharge Recommendation Home with home health rehabilitation   Equipment Recommended Bailey Knight  (has for d/c)   Trending Taste Recommended Wheeled walker   AM-PAC Basic Mobility Inpatient   Turning in Flat Bed Without Bedrails 3   Lying on Back to Sitting on Edge of Flat Bed Without Bedrails 3   Moving Bed to Chair 3   Standing Up From Chair Using Arms 2   Walk in Room 2   Climb 3-5 Stairs With Railing 2   Basic Mobility Inpatient Raw Score 15   Basic Mobility Standardized Score 36.97   Highest Level Of Mobility   -[facility] Goal 4: Move to chair/commode   -HL Achieved 5: Stand (1 or more minutes)     The patient's AM-PAC Basic Mobility Inpatient Short Form Raw Score is 15. A Raw score of less than or equal to 16 suggests the patient may benefit from discharge to post-acute rehabilitation services. Please also refer to the recommendation of the Physical Therapist for safe discharge planning.

## 2023-09-07 NOTE — PROGRESS NOTES
[facility]  Progress Note: Critical Care  Name: Dionna Franco 46 y.o. male I MRN: [id number]  Unit/Bed#: MICU 07 I Date of Admission: 9/4/2023   Date of Service: 9/7/2023 I Hospital Day: 3    Assessment/Plan   Neuro:   · Diagnosis: Pain  · Plan:   - Tylenol 650mg q6 PRN for mild pain  - Roxicodone 5mg q6 PRN for moderate pain  - Roxicodone 10mg q6 for severe pain  - Dilaudid 0.5mg q4PRN for breakthrough pain  ? Delirium precautions, maintain sleep-wake cycle, CAM-ICU      CV:   · Diagnosis: Septic shock  · Plan:   - Maintain MAP>65  - Lactic acid cleared  - Required increased levo overnight (7 mcg/min), now at 5mcg/min, continue to wean as able  - Continue to monitor endpoints      Pulm:  • Diagnosis: Acute hypoxic respiratory insufficiency likely 2/2 sepsis and reactive b/l pleural effusions  ? Tachypnic overnight   ? Plan:   - Maintain spO2>92%  - Airway clearance protocol  - IS and aggressive pulmonary toilet  - Pending AM CXR for overnight tachypnea  • Diagnosis: B/l pleural effusions likely reactive 2/2 pancreatitis  ? Plan:  - S/p L IR thoracentesis of 1.2L  - F/u pleural cultures    GI:   • Diagnosis: Infected necrotizing pancreatitis  ? 9/5 CTAP-Significant worsening of necrotizing pancreatitis w/ large acute collection extening to pararenal spaces containing large amount of gas concerning for infection  ? Plan:   - Monitor abd exam  - General surgery and GI c/s  - Plan for EUS/cystogastrostomy likely once patients HDS/off pressor therapy  - Abx as described in ID: cefepime/flagyl   - Now s/p IR upsize to 16F  - Drains with 250cc output in 24 hours  • Diagnosis: Dilated colon  ? 9/5 CTAP-Inflammatory mass along the left lateral pararenal gutter w/ involvement of proximal to mid descending colon  ? KUB with largely distended colon >12cm  ? Patient continues to have bowel movements, unlikely to be obstruction   ?  Plan   - GI consult: No indication for decompression at this time  - Monitor abdominal exam  - Aggressive bowel regimen, per GI: Miralax bid, Dulcolax rectal suppositories prn, mineral oil enemas prn  · Ascites   · S/p IR drainage: 2900cc serous fluid drained from RLQ  · Plan:   · Gram stain with polys, gram+ cocci, gram variable rods  · Culture with growth of Citrobacter freundii  · Continue antibiotics per ID  :   • Diagnosis: JERZY  ? Plan:   - Saunders placement  - Strict I/Os  - U/O 1675 in 24 hrs (0.58cc/kg/hr)  - Monitor BUN/Cr  - Slight bump in Cr to 1.4 this morning, overall improved from 2.61 on 9/4 (trend: 2.61 > 2.09 > 2.15 > 1.62 > 1.30 > 1.40)    F/E/N:   ? F: [email or web address]  ? E: replace prn K>4, Mg>2, P>3  ? N: clear liquid diet    Heme/Onc:   • Diagnosis: non-occlusive SMV thrombus  ? Plan:   - On eliquis (last dose 9/4) not reversed  - Restart heparin gtt at 2000 (24 hrs post IR procedure)  • Diagnosis: Anemia  ? Plan  - Monitor HgB: HgB 7.5, stable from 9/6   - Monitor s/s of bleeding  - Transfuse for hgb <7  • Diagnosis: Thrombocytosis   ? Plan  - Likely reactive 2/2 pancreatitis  - Monitor  - Stable at 348 on 9/7, improved from 482 to 396 on 9/6  • Diagnosis: DVT ppx  ? Plan  - SCDs  - Restart subQ heparin at 2000 (24 hrs post IR procedure)    Endo:   · Diagnosis: Hyperglycemia  · Plan:   · SSI  · BS goal 140-180      ID:   · Diagnosis: Infected necrotizing pancreatitis, gram-negative rods  · Plan:   · Antibiotics per ID: cefepime 1g q12hrs, Flagyl 500 mg q8 houts  · Continue to follow IR and blood cultures       MSK/Skin:   · Diagnosis: Chronic wounds  · Plan:   · Wound consult  · Pressure point offloading, turns/repositioning  · PT/OT      Disposition: Critical care    ICU Core Measures     A: Assess, Prevent, and Manage Pain · Has pain been assessed? Yes  · Need for changes to pain regimen? No   B: Both SAT/SAT  · N/A   C: Choice of Sedation · RASS Goal: 0 Alert and Calm  · Need for changes to sedation or analgesia regimen?  No   D: Delirium · CAM-ICU: Negative   E: Early Mobility  · Plan for early mobility? Yes   F: Family Engagement · Plan for family engagement today? Yes       Antibiotic Review: Patient on appropriate coverage based on culture data. Review of Invasive Devices: Saunders Plan: Continue for accurate I/O monitoring for 48 hours  Central access plan: Hemodynamic monitoring  Rody Plan: Keep arterial line for hemodynamic monitoring    Prophylaxis:  VTE VTE covered by:    Freddy De Luna    Contraindicated secondary to: post-procedure status   Stress Ulcer  not ordered          Subjective   HPI/24hr events: Tachypnic overnight, repeat CXR obtained with final read pending. Patient states he feels well, no complaints, no pain. Is looking forward to moving more today. Review of Systems   Constitutional: Negative. HENT: Negative. Respiratory: Negative. Cardiovascular: Positive for leg swelling. Negative for chest pain and palpitations. Gastrointestinal: Positive for abdominal distention. Negative for abdominal pain, blood in stool, constipation, nausea and vomiting. Genitourinary: Negative. Musculoskeletal: Negative. Skin: Positive for wound. Neurological: Negative. Objective                            Vitals I/O      Most Recent Min/Max in 24hrs   Temp 99 °F (37.2 °C) Temp  Min: 98.8 °F (37.1 °C)  Max: 99.4 °F (37.4 °C)   Pulse (!) 116 Pulse  Min: 90  Max: 120   Resp (!) 34 Resp  Min: 19  Max: 41   BP (!) 135/48 No data recorded   O2 Sat 96 % SpO2  Min: 86 %  Max: 98 %      Intake/Output Summary (Last 24 hours) at 9/7/2023 0757  Last data filed at 9/7/2023 0600  Gross per 24 hour   Intake 3302.23 ml   Output 4845 ml   Net -1542.77 ml         Diet Clear Liquid     Invasive Monitoring Physical exam   Arterial Line  Rody /56  Arterial Line BP  Min: 100/56  Max: 156/54   MAP 70 mmHg  Arterial Line MAP (mmHg)  Min: 58 mmHg  Max: 86 mmHg    Physical Exam  Skin:     General: Skin is warm and dry.    HENT: Head: Normocephalic and atraumatic. Cardiovascular:      Rate and Rhythm: Normal rate. Abdominal:      General: There is distension. Palpations: Abdomen is soft. Tenderness: There is no abdominal tenderness. There is no guarding. Comments: Hypoactive bowel sounds. Dull to percussion. Constitutional:       General: He is not in acute distress. Appearance: He is not toxic-appearing. Pulmonary:      Effort: Tachypnea present. No respiratory distress. Neurological:      General: No focal deficit present. Mental Status: He is alert and oriented to person, place and time. Genitourinary/Anorectal:     Comments: Draining clear urine. Saunders         Diagnostic Studies      EKG:   Imaging:  I have personally reviewed pertinent reports.        Medications:  Scheduled PRN   cefepime, 2,000 mg, Q8H  chlorhexidine, 15 mL, Q12H ALEX  insulin detemir, 7 Units, HS  insulin lispro, 2-12 Units, TID AC  insulin lispro, 2-12 Units, HS  insulin lispro, 5 Units, TID With Meals  metroNIDAZOLE, 500 mg, Q8H      acetaminophen, 650 mg, Q6H PRN  HYDROmorphone, 0.5 mg, Q4H PRN  oxyCODONE, 5 mg, Q6H PRN   Or  oxyCODONE, 10 mg, Q6H PRN       Continuous    multi-electrolyte, 75 mL/hr, Last Rate: 75 mL/hr (09/06/23 2238)  norepinephrine, 1-30 mcg/min, Last Rate: 5 mcg/min (09/07/23 0617)         Labs:    CBC    Recent Labs     09/06/23  2252 09/07/23  0506   WBC 8.77 9.82   HGB 7.5* 7.5*   HCT 24.7* 25.3*    348     BMP    Recent Labs     09/06/23  2143 09/07/23  0506   SODIUM 139 141   K 3.1* 3.6    104   CO2 29 30   AGAP 8 7   BUN 62* 57*   CREATININE 1.30 1.40*   CALCIUM 7.6* 7.7*       Coags    Recent Labs     09/05/23  2319 09/06/23  0450   PTT 41* 46*        Additional Electrolytes  Recent Labs     09/06/23  0450   MG 2.3   PHOS 4.7*          Blood Gas    No recent results  No recent results LFTs  Recent Labs     09/06/23  0450 09/07/23  0506   ALT 10 9   AST 13 11*   ALKPHOS 43 42   ALB 2. 4* 2.5*   TBILI 0.54 0.58       Infectious  No recent results  Glucose  Recent Labs     09/06/23  0450 09/06/23  2143 09/07/23  0506   GLUC 192* 151* 128             Jeff Garcia MD

## 2023-09-07 NOTE — PROGRESS NOTES
Progress Note - Infectious Disease   Susan Jones 46 y.o. male MRN: [id number]  [facility]: [id number]      Impression/Recommendations:  Septic shock, POA.    Fever, WBC, refractory hypotension.  Due to bacteremia, pancreatic abscess as below.  No other appreciable source.  Improving since admission but remains critically ill on pressors. Rec:  • Continue antibiotics as below  • Follow temperatures closely  • Recheck CBC in AM  • Supportive care as per the primary service     Polymicrobial bacteremia.    Klebsiella, alpha-Strep.  Coagulase-negative Staph likely contaminants. Likely due to pancreatic abscess as below.  No other appreciable source. Repeat blood cultures 9/6, TTE (technically difficult) negative. Rec:  • Continue cefepime/Flagyl for now  • Follow up repeat blood cultures     Pancreatic abscess.    Due to superinfection of necrosis as below. Status post drain upsizing 9/4 which yielded infected hemorrhagic material.  Cultures with Citrobacter, Bascteroids. Status post additional drains x2 9/6. Cultures pending. Rec:  • Continue cefepime/Flagyl  • Follow up fluid cultures and tailor antibiotics as indicated  • Follow drain output closely     Necrotizing pancreatitis. Presumed alcoholic.  Status post IR drain 8/5 yielding dark bloody fluid.  Cultures negative. Current CT shows replacing most of pancreatic parenchyma, extending to pararenal spaces, causing left colonic obstruction, gastric outlet obstruction. Status post drain upsizing 9/4, additional drains x2 9/6. Collection not mature enough for cyst gastrostomy per GI. Rec:  • Follow drain output closely  • Close Surgery follow-up ongoing  • Eventual cyst gastrostomy versus VARDs     JERZY. Likely multifactorial.  Improving. Rec:  • Follow creatinine closely and dose-adjust antibiotics as indicated  • Recheck BMP in AM     Large bowel obstruction.   Due to compression from pancreatitis as above.     Gastric outlet obstruction. Due to compression from pancreatitis as above.     Bilateral pleural effusions. Likely reactive to pancreatitis as above.  Status post left thoracentesis yielding 1200 cc clear yellow fluid.  Fluid cultures negative.     Ascites. Likely reactive to pancreatitis as above.     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       Antibiotics:  Cefepime  Antibiotics #4    Subjective:  Patient seen on AM rounds. Sleeping and not awakened. Offered no complaints to ICU team an AM rounds. 24 Hour Events:  Went to IR for paracentesis with 2900cc fluid removal, additional drain placement. Remains on low dose pressors. Objective:  Vitals:  Temp:  [98.4 °F (36.9 °C)-99.1 °F (37.3 °C)] 98.4 °F (36.9 °C)  HR:  [] 102  Resp:  [19-41] 26  SpO2:  [86 %-99 %] 99 %  Temp (24hrs), Av.8 °F (37.1 °C), Min:98.4 °F (36.9 °C), Max:99.1 °F (37.3 °C)  Current: Temperature: 98.4 °F (36.9 °C)    Physical Exam:   General:  No acute distress  Psychiatric:  Sleeping  Pulmonary:  Normal respiratory excursion without accessory muscle use  Abdomen:  Obese, distended  Extremities:  No edema  Skin:  No rashes    Lab Results:  I have personally reviewed pertinent labs. Results from last 7 days   Lab Units 23  0506 23  2143 23  0450 23  0229   POTASSIUM mmol/L 3.6 3.1* 3.7 4.4   CHLORIDE mmol/L 104 102 98 96   CO2 mmol/L 30 29 28 24   BUN mg/dL 57* 62* 79* 91*   CREATININE mg/dL 1.40* 1.30 1.62* 2.15*   EGFR ml/min/1.73sq m 57 62 48 34   CALCIUM mg/dL 7.7* 7.6* 7.8* 7.6*   AST U/L 11*  --  13 22   ALT U/L 9  --  10 16   ALK PHOS U/L 42  --  43 45     Results from last 7 days   Lab Units 23  0506 23  2252 23  2143   WBC Thousand/uL 9.82 8.77 7.96   HEMOGLOBIN g/dL 7.5* 7.5* 7.8*   PLATELETS Thousands/uL 348 330 344     Results from last 7 days   Lab Units 23  1512 23  0937 23  1928 23  1502   BLOOD CULTURE   --   --  No Growth at 24 hrs. No Growth at 24 hrs.  --  Klebsiella aerogenes*  Streptococcus mitis oralis group*  Staphylococcus epidermidis*  Staphylococcus hominis*  No Growth at 48 hrs. GRAM STAIN RESULT  No Polys or Bacteria seen 1+ Polys  No bacteria seen  --  No Polys or Bacteria seen  1+ Polys*  3+ Gram positive cocci in pairs*  3+ Gram variable rods* Gram negative rods*  Gram positive cocci in chains*   BODY FLUID CULTURE, STERILE  4+ Growth of Gram Negative Aamir Enteric Like* No growth  --  No growth  4+ Growth of Citrobacter freundii*  --        Imaging Studies:   I have personally reviewed pertinent imaging study reports and images in PACS. CXR reviewed personally stable right basilar opacity    EKG, Pathology, and Other Studies:   I have personally reviewed pertinent reports.

## 2023-09-07 NOTE — ANESTHESIA POSTPROCEDURE EVALUATION
Post-Op Assessment Note    CV Status:  Stable  Pain Score: 0    Pain management: adequate     Mental Status:  Alert and awake   Hydration Status:  Euvolemic   PONV Controlled:  Controlled   Airway Patency:  Patent      Post Op Vitals Reviewed: Yes      Staff: CRNA         No notable events documented.     BP  126/57   Temp   97.4   Pulse  102   Resp   18   SpO2   97

## 2023-09-07 NOTE — PLAN OF CARE
Problem: PAIN - ADULT  Goal: Verbalizes/displays adequate comfort level or baseline comfort level  Description: Interventions:  - Encourage patient to monitor pain and request assistance  - Assess pain using appropriate pain scale  - Administer analgesics based on type and severity of pain and evaluate response  - Implement non-pharmacological measures as appropriate and evaluate response  - Consider cultural and social influences on pain and pain management  - Notify physician/advanced practitioner if interventions unsuccessful or patient reports new pain  Outcome: Progressing     Problem: INFECTION - ADULT  Goal: Absence or prevention of progression during hospitalization  Description: INTERVENTIONS:  - Assess and monitor for signs and symptoms of infection  - Monitor lab/diagnostic results  - Monitor all insertion sites, i.e. indwelling lines, tubes, and drains  - Monitor endotracheal if appropriate and nasal secretions for changes in amount and color  - Thayer appropriate cooling/warming therapies per order  - Administer medications as ordered  - Instruct and encourage patient and family to use good hand hygiene technique  - Identify and instruct in appropriate isolation precautions for identified infection/condition  Outcome: Progressing  Goal: Absence of fever/infection during neutropenic period  Description: INTERVENTIONS:  - Monitor WBC    Outcome: Progressing     Problem: SAFETY ADULT  Goal: Patient will remain free of falls  Description: INTERVENTIONS:  - Educate patient/family on patient safety including physical limitations  - Instruct patient to call for assistance with activity   - Consult OT/PT to assist with strengthening/mobility   - Keep Call bell within reach  - Keep bed low and locked with side rails adjusted as appropriate  - Keep care items and personal belongings within reach  - Initiate and maintain comfort rounds  - Make Fall Risk Sign visible to staff  - Offer Toileting every 2 Hours, in advance of need  - Initiate/Maintain alarm  - Obtain necessary fall risk management equipment  - Apply yellow socks and bracelet for high fall risk patients  - Consider moving patient to room near nurses station  Outcome: Progressing  Goal: Maintain or return to baseline ADL function  Description: INTERVENTIONS:  -  Assess patient's ability to carry out ADLs; assess patient's baseline for ADL function and identify physical deficits which impact ability to perform ADLs (bathing, care of mouth/teeth, toileting, grooming, dressing, etc.)  - Assess/evaluate cause of self-care deficits   - Assess range of motion  - Assess patient's mobility; develop plan if impaired  - Assess patient's need for assistive devices and provide as appropriate  - Encourage maximum independence but intervene and supervise when necessary  - Involve family in performance of ADLs  - Assess for home care needs following discharge   - Consider OT consult to assist with ADL evaluation and planning for discharge  - Provide patient education as appropriate  Outcome: Progressing  Goal: Maintains/Returns to pre admission functional level  Description: INTERVENTIONS:  - Perform BMAT or MOVE assessment daily.   - Set and communicate daily mobility goal to care team and patient/family/caregiver. - Collaborate with rehabilitation services on mobility goals if consulted  - Perform Range of Motion 3 times a day. - Reposition patient every 2 hours.   - Dangle patient 3 times a day  - Stand patient 3 times a day  - Ambulate patient 3 times a day  - Out of bed to chair 3 times a day   - Out of bed for meals 3 times a day  - Out of bed for toileting  - Record patient progress and toleration of activity level   Outcome: Progressing     Problem: DISCHARGE PLANNING  Goal: Discharge to home or other facility with appropriate resources  Description: INTERVENTIONS:  - Identify barriers to discharge w/patient and caregiver  - Arrange for needed discharge resources and transportation as appropriate  - Identify discharge learning needs (meds, wound care, etc.)  - Arrange for interpretive services to assist at discharge as needed  - Refer to Case Management Department for coordinating discharge planning if the patient needs post-hospital services based on physician/advanced practitioner order or complex needs related to functional status, cognitive ability, or social support system  Outcome: Progressing     Problem: Knowledge Deficit  Goal: Patient/family/caregiver demonstrates understanding of disease process, treatment plan, medications, and discharge instructions  Description: Complete learning assessment and assess knowledge base. Interventions:  - Provide teaching at level of understanding  - Provide teaching via preferred learning methods  Outcome: Progressing     Problem: MOBILITY - ADULT  Goal: Maintain or return to baseline ADL function  Description: INTERVENTIONS:  -  Assess patient's ability to carry out ADLs; assess patient's baseline for ADL function and identify physical deficits which impact ability to perform ADLs (bathing, care of mouth/teeth, toileting, grooming, dressing, etc.)  - Assess/evaluate cause of self-care deficits   - Assess range of motion  - Assess patient's mobility; develop plan if impaired  - Assess patient's need for assistive devices and provide as appropriate  - Encourage maximum independence but intervene and supervise when necessary  - Involve family in performance of ADLs  - Assess for home care needs following discharge   - Consider OT consult to assist with ADL evaluation and planning for discharge  - Provide patient education as appropriate  Outcome: Progressing  Goal: Maintains/Returns to pre admission functional level  Description: INTERVENTIONS:  - Perform BMAT or MOVE assessment daily.   - Set and communicate daily mobility goal to care team and patient/family/caregiver.    - Collaborate with rehabilitation services on mobility goals if consulted  - Perform Range of Motion 3 times a day. - Reposition patient every 2 hours. - Dangle patient 3 times a day  - Stand patient 3 times a day  - Ambulate patient 3 times a day  - Out of bed to chair 3 times a day   - Out of bed for meals 3 times a day  - Out of bed for toileting  - Record patient progress and toleration of activity level   Outcome: Progressing     Problem: Prexisting or High Potential for Compromised Skin Integrity  Goal: Skin integrity is maintained or improved  Description: INTERVENTIONS:  - Identify patients at risk for skin breakdown  - Assess and monitor skin integrity  - Assess and monitor nutrition and hydration status  - Monitor labs   - Assess for incontinence   - Turn and reposition patient  - Assist with mobility/ambulation  - Relieve pressure over bony prominences  - Avoid friction and shearing  - Provide appropriate hygiene as needed including keeping skin clean and dry  - Evaluate need for skin moisturizer/barrier cream  - Collaborate with interdisciplinary team   - Patient/family teaching  - Consider wound care consult   Outcome: Progressing     Problem: Nutrition/Hydration-ADULT  Goal: Nutrient/Hydration intake appropriate for improving, restoring or maintaining nutritional needs  Description: Monitor and assess patient's nutrition/hydration status for malnutrition. Collaborate with interdisciplinary team and initiate plan and interventions as ordered. Monitor patient's weight and dietary intake as ordered or per policy. Utilize nutrition screening tool and intervene as necessary. Determine patient's food preferences and provide high-protein, high-caloric foods as appropriate.      INTERVENTIONS:  - Monitor oral intake, urinary output, labs, and treatment plans  - Assess nutrition and hydration status and recommend course of action  - Evaluate amount of meals eaten  - Assist patient with eating if necessary   - Allow adequate time for meals  - Recommend/ encourage appropriate diets, oral nutritional supplements, and vitamin/mineral supplements  - Order, calculate, and assess calorie counts as needed  - Recommend, monitor, and adjust tube feedings and TPN/PPN based on assessed needs  - Assess need for intravenous fluids  - Provide specific nutrition/hydration education as appropriate  - Include patient/family/caregiver in decisions related to nutrition  Outcome: Progressing

## 2023-09-07 NOTE — PLAN OF CARE
Problem: PHYSICAL THERAPY ADULT  Goal: Performs mobility at highest level of function for planned discharge setting. See evaluation for individualized goals. Description: Treatment/Interventions: ADL retraining, Functional transfer training, LE strengthening/ROM, Elevations, Therapeutic exercise, Endurance training, Patient/family training, Equipment eval/education, Bed mobility, Gait training, Spoke to nursing, Spoke to case management, OT, Family  Equipment Recommended: Duane Lawton (has for d/c)       See flowsheet documentation for full assessment, interventions and recommendations. Note: Prognosis: Good  Problem List: Decreased strength, Decreased range of motion, Decreased endurance, Impaired balance, Decreased mobility, Obesity, Decreased skin integrity, Pain  Assessment: Pt is 46 y.o. male seen for PT evaluation s/p admit to [facility] on 9/4/2023. Pt presenting w/ ABDOMINAL PAIN AND BLOATING; HX OF NECROTIZING PANCREATITIS NOW; DX W/ SEPTIC SHOCK; PANCREATIC ABCESS; NECROTIZING PANCREATITIS; S/P PARACENTESIS +  UPSIZE OF DRAIN 2 MORE DRAINS PLACED BY IR. Pt  Obesity (BMI 36), HTN, chronic pain, pleural effusion. Due to acute medical issues, ongoing medical workup for primary dx; pain, fall risk, increased reliance on more restrictive AD compared to baseline;  decreased activity tolerance compared to baseline, increased assistance needed from caregiver at current time, continuous telemetry monitoring, multiple lines, drains;  decline in overall functional mobility status; health management issues; note unstable clinical picture (high complexity)   Prior to admission, pt was living w his wife in a one story home. Prior to August when he was hospitalized for necrotizing pancreatitis, he was fully independent w self care, mobility, working etc. Since returning home from hospital, he regained independence, used a RW for mobility.   Currently pt  is requiring Hebert A for bed skills; Hebert for functional transfers and Hebert for ambulation w/ RW 5' bed> chair limited by fatigue; lines + pain. Pt presents functioning below baseline and currently w/ overall mobility deficits 2* to: decreased LE strength/AROM; limited flexibility; obesity; pain;   generalized weakness/ deconditioning; decreased endurance; decreased activity tolerance; ; impaired balance; gait deviations; decreased safety awareness; SOB/CERON; fatigue; limited insight into current deficits; bed/ chair alarms; multiple lines;  (Please find additional objective findings from PT assessment regarding body systems outlined above.) Pt will continue to benefit from skilled PT interventions to address stated impairments; to maximize functional potential; for ongoing pt/ family training; and DME needs. Anticipate that with continued progress pt to d/c home with family support RW use + family/ spouse support when medically cleared. Pt/ family agreeable to plan and goals as stated on evaluation. PT Discharge Recommendation: Home with home health rehabilitation    See flowsheet documentation for full assessment.

## 2023-09-07 NOTE — PROGRESS NOTES
Progress Note - General Surgery   Adriana Pena 46 y.o. male MRN: [id number]  Unit/Bed#: MICU 07 Encounter: [id number]    Assessment:  Patient is a 14-year-old man who presented to the ER with leakage around his IR drainage tube. He was admitted from 8/4 to 8/11 for necrotizing pancreatitis and was discharged with an IR drainage tube in the area. 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 He was doing well at home until he started to have increased drainage and was feeling generally weak so he came into the ER where he was found to have sepsis and significantly worsening necrotizing pancreatitis with a necrotic collection on CT scan on 9/4. He also had severe gastric distention and colonic distention likely due to inflammatory and phlegmon mechanical compression. Patient was treated for sepsis with IV fluids and meropenem. His IR drain was upsized to a 16 [place] tube on 9/4. Also he had a left thoracentesis performed with 1.2 L of fluid removed by IR. Yesterday, patient had paracentesis with 2900 cc drained (cultures pending). He also had 2 more drains placed in the pancreatic fluid collection. He has had poor output from the drains since then. He had tachycardia, tachypnea and increased o2 requirement immediately after the procedure but has since improved. Overnight, patient has improved significantly. He is awake and alert. He remains on Levophed 5 mcg/min. He continues to have abdominal distention but is having bowel movements and is passing gas regularly. UOP: 550 overnight (0.4 cc/kg/hour)  Drain output:      -DONNY drain #1: 20 cc      - DONNY drain #2 (new from yesterday): 75 out in OR and 40 out since 9 pm      - 12 Fr drain (labeled #3 in Epic): 65 out in OR and 0 out since then.   Blood cultures are positive for Klebsiella aerogenes, Strep mitis oralis  Thoracentesis cultures are no growth to date  Abdominal drain cultures are growing gram-positive cocci and gram-negative bacteria  Paracentesis cultures pending  Repeat blood cultures from 9/6 pending    Plan:  Continue multimodal pain control  Continue vasopressors to maintain MAP greater than or equal to 65  --Continue to wean Levophed   --consider albumin transfusion  Wean nasal cannula oxygen as patient is not oxygen dependent at home  Continue antibiotics, currently on cefepime and Flagyl  --Appreciate infectious disease recommendations   Heparin drip on hold until this afternoon per IR request  GI-- possible cyst gastrostomy when the patient is more stable   Advance diet-- Low fat  Monitor urine output in light of his acute renal failure  Monitor abscess drain output-- talk to IR about flushes since minimal output  Continuous central venous catheter, arterial line, and coudé catheter   Monitor fever curve and white blood cell count  Continue sliding scale insulin and  Levemir for glucose control-- may need to increase Levemir to home dose once on a diet. Subjective/Objective   Chief Complaint: Abdominal discomfort, bloating    Subjective: Patient is significantly more awake this morning. He states that he feels well and generally as no complaints. Still feels bloated but is passing gas and having bowel movements. Objective:     Blood pressure (!) 135/48, pulse (!) 106, temperature 99 °F (37.2 °C), resp. rate (!) 26, height 5' 11" (1.803 m), weight 120 kg (264 lb 15.9 oz), SpO2 96 %. ,Body mass index is 36.96 kg/m². Intake/Output Summary (Last 24 hours) at 9/7/2023 0929  Last data filed at 9/7/2023 0600  Gross per 24 hour   Intake 2751.63 ml   Output 4625 ml   Net -1873.37 ml       Invasive Devices     Central Venous Catheter Line  Duration           CVC Central Lines 09/04/23 Triple Left Femoral 2 days          Peripheral Intravenous Line  Duration           Peripheral IV 08/08/23 Dorsal (posterior); Left Wrist 30 days    Peripheral IV 09/04/23 Distal;Left;Upper;Ventral (anterior) Arm 3 days          Arterial Line  Duration           Arterial Line 09/05/23 Radial 2 days          Drain  Duration           Abscess Drain RUQ 2 days    Urethral Catheter Coude 2 days    Abscess Drain LUQ <1 day    Abscess Drain LUQ <1 day                Physical Exam: BP (!) 135/48   Pulse (!) 106   Temp 99 °F (37.2 °C)   Resp (!) 26   Ht 5' 11" (1.803 m)   Wt 120 kg (264 lb 15.9 oz)   SpO2 96%   BMI 36.96 kg/m²     General appearance: Obese, awake and alert  HEENT: Normocephalic, atraumatic, pupils equal, round, reactive, no jaundice, mucous membranes moist, no JVD noted  Cardiac: Tachycardia, no murmurs  Lungs: Clear to auscultation bilaterally, on 2 L of oxygen  Abdomen: Obese, soft, distended, IR drain sites clean, dry, and intact   Extremities: Normal, atraumatic, 2+ pitting edema in all extremities  Skin: Pale, cool, no rashes or other wounds:  Neuro: A&O x4      Lab, Imaging and other studies:I have personally reviewed pertinent lab results.     VTE Pharmacologic Prophylaxis: Sequential compression device (Venodyne)  and Heparin  VTE Mechanical Prophylaxis: sequential compression device     Manasa Clinton MD

## 2023-09-07 NOTE — PLAN OF CARE
Problem: OCCUPATIONAL THERAPY ADULT  Goal: Performs self-care activities at highest level of function for planned discharge setting. See evaluation for individualized goals. Note: Limitation: Decreased ADL status, Decreased endurance, Decreased self-care trans, Decreased high-level ADLs     Assessment: Pt is a 46 y.o. male seen for OT evaluation s/p admit to [address] [place] [address] on 9/4/2023 w/ <principal problem not specified>. Pt to ED with hx of necrotizing pancreatitis with betzy drain, noted increased abdominal pain and distention with drainage around drain. Pt found to be in septic shock. Underwent placement of new large drain to facilitate drainage. Pt is now in medical ICU, has multiple lines, including 3 drains, IV's, a-line, O2, palafox, tele. He has been cleared for OOB and therapy evaluation. pt  has a past medical history of Pancreatitis. Obesity (BMI 36), HTN, chronic pain, pleural effusion. Personal factors affecting pt at time of IE include:difficulty performing ADLS and difficulty performing IADLS . Prior to admission, pt was living w his wife in a one story home. Prior to August when he was hospitalized for necrotizing pancreatitis, he was fully independent w self care, mobility, working etc. Since returning home from hospital, he regained independence, used a RW for mobility. Upon evaluation: Pt requires mod assist to don/doff socks, min assist functional mobility + a second person stand by for line managment 2* the following deficits impacting occupational performance: weakness, decreased balance and decreased tolerance. Pt to benefit from continued skilled OT tx while in the hospital to address deficits as defined above and maximize level of functional independence w ADL's and functional mobility. Occupational Performance areas to address include: grooming, bathing/shower, toilet hygiene, dressing, functional mobility and clothing management.   Based on findings from OT evaluation and functional performance deficits, pt has been identified as a  high complexity evaluation. The patient's raw score on the AM-PAC Daily Activity inpatient short form is 20, standardized score is 42.03, greater than 39.4. Patients at this level are likely to benefit from discharge to home. From OT standpoint, recommendation at time of d/c would be home w family support.      OT Discharge Recommendation: No rehabilitation needs

## 2023-09-07 NOTE — OCCUPATIONAL THERAPY NOTE
Occupational Therapy Evaluation      Fritz Julien    9/7/2023    Active Problems: There are no active Hospital Problems. Past Medical History:   Diagnosis Date    Pancreatitis        Past Surgical History:   Procedure Laterality Date    CYSTOSCOPY N/A 8/5/2023    Procedure: CYSTOSCOPY. Palafox insertion;  Surgeon: Annie Aquino MD;  Location: [facility];  Service: Urology    FL RETROGRADE URETHROCYSTOGRAM  8/5/2023    HERNIA REPAIR      IR DRAINAGE TUBE CHECK/CHANGE/REPOSITION/REINSERTION/UPSIZE  9/4/2023    IR DRAINAGE TUBE PLACEMENT  8/5/2023    IR DRAINAGE TUBE PLACEMENT  9/6/2023    IR THORACENTESIS  9/4/2023    JOINT REPLACEMENT      TONSILLECTOMY  1976        09/07/23 1107   OT Last Visit   OT Visit Date 09/07/23   Note Type   Note type Evaluation   Pain Assessment   Pain Assessment Tool 0-10   Pain Score 6   Pain Location/Orientation Orientation: Left; Location: Leg;Orientation: Lower; Location: Abdomen   Restrictions/Precautions   Other Precautions Multiple lines;Telemetry;O2;Fall Risk;Pain  (3 drains, IV, A-line, O2 3 L, palafox)   Home Living   Type of [address] [place] [address]  One level  (2 REJI)   Bathroom Shower/Tub Walk-in shower   Bathroom Toilet Standard   Bathroom Equipment Shower chair   Home Equipment Walker   Prior Function   Level of [place] Independent with ADLs; Independent with functional mobility   Lives With Spouse   IADLs Independent with driving; Independent with meal prep; Independent with medication management  (prior to last hospitalization)   Vocational   (pt typically works full time.  computer work/manager)   Lifestyle   Autonomy pt reports that prior to last hospitalization in August, that he was fully independent w self care, mobility, driving etc   Reciprocal Relationships supportive wife   Intrinsic Gratification his dog Aime Rios   Subjective   Subjective "I would like to get up to the chair"   ADL   Eating Assistance 7  Independent   Grooming Assistance 5 Supervision/Setup   Grooming Deficit Setup; Increased time to complete   UB Bathing Assistance 5  Supervision/Setup   UB Bathing Deficit Setup; Increased time to complete   LB Bathing Assistance 4  Minimal Assistance   LB Bathing Deficit Increased time to complete   UB Dressing Assistance 5  Supervision/Setup   LB Dressing Assistance 3  Moderate Assistance   LB Dressing Deficit Don/doff L sock; Don/doff R sock   Bed Mobility   Supine to Sit 4  Minimal assistance   Additional items Assist x 1; Increased time required   Transfers   Sit to Stand 4  Minimal assistance   Additional items Assist x 1   Stand to Sit 4  Minimal assistance   Additional items Assist x 1;Verbal cues   Stand pivot 4  Minimal assistance   Additional items Assist x 1; Increased time required;Verbal cues   Balance   Static Sitting Good   Dynamic Sitting Fair +   Static Standing Fair -   Dynamic Standing Fair -   Activity Tolerance   Activity Tolerance Patient tolerated treatment well   Medical Staff Made Aware seen for coeval with PT Karen Orosco due to medical complexity   Nurse Made Aware ok to see   RUE Assessment   RUE Assessment WFL   LUE Assessment   LUE Assessment WFL   Hand Function   Gross Motor Coordination Functional   Fine Motor Coordination Functional   Sensation   Light Touch No apparent deficits   Psychosocial   Psychosocial (WDL) WDL   Perception   Inattention/Neglect Appears intact   Cognition   Overall Cognitive Status WFL   Arousal/Participation Alert; Cooperative   Attention Within functional limits   Orientation Level Oriented X4   Memory Within functional limits   Following Commands Follows all commands and directions without difficulty   Assessment   Limitation Decreased ADL status; Decreased endurance;Decreased self-care trans;Decreased high-level ADLs   Assessment Pt is a 46 y.o. male seen for OT evaluation s/p admit to [facility] on 9/4/2023 w/ <principal problem not specified>.  Pt to ED with hx of necrotizing pancreatitis with betzy drain, noted increased abdominal pain and distention with drainage around drain. Pt found to be in septic shock. Underwent placement of new large drain to facilitate drainage. Pt is now in medical ICU, has multiple lines, including 3 drains, IV's, a-line, O2, palafox, tele. He has been cleared for OOB and therapy evaluation. pt  has a past medical history of Pancreatitis. Obesity (BMI 36), HTN, chronic pain, pleural effusion. Personal factors affecting pt at time of IE include:difficulty performing ADLS and difficulty performing IADLS . Prior to admission, pt was living w his wife in a one story home. Prior to August when he was hospitalized for necrotizing pancreatitis, he was fully independent w self care, mobility, working etc. Since returning home from hospital, he regained independence, used a RW for mobility. Upon evaluation: Pt requires mod assist to don/doff socks, min assist functional mobility + a second person stand by for line managment 2* the following deficits impacting occupational performance: weakness, decreased balance and decreased tolerance. Pt to benefit from continued skilled OT tx while in the hospital to address deficits as defined above and maximize level of functional independence w ADL's and functional mobility. Occupational Performance areas to address include: grooming, bathing/shower, toilet hygiene, dressing, functional mobility and clothing management. Based on findings from OT evaluation and functional performance deficits, pt has been identified as a  high complexity evaluation. The patient's raw score on the AM-PAC Daily Activity inpatient short form is 20, standardized score is 42.03, greater than 39.4. Patients at this level are likely to benefit from discharge to home. From OT standpoint, recommendation at time of d/c would be home w family support.    Goals   Patient Goals to sit in the chair   Plan   Treatment Interventions ADL retraining;Functional transfer training; Endurance training;Patient/family training; Compensatory technique education; Activityengagement; Energy conservation   Goal Expiration Date 09/21/23   OT Frequency 2-3x/wk   Recommendation   OT Discharge Recommendation No rehabilitation needs   AM-PAC Daily Activity Inpatient   Lower Body Dressing 3   Bathing 3   Toileting 3   Upper Body Dressing 3   Grooming 4   Eating 4   Daily Activity Raw Score 20   Daily Activity Standardized Score (Calc for Raw Score >=11) 42.03     OT GOALS TO BE ACHIEVED IN 14 DAYS:    Patient will complete bed mobility mod I  With good safety     Pt will demonstrate good balance sitting at EOB x 10 min for increased safety w self care and in preparation for increased indpendence    Pt will complete grooming independently with set up     Pt will complete UB bathing and dressing independently    Pt will complete LB bathing and dressing with supervision    Pt will complete toileting w mod I and good safety     Pt will complete functional transfers mod I     Pt will tolerate standing at sinkside x 5 min w F+ balance for increased safety w hygiene    Pt will complete functional mobility in room and bathroom mod I     Pt will demonstrate good ECT/self pacing skills with all self care and functional mobility

## 2023-09-07 NOTE — PROGRESS NOTES
[facility] - Inpatient Progress Note    PATIENT INFORMATION      Domi Williamson 46 y.o. male MRN: [id number]  Unit/Bed#: MICU 07 Encounter: [id number]    ASSESSMENT & PLAN   40-year-old male with past medical history of recent admission for gallstone necrotizing pancreatitis complicated by fluid collection status post IR drain placement, obesity who was admitted for sepsis.  On 9/6 underwent repeat IR drain placement with 2 additional drains placed into necrotic fluid collection. GI consulted for evaluation of both necrotizing pancreatitis and colonic dilation.     1. Necrotizing gallstone pancreatitis with superimposed infection  2. Bacteremia  3. Septic shock  4. SMV thrombus  5. Ascites  Blood cultures growing Klebsiella and Strep. Fluid culture growing Bacteroides fragilis and Citrobacter freundii. Remains on low-dose Levophed. CT shows worsening fluid collection.  IR drain output minimal.  Suspect infected necrosis as source of sepsis given enlarging collection and gram-negative bacteremia.  Mass effect from phlegmonous mass likely contributing to gastric distention. S/p IR drain placement on 9/6, now with 3 drains in place. • Plan for EUS/cystogastrostomy once patient becomes more hemodynamically stable off pressor therapy and collection mature  • Appreciate ID consult - continue cefepime/flagyl  • Recommend discussion with IR in regards to potentially upsizing vs repositioning tube with increased size of collection and lack of output  • Blood cultures positive for 1/2 with Klebsiella and Strep  • Repeat blood cultures pending  • Okay to progress diet from GI perspective. • NG tube trial per surgery  • IV fluids per primary team.  • Monitor hemodynamics  • Monitor CBC  • Underwent paracentesis with 2.9L removed - WBC 3404, with cloudy appearance - currently on appropriate antibiotic therapy per ID, amylase 17     8.  Dilated colon  Likely secondary to mass effect from extensive inflammatory changes noted around the pancreas.  Fortunately patient displays benign exam and having bowel movements making obstruction unlikely.  Transverse colon maximal diameter is 13 on CT scan.  Appears to be 12 cm today which is improved.  Lactic acid now normal.  • Continues to have bowel movements and passing flatus  • No indication for decompressive colonoscopy at this time  • Recommend aggressive bowel regimen with MiraLAX twice daily  • Dulcolax rectal suppositories as needed  • Mineral oil enemas as needed.     9. Anemia  Most recent baseline Hgb 7-8 throughout hospitalization in setting of necrotic pancreatitis  • Hgb this AM 7.5 - stable, continue monitoring  • No over S/S of GIB  • Possibly component of necrotizing pancreatitis  • Continue to monitor and transfuse for <7      SUBJECTIVE     Patient seen and evaluated at bedside. Feels better overall, thinks abdomen is less distended after procedures yesterday. Does note femoral line has been bothersome and preventing him from ambulating appropriately.      MEDICATIONS & ALLERGIES       Medications:   Medications Prior to Admission   Medication   • acetaminophen (TYLENOL) 325 mg tablet   • Alcohol Swabs 70 % PADS   • apixaban (Eliquis) 5 mg   • BD PosiFlush 0.9 % SOLN   • Blood Glucose Monitoring Suppl (OneTouch Verio Reflect) w/Device KIT   • Blood Pressure Monitoring (Adult Blood Pressure Cuff Lg) KIT   • furosemide (LASIX) 20 mg tablet   • glucose blood (OneTouch Verio) test strip   • Insulin Glargine Solostar (Lantus SoloStar) 100 UNIT/ML SOPN   • insulin lispro (HumaLOG KwikPen) 100 units/mL injection pen   • Insulin Pen Needle (BD Pen Needle Lupe 2nd Gen) 32G X 4 MM MISC   • Insulin Pen Needle (BD Pen Needle Lupe 2nd Gen) 32G X 4 MM MISC   • Levemir FlexPen 100 units/mL injection pen   • lisinopril (ZESTRIL) 10 mg tablet   • NovoLOG FlexPen 100 units/mL injection pen   • OneTouch Delica Lancets 96I MISC   • pancrelipase, Lip-Prot-Amyl, (CREON) 6,000 units delayed release capsule   • sodium chloride, PF, 0.9 %     Current Facility-Administered Medications   Medication Dose Route Frequency   • acetaminophen (TYLENOL) tablet 650 mg  650 mg Oral Q6H PRN   • cefepime (MAXIPIME) 2 g/50 mL dextrose IVPB  2,000 mg Intravenous Q8H   • chlorhexidine (PERIDEX) 0.12 % oral rinse 15 mL  15 mL Mouth/Throat Q12H [address]   • HYDROmorphone (DILAUDID) injection 0.5 mg  0.5 mg Intravenous Q4H PRN   • insulin detemir (LEVEMIR) subcutaneous injection 7 Units  7 Units Subcutaneous HS   • insulin lispro (HumaLOG) 100 units/mL subcutaneous injection 2-12 Units  2-12 Units Subcutaneous TID AC   • insulin lispro (HumaLOG) 100 units/mL subcutaneous injection 2-12 Units  2-12 Units Subcutaneous HS   • insulin lispro (HumaLOG) 100 units/mL subcutaneous injection 5 Units  5 Units Subcutaneous TID With Meals   • metroNIDAZOLE (FLAGYL) IVPB (premix) 500 mg 100 mL  500 mg Intravenous Q8H   • multi-electrolyte (PLASMALYTE-A/ISOLYTE-S PH 7.4) IV solution  75 mL/hr Intravenous Continuous   • norepinephrine (LEVOPHED) 4 mg (STANDARD CONCENTRATION) IV in sodium chloride 0.9% 250 mL  1-30 mcg/min Intravenous Titrated   • oxyCODONE (ROXICODONE) IR tablet 5 mg  5 mg Oral Q6H PRN    Or   • oxyCODONE (ROXICODONE) immediate release tablet 10 mg  10 mg Oral Q6H PRN       Allergies:   No Known Allergies    PHYSICAL EXAM     Objective   Blood pressure (!) 135/48, pulse (!) 116, temperature 99 °F (37.2 °C), resp. rate (!) 34, height 5' 11" (1.803 m), weight 120 kg (264 lb 15.9 oz), SpO2 96 %. Body mass index is 36.96 kg/m².     Intake/Output Summary (Last 24 hours) at 9/7/2023 0720  Last data filed at 9/7/2023 0600  Gross per 24 hour   Intake 3085.56 ml   Output 4845 ml   Net -1759.44 ml       General Appearance:   Alert, cooperative   HEENT:   Normocephalic, atraumatic, anicteric     Neck:   Supple, symmetrical, trachea midline   Lungs:   Equal chest rise, respirations unlabored    Heart:   Regular rate and rhythm   Abdomen:   Distended with diffuse tenderness; IR drains in place appear CDI; no masses, no organomegaly    Rectal:   Deferred    Extremities:   No cyanosis, clubbing or edema    Neuro: Moves all 4 extremities    Skin:   No jaundice, rashes, or lesions      ADDITIONAL DATA     Lab Results:     Results from last 7 days   Lab Units 09/07/23  0506 09/04/23 2021 09/04/23  1444   WBC Thousand/uL 9.82   < > 12.39*   HEMOGLOBIN g/dL 7.5*   < > 7.9*   HEMATOCRIT % 25.3*   < > 25.1*   PLATELETS Thousands/uL 348   < > 493*   LYMPHO PCT %  --   --  1*   MONO PCT %  --   --  6   EOS PCT %  --   --  1    < > = values in this interval not displayed. Results from last 7 days   Lab Units 09/07/23  0506   POTASSIUM mmol/L 3.6   CHLORIDE mmol/L 104   CO2 mmol/L 30   BUN mg/dL 57*   CREATININE mg/dL 1.40*   CALCIUM mg/dL 7.7*   ALK PHOS U/L 42   ALT U/L 9   AST U/L 11*     Results from last 7 days   Lab Units 09/04/23  1502   INR  2.00*       Imaging:    XR abdomen 1 view kub    Result Date: 9/5/2023  Narrative: ABDOMEN INDICATION:   monitor colonic distention. COMPARISON: CT abdomen pelvis with contrast 9/4/2023. VIEWS:  AP supine Images: 3 FINDINGS: Nonweighted enteric tube sidehole overlies distal stomach and tip in proximal second portion of duodenal region. Surgical drain overlies the epigastric region. Probable Saunders catheter overlies pelvic region. Left femoral approach central venous catheter tip overlies left iliac vessel region. There is a nonobstructive bowel gas pattern. Similar persistent gaseous distention of visualized ascending and transverse colon. No discernible free air on this supine study. Upright or left lateral decubitus imaging is more sensitive to detect subtle free air in the appropriate setting. No pathologic calcifications or soft tissue masses. Bibasilar atelectasis (left worse than right). Bilateral total hip arthroplasty. Spinal degenerative changes.      Impression: Devices as detailed below: -Nonweighted enteric tube sidehole overlies distal stomach and tip in proximal second portion of duodenal region. -Surgical drain overlies the epigastric region. -Probable Saunders catheter overlies pelvic region. -Left femoral approach central venous catheter tip overlies left iliac vessel region. Similar persistent gaseous distention of visualized ascending and transverse colon. Bibasilar atelectasis (left worse than right). Workstation performed: [id number]     Echo complete w/ contrast if indicated    Result Date: 9/5/2023  Narrative: •  Left Ventricle: Left ventricular cavity size is normal. Wall thickness is upper normal. The left ventricular ejection fraction is 60%. Systolic function is normal. Wall motion is normal. Diastolic function is normal. •  Right Ventricle: Right ventricular cavity size is mildly dilated. Systolic function is normal. •  Mitral Valve: There is trace regurgitation. •  Pulmonary Artery: The pulmonary artery systolic pressure is mildly increased. XR chest 1 view portable    Result Date: 9/5/2023  Narrative: CHEST INDICATION:   CP. COMPARISON: Chest radiograph from August 4, 2023 EXAM PERFORMED/VIEWS:  XR CHEST PORTABLE FINDINGS: Cardiomediastinal silhouette appears unremarkable. Subsegmental atelectasis in the perihilar regions and lung bases. Small layering pleural effusions. No pneumothorax. Osseous structures appear within normal limits for patient age. Impression: Small pleural effusions and bibasilar atelectasis.  Workstation performed: [id number]     IR drainage tube check/change/reposition/reinsertion/upsize    Result Date: 9/5/2023  Narrative: Examination: Abscess catheter tube reposition/upsize History: Pancreatitis, septic shock, respiratory failure, bilateral pleural effusions larger on the left Patient requires respiratory optimization in order to lay on the fluoroscopy table for drain upsize Contrast type: omnipaque 300   Contrast dose: 10 cc Fluoroscopy time: 1.1 minutes Moderate sedation time: 30 minutes Technique: Risks benefits alternatives discussed informed consent obtained. Risk of bleeding discussed on Eliquis. The patient was identified verbally, and by wrist band." Time out" was performed. Initially patient was optimized with left thoracentesis which is dictated separately. The existing tube was prepped and draped in usual sterile fashion. Lidocaine was given as local anesthesia. Lidocaine was also administered within the tube. Contrast was injected. The system was opacified. The existing tube was removed over  an Amplatz wire. The tract was dilated and a new 12 [place] skater all-purpose drain was formed Given the patient's body habitus and the depth of the collection the catheter is hubbed to the skin Specimens: Culture aerobic, anaerobic Findings: Ill-defined cavity in the mid abdomen containing previously placed drain. Catheter positioned more deeply and upsized to 12 [place]er 30-50 cc of chunky hemorrhagic infected material removed NG tube noted decompressing the stomach with gaseous distention of the transverse colon     Impression: Impression: Upsize and reposition of a pancreatic necrotic collection drain to a 16 [place]er pigtail catheter Patient also has ascites leaking around the catheter, he may be a candidate for paracentesis or a peritoneal drain to simply divert and reduce the ascites Workstation performed: [id number]     IR thoracentesis    Result Date: 9/5/2023  Narrative: Ultrasound-guided thoracentesis Clinical History: Pancreatitis, septic shock, respiratory failure, bilateral pleural effusions larger on the left Patient requires respiratory optimization in order to lay on the fluoroscopy table for drain upsize procedure: After explaining the risks and benefits of the procedure to the patient, informed consent was obtained. Ultrasound was used to localize the pleural effusion.  The overlying skin was prepped and draped in usual sterile fashion and local anesthesia was obtained with the 1% lidocaine solution. A 5-Kyrgy[place] Yueh needle was advanced until fluid was aspirated under live ultrasound guidance. Approximately  1200   cc of clear, yellow fluid was aspirated. Specimens: Multiple The patient tolerated the procedure well, and attention was turned to drain upsize which is dictated separately     Impression: Impression: Ultrasound-guided left thoracentesis Workstation performed: [id number]     XR chest portable ICU    Result Date: 9/5/2023  Narrative: CHEST INDICATION:   evaluation after IJ attempted. COMPARISON: 9/4/2023 EXAM PERFORMED/VIEWS:  XR CHEST PORTABLE ICU 2 views FINDINGS: Interval placement of nasogastric tube which terminates below the diaphragm in the mid stomach Cardiomediastinal silhouette appears unremarkable. Persistent opacity at medial right lung base suspicious for pneumonia No pneumothorax or pleural effusion. Osseous structures appear within normal limits for patient age. Impression: Persistent medial right lung base opacity suspicious for pneumonia No pneumothorax Workstation performed: [id number]     CT abdomen pelvis w contrast    Result Date: 9/4/2023  Narrative: CT ABDOMEN AND PELVIS WITH IV CONTRAST INDICATION:   Abdominal abscess/infection suspected r/o peripancreatic abscess, infection. COMPARISON: CT abdomen pelvis 8/18/2023 TECHNIQUE:  CT examination of the abdomen and pelvis was performed. Multiplanar 2D reformatted images were created from the source data. This examination, like all CT scans performed in the [facility], was performed utilizing techniques to minimize radiation dose exposure, including the use of iterative reconstruction and automated exposure control. Radiation dose length product (DLP) for this visit:  3543.98 mGy-cm IV Contrast:  100 mL of iodixanol (VISIPAQUE) Enteric Contrast:  Enteric contrast was not administered.  FINDINGS: ABDOMEN LOWER CHEST: Moderate bilateral pleural effusions, increased from prior study with mild bilateral lower lobe compressive atelectasis. Shotty lymph nodes are seen in the bilateral cardiophrenic angles. 2 mm subpleural right middle lobe nodule, unchanged. Small focal airspace opacity within the lingula, new from previous study could represent atelectasis or early pneumonia. LIVER/BILIARY TREE:  Unremarkable. GALLBLADDER: No calcified gallstones. Pericholecystic inflammatory changes are favored to be secondary to extension from pancreatic primary process. SPLEEN: Small crescentic hypodensity is seen along the medial spleen, unchanged in retrospect. Unclear if this is related to a small infarct or possibly a small component of subcapsular fluid. PANCREAS: There has been development of a significant amount of gas seen within a large acute necrotic collection which is largely replacing the pancreatic parenchyma and extends laterally along the anterior pararenal space bilaterally, left greater than  right. This collection measures about 21 x 5.6 x 18.9 cm transverse, AP and craniocaudal dimensions respectively (series 4 image 74 and 603/96). Pigtail left upper quadrant drainage catheter is seen along the anterior margin of the collection. However, there is a significant amount of gas associated with this collection and elsewhere in the retroperitoneum more so than expected from catheter manipulation alone and is concerning for infected necrosis. The collection extends into the lateral paracolic gutter more caudally and may cross the midline as well such as series 4 image 89. On the left, the collection is associated with thickening of the lateral conal fascia and causes masslike thickening of the proximal to mid descending colon (series 4 images 91- 115). This is believed to represent phlegmonous mass. There is dilatation of the transverse colon up to 12.1 cm craniocaudal dimension (601/43) and 8.8 cm AP dimension, consistent with colonic obstruction.  This degree of distention could pose a risk for bowel perforation. Surgical consultation is advised. The above collection involves the lesser sac and extends cephalad on the right interposed between the caudate lobe liver and reina of the diaphragm to the gastrohepatic ligament. Inflammatory changes are noted within the laurent hepatis surrounding the vessels and causing slitlike narrowing of the main portal vein such as series 4 image 63. The vein is grossly patent. Similar findings are noted involving the splenic vein and SMV. These findings have also progressed from the prior study. Inflammatory tissue also surrounds the vessels about the celiac bifurcation and branches of the common hepatic artery. There is minimal if any appreciable enhancing pancreatic parenchyma. ADRENAL GLANDS:  Unremarkable. KIDNEYS/URETERS:  Unremarkable. No hydronephrosis. STOMACH AND BOWEL: Please see comments regarding the colon under the above pancreas section. The stomach is mildly distended and fluid-filled. There is collapse and/or thickening of the gastric antrum and descending duodenum, the latter is likely 180 degrees enveloped by the inflammatory collection. Secondary antritis/duodenitis with element of gastric outlet obstruction is not excluded. Moderate stool in the rectum. APPENDIX:  No findings to suggest appendicitis. ABDOMINOPELVIC CAVITY: Moderate to large amount of abdominal pelvic free fluid which is greater in the pelvis, also slightly increased. No pneumoperitoneum. No lymphadenopathy. VESSELS: See comments above. PELVIS soft tissue detail of the pelvis is degraded by beam hardening artifact from bilateral total hip arthroplasties. REPRODUCTIVE ORGANS:  Unremarkable for patient's age. URINARY BLADDER:  Unremarkable. ABDOMINAL WALL/INGUINAL REGIONS:  Unremarkable. OSSEOUS STRUCTURES:  No acute fracture or destructive osseous lesion. Bilateral total hip arthroplasties. Degenerative changes of the spine. Impression: 1. Significant worsening of necrotizing pancreatitis with large acute necrotic collection largely replacing the pancreatic parenchyma and extending into the anterior pararenal spaces (left greater than right) with the collection now containing a large amount of gas concerning for infected necrosis. Additionally, there is an inflammatory phlegmonous mass along the left lateral pararenal space/paracolic gutter with involvement of the proximal to mid descending colon causing associated colonic obstruction. This degree of bowel distention could pose a risk for bowel perforation. Surgical consultation is advised. 2. Progression of inflammatory changes extending into the laurent hepatis with mass effect and narrowing of the portal vein and associated proximal vessels about the portal confluence as detailed above but no evidence for overt venous occlusion. 3. Mild fluid-filled gastric distention. Collapse and/or thickening of the gastric antrum and descending duodenum, the latter is likely 180 degrees enveloped by the inflammatory collection. Secondary antritis/duodenitis with element of gastric outlet obstruction is not excluded. 4. Moderate to large amount of abdominopelvic free fluid, increasing from prior study. 5. Enlarging, moderate bilateral pleural effusions with mild bibasilar compressive atelectasis. I personally discussed this study with [address] [place] [address] on 9/4/2023 at 5:47 PM. Workstation performed: [id number]     CT abdomen pelvis w contrast    Result Date: 8/18/2023  Narrative: CT ABDOMEN AND PELVIS WITH IV CONTRAST INDICATION:   K85.91: Acute pancreatitis with uninfected necrosis, unspecified. COMPARISON: Prior CT abdomen pelvis examinations dating back to 8/4/2023 TECHNIQUE:  CT examination of the abdomen and pelvis was performed. Scanning through the abdomen was performed in arterial, venous and delayed phases according a protocol specifically designed to evaluate upper abdominal viscera.  Multiplanar 2D reformatted images were created from the source data. This examination, like all CT scans performed in the [facility], was performed utilizing techniques to minimize radiation dose exposure, including the use of iterative reconstruction and automated exposure control. Radiation dose length product (DLP) for this visit:  3995 mGy-cm IV Contrast:  100 mL of iohexol (OMNIPAQUE) Enteric Contrast:  Enteric contrast was not administered. FINDINGS: ABDOMEN LOWER CHEST: Partially visualized at least small bilateral pleural effusions. A 3 mm left lower lobe pulmonary nodule (4/8). Bibasilar subsegmental atelectasis. LIVER/BILIARY TREE:  Unremarkable. GALLBLADDER:  No calcified gallstones. No pericholecystic inflammatory change. SPLEEN:  Unremarkable. PANCREAS: There is evidence of acute pancreatitis, which will be described based on the revised Laurie Classification of Acute Pancreatitis: -TIME OF ONSET: less than or equal to 4 weeks -NECROSIS: There is evidence of pancreatic parenchyma necrosis (more than 30%), therefore this is necrotizing pancreatitis. There is decreasing peripancreatic fat stranding and fluid tracking compared to 8/6/2023. Again seen is small volume ascites, minimally decreased compared to 9/7/8134. -LOCAL COMPLICATIONS: Interval decrease in the size of acute necrotizing collection in the lesser sac, currently being 4.1 x 7.4 cm (series 4 image 64), previously 11.4 x 6.1 cm. It contains a tendon heterogenous material in the bladder drainage catheter  lies in the superficial portion of this collection The main portal vein, splenic vein and the intermesenteric vein are patent with mild mass effect on the main proximal splenic vein, improved since 8/6/2023. -INFECTION: There is no abnormal gas in or around the pancreas to suggest infection. ADRENAL GLANDS:  Unremarkable. KIDNEYS/URETERS:  Unremarkable. No hydronephrosis. STOMACH AND BOWEL: No abnormal bowel dilation.  Suboptimal evaluation rectum due to streak artifact from arthroplasties. APPENDIX:  No findings to suggest appendicitis. ABDOMINOPELVIC CAVITY:  No ascites. No pneumoperitoneum. No lymphadenopathy. VESSELS:  Unremarkable for patient's age. PELVIS REPRODUCTIVE ORGANS: Suboptimal evaluation of the prostate with streak artifact bilateral hip arthroplasties. Cruzito Hernandez URINARY BLADDER:  Unremarkable. ABDOMINAL WALL/INGUINAL REGIONS:  Unremarkable. OSSEOUS STRUCTURES:  No acute fracture or destructive osseous lesion. Bilateral total hip arthroplasty     Impression: Resolving acute necrotizing pancreatitis with interval decrease in the size of the peripancreatic necrotizing collection in the lesser sac compared to 8/6/2023. Persistent high attenuation material within the peripancreatic fluid collection with tip of the drainage catheter in its superficial aspect. Mildly improved ascites. At least small bilateral pleural effusions. A 3 mm left lower lobe pulmonary nodule. Attention on follow-up imaging versus optional follow-up CT chest in 12 months is recommended to assess for stability, as per Fleischner criteria. The study was marked in EPIC for significant notification. Workstation performed: [id number]       EKG, Pathology, and Other Studies Reviewed on Admission:   · EKG: Reviewed      Counseling / Coordination of Care Time: 30 total mins spent n consult. Greater than 50% of total time spent on patient counseling and coordination of care. Yolis Camacho DO  Gastroenterology Fellow  [address] [facility]  Division of Gastroenterology and Hepatology  Available on [employer]  . ..............................................................................................................................................  ** Please Note: This note is constructed using a voice recognition dictation system.  **

## 2023-09-07 NOTE — BRIEF OP NOTE (RAD/CATH)
INTERVENTIONAL RADIOLOGY PROCEDURE NOTE    Date: 9/6/2023    Procedure:   Procedure Summary     Date: 09/06/23 Room / Location: [address] [place] [address] CAT Scan    Anesthesia Start: 7045 Anesthesia Stop:     Procedure: IR DRAINAGE TUBE PLACEMENT Diagnosis: (Large peripancreatic collection, current drain insufficient)    Scheduled Providers:  Responsible Provider: Matias Zendejas MD    Anesthesia Type: general ASA Status: 4 - Emergent          Preoperative diagnosis:   1. Necrotizing pancreatitis    2. Colonic obstruction ([address])    3. Bacteremia    4. Pressure injury of sacral region, unstageable ([address])         Postoperative diagnosis: Same. Surgeon: Mynor Das MD     Assistant: None. No qualified resident was available. Blood loss: 0    Specimens: culture  Aerobic anaerobic     Findings:     Placement of 12 [place] drain into left lateral collection  Necrotic material and pus    Placement of 28 [place] drain into central pancreatic collection  Deep angle used for possible future necrosectomy/VARDS  Copious necrotic material  Drainage will be best with large diameter tubing, such as a chest tube box  Not all sideholes are in the collection due to tube length, DO NOT FLUSH WITH LARGE VOLUMES    Complications: None immediate. Anesthesia: general     Note:     One initial needle placement was near the spleen, I administered gelfoam and moved the needle. Additionally, material is partially hemorraghic.      Would hold any anticoagulation at least 12-24 hours

## 2023-09-07 NOTE — SEDATION DOCUMENTATION
LUQ abscess drains placed by Dr. Clement Roman with Anesthesia support. Specimens to lab per protocol. Bedside report given to Larry Anaya RN.

## 2023-09-08 ENCOUNTER — APPOINTMENT (INPATIENT)
Dept: RADIOLOGY | Facility: HOSPITAL | Age: 53
DRG: 853 | End: 2023-09-08
Payer: COMMERCIAL

## 2023-09-08 PROBLEM — R65.21 SEPTIC SHOCK (HCC): Status: ACTIVE | Noted: 2023-09-08

## 2023-09-08 PROBLEM — D64.9 ANEMIA: Status: ACTIVE | Noted: 2023-09-08

## 2023-09-08 PROBLEM — R78.81 BACTEREMIA: Status: ACTIVE | Noted: 2023-09-08

## 2023-09-08 PROBLEM — A41.9 SEPTIC SHOCK (HCC): Status: ACTIVE | Noted: 2023-09-08

## 2023-09-08 PROBLEM — I81 PORTAL VEIN THROMBOSIS: Status: ACTIVE | Noted: 2023-09-08

## 2023-09-08 PROBLEM — R18.8 ASCITES: Status: ACTIVE | Noted: 2023-09-08

## 2023-09-08 PROBLEM — R73.9 HYPERGLYCEMIA: Status: ACTIVE | Noted: 2023-09-08

## 2023-09-08 PROBLEM — K85.90: Status: ACTIVE | Noted: 2023-09-08

## 2023-09-08 PROBLEM — J91.8: Status: ACTIVE | Noted: 2023-09-08

## 2023-09-08 LAB
ABO GROUP BLD BPU: NORMAL
ABO GROUP BLD BPU: NORMAL
ANION GAP SERPL CALCULATED.3IONS-SCNC: 8 MMOL/L
ANISOCYTOSIS BLD QL SMEAR: PRESENT
APTT PPP: 36 SECONDS (ref 23–37)
APTT PPP: 36 SECONDS (ref 23–37)
APTT PPP: 38 SECONDS (ref 23–37)
BACTERIA SPEC ANAEROBE CULT: ABNORMAL
BASOPHILS # BLD MANUAL: 0 THOUSAND/UL (ref 0–0.1)
BASOPHILS NFR MAR MANUAL: 0 % (ref 0–1)
BPU ID: NORMAL
BPU ID: NORMAL
BUN SERPL-MCNC: 43 MG/DL (ref 5–25)
CALCIUM SERPL-MCNC: 7.5 MG/DL (ref 8.4–10.2)
CHLORIDE SERPL-SCNC: 103 MMOL/L (ref 96–108)
CO2 SERPL-SCNC: 29 MMOL/L (ref 21–32)
CREAT SERPL-MCNC: 1.13 MG/DL (ref 0.6–1.3)
CROSSMATCH: NORMAL
CROSSMATCH: NORMAL
EOSINOPHIL # BLD MANUAL: 0 THOUSAND/UL (ref 0–0.4)
EOSINOPHIL NFR BLD MANUAL: 0 % (ref 0–6)
ERYTHROCYTE [DISTWIDTH] IN BLOOD BY AUTOMATED COUNT: 16.7 % (ref 11.6–15.1)
GFR SERPL CREATININE-BSD FRML MDRD: 74 ML/MIN/1.73SQ M
GLUCOSE SERPL-MCNC: 121 MG/DL (ref 65–140)
GLUCOSE SERPL-MCNC: 127 MG/DL (ref 65–140)
GLUCOSE SERPL-MCNC: 151 MG/DL (ref 65–140)
GLUCOSE SERPL-MCNC: 161 MG/DL (ref 65–140)
GLUCOSE SERPL-MCNC: 188 MG/DL (ref 65–140)
HCT VFR BLD AUTO: 25.2 % (ref 36.5–49.3)
HGB BLD-MCNC: 7.7 G/DL (ref 12–17)
HYPERCHROMIA BLD QL SMEAR: PRESENT
LYMPHOCYTES # BLD AUTO: 0.29 THOUSAND/UL (ref 0.6–4.47)
LYMPHOCYTES # BLD AUTO: 3 % (ref 14–44)
MAGNESIUM SERPL-MCNC: 2.1 MG/DL (ref 1.9–2.7)
MCH RBC QN AUTO: 26.8 PG (ref 26.8–34.3)
MCHC RBC AUTO-ENTMCNC: 30.6 G/DL (ref 31.4–37.4)
MCV RBC AUTO: 88 FL (ref 82–98)
MONOCYTES # BLD AUTO: 0.48 THOUSAND/UL (ref 0–1.22)
MONOCYTES NFR BLD: 5 % (ref 4–12)
NEUTROPHILS # BLD MANUAL: 8.74 THOUSAND/UL (ref 1.85–7.62)
NEUTS BAND NFR BLD MANUAL: 10 % (ref 0–8)
NEUTS SEG NFR BLD AUTO: 82 % (ref 43–75)
PHOSPHATE SERPL-MCNC: 3.6 MG/DL (ref 2.7–4.5)
PLATELET # BLD AUTO: 278 THOUSANDS/UL (ref 149–390)
PLATELET BLD QL SMEAR: ADEQUATE
PMV BLD AUTO: 9 FL (ref 8.9–12.7)
POIKILOCYTOSIS BLD QL SMEAR: PRESENT
POTASSIUM SERPL-SCNC: 3.4 MMOL/L (ref 3.5–5.3)
RBC # BLD AUTO: 2.87 MILLION/UL (ref 3.88–5.62)
RBC MORPH BLD: PRESENT
SODIUM SERPL-SCNC: 140 MMOL/L (ref 135–147)
UNIT DISPENSE STATUS: NORMAL
UNIT DISPENSE STATUS: NORMAL
UNIT PRODUCT CODE: NORMAL
UNIT PRODUCT CODE: NORMAL
UNIT PRODUCT VOLUME: 350 ML
UNIT PRODUCT VOLUME: 350 ML
UNIT RH: NORMAL
UNIT RH: NORMAL
WBC # BLD AUTO: 9.5 THOUSAND/UL (ref 4.31–10.16)

## 2023-09-08 PROCEDURE — 80048 BASIC METABOLIC PNL TOTAL CA: CPT | Performed by: STUDENT IN AN ORGANIZED HEALTH CARE EDUCATION/TRAINING PROGRAM

## 2023-09-08 PROCEDURE — 48999 UNLISTED PROCEDURE PANCREAS: CPT | Performed by: STUDENT IN AN ORGANIZED HEALTH CARE EDUCATION/TRAINING PROGRAM

## 2023-09-08 PROCEDURE — 85007 BL SMEAR W/DIFF WBC COUNT: CPT | Performed by: STUDENT IN AN ORGANIZED HEALTH CARE EDUCATION/TRAINING PROGRAM

## 2023-09-08 PROCEDURE — 94760 N-INVAS EAR/PLS OXIMETRY 1: CPT

## 2023-09-08 PROCEDURE — 99232 SBSQ HOSP IP/OBS MODERATE 35: CPT | Performed by: INTERNAL MEDICINE

## 2023-09-08 PROCEDURE — 85730 THROMBOPLASTIN TIME PARTIAL: CPT | Performed by: STUDENT IN AN ORGANIZED HEALTH CARE EDUCATION/TRAINING PROGRAM

## 2023-09-08 PROCEDURE — 84100 ASSAY OF PHOSPHORUS: CPT | Performed by: STUDENT IN AN ORGANIZED HEALTH CARE EDUCATION/TRAINING PROGRAM

## 2023-09-08 PROCEDURE — 99232 SBSQ HOSP IP/OBS MODERATE 35: CPT | Performed by: STUDENT IN AN ORGANIZED HEALTH CARE EDUCATION/TRAINING PROGRAM

## 2023-09-08 PROCEDURE — 74176 CT ABD & PELVIS W/O CONTRAST: CPT

## 2023-09-08 PROCEDURE — 85027 COMPLETE CBC AUTOMATED: CPT | Performed by: STUDENT IN AN ORGANIZED HEALTH CARE EDUCATION/TRAINING PROGRAM

## 2023-09-08 PROCEDURE — 82948 REAGENT STRIP/BLOOD GLUCOSE: CPT

## 2023-09-08 PROCEDURE — 0FBG8ZZ EXCISION OF PANCREAS, VIA NATURAL OR ARTIFICIAL OPENING ENDOSCOPIC: ICD-10-PCS | Performed by: STUDENT IN AN ORGANIZED HEALTH CARE EDUCATION/TRAINING PROGRAM

## 2023-09-08 PROCEDURE — 83735 ASSAY OF MAGNESIUM: CPT | Performed by: STUDENT IN AN ORGANIZED HEALTH CARE EDUCATION/TRAINING PROGRAM

## 2023-09-08 PROCEDURE — G1004 CDSM NDSC: HCPCS

## 2023-09-08 PROCEDURE — 99291 CRITICAL CARE FIRST HOUR: CPT | Performed by: STUDENT IN AN ORGANIZED HEALTH CARE EDUCATION/TRAINING PROGRAM

## 2023-09-08 PROCEDURE — 36569 INSJ PICC 5 YR+ W/O IMAGING: CPT

## 2023-09-08 PROCEDURE — C1751 CATH, INF, PER/CENT/MIDLINE: HCPCS

## 2023-09-08 PROCEDURE — 02HV33Z INSERTION OF INFUSION DEVICE INTO SUPERIOR VENA CAVA, PERCUTANEOUS APPROACH: ICD-10-PCS | Performed by: STUDENT IN AN ORGANIZED HEALTH CARE EDUCATION/TRAINING PROGRAM

## 2023-09-08 RX ORDER — POTASSIUM CHLORIDE 29.8 MG/ML
40 INJECTION INTRAVENOUS ONCE
Status: COMPLETED | OUTPATIENT
Start: 2023-09-08 | End: 2023-09-08

## 2023-09-08 RX ORDER — BISACODYL 10 MG
10 SUPPOSITORY, RECTAL RECTAL DAILY PRN
Status: DISCONTINUED | OUTPATIENT
Start: 2023-09-08 | End: 2023-09-19 | Stop reason: HOSPADM

## 2023-09-08 RX ORDER — POTASSIUM CHLORIDE 14.9 MG/ML
20 INJECTION INTRAVENOUS ONCE
Status: COMPLETED | OUTPATIENT
Start: 2023-09-08 | End: 2023-09-08

## 2023-09-08 RX ORDER — MIDAZOLAM HYDROCHLORIDE 2 MG/2ML
2 INJECTION, SOLUTION INTRAMUSCULAR; INTRAVENOUS ONCE
Status: COMPLETED | OUTPATIENT
Start: 2023-09-08 | End: 2023-09-08

## 2023-09-08 RX ORDER — POLYETHYLENE GLYCOL 3350 17 G/17G
17 POWDER, FOR SOLUTION ORAL DAILY
Status: DISCONTINUED | OUTPATIENT
Start: 2023-09-08 | End: 2023-09-08

## 2023-09-08 RX ORDER — FENTANYL CITRATE 50 UG/ML
100 INJECTION, SOLUTION INTRAMUSCULAR; INTRAVENOUS ONCE
Status: DISCONTINUED | OUTPATIENT
Start: 2023-09-08 | End: 2023-09-08

## 2023-09-08 RX ORDER — POLYETHYLENE GLYCOL 3350 17 G/17G
17 POWDER, FOR SOLUTION ORAL 2 TIMES DAILY
Status: DISCONTINUED | OUTPATIENT
Start: 2023-09-08 | End: 2023-09-19 | Stop reason: HOSPADM

## 2023-09-08 RX ORDER — KETAMINE HCL IN NACL, ISO-OSM 100MG/10ML
100 SYRINGE (ML) INJECTION ONCE
Status: DISCONTINUED | OUTPATIENT
Start: 2023-09-08 | End: 2023-09-08

## 2023-09-08 RX ORDER — MIDAZOLAM HYDROCHLORIDE 2 MG/2ML
INJECTION, SOLUTION INTRAMUSCULAR; INTRAVENOUS
Status: COMPLETED
Start: 2023-09-08 | End: 2023-09-08

## 2023-09-08 RX ORDER — FENTANYL CITRATE 50 UG/ML
INJECTION, SOLUTION INTRAMUSCULAR; INTRAVENOUS
Status: DISCONTINUED
Start: 2023-09-08 | End: 2023-09-08 | Stop reason: WASHOUT

## 2023-09-08 RX ORDER — KETAMINE HCL IN NACL, ISO-OSM 100MG/10ML
SYRINGE (ML) INJECTION
Status: DISCONTINUED
Start: 2023-09-08 | End: 2023-09-08 | Stop reason: WASHOUT

## 2023-09-08 RX ORDER — MINERAL OIL 100 G/100G
1 OIL RECTAL DAILY PRN
Status: DISCONTINUED | OUTPATIENT
Start: 2023-09-08 | End: 2023-09-19 | Stop reason: HOSPADM

## 2023-09-08 RX ADMIN — CEFEPIME 2000 MG: 2 INJECTION, POWDER, FOR SOLUTION INTRAVENOUS at 22:47

## 2023-09-08 RX ADMIN — POTASSIUM CHLORIDE 40 MEQ: 29.8 INJECTION, SOLUTION INTRAVENOUS at 07:20

## 2023-09-08 RX ADMIN — CEFEPIME 2000 MG: 2 INJECTION, POWDER, FOR SOLUTION INTRAVENOUS at 11:34

## 2023-09-08 RX ADMIN — METRONIDAZOLE 500 MG: 500 INJECTION, SOLUTION INTRAVENOUS at 20:17

## 2023-09-08 RX ADMIN — INSULIN LISPRO 5 UNITS: 100 INJECTION, SOLUTION INTRAVENOUS; SUBCUTANEOUS at 17:02

## 2023-09-08 RX ADMIN — INSULIN LISPRO 5 UNITS: 100 INJECTION, SOLUTION INTRAVENOUS; SUBCUTANEOUS at 08:15

## 2023-09-08 RX ADMIN — MIDAZOLAM 2 MG: 1 INJECTION INTRAMUSCULAR; INTRAVENOUS at 14:15

## 2023-09-08 RX ADMIN — CHLORHEXIDINE GLUCONATE 15 ML: 1.2 SOLUTION ORAL at 09:00

## 2023-09-08 RX ADMIN — POTASSIUM CHLORIDE 20 MEQ: 14.9 INJECTION, SOLUTION INTRAVENOUS at 08:40

## 2023-09-08 RX ADMIN — METRONIDAZOLE 500 MG: 500 INJECTION, SOLUTION INTRAVENOUS at 13:55

## 2023-09-08 RX ADMIN — INSULIN LISPRO 2 UNITS: 100 INJECTION, SOLUTION INTRAVENOUS; SUBCUTANEOUS at 07:15

## 2023-09-08 RX ADMIN — CHLORHEXIDINE GLUCONATE 15 ML: 1.2 SOLUTION ORAL at 20:17

## 2023-09-08 RX ADMIN — METRONIDAZOLE 500 MG: 500 INJECTION, SOLUTION INTRAVENOUS at 05:07

## 2023-09-08 RX ADMIN — HEPARIN SODIUM 19.1 UNITS/KG/HR: 10000 INJECTION, SOLUTION INTRAVENOUS at 15:17

## 2023-09-08 RX ADMIN — MIDAZOLAM HYDROCHLORIDE 2 MG: 2 INJECTION, SOLUTION INTRAMUSCULAR; INTRAVENOUS at 14:15

## 2023-09-08 RX ADMIN — INSULIN LISPRO 2 UNITS: 100 INJECTION, SOLUTION INTRAVENOUS; SUBCUTANEOUS at 12:09

## 2023-09-08 RX ADMIN — INSULIN LISPRO 5 UNITS: 100 INJECTION, SOLUTION INTRAVENOUS; SUBCUTANEOUS at 12:10

## 2023-09-08 NOTE — PROGRESS NOTES
[facility] - Inpatient Progress Note    PATIENT INFORMATION      Quyen Harris 46 y.o. male MRN: [id number]  Unit/Bed#: MICU 07 Encounter: [id number]    ASSESSMENT & PLAN   80-year-old male with past medical history of recent admission for gallstone necrotizing pancreatitis complicated by fluid collection status post IR drain placement, obesity who was admitted for sepsis.  On 9/6 underwent repeat IR drain placement with 2 additional drains placed into necrotic fluid collection. GI consulted for evaluation of both necrotizing pancreatitis and colonic dilation.     1. Necrotizing gallstone pancreatitis with superimposed infection  2. Bacteremia  3. Septic shock  4. SMV thrombus  5. Ascites  Patient initially diagnosed with necrotizing pancreatitis 7/2023. Initial Blood cultures growing Klebsiella and Strep with initial Fluid culture growing Bacteroides fragilis and Citrobacter freundii. CT shows worsening fluid collection. Suspect infected necrosis as source of sepsis given enlarging collection and gram-negative bacteremia.  Mass effect from phlegmonous mass likely contributing to gastric/colonic distention. S/p IR drain placement on 9/6, now with 3 drains in place. Repeat fluid cultures growing EColi. Repeat blood cultures negative at 24 hours. · Consider EUS/cystogastrostomy once patient becomes more hemodynamically stable off pressor therapy and collection mature  • Appreciate ID consult - continue cefepime/flagyl  • Recommend low fat diet  • Would add aggressive bowel regimen - continue to monitor stool output  • Management of IR drains per surgical critical care team  • Monitor hemodynamics  • Monitor CBC     6. Dilated colon  Likely secondary to mass effect from extensive inflammatory changes noted around the pancreas.  Fortunately patient displays benign exam and having bowel movements making obstruction unlikely.  Repeat CT scan completed this morning with improved colonic dilation. Patient continues to have bowel movements and pass flatus, with softer abdomen on exam today. • No indication for decompressive colonoscopy at this time  • Recommend aggressive bowel regimen with MiraLAX twice daily  • Dulcolax rectal suppositories as needed  • Mineral oil enemas as needed.     7. Anemia  Most recent baseline Hgb 7-8 throughout hospitalization in setting of necrotic pancreatitis  • Hgb this AM 7.7 - stable, continue monitoring  • No overt S/S of GIB  • Possibly component of necrotizing pancreatitis  • Continue to monitor and transfuse for <7      SUBJECTIVE     Patient seen and evaluated at bedside. Feels better overall. Having bowel movements. No abdominal pain.     MEDICATIONS & ALLERGIES       Medications:   Medications Prior to Admission   Medication   • acetaminophen (TYLENOL) 325 mg tablet   • Alcohol Swabs 70 % PADS   • apixaban (Eliquis) 5 mg   • BD PosiFlush 0.9 % SOLN   • Blood Glucose Monitoring Suppl (OneTouch Verio Reflect) w/Device KIT   • Blood Pressure Monitoring (Adult Blood Pressure Cuff Lg) KIT   • furosemide (LASIX) 20 mg tablet   • glucose blood (OneTouch Verio) test strip   • Insulin Glargine Solostar (Lantus SoloStar) 100 UNIT/ML SOPN   • insulin lispro (HumaLOG KwikPen) 100 units/mL injection pen   • Insulin Pen Needle (BD Pen Needle Lupe 2nd Gen) 32G X 4 MM MISC   • Insulin Pen Needle (BD Pen Needle Lupe 2nd Gen) 32G X 4 MM MISC   • Levemir FlexPen 100 units/mL injection pen   • lisinopril (ZESTRIL) 10 mg tablet   • NovoLOG FlexPen 100 units/mL injection pen   • OneTouch Delica Lancets 63F MISC   • pancrelipase, Lip-Prot-Amyl, (CREON) 6,000 units delayed release capsule   • sodium chloride, PF, 0.9 %     Current Facility-Administered Medications   Medication Dose Route Frequency   • acetaminophen (TYLENOL) tablet 650 mg  650 mg Oral Q6H PRN   • cefepime (MAXIPIME) 2 g/50 mL dextrose IVPB  2,000 mg Intravenous Q12H   • chlorhexidine (PERIDEX) 0.12 % oral rinse 15 mL  15 mL Mouth/Throat Q12H [address]   • heparin (porcine) 25,000 units in 0.45% NaCl 250 mL infusion (premix)  3-20 Units/kg/hr (Order-Specific) Intravenous Titrated   • HYDROmorphone (DILAUDID) injection 0.5 mg  0.5 mg Intravenous Q4H PRN   • insulin lispro (HumaLOG) 100 units/mL subcutaneous injection 2-12 Units  2-12 Units Subcutaneous TID AC   • insulin lispro (HumaLOG) 100 units/mL subcutaneous injection 2-12 Units  2-12 Units Subcutaneous HS   • insulin lispro (HumaLOG) 100 units/mL subcutaneous injection 5 Units  5 Units Subcutaneous TID With Meals   • metroNIDAZOLE (FLAGYL) IVPB (premix) 500 mg 100 mL  500 mg Intravenous Q8H   • norepinephrine (LEVOPHED) 4 mg (STANDARD CONCENTRATION) IV in sodium chloride 0.9% 250 mL  1-30 mcg/min Intravenous Titrated   • oxyCODONE (ROXICODONE) IR tablet 5 mg  5 mg Oral Q6H PRN    Or   • oxyCODONE (ROXICODONE) immediate release tablet 10 mg  10 mg Oral Q6H PRN   • potassium chloride 40 mEq IVPB (premix)  40 mEq Intravenous Once    Followed by   • potassium chloride 20 mEq IVPB (premix)  20 mEq Intravenous Once       Allergies:   No Known Allergies    PHYSICAL EXAM     Objective   Blood pressure 107/65, pulse 92, temperature 98.8 °F (37.1 °C), temperature source Oral, resp. rate 19, height 5' 11" (1.803 m), weight 123 kg (271 lb 13.2 oz), SpO2 99 %. Body mass index is 37.91 kg/m².     Intake/Output Summary (Last 24 hours) at 9/8/2023 0656  Last data filed at 9/8/2023 0600  Gross per 24 hour   Intake 1832.05 ml   Output 1590 ml   Net 242.05 ml       General Appearance:   Alert, cooperative, no distress   HEENT:   Normocephalic, atraumatic, anicteric     Neck:   Supple, symmetrical, trachea midline   Lungs:   Equal chest rise, respirations unlabored    Heart:   Regular rate and rhythm   Abdomen:   Soft, distended, nontender, 3 drains in place; normal bowel sounds; no masses, no organomegaly    Rectal:   Deferred    Extremities:   No cyanosis, clubbing or edema    Neuro: Moves all 4 extremities    Skin:   No jaundice, rashes, or lesions      ADDITIONAL DATA     Lab Results:     Results from last 7 days   Lab Units 09/08/23  0512 09/04/23 2021 09/04/23  1444   WBC Thousand/uL 9.50   < > 12.39*   HEMOGLOBIN g/dL 7.7*   < > 7.9*   HEMATOCRIT % 25.2*   < > 25.1*   PLATELETS Thousands/uL 278   < > 493*   LYMPHO PCT %  --   --  1*   MONO PCT %  --   --  6   EOS PCT %  --   --  1    < > = values in this interval not displayed. Results from last 7 days   Lab Units 09/08/23 0512 09/07/23  0506   POTASSIUM mmol/L 3.4* 3.6   CHLORIDE mmol/L 103 104   CO2 mmol/L 29 30   BUN mg/dL 43* 57*   CREATININE mg/dL 1.13 1.40*   CALCIUM mg/dL 7.5* 7.7*   ALK PHOS U/L  --  42   ALT U/L  --  9   AST U/L  --  11*     Results from last 7 days   Lab Units 09/04/23  1502   INR  2.00*       Imaging:    XR chest portable    Result Date: 9/7/2023  Narrative: CHEST INDICATION:   SOB. COMPARISON: Radiograph chest 9/5/2023 and CT abdomen pelvis 9/4/2023. EXAM PERFORMED/VIEWS:  XR CHEST PORTABLE FINDINGS:  Monitoring leads and clips project over the chest. Previous nasogastric tube removed. Cardiomediastinal silhouette appears unremarkable. Unchanged right medial lung base opacity. Small left and trace right pleural effusions. No pneumothorax. Thoracolumbar bridging osteophytes. Impression: Unchanged right medial lung base opacity. Small left and trace right pleural effusions. No pneumothorax. Resident: Yusra Mendoza, the attending radiologist, have reviewed the images and agree with the final report above. Workstation performed: [id number]     XR abdomen 1 view kub    Result Date: 9/7/2023  Narrative: ABDOMEN INDICATION:   Re-evaluate colonic distention after bowel movements. COMPARISON: KUB 9/5/2023 and CT abdomen pelvis 9/4/2023. VIEWS:  AP supine FINDINGS: Surgical drain in the epigastric region. Saunders catheter overlies pelvic region. Left femoral central venous catheter in unchanged position. Slightly worse distention of the transverse colon. Unchanged gaseous distention of the ascending colon. There may however be a small amount of air present within the distal descending colon adjacent to the left iliac crest No discernible free air under the right diaphragm. Left diaphragm is poorly visualized. Upright or left lateral decubitus imaging is more sensitive to detect subtle free air in the appropriate setting. No pathologic calcifications or soft tissue masses. Visualized right lung base is unremarkable. Left lung base is not visualized. Bilateral total hip arthroplasties. Impression: Slightly worse distention of the transverse colon. However, small amount of air may now be present in the distal descending colon adjacent to the left iliac crest. Unchanged gaseous distention of the ascending colon. Resident: Leeanna Lu, the attending radiologist, have reviewed the images and agree with the final report above. Workstation performed: [id number]     IR drainage tube placement    Result Date: 9/7/2023  Narrative: Abscess drainage catheter placement x2 Limited ultrasound left chest. Limited ultrasound right chest History: Necrotizing pancreatitis. Anterior drainage catheter placed 8/5/2023, upsize to 16 [place] on 9/4/2023. Patient be admitted with septic shock and continues with respiratory and hemodynamic failure requiring vasopressor support. He is a poor surgical candidate at this time. After multidisciplinary discussion we have decided to move forward with large bore drainage catheter placement to attempt to manage the collection. He underwent thoracentesis on the fourth and paracentesis earlier today. Risks benefits and alternatives were reviewed including risks of bleeding and damage to adjacent organs. Anesthesia: General Procedure: After explaining the risks and benefits of the procedure to the patient, informed consent was obtained.  Anesthesia was initiated and the patient was placed somewhat decubitus CT was used to localize the collections. Radiation dose length product (DLP) for this visit:  6346.4 mGy . This examination, like all CT scans performed in the [facility], was performed utilizing techniques to minimize radiation dose exposure, including the use of iterative reconstruction and automated exposure control. Procedure: In the holding area limited ultrasound of the left and right chest was performed to assess for size of the effusions. This was for planning for possible thoracentesis. The patient was identified verbally and by wristband. Timeout was performed. Based on  imaging I elected to place 2 drains as the left paracolic gutter collection did not appear to freely communicate with the main pancreatic region collection The patient was prepped and draped in the usual sterile fashion. Using intermittent CT guidance access was gained to the patient's left paracolic collection using an 18 gauge needle. Amplatz wire was placed, the tract was dilated and a 12 [place] all-purpose drain formed in the cavity. Purulent necrotic material was aspirated. Simultaneously using CT guidance access was gained to the patient's retroperitoneal pancreatic region collection using an 18 gauge needle. I attempted to use an oblique angle in order to provide a good runway for future interventions. The first needle pass was close to the inferior margin of the spleen. Although I believe we did not traverse the spleen I felt this would not be the ideal angle for future necrosectomy. Therefore Gelfoam slurry was prepared and injected while the needle was removed A new needle route was chosen with the Cisneros needle in the collection accessed. A Lunderquist wire was placed, initially it coiled in the lateral margin of the collection and I was able to redirected more centrally.  The tract was serially dilated with 12, 18 dilators and then a 24 [place] peel-away sheath utilized as a dilator followed finally by a 30 [place] dilator. A 28 Faroese Thal-Quick drain was then inserted without difficulty. The central loop of the Fal would not form properly given the size of the collection. Additionally, sideholes were seen to be visible to the flank but there was no capability to advance the tube further. Necrotic material began to drain under pressure. Each catheter was secured in place and placed to suction drainage. Patient was extubated in the CT scanner and tolerated the procedure well without apparent immediate complications. Findings: Left paracolic gutter collection measuring approximately 6.7 cm. Needle then wire then catheter within the collection. Use yielded purulent necrotic material. Large collection in the region of the pancreatic area, bilobed measuring approximately 16 x 7 cm on a representative image. Needle approaching this collection. Initial pass slightly inferior to the margin of the spleen. This needle pass was removed with Gelfoam injection. Subsequent needle pass more inferiorly and retroperitoneal debris. Wire then large bore catheter within the collection. Numerous additional findings including previously placed anterior drain coiled in the superior aspect of the collection. Minimal ascites. Small bilateral pleural effusions. Stomach containing some fluid. Dilated transverse colon. Limited ultrasound of the left and right chest before the drainage confirmed small bilateral pleural effusions. Specimens: Abscess drainage material representative sample sent for aerobic, anaerobic culture     Impression: Impression: CT-guided placement of a large-bore 28 Faroese drain into a retroperitoneal necrotic pancreatic collection CT-guided placement of a 12 Faroese drain into a more inferior loculated collection Note: The large 28 Faroese drain was placed at an oblique angle to allow for  possible future necrosectomy/VARDS.  Drainage of this very thick necrotic material will be best with continuous large diameter tubing, such as a chest tube box. Not all sideholes are in the collection due to tube length, do not flush with large volumes as this will spill material into the flank. Would hold any anticoagulation at least 12-24 hours due to hemorrhagic nature of the material and proximity of 1 needle pass to the spleen Workstation performed: [id number]     XR abdomen 1 view kub    Result Date: 9/5/2023  Narrative: ABDOMEN INDICATION:   monitor colonic distention. COMPARISON: CT abdomen pelvis with contrast 9/4/2023. VIEWS:  AP supine Images: 3 FINDINGS: Nonweighted enteric tube sidehole overlies distal stomach and tip in proximal second portion of duodenal region. Surgical drain overlies the epigastric region. Probable Saunders catheter overlies pelvic region. Left femoral approach central venous catheter tip overlies left iliac vessel region. There is a nonobstructive bowel gas pattern. Similar persistent gaseous distention of visualized ascending and transverse colon. No discernible free air on this supine study. Upright or left lateral decubitus imaging is more sensitive to detect subtle free air in the appropriate setting. No pathologic calcifications or soft tissue masses. Bibasilar atelectasis (left worse than right). Bilateral total hip arthroplasty. Spinal degenerative changes. Impression: Devices as detailed below: -Nonweighted enteric tube sidehole overlies distal stomach and tip in proximal second portion of duodenal region. -Surgical drain overlies the epigastric region. -Probable Saunders catheter overlies pelvic region. -Left femoral approach central venous catheter tip overlies left iliac vessel region. Similar persistent gaseous distention of visualized ascending and transverse colon. Bibasilar atelectasis (left worse than right).  Workstation performed: [id number]     Echo complete w/ contrast if indicated    Result Date: 9/5/2023  Narrative: •  Left Ventricle: Left ventricular cavity size is normal. Wall thickness is upper normal. The left ventricular ejection fraction is 60%. Systolic function is normal. Wall motion is normal. Diastolic function is normal. •  Right Ventricle: Right ventricular cavity size is mildly dilated. Systolic function is normal. •  Mitral Valve: There is trace regurgitation. •  Pulmonary Artery: The pulmonary artery systolic pressure is mildly increased. XR chest 1 view portable    Result Date: 9/5/2023  Narrative: CHEST INDICATION:   CP. COMPARISON: Chest radiograph from August 4, 2023 EXAM PERFORMED/VIEWS:  XR CHEST PORTABLE FINDINGS: Cardiomediastinal silhouette appears unremarkable. Subsegmental atelectasis in the perihilar regions and lung bases. Small layering pleural effusions. No pneumothorax. Osseous structures appear within normal limits for patient age. Impression: Small pleural effusions and bibasilar atelectasis. Workstation performed: [id number]     IR drainage tube check/change/reposition/reinsertion/upsize    Result Date: 9/5/2023  Narrative: Examination: Abscess catheter tube reposition/upsize History: Pancreatitis, septic shock, respiratory failure, bilateral pleural effusions larger on the left Patient requires respiratory optimization in order to lay on the fluoroscopy table for drain upsize Contrast type: omnipaque 300   Contrast dose: 10 cc Fluoroscopy time: 1.1 minutes Moderate sedation time: 30 minutes Technique: Risks benefits alternatives discussed informed consent obtained. Risk of bleeding discussed on Eliquis. The patient was identified verbally, and by wrist band." Time out" was performed. Initially patient was optimized with left thoracentesis which is dictated separately. The existing tube was prepped and draped in usual sterile fashion. Lidocaine was given as local anesthesia. Lidocaine was also administered within the tube. Contrast was injected. The system was opacified. The existing tube was removed over  an Amplatz wire.  The tract was dilated and a new 12 [place] skater all-purpose drain was formed Given the patient's body habitus and the depth of the collection the catheter is hubbed to the skin Specimens: Culture aerobic, anaerobic Findings: Ill-defined cavity in the mid abdomen containing previously placed drain. Catheter positioned more deeply and upsized to 12 Cuban 30-50 cc of chunky hemorrhagic infected material removed NG tube noted decompressing the stomach with gaseous distention of the transverse colon     Impression: Impression: Upsize and reposition of a pancreatic necrotic collection drain to a 16 Cuban pigtail catheter Patient also has ascites leaking around the catheter, he may be a candidate for paracentesis or a peritoneal drain to simply divert and reduce the ascites Workstation performed: [id number]     IR thoracentesis    Result Date: 9/5/2023  Narrative: Ultrasound-guided thoracentesis Clinical History: Pancreatitis, septic shock, respiratory failure, bilateral pleural effusions larger on the left Patient requires respiratory optimization in order to lay on the fluoroscopy table for drain upsize procedure: After explaining the risks and benefits of the procedure to the patient, informed consent was obtained. Ultrasound was used to localize the pleural effusion. The overlying skin was prepped and draped in usual sterile fashion and local anesthesia was obtained with the 1% lidocaine solution. A 5-Cuban Yueh needle was advanced until fluid was aspirated under live ultrasound guidance. Approximately  1200   cc of clear, yellow fluid was aspirated. Specimens: Multiple The patient tolerated the procedure well, and attention was turned to drain upsize which is dictated separately     Impression: Impression: Ultrasound-guided left thoracentesis Workstation performed: [id number]     XR chest portable ICU    Result Date: 9/5/2023  Narrative: CHEST INDICATION:   evaluation after IJ attempted.  COMPARISON: 9/4/2023 EXAM PERFORMED/VIEWS:  XR CHEST PORTABLE ICU 2 views FINDINGS: Interval placement of nasogastric tube which terminates below the diaphragm in the mid stomach Cardiomediastinal silhouette appears unremarkable. Persistent opacity at medial right lung base suspicious for pneumonia No pneumothorax or pleural effusion. Osseous structures appear within normal limits for patient age. Impression: Persistent medial right lung base opacity suspicious for pneumonia No pneumothorax Workstation performed: [id number]     CT abdomen pelvis w contrast    Result Date: 9/4/2023  Narrative: CT ABDOMEN AND PELVIS WITH IV CONTRAST INDICATION:   Abdominal abscess/infection suspected r/o peripancreatic abscess, infection. COMPARISON: CT abdomen pelvis 8/18/2023 TECHNIQUE:  CT examination of the abdomen and pelvis was performed. Multiplanar 2D reformatted images were created from the source data. This examination, like all CT scans performed in the [facility], was performed utilizing techniques to minimize radiation dose exposure, including the use of iterative reconstruction and automated exposure control. Radiation dose length product (DLP) for this visit:  3543.98 mGy-cm IV Contrast:  100 mL of iodixanol (VISIPAQUE) Enteric Contrast:  Enteric contrast was not administered. FINDINGS: ABDOMEN LOWER CHEST: Moderate bilateral pleural effusions, increased from prior study with mild bilateral lower lobe compressive atelectasis. Shotty lymph nodes are seen in the bilateral cardiophrenic angles. 2 mm subpleural right middle lobe nodule, unchanged. Small focal airspace opacity within the lingula, new from previous study could represent atelectasis or early pneumonia. LIVER/BILIARY TREE:  Unremarkable. GALLBLADDER: No calcified gallstones. Pericholecystic inflammatory changes are favored to be secondary to extension from pancreatic primary process.  SPLEEN: Small crescentic hypodensity is seen along the medial spleen, unchanged in retrospect. Unclear if this is related to a small infarct or possibly a small component of subcapsular fluid. PANCREAS: There has been development of a significant amount of gas seen within a large acute necrotic collection which is largely replacing the pancreatic parenchyma and extends laterally along the anterior pararenal space bilaterally, left greater than  right. This collection measures about 21 x 5.6 x 18.9 cm transverse, AP and craniocaudal dimensions respectively (series 4 image 74 and 603/96). Pigtail left upper quadrant drainage catheter is seen along the anterior margin of the collection. However, there is a significant amount of gas associated with this collection and elsewhere in the retroperitoneum more so than expected from catheter manipulation alone and is concerning for infected necrosis. The collection extends into the lateral paracolic gutter more caudally and may cross the midline as well such as series 4 image 89. On the left, the collection is associated with thickening of the lateral conal fascia and causes masslike thickening of the proximal to mid descending colon (series 4 images 91- 115). This is believed to represent phlegmonous mass. There is dilatation of the transverse colon up to 12.1 cm craniocaudal dimension (601/43) and 8.8 cm AP dimension, consistent with colonic obstruction. This degree of distention could pose a risk for bowel perforation. Surgical consultation is advised. The above collection involves the lesser sac and extends cephalad on the right interposed between the caudate lobe liver and reina of the diaphragm to the gastrohepatic ligament. Inflammatory changes are noted within the laurent hepatis surrounding the vessels and causing slitlike narrowing of the main portal vein such as series 4 image 63. The vein is grossly patent. Similar findings are noted involving the splenic vein and SMV. These findings have also progressed from the prior study.  Inflammatory tissue also surrounds the vessels about the celiac bifurcation and branches of the common hepatic artery. There is minimal if any appreciable enhancing pancreatic parenchyma. ADRENAL GLANDS:  Unremarkable. KIDNEYS/URETERS:  Unremarkable. No hydronephrosis. STOMACH AND BOWEL: Please see comments regarding the colon under the above pancreas section. The stomach is mildly distended and fluid-filled. There is collapse and/or thickening of the gastric antrum and descending duodenum, the latter is likely 180 degrees enveloped by the inflammatory collection. Secondary antritis/duodenitis with element of gastric outlet obstruction is not excluded. Moderate stool in the rectum. APPENDIX:  No findings to suggest appendicitis. ABDOMINOPELVIC CAVITY: Moderate to large amount of abdominal pelvic free fluid which is greater in the pelvis, also slightly increased. No pneumoperitoneum. No lymphadenopathy. VESSELS: See comments above. PELVIS soft tissue detail of the pelvis is degraded by beam hardening artifact from bilateral total hip arthroplasties. REPRODUCTIVE ORGANS:  Unremarkable for patient's age. URINARY BLADDER:  Unremarkable. ABDOMINAL WALL/INGUINAL REGIONS:  Unremarkable. OSSEOUS STRUCTURES:  No acute fracture or destructive osseous lesion. Bilateral total hip arthroplasties. Degenerative changes of the spine. Impression: 1. Significant worsening of necrotizing pancreatitis with large acute necrotic collection largely replacing the pancreatic parenchyma and extending into the anterior pararenal spaces (left greater than right) with the collection now containing a large amount of gas concerning for infected necrosis. Additionally, there is an inflammatory phlegmonous mass along the left lateral pararenal space/paracolic gutter with involvement of the proximal to mid descending colon causing associated colonic obstruction. This degree of bowel distention could pose a risk for bowel perforation.  Surgical consultation is advised. 2. Progression of inflammatory changes extending into the laurent hepatis with mass effect and narrowing of the portal vein and associated proximal vessels about the portal confluence as detailed above but no evidence for overt venous occlusion. 3. Mild fluid-filled gastric distention. Collapse and/or thickening of the gastric antrum and descending duodenum, the latter is likely 180 degrees enveloped by the inflammatory collection. Secondary antritis/duodenitis with element of gastric outlet obstruction is not excluded. 4. Moderate to large amount of abdominopelvic free fluid, increasing from prior study. 5. Enlarging, moderate bilateral pleural effusions with mild bibasilar compressive atelectasis. I personally discussed this study with [address] [place] [address] on 9/4/2023 at 5:47 PM. Workstation performed: [id number]     CT abdomen pelvis w contrast    Result Date: 8/18/2023  Narrative: CT ABDOMEN AND PELVIS WITH IV CONTRAST INDICATION:   K85.91: Acute pancreatitis with uninfected necrosis, unspecified. COMPARISON: Prior CT abdomen pelvis examinations dating back to 8/4/2023 TECHNIQUE:  CT examination of the abdomen and pelvis was performed. Scanning through the abdomen was performed in arterial, venous and delayed phases according a protocol specifically designed to evaluate upper abdominal viscera. Multiplanar 2D reformatted images were created from the source data. This examination, like all CT scans performed in the [facility], was performed utilizing techniques to minimize radiation dose exposure, including the use of iterative reconstruction and automated exposure control. Radiation dose length product (DLP) for this visit:  9888 mGy-cm IV Contrast:  100 mL of iohexol (OMNIPAQUE) Enteric Contrast:  Enteric contrast was not administered. FINDINGS: ABDOMEN LOWER CHEST: Partially visualized at least small bilateral pleural effusions. A 3 mm left lower lobe pulmonary nodule (4/8).  Bibasilar subsegmental atelectasis. LIVER/BILIARY TREE:  Unremarkable. GALLBLADDER:  No calcified gallstones. No pericholecystic inflammatory change. SPLEEN:  Unremarkable. PANCREAS: There is evidence of acute pancreatitis, which will be described based on the revised Laurie Classification of Acute Pancreatitis: -TIME OF ONSET: less than or equal to 4 weeks -NECROSIS: There is evidence of pancreatic parenchyma necrosis (more than 30%), therefore this is necrotizing pancreatitis. There is decreasing peripancreatic fat stranding and fluid tracking compared to 8/6/2023. Again seen is small volume ascites, minimally decreased compared to 0/1/2564. -LOCAL COMPLICATIONS: Interval decrease in the size of acute necrotizing collection in the lesser sac, currently being 4.1 x 7.4 cm (series 4 image 64), previously 11.4 x 6.1 cm. It contains a tendon heterogenous material in the bladder drainage catheter  lies in the superficial portion of this collection The main portal vein, splenic vein and the intermesenteric vein are patent with mild mass effect on the main proximal splenic vein, improved since 8/6/2023. -INFECTION: There is no abnormal gas in or around the pancreas to suggest infection. ADRENAL GLANDS:  Unremarkable. KIDNEYS/URETERS:  Unremarkable. No hydronephrosis. STOMACH AND BOWEL: No abnormal bowel dilation. Suboptimal evaluation rectum due to streak artifact from arthroplasties. APPENDIX:  No findings to suggest appendicitis. ABDOMINOPELVIC CAVITY:  No ascites. No pneumoperitoneum. No lymphadenopathy. VESSELS:  Unremarkable for patient's age. PELVIS REPRODUCTIVE ORGANS: Suboptimal evaluation of the prostate with streak artifact bilateral hip arthroplasties. Harman Curia URINARY BLADDER:  Unremarkable. ABDOMINAL WALL/INGUINAL REGIONS:  Unremarkable. OSSEOUS STRUCTURES:  No acute fracture or destructive osseous lesion.  Bilateral total hip arthroplasty     Impression: Resolving acute necrotizing pancreatitis with interval decrease in the size of the peripancreatic necrotizing collection in the lesser sac compared to 8/6/2023. Persistent high attenuation material within the peripancreatic fluid collection with tip of the drainage catheter in its superficial aspect. Mildly improved ascites. At least small bilateral pleural effusions. A 3 mm left lower lobe pulmonary nodule. Attention on follow-up imaging versus optional follow-up CT chest in 12 months is recommended to assess for stability, as per Fleischner criteria. The study was marked in EPIC for significant notification. Workstation performed: [id number]       EKG, Pathology, and Other Studies Reviewed on Admission:   · EKG: Reviewed      Counseling / Coordination of Care Time: 30 total mins spent n consult. Greater than 50% of total time spent on patient counseling and coordination of care. Oliverio Zelaya DO  Gastroenterology Fellow  [facility]  Division of Gastroenterology and Hepatology  Available on [employer]  . ..............................................................................................................................................  ** Please Note: This note is constructed using a voice recognition dictation system.  **

## 2023-09-08 NOTE — PROCEDURES
Op Note:    Tube Interrogation / Pancreatic Necrosectomy    Date/Time: 9/8/2023 2:55 PM    Performed by: Andreea Patton DO  Authorized by: Sariah Martin DO    Patient location:  Bedside  Other Assisting Provider: Yes (comment) (Dr. Radha Reid)    Consent:     Consent obtained:  Written and verbal    Consent given by:  Patient    Risks discussed:  Bleeding, incomplete drainage, damage to surrounding structures, infection, pain and nerve damage    Alternatives discussed:  Observation and alternative treatment  Universal protocol:     Procedure explained and questions answered to patient or proxy's satisfaction: yes      Relevant documents present and verified: yes      Test results available and properly labeled: yes      Radiology Images displayed and confirmed. If images not available, report reviewed: yes      Required blood products, implants, devices, and special equipment available: yes      Site/side marked: yes      Immediately prior to procedure a time out was called: yes      Patient identity confirmed:  Verbally with patient, provided demographic data, arm band and hospital-assigned identification number  Pre-procedure details:     Skin preparation:  ChloraPrep and Betadine    Preparation: Patient was prepped and draped in the usual sterile fashion    Indications:     Indications comment:  Necrotizing Pancreatitis, Clogged Drainage Tube. Sedation:     Sedation type: Anxiolysis (2mg versed)  Procedure details:     Tube connected to: gravity bag. Drainage characteristics: thick cloudy, debris. Post-procedure details:     Patient tolerance of procedure: Tolerated well, no immediate complications      Op Report:  Prior to procedure, patient and wife were discussed the risks and benefits of the procedure including bleeding, infection, pain, injury to nearby structures, require additional procedures requiring interventional radiology or additional operative procedures.  SCDs were already in place, he has been on IV antibiotics. Nasal Cannula was in place and Capnography was set up. Patient was prepped and draped in the universal sterile fashion. 2mg IV versed was given. Universal timeout was performed. Everyone assisting was asked to bring up questions at the point or if any develop during the procedure. Patient had a 28Fr tube placed within the necrotic fluid collection from his necrotizing pancreatitis prior to this procedure. After removing the drainage bag from the 28Fr tube, thick light brown fluid was immediately evacuated from the tubing. A 5.0 Bronchoscope was placed through the tubing under sterile conditions. Areas of necrotic tissue was gently flushed with sterile saline solution and gently suctioned from the tubing. Once the tubing was opened, the scope was further advanced down the tube until the pancreas was visualized. The tubing was cleansed with a 1:1 solution of H2O2 and sterile water for a total of 40cc. This was then irrigated with sterile water clearing the tubing for re-evaluation. An area of tissue remained lodged in the tubing that was grasped using the bronchoscope and successfully evacuated. Necrotic portion of the pancreas was grasped and removed and sent for pathology. The area was re-evaluated, excess fluid was freely flowing out of the tubing. A new drainage bag was applied to gravity. Patient tolerated the procedure well. Patient tolerated the procedure well.

## 2023-09-08 NOTE — PROCEDURES
Insert Complex Venous Access Line    Date/Time: 9/8/2023 1:28 PM    Performed by: Xenia Truong RN  Authorized by: GHASSAN Meyer    Patient location:  Bedside  Other Assisting Provider: No    Consent:     Consent obtained:  Written (Physician obtained)    Consent given by:  Patient    Procedural risks discussed: with Md and [facility] RN. Alternatives discussed:  No treatment  Universal protocol:     Procedure explained and questions answered to patient or proxy's satisfaction: yes      Relevant documents present and verified: yes      Test results available and properly labeled: yes      Radiology Images displayed and confirmed. If images not available, report reviewed: yes      Required blood products, implants, devices, and special equipment available: yes      Site/side marked: yes      Immediately prior to procedure, a time out was called: yes      Patient identity confirmed:  Verbally with patient, arm band, provided demographic data and hospital-assigned identification number  Pre-procedure details:     Hand hygiene: Hand hygiene performed prior to insertion      Sterile barrier technique: All elements of maximal sterile technique followed      Skin preparation:  ChloraPrep    Skin preparation agent: Skin preparation agent completely dried prior to procedure    Indications:     PICC line indications: long term antibiotics    Anesthesia (see MAR for exact dosages):      Anesthesia method:  Local infiltration    Local anesthetic:  Lidocaine 1% w/o epi (2 ml)  Procedure details:     Location:  Basilic    Vessel type: vein      Laterality:  Left    Approach: percutaneous technique used      Patient position:  Flat    Procedural supplies:  Single lumen    Catheter size:  5 Fr    Landmarks identified: yes      Ultrasound guidance: yes      Ultrasound image availability:  Not saved    Sterile ultrasound techniques: Sterile gel and sterile probe covers were used      Number of attempts:  1    Successful placement: yes      Vessel of catheter tip end:  Sherlock 3CG confirmed    Total catheter length (cm):  43    Catheter out on skin (cm):  0    Max flow rate:  999    Arm circumference:  32  Post-procedure details:     Post-procedure:  Dressing applied and securement device placed    Assessment:  Blood return through all ports and free fluid flow    Post-procedure complications: none      Patient tolerance of procedure:   Tolerated well, no immediate complications  Comments:      Blood cultures at this time are negative 48 hours

## 2023-09-08 NOTE — PROGRESS NOTES
Progress Note - Infectious Disease   Domi Williamson 46 y.o. male MRN: [id number]  Unit/Bed#: [facility]U 07 Encounter: [id number]      Impression/Recommendations:  Septic shock, POA.    Fever, WBC, refractory hypotension.  Due to bacteremia, pancreatic abscess as below.  No other appreciable source.  Improving, now off pressor. Rec:  • Continue antibiotics as below  • Follow temperatures closely  • Recheck CBC in AM  • Supportive care as per the primary service     Polymicrobial bacteremia.    Klebsiella, Strep mitis/oralis.  Coagulase-negative Staph x2 from single set are likely contaminants. Due to pancreatic abscess as below.  No other appreciable source.  Repeat blood cultures 9/6, TTE (technically difficult) negative. Rec:  • Continue cefepime/Flagyl for now through the weekend  • Follow up final repeat blood cultures     Pancreatic abscess.    Due to superinfection of necrosis as below.  Status post drain upsizing 9/4 which yielded infected hemorrhagic material.  Cultures with Citrobacter, E. Coli, Klebsiella, Aeromonas, Bacteroides. Status post additional drains x2 9/6. Cultures with E. Coli. Rec:  • Continue cefepime/Flagyl for now through the weekend  • Follow up recent drain fluid cultures from 9/6 and tailor antibiotics as indicated  • Follow drain output closely     Necrotizing pancreatitis. Presumed alcoholic.  Status post IR drain 8/5 yielding dark bloody fluid.  Cultures initially negative.  Current CT shows replacing most of pancreatic parenchyma, extending to pararenal spaces, causing left colonic obstruction, gastric outlet obstruction.  Status post drain upsizing 9/4, additional drains x2 9/6 yielding superinfected fluid as above. Collection not mature enough for cyst gastrostomy per GI. Rec:  • Follow drain output closely  • Close Surgery follow-up ongoing  • Eventual cyst gastrostomy versus VARDs     JERZY. Likely multifactorial.  Improving.   Rec:  • Follow creatinine closely and dose-adjust antibiotics as indicated  • Recheck BMP in AM     Large bowel obstruction. Due to compression from pancreatitis as above.     Gastric outlet obstruction. Due to compression from pancreatitis as above.     Bilateral pleural effusions. Likely reactive to pancreatitis as above.  Status post left thoracentesis yielding 1200 cc clear yellow fluid.  Fluid cultures negative.     Ascites. Likely reactive to pancreatitis as above. Status post paracentesis. Fluid cultures negative.     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      I will reassess the patient on . Please call in the interim with new questions.     Antibiotics:  Cefepime #5  Flagyl #4  Antibiotics #5    Subjective:  Patient seen on AM rounds. Doing well today. Denies pain. No other complaints. 24 Hour Events:  No documented fevers, chills, sweats, nausea, vomiting, or diarrhea. Off pressors. Objective:  Vitals:  Temp:  [98 °F (36.7 °C)-98.8 °F (37.1 °C)] 98.6 °F (37 °C)  HR:  [] 102  Resp:  [14-27] 24  BP: ()/(56-70) 119/58  SpO2:  [94 %-99 %] 98 %  Temp (24hrs), Av.5 °F (36.9 °C), Min:98 °F (36.7 °C), Max:98.8 °F (37.1 °C)  Current: Temperature: 98.6 °F (37 °C)    Physical Exam:   General:  No acute distress  Psychiatric:  Awake and alert  Pulmonary:  Normal respiratory excursion without accessory muscle use  Abdomen:  Soft, obese  Extremities:  No edema  Skin:  No rashes    Lab Results:  I have personally reviewed pertinent labs.   Results from last 7 days   Lab Units 23  0512 23  0506 23  2143 23  0450 23  0229   POTASSIUM mmol/L 3.4* 3.6 3.1* 3.7 4.4   CHLORIDE mmol/L 103 104 102 98 96   CO2 mmol/L 29 30 29 28 24   BUN mg/dL 43* 57* 62* 79* 91*   CREATININE mg/dL 1.13 1.40* 1.30 1.62* 2.15*   EGFR ml/min/1.73sq m 74 57 62 48 34   CALCIUM mg/dL 7.5* 7.7* 7.6* 7.8* 7.6*   AST U/L  --  11*  --  13 22   ALT U/L  --  9  --  10 16   ALK PHOS U/L  --  42  --  43 45 Results from last 7 days   Lab Units 09/08/23  0512 09/07/23  0506 09/06/23  2252   WBC Thousand/uL 9.50 9.82 8.77   HEMOGLOBIN g/dL 7.7* 7.5* 7.5*   PLATELETS Thousands/uL 278 348 330     Results from last 7 days   Lab Units 09/06/23  2127 09/06/23  1512 09/06/23  0937 09/04/23  1928 09/04/23  1502   BLOOD CULTURE   --   --  No Growth at 48 hrs. No Growth at 48 hrs. --  Klebsiella aerogenes*  Streptococcus mitis oralis group*  Staphylococcus epidermidis*  Staphylococcus hominis*  No Growth at 72 hrs. GRAM STAIN RESULT  No Polys or Bacteria seen 1+ Polys  No bacteria seen  --  1+ Polys*  3+ Gram positive cocci in pairs*  3+ Gram variable rods*  No Polys or Bacteria seen Gram negative rods*  Gram positive cocci in chains*   BODY FLUID CULTURE, STERILE  4+ Growth of Escherichia coli* No growth  --  4+ Growth of Citrobacter freundii*  4+ Growth of Escherichia coli*  3+ Growth of Klebsiella aerogenes*  1+ Growth of Aeromonas hydrophila / caviae*  No growth  --        Imaging Studies:   I have personally reviewed pertinent imaging study reports and images in PACS. EKG, Pathology, and Other Studies:   I have personally reviewed pertinent reports.

## 2023-09-08 NOTE — PROGRESS NOTES
Progress Note - General Surgery   Charly Ruff 46 y.o. male MRN: [id number]  [facility] Encounter: [id number]    Assessment:  48yoM p/w necrotizing pancreatitis s/p IR drainage  -Klebsiella bacteremia  -had paracentesis 9/6 almost 3L  -SMV thrombosis on hep gtt  -Transverse colonic distention    Afebrile, on Levophed 3, 2L NC  WBC 9.5 from 9.82  Hemoglobin 7.7 from 7.5  9/4 blood cultures growing Klebsiella, strep mitis oralis, Staph epidermidis and hominis    UOP 1400  Drains total 190 cc pancreatic debris  Multiple BM    Plan:  -NPO for possible procedure with GI, ok for low-fat diet otherwise  -CT A/P done overnight, official read pending  -Cefepime/Flagyl  -Monitor drains output and character  -Heparin gtt, monitor hemoglobin  -Wean levo as tolerated  -Has Saunders for UOP  -Pain control  -Incentive spirometry  -Encourage ambulation, PT/OT rec no needs  -Appreciate ICU care    Subjective/Objective   Subjective:   Patient doing well, reports abdominal pain improving, has been tolerating diet yesterday without nausea or emesis, having bowel function, voiding with Saunders in place. Objective:     Blood pressure 107/65, pulse 92, temperature 98.8 °F (37.1 °C), temperature source Oral, resp. rate 19, height 5' 11" (1.803 m), weight 123 kg (271 lb 13.2 oz), SpO2 99 %. ,Body mass index is 37.91 kg/m². Intake/Output Summary (Last 24 hours) at 9/8/2023 0823  Last data filed at 9/8/2023 0600  Gross per 24 hour   Intake 1462.32 ml   Output 1350 ml   Net 112.32 ml       Invasive Devices     Central Venous Catheter Line  Duration           CVC Central Lines 09/04/23 Triple Left Femoral 3 days          Peripheral Intravenous Line  Duration           Peripheral IV 08/08/23 Dorsal (posterior); Left Wrist 31 days    Peripheral IV 09/04/23 Distal;Left;Upper;Ventral (anterior) Arm 4 days          Arterial Line  Duration           Arterial Line 09/05/23 Radial 3 days          Drain  Duration           Abscess Drain RUQ 3 days    Urethral Catheter Coude 3 days    Abscess Drain LUQ 1 day    Abscess Drain LUQ 1 day                Physical Exam:   General: NAD  Skin: Warm, dry, anicteric  HEENT: Normocephalic, atraumatic  CV: RRR, no m/r/g  Pulm: CTA b/l, no inc WOB, 2L NC  Abd: Soft, distended, minimally tender to palpation mostly in epigastric, drains as above  MSK: Symmetric, no edema, no tenderness, no deformity  Neuro: AOx3, GCS 15    Lab, Imaging and other studies:  I have personally reviewed pertinent lab results.   , CBC:   Lab Results   Component Value Date    WBC 9.50 09/08/2023    HGB 7.7 (L) 09/08/2023    HCT 25.2 (L) 09/08/2023    MCV 88 09/08/2023     09/08/2023    RBC 2.87 (L) 09/08/2023    MCH 26.8 09/08/2023    MCHC 30.6 (L) 09/08/2023    RDW 16.7 (H) 09/08/2023    MPV 9.0 09/08/2023   , CMP:   Lab Results   Component Value Date    SODIUM 140 09/08/2023    K 3.4 (L) 09/08/2023     09/08/2023    CO2 29 09/08/2023    BUN 43 (H) 09/08/2023    CREATININE 1.13 09/08/2023    CALCIUM 7.5 (L) 09/08/2023    EGFR 74 09/08/2023   , Coagulation: No results found for: "PT", "INR", "APTT"  VTE Pharmacologic Prophylaxis: Sequential compression device (Venodyne)  and Heparin  VTE Mechanical Prophylaxis: sequential compression device

## 2023-09-08 NOTE — PLAN OF CARE
Problem: PAIN - ADULT  Goal: Verbalizes/displays adequate comfort level or baseline comfort level  Description: Interventions:  - Encourage patient to monitor pain and request assistance  - Assess pain using appropriate pain scale  - Administer analgesics based on type and severity of pain and evaluate response  - Implement non-pharmacological measures as appropriate and evaluate response  - Consider cultural and social influences on pain and pain management  - Notify physician/advanced practitioner if interventions unsuccessful or patient reports new pain  Outcome: Progressing     Problem: INFECTION - ADULT  Goal: Absence or prevention of progression during hospitalization  Description: INTERVENTIONS:  - Assess and monitor for signs and symptoms of infection  - Monitor lab/diagnostic results  - Monitor all insertion sites, i.e. indwelling lines, tubes, and drains  - Monitor endotracheal if appropriate and nasal secretions for changes in amount and color  - Perry appropriate cooling/warming therapies per order  - Administer medications as ordered  - Instruct and encourage patient and family to use good hand hygiene technique  - Identify and instruct in appropriate isolation precautions for identified infection/condition  Outcome: Progressing  Goal: Absence of fever/infection during neutropenic period  Description: INTERVENTIONS:  - Monitor WBC    Outcome: Progressing     Problem: SAFETY ADULT  Goal: Patient will remain free of falls  Description: INTERVENTIONS:  - Educate patient/family on patient safety including physical limitations  - Instruct patient to call for assistance with activity   - Consult OT/PT to assist with strengthening/mobility   - Keep Call bell within reach  - Keep bed low and locked with side rails adjusted as appropriate  - Keep care items and personal belongings within reach  - Initiate and maintain comfort rounds  - Make Fall Risk Sign visible to staff  - Offer Toileting every 2 Hours, in advance of need  - Initiate/Maintain bed alarm  - Obtain necessary fall risk management equipment: bed alarm  - Apply yellow socks and bracelet for high fall risk patients  - Consider moving patient to room near nurses station  Outcome: Progressing  Goal: Maintain or return to baseline ADL function  Description: INTERVENTIONS:  -  Assess patient's ability to carry out ADLs; assess patient's baseline for ADL function and identify physical deficits which impact ability to perform ADLs (bathing, care of mouth/teeth, toileting, grooming, dressing, etc.)  - Assess/evaluate cause of self-care deficits   - Assess range of motion  - Assess patient's mobility; develop plan if impaired  - Assess patient's need for assistive devices and provide as appropriate  - Encourage maximum independence but intervene and supervise when necessary  - Involve family in performance of ADLs  - Assess for home care needs following discharge   - Consider OT consult to assist with ADL evaluation and planning for discharge  - Provide patient education as appropriate  Outcome: Progressing  Goal: Maintains/Returns to pre admission functional level  Description: INTERVENTIONS:  - Perform BMAT or MOVE assessment daily.   - Set and communicate daily mobility goal to care team and patient/family/caregiver. - Collaborate with rehabilitation services on mobility goals if consulted  - Perform Range of Motion 4 times a day. - Reposition patient every 2 hours.   - Dangle patient 0 times a day  - Stand patient 0 times a day  - Ambulate patient 0 times a day  - Out of bed to chair 0 times a day   - Out of bed for meals 0 times a day  - Out of bed for toileting  - Record patient progress and toleration of activity level   Outcome: Progressing     Problem: DISCHARGE PLANNING  Goal: Discharge to home or other facility with appropriate resources  Description: INTERVENTIONS:  - Identify barriers to discharge w/patient and caregiver  - Arrange for needed discharge resources and transportation as appropriate  - Identify discharge learning needs (meds, wound care, etc.)  - Arrange for interpretive services to assist at discharge as needed  - Refer to Case Management Department for coordinating discharge planning if the patient needs post-hospital services based on physician/advanced practitioner order or complex needs related to functional status, cognitive ability, or social support system  Outcome: Progressing     Problem: Knowledge Deficit  Goal: Patient/family/caregiver demonstrates understanding of disease process, treatment plan, medications, and discharge instructions  Description: Complete learning assessment and assess knowledge base. Interventions:  - Provide teaching at level of understanding  - Provide teaching via preferred learning methods  Outcome: Progressing     Problem: MOBILITY - ADULT  Goal: Maintain or return to baseline ADL function  Description: INTERVENTIONS:  -  Assess patient's ability to carry out ADLs; assess patient's baseline for ADL function and identify physical deficits which impact ability to perform ADLs (bathing, care of mouth/teeth, toileting, grooming, dressing, etc.)  - Assess/evaluate cause of self-care deficits   - Assess range of motion  - Assess patient's mobility; develop plan if impaired  - Assess patient's need for assistive devices and provide as appropriate  - Encourage maximum independence but intervene and supervise when necessary  - Involve family in performance of ADLs  - Assess for home care needs following discharge   - Consider OT consult to assist with ADL evaluation and planning for discharge  - Provide patient education as appropriate  Outcome: Progressing  Goal: Maintains/Returns to pre admission functional level  Description: INTERVENTIONS:  - Perform BMAT or MOVE assessment daily.   - Set and communicate daily mobility goal to care team and patient/family/caregiver.    - Collaborate with rehabilitation services on mobility goals if consulted  - Perform Range of Motion 4 times a day. - Reposition patient every 2 hours. - Dangle patient 0 times a day  - Stand patient 2 times a day  - Ambulate patient 1 times a day  - Out of bed to chair 2 times a day   - Out of bed for meals 2 times a day  - Out of bed for toileting  - Record patient progress and toleration of activity level   Outcome: Progressing     Problem: Prexisting or High Potential for Compromised Skin Integrity  Goal: Skin integrity is maintained or improved  Description: INTERVENTIONS:  - Identify patients at risk for skin breakdown  - Assess and monitor skin integrity  - Assess and monitor nutrition and hydration status  - Monitor labs   - Assess for incontinence   - Turn and reposition patient  - Assist with mobility/ambulation  - Relieve pressure over bony prominences  - Avoid friction and shearing  - Provide appropriate hygiene as needed including keeping skin clean and dry  - Evaluate need for skin moisturizer/barrier cream  - Collaborate with interdisciplinary team   - Patient/family teaching  - Consider wound care consult   Outcome: Progressing     Problem: Nutrition/Hydration-ADULT  Goal: Nutrient/Hydration intake appropriate for improving, restoring or maintaining nutritional needs  Description: Monitor and assess patient's nutrition/hydration status for malnutrition. Collaborate with interdisciplinary team and initiate plan and interventions as ordered. Monitor patient's weight and dietary intake as ordered or per policy. Utilize nutrition screening tool and intervene as necessary. Determine patient's food preferences and provide high-protein, high-caloric foods as appropriate.      INTERVENTIONS:  - Monitor oral intake, urinary output, labs, and treatment plans  - Assess nutrition and hydration status and recommend course of action  - Evaluate amount of meals eaten  - Assist patient with eating if necessary   - Allow adequate time for meals  - Recommend/ encourage appropriate diets, oral nutritional supplements, and vitamin/mineral supplements  - Order, calculate, and assess calorie counts as needed  - Recommend, monitor, and adjust tube feedings and TPN/PPN based on assessed needs  - Assess need for intravenous fluids  - Provide specific nutrition/hydration education as appropriate  - Include patient/family/caregiver in decisions related to nutrition  Outcome: Progressing

## 2023-09-08 NOTE — PROGRESS NOTES
[address] [facility]  Progress Note: Critical Care  Name: Susan Jones 46 y.o. male I MRN: [id number]  Unit/Bed#: MICU 07 I Date of Admission: 9/4/2023   Date of Service: 9/8/2023 I Hospital Day: 4    Assessment/Plan    Neuro:   • Diagnosis: Pain  ? Plan:   - Tylenol 650mg q6 PRN for mild pain  - Roxicodone 5mg q6 PRN for moderate pain  - Roxicodone 10mg q6 for severe pain  - Dilaudid 0.5mg q4PRN for breakthrough pain  ? Delirium precautions  ? Sleep/wake cycle regulation as able   ? CAM-ICU BID    CV:   • Diagnosis: Septic shock  ? Plan:   - Maintain MAP>65  - Lactic acid cleared  - Norepinephrine 4 mcg/min  - Continue to wean as able  - Continue to monitor endpoints     Pulm:  • Diagnosis: Acute hypoxic respiratory insufficiency likely 2/2 sepsis and reactive b/l pleural effusions  ? Plan:   - Maintain spO2>92%  - Currently on 2L/NC- wean as tolerated  - Airway clearance protocol  - IS and aggressive pulmonary toilet  • Diagnosis: B/l pleural effusions likely reactive 2/2 pancreatitis  ? Plan:  - S/p L IR thoracentesis of 1.2L  - Pleural cultures- no growth      GI:   • Diagnosis: Infected necrotizing pancreatitis  ? 9/5 CTAP-Significant worsening of necrotizing pancreatitis w/ large acute collection extening to pararenal spaces containing large amount of gas concerning for infection  ? S/p IR Pancreatic Bed Drain Placement 8/5  - Upsized to 16Fr 9/4  ? S/p 12Fr IR Drain Placement into Left Lateral Pancreatic Collection 9/6  ? S/p 28Fr Pancreatic Bed Drain Placement 9/6  ? Plan:   - Monitor abd exam  - General surgery and GI c/s  - Plan for EUS/cystogastrostomy likely once patients HDS/off pressor therapy  - Abx as described in ID: cefepime/flagyl   - Drains with 180cc output in 24 hours    • Diagnosis: Dilated colon  ? 9/5 CTAP-Inflammatory mass along the left lateral pararenal gutter w/ involvement of proximal to mid descending colon  ?  KUB with largely distended colon >12cm  ? Patient continues to have bowel movements, unlikely to be obstruction   ? Plan   - GI consult: No indication for decompression at this time  - Monitor abdominal exam  - Aggressive bowel regimen, per GI:  - Miralax bid  - Dulcolax rectal suppositories prn  - Mineral oil enemas prn  • Ascites   ? 9/7 S/p IR drainage: 2900cc serous fluid drained from RLQ  - Plan:   - Gram stain with polys, gram+ cocci, gram variable rods  - Culture with growth of Citrobacter freundii  - Continue antibiotics per ID  :   • Diagnosis: JERZY  ? Plan:   - Saunders placement  - Strict I/Os  - U/O 1225 in 24 hrs   - Monitor BUN/Cr    F/E/N:   ? F: none  ? E: replace prn K>4, Mg>2, P>3  ? N: clear liquid diet     Heme/Onc:   • Diagnosis: non-occlusive SMV thrombus  ? Plan:   - On eliquis (last dose 9/4) not reversed  - Restart heparin gtt at 2000 9/7  • Diagnosis: Anemia  ? Plan  - Monitor HgB: HgB 7.5, stable from 9/6   - Monitor s/s of bleeding  - Transfuse for hgb <7  • Diagnosis: Thrombocytosis   ? Plan  - Likely reactive 2/2 pancreatitis  - Monitor  - Stable at 348 on 9/7, improved from 482 to 396 on 9/6  • Diagnosis: DVT ppx  ? Plan  - Heparin gtt  - SCDs     Endo:   • Diagnosis: Hyperglycemia  ? Plan:   - Last hemoglobin A1c 7.0 % on 8/7/23  - Continue sliding scale algorithm 4  - Adjust insulin regimen as needed to maintain goal -180        ID:   • Diagnosis: Infected necrotizing pancreatitis, gram-negative rods  ? Plan:   - Antibiotics per ID: cefepime 1g q12hrs, Flagyl 500 mg q8 hours  - Continue to follow IR and blood cultures         MSK/Skin:   • Diagnosis: Chronic wounds  ? Plan:   - Pressure point offloading, turns/repositioning  - PT/OT  - Appreciate wound care consult recommendations     Disposition: Critical care    ICU Core Measures     A: Assess, Prevent, and Manage Pain · Has pain been assessed? Yes  · Need for changes to pain regimen?  No   B: Both SAT/SAT  · N/A   C: Choice of Sedation · RASS Goal: 0 Alert and Calm  · Need for changes to sedation or analgesia regimen? No   D: Delirium · CAM-ICU: Negative   E: Early Mobility  · Plan for early mobility? Yes   F: Family Engagement · Plan for family engagement today? Yes       Antibiotic Review: Patient on appropriate coverage based on culture data. and Infectious disease consulted    Review of Invasive Devices: Saunders Plan: Continue for accurate I/O monitoring for 48 hours  Central access plan: Medications requiring central line Hemodynamic monitoring  [place] Plan: Keep arterial line for hemodynamic monitoring, frequent ABGs and frequent labs    Prophylaxis:  VTE VTE covered by:  heparin (porcine), Intravenous, 11.1 Units/kg/hr at 09/07/23 2136       Stress Ulcer  not ordered          Subjective   HPI/24hr events: Patient to get PICC line, when that occurs patient's femoral line can be removed. No overnight events. Review of Systems   Constitutional: Negative for chills and fever. Respiratory: Negative for cough and shortness of breath. Cardiovascular: Negative for chest pain and palpitations. Gastrointestinal: Negative for abdominal pain and vomiting. Two drains in LUQ   Genitourinary: Negative for dysuria and hematuria. Musculoskeletal: Negative for arthralgias. Skin: Negative for color change and rash. Neurological: Negative for dizziness, seizures, syncope and headaches. All other systems reviewed and are negative.         Objective                            Vitals I/O      Most Recent Min/Max in 24hrs   Temp 98 °F (36.7 °C) Temp  Min: 98 °F (36.7 °C)  Max: 99 °F (37.2 °C)   Pulse 82 Pulse  Min: 82  Max: 116   Resp 14 Resp  Min: 14  Max: 34   /62 BP  Min: 97/56  Max: 123/63   O2 Sat 99 % SpO2  Min: 95 %  Max: 99 %      Intake/Output Summary (Last 24 hours) at 9/8/2023 0359  Last data filed at 9/8/2023 0200  Gross per 24 hour   Intake 2248.54 ml   Output 1675 ml   Net 573.54 ml         Diet NPO; Sips with meds     Invasive Monitoring Physical exam   Arterial Line  Rody /44  Arterial Line BP  Min: 100/56  Max: 126/52   MAP 66 mmHg  Arterial Line MAP (mmHg)  Min: 58 mmHg  Max: 102 mmHg    Physical Exam  Eyes:      Conjunctiva/sclera: Conjunctivae normal.   Skin:     General: Skin is warm. Capillary Refill: Capillary refill takes less than 2 seconds. HENT:      Head: Normocephalic. Right Ear: Tympanic membrane normal.      Left Ear: Tympanic membrane normal.      Mouth/Throat:      Mouth: Mucous membranes are dry. Cardiovascular:      Rate and Rhythm: Normal rate and regular rhythm. Pulses: Normal pulses. Musculoskeletal:         General: Normal range of motion. Comments: CVC in left femoral   Abdominal:      General: Abdomen is protuberant. Bowel sounds are normal.      Tenderness: There is no abdominal tenderness. Comments: Two DONNY drain   Constitutional:       General: He is not in acute distress. Pulmonary:      Effort: Pulmonary effort is normal.   Neurological:      Mental Status: He is alert and oriented to person, place and time. Mental status is at baseline. Genitourinary/Anorectal:  FoleyVitals and nursing note reviewed. Diagnostic Studies      EK/4- Sinus tachycardia   Imaging:  I have personally reviewed pertinent reports.        Medications:  Scheduled PRN   cefepime, 2,000 mg, Q12H  chlorhexidine, 15 mL, Q12H ALEX  insulin lispro, 2-12 Units, TID AC  insulin lispro, 2-12 Units, HS  insulin lispro, 5 Units, TID With Meals  metroNIDAZOLE, 500 mg, Q8H      acetaminophen, 650 mg, Q6H PRN  HYDROmorphone, 0.5 mg, Q4H PRN  oxyCODONE, 5 mg, Q6H PRN   Or  oxyCODONE, 10 mg, Q6H PRN       Continuous    heparin (porcine), 3-20 Units/kg/hr (Order-Specific), Last Rate: 11.1 Units/kg/hr (23 2136)  norepinephrine, 1-30 mcg/min, Last Rate: 3 mcg/min (23 2335)         Labs:    CBC    Recent Labs     23  2252 23  0506   WBC 8.77 9.82   HGB 7.5* 7.5*   HCT 24.7* 25.3*   PLT 330 348     BMP    Recent Labs     09/06/23 2143 09/07/23  0506   SODIUM 139 141   K 3.1* 3.6    104   CO2 29 30   AGAP 8 7   BUN 62* 57*   CREATININE 1.30 1.40*   CALCIUM 7.6* 7.7*       Coags    Recent Labs     09/06/23  0450 09/07/23  2003   PTT 46* 33        Additional Electrolytes  Recent Labs     09/06/23  0450   MG 2.3   PHOS 4.7*          Blood Gas    No recent results  No recent results LFTs  Recent Labs     09/06/23 0450 09/07/23  0506   ALT 10 9   AST 13 11*   ALKPHOS 43 42   ALB 2.4* 2.5*   TBILI 0.54 0.58       Infectious  No recent results  Glucose  Recent Labs     09/06/23  0450 09/06/23 2143 09/07/23  0506   GLUC 192* 151* [facility]

## 2023-09-09 LAB
ANION GAP SERPL CALCULATED.3IONS-SCNC: 4 MMOL/L
ANION GAP SERPL CALCULATED.3IONS-SCNC: 7 MMOL/L
APTT PPP: 66 SECONDS (ref 23–37)
APTT PPP: 76 SECONDS (ref 23–37)
BACTERIA BLD CULT: ABNORMAL
BACTERIA BLD CULT: NORMAL
BACTERIA SPEC BFLD CULT: NO GROWTH
BUN SERPL-MCNC: 34 MG/DL (ref 5–25)
BUN SERPL-MCNC: 36 MG/DL (ref 5–25)
CA-I BLD-SCNC: 1.12 MMOL/L (ref 1.12–1.32)
CALCIUM SERPL-MCNC: 7.4 MG/DL (ref 8.4–10.2)
CALCIUM SERPL-MCNC: 7.5 MG/DL (ref 8.4–10.2)
CHLORIDE SERPL-SCNC: 104 MMOL/L (ref 96–108)
CHLORIDE SERPL-SCNC: 104 MMOL/L (ref 96–108)
CO2 SERPL-SCNC: 27 MMOL/L (ref 21–32)
CO2 SERPL-SCNC: 29 MMOL/L (ref 21–32)
CREAT SERPL-MCNC: 1.18 MG/DL (ref 0.6–1.3)
CREAT SERPL-MCNC: 1.22 MG/DL (ref 0.6–1.3)
ERYTHROCYTE [DISTWIDTH] IN BLOOD BY AUTOMATED COUNT: 17 % (ref 11.6–15.1)
GFR SERPL CREATININE-BSD FRML MDRD: 67 ML/MIN/1.73SQ M
GFR SERPL CREATININE-BSD FRML MDRD: 70 ML/MIN/1.73SQ M
GLUCOSE SERPL-MCNC: 118 MG/DL (ref 65–140)
GLUCOSE SERPL-MCNC: 124 MG/DL (ref 65–140)
GLUCOSE SERPL-MCNC: 138 MG/DL (ref 65–140)
GLUCOSE SERPL-MCNC: 139 MG/DL (ref 65–140)
GLUCOSE SERPL-MCNC: 148 MG/DL (ref 65–140)
GLUCOSE SERPL-MCNC: 161 MG/DL (ref 65–140)
GRAM STN SPEC: ABNORMAL
GRAM STN SPEC: ABNORMAL
GRAM STN SPEC: NORMAL
GRAM STN SPEC: NORMAL
HCT VFR BLD AUTO: 25.2 % (ref 36.5–49.3)
HGB BLD-MCNC: 7.5 G/DL (ref 12–17)
MAGNESIUM SERPL-MCNC: 2.5 MG/DL (ref 1.9–2.7)
MCH RBC QN AUTO: 26.5 PG (ref 26.8–34.3)
MCHC RBC AUTO-ENTMCNC: 29.8 G/DL (ref 31.4–37.4)
MCV RBC AUTO: 89 FL (ref 82–98)
PHOSPHATE SERPL-MCNC: 3.2 MG/DL (ref 2.7–4.5)
PLATELET # BLD AUTO: 259 THOUSANDS/UL (ref 149–390)
PMV BLD AUTO: 9.4 FL (ref 8.9–12.7)
POTASSIUM SERPL-SCNC: 3.4 MMOL/L (ref 3.5–5.3)
POTASSIUM SERPL-SCNC: 3.6 MMOL/L (ref 3.5–5.3)
RBC # BLD AUTO: 2.83 MILLION/UL (ref 3.88–5.62)
SODIUM SERPL-SCNC: 137 MMOL/L (ref 135–147)
SODIUM SERPL-SCNC: 138 MMOL/L (ref 135–147)
WBC # BLD AUTO: 8.26 THOUSAND/UL (ref 4.31–10.16)

## 2023-09-09 PROCEDURE — 87205 SMEAR GRAM STAIN: CPT | Performed by: PHYSICIAN ASSISTANT

## 2023-09-09 PROCEDURE — 82948 REAGENT STRIP/BLOOD GLUCOSE: CPT

## 2023-09-09 PROCEDURE — 99232 SBSQ HOSP IP/OBS MODERATE 35: CPT | Performed by: SURGERY

## 2023-09-09 PROCEDURE — 80048 BASIC METABOLIC PNL TOTAL CA: CPT | Performed by: PHYSICIAN ASSISTANT

## 2023-09-09 PROCEDURE — 93005 ELECTROCARDIOGRAM TRACING: CPT

## 2023-09-09 PROCEDURE — 85730 THROMBOPLASTIN TIME PARTIAL: CPT | Performed by: STUDENT IN AN ORGANIZED HEALTH CARE EDUCATION/TRAINING PROGRAM

## 2023-09-09 PROCEDURE — 87186 SC STD MICRODIL/AGAR DIL: CPT | Performed by: PHYSICIAN ASSISTANT

## 2023-09-09 PROCEDURE — 82330 ASSAY OF CALCIUM: CPT | Performed by: PHYSICIAN ASSISTANT

## 2023-09-09 PROCEDURE — 84100 ASSAY OF PHOSPHORUS: CPT | Performed by: PHYSICIAN ASSISTANT

## 2023-09-09 PROCEDURE — 87070 CULTURE OTHR SPECIMN AEROBIC: CPT | Performed by: PHYSICIAN ASSISTANT

## 2023-09-09 PROCEDURE — 83735 ASSAY OF MAGNESIUM: CPT | Performed by: PHYSICIAN ASSISTANT

## 2023-09-09 PROCEDURE — 87077 CULTURE AEROBIC IDENTIFY: CPT | Performed by: PHYSICIAN ASSISTANT

## 2023-09-09 PROCEDURE — 85027 COMPLETE CBC AUTOMATED: CPT | Performed by: PHYSICIAN ASSISTANT

## 2023-09-09 PROCEDURE — 99233 SBSQ HOSP IP/OBS HIGH 50: CPT | Performed by: STUDENT IN AN ORGANIZED HEALTH CARE EDUCATION/TRAINING PROGRAM

## 2023-09-09 RX ORDER — FUROSEMIDE 20 MG/1
20 TABLET ORAL DAILY
Status: DISCONTINUED | OUTPATIENT
Start: 2023-09-09 | End: 2023-09-19 | Stop reason: HOSPADM

## 2023-09-09 RX ORDER — MAGNESIUM SULFATE HEPTAHYDRATE 40 MG/ML
2 INJECTION, SOLUTION INTRAVENOUS ONCE
Status: COMPLETED | OUTPATIENT
Start: 2023-09-09 | End: 2023-09-09

## 2023-09-09 RX ORDER — POTASSIUM CHLORIDE 20 MEQ/1
40 TABLET, EXTENDED RELEASE ORAL ONCE
Status: COMPLETED | OUTPATIENT
Start: 2023-09-09 | End: 2023-09-09

## 2023-09-09 RX ORDER — POTASSIUM CHLORIDE 14.9 MG/ML
20 INJECTION INTRAVENOUS ONCE
Status: COMPLETED | OUTPATIENT
Start: 2023-09-09 | End: 2023-09-09

## 2023-09-09 RX ORDER — HEPARIN SODIUM 10000 [USP'U]/100ML
3-26 INJECTION, SOLUTION INTRAVENOUS
Status: DISCONTINUED | OUTPATIENT
Start: 2023-09-09 | End: 2023-09-18

## 2023-09-09 RX ORDER — HEPARIN SODIUM 10000 [USP'U]/100ML
3-20 INJECTION, SOLUTION INTRAVENOUS
Status: DISCONTINUED | OUTPATIENT
Start: 2023-09-09 | End: 2023-09-09

## 2023-09-09 RX ADMIN — METRONIDAZOLE 500 MG: 500 INJECTION, SOLUTION INTRAVENOUS at 04:48

## 2023-09-09 RX ADMIN — MAGNESIUM SULFATE HEPTAHYDRATE 2 G: 40 INJECTION, SOLUTION INTRAVENOUS at 12:32

## 2023-09-09 RX ADMIN — METRONIDAZOLE 500 MG: 500 INJECTION, SOLUTION INTRAVENOUS at 12:56

## 2023-09-09 RX ADMIN — INSULIN LISPRO 2 UNITS: 100 INJECTION, SOLUTION INTRAVENOUS; SUBCUTANEOUS at 17:56

## 2023-09-09 RX ADMIN — CHLORHEXIDINE GLUCONATE 15 ML: 1.2 SOLUTION ORAL at 20:17

## 2023-09-09 RX ADMIN — CHLORHEXIDINE GLUCONATE 15 ML: 1.2 SOLUTION ORAL at 09:50

## 2023-09-09 RX ADMIN — HEPARIN SODIUM 23.1 UNITS/KG/HR: 10000 INJECTION, SOLUTION INTRAVENOUS at 05:44

## 2023-09-09 RX ADMIN — POTASSIUM CHLORIDE 40 MEQ: 1500 TABLET, EXTENDED RELEASE ORAL at 06:14

## 2023-09-09 RX ADMIN — INSULIN LISPRO 5 UNITS: 100 INJECTION, SOLUTION INTRAVENOUS; SUBCUTANEOUS at 12:01

## 2023-09-09 RX ADMIN — METRONIDAZOLE 500 MG: 500 INJECTION, SOLUTION INTRAVENOUS at 20:17

## 2023-09-09 RX ADMIN — INSULIN LISPRO 5 UNITS: 100 INJECTION, SOLUTION INTRAVENOUS; SUBCUTANEOUS at 17:56

## 2023-09-09 RX ADMIN — POTASSIUM CHLORIDE 20 MEQ: 14.9 INJECTION, SOLUTION INTRAVENOUS at 06:14

## 2023-09-09 RX ADMIN — POTASSIUM CHLORIDE 40 MEQ: 1500 TABLET, EXTENDED RELEASE ORAL at 14:32

## 2023-09-09 RX ADMIN — INSULIN LISPRO 5 UNITS: 100 INJECTION, SOLUTION INTRAVENOUS; SUBCUTANEOUS at 07:55

## 2023-09-09 RX ADMIN — HEPARIN SODIUM 23.1 UNITS/KG/HR: 10000 INJECTION, SOLUTION INTRAVENOUS at 19:38

## 2023-09-09 RX ADMIN — CEFEPIME 2000 MG: 2 INJECTION, POWDER, FOR SOLUTION INTRAVENOUS at 11:33

## 2023-09-09 RX ADMIN — MAGNESIUM SULFATE HEPTAHYDRATE 2 G: 40 INJECTION, SOLUTION INTRAVENOUS at 08:00

## 2023-09-09 NOTE — PROGRESS NOTES
Progress Note - General Surgery   Tha Fung 46 y.o. male MRN: [id number]  Unit/Bed#: MICU 07 Encounter: [id number]    Assessment:  48yoM p/w necrotizing pancreatitis s/p IR drainage  -Klebsiella bacteremia  -septic shock requiring vasopressor medication  -had paracentesis 9/6 almost 3L  -SMV thrombosis on hep gtt  -Transverse colonic distention  -s/p tube irrigation at bedside 9/8    Tachy to 100s, afebrile, 2L NC, off pressors  WBC 8.26 from 9.5  Hb 7.5 from 7.7  Cr 1.22      Drains total 250cc pancreatic debris  -LUQ 20cc  -LUQ 200cc  -RUQ 30cc    Plan:  -low fat diet  -Cefepime/Flagyl, ID on board  -Monitor drains output and character  -Heparin gtt, monitor hemoglobin  -Pain control  -Incentive spirometry  -Encourage ambulation, PT/OT rec no needs  -Appreciate ICU care    Subjective/Objective   Subjective:   Doing well, denies significant abdominal pain, tolerating diet without n/v, having bowel movements, now voiding without palafox, cleared by PT for no rehab needs. Objective:     Blood pressure 106/62, pulse 102, temperature 98.6 °F (37 °C), temperature source Oral, resp. rate 17, height 5' 11" (1.803 m), weight 124 kg (273 lb 2.4 oz), SpO2 98 %. ,Body mass index is 38.1 kg/m². Intake/Output Summary (Last 24 hours) at 9/9/2023 0702  Last data filed at 9/9/2023 0600  Gross per 24 hour   Intake 1088.51 ml   Output 1175 ml   Net -86.49 ml       Invasive Devices     Peripherally Inserted Central Catheter Line  Duration           PICC Line 11/00/74 Left Basilic <1 day          Peripheral Intravenous Line  Duration           Peripheral IV 08/08/23 Dorsal (posterior); Left Wrist 32 days          Drain  Duration           Abscess Drain RUQ 4 days    Abscess Drain LUQ 2 days    Abscess Drain LUQ 2 days    External Urinary Catheter <1 day                Physical Exam:  General: NAD  Skin: Warm, dry, anicteric  HEENT: Normocephalic, atraumatic  CV: tachy, no m/r/g  Pulm: CTA b/l, no inc WOB, 2L NC  Abd: Soft, distended, minimally tender, drains as above  MSK: Symmetric, no edema, no tenderness, no deformity  Neuro: AOx3, GCS 15    Lab, Imaging and other studies:  I have personally reviewed pertinent lab results.   , CBC:   Lab Results   Component Value Date    WBC 8.26 09/09/2023    HGB 7.5 (L) 09/09/2023    HCT 25.2 (L) 09/09/2023    MCV 89 09/09/2023     09/09/2023    RBC 2.83 (L) 09/09/2023    MCH 26.5 (L) 09/09/2023    MCHC 29.8 (L) 09/09/2023    RDW 17.0 (H) 09/09/2023    MPV 9.4 09/09/2023   , CMP:   Lab Results   Component Value Date    SODIUM 138 09/09/2023    K 3.4 (L) 09/09/2023     09/09/2023    CO2 27 09/09/2023    BUN 36 (H) 09/09/2023    CREATININE 1.22 09/09/2023    CALCIUM 7.4 (L) 09/09/2023    EGFR 67 09/09/2023     VTE Pharmacologic Prophylaxis: Sequential compression device (Venodyne)  and Heparin  VTE Mechanical Prophylaxis: sequential compression device

## 2023-09-09 NOTE — PLAN OF CARE
Problem: PAIN - ADULT  Goal: Verbalizes/displays adequate comfort level or baseline comfort level  Description: Interventions:  - Encourage patient to monitor pain and request assistance  - Assess pain using appropriate pain scale  - Administer analgesics based on type and severity of pain and evaluate response  - Implement non-pharmacological measures as appropriate and evaluate response  - Consider cultural and social influences on pain and pain management  - Notify physician/advanced practitioner if interventions unsuccessful or patient reports new pain  Outcome: Progressing     Problem: INFECTION - ADULT  Goal: Absence or prevention of progression during hospitalization  Description: INTERVENTIONS:  - Assess and monitor for signs and symptoms of infection  - Monitor lab/diagnostic results  - Monitor all insertion sites, i.e. indwelling lines, tubes, and drains  - Monitor endotracheal if appropriate and nasal secretions for changes in amount and color  - [place] appropriate cooling/warming therapies per order  - Administer medications as ordered  - Instruct and encourage patient and family to use good hand hygiene technique  - Identify and instruct in appropriate isolation precautions for identified infection/condition  Outcome: Progressing  Goal: Absence of fever/infection during neutropenic period  Description: INTERVENTIONS:  - Monitor WBC    Outcome: Progressing     Problem: SAFETY ADULT  Goal: Patient will remain free of falls  Description: INTERVENTIONS:  - Educate patient/family on patient safety including physical limitations  - Instruct patient to call for assistance with activity   - Consult OT/PT to assist with strengthening/mobility   - Keep Call bell within reach  - Keep bed low and locked with side rails adjusted as appropriate  - Keep care items and personal belongings within reach  - Initiate and maintain comfort rounds  - Make Fall Risk Sign visible to staff  - Offer Toileting every 2 Hours, in advance of need  - Initiate/Maintain bed alarm  - Obtain necessary fall risk management equipment: bed alarm  - Apply yellow socks and bracelet for high fall risk patients  - Consider moving patient to room near nurses station  Outcome: Progressing  Goal: Maintain or return to baseline ADL function  Description: INTERVENTIONS:  -  Assess patient's ability to carry out ADLs; assess patient's baseline for ADL function and identify physical deficits which impact ability to perform ADLs (bathing, care of mouth/teeth, toileting, grooming, dressing, etc.)  - Assess/evaluate cause of self-care deficits   - Assess range of motion  - Assess patient's mobility; develop plan if impaired  - Assess patient's need for assistive devices and provide as appropriate  - Encourage maximum independence but intervene and supervise when necessary  - Involve family in performance of ADLs  - Assess for home care needs following discharge   - Consider OT consult to assist with ADL evaluation and planning for discharge  - Provide patient education as appropriate  Outcome: Progressing  Goal: Maintains/Returns to pre admission functional level  Description: INTERVENTIONS:  - Perform BMAT or MOVE assessment daily.   - Set and communicate daily mobility goal to care team and patient/family/caregiver. - Collaborate with rehabilitation services on mobility goals if consulted  - Perform Range of Motion 2 times a day. - Reposition patient every 2 hours.   - Dangle patient 0 times a day  - Stand patient 2 times a day  - Ambulate patient 2 times a day  - Out of bed to chair 2 times a day   - Out of bed for meals 2 times a day  - Out of bed for toileting  - Record patient progress and toleration of activity level   Outcome: Progressing     Problem: DISCHARGE PLANNING  Goal: Discharge to home or other facility with appropriate resources  Description: INTERVENTIONS:  - Identify barriers to discharge w/patient and caregiver  - Arrange for needed discharge resources and transportation as appropriate  - Identify discharge learning needs (meds, wound care, etc.)  - Arrange for interpretive services to assist at discharge as needed  - Refer to Case Management Department for coordinating discharge planning if the patient needs post-hospital services based on physician/advanced practitioner order or complex needs related to functional status, cognitive ability, or social support system  Outcome: Progressing     Problem: Knowledge Deficit  Goal: Patient/family/caregiver demonstrates understanding of disease process, treatment plan, medications, and discharge instructions  Description: Complete learning assessment and assess knowledge base. Interventions:  - Provide teaching at level of understanding  - Provide teaching via preferred learning methods  Outcome: Progressing     Problem: MOBILITY - ADULT  Goal: Maintain or return to baseline ADL function  Description: INTERVENTIONS:  -  Assess patient's ability to carry out ADLs; assess patient's baseline for ADL function and identify physical deficits which impact ability to perform ADLs (bathing, care of mouth/teeth, toileting, grooming, dressing, etc.)  - Assess/evaluate cause of self-care deficits   - Assess range of motion  - Assess patient's mobility; develop plan if impaired  - Assess patient's need for assistive devices and provide as appropriate  - Encourage maximum independence but intervene and supervise when necessary  - Involve family in performance of ADLs  - Assess for home care needs following discharge   - Consider OT consult to assist with ADL evaluation and planning for discharge  - Provide patient education as appropriate  Outcome: Progressing  Goal: Maintains/Returns to pre admission functional level  Description: INTERVENTIONS:  - Perform BMAT or MOVE assessment daily.   - Set and communicate daily mobility goal to care team and patient/family/caregiver.    - Collaborate with rehabilitation services on mobility goals if consulted  - Perform Range of Motion 4 times a day. - Reposition patient every 2 hours. - Dangle patient 0 times a day  - Stand patient 2 times a day  - Ambulate patient 2 times a day  - Out of bed to chair 2 times a day   - Out of bed for meals 2 times a day  - Out of bed for toileting  - Record patient progress and toleration of activity level   Outcome: Progressing     Problem: Prexisting or High Potential for Compromised Skin Integrity  Goal: Skin integrity is maintained or improved  Description: INTERVENTIONS:  - Identify patients at risk for skin breakdown  - Assess and monitor skin integrity  - Assess and monitor nutrition and hydration status  - Monitor labs   - Assess for incontinence   - Turn and reposition patient  - Assist with mobility/ambulation  - Relieve pressure over bony prominences  - Avoid friction and shearing  - Provide appropriate hygiene as needed including keeping skin clean and dry  - Evaluate need for skin moisturizer/barrier cream  - Collaborate with interdisciplinary team   - Patient/family teaching  - Consider wound care consult   Outcome: Progressing     Problem: Nutrition/Hydration-ADULT  Goal: Nutrient/Hydration intake appropriate for improving, restoring or maintaining nutritional needs  Description: Monitor and assess patient's nutrition/hydration status for malnutrition. Collaborate with interdisciplinary team and initiate plan and interventions as ordered. Monitor patient's weight and dietary intake as ordered or per policy. Utilize nutrition screening tool and intervene as necessary. Determine patient's food preferences and provide high-protein, high-caloric foods as appropriate.      INTERVENTIONS:  - Monitor oral intake, urinary output, labs, and treatment plans  - Assess nutrition and hydration status and recommend course of action  - Evaluate amount of meals eaten  - Assist patient with eating if necessary   - Allow adequate time for meals  - Recommend/ encourage appropriate diets, oral nutritional supplements, and vitamin/mineral supplements  - Order, calculate, and assess calorie counts as needed  - Recommend, monitor, and adjust tube feedings and TPN/PPN based on assessed needs  - Assess need for intravenous fluids  - Provide specific nutrition/hydration education as appropriate  - Include patient/family/caregiver in decisions related to nutrition  Outcome: Progressing

## 2023-09-09 NOTE — PROGRESS NOTES
"Patient Education   The [facility]  What to Expect  Here's what to expect in the Panel Psychiatry Program.   About the program  You'll be meeting with a psychiatric doctor to check your mental health. A psychiatric doctor helps you deal with troubling thoughts and feelings by giving you medicine. They'll make sure you know the plan for your care. You may see them for a long time. When you're feeling better, they may refer you back to seeing your family doctor.   If you have any questions, we'll be glad to talk to you.  About visits  Be open  At your visits, please talk openly about your problems. It may feel hard, but it's the best way for us to help you.  Cancelling visits  If you can't come to your visit, please call us right away at [phone]. If you don't cancel at least 24 hours (1 full day) before your visit, that's \"late cancellation.\"  Not showing up for your visits  Being very late is the same as not showing up. You'll be a \"no show\" if:  You're more than 15 minutes late for a 30-minute (half hour) visit.  You're more than 30 minutes late for a 60-minute (full hour) visit.  If you cancel late or don't show up 2 times within 6 months, we may end your care.  Getting help between visits  If you need help between visits, you can call us Monday to Friday from 8 a.m. to 4:30 p.m. at [phone].  Emergency care  Call 911 or go to the nearest emergency department if your life or someone else's life is in danger.  Call 988 anytime to reach the national Suicide and Crisis hotline.  Medicine refills  To refill your medicine, call your pharmacy. You can also call [facility]'s [facility] at [phone], Monday to Friday, 8 a.m. to 4:30 p.m. It can take 1 to 3 business days to get a refill.   Forms, letters, and tests  You may have papers to fill out, like FMLA, short-term disability, and workability. We can help you with these forms at your visits, but you must have an " [address] [place] [address]  Progress Note: Critical Care  Name: Tha Fung 46 y.o. male I MRN: [id number]  Unit/Bed#: MICU 07 I Date of Admission: 9/4/2023   Date of Service: 9/9/2023 I Hospital Day: 5    Assessment/Plan   Neuro:   · Diagnosis: Acute pain   · Tylenol 650 q6h PRN   · Oxycodone 5mg/10 mg q6h PRN mod/severe pain   · Dilaudid 0.5 mg q4h PRN breakthrough pain   · Delirium precautions, CAM ICU BID   · Regulate sleep/wake cycle       CV:   · Diagnosis: Septic shock, resolved   · Off all vasopressors x 24 hrs   · Maintain MAP >65       Pulm:  · Diagnosis: Acute respiratory insufficiency, BL pleural effusions   · 2 L O2, wean for SPO2 goal >92%  · Incentive spirometry, pulmonary toileting   · S/p L IR thoracentesis on 9/4 for 1.2 L  · Cultures no growth       GI:   · Diagnosis: Infected necrotizing pancreatitis, pancreatic abscess  · 8/5 IR pancreatic bed drain placement   · 9/4 up-sized to 16F  · 9/6 IR L lateral pancreatic collection drain placement   · 9/6 IR 28F pancreatic bed drain placement   · 9/8 Bedside 28F drain interrogation/irrigation   · Collection sizes improving on CT scan 9/8  · Serial abdominal exams   · Monitor drain output   · Abx per ID as below   · General surgery/GI following   · Diagnosis: Colonic dilation   · 9/8 CT scan dilation improving, now 9 cm   · Having BMs, no obstruction   · GI consulted - continue aggressive bowel regimen   · Miralax BID   · Dulcolax suppositories and Mineral oil enema PRN   · Diagnosis: Ascites   · 9/6 IR paracentesis 2900 mL serous fluid   · Culture - no growth   · Likely reactive secondary to pancreatitis       :   · Diagnosis: JERZY  · Cr downtrending   · Strict I/O   · Trend BMP       F/E/N:   · F: No IVF   · E: Replete electrolytes for goal K >4, Phos >3, Mg >2  · N: Low fat diet     Heme/Onc:   · Diagnosis: Non-occlusive SMV thrombus   · Heparin infusion  · Was prior on Eliquis, on hold with ongoing procedures appointment. You may need more than 1 visit for this, to be in an intensive therapy program, or both.  Before we can give you medicine for ADHD, we may refer you to get tested for it or confirm it another way.  We may not be able to give you an emotional support animal letter.  We don't do mental health checks ordered by the court.   We don't do mental health testing, but we can refer you to get tested.   Thank you for choosing us for your care.  For informational purposes only. Not to replace the advice of your health care provider. Copyright   2022 [facility]. All rights reserved. interclick [id number] - 12/22.   Treatment Plan:     Change quetiapine to 12.5 mg at 8 AM and 4 PM  2.     Change Lorazepam to 0.25 mg at 8 AM and 4 PM  3.     Paroxetine 30 mg daily  4.    Depakote 125  mg twice daily      Continue all other medical directions per primary care provider.   Continue all other medications as reviewed per electronic medical record today.   Safety plan reviewed. To the Emergency Department as needed or call after hours crisis line at [phone] or [phone]. [place] Crisis Text Line: Text [place] to 544376  or  Suicide LifeLine Chat: suicidepreventionlifeline.org/chat/  To schedule individual or family therapy, call [facility] at [phone].   Schedule an appointment with me in 6 weeks or sooner as needed.  Call [facility] at [phone] to schedule.  Follow up with primary care provider as planned or for acute medical concerns.  Call the psychiatric nurse line with medication questions or concerns at [phone].  Covelust may be used to communicate with your provider, but this is not intended to be used for emergencies.    Crisis Resources:    National Suicide Prevention Lifeline: 317.510.5128 (TTY: 242.470.4603). Call anytime for help.  (www.suicidepreventionlifeline.org)  National [place] on Mental Illness (www.melvin.org): 450.168.5889 or 587-664-3991.  · Diagnosis: Anemia   · Hgb stable 7.5 from 7.7  · Continue to trend and transfuse PRN Hgb <7 or symptomatic anemia   · Diagnosis: Thrombocytosis  · Likely reactive 2/2 pancreatitis   · Continue to trend       Endo:   · Diagnosis: T2DM with hyperglycemia  · Hgb A1c 7.0%  · ISS Algorithm 4 with BG at goal 140-180      ID:   · Diagnosis: Sepsis 2/2 infected necrotizing pancreatitis, pancreatic abscess, polymicrobial bacteremia  · ID following, recs appreciated   · Plan for cefepime/Flagyl through the weekend   · Blood cultures 9/6 - no growth x 48 hrs       MSK/Skin:   · Frequent turning/repositioning       Disposition: Stepdown Level 2    ICU Core Measures     A: Assess, Prevent, and Manage Pain · Has pain been assessed? Yes  · Need for changes to pain regimen? No   B: Both SAT/SAT  · N/A   C: Choice of Sedation · RASS Goal: N/A patient not on sedation  · Need for changes to sedation or analgesia regimen? NA   D: Delirium · CAM-ICU: Negative   E: Early Mobility  · Plan for early mobility? Yes   F: Family Engagement · Plan for family engagement today? Yes       Antibiotic Review: Awaiting culture results. and Infectious disease consulted    Review of Invasive Devices:      Central access plan: Patient requires PICC secondary to long term antibiotics       Prophylaxis:  VTE VTE covered by:  heparin (porcine), Intravenous, 23.1 Units/kg/hr at 09/09/23 0544       Stress Ulcer  not ordered          Subjective   HPI/24hr events: Bedside drain interrogation/irrigation with drainage of thick brown output. Levophed off since 0830 yesterday AM.   Tolerating low fat diet. Review of Systems   Respiratory: Negative for shortness of breath. Cardiovascular: Negative for chest pain. Gastrointestinal: Positive for abdominal distention. Negative for abdominal pain, constipation, diarrhea, nausea and vomiting. Neurological: Negative for dizziness and headaches.           Objective                            Vitals I/O   Mental Health Association (www.mentalhealth.org): 071-574-6317 or 846-167-7323.  [place] Crisis Text Line: Text [place] to 297652  Suicide LifeLine Chat: suicidepreventionlifeline.org/chat         Most Recent Min/Max in 24hrs   Temp 98.6 °F (37 °C) Temp  Min: 98.4 °F (36.9 °C)  Max: 98.6 °F (37 °C)   Pulse 102 Pulse  Min: 92  Max: 104   Resp 17 Resp  Min: 15  Max: 27   /62 BP  Min: 98/61  Max: 135/75   O2 Sat 98 % SpO2  Min: 90 %  Max: 100 %      Intake/Output Summary (Last 24 hours) at 9/9/2023 0208  Last data filed at 9/9/2023 0600  Gross per 24 hour   Intake 1088.51 ml   Output 1175 ml   Net -86.49 ml         Diet GI; Lo Fat     Invasive Monitoring Physical exam    Physical Exam  Eyes:      Pupils: Pupils are equal, round, and reactive to light. Skin:     General: Skin is warm, dry and not mottled extremities. Coloration: Skin is not pale. HENT:      Head: Normocephalic. Mouth/Throat:      Mouth: Mucous membranes are moist.   Cardiovascular:      Rate and Rhythm: Normal rate and regular rhythm. Musculoskeletal:      Right lower leg: Edema present. Left lower leg: Edema present. Abdominal:      General: Abdomen is protuberant. There is distension. Palpations: Abdomen is soft. Tenderness: There is no abdominal tenderness. Comments: 2 DONNY drains with minimal output   28 F drain with thick brown output   Constitutional:       General: He is not in acute distress. Appearance: He is not ill-appearing or toxic-appearing. Pulmonary:      Effort: Pulmonary effort is normal. No respiratory distress. Breath sounds: Normal breath sounds. Psychiatric:         Mood and Affect: Mood and affect normal.   Neurological:      General: No focal deficit present. Mental Status: He is alert and oriented to person, place and time. Mental status is at baseline. He is CAM ICU negative. Diagnostic Studies      Imaging: IR INPATIENT Paracentesis    Result Date: 9/8/2023  Impression: Therapeutic and diagnostic paracentesis. Signed, performed, and dictated by GHASSAN Murphy under the supervision of Dr. Teresita Holland.  Workstation performed: [id number] CT abdomen pelvis wo contrast    Result Date: 9/8/2023  Impression: Findings compatible with infected pancreatic necrosis with multiple drains in place. Appearance overall similar. Interval placement of a left paracolic drain with slight decrease in the size of a fluid collection. Gaseous distention of the transverse colon and cecum which overall has improved since the prior study. The cecum measures approximately 9 cm. No cecal pneumatosis. Moderate volume ascites is unchanged. Persistent nephrograms bilaterally compatible with acute kidney injury. Workstation performed: [id number]     XR chest portable    Result Date: 9/7/2023  Impression: Unchanged right medial lung base opacity. Small left and trace right pleural effusions. No pneumothorax. Resident: Bart Garcia, the attending radiologist, have reviewed the images and agree with the final report above. Workstation performed: [id number]     XR abdomen 1 view kub    Result Date: 9/7/2023  Impression: Slightly worse distention of the transverse colon. However, small amount of air may now be present in the distal descending colon adjacent to the left iliac crest. Unchanged gaseous distention of the ascending colon. Resident: Bart Garcia, the attending radiologist, have reviewed the images and agree with the final report above. Workstation performed: [id number]     IR drainage tube placement    Result Date: 9/7/2023  Impression: Impression: CT-guided placement of a large-bore 29 Cameroonian drain into a retroperitoneal necrotic pancreatic collection CT-guided placement of a 12 Cameroonian drain into a more inferior loculated collection Note: The large 28 Cameroonian drain was placed at an oblique angle to allow for  possible future necrosectomy/VARDS. Drainage of this very thick necrotic material will be best with continuous large diameter tubing, such as a chest tube box.  Not all sideholes are in the collection due to tube length, do not flush with large volumes as this will spill material into the flank. Would hold any anticoagulation at least 12-24 hours due to hemorrhagic nature of the material and proximity of 1 needle pass to the spleen Workstation performed: [id number]     XR abdomen 1 view kub    Result Date: 9/5/2023  Impression: Devices as detailed below: -Nonweighted enteric tube sidehole overlies distal stomach and tip in proximal second portion of duodenal region. -Surgical drain overlies the epigastric region. -Probable Saunders catheter overlies pelvic region. -Left femoral approach central venous catheter tip overlies left iliac vessel region. Similar persistent gaseous distention of visualized ascending and transverse colon. Bibasilar atelectasis (left worse than right). Workstation performed: [id number]     XR chest 1 view portable    Result Date: 9/5/2023  Impression: Small pleural effusions and bibasilar atelectasis. Workstation performed: [id number]     IR drainage tube check/change/reposition/reinsertion/upsize    Result Date: 9/5/2023  Impression: Impression: Upsize and reposition of a pancreatic necrotic collection drain to a 16 Jordanian pigtail catheter Patient also has ascites leaking around the catheter, he may be a candidate for paracentesis or a peritoneal drain to simply divert and reduce the ascites Workstation performed: [id number]     IR thoracentesis    Result Date: 9/5/2023  Impression: Impression: Ultrasound-guided left thoracentesis Workstation performed: [id number]     XR chest portable ICU    Result Date: 9/5/2023  Impression: Persistent medial right lung base opacity suspicious for pneumonia No pneumothorax Workstation performed: [id number]     CT abdomen pelvis w contrast    Result Date: 9/4/2023  Impression: 1.  Significant worsening of necrotizing pancreatitis with large acute necrotic collection largely replacing the pancreatic parenchyma and extending into the anterior pararenal spaces (left greater than right) with the collection now containing a large amount of gas concerning for infected necrosis. Additionally, there is an inflammatory phlegmonous mass along the left lateral pararenal space/paracolic gutter with involvement of the proximal to mid descending colon causing associated colonic obstruction. This degree of bowel distention could pose a risk for bowel perforation. Surgical consultation is advised. 2. Progression of inflammatory changes extending into the laurent hepatis with mass effect and narrowing of the portal vein and associated proximal vessels about the portal confluence as detailed above but no evidence for overt venous occlusion. 3. Mild fluid-filled gastric distention. Collapse and/or thickening of the gastric antrum and descending duodenum, the latter is likely 180 degrees enveloped by the inflammatory collection. Secondary antritis/duodenitis with element of gastric outlet obstruction is not excluded. 4. Moderate to large amount of abdominopelvic free fluid, increasing from prior study. 5. Enlarging, moderate bilateral pleural effusions with mild bibasilar compressive atelectasis. I personally discussed this study with [address] [place] [address] on 9/4/2023 at 5:47 PM. Workstation performed: [id number]    I have personally reviewed pertinent reports.        Medications:  Scheduled PRN   cefepime, 2,000 mg, Q12H  chlorhexidine, 15 mL, Q12H ALEX  insulin lispro, 2-12 Units, TID AC  insulin lispro, 2-12 Units, HS  insulin lispro, 5 Units, TID With Meals  magnesium sulfate, 2 g, Once  metroNIDAZOLE, 500 mg, Q8H  polyethylene glycol, 17 g, BID  potassium chloride, 20 mEq, Once      acetaminophen, 650 mg, Q6H PRN  bisacodyl, 10 mg, Daily PRN  HYDROmorphone, 0.5 mg, Q4H PRN  mineral oil, 1 enema, Daily PRN  oxyCODONE, 5 mg, Q6H PRN   Or  oxyCODONE, 10 mg, Q6H PRN       Continuous    heparin (porcine), 3-26 Units/kg/hr (Order-Specific), Last Rate: 23.1 Units/kg/hr (09/09/23 0686)         Labs:    CBC    Recent Labs     09/08/23  6530 09/09/23  0454   WBC 9.50 8.26   HGB 7.7* 7.5*   HCT 25.2* 25.2*    259   BANDSPCT 10*  --      BMP    Recent Labs     09/08/23  0512 09/09/23  0454   SODIUM 140 138   K 3.4* 3.4*    104   CO2 29 27   AGAP 8 7   BUN 43* 36*   CREATININE 1.13 1.22   CALCIUM 7.5* 7.4*       Coags    Recent Labs     09/08/23  1955 09/09/23  0340   PTT 38* 66*        Additional Electrolytes  Recent Labs     09/08/23 0512   MG 2.1   PHOS 3.6          Blood Gas    No recent results  No recent results LFTs  No recent results    Infectious  No recent results  Glucose  Recent Labs     09/08/23 0512 09/09/23  0454   GLUC 161* [address] [place], [place]

## 2023-09-10 LAB
ANION GAP SERPL CALCULATED.3IONS-SCNC: 4 MMOL/L
APTT PPP: 47 SECONDS (ref 23–37)
APTT PPP: 79 SECONDS (ref 23–37)
ATRIAL RATE: 104 BPM
BACTERIA SPEC BFLD CULT: ABNORMAL
BUN SERPL-MCNC: 31 MG/DL (ref 5–25)
CALCIUM SERPL-MCNC: 7.3 MG/DL (ref 8.4–10.2)
CHLORIDE SERPL-SCNC: 105 MMOL/L (ref 96–108)
CO2 SERPL-SCNC: 27 MMOL/L (ref 21–32)
CREAT SERPL-MCNC: 1.17 MG/DL (ref 0.6–1.3)
ERYTHROCYTE [DISTWIDTH] IN BLOOD BY AUTOMATED COUNT: 17.2 % (ref 11.6–15.1)
GFR SERPL CREATININE-BSD FRML MDRD: 71 ML/MIN/1.73SQ M
GLUCOSE SERPL-MCNC: 108 MG/DL (ref 65–140)
GLUCOSE SERPL-MCNC: 142 MG/DL (ref 65–140)
GLUCOSE SERPL-MCNC: 174 MG/DL (ref 65–140)
GLUCOSE SERPL-MCNC: 180 MG/DL (ref 65–140)
GLUCOSE SERPL-MCNC: 180 MG/DL (ref 65–140)
GLUCOSE SERPL-MCNC: 85 MG/DL (ref 65–140)
GRAM STN SPEC: ABNORMAL
HCT VFR BLD AUTO: 25 % (ref 36.5–49.3)
HGB BLD-MCNC: 7.5 G/DL (ref 12–17)
MCH RBC QN AUTO: 26.7 PG (ref 26.8–34.3)
MCHC RBC AUTO-ENTMCNC: 30 G/DL (ref 31.4–37.4)
MCV RBC AUTO: 89 FL (ref 82–98)
P AXIS: 34 DEGREES
PLATELET # BLD AUTO: 260 THOUSANDS/UL (ref 149–390)
PMV BLD AUTO: 9.8 FL (ref 8.9–12.7)
POTASSIUM SERPL-SCNC: 3.9 MMOL/L (ref 3.5–5.3)
PR INTERVAL: 133 MS
QRS AXIS: 65 DEGREES
QRSD INTERVAL: 96 MS
QT INTERVAL: 354 MS
QTC INTERVAL: 466 MS
RBC # BLD AUTO: 2.81 MILLION/UL (ref 3.88–5.62)
SODIUM SERPL-SCNC: 136 MMOL/L (ref 135–147)
T WAVE AXIS: -2 DEGREES
VENTRICULAR RATE: 104 BPM
WBC # BLD AUTO: 10.21 THOUSAND/UL (ref 4.31–10.16)

## 2023-09-10 PROCEDURE — 85730 THROMBOPLASTIN TIME PARTIAL: CPT | Performed by: PHYSICIAN ASSISTANT

## 2023-09-10 PROCEDURE — 82948 REAGENT STRIP/BLOOD GLUCOSE: CPT

## 2023-09-10 PROCEDURE — 85027 COMPLETE CBC AUTOMATED: CPT | Performed by: PHYSICIAN ASSISTANT

## 2023-09-10 PROCEDURE — 93010 ELECTROCARDIOGRAM REPORT: CPT | Performed by: INTERNAL MEDICINE

## 2023-09-10 PROCEDURE — 80048 BASIC METABOLIC PNL TOTAL CA: CPT | Performed by: PHYSICIAN ASSISTANT

## 2023-09-10 PROCEDURE — 99232 SBSQ HOSP IP/OBS MODERATE 35: CPT | Performed by: SURGERY

## 2023-09-10 PROCEDURE — 85730 THROMBOPLASTIN TIME PARTIAL: CPT | Performed by: SURGERY

## 2023-09-10 RX ADMIN — HEPARIN SODIUM 25.1 UNITS/KG/HR: 10000 INJECTION, SOLUTION INTRAVENOUS at 21:46

## 2023-09-10 RX ADMIN — POLYETHYLENE GLYCOL 3350 17 G: 17 POWDER, FOR SOLUTION ORAL at 08:28

## 2023-09-10 RX ADMIN — INSULIN LISPRO 2 UNITS: 100 INJECTION, SOLUTION INTRAVENOUS; SUBCUTANEOUS at 12:15

## 2023-09-10 RX ADMIN — INSULIN LISPRO 5 UNITS: 100 INJECTION, SOLUTION INTRAVENOUS; SUBCUTANEOUS at 08:30

## 2023-09-10 RX ADMIN — METRONIDAZOLE 500 MG: 500 INJECTION, SOLUTION INTRAVENOUS at 12:28

## 2023-09-10 RX ADMIN — HEPARIN SODIUM 25.1 UNITS/KG/HR: 10000 INJECTION, SOLUTION INTRAVENOUS at 08:29

## 2023-09-10 RX ADMIN — INSULIN LISPRO 5 UNITS: 100 INJECTION, SOLUTION INTRAVENOUS; SUBCUTANEOUS at 17:06

## 2023-09-10 RX ADMIN — CHLORHEXIDINE GLUCONATE 15 ML: 1.2 SOLUTION ORAL at 08:29

## 2023-09-10 RX ADMIN — FUROSEMIDE 20 MG: 20 TABLET ORAL at 08:29

## 2023-09-10 RX ADMIN — CEFEPIME 2000 MG: 2 INJECTION, POWDER, FOR SOLUTION INTRAVENOUS at 22:46

## 2023-09-10 RX ADMIN — CEFEPIME 2000 MG: 2 INJECTION, POWDER, FOR SOLUTION INTRAVENOUS at 00:44

## 2023-09-10 RX ADMIN — CEFEPIME 2000 MG: 2 INJECTION, POWDER, FOR SOLUTION INTRAVENOUS at 11:17

## 2023-09-10 RX ADMIN — CHLORHEXIDINE GLUCONATE 15 ML: 1.2 SOLUTION ORAL at 21:41

## 2023-09-10 RX ADMIN — INSULIN LISPRO 2 UNITS: 100 INJECTION, SOLUTION INTRAVENOUS; SUBCUTANEOUS at 08:29

## 2023-09-10 RX ADMIN — METRONIDAZOLE 500 MG: 500 INJECTION, SOLUTION INTRAVENOUS at 21:41

## 2023-09-10 RX ADMIN — INSULIN LISPRO 5 UNITS: 100 INJECTION, SOLUTION INTRAVENOUS; SUBCUTANEOUS at 12:16

## 2023-09-10 RX ADMIN — METRONIDAZOLE 500 MG: 500 INJECTION, SOLUTION INTRAVENOUS at 05:24

## 2023-09-10 NOTE — PROGRESS NOTES
Progress Note - General Surgery   Maura Stephen 46 y.o. male MRN: [id number]  Unit/Bed#: [facility] 901-01 Encounter: [id number]    Assessment:  48yoM p/w necrotizing pancreatitis s/p IR drainage  -Klebsiella bacteremia  -septic shock requiring vasopressor medication  -had paracentesis 9/6 almost 3L  -SMV thrombosis on hep gtt  -Transverse colonic distention  -s/p tube irrigation at bedside 9/8     Tachy to 100s, afebrile  WBC 10.21 from 8.26  IR cultures from 9/4 and 9/6 growing E. coli, Citrobacter, Klebsiella, Aeromonas  Hb 7.5 from 7.5  Cr 1.17       Stool x2  Drains total 600 cc pancreatic debris  -LUQ 50 cc  - cc  -RUQ 30cc 50 cc    Plan:  -low fat diet  -Cefepime/Flagyl, ID on board  -Monitor drains output and character  -Heparin gtt, monitor hemoglobin, will eventually need oral AC  -Pain control  -Incentive spirometry  -Encourage ambulation, PT/OT rec no needs    Subjective/Objective   Subjective:   Patient doing well, reports pain is well controlled, tolerating diet without nausea or emesis, having bowel function, voiding, was out of bed to chair yesterday, using incentive spirometer. Objective:     Blood pressure 121/68, pulse 104, temperature 98.5 °F (36.9 °C), resp. rate 18, height 5' 11" (1.803 m), weight 127 kg (280 lb 6.8 oz), SpO2 97 %. ,Body mass index is 39.11 kg/m². Intake/Output Summary (Last 24 hours) at 9/10/2023 0959  Last data filed at 9/10/2023 0301  Gross per 24 hour   Intake 195.67 ml   Output 900 ml   Net -704.33 ml       Invasive Devices     Peripherally Inserted Central Catheter Line  Duration           PICC Line 89/38/48 Left Basilic 1 day          Peripheral Intravenous Line  Duration           Peripheral IV 08/08/23 Dorsal (posterior); Left Wrist 33 days          Drain  Duration           Abscess Drain RUQ 5 days    Abscess Drain LUQ 3 days    Abscess Drain LUQ 3 days                Physical Exam:  General: NAD  Skin: Warm, dry, anicteric  HEENT: Normocephalic, atraumatic  CV: Tachycardic, no m/r/g  Pulm: CTA b/l, no inc WOB  Abd: Soft, distended, minimally tender to palpation, drains as above  MSK: Symmetric, no edema, no tenderness, no deformity  Neuro: AOx3, GCS 15    Lab, Imaging and other studies:  I have personally reviewed pertinent lab results.   , CBC:   Lab Results   Component Value Date    WBC 10.21 (H) 09/10/2023    HGB 7.5 (L) 09/10/2023    HCT 25.0 (L) 09/10/2023    MCV 89 09/10/2023     09/10/2023    RBC 2.81 (L) 09/10/2023    MCH 26.7 (L) 09/10/2023    MCHC 30.0 (L) 09/10/2023    RDW 17.2 (H) 09/10/2023    MPV 9.8 09/10/2023   , CMP:   Lab Results   Component Value Date    SODIUM 136 09/10/2023    K 3.9 09/10/2023     09/10/2023    CO2 27 09/10/2023    BUN 31 (H) 09/10/2023    CREATININE 1.17 09/10/2023    CALCIUM 7.3 (L) 09/10/2023    EGFR 71 09/10/2023   , Coagulation: No results found for: "PT", "INR", "APTT"  VTE Pharmacologic Prophylaxis: Sequential compression device (Venodyne)  and Heparin  VTE Mechanical Prophylaxis: sequential compression device

## 2023-09-10 NOTE — PLAN OF CARE
Problem: PAIN - ADULT  Goal: Verbalizes/displays adequate comfort level or baseline comfort level  Description: Interventions:  - Encourage patient to monitor pain and request assistance  - Assess pain using appropriate pain scale  - Administer analgesics based on type and severity of pain and evaluate response  - Implement non-pharmacological measures as appropriate and evaluate response  - Consider cultural and social influences on pain and pain management  - Notify physician/advanced practitioner if interventions unsuccessful or patient reports new pain  Outcome: Progressing     Problem: INFECTION - ADULT  Goal: Absence or prevention of progression during hospitalization  Description: INTERVENTIONS:  - Assess and monitor for signs and symptoms of infection  - Monitor lab/diagnostic results  - Monitor all insertion sites, i.e. indwelling lines, tubes, and drains  - Monitor endotracheal if appropriate and nasal secretions for changes in amount and color  - [place] appropriate cooling/warming therapies per order  - Administer medications as ordered  - Instruct and encourage patient and family to use good hand hygiene technique  - Identify and instruct in appropriate isolation precautions for identified infection/condition  Outcome: Progressing  Goal: Absence of fever/infection during neutropenic period  Description: INTERVENTIONS:  - Monitor WBC    Outcome: Progressing     Problem: SAFETY ADULT  Goal: Patient will remain free of falls  Description: INTERVENTIONS:  - Educate patient/family on patient safety including physical limitations  - Instruct patient to call for assistance with activity   - Consult OT/PT to assist with strengthening/mobility   - Keep Call bell within reach  - Keep bed low and locked with side rails adjusted as appropriate  - Keep care items and personal belongings within reach  - Initiate and maintain comfort rounds  - Make Fall Risk Sign visible to staff  - Offer Toileting every 2 Hours, in advance of need  - Initiate/Maintain bed alarm  - Obtain necessary fall risk management equipment  - Apply yellow socks and bracelet for high fall risk patients  - Consider moving patient to room near nurses station  Outcome: Progressing  Goal: Maintain or return to baseline ADL function  Description: INTERVENTIONS:  -  Assess patient's ability to carry out ADLs; assess patient's baseline for ADL function and identify physical deficits which impact ability to perform ADLs (bathing, care of mouth/teeth, toileting, grooming, dressing, etc.)  - Assess/evaluate cause of self-care deficits   - Assess range of motion  - Assess patient's mobility; develop plan if impaired  - Assess patient's need for assistive devices and provide as appropriate  - Encourage maximum independence but intervene and supervise when necessary  - Involve family in performance of ADLs  - Assess for home care needs following discharge   - Consider OT consult to assist with ADL evaluation and planning for discharge  - Provide patient education as appropriate  Outcome: Progressing  Goal: Maintains/Returns to pre admission functional level  Description: INTERVENTIONS:  - Perform BMAT or MOVE assessment daily.   - Set and communicate daily mobility goal to care team and patient/family/caregiver. - Collaborate with rehabilitation services on mobility goals if consulted  - Reposition patient every 2 hours.   - Dangle patient 3 times a day  - Stand patient 3 times a day  - Ambulate patient 3 times a day  - Out of bed to chair 3 times a day   - Out of bed for meals 3 times a day  - Out of bed for toileting  - Record patient progress and toleration of activity level   Outcome: Progressing     Problem: DISCHARGE PLANNING  Goal: Discharge to home or other facility with appropriate resources  Description: INTERVENTIONS:  - Identify barriers to discharge w/patient and caregiver  - Arrange for needed discharge resources and transportation as appropriate  - Identify discharge learning needs (meds, wound care, etc.)  - Arrange for interpretive services to assist at discharge as needed  - Refer to Case Management Department for coordinating discharge planning if the patient needs post-hospital services based on physician/advanced practitioner order or complex needs related to functional status, cognitive ability, or social support system  Outcome: Progressing     Problem: MOBILITY - ADULT  Goal: Maintain or return to baseline ADL function  Description: INTERVENTIONS:  -  Assess patient's ability to carry out ADLs; assess patient's baseline for ADL function and identify physical deficits which impact ability to perform ADLs (bathing, care of mouth/teeth, toileting, grooming, dressing, etc.)  - Assess/evaluate cause of self-care deficits   - Assess range of motion  - Assess patient's mobility; develop plan if impaired  - Assess patient's need for assistive devices and provide as appropriate  - Encourage maximum independence but intervene and supervise when necessary  - Involve family in performance of ADLs  - Assess for home care needs following discharge   - Consider OT consult to assist with ADL evaluation and planning for discharge  - Provide patient education as appropriate  Outcome: Progressing  Goal: Maintains/Returns to pre admission functional level  Description: INTERVENTIONS:  - Perform BMAT or MOVE assessment daily.   - Set and communicate daily mobility goal to care team and patient/family/caregiver. - Collaborate with rehabilitation services on mobility goals if consulted  - Reposition patient every 2 hours.   - Dangle patient 3 times a day  - Stand patient 3 times a day  - Ambulate patient 3 times a day  - Out of bed to chair 3 times a day   - Out of bed for meals 3 times a day  - Out of bed for toileting  - Record patient progress and toleration of activity level   Outcome: Progressing     Problem: Prexisting or High Potential for Compromised Skin Integrity  Goal: Skin integrity is maintained or improved  Description: INTERVENTIONS:  - Identify patients at risk for skin breakdown  - Assess and monitor skin integrity  - Assess and monitor nutrition and hydration status  - Monitor labs   - Assess for incontinence   - Turn and reposition patient  - Assist with mobility/ambulation  - Relieve pressure over bony prominences  - Avoid friction and shearing  - Provide appropriate hygiene as needed including keeping skin clean and dry  - Evaluate need for skin moisturizer/barrier cream  - Collaborate with interdisciplinary team   - Patient/family teaching  - Consider wound care consult   Outcome: Progressing     Problem: Nutrition/Hydration-ADULT  Goal: Nutrient/Hydration intake appropriate for improving, restoring or maintaining nutritional needs  Description: Monitor and assess patient's nutrition/hydration status for malnutrition. Collaborate with interdisciplinary team and initiate plan and interventions as ordered. Monitor patient's weight and dietary intake as ordered or per policy. Utilize nutrition screening tool and intervene as necessary. Determine patient's food preferences and provide high-protein, high-caloric foods as appropriate.      INTERVENTIONS:  - Monitor oral intake, urinary output, labs, and treatment plans  - Assess nutrition and hydration status and recommend course of action  - Evaluate amount of meals eaten  - Assist patient with eating if necessary   - Allow adequate time for meals  - Recommend/ encourage appropriate diets, oral nutritional supplements, and vitamin/mineral supplements  - Order, calculate, and assess calorie counts as needed  - Recommend, monitor, and adjust tube feedings and TPN/PPN based on assessed needs  - Assess need for intravenous fluids  - Provide specific nutrition/hydration education as appropriate  - Include patient/family/caregiver in decisions related to nutrition  Outcome: Progressing

## 2023-09-10 NOTE — NURSING NOTE
All dressings changed. Cleansed area with normal saline and dressing reapplied as all three dressings in the bed. Moderate amount of beige drainage at large drain and small amount of serosanguinous drainage on left groin dressing.

## 2023-09-11 LAB
ANION GAP SERPL CALCULATED.3IONS-SCNC: 7 MMOL/L
APTT PPP: 60 SECONDS (ref 23–37)
APTT PPP: 77 SECONDS (ref 23–37)
APTT PPP: 95 SECONDS (ref 23–37)
BACTERIA BLD CULT: NORMAL
BACTERIA BLD CULT: NORMAL
BUN SERPL-MCNC: 27 MG/DL (ref 5–25)
CALCIUM SERPL-MCNC: 7.3 MG/DL (ref 8.4–10.2)
CHLORIDE SERPL-SCNC: 104 MMOL/L (ref 96–108)
CO2 SERPL-SCNC: 25 MMOL/L (ref 21–32)
CREAT SERPL-MCNC: 1.18 MG/DL (ref 0.6–1.3)
ERYTHROCYTE [DISTWIDTH] IN BLOOD BY AUTOMATED COUNT: 17.4 % (ref 11.6–15.1)
GFR SERPL CREATININE-BSD FRML MDRD: 70 ML/MIN/1.73SQ M
GLUCOSE SERPL-MCNC: 115 MG/DL (ref 65–140)
GLUCOSE SERPL-MCNC: 129 MG/DL (ref 65–140)
GLUCOSE SERPL-MCNC: 133 MG/DL (ref 65–140)
GLUCOSE SERPL-MCNC: 137 MG/DL (ref 65–140)
GLUCOSE SERPL-MCNC: 144 MG/DL (ref 65–140)
HCT VFR BLD AUTO: 26 % (ref 36.5–49.3)
HGB BLD-MCNC: 7.9 G/DL (ref 12–17)
MCH RBC QN AUTO: 26.6 PG (ref 26.8–34.3)
MCHC RBC AUTO-ENTMCNC: 30.4 G/DL (ref 31.4–37.4)
MCV RBC AUTO: 88 FL (ref 82–98)
PLATELET # BLD AUTO: 256 THOUSANDS/UL (ref 149–390)
PMV BLD AUTO: 9.8 FL (ref 8.9–12.7)
POTASSIUM SERPL-SCNC: 3.6 MMOL/L (ref 3.5–5.3)
RBC # BLD AUTO: 2.97 MILLION/UL (ref 3.88–5.62)
SODIUM SERPL-SCNC: 136 MMOL/L (ref 135–147)
WBC # BLD AUTO: 10.41 THOUSAND/UL (ref 4.31–10.16)

## 2023-09-11 PROCEDURE — 82948 REAGENT STRIP/BLOOD GLUCOSE: CPT

## 2023-09-11 PROCEDURE — 99232 SBSQ HOSP IP/OBS MODERATE 35: CPT | Performed by: SURGERY

## 2023-09-11 PROCEDURE — 99232 SBSQ HOSP IP/OBS MODERATE 35: CPT | Performed by: INTERNAL MEDICINE

## 2023-09-11 PROCEDURE — 97116 GAIT TRAINING THERAPY: CPT

## 2023-09-11 PROCEDURE — 85027 COMPLETE CBC AUTOMATED: CPT | Performed by: SURGERY

## 2023-09-11 PROCEDURE — 85730 THROMBOPLASTIN TIME PARTIAL: CPT | Performed by: SURGERY

## 2023-09-11 PROCEDURE — 97530 THERAPEUTIC ACTIVITIES: CPT

## 2023-09-11 PROCEDURE — 97110 THERAPEUTIC EXERCISES: CPT

## 2023-09-11 PROCEDURE — 80048 BASIC METABOLIC PNL TOTAL CA: CPT | Performed by: SURGERY

## 2023-09-11 RX ORDER — POTASSIUM CHLORIDE 20 MEQ/1
40 TABLET, EXTENDED RELEASE ORAL ONCE
Status: COMPLETED | OUTPATIENT
Start: 2023-09-11 | End: 2023-09-11

## 2023-09-11 RX ADMIN — HEPARIN SODIUM 23.1 UNITS/KG/HR: 10000 INJECTION, SOLUTION INTRAVENOUS at 22:49

## 2023-09-11 RX ADMIN — CHLORHEXIDINE GLUCONATE 15 ML: 1.2 SOLUTION ORAL at 08:45

## 2023-09-11 RX ADMIN — POTASSIUM CHLORIDE 40 MEQ: 1500 TABLET, EXTENDED RELEASE ORAL at 06:49

## 2023-09-11 RX ADMIN — METRONIDAZOLE 500 MG: 500 INJECTION, SOLUTION INTRAVENOUS at 21:28

## 2023-09-11 RX ADMIN — METRONIDAZOLE 500 MG: 500 INJECTION, SOLUTION INTRAVENOUS at 05:56

## 2023-09-11 RX ADMIN — INSULIN LISPRO 5 UNITS: 100 INJECTION, SOLUTION INTRAVENOUS; SUBCUTANEOUS at 12:18

## 2023-09-11 RX ADMIN — HEPARIN SODIUM 23.1 UNITS/KG/HR: 10000 INJECTION, SOLUTION INTRAVENOUS at 11:46

## 2023-09-11 RX ADMIN — CEFTRIAXONE SODIUM 2000 MG: 10 INJECTION, POWDER, FOR SOLUTION INTRAVENOUS at 12:07

## 2023-09-11 RX ADMIN — INSULIN LISPRO 5 UNITS: 100 INJECTION, SOLUTION INTRAVENOUS; SUBCUTANEOUS at 17:08

## 2023-09-11 RX ADMIN — INSULIN LISPRO 5 UNITS: 100 INJECTION, SOLUTION INTRAVENOUS; SUBCUTANEOUS at 08:45

## 2023-09-11 RX ADMIN — FUROSEMIDE 20 MG: 20 TABLET ORAL at 08:45

## 2023-09-11 RX ADMIN — METRONIDAZOLE 500 MG: 500 INJECTION, SOLUTION INTRAVENOUS at 13:27

## 2023-09-11 NOTE — PLAN OF CARE
Problem: INFECTION - ADULT  Goal: Absence or prevention of progression during hospitalization  Description: INTERVENTIONS:  - Assess and monitor for signs and symptoms of infection  - Monitor lab/diagnostic results  - Monitor all insertion sites, i.e. indwelling lines, tubes, and drains  - Monitor endotracheal if appropriate and nasal secretions for changes in amount and color  - [place] appropriate cooling/warming therapies per order  - Administer medications as ordered  - Instruct and encourage patient and family to use good hand hygiene technique  - Identify and instruct in appropriate isolation precautions for identified infection/condition  Outcome: Progressing

## 2023-09-11 NOTE — PROGRESS NOTES
Progress Note - General Surgery   Crystal Rowland 46 y.o. 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  -Transverse colonic distention  -s/p tube irrigation at bedside 9/8     Afebrile, tachy 110s since 11pm on RA    UOP: 300cc PM shift  BM: x1  Drains: total 535 cc   cc dark tan necrotic  LUQ: 30 cc tan necrotic  RUQ: 225cc    Lab Results   Component Value Date/Time    WBC 10.41 (H) 09/11/2023 05:54 AM    WBC 10.21 (H) 09/10/2023 06:12 AM    HGB 7.9 (L) 09/11/2023 05:54 AM    HGB 7.5 (L) 09/10/2023 06:12 AM    CREATININE 1.18 09/11/2023 05:54 AM    CREATININE 1.17 09/10/2023 06:12 AM       Plan:  -F/u w/ GI regarding timing/plans for this week  -low fat diet  -Cefepime/Flagyl, ID on board  -Monitor drains output and character  -Heparin gtt- monitor PTT  -Pain control  -Incentive spirometry  -Encourage ambulation, PT/OT rec no needs    Subjective/Objective   Subjective:   No acute events overnight. Pt resting comfortably. No N/V, vicki diet. Having bowel function. Voiding. Only ambulating to bathroom. Encouraged to ambulate halls. Objective:     Blood pressure 129/71, pulse (!) 112, temperature 98.4 °F (36.9 °C), resp. rate 18, height 5' 11" (1.803 m), weight 127 kg (280 lb 3.3 oz), SpO2 92 %. ,Body mass index is 39.08 kg/m². Intake/Output Summary (Last 24 hours) at 9/11/2023 0703  Last data filed at 9/11/2023 0601  Gross per 24 hour   Intake --   Output 835 ml   Net -835 ml       Invasive Devices     Peripherally Inserted Central Catheter Line  Duration           PICC Line 70/29/19 Left Basilic 2 days          Peripheral Intravenous Line  Duration           Peripheral IV 08/08/23 Dorsal (posterior); Left Wrist 34 days          Drain  Duration           Abscess Drain RUQ 6 days Abscess Drain LUQ 4 days    Abscess Drain LUQ 4 days                Physical Exam:  General: NAD  Skin: Warm, dry, anicteric  HEENT: Normocephalic, atraumatic  CV: Tachycardic, no m/r/g  Pulm: CTA b/l, no inc WOB  Abd: soft, nontender, mild distension, protuberant abdomen. MSK: Symmetric, +3 b/l pitting edema, no tenderness, no deformity  Neuro: AOx3, GCS 15    Lab, Imaging and other studies:  I have personally reviewed pertinent lab results.   , CBC:   Lab Results   Component Value Date    WBC 10.41 (H) 09/11/2023    HGB 7.9 (L) 09/11/2023    HCT 26.0 (L) 09/11/2023    MCV 88 09/11/2023     09/11/2023    RBC 2.97 (L) 09/11/2023    MCH 26.6 (L) 09/11/2023    MCHC 30.4 (L) 09/11/2023    RDW 17.4 (H) 09/11/2023    MPV 9.8 09/11/2023   , CMP:   Lab Results   Component Value Date    SODIUM 136 09/11/2023    K 3.6 09/11/2023     09/11/2023    CO2 25 09/11/2023    BUN 27 (H) 09/11/2023    CREATININE 1.18 09/11/2023    CALCIUM 7.3 (L) 09/11/2023    EGFR 70 09/11/2023   , Coagulation: No results found for: "PT", "INR", "APTT"  VTE Pharmacologic Prophylaxis: Sequential compression device (Venodyne)  and Heparin  VTE Mechanical Prophylaxis: sequential compression device

## 2023-09-11 NOTE — PHYSICAL THERAPY NOTE
PHYSICAL THERAPY NOTE          Patient Name: Crystal Rowland  DMSRA'G Date: 9/11/2023 09/11/23 1203   PT Last Visit   PT Visit Date 09/11/23   Note Type   Note Type Treatment   Pain Assessment   Pain Assessment Tool 0-10   Pain Score No Pain   Restrictions/Precautions   Weight Bearing Precautions Per Order No   Other Precautions Multiple lines; Fall Risk  (drains, LE edema)   General   Chart Reviewed Yes   Response to Previous Treatment Patient with no complaints from previous session. Family/Caregiver Present Yes   Cognition   Overall Cognitive Status WFL   Arousal/Participation Alert; Cooperative   Attention Within functional limits   Orientation Level Oriented X4   Memory Within functional limits   Following Commands Follows all commands and directions without difficulty   Comments pt with flat affect, cooperative with increased time + encouragement    Subjective   Subjective enjoys spending time with [place] [employer]" and watching television   Bed Mobility   Supine to Sit Unable to assess   Sit to Supine Unable to assess   Additional Comments pt sitting OOB in recliner upon arrival. Pt left sitting OOB in recliner with all needs wihtin reach   Transfers   Sit to Stand 5  Supervision   Additional items Armrests; Increased time required;Verbal cues   Stand to Sit 5  Supervision   Additional items Armrests; Increased time required;Verbal cues   Additional Comments transfers with RW   Ambulation/Elevation   Gait pattern Excessively slow; Short stride; Foward flexed;Decreased foot clearance; Improper Weight shift   Gait Assistance 4  Minimal assist   Additional items Assist x 1; Tactile cues; Verbal cues   Assistive Device Rolling walker   Distance 40ft   Stair Management Assistance Not tested   Ambulation/Elevation Additional Comments CERON noted with ambulation on RA; desatted to 84%, resaturated to 90% with seated rest   Balance Static Sitting Fair +   Dynamic Sitting Fair   Static Standing Fair -   Dynamic Standing Poor +   Ambulatory Poor +   Endurance Deficit   Endurance Deficit Yes   Endurance Deficit Description generalized weakness, deconditioning, CERON, LE edema   Activity Tolerance   Activity Tolerance Patient limited by fatigue   Nurse Made Aware RN cleared pt to participate in therapy session   Exercises   Hip Adduction Sitting;Bilateral;10 reps;AROM  (w/ pillow)   Knee AROM Long Arc Quad Sitting;Bilateral;10 reps;AROM   Ankle Pumps Sitting;Bilateral;10 reps;AROM   Marching Sitting;Bilateral;10 reps;AAROM;AROM  (AAROM LLE, AROM RLE)   Assessment   Prognosis Good   Problem List Decreased strength;Decreased range of motion;Decreased endurance; Impaired balance;Decreased mobility;Obesity; Decreased skin integrity;Pain   Assessment Pt seen for PT treatment session this date. Therapy session focused on functional transfers, gait training, therex, and endurance training in order to improve overall mobility and independence. Pt requires assist of 1 for all mobility performed this date. Pt making progress toward goals as demonstrated by ambulating increased distances with min A. Pt continues to be limited by generalized weakness + deconditioning. Pt performed/ tolerated seated therex with AAROM required for LLE, specifically hip flexion. Pt provided with HEP handout. Pt requires ffrequent + extended seated rest breaks t/o session. Pt was left sitting OOB in recliner with LEs elevated at the end of PT session with all needs in reach. Pt would benefit from continued PT services while in hospital to address remaining limitations. PT to continue to follow pt and recommends home with HHPT. The patient's AM-PAC Basic Mobility Inpatient Short Form Raw Score is 17. A Raw score of greater than 16 suggests the patient may benefit from discharge to home. Please also refer to the recommendation of the Physical Therapist for safe discharge planning. Goals   Patient Goals to visit with his spouse   STG Expiration Date 09/21/23   PT Treatment Day 2   Plan   Treatment/Interventions Functional transfer training;LE strengthening/ROM; Therapeutic exercise; Endurance training;Patient/family training;Equipment eval/education; Bed mobility;Gait training;Spoke to nursing;Spoke to case management;OT   Progress Progressing toward goals   PT Frequency 3-5x/wk   Recommendation   PT Discharge Recommendation Home with home health rehabilitation   Equipment Recommended [address] walker   AM-PAC Basic Mobility Inpatient   Turning in Flat Bed Without Bedrails 3   Lying on Back to Sitting on Edge of Flat Bed Without Bedrails 3   Moving Bed to Chair 3   Standing Up From Chair Using Arms 3   Walk in Room 3   Climb 3-5 Stairs With Railing 2   Basic Mobility Inpatient Raw Score 17   Basic Mobility Standardized Score 39.67   Highest Level Of Mobility   JH-HLM Goal 5: Stand one or more mins   JH-HLM Achieved 7: Walk 25 feet or more   Education   Education Provided Mobility training;Assistive device   Patient Demonstrates acceptance/verbal understanding   End of Consult   Patient Position at End of Consult Bedside chair; All needs within reach     Doc Loya, PT, DPT

## 2023-09-11 NOTE — UTILIZATION REVIEW
Continued Stay Review    Date: 9/9/23                          Current Patient Class: inpatient  Current Level of Care: transfer from ICU to Level 2 stepdown unit today    HPI:52 y.o. male initially admitted on 9/4/23    • Infected, Necrotizing Gallstone Pancreatitis   ? c/b   - Polymicrobial Bacteremia  - Septic Shock, resolved  - SMV Thrombus  - Partial Gastric Outlet Obstruction  - Partial Large Bowel Obstruction Resolved  - Large Volume Ascites   - S/p paracentesis 9/7  ? S/p IR Pancreatic Bed Drain Placement 8/5  - Upsized to 16Fr 9/4  ? S/p 12Fr IR Drain Placement into Left Lateral Pancreatic Collection 9/6  ? S/p 28Fr Pancreatic Bed Drain Placement 9/6  ? S/p Bedside Pancreatic Tube Drain Irrigation, endoscopy, and Necrosectomy 9/8    Assessment/Plan:  Bedside drain interrogation/irrigation with drainage of thick brown output. Levophed off since 0830 yesterday AM. Tolerating low fat diet. Abdominal distention noted. Continue pain control, wean O2 as able, encourage inc spirometry, monitor abd exam and drain output, flush bid. Consider bowel regimen, strict IO, resume home lasix and monitor electrolytes and replete prn, continue accuchecks w/ SSI, heparin drip for splenic vein thrombosis, monitor hgb, continue SCDs. Continue IV cefepime, flagyl, fu on cxs and ID recommendations. PT OT.      Vital Signs:   Date/Time Temp Pulse Resp BP MAP (mmHg) SpO2 Calculated FIO2 (%) - Nasal Cannula Nasal Cannula O2 Flow Rate (L/min) O2 Device Patient Position - Orthostatic VS   09/11/23 11:24:55 100.1 °F (37.8 °C) 110 Abnormal  16 98/75 83 96 % -- -- -- --   09/11/23 0851 -- -- -- -- -- -- -- -- None (Room air) --   09/11/23 07:56:37 99.1 °F (37.3 °C) 111 Abnormal  17 120/75 90 93 % -- -- -- --   09/11/23 0300 -- -- -- -- -- -- -- -- None (Room air) --   09/11/23 02:43:14 98.4 °F (36.9 °C) 112 Abnormal  -- 129/71 90 92 % -- -- -- --   09/10/23 23:49:48 98.5 °F (36.9 °C) 112 Abnormal  18 125/79 94 93 % -- -- -- Lying 09/10/23 2100 -- -- -- -- -- -- -- -- None (Room air) --   09/10/23 19:30:31 99.5 °F (37.5 °C) 105 20 130/80 97 93 % -- -- -- --   09/10/23 15:05:57 98.2 °F (36.8 °C) 98 18 117/66 83 94 % -- -- -- --   09/10/23 1500 -- -- -- -- -- -- -- -- None (Room air) --   09/10/23 11:04:03 98.6 °F (37 °C) 101 18 107/63 78 93 % -- -- -- --   09/10/23 0850 -- -- -- -- -- -- -- -- None (Room air) --   09/10/23 07:28:31 98.5 °F (36.9 °C) 104 18 121/68 86 97 % -- -- -- --   09/10/23 02:53:59 98.2 °F (36.8 °C) 104 18 107/61 76 94 % -- -- -- --   09/09/23 22:16:35 98.2 °F (36.8 °C) 101 20 123/66 85 97 % -- -- -- --   09/09/23 20:49:22 98.7 °F (37.1 °C) 107 Abnormal  -- 114/60 78 95 % -- -- -- --   09/09/23 2036 -- -- -- -- -- 97 % 28 2 L/min -- --   09/09/23 15:47:04 98.1 °F (36.7 °C) 97 18 114/70 85 97 % -- -- -- --   09/09/23 1300 -- 96 21 -- -- 100 % -- -- -- --   09/09/23 1200 98.3 °F (36.8 °C) 102 19 -- -- 99 % 28 2 L/min Nasal cannula --   09/09/23 1100 -- 96 20 -- -- 92 % -- -- -- --   09/09/23 1000 -- 96 16 94/60 70 98 % -- -- -- --   09/09/23 0900 -- 100 14 -- -- 98 % -- -- -- --   09/09/23 0824 98.5 °F (36.9 °C) -- -- -- -- -- -- -- -- --   09/09/23 0800 -- 110 Abnormal  21 -- -- 98 % -- -- -- --   09/09/23 0700 -- 98 16 -- -- 98 % -- -- -- --   09/09/23 0600 -- 102 17 106/62 71 98 % -- -- -- --   09/09/23 0500 -- 104 19 -- -- 98 % -- -- -- --   09/09/23 0400 98.6 °F (37 °C) 98 17 -- -- 98 % 28 2 L/min Nasal cannula Lying   09/09/23 0300 -- 102 16 115/60 78 98 % -- -- -- --   09/09/23 0200 -- 96 18 109/56 78 99 % -- -- -- --   09/09/23 0100 -- 96 17 98/61 67 -- -- -- -- --   09/09/23 0000 98.4 °F (36.9 °C) 98 18 106/60 70 98 % 28 2 L/min Nasal cannula Lying     Pertinent Labs/Diagnostic Results:       Results from last 7 days   Lab Units 09/11/23  0554 09/10/23  0612 09/09/23  0454 09/08/23  0512 09/07/23  0506 09/04/23  2021 09/04/23  1444   WBC Thousand/uL 10.41* 10.21* 8.26 9.50 9.82   < > 12.39*   HEMOGLOBIN g/dL 7.9* 7. 5* 7.5* 7.7* 7.5*   < > 7.9*   HEMATOCRIT % 26.0* 25.0* 25.2* 25.2* 25.3*   < > 25.1*   PLATELETS Thousands/uL 256 260 259 278 348   < > 493*   BANDS PCT %  --   --   --  10*  --   --  14*    < > = values in this interval not displayed. Results from last 7 days   Lab Units 09/11/23  0554 09/10/23  0612 09/09/23  1226 09/09/23  0454 09/08/23  0703 09/06/23  2143 09/06/23  0450 09/05/23  0229 09/05/23  0228 09/04/23  2021   SODIUM mmol/L 136 136 137 138 140   < > 137 135  --  132*   POTASSIUM mmol/L 3.6 3.9 3.6 3.4* 3.4*   < > 3.7 4.4  --  4.4   CHLORIDE mmol/L 104 105 104 104 103   < > 98 96  --  95*   CO2 mmol/L 25 27 29 27 29   < > 28 24  --  28   ANION GAP mmol/L 7 4 4 7 8   < > 11 15  --  9   BUN mg/dL 27* 31* 34* 36* 43*   < > 79* 91*  --  97*   CREATININE mg/dL 1.18 1.17 1.18 1.22 1.13   < > 1.62* 2.15*  --  2.09*   EGFR ml/min/1.73sq m 70 71 70 67 74   < > 48 34  --  35   CALCIUM mg/dL 7.3* 7.3* 7.5* 7.4* 7.5*   < > 7.8* 7.6*  --  7.1*   CALCIUM, IONIZED mmol/L  --   --  1.12  --   --   --   --   --  0.99* 0.96*   MAGNESIUM mg/dL  --   --  2.5  --  2.1  --  2.3 2.3  --  2.3   PHOSPHORUS mg/dL  --   --  3.2  --  3.6  --  4.7* 5.3*  --  5.2*    < > = values in this interval not displayed.      Results from last 7 days   Lab Units 09/07/23  0506 09/06/23  0450 09/05/23  0229 09/04/23  2021 09/04/23  1502 09/04/23  1444   AST U/L 11* 13 22 26  --  40*   ALT U/L 9 10 16 18  --  25   ALK PHOS U/L 42 43 45 52  --  153*   TOTAL PROTEIN g/dL 5.6* 5.5* 5.5* 5.4*  --  6.4   ALBUMIN g/dL 2.5* 2.4* 2.4* 2.2*  --  2.5*   TOTAL BILIRUBIN mg/dL 0.58 0.54 0.88 0.61  --  0.73   AMMONIA umol/L  --   --   --   --  44  --      Results from last 7 days   Lab Units 09/11/23  1148 09/11/23  0805 09/10/23  2358 09/10/23  2129 09/10/23  1630 09/10/23  1103 09/10/23  0727 09/09/23  2105 09/09/23  1637 09/09/23  1157 09/09/23  0753 09/08/23  2055   POC GLUCOSE mg/dl 144* 129 108 85 142* 180* 180* 148* 161* 118 139 121 Results from last 7 days   Lab Units 09/11/23  0554 09/10/23  0612 09/09/23  1226 09/09/23  0454 09/08/23  0512 09/07/23  0506 09/06/23  2143 09/06/23  0450 09/05/23  0229 09/04/23 2021 09/04/23  1444   GLUCOSE RANDOM mg/dL 133 174* 124 138 161* 128 151* 192* 112 100 114      Results from last 7 days   Lab Units 09/05/23  0102   PH ART  7.462*   PCO2 ART mm Hg 32.6*   PO2 ART mm Hg 65.2*   HCO3 ART mmol/L 22.8   BASE EXC ART mmol/L -0.8   O2 CONTENT ART mL/dL 10.2*   O2 HGB, ARTERIAL % 91.3*   ABG SOURCE  Line, Arterial     Results from last 7 days   Lab Units 09/05/23  0011   PH WILLIAM  7.420*   PCO2 WILLIAM mm Hg 40.4*   PO2 WILLIAM mm Hg 36.3   HCO3 WILLIAM mmol/L 25.6   BASE EXC WILLIAM mmol/L 1.1   O2 CONTENT WILLIAM ml/dL 7.3   O2 HGB, VENOUS % 66.4     Results from last 7 days   Lab Units 09/04/23  1702 09/04/23  1444   HS TNI 0HR ng/L  --  11   HS TNI 2HR ng/L 6  --    HSTNI D2 ng/L -5  --          Results from last 7 days   Lab Units 09/11/23  0554 09/10/23  2358 09/10/23  1503 09/05/23  2319 09/04/23  1502   PROTIME seconds  --   --   --   --  22.9*   INR   --   --   --   --  2.00*   PTT seconds 77* 95* 79*   < > 37    < > = values in this interval not displayed.          Results from last 7 days   Lab Units 09/04/23  1502   PROCALCITONIN ng/ml 2.58*     Results from last 7 days   Lab Units 09/06/23  2143 09/05/23  0212 09/04/23 2021 09/04/23  1502   LACTIC ACID mmol/L 1.3 1.3 1.7 2.4*     Results from last 7 days   Lab Units 09/08/23  0721 09/07/23  0555   UNIT PRODUCT CODE  [id number]  [id number]   UNIT NUMBER  [id number]  [id number]   UNITABO  B  B B   UNITRH  POS  POS POS   CROSSMATCH  Compatible  Compatible Compatible   UNIT DISPENSE STATUS  Return to Inv  Return to Inv Presumed Trans   UNIT PRODUCT VOL ml 350  350 300     Results from last 7 days   Lab Units 09/04/23  1444   LIPASE u/L 28     Results from last 7 days   Lab Units 09/04/23  2118   CLARITY UA  Clear   COLOR UA Yellow   SPEC GRAV UA  1.024   PH UA  5.0   GLUCOSE UA mg/dl Negative   KETONES UA mg/dl Negative   BLOOD UA  Negative   PROTEIN UA mg/dl Trace*   NITRITE UA  Negative   BILIRUBIN UA  Negative   UROBILINOGEN UA (BE) mg/dl <2.0   LEUKOCYTES UA  Negative   WBC UA /hpf 2-4*   RBC UA /hpf 1-2   BACTERIA UA /hpf Occasional   EPITHELIAL CELLS WET PREP /hpf Occasional   MUCUS THREADS  Occasional*     Results from last 7 days   Lab Units 09/09/23  0819 09/06/23 2127 09/06/23  1512 09/06/23  0937 09/04/23  1928 09/04/23  1502   BLOOD CULTURE   --   --   --  No Growth After 5 Days. No Growth After 5 Days. --  No Growth After 5 Days.   Klebsiella aerogenes*  Streptococcus mitis oralis group*  Staphylococcus epidermidis*  Staphylococcus hominis*   GRAM STAIN RESULT  No Polys or Bacteria seen No Polys or Bacteria seen 1+ Polys  No bacteria seen  --  1+ Polys*  3+ Gram positive cocci in pairs*  3+ Gram variable rods*  No Polys or Bacteria seen Gram negative rods*  Gram positive cocci in chains*   BODY FLUID CULTURE, STERILE   --  4+ Growth of Escherichia coli*  4+ Growth of Citrobacter freundii*  4+ Growth of Klebsiella aerogenes*  1+ Growth of Aeromonas hydrophila* No growth  --  4+ Growth of Citrobacter freundii*  4+ Growth of Escherichia coli*  3+ Growth of Klebsiella aerogenes*  1+ Growth of Aeromonas hydrophila*  No growth  --      Results from last 7 days   Lab Units 09/06/23  1512 09/04/23 1928   TOTAL COUNTED  100 100   WBC FLUID /ul 3,404 1,939     Medications:   Scheduled Medications:  cefTRIAXone, 2,000 mg, Intravenous, Q24H  chlorhexidine, 15 mL, Mouth/Throat, Q12H ALEX  furosemide, 20 mg, Oral, Daily  insulin lispro, 2-12 Units, Subcutaneous, TID AC  insulin lispro, 2-12 Units, Subcutaneous, HS  insulin lispro, 5 Units, Subcutaneous, TID With Meals  metroNIDAZOLE, 500 mg, Intravenous, Q8H  polyethylene glycol, 17 g, Oral, BID      Continuous IV Infusions:  heparin (porcine), 3-26 Units/kg/hr (Order-Specific), Intravenous, Titrated      PRN Meds:  acetaminophen, 650 mg, Oral, Q6H PRN  bisacodyl, 10 mg, Rectal, Daily PRN  HYDROmorphone, 0.5 mg, Intravenous, Q4H PRN  mineral oil, 1 enema, Rectal, Daily PRN  oxyCODONE, 5 mg, Oral, Q6H PRN   Or  oxyCODONE, 10 mg, Oral, Q6H PRN        Discharge Plan: [facility]    Network Utilization Review Department  ATTENTION: Please call with any questions or concerns to [phone] and carefully listen to the prompts so that you are directed to the right person. All voicemails are confidential.  Cherri Bianca all requests for admission clinical reviews, approved or denied determinations and any other requests to dedicated fax number below belonging to the campus where the patient is receiving treatment.  List of dedicated fax numbers for the Facilities:  [place] DENIALS (Administrative/Medical Necessity) [phone] [address] (Maternity/NICU/Pediatrics) [phone]   [address] [place] [address] [phone]   [facility] [address] [facility] [phone]   [address] [facility] [address] [facility] [address] [address] [facility] [phone]   [address] [address] [phone]

## 2023-09-11 NOTE — PLAN OF CARE
Problem: PAIN - ADULT  Goal: Verbalizes/displays adequate comfort level or baseline comfort level  Description: Interventions:  - Encourage patient to monitor pain and request assistance  - Assess pain using appropriate pain scale  - Administer analgesics based on type and severity of pain and evaluate response  - Implement non-pharmacological measures as appropriate and evaluate response  - Consider cultural and social influences on pain and pain management  - Notify physician/advanced practitioner if interventions unsuccessful or patient reports new pain  Outcome: Progressing     Problem: INFECTION - ADULT  Goal: Absence or prevention of progression during hospitalization  Description: INTERVENTIONS:  - Assess and monitor for signs and symptoms of infection  - Monitor lab/diagnostic results  - Monitor all insertion sites, i.e. indwelling lines, tubes, and drains  - Monitor endotracheal if appropriate and nasal secretions for changes in amount and color  - [place] appropriate cooling/warming therapies per order  - Administer medications as ordered  - Instruct and encourage patient and family to use good hand hygiene technique  - Identify and instruct in appropriate isolation precautions for identified infection/condition  Outcome: Progressing  Goal: Absence of fever/infection during neutropenic period  Description: INTERVENTIONS:  - Monitor WBC    Outcome: Progressing     Problem: SAFETY ADULT  Goal: Patient will remain free of falls  Description: INTERVENTIONS:  - Educate patient/family on patient safety including physical limitations  - Instruct patient to call for assistance with activity   - Consult OT/PT to assist with strengthening/mobility   - Keep Call bell within reach  - Keep bed low and locked with side rails adjusted as appropriate  - Keep care items and personal belongings within reach  - Initiate and maintain comfort rounds  - Make Fall Risk Sign visible to staff  - Offer Toileting every 2 Hours, in advance of need  - Initiate/Maintain alarm  - Obtain necessary fall risk management equipment  - Apply yellow socks and bracelet for high fall risk patients  - Consider moving patient to room near nurses station  Outcome: Progressing  Goal: Maintain or return to baseline ADL function  Description: INTERVENTIONS:  -  Assess patient's ability to carry out ADLs; assess patient's baseline for ADL function and identify physical deficits which impact ability to perform ADLs (bathing, care of mouth/teeth, toileting, grooming, dressing, etc.)  - Assess/evaluate cause of self-care deficits   - Assess range of motion  - Assess patient's mobility; develop plan if impaired  - Assess patient's need for assistive devices and provide as appropriate  - Encourage maximum independence but intervene and supervise when necessary  - Involve family in performance of ADLs  - Assess for home care needs following discharge   - Consider OT consult to assist with ADL evaluation and planning for discharge  - Provide patient education as appropriate  Outcome: Progressing  Goal: Maintains/Returns to pre admission functional level  Description: INTERVENTIONS:  - Perform BMAT or MOVE assessment daily.   - Set and communicate daily mobility goal to care team and patient/family/caregiver. - Collaborate with rehabilitation services on mobility goals if consulted  - Perform Range of Motion 2 times a day. - Reposition patient every 2 hours.   - Dangle patient 2 times a day  - Stand patient 2 times a day  - Ambulate patient 2 times a day  - Out of bed to chair 2 times a day   - Out of bed for meals 2 times a day  - Out of bed for toileting  - Record patient progress and toleration of activity level   Outcome: Progressing     Problem: DISCHARGE PLANNING  Goal: Discharge to home or other facility with appropriate resources  Description: INTERVENTIONS:  - Identify barriers to discharge w/patient and caregiver  - Arrange for needed discharge resources and transportation as appropriate  - Identify discharge learning needs (meds, wound care, etc.)  - Arrange for interpretive services to assist at discharge as needed  - Refer to Case Management Department for coordinating discharge planning if the patient needs post-hospital services based on physician/advanced practitioner order or complex needs related to functional status, cognitive ability, or social support system  Outcome: Progressing     Problem: Knowledge Deficit  Goal: Patient/family/caregiver demonstrates understanding of disease process, treatment plan, medications, and discharge instructions  Description: Complete learning assessment and assess knowledge base. Interventions:  - Provide teaching at level of understanding  - Provide teaching via preferred learning methods  Outcome: Progressing     Problem: MOBILITY - ADULT  Goal: Maintain or return to baseline ADL function  Description: INTERVENTIONS:  -  Assess patient's ability to carry out ADLs; assess patient's baseline for ADL function and identify physical deficits which impact ability to perform ADLs (bathing, care of mouth/teeth, toileting, grooming, dressing, etc.)  - Assess/evaluate cause of self-care deficits   - Assess range of motion  - Assess patient's mobility; develop plan if impaired  - Assess patient's need for assistive devices and provide as appropriate  - Encourage maximum independence but intervene and supervise when necessary  - Involve family in performance of ADLs  - Assess for home care needs following discharge   - Consider OT consult to assist with ADL evaluation and planning for discharge  - Provide patient education as appropriate  Outcome: Progressing  Goal: Maintains/Returns to pre admission functional level  Description: INTERVENTIONS:  - Perform BMAT or MOVE assessment daily.   - Set and communicate daily mobility goal to care team and patient/family/caregiver.    - Collaborate with rehabilitation services on mobility goals if consulted  - Perform Range of Motion 2 times a day. - Reposition patient every 2 hours. - Dangle patient 2 times a day  - Stand patient 2 times a day  - Ambulate patient 2 times a day  - Out of bed to chair 2 times a day   - Out of bed for meals 2 times a day  - Out of bed for toileting  - Record patient progress and toleration of activity level   Outcome: Progressing     Problem: Prexisting or High Potential for Compromised Skin Integrity  Goal: Skin integrity is maintained or improved  Description: INTERVENTIONS:  - Identify patients at risk for skin breakdown  - Assess and monitor skin integrity  - Assess and monitor nutrition and hydration status  - Monitor labs   - Assess for incontinence   - Turn and reposition patient  - Assist with mobility/ambulation  - Relieve pressure over bony prominences  - Avoid friction and shearing  - Provide appropriate hygiene as needed including keeping skin clean and dry  - Evaluate need for skin moisturizer/barrier cream  - Collaborate with interdisciplinary team   - Patient/family teaching  - Consider wound care consult   Outcome: Progressing     Problem: Nutrition/Hydration-ADULT  Goal: Nutrient/Hydration intake appropriate for improving, restoring or maintaining nutritional needs  Description: Monitor and assess patient's nutrition/hydration status for malnutrition. Collaborate with interdisciplinary team and initiate plan and interventions as ordered. Monitor patient's weight and dietary intake as ordered or per policy. Utilize nutrition screening tool and intervene as necessary. Determine patient's food preferences and provide high-protein, high-caloric foods as appropriate.      INTERVENTIONS:  - Monitor oral intake, urinary output, labs, and treatment plans  - Assess nutrition and hydration status and recommend course of action  - Evaluate amount of meals eaten  - Assist patient with eating if necessary   - Allow adequate time for meals  - Recommend/ encourage appropriate diets, oral nutritional supplements, and vitamin/mineral supplements  - Order, calculate, and assess calorie counts as needed  - Recommend, monitor, and adjust tube feedings and TPN/PPN based on assessed needs  - Assess need for intravenous fluids  - Provide specific nutrition/hydration education as appropriate  - Include patient/family/caregiver in decisions related to nutrition  Outcome: Progressing

## 2023-09-11 NOTE — PROGRESS NOTES
[facility] - Inpatient Progress Note    PATIENT INFORMATION      Dionna Franco 46 y.o. male MRN: [id number]  Unit/Bed#: [facility] 901-01 Encounter: [id number]    ASSESSMENT & PLAN   59-year-old male with past medical history of recent admission for gallstone necrotizing pancreatitis complicated by fluid collection status post IR drain placement, obesity who was admitted for sepsis.  On 9/6 underwent repeat IR drain placement with 2 additional drains placed into necrotic fluid collection. GI consulted for evaluation of both necrotizing pancreatitis and colonic dilation.     1. 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 Bacteremia  3. Septic shock  4. SMV thrombus  5. Ascites  Patient initially diagnosed with necrotizing pancreatitis 7/2023. Initial Blood cultures growing Klebsiella and Strep with initial Fluid culture growing Bacteroides fragilis and Citrobacter freundii. CT shows worsening fluid collection. Suspect infected necrosis as source of sepsis given enlarging collection and gram-negative bacteremia.  Mass effect from phlegmonous mass likely contributing to gastric/colonic distention. S/p IR drain placement on 9/6, now with 3 drains in place. Repeat fluid cultures growing EColi. Repeat blood cultures negative at 24 hours. • Patient s/p IR drain placement with plan for VARDS in future - discussed with advanced endoscopist Dr. Tamir Muse - plan for cystgastro placement on 9/14 for further management of necrotic collection  • Appreciate ID consult - continue cefepime/flagyl  • Recommend low fat diet  • Continue bowel regimen - continue to monitor stool output  • Management of IR drains per surgical team  • Monitor hemodynamics  • Monitor CBC     6. Dilated colon  Likely secondary to mass effect from extensive inflammatory changes noted around the pancreas.  Fortunately patient displays benign exam and having bowel movements making obstruction unlikely. Repeat CT scan completed this morning with improved colonic dilation. Patient continues to have bowel movements and pass flatus, with softer abdomen on exam today. • No indication for decompressive colonoscopy at this time  • Recommend aggressive bowel regimen with MiraLAX twice daily  • Dulcolax rectal suppositories as needed  • Mineral oil enemas as needed.     7. Anemia  Most recent baseline Hgb 7-8 throughout hospitalization in setting of necrotic pancreatitis  • Hgb this AM 7.9 - stable, continue monitoring  • No overt S/S of GIB  • Possibly component of necrotizing pancreatitis  • Continue to monitor and transfuse for <7    Thank you for this consult and the opportunity to participate in [facility]. At this time, patient currently being managed for necrotizing pancreatitis with plan for VARDS in future, and therefore no need for cystgastro. Currently, colonic dilation has improved with regular bowel movements and passing flatus. GI will follow from periphery at this time. Please feel free to reach out to on-call GI provider with any additional questions or concerns. SUBJECTIVE     Patient seen and evaluated at bedside. Patient with increased abdominal distension, however, continues to have bowel movements and passing flatus. Tolerating PO diet.  No abdominal pain    MEDICATIONS & ALLERGIES       Medications:   Medications Prior to Admission   Medication   • acetaminophen (TYLENOL) 325 mg tablet   • Alcohol Swabs 70 % PADS   • apixaban (Eliquis) 5 mg   • BD PosiFlush 0.9 % SOLN   • Blood Glucose Monitoring Suppl (OneTouch Verio Reflect) w/Device KIT   • Blood Pressure Monitoring (Adult Blood Pressure Cuff Lg) KIT   • furosemide (LASIX) 20 mg tablet   • glucose blood (OneTouch Verio) test strip   • Insulin Glargine Solostar (Lantus SoloStar) 100 UNIT/ML SOPN   • insulin lispro (HumaLOG KwikPen) 100 units/mL injection pen   • Insulin Pen Needle (BD Pen Needle Lupe 2nd Gen) 32G X 4 MM MISC   • Insulin Pen Needle (BD Pen Needle Lupe 2nd Gen) 32G X 4 MM MISC   • Levemir FlexPen 100 units/mL injection pen   • lisinopril (ZESTRIL) 10 mg tablet   • NovoLOG FlexPen 100 units/mL injection pen   • OneTouch Delica Lancets 01W MISC   • pancrelipase, Lip-Prot-Amyl, (CREON) 6,000 units delayed release capsule   • sodium chloride, PF, 0.9 %     Current Facility-Administered Medications   Medication Dose Route Frequency   • acetaminophen (TYLENOL) tablet 650 mg  650 mg Oral Q6H PRN   • bisacodyl (DULCOLAX) rectal suppository 10 mg  10 mg Rectal Daily PRN   • cefepime (MAXIPIME) 2 g/50 mL dextrose IVPB  2,000 mg Intravenous Q12H   • chlorhexidine (PERIDEX) 0.12 % oral rinse 15 mL  15 mL Mouth/Throat Q12H ALEX   • furosemide (LASIX) tablet 20 mg  20 mg Oral Daily   • heparin (porcine) 25,000 units in 0.45% NaCl 250 mL infusion (premix)  3-26 Units/kg/hr (Order-Specific) Intravenous Titrated   • HYDROmorphone (DILAUDID) injection 0.5 mg  0.5 mg Intravenous Q4H PRN   • insulin lispro (HumaLOG) 100 units/mL subcutaneous injection 2-12 Units  2-12 Units Subcutaneous TID AC   • insulin lispro (HumaLOG) 100 units/mL subcutaneous injection 2-12 Units  2-12 Units Subcutaneous HS   • insulin lispro (HumaLOG) 100 units/mL subcutaneous injection 5 Units  5 Units Subcutaneous TID With Meals   • metroNIDAZOLE (FLAGYL) IVPB (premix) 500 mg 100 mL  500 mg Intravenous Q8H   • mineral oil enema 1 enema  1 enema Rectal Daily PRN   • oxyCODONE (ROXICODONE) IR tablet 5 mg  5 mg Oral Q6H PRN    Or   • oxyCODONE (ROXICODONE) immediate release tablet 10 mg  10 mg Oral Q6H PRN   • polyethylene glycol (MIRALAX) packet 17 g  17 g Oral BID       Allergies:   No Known Allergies    PHYSICAL EXAM     Objective   Blood pressure 129/71, pulse (!) 112, temperature 98.4 °F (36.9 °C), resp. rate 18, height 5' 11" (1.803 m), weight 127 kg (280 lb 3.3 oz), SpO2 92 %. Body mass index is 39.08 kg/m².     Intake/Output Summary (Last 24 hours) at 9/11/2023 3642  Last data filed at 9/11/2023 0601  Gross per 24 hour   Intake --   Output 835 ml   Net -835 ml       General Appearance:   Alert, cooperative, no distress   HEENT:   Normocephalic, atraumatic, anicteric     Neck:   Supple, symmetrical, trachea midline   Lungs:   Equal chest rise, respirations unlabored    Heart:   Regular rate and rhythm   Abdomen:   Soft, non-tender, distended; no masses, no organomegaly    Rectal:   Deferred    Extremities:   No cyanosis, clubbing or edema    Neuro: Moves all 4 extremities    Skin:   No jaundice, rashes, or lesions      ADDITIONAL DATA     Lab Results:     Results from last 7 days   Lab Units 09/11/23  0554 09/09/23  0454 09/08/23  0512   WBC Thousand/uL 10.41*   < > 9.50   HEMOGLOBIN g/dL 7.9*   < > 7.7*   HEMATOCRIT % 26.0*   < > 25.2*   PLATELETS Thousands/uL 256   < > 278   LYMPHO PCT %  --   --  3*   MONO PCT %  --   --  5   EOS PCT %  --   --  0    < > = values in this interval not displayed. Results from last 7 days   Lab Units 09/11/23  0554 09/08/23  0512 09/07/23  0506   POTASSIUM mmol/L 3.6   < > 3.6   CHLORIDE mmol/L 104   < > 104   CO2 mmol/L 25   < > 30   BUN mg/dL 27*   < > 57*   CREATININE mg/dL 1.18   < > 1.40*   CALCIUM mg/dL 7.3*   < > 7.7*   ALK PHOS U/L  --   --  42   ALT U/L  --   --  9   AST U/L  --   --  11*    < > = values in this interval not displayed. Results from last 7 days   Lab Units 09/04/23  1502   INR  2.00*       Imaging:    IR INPATIENT Paracentesis    Result Date: 9/8/2023  Narrative: PROCEDURE: Therapeutic and diagnostic paracentesis MEDICATIONS: 1% lidocaine subcutaneous. INDICATION: Symptomatic ascites. Necrotizing pancreatitis. PROCEDURE: Using ultrasound guidance, the right lower quadrant was punctured and a 5f Long Taileh catheter was placed into the abdominal cavity until ascites was aspirated. A total of 2900 milliliters of serous fluid was removed. The catheter was then removed. FINDINGS: 2900 mL of serous ascites was removed. Impression: Therapeutic and diagnostic paracentesis. Signed, performed, and dictated by GHASSAN Correa under the supervision of Dr. James Choudhury. Workstation performed: [id number]     CT abdomen pelvis wo contrast    Result Date: 9/8/2023  Narrative: CT ABDOMEN AND PELVIS WITHOUT IV CONTRAST INDICATION:   Persistent colonic distention. COMPARISON: Multiple prior studies, most recent is dated September 4, 2023. TECHNIQUE:  CT examination of the abdomen and pelvis was performed without intravenous contrast.  Axial, sagittal, and coronal 2D reformatted images were created from the source data and submitted for interpretation. Radiation dose length product (DLP) for this visit:  4512.84 mGy-cm . This examination, like all CT scans performed in the [facility], was performed utilizing techniques to minimize radiation dose exposure, including the use of iterative reconstruction and automated exposure control. Enteric contrast was not administered. FINDINGS: ABDOMEN LOWER CHEST: Bilateral pleural effusions and underlying compressive atelectasis. LIVER/BILIARY TREE:  Unremarkable. GALLBLADDER:  No calcified gallstones. No pericholecystic inflammatory change. SPLEEN:  Unremarkable. PANCREAS: Again shown are findings of pancreatic necrosis with nearly the entire pancreas replaced by necrotic debris and gas. 2. Catheters draining the pancreatic debris, including an epigastric pigtail catheterq, and a large bore surgical catheter placed in the region of the tail the pancreas. Additional pigtail catheter has been placed in a left paracolic collection. The left  paracolic gutter collection is incompletely imaged but has decreased in size. ADRENAL GLANDS:  Unremarkable. KIDNEYS/URETERS: BOWEL: The cecum and transverse colon are distended with gas. The remainder of the colon is normal in caliber. When compared to the prior study though there has been improvement in the distention of the colon.  The degree of cecal distention is similar with the cecum measuring approximately 9 cm. There is no cecal pneumatosis. Small bowel is normal in caliber. Fluid contents in the distal colon and rectum compatible with a diarrheal state. APPENDIX:  No findings to suggest appendicitis. ABDOMINOPELVIC CAVITY: Moderate volume ascites in the peritoneal cavity, greatest in the pelvis not significantly changed. VESSELS:  Unremarkable for patient's age. PELVIS REPRODUCTIVE ORGANS:  Unremarkable for patient's age. URINARY BLADDER: Collapsed with a Saunders catheter in place. ABDOMINAL WALL/INGUINAL REGIONS: Central venous access catheter in the left femoral vein. Moderate body wall edema. OSSEOUS STRUCTURES: Bilateral hip arthroplasties. Degenerative changes throughout the spine. Impression: Findings compatible with infected pancreatic necrosis with multiple drains in place. Appearance overall similar. Interval placement of a left paracolic drain with slight decrease in the size of a fluid collection. Gaseous distention of the transverse colon and cecum which overall has improved since the prior study. The cecum measures approximately 9 cm. No cecal pneumatosis. Moderate volume ascites is unchanged. Persistent nephrograms bilaterally compatible with acute kidney injury. Workstation performed: [id number]     XR chest portable    Result Date: 9/7/2023  Narrative: CHEST INDICATION:   SOB. COMPARISON: Radiograph chest 9/5/2023 and CT abdomen pelvis 9/4/2023. EXAM PERFORMED/VIEWS:  XR CHEST PORTABLE FINDINGS:  Monitoring leads and clips project over the chest. Previous nasogastric tube removed. Cardiomediastinal silhouette appears unremarkable. Unchanged right medial lung base opacity. Small left and trace right pleural effusions. No pneumothorax. Thoracolumbar bridging osteophytes. Impression: Unchanged right medial lung base opacity. Small left and trace right pleural effusions. No pneumothorax.  Resident: Alexandru Sprague, the attending radiologist, have reviewed the images and agree with the final report above. Workstation performed: [id number]     XR abdomen 1 view kub    Result Date: 9/7/2023  Narrative: ABDOMEN INDICATION:   Re-evaluate colonic distention after bowel movements. COMPARISON: KUB 9/5/2023 and CT abdomen pelvis 9/4/2023. VIEWS:  AP supine FINDINGS: Surgical drain in the epigastric region. Saunders catheter overlies pelvic region. Left femoral central venous catheter in unchanged position. Slightly worse distention of the transverse colon. Unchanged gaseous distention of the ascending colon. There may however be a small amount of air present within the distal descending colon adjacent to the left iliac crest No discernible free air under the right diaphragm. Left diaphragm is poorly visualized. Upright or left lateral decubitus imaging is more sensitive to detect subtle free air in the appropriate setting. No pathologic calcifications or soft tissue masses. Visualized right lung base is unremarkable. Left lung base is not visualized. Bilateral total hip arthroplasties. Impression: Slightly worse distention of the transverse colon. However, small amount of air may now be present in the distal descending colon adjacent to the left iliac crest. Unchanged gaseous distention of the ascending colon. Resident: Silvia Obrien, the attending radiologist, have reviewed the images and agree with the final report above. Workstation performed: [id number]     IR drainage tube placement    Result Date: 9/7/2023  Narrative: Abscess drainage catheter placement x2 Limited ultrasound left chest. Limited ultrasound right chest History: Necrotizing pancreatitis. Anterior drainage catheter placed 8/5/2023, upsize to 16 [place] on 9/4/2023. Patient be admitted with septic shock and continues with respiratory and hemodynamic failure requiring vasopressor support. He is a poor surgical candidate at this time.  After multidisciplinary discussion we have decided to move forward with large bore drainage catheter placement to attempt to manage the collection. He underwent thoracentesis on the fourth and paracentesis earlier today. Risks benefits and alternatives were reviewed including risks of bleeding and damage to adjacent organs. Anesthesia: General Procedure: After explaining the risks and benefits of the procedure to the patient, informed consent was obtained. Anesthesia was initiated and the patient was placed somewhat decubitus CT was used to localize the collections. Radiation dose length product (DLP) for this visit:  6346.4 mGy . This examination, like all CT scans performed in the [facility], was performed utilizing techniques to minimize radiation dose exposure, including the use of iterative reconstruction and automated exposure control. Procedure: In the holding area limited ultrasound of the left and right chest was performed to assess for size of the effusions. This was for planning for possible thoracentesis. The patient was identified verbally and by wristband. Timeout was performed. Based on  imaging I elected to place 2 drains as the left paracolic gutter collection did not appear to freely communicate with the main pancreatic region collection The patient was prepped and draped in the usual sterile fashion. Using intermittent CT guidance access was gained to the patient's left paracolic collection using an 18 gauge needle. Amplatz wire was placed, the tract was dilated and a 12 [place] all-purpose drain formed in the cavity. Purulent necrotic material was aspirated. Simultaneously using CT guidance access was gained to the patient's retroperitoneal pancreatic region collection using an 18 gauge needle. I attempted to use an oblique angle in order to provide a good runway for future interventions. The first needle pass was close to the inferior margin of the spleen.  Although I believe we did not traverse the spleen I felt this would not be the ideal angle for future necrosectomy. Therefore Gelfoam slurry was prepared and injected while the needle was removed A new needle route was chosen with the Cisneros needle in the collection accessed. A Lunderquist wire was placed, initially it coiled in the lateral margin of the collection and I was able to redirected more centrally. The tract was serially dilated with 12, 18 dilators and then a 24 Eritrean peel-away sheath utilized as a dilator followed finally by a 30 [place] dilator. A 28 Eritrean Thal-Quick drain was then inserted without difficulty. The central loop of the Fal would not form properly given the size of the collection. Additionally, sideholes were seen to be visible to the flank but there was no capability to advance the tube further. Necrotic material began to drain under pressure. Each catheter was secured in place and placed to suction drainage. Patient was extubated in the CT scanner and tolerated the procedure well without apparent immediate complications. Findings: Left paracolic gutter collection measuring approximately 6.7 cm. Needle then wire then catheter within the collection. Use yielded purulent necrotic material. Large collection in the region of the pancreatic area, bilobed measuring approximately 16 x 7 cm on a representative image. Needle approaching this collection. Initial pass slightly inferior to the margin of the spleen. This needle pass was removed with Gelfoam injection. Subsequent needle pass more inferiorly and retroperitoneal debris. Wire then large bore catheter within the collection. Numerous additional findings including previously placed anterior drain coiled in the superior aspect of the collection. Minimal ascites. Small bilateral pleural effusions. Stomach containing some fluid. Dilated transverse colon. Limited ultrasound of the left and right chest before the drainage confirmed small bilateral pleural effusions.  Specimens: Abscess drainage material representative sample sent for aerobic, anaerobic culture     Impression: Impression: CT-guided placement of a large-bore 28 Latvian drain into a retroperitoneal necrotic pancreatic collection CT-guided placement of a 12 Latvian drain into a more inferior loculated collection Note: The large 28 Latvian drain was placed at an oblique angle to allow for  possible future necrosectomy/VARDS. Drainage of this very thick necrotic material will be best with continuous large diameter tubing, such as a chest tube box. Not all sideholes are in the collection due to tube length, do not flush with large volumes as this will spill material into the flank. Would hold any anticoagulation at least 12-24 hours due to hemorrhagic nature of the material and proximity of 1 needle pass to the spleen Workstation performed: [id number]     XR abdomen 1 view kub    Result Date: 9/5/2023  Narrative: ABDOMEN INDICATION:   monitor colonic distention. COMPARISON: CT abdomen pelvis with contrast 9/4/2023. VIEWS:  AP supine Images: 3 FINDINGS: Nonweighted enteric tube sidehole overlies distal stomach and tip in proximal second portion of duodenal region. Surgical drain overlies the epigastric region. Probable Saunders catheter overlies pelvic region. Left femoral approach central venous catheter tip overlies left iliac vessel region. There is a nonobstructive bowel gas pattern. Similar persistent gaseous distention of visualized ascending and transverse colon. No discernible free air on this supine study. Upright or left lateral decubitus imaging is more sensitive to detect subtle free air in the appropriate setting. No pathologic calcifications or soft tissue masses. Bibasilar atelectasis (left worse than right). Bilateral total hip arthroplasty. Spinal degenerative changes.      Impression: Devices as detailed below: -Nonweighted enteric tube sidehole overlies distal stomach and tip in proximal second portion of duodenal region. -Surgical drain overlies the epigastric region. -Probable Saunders catheter overlies pelvic region. -Left femoral approach central venous catheter tip overlies left iliac vessel region. Similar persistent gaseous distention of visualized ascending and transverse colon. Bibasilar atelectasis (left worse than right). Workstation performed: [id number]     Echo complete w/ contrast if indicated    Result Date: 9/5/2023  Narrative: •  Left Ventricle: Left ventricular cavity size is normal. Wall thickness is upper normal. The left ventricular ejection fraction is 60%. Systolic function is normal. Wall motion is normal. Diastolic function is normal. •  Right Ventricle: Right ventricular cavity size is mildly dilated. Systolic function is normal. •  Mitral Valve: There is trace regurgitation. •  Pulmonary Artery: The pulmonary artery systolic pressure is mildly increased. XR chest 1 view portable    Result Date: 9/5/2023  Narrative: CHEST INDICATION:   CP. COMPARISON: Chest radiograph from August 4, 2023 EXAM PERFORMED/VIEWS:  XR CHEST PORTABLE FINDINGS: Cardiomediastinal silhouette appears unremarkable. Subsegmental atelectasis in the perihilar regions and lung bases. Small layering pleural effusions. No pneumothorax. Osseous structures appear within normal limits for patient age. Impression: Small pleural effusions and bibasilar atelectasis. Workstation performed: [id number]     IR drainage tube check/change/reposition/reinsertion/upsize    Result Date: 9/5/2023  Narrative: Examination: Abscess catheter tube reposition/upsize History: Pancreatitis, septic shock, respiratory failure, bilateral pleural effusions larger on the left Patient requires respiratory optimization in order to lay on the fluoroscopy table for drain upsize Contrast type: omnipaque 300   Contrast dose: 10 cc Fluoroscopy time: 1.1 minutes Moderate sedation time: 30 minutes Technique: Risks benefits alternatives discussed informed consent obtained.  Risk of bleeding discussed on Eliquis. The patient was identified verbally, and by wrist band." Time out" was performed. Initially patient was optimized with left thoracentesis which is dictated separately. The existing tube was prepped and draped in usual sterile fashion. Lidocaine was given as local anesthesia. Lidocaine was also administered within the tube. Contrast was injected. The system was opacified. The existing tube was removed over  an Amplatz wire. The tract was dilated and a new 12 [place] skater all-purpose drain was formed Given the patient's body habitus and the depth of the collection the catheter is hubbed to the skin Specimens: Culture aerobic, anaerobic Findings: Ill-defined cavity in the mid abdomen containing previously placed drain. Catheter positioned more deeply and upsized to 12 Afghan 30-50 cc of chunky hemorrhagic infected material removed NG tube noted decompressing the stomach with gaseous distention of the transverse colon     Impression: Impression: Upsize and reposition of a pancreatic necrotic collection drain to a 16 Afghan pigtail catheter Patient also has ascites leaking around the catheter, he may be a candidate for paracentesis or a peritoneal drain to simply divert and reduce the ascites Workstation performed: [id number]     IR thoracentesis    Result Date: 9/5/2023  Narrative: Ultrasound-guided thoracentesis Clinical History: Pancreatitis, septic shock, respiratory failure, bilateral pleural effusions larger on the left Patient requires respiratory optimization in order to lay on the fluoroscopy table for drain upsize procedure: After explaining the risks and benefits of the procedure to the patient, informed consent was obtained. Ultrasound was used to localize the pleural effusion. The overlying skin was prepped and draped in usual sterile fashion and local anesthesia was obtained with the 1% lidocaine solution.  A 5-Afghan Yueh needle was advanced until fluid was aspirated under live ultrasound guidance. Approximately  1200   cc of clear, yellow fluid was aspirated. Specimens: Multiple The patient tolerated the procedure well, and attention was turned to drain upsize which is dictated separately     Impression: Impression: Ultrasound-guided left thoracentesis Workstation performed: [id number]     XR chest portable ICU    Result Date: 9/5/2023  Narrative: CHEST INDICATION:   evaluation after IJ attempted. COMPARISON: 9/4/2023 EXAM PERFORMED/VIEWS:  XR CHEST PORTABLE ICU 2 views FINDINGS: Interval placement of nasogastric tube which terminates below the diaphragm in the mid stomach Cardiomediastinal silhouette appears unremarkable. Persistent opacity at medial right lung base suspicious for pneumonia No pneumothorax or pleural effusion. Osseous structures appear within normal limits for patient age. Impression: Persistent medial right lung base opacity suspicious for pneumonia No pneumothorax Workstation performed: [id number]     CT abdomen pelvis w contrast    Result Date: 9/4/2023  Narrative: CT ABDOMEN AND PELVIS WITH IV CONTRAST INDICATION:   Abdominal abscess/infection suspected r/o peripancreatic abscess, infection. COMPARISON: CT abdomen pelvis 8/18/2023 TECHNIQUE:  CT examination of the abdomen and pelvis was performed. Multiplanar 2D reformatted images were created from the source data. This examination, like all CT scans performed in the [facility], was performed utilizing techniques to minimize radiation dose exposure, including the use of iterative reconstruction and automated exposure control. Radiation dose length product (DLP) for this visit:  3543.98 mGy-cm IV Contrast:  100 mL of iodixanol (VISIPAQUE) Enteric Contrast:  Enteric contrast was not administered. FINDINGS: ABDOMEN LOWER CHEST: Moderate bilateral pleural effusions, increased from prior study with mild bilateral lower lobe compressive atelectasis.  Shotty lymph nodes are seen in the bilateral cardiophrenic angles. 2 mm subpleural right middle lobe nodule, unchanged. Small focal airspace opacity within the lingula, new from previous study could represent atelectasis or early pneumonia. LIVER/BILIARY TREE:  Unremarkable. GALLBLADDER: No calcified gallstones. Pericholecystic inflammatory changes are favored to be secondary to extension from pancreatic primary process. SPLEEN: Small crescentic hypodensity is seen along the medial spleen, unchanged in retrospect. Unclear if this is related to a small infarct or possibly a small component of subcapsular fluid. PANCREAS: There has been development of a significant amount of gas seen within a large acute necrotic collection which is largely replacing the pancreatic parenchyma and extends laterally along the anterior pararenal space bilaterally, left greater than  right. This collection measures about 21 x 5.6 x 18.9 cm transverse, AP and craniocaudal dimensions respectively (series 4 image 74 and 603/96). Pigtail left upper quadrant drainage catheter is seen along the anterior margin of the collection. However, there is a significant amount of gas associated with this collection and elsewhere in the retroperitoneum more so than expected from catheter manipulation alone and is concerning for infected necrosis. The collection extends into the lateral paracolic gutter more caudally and may cross the midline as well such as series 4 image 89. On the left, the collection is associated with thickening of the lateral conal fascia and causes masslike thickening of the proximal to mid descending colon (series 4 images 91- 115). This is believed to represent phlegmonous mass. There is dilatation of the transverse colon up to 12.1 cm craniocaudal dimension (601/43) and 8.8 cm AP dimension, consistent with colonic obstruction. This degree of distention could pose a risk for bowel perforation. Surgical consultation is advised.  The above collection involves the lesser sac and extends cephalad on the right interposed between the caudate lobe liver and reina of the diaphragm to the gastrohepatic ligament. Inflammatory changes are noted within the laurent hepatis surrounding the vessels and causing slitlike narrowing of the main portal vein such as series 4 image 63. The vein is grossly patent. Similar findings are noted involving the splenic vein and SMV. These findings have also progressed from the prior study. Inflammatory tissue also surrounds the vessels about the celiac bifurcation and branches of the common hepatic artery. There is minimal if any appreciable enhancing pancreatic parenchyma. ADRENAL GLANDS:  Unremarkable. KIDNEYS/URETERS:  Unremarkable. No hydronephrosis. STOMACH AND BOWEL: Please see comments regarding the colon under the above pancreas section. The stomach is mildly distended and fluid-filled. There is collapse and/or thickening of the gastric antrum and descending duodenum, the latter is likely 180 degrees enveloped by the inflammatory collection. Secondary antritis/duodenitis with element of gastric outlet obstruction is not excluded. Moderate stool in the rectum. APPENDIX:  No findings to suggest appendicitis. ABDOMINOPELVIC CAVITY: Moderate to large amount of abdominal pelvic free fluid which is greater in the pelvis, also slightly increased. No pneumoperitoneum. No lymphadenopathy. VESSELS: See comments above. PELVIS soft tissue detail of the pelvis is degraded by beam hardening artifact from bilateral total hip arthroplasties. REPRODUCTIVE ORGANS:  Unremarkable for patient's age. URINARY BLADDER:  Unremarkable. ABDOMINAL WALL/INGUINAL REGIONS:  Unremarkable. OSSEOUS STRUCTURES:  No acute fracture or destructive osseous lesion. Bilateral total hip arthroplasties. Degenerative changes of the spine. Impression: 1.  Significant worsening of necrotizing pancreatitis with large acute necrotic collection largely replacing the pancreatic parenchyma and extending into the anterior pararenal spaces (left greater than right) with the collection now containing a large amount of gas concerning for infected necrosis. Additionally, there is an inflammatory phlegmonous mass along the left lateral pararenal space/paracolic gutter with involvement of the proximal to mid descending colon causing associated colonic obstruction. This degree of bowel distention could pose a risk for bowel perforation. Surgical consultation is advised. 2. Progression of inflammatory changes extending into the laurent hepatis with mass effect and narrowing of the portal vein and associated proximal vessels about the portal confluence as detailed above but no evidence for overt venous occlusion. 3. Mild fluid-filled gastric distention. Collapse and/or thickening of the gastric antrum and descending duodenum, the latter is likely 180 degrees enveloped by the inflammatory collection. Secondary antritis/duodenitis with element of gastric outlet obstruction is not excluded. 4. Moderate to large amount of abdominopelvic free fluid, increasing from prior study. 5. Enlarging, moderate bilateral pleural effusions with mild bibasilar compressive atelectasis. I personally discussed this study with [address] [place] [address] on 9/4/2023 at 5:47 PM. Workstation performed: [id number]     CT abdomen pelvis w contrast    Result Date: 8/18/2023  Narrative: CT ABDOMEN AND PELVIS WITH IV CONTRAST INDICATION:   K85.91: Acute pancreatitis with uninfected necrosis, unspecified. COMPARISON: Prior CT abdomen pelvis examinations dating back to 8/4/2023 TECHNIQUE:  CT examination of the abdomen and pelvis was performed. Scanning through the abdomen was performed in arterial, venous and delayed phases according a protocol specifically designed to evaluate upper abdominal viscera. Multiplanar 2D reformatted images were created from the source data.  This examination, like all CT scans performed in the [facility], was performed utilizing techniques to minimize radiation dose exposure, including the use of iterative reconstruction and automated exposure control. Radiation dose length product (DLP) for this visit:  3629 mGy-cm IV Contrast:  100 mL of iohexol (OMNIPAQUE) Enteric Contrast:  Enteric contrast was not administered. FINDINGS: ABDOMEN LOWER CHEST: Partially visualized at least small bilateral pleural effusions. A 3 mm left lower lobe pulmonary nodule (4/8). Bibasilar subsegmental atelectasis. LIVER/BILIARY TREE:  Unremarkable. GALLBLADDER:  No calcified gallstones. No pericholecystic inflammatory change. SPLEEN:  Unremarkable. PANCREAS: There is evidence of acute pancreatitis, which will be described based on the revised Paris Classification of Acute Pancreatitis: -TIME OF ONSET: less than or equal to 4 weeks -NECROSIS: There is evidence of pancreatic parenchyma necrosis (more than 30%), therefore this is necrotizing pancreatitis. There is decreasing peripancreatic fat stranding and fluid tracking compared to 8/6/2023. Again seen is small volume ascites, minimally decreased compared to 9/6/6307. -LOCAL COMPLICATIONS: Interval decrease in the size of acute necrotizing collection in the lesser sac, currently being 4.1 x 7.4 cm (series 4 image 64), previously 11.4 x 6.1 cm. It contains a tendon heterogenous material in the bladder drainage catheter  lies in the superficial portion of this collection The main portal vein, splenic vein and the intermesenteric vein are patent with mild mass effect on the main proximal splenic vein, improved since 8/6/2023. -INFECTION: There is no abnormal gas in or around the pancreas to suggest infection. ADRENAL GLANDS:  Unremarkable. KIDNEYS/URETERS:  Unremarkable. No hydronephrosis. STOMACH AND BOWEL: No abnormal bowel dilation. Suboptimal evaluation rectum due to streak artifact from arthroplasties. APPENDIX:  No findings to suggest appendicitis. ABDOMINOPELVIC CAVITY:  No ascites.   No pneumoperitoneum. No lymphadenopathy. VESSELS:  Unremarkable for patient's age. PELVIS REPRODUCTIVE ORGANS: Suboptimal evaluation of the prostate with streak artifact bilateral hip arthroplasties. [place] URINARY BLADDER:  Unremarkable. ABDOMINAL WALL/INGUINAL REGIONS:  Unremarkable. OSSEOUS STRUCTURES:  No acute fracture or destructive osseous lesion. Bilateral total hip arthroplasty     Impression: Resolving acute necrotizing pancreatitis with interval decrease in the size of the peripancreatic necrotizing collection in the lesser sac compared to 8/6/2023. Persistent high attenuation material within the peripancreatic fluid collection with tip of the drainage catheter in its superficial aspect. Mildly improved ascites. At least small bilateral pleural effusions. A 3 mm left lower lobe pulmonary nodule. Attention on follow-up imaging versus optional follow-up CT chest in 12 months is recommended to assess for stability, as per Fleischner criteria. The study was marked in EPIC for significant notification. Workstation performed: [id number]       EKG, Pathology, and Other Studies Reviewed on Admission:   · EKG: Reviewed      Counseling / Coordination of Care Time: 30 total mins spent n consult. Greater than 50% of total time spent on patient counseling and coordination of care. Ada Woodard DO  Gastroenterology Fellow  [facility]  Division of Gastroenterology and Hepatology  Available on [employer]  . ..............................................................................................................................................  ** Please Note: This note is constructed using a voice recognition dictation system.  **

## 2023-09-11 NOTE — PLAN OF CARE
Problem: PHYSICAL THERAPY ADULT  Goal: Performs mobility at highest level of function for planned discharge setting. See evaluation for individualized goals. Description: Treatment/Interventions: ADL retraining, Functional transfer training, LE strengthening/ROM, Elevations, Therapeutic exercise, Endurance training, Patient/family training, Equipment eval/education, Bed mobility, Gait training, Spoke to nursing, Spoke to case management, OT, Family  Equipment Recommended: Aimee Agarwal (has for d/c)       See flowsheet documentation for full assessment, interventions and recommendations. Outcome: Progressing  Note: Prognosis: Good  Problem List: Decreased strength, Decreased range of motion, Decreased endurance, Impaired balance, Decreased mobility, Obesity, Decreased skin integrity, Pain  Assessment: Pt seen for PT treatment session this date. Therapy session focused on functional transfers, gait training, therex, and endurance training in order to improve overall mobility and independence. Pt requires assist of 1 for all mobility performed this date. Pt making progress toward goals as demonstrated by ambulating increased distances with min A. Pt continues to be limited by generalized weakness + deconditioning. Pt performed/ tolerated seated therex with AAROM required for LLE, specifically hip flexion. Pt provided with HEP handout. Pt requires ffrequent + extended seated rest breaks t/o session. Pt was left sitting OOB in recliner with LEs elevated at the end of PT session with all needs in reach. Pt would benefit from continued PT services while in hospital to address remaining limitations. PT to continue to follow pt and recommends home with HHPT. The patient's AM-PAC Basic Mobility Inpatient Short Form Raw Score is 17. A Raw score of greater than 16 suggests the patient may benefit from discharge to home. Please also refer to the recommendation of the Physical Therapist for safe discharge planning.         PT Discharge Recommendation: Home with home health rehabilitation    See flowsheet documentation for full assessment.

## 2023-09-11 NOTE — PROGRESS NOTES
Progress Note - Infectious Disease   Dionna Franco 46 y.o. male MRN: [id number]  Unit/Bed#: [facility] 901-01 Encounter: [id number]      Impression/Recommendations:  Septic shock, POA.    Fever, WBC, refractory hypotension.  Due to bacteremia, pancreatic abscess as below.  No other appreciable source.  Improving, now off pressors. Rec:  • Continue antibiotics as below  • Follow temperatures closely  • Recheck CBC in AM  • Supportive care as per the primary service     Polymicrobial bacteremia.    Klebsiella, Strep mitis/oralis.  Coagulase-negative Staph x2 from single set are likely contaminants.  Due to pancreatic abscess as below.  No other appreciable source.  Repeat blood cultures 9/6, TTE (technically difficult) negative. Rec:  • Change cefepime to ceftriaxone to continue for 14 days total antibiotics through 9/17     Pancreatic abscess.    Due to superinfection of necrosis as below.  Status post drain upsizing 9/4 which yielded infected hemorrhagic material.  Cultures with Citrobacter, E. Coli, Klebsiella, Aeromonas, Bacteroides.  Status post additional drains x2 9/6.  Cultures with same organisms. 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  Status post IR drain 8/5 yielding dark bloody fluid.  Cultures initially negative.  Current CT shows replacing most of pancreatic parenchyma, extending to pararenal spaces, causing left colonic obstruction, gastric outlet obstruction.  Status post drain upsizing 9/4, additional drains x2 9/6 yielding superinfected fluid as above.  Collection not mature enough for cyst gastrostomy per GI. Status post bedside tube interrogation, necrosectomy 9/8. Rec:  • Follow drain output closely  • Close Surgery, GI follow-up ongoing     JERZY. Likely multifactorial.  Improving.     Large bowel obstruction.   Due to compression from pancreatitis as above.     Gastric outlet obstruction. Due to compression from pancreatitis as above.     Bilateral pleural effusions. Likely reactive to pancreatitis as above.  Status post left thoracentesis yielding 1200 cc clear yellow fluid.  Fluid cultures negative.     Ascites. Likely reactive to pancreatitis as above. Status post paracentesis. Fluid cultures negative.     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       I will reassess the patient on . Please call in the interim with new questions.     Antibiotics:  Cefepime #8  Flagyl #7  Antibiotics #8    Subjective:  Patient seen on AM rounds. Doing well. Offers no complaints. Eating regular food without difficulty. 24 Hour Events:  No documented fevers, chills, sweats, nausea, vomiting, or diarrhea. Low grade temp this AM.    Objective:  Vitals:  Temp:  [98.2 °F (36.8 °C)-100.1 °F (37.8 °C)] 100.1 °F (37.8 °C)  HR:  [] 110  Resp:  [16-20] 16  BP: ()/(66-80) 98/75  SpO2:  [92 %-96 %] 96 %  Temp (24hrs), Av °F (37.2 °C), Min:98.2 °F (36.8 °C), Max:100.1 °F (37.8 °C)  Current: Temperature: 100.1 °F (37.8 °C)    Physical Exam:   General:  No acute distress  Psychiatric:  Awake and alert  Pulmonary:  Normal respiratory excursion without accessory muscle use  Abdomen:  Obese, drains with opaque grey fluid  Extremities:  Nonpitting leg edema  Skin:  No rashes    Lab Results:  I have personally reviewed pertinent labs.   Results from last 7 days   Lab Units 23  0554 09/10/23  0612 23  1226 23  0512 23  0506 23  2143 23  0450 23  0229   POTASSIUM mmol/L 3.6 3.9 3.6   < > 3.6   < > 3.7 4.4   CHLORIDE mmol/L 104 105 104   < > 104   < > 98 96   CO2 mmol/L 25 27 29   < > 30   < > 28 24   BUN mg/dL 27* 31* 34*   < > 57*   < > 79* 91*   CREATININE mg/dL 1.18 1.17 1.18   < > 1.40*   < > 1.62* 2.15*   EGFR ml/min/1.73sq m 70 71 70   < > 57   < > 48 34   CALCIUM mg/dL 7.3* 7.3* 7.5*   < > 7.7*   < > 7.8* 7.6*   AST U/L --   --   --   --  11*  --  13 22   ALT U/L  --   --   --   --  9  --  10 16   ALK PHOS U/L  --   --   --   --  42  --  43 45    < > = values in this interval not displayed. Results from last 7 days   Lab Units 09/11/23  0554 09/10/23  0612 09/09/23  0454   WBC Thousand/uL 10.41* 10.21* 8.26   HEMOGLOBIN g/dL 7.9* 7.5* 7.5*   PLATELETS Thousands/uL 256 260 259     Results from last 7 days   Lab Units 09/09/23  0819 09/06/23  2127 09/06/23  1512 09/06/23  0937 09/04/23  1928 09/04/23  1502   BLOOD CULTURE   --   --   --  No Growth After 4 Days. No Growth After 4 Days. --  No Growth After 5 Days. Klebsiella aerogenes*  Streptococcus mitis oralis group*  Staphylococcus epidermidis*  Staphylococcus hominis*   GRAM STAIN RESULT  No Polys or Bacteria seen No Polys or Bacteria seen 1+ Polys  No bacteria seen  --  1+ Polys*  3+ Gram positive cocci in pairs*  3+ Gram variable rods*  No Polys or Bacteria seen Gram negative rods*  Gram positive cocci in chains*   BODY FLUID CULTURE, STERILE   --  4+ Growth of Escherichia coli*  4+ Growth of Citrobacter freundii*  4+ Growth of Klebsiella aerogenes*  1+ Growth of Aeromonas hydrophila* No growth  --  4+ Growth of Citrobacter freundii*  4+ Growth of Escherichia coli*  3+ Growth of Klebsiella aerogenes*  1+ Growth of Aeromonas hydrophila*  No growth  --        Imaging Studies:   I have personally reviewed pertinent imaging study reports and images in PACS. EKG, Pathology, and Other Studies:   I have personally reviewed pertinent reports.

## 2023-09-12 LAB
ALBUMIN SERPL BCP-MCNC: 2.3 G/DL (ref 3.5–5)
ALP SERPL-CCNC: 45 U/L (ref 34–104)
ALT SERPL W P-5'-P-CCNC: 4 U/L (ref 7–52)
ANION GAP SERPL CALCULATED.3IONS-SCNC: 5 MMOL/L
APTT PPP: 68 SECONDS (ref 23–37)
AST SERPL W P-5'-P-CCNC: 10 U/L (ref 13–39)
BILIRUB DIRECT SERPL-MCNC: 0.1 MG/DL (ref 0–0.2)
BILIRUB SERPL-MCNC: 0.39 MG/DL (ref 0.2–1)
BUN SERPL-MCNC: 25 MG/DL (ref 5–25)
CALCIUM SERPL-MCNC: 7.3 MG/DL (ref 8.4–10.2)
CHLORIDE SERPL-SCNC: 105 MMOL/L (ref 96–108)
CO2 SERPL-SCNC: 26 MMOL/L (ref 21–32)
CREAT SERPL-MCNC: 1.12 MG/DL (ref 0.6–1.3)
ERYTHROCYTE [DISTWIDTH] IN BLOOD BY AUTOMATED COUNT: 18 % (ref 11.6–15.1)
GFR SERPL CREATININE-BSD FRML MDRD: 75 ML/MIN/1.73SQ M
GLUCOSE SERPL-MCNC: 112 MG/DL (ref 65–140)
GLUCOSE SERPL-MCNC: 118 MG/DL (ref 65–140)
GLUCOSE SERPL-MCNC: 123 MG/DL (ref 65–140)
GLUCOSE SERPL-MCNC: 143 MG/DL (ref 65–140)
GLUCOSE SERPL-MCNC: 147 MG/DL (ref 65–140)
GLUCOSE SERPL-MCNC: 74 MG/DL (ref 65–140)
HCT VFR BLD AUTO: 25.8 % (ref 36.5–49.3)
HGB BLD-MCNC: 7.7 G/DL (ref 12–17)
MCH RBC QN AUTO: 26.3 PG (ref 26.8–34.3)
MCHC RBC AUTO-ENTMCNC: 29.8 G/DL (ref 31.4–37.4)
MCV RBC AUTO: 88 FL (ref 82–98)
PLATELET # BLD AUTO: 284 THOUSANDS/UL (ref 149–390)
PMV BLD AUTO: 10.2 FL (ref 8.9–12.7)
POTASSIUM SERPL-SCNC: 3.5 MMOL/L (ref 3.5–5.3)
PROT SERPL-MCNC: 5.5 G/DL (ref 6.4–8.4)
RBC # BLD AUTO: 2.93 MILLION/UL (ref 3.88–5.62)
SODIUM SERPL-SCNC: 136 MMOL/L (ref 135–147)
WBC # BLD AUTO: 12.96 THOUSAND/UL (ref 4.31–10.16)

## 2023-09-12 PROCEDURE — 85027 COMPLETE CBC AUTOMATED: CPT

## 2023-09-12 PROCEDURE — 99232 SBSQ HOSP IP/OBS MODERATE 35: CPT | Performed by: INTERNAL MEDICINE

## 2023-09-12 PROCEDURE — 97530 THERAPEUTIC ACTIVITIES: CPT

## 2023-09-12 PROCEDURE — 80048 BASIC METABOLIC PNL TOTAL CA: CPT

## 2023-09-12 PROCEDURE — 80076 HEPATIC FUNCTION PANEL: CPT | Performed by: STUDENT IN AN ORGANIZED HEALTH CARE EDUCATION/TRAINING PROGRAM

## 2023-09-12 PROCEDURE — 99232 SBSQ HOSP IP/OBS MODERATE 35: CPT | Performed by: SURGERY

## 2023-09-12 PROCEDURE — 85730 THROMBOPLASTIN TIME PARTIAL: CPT | Performed by: SURGERY

## 2023-09-12 PROCEDURE — 97116 GAIT TRAINING THERAPY: CPT

## 2023-09-12 PROCEDURE — 82948 REAGENT STRIP/BLOOD GLUCOSE: CPT

## 2023-09-12 PROCEDURE — 97110 THERAPEUTIC EXERCISES: CPT

## 2023-09-12 RX ORDER — POTASSIUM CHLORIDE 20 MEQ/1
40 TABLET, EXTENDED RELEASE ORAL ONCE
Status: COMPLETED | OUTPATIENT
Start: 2023-09-12 | End: 2023-09-12

## 2023-09-12 RX ORDER — ALBUMIN, HUMAN INJ 5% 5 %
25 SOLUTION INTRAVENOUS ONCE
Status: COMPLETED | OUTPATIENT
Start: 2023-09-12 | End: 2023-09-12

## 2023-09-12 RX ADMIN — FUROSEMIDE 20 MG: 20 TABLET ORAL at 08:47

## 2023-09-12 RX ADMIN — CHLORHEXIDINE GLUCONATE 15 ML: 1.2 SOLUTION ORAL at 08:47

## 2023-09-12 RX ADMIN — METRONIDAZOLE 500 MG: 500 INJECTION, SOLUTION INTRAVENOUS at 05:26

## 2023-09-12 RX ADMIN — POLYETHYLENE GLYCOL 3350 17 G: 17 POWDER, FOR SOLUTION ORAL at 21:45

## 2023-09-12 RX ADMIN — HEPARIN SODIUM 23.11 UNITS/KG/HR: 10000 INJECTION, SOLUTION INTRAVENOUS at 13:24

## 2023-09-12 RX ADMIN — INSULIN LISPRO 5 UNITS: 100 INJECTION, SOLUTION INTRAVENOUS; SUBCUTANEOUS at 17:55

## 2023-09-12 RX ADMIN — CEFTRIAXONE SODIUM 2000 MG: 10 INJECTION, POWDER, FOR SOLUTION INTRAVENOUS at 11:52

## 2023-09-12 RX ADMIN — INSULIN LISPRO 5 UNITS: 100 INJECTION, SOLUTION INTRAVENOUS; SUBCUTANEOUS at 13:24

## 2023-09-12 RX ADMIN — ALBUMIN (HUMAN) 25 G: 12.5 INJECTION, SOLUTION INTRAVENOUS at 10:32

## 2023-09-12 RX ADMIN — CHLORHEXIDINE GLUCONATE 15 ML: 1.2 SOLUTION ORAL at 21:45

## 2023-09-12 RX ADMIN — INSULIN LISPRO 5 UNITS: 100 INJECTION, SOLUTION INTRAVENOUS; SUBCUTANEOUS at 08:47

## 2023-09-12 RX ADMIN — ALBUMIN (HUMAN) 25 G: 12.5 INJECTION, SOLUTION INTRAVENOUS at 15:17

## 2023-09-12 RX ADMIN — POTASSIUM CHLORIDE 40 MEQ: 1500 TABLET, EXTENDED RELEASE ORAL at 10:29

## 2023-09-12 RX ADMIN — METRONIDAZOLE 500 MG: 500 INJECTION, SOLUTION INTRAVENOUS at 21:46

## 2023-09-12 RX ADMIN — METRONIDAZOLE 500 MG: 500 INJECTION, SOLUTION INTRAVENOUS at 13:24

## 2023-09-12 NOTE — CASE MANAGEMENT
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   • Pleural effusion, left 2023   • Hyponatremia 2023   • Abnormal urinalysis 2023   • Leukocytosis 2023   • JERZY (acute kidney injury) ([address]) 2023   • Primary hypertension 2022   • Proteinuria 2022   • Chronic pain of both hips 2022   • Family history of prostate cancer in father 2022      LOS (days): 8  Geometric Mean LOS (GMLOS) (days):   Days to GMLOS:     OBJECTIVE:  Risk of Unplanned Readmission Score: 25.25         Current admission status: Inpatient   Preferred Pharmacy:   [facility] [id number] [address]  Phone: [phone] Fax: [address] - [address] [place] [address]  Phone: [phone] Fax: [phone]    Primary Care Provider: Jeremy Hoyt MD    Primary Insurance: Josh Wren  Secondary Insurance:     Ronny Verduzco       Other Referral/Resources/Interventions Provided:  Referral Comments: [facility] but then sent message that they can. S/w Yessy & confirmed they indeed can accept back, however they cannot change the unavailable to available in aidin. DCP pt will go home with [facility].

## 2023-09-12 NOTE — PLAN OF CARE
Problem: PAIN - ADULT  Goal: Verbalizes/displays adequate comfort level or baseline comfort level  Description: Interventions:  - Encourage patient to monitor pain and request assistance  - Assess pain using appropriate pain scale  - Administer analgesics based on type and severity of pain and evaluate response  - Implement non-pharmacological measures as appropriate and evaluate response  - Consider cultural and social influences on pain and pain management  - Notify physician/advanced practitioner if interventions unsuccessful or patient reports new pain  Outcome: Progressing     Problem: INFECTION - ADULT  Goal: Absence or prevention of progression during hospitalization  Description: INTERVENTIONS:  - Assess and monitor for signs and symptoms of infection  - Monitor lab/diagnostic results  - Monitor all insertion sites, i.e. indwelling lines, tubes, and drains  - Monitor endotracheal if appropriate and nasal secretions for changes in amount and color  - [place] appropriate cooling/warming therapies per order  - Administer medications as ordered  - Instruct and encourage patient and family to use good hand hygiene technique  - Identify and instruct in appropriate isolation precautions for identified infection/condition  Outcome: Progressing  Goal: Absence of fever/infection during neutropenic period  Description: INTERVENTIONS:  - Monitor WBC    Outcome: Progressing     Problem: SAFETY ADULT  Goal: Patient will remain free of falls  Description: INTERVENTIONS:  - Educate patient/family on patient safety including physical limitations  - Instruct patient to call for assistance with activity   - Consult OT/PT to assist with strengthening/mobility   - Keep Call bell within reach  - Keep bed low and locked with side rails adjusted as appropriate  - Keep care items and personal belongings within reach  - Initiate and maintain comfort rounds  - Make Fall Risk Sign visible to staff  - Apply yellow socks and bracelet for high fall risk patients  - Consider moving patient to room near nurses station  Outcome: Progressing  Goal: Maintain or return to baseline ADL function  Description: INTERVENTIONS:  -  Assess patient's ability to carry out ADLs; assess patient's baseline for ADL function and identify physical deficits which impact ability to perform ADLs (bathing, care of mouth/teeth, toileting, grooming, dressing, etc.)  - Assess/evaluate cause of self-care deficits   - Assess range of motion  - Assess patient's mobility; develop plan if impaired  - Assess patient's need for assistive devices and provide as appropriate  - Encourage maximum independence but intervene and supervise when necessary  - Involve family in performance of ADLs  - Assess for home care needs following discharge   - Consider OT consult to assist with ADL evaluation and planning for discharge  - Provide patient education as appropriate  Outcome: Progressing  Goal: Maintains/Returns to pre admission functional level  Description: INTERVENTIONS:  - Perform BMAT or MOVE assessment daily.   - Set and communicate daily mobility goal to care team and patient/family/caregiver.    - Collaborate with rehabilitation services on mobility goals if consulted  - Out of bed for toileting  - Record patient progress and toleration of activity level   Outcome: Progressing     Problem: DISCHARGE PLANNING  Goal: Discharge to home or other facility with appropriate resources  Description: INTERVENTIONS:  - Identify barriers to discharge w/patient and caregiver  - Arrange for needed discharge resources and transportation as appropriate  - Identify discharge learning needs (meds, wound care, etc.)  - Arrange for interpretive services to assist at discharge as needed  - Refer to Case Management Department for coordinating discharge planning if the patient needs post-hospital services based on physician/advanced practitioner order or complex needs related to functional status, cognitive ability, or social support system  Outcome: Progressing     Problem: Knowledge Deficit  Goal: Patient/family/caregiver demonstrates understanding of disease process, treatment plan, medications, and discharge instructions  Description: Complete learning assessment and assess knowledge base. Interventions:  - Provide teaching at level of understanding  - Provide teaching via preferred learning methods  Outcome: Progressing     Problem: MOBILITY - ADULT  Goal: Maintain or return to baseline ADL function  Description: INTERVENTIONS:  -  Assess patient's ability to carry out ADLs; assess patient's baseline for ADL function and identify physical deficits which impact ability to perform ADLs (bathing, care of mouth/teeth, toileting, grooming, dressing, etc.)  - Assess/evaluate cause of self-care deficits   - Assess range of motion  - Assess patient's mobility; develop plan if impaired  - Assess patient's need for assistive devices and provide as appropriate  - Encourage maximum independence but intervene and supervise when necessary  - Involve family in performance of ADLs  - Assess for home care needs following discharge   - Consider OT consult to assist with ADL evaluation and planning for discharge  - Provide patient education as appropriate  Outcome: Progressing  Goal: Maintains/Returns to pre admission functional level  Description: INTERVENTIONS:  - Perform BMAT or MOVE assessment daily.   - Set and communicate daily mobility goal to care team and patient/family/caregiver.    - Collaborate with rehabilitation services on mobility goals if consulted  - Out of bed for toileting  - Record patient progress and toleration of activity level   Outcome: Progressing     Problem: Prexisting or High Potential for Compromised Skin Integrity  Goal: Skin integrity is maintained or improved  Description: INTERVENTIONS:  - Identify patients at risk for skin breakdown  - Assess and monitor skin integrity  - Assess and monitor nutrition and hydration status  - Monitor labs   - Assess for incontinence   - Turn and reposition patient  - Assist with mobility/ambulation  - Relieve pressure over bony prominences  - Avoid friction and shearing  - Provide appropriate hygiene as needed including keeping skin clean and dry  - Evaluate need for skin moisturizer/barrier cream  - Collaborate with interdisciplinary team   - Patient/family teaching  - Consider wound care consult   Outcome: Progressing     Problem: Nutrition/Hydration-ADULT  Goal: Nutrient/Hydration intake appropriate for improving, restoring or maintaining nutritional needs  Description: Monitor and assess patient's nutrition/hydration status for malnutrition. Collaborate with interdisciplinary team and initiate plan and interventions as ordered. Monitor patient's weight and dietary intake as ordered or per policy. Utilize nutrition screening tool and intervene as necessary. Determine patient's food preferences and provide high-protein, high-caloric foods as appropriate.      INTERVENTIONS:  - Monitor oral intake, urinary output, labs, and treatment plans  - Assess nutrition and hydration status and recommend course of action  - Evaluate amount of meals eaten  - Assist patient with eating if necessary   - Allow adequate time for meals  - Recommend/ encourage appropriate diets, oral nutritional supplements, and vitamin/mineral supplements  - Order, calculate, and assess calorie counts as needed  - Recommend, monitor, and adjust tube feedings and TPN/PPN based on assessed needs  - Assess need for intravenous fluids  - Provide specific nutrition/hydration education as appropriate  - Include patient/family/caregiver in decisions related to nutrition  Outcome: Progressing

## 2023-09-12 NOTE — PROGRESS NOTES
Progress Note - General Surgery   Charly Ruff 46 y.o. 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 UOP: 150cc   BM: x1  Drains: 320 cc total    cc tan necrotic  LUQ: 40 cc tan necrotic  RUQ:  130 cc tan    Lab Results   Component Value Date/Time    WBC 12.96 (H) 09/12/2023 05:24 AM    WBC 10.41 (H) 09/11/2023 05:54 AM    HGB 7.7 (L) 09/12/2023 05:24 AM    HGB 7.9 (L) 09/11/2023 05:54 AM    CREATININE 1.12 09/12/2023 05:24 AM    CREATININE 1.18 09/11/2023 05:54 AM       Plan:  -F/u w/ GI regarding timing/plans for this week  -low fat diet  -Cefepime/Flagyl to continue through 9/17  -Monitor drains output and character  -Heparin gtt- monitor PTT  -Pain control  -Incentive spirometry  -Encourage ambulation, PT/OT rec no needs  - will f/u I&O and PO intake    Subjective/Objective   Subjective:   No acute events overnight. No N/V. vicki diet, but decreased appetite and states his food taste bland/metallic. He is passing flatus/BM, voiding but low UOP. Objective:     Blood pressure 124/75, pulse 103, temperature 99.3 °F (37.4 °C), resp. rate 16, height 5' 11" (1.803 m), weight 127 kg (280 lb 3.3 oz), SpO2 93 %. ,Body mass index is 39.08 kg/m². Intake/Output Summary (Last 24 hours) at 9/12/2023 0918  Last data filed at 9/12/2023 0901  Gross per 24 hour   Intake 480 ml   Output 620 ml   Net -140 ml       Invasive Devices     Peripherally Inserted Central Catheter Line  Duration           PICC Line 27/47/12 Left Basilic 3 days          Peripheral Intravenous Line  Duration           Peripheral IV 08/08/23 Dorsal (posterior); Left Wrist 35 days          Drain Duration           Abscess Drain RUQ 7 days    Abscess Drain LUQ 5 days    Abscess Drain LUQ 5 days                Physical Exam:  General: NAD  Skin: Warm, dry, anicteric  HEENT: Normocephalic, atraumatic  CV: Tachycardic, no m/r/g  Pulm: CTA b/l, no inc WOB  Abd: soft, nontender, mild distension, protuberant abdomen. MSK: Symmetric, +3 b/l pitting edema unchanged from day prior, no tenderness, no deformity  Neuro: AOx3, GCS 15    Lab, Imaging and other studies:  I have personally reviewed pertinent lab results.   , CBC:   Lab Results   Component Value Date    WBC 12.96 (H) 09/12/2023    HGB 7.7 (L) 09/12/2023    HCT 25.8 (L) 09/12/2023    MCV 88 09/12/2023     09/12/2023    RBC 2.93 (L) 09/12/2023    MCH 26.3 (L) 09/12/2023    MCHC 29.8 (L) 09/12/2023    RDW 18.0 (H) 09/12/2023    MPV 10.2 09/12/2023   , CMP:   Lab Results   Component Value Date    SODIUM 136 09/12/2023    K 3.5 09/12/2023     09/12/2023    CO2 26 09/12/2023    BUN 25 09/12/2023    CREATININE 1.12 09/12/2023    CALCIUM 7.3 (L) 09/12/2023    AST 10 (L) 09/12/2023    ALT 4 (L) 09/12/2023    ALKPHOS 45 09/12/2023    EGFR 75 09/12/2023   , Coagulation: No results found for: "PT", "INR", "APTT"  VTE Pharmacologic Prophylaxis: Sequential compression device (Venodyne)  and Heparin   VTE Mechanical Prophylaxis: sequential compression device supervision

## 2023-09-12 NOTE — PLAN OF CARE
Problem: PHYSICAL THERAPY ADULT  Goal: Performs mobility at highest level of function for planned discharge setting. See evaluation for individualized goals. Description: Treatment/Interventions: ADL retraining, Functional transfer training, LE strengthening/ROM, Elevations, Therapeutic exercise, Endurance training, Patient/family training, Equipment eval/education, Bed mobility, Gait training, Spoke to nursing, Spoke to case management, OT, Family  Equipment Recommended: Gera Lomeli (has for d/c)       See flowsheet documentation for full assessment, interventions and recommendations. Outcome: Progressing  Note: Prognosis: Good  Problem List: Decreased strength, Decreased range of motion, Decreased endurance, Impaired balance, Decreased mobility, Obesity, Decreased skin integrity, Pain  Assessment: Pt seen for PT treatment session this date. Therapy session focused on functional transfers, gait training, endurance training and therex in order to improve overall mobility and independence. Pt requires assist of 1 for all mobility performed this date. Pt making steady progress toward goals as demonstrated by tolerating 3x gait trials. Pt continues to require extended seated rest breaks after all mobility, cues for pacing and sequencing also required. Pt performed/ tolerated seated therex without complaints. Pt was left sitting OOB in recliner at the end of PT session with all needs in reach. Pt would benefit from continued PT services while in hospital to address remaining limitations. PT to continue to follow pt and recommends home with hHPT + increased social support. The patient's AM-PAC Basic Mobility Inpatient Short Form Raw Score is 17. A Raw score of greater than 16 suggests the patient may benefit from discharge to home. Please also refer to the recommendation of the Physical Therapist for safe discharge planning.         PT Discharge Recommendation: Home with home health rehabilitation    See flowsheet documentation for full assessment.

## 2023-09-12 NOTE — UTILIZATION REVIEW
Continued Stay Review    Date: 9/12/2023                        Current Patient Class: inpatient  Current Level of Care: med/surg  HPI:52 y.o. male initially admitted on 9/4 with septic shock d/t bacteremia, pancreatic abscess s/p IR drainage    Assessment/Plan: [place] diet but decreased appetite and states his food has a bland/metallic taste. + flatus, + bm. Voiding but low uop. +3 b/l pitting edema unchanged from day prior. Afebrile, continues tachy 100s, HD stable on RA. WBC 12.96, Hgb 7.7. Drains: 320 cc total:  cc tan necrotic, LUQ: 40 cc tan necrotic, RUQ:  130 cc tan. Continue low fat diet. Cefepime/Flagyl to continue through 9/17. ID following. Hep gtt for SMV thrombus. Pain control. Incentive spirometry. Encourage ambulation. Monitor I/Os, po intake. CBC in AM. Cyst gastrostomy placement pending 9/14 per GI. Plan to d/c home with [facility] when medically stable.     Vital Signs:   Date/Time Temp Pulse Resp BP MAP (mmHg) SpO2 O2 Device Patient Position - Orthostatic VS   09/12/23 15:17:36 97.8 °F (36.6 °C) 80 15 113/67 82 100 % -- --   09/12/23 11:20:58 99.3 °F (37.4 °C) 103 18 117/74 88 94 % -- --   09/12/23 07:26:01 99.3 °F (37.4 °C) 103 16 124/75 91 93 % -- --   09/12/23 03:37:28 99.7 °F (37.6 °C) 108 Abnormal  -- 137/77 97 93 % None (Room air) --   09/11/23 21:15:13 99 °F (37.2 °C) 104 15 118/61 80 94 % -- --   09/11/23 19:03:04 99.2 °F (37.3 °C) 111 Abnormal  15 120/66 84 92 % -- --   09/11/23 15:16:10 99 °F (37.2 °C) 108 Abnormal  14 108/75 86 91 % -- --   09/11/23 1500 -- -- -- -- -- -- None (Room air) --   09/11/23 11:24:55 100.1 °F (37.8 °C) 110 Abnormal  16 98/75 83 96 % -- --   09/11/23 0851 -- -- -- -- -- -- None (Room air) --   09/11/23 07:56:37 99.1 °F (37.3 °C) 111 Abnormal  17 120/75 90 93 % -- --   09/11/23 0300 -- -- -- -- -- -- None (Room air) --   09/11/23 02:43:14 98.4 °F (36.9 °C) 112 Abnormal  -- 129/71 90 92 % -- --   09/10/23 23:49:48 98.5 °F (36.9 °C) 112 Abnormal  18 125/79 94 93 % -- Lying   09/10/23 2100 -- -- -- -- -- -- None (Room air) --   09/10/23 19:30:31 99.5 °F (37.5 °C) 105 20 130/80 97 93 % -- --   09/10/23 15:05:57 98.2 °F (36.8 °C) 98 18 117/66 83 94 % -- --   09/10/23 1500 -- -- -- -- -- -- None (Room air) --   09/10/23 11:04:03 98.6 °F (37 °C) 101 18 107/63 78 93 % -- --   09/10/23 0850 -- -- -- -- -- -- None (Room air) --   09/10/23 07:28:31 98.5 °F (36.9 °C) 104 18 121/68 86 97 % -- --   09/10/23 02:53:59 98.2 °F (36.8 °C) 104 18 107/61 76 94 % -- --       Pertinent Labs/Diagnostic Results:     Results from last 7 days   Lab Units 09/12/23  0524 09/11/23  0554 09/10/23  0612 09/09/23  0454 09/08/23  0512   WBC Thousand/uL 12.96* 10.41* 10.21* 8.26 9.50   HEMOGLOBIN g/dL 7.7* 7.9* 7.5* 7.5* 7.7*   HEMATOCRIT % 25.8* 26.0* 25.0* 25.2* 25.2*   PLATELETS Thousands/uL 284 256 260 259 278   BANDS PCT %  --   --   --   --  10*     Results from last 7 days   Lab Units 09/12/23  0524 09/11/23  0554 09/10/23  0612 09/09/23  1226 09/09/23  0454 09/08/23  0512 09/06/23  2143 09/06/23  0450   SODIUM mmol/L 136 136 136 137 138 140   < > 137   POTASSIUM mmol/L 3.5 3.6 3.9 3.6 3.4* 3.4*   < > 3.7   CHLORIDE mmol/L 105 104 105 104 104 103   < > 98   CO2 mmol/L 26 25 27 29 27 29   < > 28   ANION GAP mmol/L 5 7 4 4 7 8   < > 11   BUN mg/dL 25 27* 31* 34* 36* 43*   < > 79*   CREATININE mg/dL 1.12 1.18 1.17 1.18 1.22 1.13   < > 1.62*   EGFR ml/min/1.73sq m 75 70 71 70 67 74   < > 48   CALCIUM mg/dL 7.3* 7.3* 7.3* 7.5* 7.4* 7.5*   < > 7.8*   CALCIUM, IONIZED mmol/L  --   --   --  1.12  --   --   --   --    MAGNESIUM mg/dL  --   --   --  2.5  --  2.1  --  2.3   PHOSPHORUS mg/dL  --   --   --  3.2  --  3.6  --  4.7*    < > = values in this interval not displayed.      Results from last 7 days   Lab Units 09/12/23  0524 09/07/23  0506 09/06/23  0450   AST U/L 10* 11* 13   ALT U/L 4* 9 10   ALK PHOS U/L 45 42 43   TOTAL PROTEIN g/dL 5.5* 5.6* 5.5*   ALBUMIN g/dL 2.3* 2.5* 2.4*   TOTAL BILIRUBIN mg/dL 0.39 0. 58 0.54   BILIRUBIN DIRECT mg/dL 0.10  --   --      Results from last 7 days   Lab Units 09/12/23  1139 09/12/23  0727 09/11/23  2114 09/11/23  1646 09/11/23  1148 09/11/23  0805 09/10/23  2358 09/10/23  2129 09/10/23  1630 09/10/23  1103 09/10/23  0727 09/09/23  2105   POC GLUCOSE mg/dl 118 143* 137 115 144* 129 108 85 142* 180* 180* 148*     Results from last 7 days   Lab Units 09/12/23  0524 09/11/23  0554 09/10/23  0612 09/09/23  1226 09/09/23  0454 09/08/23  0512 09/07/23  0506 09/06/23  2143 09/06/23  0450   GLUCOSE RANDOM mg/dL 123 133 174* 124 138 161* 128 151* 192*     Results from last 7 days   Lab Units 09/12/23  0524 09/11/23  1220 09/11/23  0554   PTT seconds 68* 60* 77*       Results from last 7 days   Lab Units 09/08/23  0721 09/07/23  0555   UNIT PRODUCT CODE  [id number]  [id number]   UNIT NUMBER  [id number]  [id number]   UNITABO  B  B B   UNITRH  POS  POS POS   CROSSMATCH  Compatible  Compatible Compatible   UNIT DISPENSE STATUS  Return to Inv  Return to Inv Presumed Trans   UNIT PRODUCT VOL ml 350  350 300     Results from last 7 days   Lab Units 09/09/23  0819 09/06/23  2127 09/06/23  1512 09/06/23  0937   BLOOD CULTURE   --   --   --  No Growth After 5 Days. No Growth After 5 Days. GRAM STAIN RESULT  No Polys or Bacteria seen No Polys or Bacteria seen 1+ Polys  No bacteria seen  --    BODY FLUID CULTURE, STERILE   --  4+ Growth of Escherichia coli*  4+ Growth of Citrobacter freundii*  4+ Growth of Klebsiella aerogenes*  1+ Growth of Aeromonas hydrophila* No growth  --        Medications:   Scheduled Medications:   Albumin 5%, 25 g, Intravenous, Once  cefTRIAXone, 2,000 mg, Intravenous, Q24H  chlorhexidine, 15 mL, Mouth/Throat, Q12H ALEX  furosemide, 20 mg, Oral, Daily  insulin lispro, 2-12 Units, Subcutaneous, TID AC  insulin lispro, 2-12 Units, Subcutaneous, HS  insulin lispro, 5 Units, Subcutaneous, TID With Meals  metroNIDAZOLE, 500 mg, Intravenous, Q8H  polyethylene glycol, 17 g, Oral, BID    Continuous IV Infusions:  heparin (porcine), 3-26 Units/kg/hr (Order-Specific), Intravenous, Titrated    PRN Meds:  acetaminophen, 650 mg, Oral, Q6H PRN  bisacodyl, 10 mg, Rectal, Daily PRN  HYDROmorphone, 0.5 mg, Intravenous, Q4H PRN  mineral oil, 1 enema, Rectal, Daily PRN  oxyCODONE, 5 mg, Oral, Q6H PRN   Or  oxyCODONE, 10 mg, Oral, Q6H PRN        Discharge Plan: home with [facility] Utilization Review Department  ATTENTION: Please call with any questions or concerns to [phone] and carefully listen to the prompts so that you are directed to the right person. All voicemails are confidential.  Ariadna Dockery all requests for admission clinical reviews, approved or denied determinations and any other requests to dedicated fax number below belonging to the campus where the patient is receiving treatment.  List of dedicated fax numbers for the Facilities:  [place] DENIALS (Administrative/Medical Necessity) [phone]   [address] [facility] (Maternity/NICU/Pediatrics) [phone]   [address] [place] [address] Drive [phone]   [facility] [address] [facility] [phone]   [facility] 207 [facility] [id number] [place] [address] [phone]   [address] [facility] [address] [facility] [phone]

## 2023-09-12 NOTE — WOUND OSTOMY CARE
Progress Note - Wound   Alena Hatfield 46 y.o. male MRN: [id number]  Unit/Bed#: [facility] 901-01 Encounter: [id number]      Assessment:   Patient admitted due to necrotizing pancreatitis. History of pancreatitis. Wound care follow-up for buttock and abdominal pannus wounds. Patient agreeable to assessment, alert and oriented x4, continent of bowel and bladder, assist of 1 to turn for assessment, is an assist with care. Primary RN at bedside for assessment. 1. MASD/IAD abdominal pannus- Two wounds pictured and measured together, Wounds are linear in shape, full-thickness, approx. 30% pink tissue and 70% yellow adhered tissue, with scant amount of serous drainage noted. Zaina-wounds are dry, intact, no redness. 2. Pressure injury right buttock, unstageable- POA- Wound is oval in shape, true depth unknown, approx. 20% pink tissue and 80% yellow adhered tissue, with scant amount of serosanguineous drainage noted. Zaina-wound is dry, intact, blanchable. 3. Bilateral sacrum, hips, and heels- skin is dry, intact, blanchable. Educated patient on importance of frequent offloading of pressure via turning, repositioning, and weight-shifting. Verbalized understanding of plan of care. No induration, fluctuance, odor, warmth, redness, or purulence noted to the above noted wounds. New dressing applied. Patient tolerated well, reports mild pain to the wounds. Primary nurse aware of plan of care. See flow sheets for more detailed assessment findings. Will follow along. Skin care plans:  1-Right buttock- Cleanse sacro-buttocks with soap and water, pat dry. Apply triad paste over open wound bed and cover with Allevyn foam dressing. Change every other day and as needed. 2-Allevyn foam to heels, trina w/P, peel foam check skin integrity q-shift. Change q3d  3-Elevate heels to offload pressure  4-Ehob cushion when out of bed. 5-Turn/reposition q2h for pressure re-distribution on skin.   6-Moisturize skin daily with skin nourishing cream.  7-Abdominal pannus- Cleanse with soap and water, pat dry. Apply Maxorb rope, change daily and as needed for soilage. Wound 09/04/23 Pressure Injury Buttocks (Active)   Wound Image   09/12/23 0856   Wound Description Yellow;Pink 09/12/23 0856   Pressure Injury Stage U 09/12/23 0856   Zaina-wound Assessment Dry; Intact 09/12/23 0856   Wound Length (cm) 2 cm 09/12/23 0856   Wound Width (cm) 2 cm 09/12/23 0856   Wound Depth (cm) 0.2 cm 09/12/23 0856   Wound Surface Area (cm^2) 4 cm^2 09/12/23 0856   Wound Volume (cm^3) 0.8 cm^3 09/12/23 0856   Calculated Wound Volume (cm^3) 0.8 cm^3 09/12/23 0856   Change in Wound Size % 83.33 09/12/23 0856   Tunneling 0 cm 09/12/23 0856   Undermining 0 09/12/23 0856   Drainage Amount Scant 09/12/23 0856   Drainage Description Serosanguineous 09/12/23 0856   Non-staged Wound Description Not applicable 47/46/98 7624   Treatments Cleansed;Site care 09/12/23 0856   Dressing Foam, Silicon (eg. Allevyn, etc) 09/12/23 0856   Wound packed? No 09/12/23 0856                      Wound 09/05/23 MASD Abdomen Anterior; Left (Active)   Wound Image   09/12/23 0854   Wound Description Pink;Yellow 09/12/23 0854   Zaina-wound Assessment Dry; Intact 09/12/23 0854   Wound Length (cm) 0.3 cm 09/12/23 0854   Wound Width (cm) 13 cm 09/12/23 0854   Wound Depth (cm) 0.2 cm 09/12/23 0854   Wound Surface Area (cm^2) 3.9 cm^2 09/12/23 0854   Wound Volume (cm^3) 0.78 cm^3 09/12/23 0854   Calculated Wound Volume (cm^3) 0.78 cm^3 09/12/23 0854   Change in Wound Size % 80.5 09/12/23 0854   Tunneling 0 cm 09/12/23 0854   Undermining 0 09/12/23 0854        Drainage Amount Scant 09/12/23 0854   Drainage Description Serous 09/12/23 0854   Non-staged Wound Description Full thickness 09/12/23 0854   Treatments Cleansed;Irrigation with NSS;Site care 09/12/23 0854   Dressing Calcium Alginate 09/12/23 0854   Wound packed?  No 09/12/23 0854   Dressing Changed Changed 09/12/23 0854   Patient Tolerance Tolerated well 09/12/23 0854   Dressing Status Clean;Dry; Intact 09/12/23 0854       Call or tigertext with any questions  Wound Care will continue to follow    Sarah Ferrer BSN RN CWON  Wound and Ostomy care

## 2023-09-12 NOTE — PROGRESS NOTES
Progress Note - Infectious Disease   Mary Gutierrez 46 y.o. male MRN: [id number]  Unit/Bed#: [facility] 901-01 Encounter: [id number]      Impression/Recommendations:  Septic shock, POA.    Fever, WBC, refractory hypotension.  Due to bacteremia, pancreatic abscess as below.  No other appreciable source.  Improving, now off pressors. Rec:  • Continue antibiotics as below  • Follow temperatures closely  • Recheck CBC in AM  • Supportive care as per the primary service     Polymicrobial bacteremia.    Klebsiella, Strep mitis/oralis.  Coagulase-negative Staph x2 from single set are likely contaminants.  Due to pancreatic abscess as below.  No other appreciable source.  Repeat blood cultures 9/6, TTE (technically difficult) negative. Rec:  • Continue ceftriaxone for 14 days total antibiotics through 9/17     Pancreatic abscess.    Due to superinfection of necrosis as below.  Status post drain upsizing 9/4 which yielded infected hemorrhagic material.  Cultures with Citrobacter, E. Coli, Klebsiella, Aeromonas, Bacteroides.  Status post additional drains x2 9/6. Lissett Perez same organisms.    Rec:  • Continue ceftriaxone/Flagyl for 14 days total antibiotics through 9/17  • Follow drain output closely  • Cyst gastrostomy placement pending 9/14 per GI     Necrotizing pancreatitis. Presumed alcoholic.  Status post IR drain 8/5 yielding dark bloody fluid.  Cultures initially negative.  Current CT shows replacing most of pancreatic parenchyma, extending to pararenal spaces, causing left colonic obstruction, gastric outlet obstruction.  Status post drain upsizing 9/4, additional drains x2 9/6 yielding superinfected fluid as above.  Collection not mature enough for cyst gastrostomy per GI. Status post bedside tube interrogation, necrosectomy 9/8. Rec:  • Follow drain output closely  • Cyst gastrostomy placement pending 9/14 per GI  • Close Surgery, GI follow-up ongoing     JERZY.   Likely multifactorial.  Improving.     Large bowel obstruction. Due to compression from pancreatitis as above.     Gastric outlet obstruction. Due to compression from pancreatitis as above.     Bilateral pleural effusions. Likely reactive to pancreatitis as above.  Status post left thoracentesis yielding 1200 cc clear yellow fluid.  Fluid cultures negative.     Ascites. Likely reactive to pancreatitis as above.  Status post paracentesis.  Fluid cultures negative.     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       Antibiotics:  Cefepime #9  Flagyl #8  Antibiotics #9    Subjective:  Patient seen on AM rounds. Doing well. Denies fevers, chills, sweats, nausea, vomiting, or diarrhea. Denies pain. 24 Hour Events:  No documented fevers, chills, sweats, nausea, vomiting, or diarrhea. Objective:  Vitals:  Temp:  [99 °F (37.2 °C)-99.7 °F (37.6 °C)] 99.3 °F (37.4 °C)  HR:  [103-111] 103  Resp:  [14-18] 18  BP: (108-137)/(61-77) 117/74  SpO2:  [91 %-94 %] 94 %  Temp (24hrs), Av.3 °F (37.4 °C), Min:99 °F (37.2 °C), Max:99.7 °F (37.6 °C)  Current: Temperature: 99.3 °F (37.4 °C)    Physical Exam:   General:  No acute distress  Psychiatric:  Awake and alert  Pulmonary:  Normal respiratory excursion without accessory muscle use  Abdomen:  Obese, cloudy gray-brown fluid in drain bulbs  Extremities:  Nonpitting leg edema  Skin:  No rashes    Lab Results:  I have personally reviewed pertinent labs.   Results from last 7 days   Lab Units 23  0524 23  0554 09/10/23  0612 23  0512 23  0506 23  2143 23  0450   POTASSIUM mmol/L 3.5 3.6 3.9   < > 3.6   < > 3.7   CHLORIDE mmol/L 105 104 105   < > 104   < > 98   CO2 mmol/L 26 25 27   < > 30   < > 28   BUN mg/dL 25 27* 31*   < > 57*   < > 79*   CREATININE mg/dL 1.12 1.18 1.17   < > 1.40*   < > 1.62*   EGFR ml/min/1.73sq m 75 70 71   < > 57   < > 48   CALCIUM mg/dL 7.3* 7.3* 7.3*   < > 7.7*   < > 7.8*   AST U/L 10*  --   --   --  11*  --  13   ALT U/L 4*  --   --   --  9  --  10 ALK PHOS U/L 45  --   --   --  42  --  43    < > = values in this interval not displayed. Results from last 7 days   Lab Units 09/12/23  0524 09/11/23  0554 09/10/23  0612   WBC Thousand/uL 12.96* 10.41* 10.21*   HEMOGLOBIN g/dL 7.7* 7.9* 7.5*   PLATELETS Thousands/uL 284 256 260     Results from last 7 days   Lab Units 09/09/23  0819 09/06/23  2127 09/06/23  1512 09/06/23  0937   BLOOD CULTURE   --   --   --  No Growth After 5 Days. No Growth After 5 Days. GRAM STAIN RESULT  No Polys or Bacteria seen No Polys or Bacteria seen 1+ Polys  No bacteria seen  --    BODY FLUID CULTURE, STERILE   --  4+ Growth of Escherichia coli*  4+ Growth of Citrobacter freundii*  4+ Growth of Klebsiella aerogenes*  1+ Growth of Aeromonas hydrophila* No growth  --        Imaging Studies:   I have personally reviewed pertinent imaging study reports and images in PACS. EKG, Pathology, and Other Studies:   I have personally reviewed pertinent reports.

## 2023-09-12 NOTE — PLAN OF CARE
Problem: PAIN - ADULT  Goal: Verbalizes/displays adequate comfort level or baseline comfort level  Description: Interventions:  - Encourage patient to monitor pain and request assistance  - Assess pain using appropriate pain scale  - Administer analgesics based on type and severity of pain and evaluate response  - Implement non-pharmacological measures as appropriate and evaluate response  - Consider cultural and social influences on pain and pain management  - Notify physician/advanced practitioner if interventions unsuccessful or patient reports new pain  Outcome: Progressing     Problem: INFECTION - ADULT  Goal: Absence or prevention of progression during hospitalization  Description: INTERVENTIONS:  - Assess and monitor for signs and symptoms of infection  - Monitor lab/diagnostic results  - Monitor all insertion sites, i.e. indwelling lines, tubes, and drains  - Monitor endotracheal if appropriate and nasal secretions for changes in amount and color  - Bertha appropriate cooling/warming therapies per order  - Administer medications as ordered  - Instruct and encourage patient and family to use good hand hygiene technique  - Identify and instruct in appropriate isolation precautions for identified infection/condition  Outcome: Progressing  Goal: Absence of fever/infection during neutropenic period  Description: INTERVENTIONS:  - Monitor WBC    Outcome: Progressing     Problem: SAFETY ADULT  Goal: Patient will remain free of falls  Description: INTERVENTIONS:  - Educate patient/family on patient safety including physical limitations  - Instruct patient to call for assistance with activity   - Consult OT/PT to assist with strengthening/mobility   - Keep Call bell within reach  - Keep bed low and locked with side rails adjusted as appropriate  - Keep care items and personal belongings within reach  - Initiate and maintain comfort rounds  - Make Fall Risk Sign visible to staff  - Offer Toileting every 3 Hours, in advance of need  - Initiate/Maintain 3alarm  - Obtain necessary fall risk management equipment: 3  - Apply yellow socks and bracelet for high fall risk patients  - Consider moving patient to room near nurses station  Outcome: Progressing  Goal: Maintain or return to baseline ADL function  Description: INTERVENTIONS:  -  Assess patient's ability to carry out ADLs; assess patient's baseline for ADL function and identify physical deficits which impact ability to perform ADLs (bathing, care of mouth/teeth, toileting, grooming, dressing, etc.)  - Assess/evaluate cause of self-care deficits   - Assess range of motion  - Assess patient's mobility; develop plan if impaired  - Assess patient's need for assistive devices and provide as appropriate  - Encourage maximum independence but intervene and supervise when necessary  - Involve family in performance of ADLs  - Assess for home care needs following discharge   - Consider OT consult to assist with ADL evaluation and planning for discharge  - Provide patient education as appropriate  Outcome: Progressing  Goal: Maintains/Returns to pre admission functional level  Description: INTERVENTIONS:  - Perform BMAT or MOVE assessment daily.   - Set and communicate daily mobility goal to care team and patient/family/caregiver. - Collaborate with rehabilitation services on mobility goals if consulted  - Perform Range of Motion 3 times a day. - Reposition patient every 3 hours.   - Dangle patient 3 times a day  - Stand patient 3 times a day  - Ambulate patient 3 times a day  - Out of bed to chair 3 times a day   - Out of bed for meals 3 times a day  - Out of bed for toileting  - Record patient progress and toleration of activity level   Outcome: Progressing     Problem: DISCHARGE PLANNING  Goal: Discharge to home or other facility with appropriate resources  Description: INTERVENTIONS:  - Identify barriers to discharge w/patient and caregiver  - Arrange for needed discharge resources and transportation as appropriate  - Identify discharge learning needs (meds, wound care, etc.)  - Arrange for interpretive services to assist at discharge as needed  - Refer to Case Management Department for coordinating discharge planning if the patient needs post-hospital services based on physician/advanced practitioner order or complex needs related to functional status, cognitive ability, or social support system  Outcome: Progressing     Problem: Knowledge Deficit  Goal: Patient/family/caregiver demonstrates understanding of disease process, treatment plan, medications, and discharge instructions  Description: Complete learning assessment and assess knowledge base. Interventions:  - Provide teaching at level of understanding  - Provide teaching via preferred learning methods  Outcome: Progressing     Problem: MOBILITY - ADULT  Goal: Maintain or return to baseline ADL function  Description: INTERVENTIONS:  -  Assess patient's ability to carry out ADLs; assess patient's baseline for ADL function and identify physical deficits which impact ability to perform ADLs (bathing, care of mouth/teeth, toileting, grooming, dressing, etc.)  - Assess/evaluate cause of self-care deficits   - Assess range of motion  - Assess patient's mobility; develop plan if impaired  - Assess patient's need for assistive devices and provide as appropriate  - Encourage maximum independence but intervene and supervise when necessary  - Involve family in performance of ADLs  - Assess for home care needs following discharge   - Consider OT consult to assist with ADL evaluation and planning for discharge  - Provide patient education as appropriate  Outcome: Progressing  Goal: Maintains/Returns to pre admission functional level  Description: INTERVENTIONS:  - Perform BMAT or MOVE assessment daily.   - Set and communicate daily mobility goal to care team and patient/family/caregiver.    - Collaborate with rehabilitation services on mobility goals if consulted  - Perform Range of Motion 3 times a day. - Reposition patient every 3 hours. - Dangle patient 3 times a day  - Stand patient 3 times a day  - Ambulate patient 3 times a day  - Out of bed to chair 3 times a day   - Out of bed for meals 3 times a day  - Out of bed for toileting  - Record patient progress and toleration of activity level   Outcome: Progressing     Problem: Prexisting or High Potential for Compromised Skin Integrity  Goal: Skin integrity is maintained or improved  Description: INTERVENTIONS:  - Identify patients at risk for skin breakdown  - Assess and monitor skin integrity  - Assess and monitor nutrition and hydration status  - Monitor labs   - Assess for incontinence   - Turn and reposition patient  - Assist with mobility/ambulation  - Relieve pressure over bony prominences  - Avoid friction and shearing  - Provide appropriate hygiene as needed including keeping skin clean and dry  - Evaluate need for skin moisturizer/barrier cream  - Collaborate with interdisciplinary team   - Patient/family teaching  - Consider wound care consult   Outcome: Progressing     Problem: Nutrition/Hydration-ADULT  Goal: Nutrient/Hydration intake appropriate for improving, restoring or maintaining nutritional needs  Description: Monitor and assess patient's nutrition/hydration status for malnutrition. Collaborate with interdisciplinary team and initiate plan and interventions as ordered. Monitor patient's weight and dietary intake as ordered or per policy. Utilize nutrition screening tool and intervene as necessary. Determine patient's food preferences and provide high-protein, high-caloric foods as appropriate.      INTERVENTIONS:  - Monitor oral intake, urinary output, labs, and treatment plans  - Assess nutrition and hydration status and recommend course of action  - Evaluate amount of meals eaten  - Assist patient with eating if necessary   - Allow adequate time for meals  - Recommend/ encourage appropriate diets, oral nutritional supplements, and vitamin/mineral supplements  - Order, calculate, and assess calorie counts as needed  - Recommend, monitor, and adjust tube feedings and TPN/PPN based on assessed needs  - Assess need for intravenous fluids  - Provide specific nutrition/hydration education as appropriate  - Include patient/family/caregiver in decisions related to nutrition  Outcome: Progressing

## 2023-09-12 NOTE — PHYSICAL THERAPY NOTE
PHYSICAL THERAPY NOTE          [facility]: [facility] 09/12/23 1221   PT Last Visit   PT Visit Date 09/12/23   Note Type   Note Type Treatment   Pain Assessment   Pain Assessment Tool 0-10   Pain Score No Pain   Restrictions/Precautions   Weight Bearing Precautions Per Order No   Other Precautions Multiple lines; Fall Risk;Pain  (drains, LE edema)   General   Chart Reviewed Yes   Response to Previous Treatment Patient with no complaints from previous session. Family/Caregiver Present Yes   Cognition   Overall Cognitive Status WFL   Arousal/Participation Alert; Cooperative   Attention Within functional limits   Orientation Level Oriented X4   Memory Within functional limits   Following Commands Follows all commands and directions without difficulty   Comments pt has flat affect, cooperative. expresses frustration regarding medical status + deconditioning   Bed Mobility   Supine to Sit Unable to assess   Sit to Supine Unable to assess   Additional Comments pt OOB in recliner upon arrival. Pt left sitting OOB in recliner with all needs wihtin reach   Transfers   Sit to Stand 5  Supervision   Additional items Armrests; Increased time required   Stand to Sit 5  Supervision   Additional items Armrests; Increased time required   Additional Comments transfers with RW; performs 4x STS t/o session   Ambulation/Elevation   Gait pattern Excessively slow; Short stride; Foward flexed;Decreased foot clearance   Gait Assistance 4  Minimal assist   Additional items Assist x 1;Verbal cues   Assistive Device Rolling walker   Distance 20ft, 30ft, 20  (seated rest breaks in betweem trials)   Stair Management Assistance Not tested   Ambulation/Elevation Additional Comments CERON + Tachycardia noted with ambulation. O2 sat dropping to 84% on RA, resaturation to 92% with rest break.  HR elevated to 122   Balance   Static Sitting Fair +   Dynamic Sitting Fair   Static Standing Fair -   Dynamic Standing Poor +   Ambulatory Poor +   Endurance Deficit   Endurance Deficit Yes   Endurance Deficit Description gneneralized weakness, deconditioning, fatigue, pain   Activity Tolerance   Activity Tolerance Patient tolerated treatment well;Patient limited by fatigue   Medical Staff Made Aware rehab aidchristiano Spencer   Nurse Made Aware RN cleared pt to participate in therapy session   Exercises   Knee AROM Long Arc Quad Sitting;Bilateral;10 reps;AROM   Ankle Pumps Sitting;Bilateral;10 reps;AROM   Marching Sitting;Bilateral;10 reps;AROM   Assessment   Prognosis Good   Problem List Decreased strength;Decreased range of motion;Decreased endurance; Impaired balance;Decreased mobility;Obesity; Decreased skin integrity;Pain   Assessment Pt seen for PT treatment session this date. Therapy session focused on functional transfers, gait training, endurance training and therex in order to improve overall mobility and independence. Pt requires assist of 1 for all mobility performed this date. Pt making steady progress toward goals as demonstrated by tolerating 3x gait trials. Pt continues to require extended seated rest breaks after all mobility, cues for pacing and sequencing also required. Pt performed/ tolerated seated therex without complaints. Pt was left sitting OOB in recliner at the end of PT session with all needs in reach. Pt would benefit from continued PT services while in hospital to address remaining limitations. PT to continue to follow pt and recommends home with hHPT + increased social support. The patient's AM-PAC Basic Mobility Inpatient Short Form Raw Score is 17. A Raw score of greater than 16 suggests the patient may benefit from discharge to home. Please also refer to the recommendation of the Physical Therapist for safe discharge planning.    Goals   Patient Goals to rest   STG Expiration Date 09/21/23   PT Treatment Day 3   Plan Treatment/Interventions Functional transfer training;LE strengthening/ROM; Therapeutic exercise; Endurance training;Patient/family training;Equipment eval/education; Bed mobility;Gait training;Spoke to nursing;Spoke to case management;OT   Progress Progressing toward goals   PT Frequency 3-5x/wk   Recommendation   PT Discharge Recommendation Home with home health rehabilitation   Equipment Recommended [address] walker   AM-PAC Basic Mobility Inpatient   Turning in Flat Bed Without Bedrails 3   Lying on Back to Sitting on Edge of Flat Bed Without Bedrails 3   Moving Bed to Chair 3   Standing Up From Chair Using Arms 3   Walk in Room 3   Climb 3-5 Stairs With Railing 2   Basic Mobility Inpatient Raw Score 17   Basic Mobility Standardized Score 39.67   Highest Level Of Mobility   JH-HLM Goal 5: Stand one or more mins   JH-HLM Achieved 7: Walk 25 feet or more   Education   Education Provided Mobility training;Assistive device;Home exercise program   Patient Demonstrates acceptance/verbal understanding   End of Consult   Patient Position at End of Consult Bedside chair; All needs within reach     Ameena Wilson, PT, DPT

## 2023-09-13 LAB
ANION GAP SERPL CALCULATED.3IONS-SCNC: 9 MMOL/L
APTT PPP: 68 SECONDS (ref 23–37)
BACTERIA TISS AEROBE CULT: ABNORMAL
BUN SERPL-MCNC: 22 MG/DL (ref 5–25)
CALCIUM SERPL-MCNC: 7.6 MG/DL (ref 8.4–10.2)
CHLORIDE SERPL-SCNC: 106 MMOL/L (ref 96–108)
CO2 SERPL-SCNC: 24 MMOL/L (ref 21–32)
CREAT SERPL-MCNC: 1.14 MG/DL (ref 0.6–1.3)
ERYTHROCYTE [DISTWIDTH] IN BLOOD BY AUTOMATED COUNT: 18.5 % (ref 11.6–15.1)
GFR SERPL CREATININE-BSD FRML MDRD: 73 ML/MIN/1.73SQ M
GLUCOSE SERPL-MCNC: 111 MG/DL (ref 65–140)
GLUCOSE SERPL-MCNC: 115 MG/DL (ref 65–140)
GLUCOSE SERPL-MCNC: 118 MG/DL (ref 65–140)
GLUCOSE SERPL-MCNC: 121 MG/DL (ref 65–140)
GLUCOSE SERPL-MCNC: 89 MG/DL (ref 65–140)
GLUCOSE SERPL-MCNC: 98 MG/DL (ref 65–140)
GRAM STN SPEC: ABNORMAL
HCT VFR BLD AUTO: 25.3 % (ref 36.5–49.3)
HGB BLD-MCNC: 7.7 G/DL (ref 12–17)
MCH RBC QN AUTO: 26.8 PG (ref 26.8–34.3)
MCHC RBC AUTO-ENTMCNC: 30.4 G/DL (ref 31.4–37.4)
MCV RBC AUTO: 88 FL (ref 82–98)
PLATELET # BLD AUTO: 292 THOUSANDS/UL (ref 149–390)
PMV BLD AUTO: 10 FL (ref 8.9–12.7)
POTASSIUM SERPL-SCNC: 3.6 MMOL/L (ref 3.5–5.3)
RBC # BLD AUTO: 2.87 MILLION/UL (ref 3.88–5.62)
SODIUM SERPL-SCNC: 139 MMOL/L (ref 135–147)
WBC # BLD AUTO: 13.46 THOUSAND/UL (ref 4.31–10.16)

## 2023-09-13 PROCEDURE — 80048 BASIC METABOLIC PNL TOTAL CA: CPT

## 2023-09-13 PROCEDURE — 97530 THERAPEUTIC ACTIVITIES: CPT

## 2023-09-13 PROCEDURE — 99232 SBSQ HOSP IP/OBS MODERATE 35: CPT | Performed by: SURGERY

## 2023-09-13 PROCEDURE — 85730 THROMBOPLASTIN TIME PARTIAL: CPT | Performed by: INTERNAL MEDICINE

## 2023-09-13 PROCEDURE — 99232 SBSQ HOSP IP/OBS MODERATE 35: CPT | Performed by: INTERNAL MEDICINE

## 2023-09-13 PROCEDURE — 82948 REAGENT STRIP/BLOOD GLUCOSE: CPT

## 2023-09-13 PROCEDURE — 85027 COMPLETE CBC AUTOMATED: CPT

## 2023-09-13 PROCEDURE — 97116 GAIT TRAINING THERAPY: CPT

## 2023-09-13 RX ADMIN — CEFTRIAXONE SODIUM 2000 MG: 10 INJECTION, POWDER, FOR SOLUTION INTRAVENOUS at 11:00

## 2023-09-13 RX ADMIN — CHLORHEXIDINE GLUCONATE 15 ML: 1.2 SOLUTION ORAL at 08:46

## 2023-09-13 RX ADMIN — INSULIN LISPRO 5 UNITS: 100 INJECTION, SOLUTION INTRAVENOUS; SUBCUTANEOUS at 08:47

## 2023-09-13 RX ADMIN — HEPARIN SODIUM 23.1 UNITS/KG/HR: 10000 INJECTION, SOLUTION INTRAVENOUS at 02:09

## 2023-09-13 RX ADMIN — METRONIDAZOLE 500 MG: 500 INJECTION, SOLUTION INTRAVENOUS at 04:39

## 2023-09-13 RX ADMIN — INSULIN LISPRO 5 UNITS: 100 INJECTION, SOLUTION INTRAVENOUS; SUBCUTANEOUS at 14:14

## 2023-09-13 RX ADMIN — INSULIN LISPRO 5 UNITS: 100 INJECTION, SOLUTION INTRAVENOUS; SUBCUTANEOUS at 17:08

## 2023-09-13 RX ADMIN — METRONIDAZOLE 500 MG: 500 INJECTION, SOLUTION INTRAVENOUS at 21:46

## 2023-09-13 RX ADMIN — CHLORHEXIDINE GLUCONATE 15 ML: 1.2 SOLUTION ORAL at 21:46

## 2023-09-13 RX ADMIN — HEPARIN SODIUM 23.11 UNITS/KG/HR: 10000 INJECTION, SOLUTION INTRAVENOUS at 17:07

## 2023-09-13 RX ADMIN — FUROSEMIDE 20 MG: 20 TABLET ORAL at 08:47

## 2023-09-13 RX ADMIN — METRONIDAZOLE 500 MG: 500 INJECTION, SOLUTION INTRAVENOUS at 14:14

## 2023-09-13 NOTE — QUICK NOTE
Nurse-Patient-Provider rounds were completed with the patient's nurse today, Benoit Kohli. We discussed the plan is to diet as tolerated with Glucerna supplements for nutritional support with plan to keep n.p.o. after midnight for GI intervention on 9/14/2023. Patient planning to go with GI for cystogastrostomy on 9/14/2023. Continue current IV antibiotic regimen with Rocephin and Flagyl through anticipated completion date of 9/17/2023 per ID; appreciate ID evaluation and assistance with management. Continue current drains and flushes per GI recommendations. We reviewed all of the invasive devices/lines/telemetry orders.  - Maintain PICC line and IR drains for ongoing management of pancreatitis. DVT Prophylaxis:  - Currently anticoagulated with heparin infusion. Pain Assessment / Plan:  - Continue current analgesic regimen. Mobility Assessment / Plan:  - Activity as tolerated. Goals / Barriers for discharge:  - Not yet appropriate for discharge while pending GI intervention and further inpatient management for severe infected necrotizing pancreatitis. - Case management following; case and discharge needs discussed. All questions and concerns were addressed. I spent greater than 20 minutes reviewing the plan with the patient and the nurse, and coordinating care for the day.     Umesh Aguayo PA-C  9/13/2023 09:06 AM

## 2023-09-13 NOTE — PLAN OF CARE
Problem: PAIN - ADULT  Goal: Verbalizes/displays adequate comfort level or baseline comfort level  Description: Interventions:  - Encourage patient to monitor pain and request assistance  - Assess pain using appropriate pain scale  - Administer analgesics based on type and severity of pain and evaluate response  - Implement non-pharmacological measures as appropriate and evaluate response  - Consider cultural and social influences on pain and pain management  - Notify physician/advanced practitioner if interventions unsuccessful or patient reports new pain  Outcome: Progressing     Problem: INFECTION - ADULT  Goal: Absence or prevention of progression during hospitalization  Description: INTERVENTIONS:  - Assess and monitor for signs and symptoms of infection  - Monitor lab/diagnostic results  - Monitor all insertion sites, i.e. indwelling lines, tubes, and drains  - Monitor endotracheal if appropriate and nasal secretions for changes in amount and color  - Witten appropriate cooling/warming therapies per order  - Administer medications as ordered  - Instruct and encourage patient and family to use good hand hygiene technique  - Identify and instruct in appropriate isolation precautions for identified infection/condition  Outcome: Progressing  Goal: Absence of fever/infection during neutropenic period  Description: INTERVENTIONS:  - Monitor WBC    Outcome: Progressing     Problem: SAFETY ADULT  Goal: Patient will remain free of falls  Description: INTERVENTIONS:  - Educate patient/family on patient safety including physical limitations  - Instruct patient to call for assistance with activity   - Consult OT/PT to assist with strengthening/mobility   - Keep Call bell within reach  - Keep bed low and locked with side rails adjusted as appropriate  - Keep care items and personal belongings within reach  - Initiate and maintain comfort rounds  - Make Fall Risk Sign visible to staff  - Apply yellow socks and bracelet for high fall risk patients  - Consider moving patient to room near nurses station  Outcome: Progressing  Goal: Maintain or return to baseline ADL function  Description: INTERVENTIONS:  -  Assess patient's ability to carry out ADLs; assess patient's baseline for ADL function and identify physical deficits which impact ability to perform ADLs (bathing, care of mouth/teeth, toileting, grooming, dressing, etc.)  - Assess/evaluate cause of self-care deficits   - Assess range of motion  - Assess patient's mobility; develop plan if impaired  - Assess patient's need for assistive devices and provide as appropriate  - Encourage maximum independence but intervene and supervise when necessary  - Involve family in performance of ADLs  - Assess for home care needs following discharge   - Consider OT consult to assist with ADL evaluation and planning for discharge  - Provide patient education as appropriate  Outcome: Progressing  Goal: Maintains/Returns to pre admission functional level  Description: INTERVENTIONS:  - Perform BMAT or MOVE assessment daily.   - Set and communicate daily mobility goal to care team and patient/family/caregiver.    - Collaborate with rehabilitation services on mobility goals if consulted  - Out of bed for toileting  - Record patient progress and toleration of activity level   Outcome: Progressing     Problem: DISCHARGE PLANNING  Goal: Discharge to home or other facility with appropriate resources  Description: INTERVENTIONS:  - Identify barriers to discharge w/patient and caregiver  - Arrange for needed discharge resources and transportation as appropriate  - Identify discharge learning needs (meds, wound care, etc.)  - Arrange for interpretive services to assist at discharge as needed  - Refer to Case Management Department for coordinating discharge planning if the patient needs post-hospital services based on physician/advanced practitioner order or complex needs related to functional status, cognitive ability, or social support system  Outcome: Progressing     Problem: Knowledge Deficit  Goal: Patient/family/caregiver demonstrates understanding of disease process, treatment plan, medications, and discharge instructions  Description: Complete learning assessment and assess knowledge base. Interventions:  - Provide teaching at level of understanding  - Provide teaching via preferred learning methods  Outcome: Progressing     Problem: MOBILITY - ADULT  Goal: Maintain or return to baseline ADL function  Description: INTERVENTIONS:  -  Assess patient's ability to carry out ADLs; assess patient's baseline for ADL function and identify physical deficits which impact ability to perform ADLs (bathing, care of mouth/teeth, toileting, grooming, dressing, etc.)  - Assess/evaluate cause of self-care deficits   - Assess range of motion  - Assess patient's mobility; develop plan if impaired  - Assess patient's need for assistive devices and provide as appropriate  - Encourage maximum independence but intervene and supervise when necessary  - Involve family in performance of ADLs  - Assess for home care needs following discharge   - Consider OT consult to assist with ADL evaluation and planning for discharge  - Provide patient education as appropriate  Outcome: Progressing  Goal: Maintains/Returns to pre admission functional level  Description: INTERVENTIONS:  - Perform BMAT or MOVE assessment daily.   - Set and communicate daily mobility goal to care team and patient/family/caregiver.    - Collaborate with rehabilitation services on mobility goals if consulted  - Out of bed for toileting  - Record patient progress and toleration of activity level   Outcome: Progressing     Problem: Prexisting or High Potential for Compromised Skin Integrity  Goal: Skin integrity is maintained or improved  Description: INTERVENTIONS:  - Identify patients at risk for skin breakdown  - Assess and monitor skin integrity  - Assess and monitor nutrition and hydration status  - Monitor labs   - Assess for incontinence   - Turn and reposition patient  - Assist with mobility/ambulation  - Relieve pressure over bony prominences  - Avoid friction and shearing  - Provide appropriate hygiene as needed including keeping skin clean and dry  - Evaluate need for skin moisturizer/barrier cream  - Collaborate with interdisciplinary team   - Patient/family teaching  - Consider wound care consult   Outcome: Progressing     Problem: Nutrition/Hydration-ADULT  Goal: Nutrient/Hydration intake appropriate for improving, restoring or maintaining nutritional needs  Description: Monitor and assess patient's nutrition/hydration status for malnutrition. Collaborate with interdisciplinary team and initiate plan and interventions as ordered. Monitor patient's weight and dietary intake as ordered or per policy. Utilize nutrition screening tool and intervene as necessary. Determine patient's food preferences and provide high-protein, high-caloric foods as appropriate.      INTERVENTIONS:  - Monitor oral intake, urinary output, labs, and treatment plans  - Assess nutrition and hydration status and recommend course of action  - Evaluate amount of meals eaten  - Assist patient with eating if necessary   - Allow adequate time for meals  - Recommend/ encourage appropriate diets, oral nutritional supplements, and vitamin/mineral supplements  - Order, calculate, and assess calorie counts as needed  - Recommend, monitor, and adjust tube feedings and TPN/PPN based on assessed needs  - Assess need for intravenous fluids  - Provide specific nutrition/hydration education as appropriate  - Include patient/family/caregiver in decisions related to nutrition  Outcome: Progressing

## 2023-09-13 NOTE — PROGRESS NOTES
[facility] - Inpatient Progress Note    PATIENT INFORMATION      Edwardo Turpin 46 y.o. male MRN: [id number]  Unit/Bed#: [facility] 901-01 Encounter: [id number]    ASSESSMENT & PLAN   54-year-old male with past medical history of recent admission for gallstone necrotizing pancreatitis complicated by fluid collection status post IR drain placement, obesity who was admitted for sepsis.  On 9/6 underwent repeat IR drain placement with 2 additional drains placed into necrotic fluid collection. GI consulted for evaluation of both necrotizing pancreatitis and colonic dilation.     1. 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 Bacteremia  3. Septic shock  4. SMV thrombus  5. Ascites  Patient initially diagnosed with necrotizing pancreatitis 7/2023. Initial Blood cultures growing Klebsiella and Strep with initial Fluid culture growing Bacteroides fragilis, Citrobacter freundii, EColi, Klebsiella, Aeromonas hydrophilia. Suspect infected necrosis as source of sepsis given enlarging collection and gram-negative bacteremia.  Mass effect from phlegmonous mass likely contributing to gastric/colonic distention. S/p IR drain placement on 9/6, now with 3 drains in place. Repeat blood cultures negative. • Patient s/p IR drain placement with plan for VARDS in future - discussed with advanced endoscopist  [facility] - plan for cystgastro placement on 9/14 for further management of necrotic collection  • NPO at midnight  • Appreciate ID consult - continue cefepime/flagyl  • Continue bowel regimen - continue to monitor stool output  • Management of IR drains per surgical team  • Monitor hemodynamics  • Monitor CBC     6. Dilated colon  Likely secondary to mass effect from extensive inflammatory changes noted around the pancreas.  Fortunately patient displays benign exam and having bowel movements making obstruction unlikely.  Repeat CT scan completed this morning with improved colonic dilation. Patient continues to have bowel movements and pass flatus. • No indication for decompressive colonoscopy at this time  • Recommend aggressive bowel regimen with MiraLAX twice daily  • Dulcolax rectal suppositories as needed  • Mineral oil enemas as needed.     7. Anemia  Most recent baseline Hgb 7-8 throughout hospitalization in setting of necrotic pancreatitis  • Hgb this AM 7.7 - stable, continue monitoring  • No overt S/S of GIB  • Possibly component of necrotizing pancreatitis  • Continue to monitor and transfuse for <7    SUBJECTIVE     Patient seen and evaluated at bedside. No abdominal discomfort. Reports frustrated with being hospitalized.  Confused in regards to plan    MEDICATIONS & ALLERGIES       Medications:   Medications Prior to Admission   Medication   • acetaminophen (TYLENOL) 325 mg tablet   • Alcohol Swabs 70 % PADS   • apixaban (Eliquis) 5 mg   • BD PosiFlush 0.9 % SOLN   • Blood Glucose Monitoring Suppl (OneTouch Verio Reflect) w/Device KIT   • Blood Pressure Monitoring (Adult Blood Pressure Cuff Lg) KIT   • furosemide (LASIX) 20 mg tablet   • glucose blood (OneTouch Verio) test strip   • Insulin Glargine Solostar (Lantus SoloStar) 100 UNIT/ML SOPN   • insulin lispro (HumaLOG KwikPen) 100 units/mL injection pen   • Insulin Pen Needle (BD Pen Needle Lupe 2nd Gen) 32G X 4 MM MISC   • Insulin Pen Needle (BD Pen Needle Lupe 2nd Gen) 32G X 4 MM MISC   • Levemir FlexPen 100 units/mL injection pen   • lisinopril (ZESTRIL) 10 mg tablet   • NovoLOG FlexPen 100 units/mL injection pen   • OneTouch Delica Lancets 68T MISC   • pancrelipase, Lip-Prot-Amyl, (CREON) 6,000 units delayed release capsule   • sodium chloride, PF, 0.9 %     Current Facility-Administered Medications   Medication Dose Route Frequency   • acetaminophen (TYLENOL) tablet 650 mg  650 mg Oral Q6H PRN   • bisacodyl (DULCOLAX) rectal suppository 10 mg  10 mg Rectal Daily PRN   • cefTRIAXone (ROCEPHIN) 2,000 mg in dextrose 5 % 50 mL IVPB  2,000 mg Intravenous Q24H   • chlorhexidine (PERIDEX) 0.12 % oral rinse 15 mL  15 mL Mouth/Throat Q12H [address]   • furosemide (LASIX) tablet 20 mg  20 mg Oral Daily   • heparin (porcine) 25,000 units in 0.45% NaCl 250 mL infusion (premix)  3-26 Units/kg/hr (Order-Specific) Intravenous Titrated   • HYDROmorphone (DILAUDID) injection 0.5 mg  0.5 mg Intravenous Q4H PRN   • insulin lispro (HumaLOG) 100 units/mL subcutaneous injection 2-12 Units  2-12 Units Subcutaneous TID AC   • insulin lispro (HumaLOG) 100 units/mL subcutaneous injection 2-12 Units  2-12 Units Subcutaneous HS   • insulin lispro (HumaLOG) 100 units/mL subcutaneous injection 5 Units  5 Units Subcutaneous TID With Meals   • metroNIDAZOLE (FLAGYL) IVPB (premix) 500 mg 100 mL  500 mg Intravenous Q8H   • mineral oil enema 1 enema  1 enema Rectal Daily PRN   • oxyCODONE (ROXICODONE) IR tablet 5 mg  5 mg Oral Q6H PRN    Or   • oxyCODONE (ROXICODONE) immediate release tablet 10 mg  10 mg Oral Q6H PRN   • polyethylene glycol (MIRALAX) packet 17 g  17 g Oral BID       Allergies:   No Known Allergies    PHYSICAL EXAM     Objective   Blood pressure 123/77, pulse (!) 109, temperature 98.4 °F (36.9 °C), resp. rate 16, height 5' 11" (1.803 m), weight 124 kg (273 lb 9.5 oz), SpO2 94 %. Body mass index is 38.16 kg/m². Intake/Output Summary (Last 24 hours) at 9/13/2023 0962  Last data filed at 9/13/2023 0449  Gross per 24 hour   Intake 1200 ml   Output 800 ml   Net 400 ml       General Appearance:   Alert, cooperative, no distress   HEENT:   Normocephalic, atraumatic, anicteric     Neck:   Supple, symmetrical, trachea midline   Lungs:   Equal chest rise, respirations unlabored    Heart:   Regular rate and rhythm   Abdomen:   Distended, ; normal bowel sounds; no masses, no organomegaly    Rectal:   Deferred    Extremities:   No cyanosis, clubbing or edema    Neuro:    Moves all 4 extremities    Skin:   No jaundice, rashes, or lesions      ADDITIONAL DATA     Lab Results:     Results from last 7 days   Lab Units 09/13/23  0446 09/09/23  0454 09/08/23  0512   WBC Thousand/uL 13.46*   < > 9.50   HEMOGLOBIN g/dL 7.7*   < > 7.7*   HEMATOCRIT % 25.3*   < > 25.2*   PLATELETS Thousands/uL 292   < > 278   LYMPHO PCT %  --   --  3*   MONO PCT %  --   --  5   EOS PCT %  --   --  0    < > = values in this interval not displayed. Results from last 7 days   Lab Units 09/13/23  0446 09/12/23  0524   POTASSIUM mmol/L 3.6 3.5   CHLORIDE mmol/L 106 105   CO2 mmol/L 24 26   BUN mg/dL 22 25   CREATININE mg/dL 1.14 1.12   CALCIUM mg/dL 7.6* 7.3*   ALK PHOS U/L  --  45   ALT U/L  --  4*   AST U/L  --  10*           Imaging:    IR INPATIENT Paracentesis    Result Date: 9/8/2023  Narrative: PROCEDURE: Therapeutic and diagnostic paracentesis MEDICATIONS: 1% lidocaine subcutaneous. INDICATION: Symptomatic ascites. Necrotizing pancreatitis. PROCEDURE: Using ultrasound guidance, the right lower quadrant was punctured and a 5f yueh catheter was placed into the abdominal cavity until ascites was aspirated. A total of 2900 milliliters of serous fluid was removed. The catheter was then removed. FINDINGS: 2900 mL of serous ascites was removed. Impression: Therapeutic and diagnostic paracentesis. Signed, performed, and dictated by GHASSAN Correa under the supervision of Dr. James Choudhury. Workstation performed: [id number]     CT abdomen pelvis wo contrast    Result Date: 9/8/2023  Narrative: CT ABDOMEN AND PELVIS WITHOUT IV CONTRAST INDICATION:   Persistent colonic distention. COMPARISON: Multiple prior studies, most recent is dated September 4, 2023. TECHNIQUE:  CT examination of the abdomen and pelvis was performed without intravenous contrast.  Axial, sagittal, and coronal 2D reformatted images were created from the source data and submitted for interpretation. Radiation dose length product (DLP) for this visit:  4512.84 mGy-cm .   This examination, like all CT scans performed in the [facility], was performed utilizing techniques to minimize radiation dose exposure, including the use of iterative reconstruction and automated exposure control. Enteric contrast was not administered. FINDINGS: ABDOMEN LOWER CHEST: Bilateral pleural effusions and underlying compressive atelectasis. LIVER/BILIARY TREE:  Unremarkable. GALLBLADDER:  No calcified gallstones. No pericholecystic inflammatory change. SPLEEN:  Unremarkable. PANCREAS: Again shown are findings of pancreatic necrosis with nearly the entire pancreas replaced by necrotic debris and gas. 2. Catheters draining the pancreatic debris, including an epigastric pigtail catheterq, and a large bore surgical catheter placed in the region of the tail the pancreas. Additional pigtail catheter has been placed in a left paracolic collection. The left  paracolic gutter collection is incompletely imaged but has decreased in size. ADRENAL GLANDS:  Unremarkable. KIDNEYS/URETERS: BOWEL: The cecum and transverse colon are distended with gas. The remainder of the colon is normal in caliber. When compared to the prior study though there has been improvement in the distention of the colon. The degree of cecal distention is similar with the cecum measuring approximately 9 cm. There is no cecal pneumatosis. Small bowel is normal in caliber. Fluid contents in the distal colon and rectum compatible with a diarrheal state. APPENDIX:  No findings to suggest appendicitis. ABDOMINOPELVIC CAVITY: Moderate volume ascites in the peritoneal cavity, greatest in the pelvis not significantly changed. VESSELS:  Unremarkable for patient's age. PELVIS REPRODUCTIVE ORGANS:  Unremarkable for patient's age. URINARY BLADDER: Collapsed with a Saunders catheter in place. ABDOMINAL WALL/INGUINAL REGIONS: Central venous access catheter in the left femoral vein. Moderate body wall edema. OSSEOUS STRUCTURES: Bilateral hip arthroplasties. Degenerative changes throughout the spine. Impression: Findings compatible with infected pancreatic necrosis with multiple drains in place. Appearance overall similar. Interval placement of a left paracolic drain with slight decrease in the size of a fluid collection. Gaseous distention of the transverse colon and cecum which overall has improved since the prior study. The cecum measures approximately 9 cm. No cecal pneumatosis. Moderate volume ascites is unchanged. Persistent nephrograms bilaterally compatible with acute kidney injury. Workstation performed: [id number]     XR chest portable    Result Date: 9/7/2023  Narrative: CHEST INDICATION:   SOB. COMPARISON: Radiograph chest 9/5/2023 and CT abdomen pelvis 9/4/2023. EXAM PERFORMED/VIEWS:  XR CHEST PORTABLE FINDINGS:  Monitoring leads and clips project over the chest. Previous nasogastric tube removed. Cardiomediastinal silhouette appears unremarkable. Unchanged right medial lung base opacity. Small left and trace right pleural effusions. No pneumothorax. Thoracolumbar bridging osteophytes. Impression: Unchanged right medial lung base opacity. Small left and trace right pleural effusions. No pneumothorax. Resident: Yusra Mendoza, the attending radiologist, have reviewed the images and agree with the final report above. Workstation performed: [id number]     XR abdomen 1 view kub    Result Date: 9/7/2023  Narrative: ABDOMEN INDICATION:   Re-evaluate colonic distention after bowel movements. COMPARISON: KUB 9/5/2023 and CT abdomen pelvis 9/4/2023. VIEWS:  AP supine FINDINGS: Surgical drain in the epigastric region. Saunders catheter overlies pelvic region. Left femoral central venous catheter in unchanged position. Slightly worse distention of the transverse colon. Unchanged gaseous distention of the ascending colon. There may however be a small amount of air present within the distal descending colon adjacent to the left iliac crest No discernible free air under the right diaphragm.  Left diaphragm is poorly visualized. Upright or left lateral decubitus imaging is more sensitive to detect subtle free air in the appropriate setting. No pathologic calcifications or soft tissue masses. Visualized right lung base is unremarkable. Left lung base is not visualized. Bilateral total hip arthroplasties. Impression: Slightly worse distention of the transverse colon. However, small amount of air may now be present in the distal descending colon adjacent to the left iliac crest. Unchanged gaseous distention of the ascending colon. Resident: Georgia Bishop, the attending radiologist, have reviewed the images and agree with the final report above. Workstation performed: [id number]     IR drainage tube placement    Result Date: 9/7/2023  Narrative: Abscess drainage catheter placement x2 Limited ultrasound left chest. Limited ultrasound right chest History: Necrotizing pancreatitis. Anterior drainage catheter placed 8/5/2023, upsize to 16 [place] on 9/4/2023. Patient be admitted with septic shock and continues with respiratory and hemodynamic failure requiring vasopressor support. He is a poor surgical candidate at this time. After multidisciplinary discussion we have decided to move forward with large bore drainage catheter placement to attempt to manage the collection. He underwent thoracentesis on the fourth and paracentesis earlier today. Risks benefits and alternatives were reviewed including risks of bleeding and damage to adjacent organs. Anesthesia: General Procedure: After explaining the risks and benefits of the procedure to the patient, informed consent was obtained. Anesthesia was initiated and the patient was placed somewhat decubitus CT was used to localize the collections. Radiation dose length product (DLP) for this visit:  6346.4 mGy .   This examination, like all CT scans performed in the [facility], was performed utilizing techniques to minimize radiation dose exposure, including the use of iterative reconstruction and automated exposure control. Procedure: In the holding area limited ultrasound of the left and right chest was performed to assess for size of the effusions. This was for planning for possible thoracentesis. The patient was identified verbally and by wristband. Timeout was performed. Based on  imaging I elected to place 2 drains as the left paracolic gutter collection did not appear to freely communicate with the main pancreatic region collection The patient was prepped and draped in the usual sterile fashion. Using intermittent CT guidance access was gained to the patient's left paracolic collection using an 18 gauge needle. Amplatz wire was placed, the tract was dilated and a 12 [place] all-purpose drain formed in the cavity. Purulent necrotic material was aspirated. Simultaneously using CT guidance access was gained to the patient's retroperitoneal pancreatic region collection using an 18 gauge needle. I attempted to use an oblique angle in order to provide a good runway for future interventions. The first needle pass was close to the inferior margin of the spleen. Although I believe we did not traverse the spleen I felt this would not be the ideal angle for future necrosectomy. Therefore Gelfoam slurry was prepared and injected while the needle was removed A new needle route was chosen with the Cisneros needle in the collection accessed. A Lunderquist wire was placed, initially it coiled in the lateral margin of the collection and I was able to redirected more centrally. The tract was serially dilated with 12, 18 dilators and then a 24 [place] peel-away sheath utilized as a dilator followed finally by a 30 [place] dilator. A 28 [place] Thal-Quick drain was then inserted without difficulty. The central loop of the Fal would not form properly given the size of the collection.  Additionally, sideholes were seen to be visible to the flank but there was no capability to advance the tube further. Necrotic material began to drain under pressure. Each catheter was secured in place and placed to suction drainage. Patient was extubated in the CT scanner and tolerated the procedure well without apparent immediate complications. Findings: Left paracolic gutter collection measuring approximately 6.7 cm. Needle then wire then catheter within the collection. Use yielded purulent necrotic material. Large collection in the region of the pancreatic area, bilobed measuring approximately 16 x 7 cm on a representative image. Needle approaching this collection. Initial pass slightly inferior to the margin of the spleen. This needle pass was removed with Gelfoam injection. Subsequent needle pass more inferiorly and retroperitoneal debris. Wire then large bore catheter within the collection. Numerous additional findings including previously placed anterior drain coiled in the superior aspect of the collection. Minimal ascites. Small bilateral pleural effusions. Stomach containing some fluid. Dilated transverse colon. Limited ultrasound of the left and right chest before the drainage confirmed small bilateral pleural effusions. Specimens: Abscess drainage material representative sample sent for aerobic, anaerobic culture     Impression: Impression: CT-guided placement of a large-bore 28 Cyp[place] drain into a retroperitoneal necrotic pancreatic collection CT-guided placement of a 12 Cypriot drain into a more inferior loculated collection Note: The large 28 [place] drain was placed at an oblique angle to allow for  possible future necrosectomy/VARDS. Drainage of this very thick necrotic material will be best with continuous large diameter tubing, such as a chest tube box. Not all sideholes are in the collection due to tube length, do not flush with large volumes as this will spill material into the flank.  Would hold any anticoagulation at least 12-24 hours due to hemorrhagic nature of the material and proximity of 1 needle pass to the spleen Workstation performed: [id number]     XR abdomen 1 view kub    Result Date: 9/5/2023  Narrative: ABDOMEN INDICATION:   monitor colonic distention. COMPARISON: CT abdomen pelvis with contrast 9/4/2023. VIEWS:  AP supine Images: 3 FINDINGS: Nonweighted enteric tube sidehole overlies distal stomach and tip in proximal second portion of duodenal region. Surgical drain overlies the epigastric region. Probable Saunders catheter overlies pelvic region. Left femoral approach central venous catheter tip overlies left iliac vessel region. There is a nonobstructive bowel gas pattern. Similar persistent gaseous distention of visualized ascending and transverse colon. No discernible free air on this supine study. Upright or left lateral decubitus imaging is more sensitive to detect subtle free air in the appropriate setting. No pathologic calcifications or soft tissue masses. Bibasilar atelectasis (left worse than right). Bilateral total hip arthroplasty. Spinal degenerative changes. Impression: Devices as detailed below: -Nonweighted enteric tube sidehole overlies distal stomach and tip in proximal second portion of duodenal region. -Surgical drain overlies the epigastric region. -Probable Saunders catheter overlies pelvic region. -Left femoral approach central venous catheter tip overlies left iliac vessel region. Similar persistent gaseous distention of visualized ascending and transverse colon. Bibasilar atelectasis (left worse than right). Workstation performed: [id number]     Echo complete w/ contrast if indicated    Result Date: 9/5/2023  Narrative: •  Left Ventricle: Left ventricular cavity size is normal. Wall thickness is upper normal. The left ventricular ejection fraction is 60%. Systolic function is normal. Wall motion is normal. Diastolic function is normal. •  Right Ventricle: Right ventricular cavity size is mildly dilated. Systolic function is normal. •  Mitral Valve:  There is trace regurgitation. •  Pulmonary Artery: The pulmonary artery systolic pressure is mildly increased. XR chest 1 view portable    Result Date: 9/5/2023  Narrative: CHEST INDICATION:   CP. COMPARISON: Chest radiograph from August 4, 2023 EXAM PERFORMED/VIEWS:  XR CHEST PORTABLE FINDINGS: Cardiomediastinal silhouette appears unremarkable. Subsegmental atelectasis in the perihilar regions and lung bases. Small layering pleural effusions. No pneumothorax. Osseous structures appear within normal limits for patient age. Impression: Small pleural effusions and bibasilar atelectasis. Workstation performed: [id number]     IR drainage tube check/change/reposition/reinsertion/upsize    Result Date: 9/5/2023  Narrative: Examination: Abscess catheter tube reposition/upsize History: Pancreatitis, septic shock, respiratory failure, bilateral pleural effusions larger on the left Patient requires respiratory optimization in order to lay on the fluoroscopy table for drain upsize Contrast type: omnipaque 300   Contrast dose: 10 cc Fluoroscopy time: 1.1 minutes Moderate sedation time: 30 minutes Technique: Risks benefits alternatives discussed informed consent obtained. Risk of bleeding discussed on Eliquis. The patient was identified verbally, and by wrist band." Time out" was performed. Initially patient was optimized with left thoracentesis which is dictated separately. The existing tube was prepped and draped in usual sterile fashion. Lidocaine was given as local anesthesia. Lidocaine was also administered within the tube. Contrast was injected. The system was opacified. The existing tube was removed over  an Amplatz wire. The tract was dilated and a new 12 [place] skater all-purpose drain was formed Given the patient's body habitus and the depth of the collection the catheter is hubbed to the skin Specimens: Culture aerobic, anaerobic Findings: Ill-defined cavity in the mid abdomen containing previously placed drain.  Catheter positioned more deeply and upsized to 16 [place] 30-50 cc of chunky hemorrhagic infected material removed NG tube noted decompressing the stomach with gaseous distention of the transverse colon     Impression: Impression: Upsize and reposition of a pancreatic necrotic collection drain to a 16 Thai pigtail catheter Patient also has ascites leaking around the catheter, he may be a candidate for paracentesis or a peritoneal drain to simply divert and reduce the ascites Workstation performed: [id number]     IR thoracentesis    Result Date: 9/5/2023  Narrative: Ultrasound-guided thoracentesis Clinical History: Pancreatitis, septic shock, respiratory failure, bilateral pleural effusions larger on the left Patient requires respiratory optimization in order to lay on the fluoroscopy table for drain upsize procedure: After explaining the risks and benefits of the procedure to the patient, informed consent was obtained. Ultrasound was used to localize the pleural effusion. The overlying skin was prepped and draped in usual sterile fashion and local anesthesia was obtained with the 1% lidocaine solution. A 5-Thai Yueh needle was advanced until fluid was aspirated under live ultrasound guidance. Approximately  1200   cc of clear, yellow fluid was aspirated. Specimens: Multiple The patient tolerated the procedure well, and attention was turned to drain upsize which is dictated separately     Impression: Impression: Ultrasound-guided left thoracentesis Workstation performed: [id number]     XR chest portable ICU    Result Date: 9/5/2023  Narrative: CHEST INDICATION:   evaluation after IJ attempted. COMPARISON: 9/4/2023 EXAM PERFORMED/VIEWS:  XR CHEST PORTABLE ICU 2 views FINDINGS: Interval placement of nasogastric tube which terminates below the diaphragm in the mid stomach Cardiomediastinal silhouette appears unremarkable.  Persistent opacity at medial right lung base suspicious for pneumonia No pneumothorax or pleural effusion. Osseous structures appear within normal limits for patient age. Impression: Persistent medial right lung base opacity suspicious for pneumonia No pneumothorax Workstation performed: [id number]     CT abdomen pelvis w contrast    Result Date: 9/4/2023  Narrative: CT ABDOMEN AND PELVIS WITH IV CONTRAST INDICATION:   Abdominal abscess/infection suspected r/o peripancreatic abscess, infection. COMPARISON: CT abdomen pelvis 8/18/2023 TECHNIQUE:  CT examination of the abdomen and pelvis was performed. Multiplanar 2D reformatted images were created from the source data. This examination, like all CT scans performed in the [facility], was performed utilizing techniques to minimize radiation dose exposure, including the use of iterative reconstruction and automated exposure control. Radiation dose length product (DLP) for this visit:  3543.98 mGy-cm IV Contrast:  100 mL of iodixanol (VISIPAQUE) Enteric Contrast:  Enteric contrast was not administered. FINDINGS: ABDOMEN LOWER CHEST: Moderate bilateral pleural effusions, increased from prior study with mild bilateral lower lobe compressive atelectasis. Shotty lymph nodes are seen in the bilateral cardiophrenic angles. 2 mm subpleural right middle lobe nodule, unchanged. Small focal airspace opacity within the lingula, new from previous study could represent atelectasis or early pneumonia. LIVER/BILIARY TREE:  Unremarkable. GALLBLADDER: No calcified gallstones. Pericholecystic inflammatory changes are favored to be secondary to extension from pancreatic primary process. SPLEEN: Small crescentic hypodensity is seen along the medial spleen, unchanged in retrospect. Unclear if this is related to a small infarct or possibly a small component of subcapsular fluid.  PANCREAS: There has been development of a significant amount of gas seen within a large acute necrotic collection which is largely replacing the pancreatic parenchyma and extends laterally along the anterior pararenal space bilaterally, left greater than  right. This collection measures about 21 x 5.6 x 18.9 cm transverse, AP and craniocaudal dimensions respectively (series 4 image 74 and 603/96). Pigtail left upper quadrant drainage catheter is seen along the anterior margin of the collection. However, there is a significant amount of gas associated with this collection and elsewhere in the retroperitoneum more so than expected from catheter manipulation alone and is concerning for infected necrosis. The collection extends into the lateral paracolic gutter more caudally and may cross the midline as well such as series 4 image 89. On the left, the collection is associated with thickening of the lateral conal fascia and causes masslike thickening of the proximal to mid descending colon (series 4 images 91- 115). This is believed to represent phlegmonous mass. There is dilatation of the transverse colon up to 12.1 cm craniocaudal dimension (601/43) and 8.8 cm AP dimension, consistent with colonic obstruction. This degree of distention could pose a risk for bowel perforation. Surgical consultation is advised. The above collection involves the lesser sac and extends cephalad on the right interposed between the caudate lobe liver and reina of the diaphragm to the gastrohepatic ligament. Inflammatory changes are noted within the laurent hepatis surrounding the vessels and causing slitlike narrowing of the main portal vein such as series 4 image 63. The vein is grossly patent. Similar findings are noted involving the splenic vein and SMV. These findings have also progressed from the prior study. Inflammatory tissue also surrounds the vessels about the celiac bifurcation and branches of the common hepatic artery. There is minimal if any appreciable enhancing pancreatic parenchyma. ADRENAL GLANDS:  Unremarkable. KIDNEYS/URETERS:  Unremarkable. No hydronephrosis.  STOMACH AND BOWEL: Please see comments regarding the colon under the above pancreas section. The stomach is mildly distended and fluid-filled. There is collapse and/or thickening of the gastric antrum and descending duodenum, the latter is likely 180 degrees enveloped by the inflammatory collection. Secondary antritis/duodenitis with element of gastric outlet obstruction is not excluded. Moderate stool in the rectum. APPENDIX:  No findings to suggest appendicitis. ABDOMINOPELVIC CAVITY: Moderate to large amount of abdominal pelvic free fluid which is greater in the pelvis, also slightly increased. No pneumoperitoneum. No lymphadenopathy. VESSELS: See comments above. PELVIS soft tissue detail of the pelvis is degraded by beam hardening artifact from bilateral total hip arthroplasties. REPRODUCTIVE ORGANS:  Unremarkable for patient's age. URINARY BLADDER:  Unremarkable. ABDOMINAL WALL/INGUINAL REGIONS:  Unremarkable. OSSEOUS STRUCTURES:  No acute fracture or destructive osseous lesion. Bilateral total hip arthroplasties. Degenerative changes of the spine. Impression: 1. Significant worsening of necrotizing pancreatitis with large acute necrotic collection largely replacing the pancreatic parenchyma and extending into the anterior pararenal spaces (left greater than right) with the collection now containing a large amount of gas concerning for infected necrosis. Additionally, there is an inflammatory phlegmonous mass along the left lateral pararenal space/paracolic gutter with involvement of the proximal to mid descending colon causing associated colonic obstruction. This degree of bowel distention could pose a risk for bowel perforation. Surgical consultation is advised. 2. Progression of inflammatory changes extending into the laurent hepatis with mass effect and narrowing of the portal vein and associated proximal vessels about the portal confluence as detailed above but no evidence for overt venous occlusion. 3. Mild fluid-filled gastric distention.  Collapse and/or thickening of the gastric antrum and descending duodenum, the latter is likely 180 degrees enveloped by the inflammatory collection. Secondary antritis/duodenitis with element of gastric outlet obstruction is not excluded. 4. Moderate to large amount of abdominopelvic free fluid, increasing from prior study. 5. Enlarging, moderate bilateral pleural effusions with mild bibasilar compressive atelectasis. I personally discussed this study with [address] [place] [address] on 9/4/2023 at 5:47 PM. Workstation performed: [id number]     CT abdomen pelvis w contrast    Result Date: 8/18/2023  Narrative: CT ABDOMEN AND PELVIS WITH IV CONTRAST INDICATION:   K85.91: Acute pancreatitis with uninfected necrosis, unspecified. COMPARISON: Prior CT abdomen pelvis examinations dating back to 8/4/2023 TECHNIQUE:  CT examination of the abdomen and pelvis was performed. Scanning through the abdomen was performed in arterial, venous and delayed phases according a protocol specifically designed to evaluate upper abdominal viscera. Multiplanar 2D reformatted images were created from the source data. This examination, like all CT scans performed in the [facility], was performed utilizing techniques to minimize radiation dose exposure, including the use of iterative reconstruction and automated exposure control. Radiation dose length product (DLP) for this visit:  3953 mGy-cm IV Contrast:  100 mL of iohexol (OMNIPAQUE) Enteric Contrast:  Enteric contrast was not administered. FINDINGS: ABDOMEN LOWER CHEST: Partially visualized at least small bilateral pleural effusions. A 3 mm left lower lobe pulmonary nodule (4/8). Bibasilar subsegmental atelectasis. LIVER/BILIARY TREE:  Unremarkable. GALLBLADDER:  No calcified gallstones. No pericholecystic inflammatory change. SPLEEN:  Unremarkable.  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 There is decreasing peripancreatic fat stranding and fluid tracking compared to 8/6/2023. Again seen is small volume ascites, minimally decreased compared to 4/0/7220. -LOCAL COMPLICATIONS: Interval decrease in the size of acute necrotizing collection in the lesser sac, currently being 4.1 x 7.4 cm (series 4 image 64), previously 11.4 x 6.1 cm. It contains a tendon heterogenous material in the bladder drainage catheter  lies in the superficial portion of this collection The main portal vein, splenic vein and the intermesenteric vein are patent with mild mass effect on the main proximal splenic vein, improved since 8/6/2023. -INFECTION: There is no abnormal gas in or around the pancreas to suggest infection. ADRENAL GLANDS:  Unremarkable. KIDNEYS/URETERS:  Unremarkable. No hydronephrosis. STOMACH AND BOWEL: No abnormal bowel dilation. Suboptimal evaluation rectum due to streak artifact from arthroplasties. APPENDIX:  No findings to suggest appendicitis. ABDOMINOPELVIC CAVITY:  No ascites. No pneumoperitoneum. No lymphadenopathy. VESSELS:  Unremarkable for patient's age. PELVIS REPRODUCTIVE ORGANS: Suboptimal evaluation of the prostate with streak artifact bilateral hip arthroplasties. Beena Asai URINARY BLADDER:  Unremarkable. ABDOMINAL WALL/INGUINAL REGIONS:  Unremarkable. OSSEOUS STRUCTURES:  No acute fracture or destructive osseous lesion. Bilateral total hip arthroplasty     Impression: Resolving acute necrotizing pancreatitis with interval decrease in the size of the peripancreatic necrotizing collection in the lesser sac compared to 8/6/2023. Persistent high attenuation material within the peripancreatic fluid collection with tip of the drainage catheter in its superficial aspect. Mildly improved ascites. At least small bilateral pleural effusions.  A 3 mm left lower lobe pulmonary nodule. Attention on follow-up imaging versus optional follow-up CT chest in 12 months is recommended to assess for stability, as per Fleischner criteria. The study was marked in EPIC for significant notification. Workstation performed: [id number]       EKG, Pathology, and Other Studies Reviewed on Admission:   · EKG: Reviewed      Counseling / Coordination of Care Time: 30 total mins spent n consult. Greater than 50% of total time spent on patient counseling and coordination of care. Gabriela Shah DO  Gastroenterology Fellow  [facility]  Division of Gastroenterology and Hepatology  Available on [email or web address]  . ..............................................................................................................................................  ** Please Note: This note is constructed using a voice recognition dictation system.  **

## 2023-09-13 NOTE — PLAN OF CARE
Problem: PAIN - ADULT  Goal: Verbalizes/displays adequate comfort level or baseline comfort level  Description: Interventions:  - Encourage patient to monitor pain and request assistance  - Assess pain using appropriate pain scale  - Administer analgesics based on type and severity of pain and evaluate response  - Implement non-pharmacological measures as appropriate and evaluate response  - Consider cultural and social influences on pain and pain management  - Notify physician/advanced practitioner if interventions unsuccessful or patient reports new pain  Outcome: Progressing     Problem: INFECTION - ADULT  Goal: Absence or prevention of progression during hospitalization  Description: INTERVENTIONS:  - Assess and monitor for signs and symptoms of infection  - Monitor lab/diagnostic results  - Monitor all insertion sites, i.e. indwelling lines, tubes, and drains  - Monitor endotracheal if appropriate and nasal secretions for changes in amount and color  - [place] appropriate cooling/warming therapies per order  - Administer medications as ordered  - Instruct and encourage patient and family to use good hand hygiene technique  - Identify and instruct in appropriate isolation precautions for identified infection/condition  Outcome: Progressing  Goal: Absence of fever/infection during neutropenic period  Description: INTERVENTIONS:  - Monitor WBC    Outcome: Progressing

## 2023-09-13 NOTE — PLAN OF CARE
Problem: PHYSICAL THERAPY ADULT  Goal: Performs mobility at highest level of function for planned discharge setting. See evaluation for individualized goals. Description: Treatment/Interventions: ADL retraining, Functional transfer training, LE strengthening/ROM, Elevations, Therapeutic exercise, Endurance training, Patient/family training, Equipment eval/education, Bed mobility, Gait training, Spoke to nursing, Spoke to case management, OT, Family  Equipment Recommended: Aimee Agarwal (has for d/c)       See flowsheet documentation for full assessment, interventions and recommendations. Outcome: Progressing  Note: Prognosis: Good  Problem List: Decreased strength, Decreased range of motion, Decreased endurance, Impaired balance, Decreased mobility, Obesity, Decreased skin integrity, Pain  Assessment: Pt seen for PT treatment session this date. Therapy session focused on functional transfers, gait training and endurance training in order to improve overall mobility and independence. Pt requires S/ CGA for all mobility performed this date. Continues to be limited by generalized weakness + deconditioning as well as LE edema. Extended seated rest breaks required t/o session 2* fatigue + CERON. Pt making slow but steady progress toward goals. Pt was left sitting OOB in recliner at the end of PT session with all needs in reach. Pt would benefit from continued PT services while in hospital to address remaining limitations. PT to continue to follow pt and recommends home with HHPT + increased social support. The patient's AM-PAC Basic Mobility Inpatient Short Form Raw Score is 17. A Raw score of greater than 16 suggests the patient may benefit from discharge to home. Please also refer to the recommendation of the Physical Therapist for safe discharge planning. PT Discharge Recommendation: Home with home health rehabilitation    See flowsheet documentation for full assessment.

## 2023-09-13 NOTE — PHYSICAL THERAPY NOTE
PHYSICAL THERAPY NOTE          Patient Name: Domi Williamson  [facility] Date: 9/13/2023 09/13/23 1131   PT Last Visit   PT Visit Date 09/13/23   Note Type   Note Type Treatment   Pain Assessment   Pain Assessment Tool 0-10   Pain Score 3   Hospital Pain Intervention(s) Repositioned; Ambulation/increased activity; Emotional support   Restrictions/Precautions   Weight Bearing Precautions Per Order No   Other Precautions Multiple lines; Fall Risk;Pain  (drains, LE edema)   General   Chart Reviewed Yes   Response to Previous Treatment Patient with no complaints from previous session. Family/Caregiver Present Yes   Cognition   Overall Cognitive Status WFL   Arousal/Participation Alert; Cooperative   Attention Within functional limits   Orientation Level Oriented X4   Memory Within functional limits   Following Commands Follows all commands and directions without difficulty   Comments pt with flat affect, irritable but cooperative   Bed Mobility   Supine to Sit Unable to assess   Sit to Supine Unable to assess   Additional Comments pt sitting OOB in recliner upon arrival. Pt left sitting OOB in recliner with all needs wihtin reach   Transfers   Sit to Stand 5  Supervision   Additional items Armrests; Verbal cues; Increased time required   Stand to Sit 5  Supervision   Additional items Armrests; Increased time required;Verbal cues   Additional Comments transfers with RW; performs 3x STS t/o session   Ambulation/Elevation   Gait pattern Excessively slow; Short stride; Foward flexed;Decreased foot clearance   Gait Assistance 4  Minimal assist  (CGA)   Additional items Assist x 1;Verbal cues   Assistive Device Rolling walker   Distance 30', 30'   Stair Management Assistance Not tested   Balance   Static Sitting Fair +   Dynamic Sitting Fair   Static Standing Fair -   Dynamic Standing Fair -   Ambulatory Poor +   Endurance Deficit   Endurance Deficit Yes   Activity Tolerance   Activity Tolerance Patient tolerated treatment well;Patient limited by fatigue   Medical Staff Made Aware rehab aide Tj   Nurse Made Aware RN cleared pt to participate in therapy session   Assessment   Prognosis Good   Problem List Decreased strength;Decreased range of motion;Decreased endurance; Impaired balance;Decreased mobility;Obesity; Decreased skin integrity;Pain   Assessment Pt seen for PT treatment session this date. Therapy session focused on functional transfers, gait training and endurance training in order to improve overall mobility and independence. Pt requires S/ CGA for all mobility performed this date. Continues to be limited by generalized weakness + deconditioning as well as LE edema. Extended seated rest breaks required t/o session 2* fatigue + CERON. Pt making slow but steady progress toward goals. Pt was left sitting OOB in recliner at the end of PT session with all needs in reach. Pt would benefit from continued PT services while in hospital to address remaining limitations. PT to continue to follow pt and recommends home with HHPT + increased social support. The patient's [facility] Basic Mobility Inpatient Short Form Raw Score is 17. A Raw score of greater than 16 suggests the patient may benefit from discharge to home. Please also refer to the recommendation of the Physical Therapist for safe discharge planning. Goals   Patient Goals to rest   STG Expiration Date 09/21/23   PT Treatment Day 4   Plan   Treatment/Interventions Functional transfer training;LE strengthening/ROM; Endurance training;Patient/family training;Equipment eval/education; Bed mobility;Gait training;Spoke to nursing;Spoke to case management;OT   Progress Slow progress, decreased activity tolerance   PT Frequency 3-5x/wk   Recommendation   PT Discharge Recommendation Home with home health rehabilitation   Equipment Recommended [address] walker   [facility] Basic Mobility Inpatient   Turning in Flat Bed Without Bedrails 3   Lying on Back to Sitting on Edge of Flat Bed Without Bedrails 3   Moving Bed to Chair 3   Standing Up From Chair Using Arms 3   Walk in Room 3   Climb 3-5 Stairs With Railing 2   Basic Mobility Inpatient Raw Score 17   Basic Mobility Standardized Score 39.67   Highest Level Of Mobility   -HLM Goal 5: Stand one or more mins   JH-HLM Achieved 7: Walk 25 feet or more   Education   Education Provided Mobility training;Assistive device;Home exercise program   Patient Demonstrates acceptance/verbal understanding   End of Consult   Patient Position at End of Consult Bedside chair; All needs within reach     Nia Malone, PT, DPT

## 2023-09-13 NOTE — PROGRESS NOTES
Progress Note - General Surgery   Jami Theodore 46 y.o. 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 UOP: 650cc  BM: x1    cc tan necrotic  LUQ: 15 cc tan necrotic  RUQ: 10 cc tan    Lab Results   Component Value Date/Time    WBC 13.46 (H) 09/13/2023 04:46 AM    WBC 12.96 (H) 09/12/2023 05:24 AM    HGB 7.7 (L) 09/13/2023 04:46 AM    HGB 7.7 (L) 09/12/2023 05:24 AM    CREATININE 1.14 09/13/2023 04:46 AM    CREATININE 1.12 09/12/2023 05:24 AM       Plan:  -low fat diet  -f/u daily CBC- d/t uptrending WBC  -encouraged IS use  -strict I&O  -Cefepime/Flagyl to continue through 9/17  -Monitor drains output and character  -Heparin gtt- monitor PTT  -Pain control  -Encourage ambulation, PT/OT rec no needs    Subjective/Objective   Subjective:   No acute events overnight. Pt states he was unable to sleep well previous 2 nights. Had decreased PO intake bc he didn't feel like it, states no nausea or vomitng. Passing flatus/BM. 750cc on IS. Ambulating x3    Objective:     Blood pressure 123/77, pulse (!) 109, temperature 98.4 °F (36.9 °C), resp. rate 16, height 5' 11" (1.803 m), weight 124 kg (273 lb 9.5 oz), SpO2 94 %. ,Body mass index is 38.16 kg/m². Intake/Output Summary (Last 24 hours) at 9/13/2023 1227  Last data filed at 9/13/2023 1055  Gross per 24 hour   Intake 700 ml   Output 825 ml   Net -125 ml       Invasive Devices     Peripherally Inserted Central Catheter Line  Duration           PICC Line 17/84/95 Left Basilic 4 days          Peripheral Intravenous Line  Duration           Peripheral IV 08/08/23 Dorsal (posterior); Left Wrist 36 days          Drain  Duration Abscess Drain RUQ 8 days    Abscess Drain LUQ 6 days    Abscess Drain LUQ 6 days                Physical Exam:  General: NAD  Skin: Warm, dry, anicteric  HEENT: Normocephalic, atraumatic  CV: Tachycardic, no m/r/g  Pulm: CTA b/l, no inc WOB  Abd: soft, nontender, mild distension, protuberant abdomen. MSK: Symmetric, +3 b/l pitting edema unchanged from day prior, no tenderness, no deformity  Neuro: AOx3, GCS 15    Lab, Imaging and other studies:  I have personally reviewed pertinent lab results.   , CBC:   Lab Results   Component Value Date    WBC 13.46 (H) 09/13/2023    HGB 7.7 (L) 09/13/2023    HCT 25.3 (L) 09/13/2023    MCV 88 09/13/2023     09/13/2023    RBC 2.87 (L) 09/13/2023    MCH 26.8 09/13/2023    MCHC 30.4 (L) 09/13/2023    RDW 18.5 (H) 09/13/2023    MPV 10.0 09/13/2023   , CMP:   Lab Results   Component Value Date    SODIUM 139 09/13/2023    K 3.6 09/13/2023     09/13/2023    CO2 24 09/13/2023    BUN 22 09/13/2023    CREATININE 1.14 09/13/2023    CALCIUM 7.6 (L) 09/13/2023    EGFR 73 09/13/2023   , Coagulation: No results found for: "PT", "INR", "APTT"  VTE Pharmacologic Prophylaxis: Sequential compression device (Venodyne)  and Heparin   VTE Mechanical Prophylaxis: sequential compression device

## 2023-09-13 NOTE — PROGRESS NOTES
Progress Note - Infectious Disease   Lima Douglas 46 y.o. male MRN: [id number]  Unit/Bed#: [facility] 901-01 Encounter: [id number]      Impression/Recommendations:  Septic shock, POA.    Fever, WBC, refractory hypotension.  Due to bacteremia, pancreatic abscess as below.  No other appreciable source.  Improving, now off pressors. Rec:  • Continue antibiotics as below  • Follow temperatures closely  • Recheck CBC in AM  • Supportive care as per the primary service     Polymicrobial bacteremia.    Klebsiella, Strep mitis/oralis.  Coagulase-negative Staph x2 from single set are likely contaminants.  Due to pancreatic abscess as below.  No other appreciable source.  Repeat blood cultures 9/6, TTE (technically difficult) negative. Rec:  • Continue ceftriaxone for 14 days total antibiotics through 9/17     Pancreatic abscess.    Due to superinfection of necrosis as below.  Status post drain upsizing 9/4 which yielded infected hemorrhagic material.  Cultures with Citrobacter, E. Coli, Klebsiella, Aeromonas, Bacteroides.  Status post additional drains x2 9/6.  Cultures with same organisms.    Rec:  • Continue ceftriaxone/Flagyl for 14 days total antibiotics through 9/17  • Follow drain output closely  • Cyst gastrostomy placement pending 9/14 per GI     Necrotizing pancreatitis. Presumed alcoholic.  Status post IR drain 8/5 yielding dark bloody fluid.  Cultures initially negative.  Current CT shows replacing most of pancreatic parenchyma, extending to pararenal spaces, causing left colonic obstruction, gastric outlet obstruction.  Status post drain upsizing 9/4, additional drains x2 9/6 yielding superinfected fluid as above.  Collection initially not mature enough for cyst gastrostomy per GI.  Status post bedside tube interrogation, necrosectomy 9/8. Rec:  • Follow drain output closely  • Cyst gastrostomy placement pending 9/14 per GI  • Close Surgery, GI follow-up ongoing     JERZY.   Likely multifactorial.  Improving.     Large bowel obstruction. Due to compression from pancreatitis as above.     Gastric outlet obstruction. Due to compression from pancreatitis as above.     Bilateral pleural effusions. Likely reactive to pancreatitis as above.  Status post left thoracentesis yielding 1200 cc clear yellow fluid.  Fluid cultures negative.     Ascites. Likely reactive to pancreatitis as above.  Status post paracentesis.  Fluid cultures negative.     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       Antibiotics:  Cefepime #10  Flagyl #9  Antibiotics #10    Subjective:  Patient seen on AM rounds. Feels well. Having loose stools but has been on miralax. 24 Hour Events:  Low grade temp overnight. Loose stools as above. Objective:  Vitals:  Temp:  [97.8 °F (36.6 °C)-100.4 °F (38 °C)] 98.4 °F (36.9 °C)  HR:  [] 109  Resp:  [15-18] 16  BP: (113-144)/(67-79) 123/77  SpO2:  [94 %-100 %] 94 %  Temp (24hrs), Av.9 °F (37.2 °C), Min:97.8 °F (36.6 °C), Max:100.4 °F (38 °C)  Current: Temperature: 98.4 °F (36.9 °C)    Physical Exam:   General:  No acute distress  Psychiatric:  Awake and alert  Pulmonary:  Normal respiratory excursion without accessory muscle use  Abdomen:  Obese, opaque gray drainage in drains  Extremities:  No edema  Skin:  No rashes    Lab Results:  I have personally reviewed pertinent labs. Results from last 7 days   Lab Units 23  0446 23  0524 23  0554 23  0512 23  0506   POTASSIUM mmol/L 3.6 3.5 3.6   < > 3.6   CHLORIDE mmol/L 106 105 104   < > 104   CO2 mmol/L 24 26 25   < > 30   BUN mg/dL 22 25 27*   < > 57*   CREATININE mg/dL 1.14 1.12 1.18   < > 1.40*   EGFR ml/min/1.73sq m 73 75 70   < > 57   CALCIUM mg/dL 7.6* 7.3* 7.3*   < > 7.7*   AST U/L  --  10*  --   --  11*   ALT U/L  --  4*  --   --  9   ALK PHOS U/L  --  45  --   --  42    < > = values in this interval not displayed.      Results from last 7 days   Lab Units 23  0442 09/12/23  0524 09/11/23  0554   WBC Thousand/uL 13.46* 12.96* 10.41*   HEMOGLOBIN g/dL 7.7* 7.7* 7.9*   PLATELETS Thousands/uL 292 284 256     Results from last 7 days   Lab Units 09/09/23  0819 09/06/23 2127 09/06/23  1512   GRAM STAIN RESULT  No Polys or Bacteria seen No Polys or Bacteria seen 1+ Polys  No bacteria seen   BODY FLUID CULTURE, STERILE   --  4+ Growth of Escherichia coli*  4+ Growth of Citrobacter freundii*  4+ Growth of Klebsiella aerogenes*  1+ Growth of Aeromonas hydrophila* No growth       Imaging Studies:   I have personally reviewed pertinent imaging study reports and images in PACS. EKG, Pathology, and Other Studies:   I have personally reviewed pertinent reports.

## 2023-09-13 NOTE — CASE MANAGEMENT
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   • Pleural effusion, left 2023   • Hyponatremia 2023   • Abnormal urinalysis 2023   • Leukocytosis 2023   • JERZY (acute kidney injury) ([address]) 2023   • Primary hypertension 2022   • Proteinuria 2022   • Chronic pain of both hips 2022   • Family history of prostate cancer in father 2022      LOS (days): 9  Geometric Mean LOS (GMLOS) (days):   Days to GMLOS:     OBJECTIVE:  Risk of Unplanned Readmission Score: 24.26         Current admission status: Inpatient   Preferred Pharmacy:   [facility]/pharmacy #[id number] - [address] [place] [id number]  Phone: [phone] Fax: [address] [facility] [address] [address]  Phone: [phone] Fax: [phone]    Primary Care Provider: Faby Dangelo MD    Primary Insurance: Grace Shirley  Secondary Insurance:     DISCHARGE DETAILS:       Other Referral/Resources/Interventions Provided:  Referral Comments: New referral sent to [facility] in [place].

## 2023-09-14 ENCOUNTER — TELEPHONE (OUTPATIENT)
Dept: FAMILY MEDICINE CLINIC | Facility: CLINIC | Age: 53
End: 2023-09-14

## 2023-09-14 LAB
ANION GAP SERPL CALCULATED.3IONS-SCNC: 8 MMOL/L
APTT PPP: 35 SECONDS (ref 23–37)
APTT PPP: 50 SECONDS (ref 23–37)
APTT PPP: 93 SECONDS (ref 23–37)
BUN SERPL-MCNC: 20 MG/DL (ref 5–25)
CALCIUM SERPL-MCNC: 7.5 MG/DL (ref 8.4–10.2)
CHLORIDE SERPL-SCNC: 106 MMOL/L (ref 96–108)
CO2 SERPL-SCNC: 23 MMOL/L (ref 21–32)
CREAT SERPL-MCNC: 1.15 MG/DL (ref 0.6–1.3)
ERYTHROCYTE [DISTWIDTH] IN BLOOD BY AUTOMATED COUNT: 19.8 % (ref 11.6–15.1)
GFR SERPL CREATININE-BSD FRML MDRD: 72 ML/MIN/1.73SQ M
GLUCOSE SERPL-MCNC: 109 MG/DL (ref 65–140)
GLUCOSE SERPL-MCNC: 111 MG/DL (ref 65–140)
GLUCOSE SERPL-MCNC: 127 MG/DL (ref 65–140)
GLUCOSE SERPL-MCNC: 130 MG/DL (ref 65–140)
GLUCOSE SERPL-MCNC: 148 MG/DL (ref 65–140)
HCT VFR BLD AUTO: 26.5 % (ref 36.5–49.3)
HGB BLD-MCNC: 7.8 G/DL (ref 12–17)
MCH RBC QN AUTO: 26.4 PG (ref 26.8–34.3)
MCHC RBC AUTO-ENTMCNC: 29.4 G/DL (ref 31.4–37.4)
MCV RBC AUTO: 90 FL (ref 82–98)
PLATELET # BLD AUTO: 310 THOUSANDS/UL (ref 149–390)
PMV BLD AUTO: 10 FL (ref 8.9–12.7)
POTASSIUM SERPL-SCNC: 3.6 MMOL/L (ref 3.5–5.3)
RBC # BLD AUTO: 2.96 MILLION/UL (ref 3.88–5.62)
SODIUM SERPL-SCNC: 137 MMOL/L (ref 135–147)
WBC # BLD AUTO: 14.18 THOUSAND/UL (ref 4.31–10.16)

## 2023-09-14 PROCEDURE — 99232 SBSQ HOSP IP/OBS MODERATE 35: CPT | Performed by: INTERNAL MEDICINE

## 2023-09-14 PROCEDURE — 85027 COMPLETE CBC AUTOMATED: CPT | Performed by: PHYSICIAN ASSISTANT

## 2023-09-14 PROCEDURE — 97530 THERAPEUTIC ACTIVITIES: CPT

## 2023-09-14 PROCEDURE — 82948 REAGENT STRIP/BLOOD GLUCOSE: CPT

## 2023-09-14 PROCEDURE — 99232 SBSQ HOSP IP/OBS MODERATE 35: CPT | Performed by: SURGERY

## 2023-09-14 PROCEDURE — 97116 GAIT TRAINING THERAPY: CPT

## 2023-09-14 PROCEDURE — 85730 THROMBOPLASTIN TIME PARTIAL: CPT | Performed by: INTERNAL MEDICINE

## 2023-09-14 PROCEDURE — 85730 THROMBOPLASTIN TIME PARTIAL: CPT | Performed by: SURGERY

## 2023-09-14 PROCEDURE — 80048 BASIC METABOLIC PNL TOTAL CA: CPT | Performed by: PHYSICIAN ASSISTANT

## 2023-09-14 RX ORDER — POTASSIUM CHLORIDE 20 MEQ/1
40 TABLET, EXTENDED RELEASE ORAL ONCE
Status: COMPLETED | OUTPATIENT
Start: 2023-09-14 | End: 2023-09-14

## 2023-09-14 RX ADMIN — HEPARIN SODIUM 11.1 UNITS/KG/HR: 10000 INJECTION, SOLUTION INTRAVENOUS at 16:37

## 2023-09-14 RX ADMIN — METRONIDAZOLE 500 MG: 500 INJECTION, SOLUTION INTRAVENOUS at 22:43

## 2023-09-14 RX ADMIN — METRONIDAZOLE 500 MG: 500 INJECTION, SOLUTION INTRAVENOUS at 15:04

## 2023-09-14 RX ADMIN — CHLORHEXIDINE GLUCONATE 15 ML: 1.2 SOLUTION ORAL at 08:33

## 2023-09-14 RX ADMIN — FUROSEMIDE 20 MG: 20 TABLET ORAL at 08:33

## 2023-09-14 RX ADMIN — INSULIN LISPRO 5 UNITS: 100 INJECTION, SOLUTION INTRAVENOUS; SUBCUTANEOUS at 14:33

## 2023-09-14 RX ADMIN — CEFTRIAXONE SODIUM 2000 MG: 10 INJECTION, POWDER, FOR SOLUTION INTRAVENOUS at 12:36

## 2023-09-14 RX ADMIN — METRONIDAZOLE 500 MG: 500 INJECTION, SOLUTION INTRAVENOUS at 06:19

## 2023-09-14 RX ADMIN — POTASSIUM CHLORIDE 40 MEQ: 1500 TABLET, EXTENDED RELEASE ORAL at 08:33

## 2023-09-14 NOTE — PROGRESS NOTES
[facility] - Inpatient Progress Note    PATIENT INFORMATION      Crystal Rowland 46 y.o. male MRN: [id number]  Unit/Bed#: [facility] 901-01 Encounter: [id number]    ASSESSMENT & PLAN   59-year-old male with past medical history of recent admission for gallstone necrotizing pancreatitis complicated by fluid collection status post IR drain placement, obesity who was admitted for sepsis.  On 9/6 underwent repeat IR drain placement with 2 additional drains placed into necrotic fluid collection. GI consulted for evaluation of both necrotizing pancreatitis and colonic dilation.     1. 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 Bacteremia  3. Septic shock  4. SMV thrombus  5. Ascites  Patient initially diagnosed with necrotizing pancreatitis 7/2023. Initial Blood cultures growing Klebsiella and Strep with initial Fluid culture growing Bacteroides fragilis, Citrobacter freundii, EColi, Klebsiella, Aeromonas hydrophilia. Suspect infected necrosis as source of sepsis given enlarging collection and gram-negative bacteremia.  Mass effect from phlegmonous mass likely contributing to gastric/colonic distention. S/p IR drain placement on 9/6, now with 3 drains in place. Repeat blood cultures negative. • Will plan for cystgastro 9/15 - discussed with patient and wife at bedside  • Patient s/p IR drain placement with plan for VARDS in future   • NPO at midnight, heparin drip to be held in AM 9/15  • Appreciate ID consult - continue cefepime/flagyl  • Continue bowel regimen - continue to monitor stool output  • Consider repeat paracentesis for symptom relief from ascites  • Management of IR drains per surgical team  • Monitor hemodynamics  • Monitor CBC     6. Dilated colon  Likely secondary to mass effect from extensive inflammatory changes noted around the pancreas.  Fortunately patient displays benign exam and having bowel movements making obstruction unlikely.  Repeat CT scan completed this morning with improved colonic dilation. Patient continues to have bowel movements and pass flatus. • No indication for decompressive colonoscopy at this time  • Recommend aggressive bowel regimen with MiraLAX twice daily  • Dulcolax rectal suppositories as needed  • Mineral oil enemas as needed.     7. Anemia  Most recent baseline Hgb 7-8 throughout hospitalization in setting of necrotic pancreatitis  • Hgb this AM 7.8 - stable, continue monitoring  • No overt S/S of GIB  • Possibly component of necrotizing pancreatitis  • Continue to monitor and transfuse for <7      SUBJECTIVE     Patient seen and evaluated at bedside. Overall feeling okay.  Continues to have abdominal distension, but moving bowels and passing gas    MEDICATIONS & ALLERGIES       Medications:   Medications Prior to Admission   Medication   • acetaminophen (TYLENOL) 325 mg tablet   • Alcohol Swabs 70 % PADS   • apixaban (Eliquis) 5 mg   • BD PosiFlush 0.9 % SOLN   • Blood Glucose Monitoring Suppl (OneTouch Verio Reflect) w/Device KIT   • Blood Pressure Monitoring (Adult Blood Pressure Cuff Lg) KIT   • furosemide (LASIX) 20 mg tablet   • glucose blood (OneTouch Verio) test strip   • Insulin Glargine Solostar (Lantus SoloStar) 100 UNIT/ML SOPN   • insulin lispro (HumaLOG KwikPen) 100 units/mL injection pen   • Insulin Pen Needle (BD Pen Needle Lupe 2nd Gen) 32G X 4 MM MISC   • Insulin Pen Needle (BD Pen Needle Lupe 2nd Gen) 32G X 4 MM MISC   • Levemir FlexPen 100 units/mL injection pen   • lisinopril (ZESTRIL) 10 mg tablet   • NovoLOG FlexPen 100 units/mL injection pen   • OneTouch Delica Lancets 64J MISC   • pancrelipase, Lip-Prot-Amyl, (CREON) 6,000 units delayed release capsule   • sodium chloride, PF, 0.9 %     Current Facility-Administered Medications   Medication Dose Route Frequency   • acetaminophen (TYLENOL) tablet 650 mg  650 mg Oral Q6H PRN   • bisacodyl (DULCOLAX) rectal suppository 10 mg  10 mg Rectal Daily PRN   • cefTRIAXone (ROCEPHIN) 2,000 mg in dextrose 5 % 50 mL IVPB  2,000 mg Intravenous Q24H   • chlorhexidine (PERIDEX) 0.12 % oral rinse 15 mL  15 mL Mouth/Throat Q12H ALEX   • furosemide (LASIX) tablet 20 mg  20 mg Oral Daily   • heparin (porcine) 25,000 units in 0.45% NaCl 250 mL infusion (premix)  3-26 Units/kg/hr (Order-Specific) Intravenous Titrated   • HYDROmorphone (DILAUDID) injection 0.5 mg  0.5 mg Intravenous Q4H PRN   • insulin lispro (HumaLOG) 100 units/mL subcutaneous injection 2-12 Units  2-12 Units Subcutaneous TID AC   • insulin lispro (HumaLOG) 100 units/mL subcutaneous injection 2-12 Units  2-12 Units Subcutaneous HS   • insulin lispro (HumaLOG) 100 units/mL subcutaneous injection 5 Units  5 Units Subcutaneous TID With Meals   • metroNIDAZOLE (FLAGYL) IVPB (premix) 500 mg 100 mL  500 mg Intravenous Q8H   • mineral oil enema 1 enema  1 enema Rectal Daily PRN   • oxyCODONE (ROXICODONE) IR tablet 5 mg  5 mg Oral Q6H PRN    Or   • oxyCODONE (ROXICODONE) immediate release tablet 10 mg  10 mg Oral Q6H PRN   • polyethylene glycol (MIRALAX) packet 17 g  17 g Oral BID       Allergies:   No Known Allergies    PHYSICAL EXAM     Objective   Blood pressure 119/72, pulse (!) 106, temperature 98.8 °F (37.1 °C), resp. rate 16, height 5' 11" (1.803 m), weight 124 kg (273 lb 9.5 oz), SpO2 95 %. Body mass index is 38.16 kg/m². Intake/Output Summary (Last 24 hours) at 9/14/2023 0753  Last data filed at 9/14/2023 0640  Gross per 24 hour   Intake 2111.25 ml   Output 678 ml   Net 1433.25 ml       General Appearance:   Alert, cooperative, no distress   HEENT:   Normocephalic, atraumatic, anicteric     Neck:   Supple, symmetrical, trachea midline   Lungs:   Equal chest rise, respirations unlabored    Heart:   Regular rate and rhythm   Abdomen:   Soft, non-tender, distended    Rectal:   Deferred    Extremities:   Significant B/L LE edema, No cyanosis, clubbing   Neuro:    Moves all 4 extremities    Skin:   No jaundice, rashes, or lesions      ADDITIONAL DATA     Lab Results:     Results from last 7 days   Lab Units 09/14/23  0621 09/09/23  0454 09/08/23  0512   WBC Thousand/uL 14.18*   < > 9.50   HEMOGLOBIN g/dL 7.8*   < > 7.7*   HEMATOCRIT % 26.5*   < > 25.2*   PLATELETS Thousands/uL 310   < > 278   LYMPHO PCT %  --   --  3*   MONO PCT %  --   --  5   EOS PCT %  --   --  0    < > = values in this interval not displayed. Results from last 7 days   Lab Units 09/13/23  0446 09/12/23  0524   POTASSIUM mmol/L 3.6 3.5   CHLORIDE mmol/L 106 105   CO2 mmol/L 24 26   BUN mg/dL 22 25   CREATININE mg/dL 1.14 1.12   CALCIUM mg/dL 7.6* 7.3*   ALK PHOS U/L  --  45   ALT U/L  --  4*   AST U/L  --  10*           Imaging:    IR INPATIENT Paracentesis    Result Date: 9/8/2023  Narrative: PROCEDURE: Therapeutic and diagnostic paracentesis MEDICATIONS: 1% lidocaine subcutaneous. INDICATION: Symptomatic ascites. Necrotizing pancreatitis. PROCEDURE: Using ultrasound guidance, the right lower quadrant was punctured and a 5f yueh catheter was placed into the abdominal cavity until ascites was aspirated. A total of 2900 milliliters of serous fluid was removed. The catheter was then removed. FINDINGS: 2900 mL of serous ascites was removed. Impression: Therapeutic and diagnostic paracentesis. Signed, performed, and dictated by GHASSAN Maravilla under the supervision of Dr. Radha Anderson. Workstation performed: [id number]     CT abdomen pelvis wo contrast    Result Date: 9/8/2023  Narrative: CT ABDOMEN AND PELVIS WITHOUT IV CONTRAST INDICATION:   Persistent colonic distention. COMPARISON: Multiple prior studies, most recent is dated September 4, 2023. TECHNIQUE:  CT examination of the abdomen and pelvis was performed without intravenous contrast.  Axial, sagittal, and coronal 2D reformatted images were created from the source data and submitted for interpretation. Radiation dose length product (DLP) for this visit:  4512.84 mGy-cm .   This examination, like all CT scans performed in the [facility], was performed utilizing techniques to minimize radiation dose exposure, including the use of iterative reconstruction and automated exposure control. Enteric contrast was not administered. FINDINGS: ABDOMEN LOWER CHEST: Bilateral pleural effusions and underlying compressive atelectasis. LIVER/BILIARY TREE:  Unremarkable. GALLBLADDER:  No calcified gallstones. No pericholecystic inflammatory change. SPLEEN:  Unremarkable. PANCREAS: Again shown are findings of pancreatic necrosis with nearly the entire pancreas replaced by necrotic debris and gas. 2. Catheters draining the pancreatic debris, including an epigastric pigtail catheterq, and a large bore surgical catheter placed in the region of the tail the pancreas. Additional pigtail catheter has been placed in a left paracolic collection. The left  paracolic gutter collection is incompletely imaged but has decreased in size. ADRENAL GLANDS:  Unremarkable. KIDNEYS/URETERS: BOWEL: The cecum and transverse colon are distended with gas. The remainder of the colon is normal in caliber. When compared to the prior study though there has been improvement in the distention of the colon. The degree of cecal distention is similar with the cecum measuring approximately 9 cm. There is no cecal pneumatosis. Small bowel is normal in caliber. Fluid contents in the distal colon and rectum compatible with a diarrheal state. APPENDIX:  No findings to suggest appendicitis. ABDOMINOPELVIC CAVITY: Moderate volume ascites in the peritoneal cavity, greatest in the pelvis not significantly changed. VESSELS:  Unremarkable for patient's age. PELVIS REPRODUCTIVE ORGANS:  Unremarkable for patient's age. URINARY BLADDER: Collapsed with a Saunders catheter in place. ABDOMINAL WALL/INGUINAL REGIONS: Central venous access catheter in the left femoral vein. Moderate body wall edema. OSSEOUS STRUCTURES: Bilateral hip arthroplasties.  Degenerative changes throughout the spine. Impression: Findings compatible with infected pancreatic necrosis with multiple drains in place. Appearance overall similar. Interval placement of a left paracolic drain with slight decrease in the size of a fluid collection. Gaseous distention of the transverse colon and cecum which overall has improved since the prior study. The cecum measures approximately 9 cm. No cecal pneumatosis. Moderate volume ascites is unchanged. Persistent nephrograms bilaterally compatible with acute kidney injury. Workstation performed: [id number]     XR chest portable    Result Date: 9/7/2023  Narrative: CHEST INDICATION:   SOB. COMPARISON: Radiograph chest 9/5/2023 and CT abdomen pelvis 9/4/2023. EXAM PERFORMED/VIEWS:  XR CHEST PORTABLE FINDINGS:  Monitoring leads and clips project over the chest. Previous nasogastric tube removed. Cardiomediastinal silhouette appears unremarkable. Unchanged right medial lung base opacity. Small left and trace right pleural effusions. No pneumothorax. Thoracolumbar bridging osteophytes. Impression: Unchanged right medial lung base opacity. Small left and trace right pleural effusions. No pneumothorax. Resident: Georgia Bishop, the attending radiologist, have reviewed the images and agree with the final report above. Workstation performed: [id number]     XR abdomen 1 view kub    Result Date: 9/7/2023  Narrative: ABDOMEN INDICATION:   Re-evaluate colonic distention after bowel movements. COMPARISON: KUB 9/5/2023 and CT abdomen pelvis 9/4/2023. VIEWS:  AP supine FINDINGS: Surgical drain in the epigastric region. Saunders catheter overlies pelvic region. Left femoral central venous catheter in unchanged position. Slightly worse distention of the transverse colon. Unchanged gaseous distention of the ascending colon.  There may however be a small amount of air present within the distal descending colon adjacent to the left iliac crest No discernible free air under the right diaphragm. Left diaphragm is poorly visualized. Upright or left lateral decubitus imaging is more sensitive to detect subtle free air in the appropriate setting. No pathologic calcifications or soft tissue masses. Visualized right lung base is unremarkable. Left lung base is not visualized. Bilateral total hip arthroplasties. Impression: Slightly worse distention of the transverse colon. However, small amount of air may now be present in the distal descending colon adjacent to the left iliac crest. Unchanged gaseous distention of the ascending colon. Resident: Sosa Dean, the attending radiologist, have reviewed the images and agree with the final report above. Workstation performed: [id number]     IR drainage tube placement    Result Date: 9/7/2023  Narrative: Abscess drainage catheter placement x2 Limited ultrasound left chest. Limited ultrasound right chest History: Necrotizing pancreatitis. Anterior drainage catheter placed 8/5/2023, upsize to 16 [place] on 9/4/2023. Patient be admitted with septic shock and continues with respiratory and hemodynamic failure requiring vasopressor support. He is a poor surgical candidate at this time. After multidisciplinary discussion we have decided to move forward with large bore drainage catheter placement to attempt to manage the collection. He underwent thoracentesis on the fourth and paracentesis earlier today. Risks benefits and alternatives were reviewed including risks of bleeding and damage to adjacent organs. Anesthesia: General Procedure: After explaining the risks and benefits of the procedure to the patient, informed consent was obtained. Anesthesia was initiated and the patient was placed somewhat decubitus CT was used to localize the collections. Radiation dose length product (DLP) for this visit:  6346.4 mGy .   This examination, like all CT scans performed in the [facility], was performed utilizing techniques to minimize radiation dose exposure, including the use of iterative reconstruction and automated exposure control. Procedure: In the holding area limited ultrasound of the left and right chest was performed to assess for size of the effusions. This was for planning for possible thoracentesis. The patient was identified verbally and by wristband. Timeout was performed. Based on  imaging I elected to place 2 drains as the left paracolic gutter collection did not appear to freely communicate with the main pancreatic region collection The patient was prepped and draped in the usual sterile fashion. Using intermittent CT guidance access was gained to the patient's left paracolic collection using an 18 gauge needle. Amplatz wire was placed, the tract was dilated and a 12 [place] all-purpose drain formed in the cavity. Purulent necrotic material was aspirated. Simultaneously using CT guidance access was gained to the patient's retroperitoneal pancreatic region collection using an 18 gauge needle. I attempted to use an oblique angle in order to provide a good runway for future interventions. The first needle pass was close to the inferior margin of the spleen. Although I believe we did not traverse the spleen I felt this would not be the ideal angle for future necrosectomy. Therefore Gelfoam slurry was prepared and injected while the needle was removed A new needle route was chosen with the Cisneros needle in the collection accessed. A Lunderquist wire was placed, initially it coiled in the lateral margin of the collection and I was able to redirected more centrally. The tract was serially dilated with 12, 18 dilators and then a 24 German peel-away sheath utilized as a dilator followed finally by a 30 [place] dilator. A 28 German Thal-Quick drain was then inserted without difficulty. The central loop of the Fal would not form properly given the size of the collection.  Additionally, sideholes were seen to be visible to the flank but there was no capability to advance the tube further. Necrotic material began to drain under pressure. Each catheter was secured in place and placed to suction drainage. Patient was extubated in the CT scanner and tolerated the procedure well without apparent immediate complications. Findings: Left paracolic gutter collection measuring approximately 6.7 cm. Needle then wire then catheter within the collection. Use yielded purulent necrotic material. Large collection in the region of the pancreatic area, bilobed measuring approximately 16 x 7 cm on a representative image. Needle approaching this collection. Initial pass slightly inferior to the margin of the spleen. This needle pass was removed with Gelfoam injection. Subsequent needle pass more inferiorly and retroperitoneal debris. Wire then large bore catheter within the collection. Numerous additional findings including previously placed anterior drain coiled in the superior aspect of the collection. Minimal ascites. Small bilateral pleural effusions. Stomach containing some fluid. Dilated transverse colon. Limited ultrasound of the left and right chest before the drainage confirmed small bilateral pleural effusions. Specimens: Abscess drainage material representative sample sent for aerobic, anaerobic culture     Impression: Impression: CT-guided placement of a large-bore 28 [place] drain into a retroperitoneal necrotic pancreatic collection CT-guided placement of a 12 [place] drain into a more inferior loculated collection Note: The large 28 [place] drain was placed at an oblique angle to allow for  possible future necrosectomy/VARDS. Drainage of this very thick necrotic material will be best with continuous large diameter tubing, such as a chest tube box. Not all sideholes are in the collection due to tube length, do not flush with large volumes as this will spill material into the flank.  Would hold any anticoagulation at least 12-24 hours due to hemorrhagic nature of the material and proximity of 1 needle pass to the spleen Workstation performed: [id number]     XR abdomen 1 view kub    Result Date: 9/5/2023  Narrative: ABDOMEN INDICATION:   monitor colonic distention. COMPARISON: CT abdomen pelvis with contrast 9/4/2023. VIEWS:  AP supine Images: 3 FINDINGS: Nonweighted enteric tube sidehole overlies distal stomach and tip in proximal second portion of duodenal region. Surgical drain overlies the epigastric region. Probable Saunders catheter overlies pelvic region. Left femoral approach central venous catheter tip overlies left iliac vessel region. There is a nonobstructive bowel gas pattern. Similar persistent gaseous distention of visualized ascending and transverse colon. No discernible free air on this supine study. Upright or left lateral decubitus imaging is more sensitive to detect subtle free air in the appropriate setting. No pathologic calcifications or soft tissue masses. Bibasilar atelectasis (left worse than right). Bilateral total hip arthroplasty. Spinal degenerative changes. Impression: Devices as detailed below: -Nonweighted enteric tube sidehole overlies distal stomach and tip in proximal second portion of duodenal region. -Surgical drain overlies the epigastric region. -Probable Saunders catheter overlies pelvic region. -Left femoral approach central venous catheter tip overlies left iliac vessel region. Similar persistent gaseous distention of visualized ascending and transverse colon. Bibasilar atelectasis (left worse than right). Workstation performed: [id number]     Echo complete w/ contrast if indicated    Result Date: 9/5/2023  Narrative: •  Left Ventricle: Left ventricular cavity size is normal. Wall thickness is upper normal. The left ventricular ejection fraction is 60%. Systolic function is normal. Wall motion is normal. Diastolic function is normal. •  Right Ventricle: Right ventricular cavity size is mildly dilated.  Systolic function is normal. •  Mitral Valve: There is trace regurgitation. •  Pulmonary Artery: The pulmonary artery systolic pressure is mildly increased. XR chest 1 view portable    Result Date: 9/5/2023  Narrative: CHEST INDICATION:   CP. COMPARISON: Chest radiograph from August 4, 2023 EXAM PERFORMED/VIEWS:  XR CHEST PORTABLE FINDINGS: Cardiomediastinal silhouette appears unremarkable. Subsegmental atelectasis in the perihilar regions and lung bases. Small layering pleural effusions. No pneumothorax. Osseous structures appear within normal limits for patient age. Impression: Small pleural effusions and bibasilar atelectasis. Workstation performed: [id number]     IR drainage tube check/change/reposition/reinsertion/upsize    Result Date: 9/5/2023  Narrative: Examination: Abscess catheter tube reposition/upsize History: Pancreatitis, septic shock, respiratory failure, bilateral pleural effusions larger on the left Patient requires respiratory optimization in order to lay on the fluoroscopy table for drain upsize Contrast type: omnipaque 300   Contrast dose: 10 cc Fluoroscopy time: 1.1 minutes Moderate sedation time: 30 minutes Technique: Risks benefits alternatives discussed informed consent obtained. Risk of bleeding discussed on Eliquis. The patient was identified verbally, and by wrist band." Time out" was performed. Initially patient was optimized with left thoracentesis which is dictated separately. The existing tube was prepped and draped in usual sterile fashion. Lidocaine was given as local anesthesia. Lidocaine was also administered within the tube. Contrast was injected. The system was opacified. The existing tube was removed over  an Amplatz wire.  The tract was dilated and a new 12 [place] skater all-purpose drain was formed Given the patient's body habitus and the depth of the collection the catheter is hubbed to the skin Specimens: Culture aerobic, anaerobic Findings: Ill-defined cavity in the mid abdomen containing previously placed drain. Catheter positioned more deeply and upsized to 12 [place] 30-50 cc of chunky hemorrhagic infected material removed NG tube noted decompressing the stomach with gaseous distention of the transverse colon     Impression: Impression: Upsize and reposition of a pancreatic necrotic collection drain to a 16 [place] pigtail catheter Patient also has ascites leaking around the catheter, he may be a candidate for paracentesis or a peritoneal drain to simply divert and reduce the ascites Workstation performed: [id number]     IR thoracentesis    Result Date: 9/5/2023  Narrative: Ultrasound-guided thoracentesis Clinical History: Pancreatitis, septic shock, respiratory failure, bilateral pleural effusions larger on the left Patient requires respiratory optimization in order to lay on the fluoroscopy table for drain upsize procedure: After explaining the risks and benefits of the procedure to the patient, informed consent was obtained. Ultrasound was used to localize the pleural effusion. The overlying skin was prepped and draped in usual sterile fashion and local anesthesia was obtained with the 1% lidocaine solution. A 5-[place]rist[place] Yueh needle was advanced until fluid was aspirated under live ultrasound guidance. Approximately  1200   cc of clear, yellow fluid was aspirated. Specimens: Multiple The patient tolerated the procedure well, and attention was turned to drain upsize which is dictated separately     Impression: Impression: Ultrasound-guided left thoracentesis Workstation performed: [id number]     XR chest portable ICU    Result Date: 9/5/2023  Narrative: CHEST INDICATION:   evaluation after IJ attempted. COMPARISON: 9/4/2023 EXAM PERFORMED/VIEWS:  XR CHEST PORTABLE ICU 2 views FINDINGS: Interval placement of nasogastric tube which terminates below the diaphragm in the mid stomach Cardiomediastinal silhouette appears unremarkable.  Persistent opacity at medial right lung base suspicious for pneumonia No pneumothorax or pleural effusion. Osseous structures appear within normal limits for patient age. Impression: Persistent medial right lung base opacity suspicious for pneumonia No pneumothorax Workstation performed: [id number]     CT abdomen pelvis w contrast    Result Date: 9/4/2023  Narrative: CT ABDOMEN AND PELVIS WITH IV CONTRAST INDICATION:   Abdominal abscess/infection suspected r/o peripancreatic abscess, infection. COMPARISON: CT abdomen pelvis 8/18/2023 TECHNIQUE:  CT examination of the abdomen and pelvis was performed. Multiplanar 2D reformatted images were created from the source data. This examination, like all CT scans performed in the [facility], was performed utilizing techniques to minimize radiation dose exposure, including the use of iterative reconstruction and automated exposure control. Radiation dose length product (DLP) for this visit:  3543.98 mGy-cm IV Contrast:  100 mL of iodixanol (VISIPAQUE) Enteric Contrast:  Enteric contrast was not administered. FINDINGS: ABDOMEN LOWER CHEST: Moderate bilateral pleural effusions, increased from prior study with mild bilateral lower lobe compressive atelectasis. Shotty lymph nodes are seen in the bilateral cardiophrenic angles. 2 mm subpleural right middle lobe nodule, unchanged. Small focal airspace opacity within the lingula, new from previous study could represent atelectasis or early pneumonia. LIVER/BILIARY TREE:  Unremarkable. GALLBLADDER: No calcified gallstones. Pericholecystic inflammatory changes are favored to be secondary to extension from pancreatic primary process. SPLEEN: Small crescentic hypodensity is seen along the medial spleen, unchanged in retrospect. Unclear if this is related to a small infarct or possibly a small component of subcapsular fluid.  PANCREAS: There has been development of a significant amount of gas seen within a large acute necrotic collection which is largely replacing the pancreatic parenchyma and extends laterally along the anterior pararenal space bilaterally, left greater than  right. This collection measures about 21 x 5.6 x 18.9 cm transverse, AP and craniocaudal dimensions respectively (series 4 image 74 and 603/96). Pigtail left upper quadrant drainage catheter is seen along the anterior margin of the collection. However, there is a significant amount of gas associated with this collection and elsewhere in the retroperitoneum more so than expected from catheter manipulation alone and is concerning for infected necrosis. The collection extends into the lateral paracolic gutter more caudally and may cross the midline as well such as series 4 image 89. On the left, the collection is associated with thickening of the lateral conal fascia and causes masslike thickening of the proximal to mid descending colon (series 4 images 91- 115). This is believed to represent phlegmonous mass. There is dilatation of the transverse colon up to 12.1 cm craniocaudal dimension (601/43) and 8.8 cm AP dimension, consistent with colonic obstruction. This degree of distention could pose a risk for bowel perforation. Surgical consultation is advised. The above collection involves the lesser sac and extends cephalad on the right interposed between the caudate lobe liver and reina of the diaphragm to the gastrohepatic ligament. Inflammatory changes are noted within the laurent hepatis surrounding the vessels and causing slitlike narrowing of the main portal vein such as series 4 image 63. The vein is grossly patent. Similar findings are noted involving the splenic vein and SMV. These findings have also progressed from the prior study. Inflammatory tissue also surrounds the vessels about the celiac bifurcation and branches of the common hepatic artery. There is minimal if any appreciable enhancing pancreatic parenchyma. ADRENAL GLANDS:  Unremarkable. KIDNEYS/URETERS:  Unremarkable. No hydronephrosis.  STOMACH AND BOWEL: Please see comments regarding the colon under the above pancreas section. The stomach is mildly distended and fluid-filled. There is collapse and/or thickening of the gastric antrum and descending duodenum, the latter is likely 180 degrees enveloped by the inflammatory collection. Secondary antritis/duodenitis with element of gastric outlet obstruction is not excluded. Moderate stool in the rectum. APPENDIX:  No findings to suggest appendicitis. ABDOMINOPELVIC CAVITY: Moderate to large amount of abdominal pelvic free fluid which is greater in the pelvis, also slightly increased. No pneumoperitoneum. No lymphadenopathy. VESSELS: See comments above. PELVIS soft tissue detail of the pelvis is degraded by beam hardening artifact from bilateral total hip arthroplasties. REPRODUCTIVE ORGANS:  Unremarkable for patient's age. URINARY BLADDER:  Unremarkable. ABDOMINAL WALL/INGUINAL REGIONS:  Unremarkable. OSSEOUS STRUCTURES:  No acute fracture or destructive osseous lesion. Bilateral total hip arthroplasties. Degenerative changes of the spine. Impression: 1. Significant worsening of necrotizing pancreatitis with large acute necrotic collection largely replacing the pancreatic parenchyma and extending into the anterior pararenal spaces (left greater than right) with the collection now containing a large amount of gas concerning for infected necrosis. Additionally, there is an inflammatory phlegmonous mass along the left lateral pararenal space/paracolic gutter with involvement of the proximal to mid descending colon causing associated colonic obstruction. This degree of bowel distention could pose a risk for bowel perforation. Surgical consultation is advised. 2. Progression of inflammatory changes extending into the laurent hepatis with mass effect and narrowing of the portal vein and associated proximal vessels about the portal confluence as detailed above but no evidence for overt venous occlusion.  3. Mild fluid-filled gastric distention. Collapse and/or thickening of the gastric antrum and descending duodenum, the latter is likely 180 degrees enveloped by the inflammatory collection. Secondary antritis/duodenitis with element of gastric outlet obstruction is not excluded. 4. Moderate to large amount of abdominopelvic free fluid, increasing from prior study. 5. Enlarging, moderate bilateral pleural effusions with mild bibasilar compressive atelectasis. I personally discussed this study with [address] [place] [address] on 9/4/2023 at 5:47 PM. Workstation performed: [id number]     CT abdomen pelvis w contrast    Result Date: 8/18/2023  Narrative: CT ABDOMEN AND PELVIS WITH IV CONTRAST INDICATION:   K85.91: Acute pancreatitis with uninfected necrosis, unspecified. COMPARISON: Prior CT abdomen pelvis examinations dating back to 8/4/2023 TECHNIQUE:  CT examination of the abdomen and pelvis was performed. Scanning through the abdomen was performed in arterial, venous and delayed phases according a protocol specifically designed to evaluate upper abdominal viscera. Multiplanar 2D reformatted images were created from the source data. This examination, like all CT scans performed in the [facility], was performed utilizing techniques to minimize radiation dose exposure, including the use of iterative reconstruction and automated exposure control. Radiation dose length product (DLP) for this visit:  3383 mGy-cm IV Contrast:  100 mL of iohexol (OMNIPAQUE) Enteric Contrast:  Enteric contrast was not administered. FINDINGS: ABDOMEN LOWER CHEST: Partially visualized at least small bilateral pleural effusions. A 3 mm left lower lobe pulmonary nodule (4/8). Bibasilar subsegmental atelectasis. LIVER/BILIARY TREE:  Unremarkable. GALLBLADDER:  No calcified gallstones. No pericholecystic inflammatory change. SPLEEN:  Unremarkable.  PANCREAS: There is evidence of acute pancreatitis, which will be described based on the revised [facility] DISTRICT Classification of Acute Pancreatitis: -TIME OF ONSET: less than or equal to 4 weeks -NECROSIS: There is evidence of pancreatic parenchyma necrosis (more than 30%), therefore this is necrotizing pancreatitis. There is decreasing peripancreatic fat stranding and fluid tracking compared to 8/6/2023. Again seen is small volume ascites, minimally decreased compared to 3/9/7636. -LOCAL COMPLICATIONS: Interval decrease in the size of acute necrotizing collection in the lesser sac, currently being 4.1 x 7.4 cm (series 4 image 64), previously 11.4 x 6.1 cm. It contains a tendon heterogenous material in the bladder drainage catheter  lies in the superficial portion of this collection The main portal vein, splenic vein and the intermesenteric vein are patent with mild mass effect on the main proximal splenic vein, improved since 8/6/2023. -INFECTION: There is no abnormal gas in or around the pancreas to suggest infection. ADRENAL GLANDS:  Unremarkable. KIDNEYS/URETERS:  Unremarkable. No hydronephrosis. STOMACH AND BOWEL: No abnormal bowel dilation. Suboptimal evaluation rectum due to streak artifact from arthroplasties. APPENDIX:  No findings to suggest appendicitis. ABDOMINOPELVIC CAVITY:  No ascites. No pneumoperitoneum. No lymphadenopathy. VESSELS:  Unremarkable for patient's age. PELVIS REPRODUCTIVE ORGANS: Suboptimal evaluation of the prostate with streak artifact bilateral hip arthroplasties. Alex Speaker URINARY BLADDER:  Unremarkable. ABDOMINAL WALL/INGUINAL REGIONS:  Unremarkable. OSSEOUS STRUCTURES:  No acute fracture or destructive osseous lesion. Bilateral total hip arthroplasty     Impression: Resolving acute necrotizing pancreatitis with interval decrease in the size of the peripancreatic necrotizing collection in the lesser sac compared to 8/6/2023. Persistent high attenuation material within the peripancreatic fluid collection with tip of the drainage catheter in its superficial aspect. Mildly improved ascites.  At least small bilateral pleural effusions. A 3 mm left lower lobe pulmonary nodule. Attention on follow-up imaging versus optional follow-up CT chest in 12 months is recommended to assess for stability, as per Fleischner criteria. The study was marked in EPIC for significant notification. Workstation performed: [id number]       EKG, Pathology, and Other Studies Reviewed on Admission:   · EKG: Reviewed      Counseling / Coordination of Care Time: 30 total mins spent n consult. Greater than 50% of total time spent on patient counseling and coordination of care. Graciela Leong DO  Gastroenterology Fellow  [facility]  Division of Gastroenterology and Hepatology  Available on [employer]  . ..............................................................................................................................................  ** Please Note: This note is constructed using a voice recognition dictation system.  **

## 2023-09-14 NOTE — TELEPHONE ENCOUNTER
Received fax from [address] [place] [address] - requesting for Dr Trevon Ragsdale to complete short term disability paperwork for pt. Also requesting all records from 9/4-current. All reports and clinical notes printed in office and faxed to [phone]. Confirmation received.

## 2023-09-14 NOTE — UTILIZATION REVIEW
Continued Stay Review    Date:   9/14                          Current Patient Class:   Inpatient  Current Level of Care:   Med surg    HPI:52 y.o. male initially admitted on    9/4    With septic shock  D/T  Bacteremia, pancreatic  abscess    Assessment/Plan:   9/14    Continue  PT/OT. Remains  On  NILESH  Thru  9/17  For  Total  14 day course. Having some loose stools. Tachycardic today  In the  100's. IV  Heparin d/c  This am.  3  Abdominal drains intact and monitor output, opaque gray drainage noted. LUQ   150 cc,  2nd  LUQ   25  Cc and  RUQ   25  Cc. For cygastrostomy  Today. Vital Signs:   98.8-10-16      119/72      sats  95 % RA    Pertinent Labs/Diagnostic Results:       Results from last 7 days   Lab Units 09/14/23 0621 09/13/23 0446 09/12/23  0524 09/11/23  0554 09/10/23  0612 09/09/23  0454 09/08/23  0512   WBC Thousand/uL 14.18* 13.46* 12.96* 10.41* 10.21*   < > 9.50   HEMOGLOBIN g/dL 7.8* 7.7* 7.7* 7.9* 7.5*   < > 7.7*   HEMATOCRIT % 26.5* 25.3* 25.8* 26.0* 25.0*   < > 25.2*   PLATELETS Thousands/uL 310 292 284 256 260   < > 278   BANDS PCT %  --   --   --   --   --   --  10*    < > = values in this interval not displayed.          Results from last 7 days   Lab Units 09/14/23 0621 09/13/23 0446 09/12/23 0524 09/11/23  0554 09/10/23  0612 09/09/23  1226 09/09/23  0454 09/08/23  0512   SODIUM mmol/L 137 139 136 136 136 137   < > 140   POTASSIUM mmol/L 3.6 3.6 3.5 3.6 3.9 3.6   < > 3.4*   CHLORIDE mmol/L 106 106 105 104 105 104   < > 103   CO2 mmol/L 23 24 26 25 27 29   < > 29   ANION GAP mmol/L 8 9 5 7 4 4   < > 8   BUN mg/dL 20 22 25 27* 31* 34*   < > 43*   CREATININE mg/dL 1.15 1.14 1.12 1.18 1.17 1.18   < > 1.13   EGFR ml/min/1.73sq m 72 73 75 70 71 70   < > 74   CALCIUM mg/dL 7.5* 7.6* 7.3* 7.3* 7.3* 7.5*   < > 7.5*   CALCIUM, IONIZED mmol/L  --   --   --   --   --  1.12  --   --    MAGNESIUM mg/dL  --   --   --   --   --  2.5  --  2.1   PHOSPHORUS mg/dL  --   --   --   --   --  3.2 --  3.6    < > = values in this interval not displayed. Results from last 7 days   Lab Units 09/12/23  0524   AST U/L 10*   ALT U/L 4*   ALK PHOS U/L 45   TOTAL PROTEIN g/dL 5.5*   ALBUMIN g/dL 2.3*   TOTAL BILIRUBIN mg/dL 0.39   BILIRUBIN DIRECT mg/dL 0.10     Results from last 7 days   Lab Units 09/14/23  1103 09/14/23  0714 09/13/23  2238 09/13/23  2136 09/13/23  1633 09/13/23  1139 09/13/23  0811 09/12/23  2248 09/12/23  2135 09/12/23  1527 09/12/23  1139 09/12/23  0727   POC GLUCOSE mg/dl 127 130 111 89 98 115 118 147* 74 112 118 143*     Results from last 7 days   Lab Units 09/14/23  0621 09/13/23  0446 09/12/23  0524 09/11/23  0554 09/10/23  0612 09/09/23  1226 09/09/23  0454 09/08/23  0512   GLUCOSE RANDOM mg/dL 109 121 123 133 174* 124 138 161*                   Results from last 7 days   Lab Units 09/14/23  0621 09/13/23  0446 09/12/23  0524   PTT seconds 50* 68* 68*                 Medications:   Scheduled Medications:  cefTRIAXone, 2,000 mg, Intravenous, Q24H  chlorhexidine, 15 mL, Mouth/Throat, Q12H ALEX  furosemide, 20 mg, Oral, Daily  insulin lispro, 2-12 Units, Subcutaneous, TID AC  insulin lispro, 2-12 Units, Subcutaneous, HS  insulin lispro, 5 Units, Subcutaneous, TID With Meals  metroNIDAZOLE, 500 mg, Intravenous, Q8H  polyethylene glycol, 17 g, Oral, BID      Continuous IV Infusions:  heparin (porcine), 3-26 Units/kg/hr (Order-Specific), Intravenous, Titrated - D/C  9/14      PRN Meds:  acetaminophen, 650 mg, Oral, Q6H PRN  bisacodyl, 10 mg, Rectal, Daily PRN  HYDROmorphone, 0.5 mg, Intravenous, Q4H PRN  mineral oil, 1 enema, Rectal, Daily PRN  oxyCODONE, 5 mg, Oral, Q6H PRN   Or  oxyCODONE, 10 mg, Oral, Q6H PRN        Discharge Plan:    TBD    Network Utilization Review Department  ATTENTION: Please call with any questions or concerns to [phone] and carefully listen to the prompts so that you are directed to the right person.  All voicemails are confidential.  Antonieta Martinez all requests for admission clinical reviews, approved or denied determinations and any other requests to dedicated fax number below belonging to the campus where the patient is receiving treatment.  List of dedicated fax numbers for the Facilities:  [facility] (Administrative/Medical Necessity) [phone] [address] (Maternity/NICU/Pediatrics) [phone]   [address] [place] [address] [phone]   [facility] [address] [facility] [phone]   [address] [place] [address] [phone]   [id number] [place] [address] [address] [phone]

## 2023-09-14 NOTE — PLAN OF CARE
Problem: PHYSICAL THERAPY ADULT  Goal: Performs mobility at highest level of function for planned discharge setting. See evaluation for individualized goals. Description: Treatment/Interventions: ADL retraining, Functional transfer training, LE strengthening/ROM, Elevations, Therapeutic exercise, Endurance training, Patient/family training, Equipment eval/education, Bed mobility, Gait training, Spoke to nursing, Spoke to case management, OT, Family  Equipment Recommended: Cris Blanton (has for d/c)       See flowsheet documentation for full assessment, interventions and recommendations. Outcome: Progressing  Note: Prognosis: Good  Problem List: Decreased strength, Decreased range of motion, Decreased endurance, Impaired balance, Decreased mobility, Obesity, Decreased skin integrity, Pain  Assessment: Pt seen for PT treatment session this date. Therapy session focused on functional transfers, gait training, and endurance training in order to improve overall mobility and independence. Pt requires assist of 1 for all mobility performed this date. Pt making steady progress toward goals as demonstrated by ambulating increased distances as well as requiring decreased seated rest break in between gait trials. Pt continues to be limited by LE pitting edema. Pt was left sitting OOB in recliner at the end of PT session with all needs in reach. Pt would benefit from continued PT services while in hospital to address remaining limitations. PT to continue to follow pt and recommends home with HHPT. The patient's AM-PAC Basic Mobility Inpatient Short Form Raw Score is 17. A Raw score of greater than 16 suggests the patient may benefit from discharge to [facility] also refer to the recommendation of the Physical Therapist for safe discharge planning. PT Discharge Recommendation: Home with home health rehabilitation    See flowsheet documentation for full assessment.

## 2023-09-14 NOTE — PHYSICAL THERAPY NOTE
PHYSICAL THERAPY NOTE          Patient Name: Azeem ROJAS Date: 9/14/2023 09/14/23 1432   PT Last Visit   PT Visit Date 09/14/23   Note Type   Note Type Treatment   Pain Assessment   Pain Assessment Tool 0-10   Pain Score No Pain   Restrictions/Precautions   Weight Bearing Precautions Per Order No   Other Precautions Multiple lines; Fall Risk  (drains, LE edema)   General   Chart Reviewed Yes   Response to Previous Treatment Patient with no complaints from previous session. Family/Caregiver Present Yes   Cognition   Overall Cognitive Status WFL   Arousal/Participation Alert; Cooperative   Attention Within functional limits   Orientation Level Oriented X4   Memory Within functional limits   Following Commands Follows all commands and directions without difficulty   Comments pt pleasant and cooperative   Bed Mobility   Supine to Sit Unable to assess   Sit to Supine Unable to assess   Additional Comments pt sitting OOB in recliner upon arrival. Pt left sitting OOB in recliner with all needs within reach   Transfers   Sit to Stand 5  Supervision   Additional items Armrests; Increased time required   Stand to Sit 5  Supervision   Additional items Armrests; Increased time required   Additional Comments transfers with RW  (2x STS performed)   Ambulation/Elevation   Gait pattern Excessively slow; Short stride   Gait Assistance 5  Supervision  (Close S/ CGA)   Additional items Assist x 1;Verbal cues   Assistive Device Rolling walker   Distance 45', 45'   Ambulation/Elevation Additional Comments CERON, tachycardic- resolves with seated rest   Balance   Static Sitting Fair +   Dynamic Sitting Fair   Static Standing Fair -   Dynamic Standing Fair -   Ambulatory Poor +   Endurance Deficit   Endurance Deficit Yes   Endurance Deficit Description generalized weakness, deconditioning, fatigue   Activity Tolerance   Activity Tolerance Patient tolerated treatment well   Medical Staff Made Aware rehab aidchristiano Spencer   Nurse Made Aware RN cleared pt to participate in therapy session   Assessment   Prognosis Good   Problem List Decreased strength;Decreased range of motion;Decreased endurance; Impaired balance;Decreased mobility;Obesity; Decreased skin integrity;Pain   Assessment Pt seen for PT treatment session this date. Therapy session focused on functional transfers, gait training, and endurance training in order to improve overall mobility and independence. Pt requires assist of 1 for all mobility performed this date. Pt making steady progress toward goals as demonstrated by ambulating increased distances as well as requiring decreased seated rest break in between gait trials. Pt continues to be limited by LE pitting edema. Pt was left sitting OOB in recliner at the end of PT session with all needs in reach. Pt would benefit from continued PT services while in hospital to address remaining limitations. PT to continue to follow pt and recommends home with HHPT. The patient's AM-[facility] Basic Mobility Inpatient Short Form Raw Score is 17. A Raw score of greater than 16 suggests the patient may benefit from discharge to [facility] also refer to the recommendation of the Physical Therapist for safe discharge planning. Goals   Patient Goals to go home   STG Expiration Date 09/21/23   PT Treatment Day 5   Plan   Treatment/Interventions Functional transfer training;LE strengthening/ROM; Elevations; Therapeutic exercise; Endurance training;Patient/family training;Equipment eval/education; Bed mobility;Gait training;Spoke to nursing;Spoke to case management;OT   Progress Progressing toward goals   PT Frequency 3-5x/wk   Recommendation   PT Discharge Recommendation Home with home health rehabilitation   Equipment Recommended [address] walker   AM-[facility] Basic Mobility Inpatient   Turning in Flat Bed Without Bedrails 3   Lying on Back to Sitting on Edge of Flat Bed Without Bedrails 3   Moving Bed to Chair 3   Standing Up From Chair Using Arms 3   Walk in Room 3   Climb 3-5 Stairs With Railing 2   Basic Mobility Inpatient Raw Score 17   Basic Mobility Standardized Score 39.67   Highest Level Of Mobility   JH-HLM Goal 5: Stand one or more mins   JH-HLM Achieved 7: Walk 25 feet or more   Education   Education Provided Mobility training;Assistive device   Patient Demonstrates acceptance/verbal understanding   End of Consult   Patient Position at End of Consult Bedside chair; All needs within reach     Nia Malone, PT, DPT

## 2023-09-14 NOTE — CASE MANAGEMENT
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   • Pleural effusion, left 2023   • Hyponatremia 2023   • Abnormal urinalysis 2023   • Leukocytosis 2023   • JERZY (acute kidney injury) ([address]) 2023   • Primary hypertension 2022   • Proteinuria 2022   • Chronic pain of both hips 2022   • Family history of prostate cancer in father 2022      LOS (days): 10  Geometric Mean LOS (GMLOS) (days):   Days to GMLOS:     OBJECTIVE:  Risk of Unplanned Readmission Score: 23.89         Current admission status: Inpatient   Preferred Pharmacy:   [id number] [address] [place] [id number]  Phone: [phone] Fax: [address], [address] [address]  Phone: [phone] Fax: [phone]    Primary Care Provider: Roseanne Richard MD    Primary Insurance: Ana Light  Secondary Insurance:     DISCHARGE DETAILS:    DESHAWNP Yessy   3 drains,  For cystgastro placement today

## 2023-09-14 NOTE — PLAN OF CARE
Problem: PAIN - ADULT  Goal: Verbalizes/displays adequate comfort level or baseline comfort level  Description: Interventions:  - Encourage patient to monitor pain and request assistance  - Assess pain using appropriate pain scale  - Administer analgesics based on type and severity of pain and evaluate response  - Implement non-pharmacological measures as appropriate and evaluate response  - Consider cultural and social influences on pain and pain management  - Notify physician/advanced practitioner if interventions unsuccessful or patient reports new pain  Outcome: Progressing     Problem: INFECTION - ADULT  Goal: Absence or prevention of progression during hospitalization  Description: INTERVENTIONS:  - Assess and monitor for signs and symptoms of infection  - Monitor lab/diagnostic results  - Monitor all insertion sites, i.e. indwelling lines, tubes, and drains  - Monitor endotracheal if appropriate and nasal secretions for changes in amount and color  - [place] appropriate cooling/warming therapies per order  - Administer medications as ordered  - Instruct and encourage patient and family to use good hand hygiene technique  - Identify and instruct in appropriate isolation precautions for identified infection/condition  Outcome: Progressing  Goal: Absence of fever/infection during neutropenic period  Description: INTERVENTIONS:  - Monitor WBC    Outcome: Progressing

## 2023-09-14 NOTE — PROGRESS NOTES
Progress Note - General Surgery   [facility] 46 y.o. 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 UOP: 195cc  BM: x1    cc tan necrotic  LUQ: 25 cc tan necrotic  RUQ: 25 cc tan    Lab Results   Component Value Date/Time    WBC 14.18 (H) 09/14/2023 06:21 AM    WBC 13.46 (H) 09/13/2023 04:46 AM    HGB 7.8 (L) 09/14/2023 06:21 AM    HGB 7.7 (L) 09/13/2023 04:46 AM    CREATININE 1.14 09/13/2023 04:46 AM    CREATININE 1.12 09/12/2023 05:24 AM       Plan:  -NPO since midnight for cygastrostomy  -f/u daily CBC- d/t uptrending WBC  -encouraged IS use  -strict I&O  -Cefepime/Flagyl to continue through 9/17  -Monitor drains output and character  -Heparin gtt- monitor PTT  -Pain control  -Encourage ambulation, PT/OT rec no needs    Subjective/Objective   Subjective:     No acute events overnight. Pt pulling 800cc on IS. Objective:     Blood pressure 119/72, pulse (!) 106, temperature 98.8 °F (37.1 °C), resp. rate 16, height 5' 11" (1.803 m), weight 124 kg (273 lb 9.5 oz), SpO2 95 %. ,Body mass index is 38.16 kg/m². Intake/Output Summary (Last 24 hours) at 9/14/2023 0734  Last data filed at 9/14/2023 0640  Gross per 24 hour   Intake 2111.25 ml   Output 678 ml   Net 1433.25 ml       Invasive Devices     Peripherally Inserted Central Catheter Line  Duration           PICC Line 79/83/63 Left Basilic 5 days          Peripheral Intravenous Line  Duration           Peripheral IV 08/08/23 Dorsal (posterior); Left Wrist 37 days          Drain  Duration           Abscess Drain RUQ 9 days    Abscess Drain LUQ 7 days    Abscess Drain LUQ 7 days                Physical Exam:  General: NAD  Skin: Warm, dry, anicteric  HEENT: Normocephalic, atraumatic  CV: Tachycardic, no m/r/g  Pulm: CTA b/l, no inc WOB  Abd: soft, nontender, mild distension, protuberant abdomen. MSK: Symmetric, +3 b/l pitting edema unchanged from day prior, no tenderness, no deformity  Neuro: AOx3, GCS 15    Lab, Imaging and other studies:  I have personally reviewed pertinent lab results.   , CBC:   Lab Results   Component Value Date    WBC 14.18 (H) 09/14/2023    HGB 7.8 (L) 09/14/2023    HCT 26.5 (L) 09/14/2023    MCV 90 09/14/2023     09/14/2023    RBC 2.96 (L) 09/14/2023    MCH 26.4 (L) 09/14/2023    MCHC 29.4 (L) 09/14/2023    RDW 19.8 (H) 09/14/2023    MPV 10.0 09/14/2023   , CMP:   No results found for: "SODIUM", "K", "CL", "CO2", "ANIONGAP", "BUN", "CREATININE", "GLUCOSE", "CALCIUM", "AST", "ALT", "ALKPHOS", "PROT", "BILITOT", "EGFR", Coagulation: No results found for: "PT", "INR", "APTT"  VTE Pharmacologic Prophylaxis: Sequential compression device (Venodyne)  and Heparin   VTE Mechanical Prophylaxis: sequential compression device

## 2023-09-14 NOTE — PROGRESS NOTES
Progress Note - Infectious Disease   Alena Hatfield 46 y.o. male MRN: [id number]  Unit/Bed#: [facility] 901-01 Encounter: [id number]      Impression/Recommendations:  Septic shock, POA.    Fever, WBC, refractory hypotension.  Due to bacteremia, pancreatic abscess as below.  No other appreciable source.  Improving, now off pressors. WBC trending up, possible reflecting need for additional pancreatic drainage which is pending. Rec:  • Continue antibiotics as below  • Follow temperatures closely  • Recheck CBC in AM  • Supportive care as per the primary service     Polymicrobial bacteremia.    Klebsiella, Strep mitis/oralis.  Coagulase-negative Staph x2 from single set are likely contaminants.  Due to pancreatic abscess as below.  No other appreciable source.  Repeat blood cultures 9/6, TTE (technically difficult) negative. Rec:  • Continue ceftriaxone for 14 days total antibiotics through 9/17     Pancreatic abscess.    Due to superinfection of necrosis as below.  Status post drain upsizing 9/4 which yielded infected hemorrhagic material.  Cultures with Citrobacter, E. Coli, Klebsiella, Aeromonas, Bacteroides.  Status post additional drains x2 9/6.  Cultures with same organisms.    Rec:  • Continue ceftriaxone/Flagyl for 14 days total antibiotics through 9/17  • Follow drain output closely  • Cyst gastrostomy placement pending 9/15 per GI     Necrotizing pancreatitis. Presumed alcoholic.  Status post IR drain 8/5 yielding dark bloody fluid.  Cultures initially negative.  Current CT shows replacing most of pancreatic parenchyma, extending to pararenal spaces, causing left colonic obstruction, gastric outlet obstruction.  Status post drain upsizing 9/4, additional drains x2 9/6 yielding superinfected fluid as above.  Collection initially not mature enough for cyst gastrostomy per GI.  Status post bedside tube interrogation, necrosectomy 9/8.   Rec:  • Follow drain output closely  • Cyst gastrostomy placement pending 9/15 per GI  • Close Surgery, GI follow-up ongoing     JERZY. Likely multifactorial.  Improving.     Large bowel obstruction. Due to compression from pancreatitis as above.     Gastric outlet obstruction. Due to compression from pancreatitis as above.     Bilateral pleural effusions. Likely reactive to pancreatitis as above.  Status post left thoracentesis yielding 1200 cc clear yellow fluid.  Fluid cultures negative.     Ascites. Likely reactive to pancreatitis as above.  Status post paracentesis.  Fluid cultures negative.     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       Antibiotics:  Ceftriaxone #4  Flagyl #10  Antibiotics #11    Subjective:  Patient seen on PM rounds. Offers no complaints. Denies fevers, chills, sweats, nausea, vomiting, or diarrhea. 24 Hour Events:  EGD cancelled for another emergency. Objective:  Vitals:  Temp:  [98.3 °F (36.8 °C)-99.4 °F (37.4 °C)] 98.8 °F (37.1 °C)  HR:  [105-110] 106  Resp:  [14-17] 16  BP: (119-134)/(71-81) 119/72  SpO2:  [94 %-96 %] 95 %  Temp (24hrs), Av.7 °F (37.1 °C), Min:98.3 °F (36.8 °C), Max:99.4 °F (37.4 °C)  Current: Temperature: 98.8 °F (37.1 °C)    Physical Exam:   General:  No acute distress  Psychiatric:  Awake and alert  Pulmonary:  Normal respiratory excursion without accessory muscle use  Abdomen:  Soft, nontender  Extremities:  2+ pitting edema legs  Skin:  No rashes    Lab Results:  I have personally reviewed pertinent labs.   Results from last 7 days   Lab Units 23  0621 23  0446 23  0524   POTASSIUM mmol/L 3.6 3.6 3.5   CHLORIDE mmol/L 106 106 105   CO2 mmol/L 23 24 26   BUN mg/dL 20 22 25   CREATININE mg/dL 1.15 1.14 1.12   EGFR ml/min/1.73sq m 72 73 75   CALCIUM mg/dL 7.5* 7.6* 7.3*   AST U/L  --   --  10*   ALT U/L  --   --  4*   ALK PHOS U/L  --   --  45     Results from last 7 days   Lab Units 23  0621 23  0446 23  0524   WBC Thousand/uL 14.18* 13.46* 12.96*   HEMOGLOBIN g/dL 7.8* 7.7* 7.7*   PLATELETS Thousands/uL 310 292 284     Results from last 7 days   Lab Units 09/09/23  0819   GRAM STAIN RESULT  No Polys or Bacteria seen       Imaging Studies:   I have personally reviewed pertinent imaging study reports and images in PACS. EKG, Pathology, and Other Studies:   I have personally reviewed pertinent reports.

## 2023-09-15 ENCOUNTER — TELEPHONE (OUTPATIENT)
Dept: SURGERY | Facility: CLINIC | Age: 53
End: 2023-09-15

## 2023-09-15 ENCOUNTER — APPOINTMENT (INPATIENT)
Dept: GASTROENTEROLOGY | Facility: HOSPITAL | Age: 53
DRG: 853 | End: 2023-09-15
Payer: COMMERCIAL

## 2023-09-15 ENCOUNTER — ANESTHESIA EVENT (INPATIENT)
Dept: GASTROENTEROLOGY | Facility: HOSPITAL | Age: 53
DRG: 853 | End: 2023-09-15
Payer: COMMERCIAL

## 2023-09-15 ENCOUNTER — TELEPHONE (OUTPATIENT)
Dept: OTHER | Facility: OTHER | Age: 53
End: 2023-09-15

## 2023-09-15 ENCOUNTER — ANESTHESIA (INPATIENT)
Dept: GASTROENTEROLOGY | Facility: HOSPITAL | Age: 53
DRG: 853 | End: 2023-09-15
Payer: COMMERCIAL

## 2023-09-15 LAB
ANION GAP SERPL CALCULATED.3IONS-SCNC: 8 MMOL/L
APTT PPP: 38 SECONDS (ref 23–37)
BASOPHILS # BLD AUTO: 0.02 THOUSANDS/ÂΜL (ref 0–0.1)
BASOPHILS NFR BLD AUTO: 0 % (ref 0–1)
BUN SERPL-MCNC: 20 MG/DL (ref 5–25)
CALCIUM SERPL-MCNC: 7.8 MG/DL (ref 8.4–10.2)
CHLORIDE SERPL-SCNC: 106 MMOL/L (ref 96–108)
CO2 SERPL-SCNC: 23 MMOL/L (ref 21–32)
CREAT SERPL-MCNC: 1.12 MG/DL (ref 0.6–1.3)
EOSINOPHIL # BLD AUTO: 0.02 THOUSAND/ÂΜL (ref 0–0.61)
EOSINOPHIL NFR BLD AUTO: 0 % (ref 0–6)
ERYTHROCYTE [DISTWIDTH] IN BLOOD BY AUTOMATED COUNT: 20.2 % (ref 11.6–15.1)
GFR SERPL CREATININE-BSD FRML MDRD: 75 ML/MIN/1.73SQ M
GLUCOSE SERPL-MCNC: 109 MG/DL (ref 65–140)
GLUCOSE SERPL-MCNC: 115 MG/DL (ref 65–140)
GLUCOSE SERPL-MCNC: 122 MG/DL (ref 65–140)
GLUCOSE SERPL-MCNC: 123 MG/DL (ref 65–140)
HCT VFR BLD AUTO: 27.4 % (ref 36.5–49.3)
HGB BLD-MCNC: 8.1 G/DL (ref 12–17)
IMM GRANULOCYTES # BLD AUTO: 0.14 THOUSAND/UL (ref 0–0.2)
IMM GRANULOCYTES NFR BLD AUTO: 1 % (ref 0–2)
LYMPHOCYTES # BLD AUTO: 0.7 THOUSANDS/ÂΜL (ref 0.6–4.47)
LYMPHOCYTES NFR BLD AUTO: 5 % (ref 14–44)
MCH RBC QN AUTO: 27 PG (ref 26.8–34.3)
MCHC RBC AUTO-ENTMCNC: 29.6 G/DL (ref 31.4–37.4)
MCV RBC AUTO: 91 FL (ref 82–98)
MONOCYTES # BLD AUTO: 0.98 THOUSAND/ÂΜL (ref 0.17–1.22)
MONOCYTES NFR BLD AUTO: 7 % (ref 4–12)
NEUTROPHILS # BLD AUTO: 11.8 THOUSANDS/ÂΜL (ref 1.85–7.62)
NEUTS SEG NFR BLD AUTO: 87 % (ref 43–75)
NRBC BLD AUTO-RTO: 0 /100 WBCS
PLATELET # BLD AUTO: 336 THOUSANDS/UL (ref 149–390)
PMV BLD AUTO: 9.7 FL (ref 8.9–12.7)
POTASSIUM SERPL-SCNC: 3.9 MMOL/L (ref 3.5–5.3)
RBC # BLD AUTO: 3 MILLION/UL (ref 3.88–5.62)
SODIUM SERPL-SCNC: 137 MMOL/L (ref 135–147)
WBC # BLD AUTO: 13.66 THOUSAND/UL (ref 4.31–10.16)

## 2023-09-15 PROCEDURE — 85730 THROMBOPLASTIN TIME PARTIAL: CPT | Performed by: INTERNAL MEDICINE

## 2023-09-15 PROCEDURE — 99232 SBSQ HOSP IP/OBS MODERATE 35: CPT | Performed by: INTERNAL MEDICINE

## 2023-09-15 PROCEDURE — 85025 COMPLETE CBC W/AUTO DIFF WBC: CPT

## 2023-09-15 PROCEDURE — 82948 REAGENT STRIP/BLOOD GLUCOSE: CPT

## 2023-09-15 PROCEDURE — 85730 THROMBOPLASTIN TIME PARTIAL: CPT | Performed by: SURGERY

## 2023-09-15 PROCEDURE — 43235 EGD DIAGNOSTIC BRUSH WASH: CPT | Performed by: INTERNAL MEDICINE

## 2023-09-15 PROCEDURE — 80048 BASIC METABOLIC PNL TOTAL CA: CPT

## 2023-09-15 PROCEDURE — 99232 SBSQ HOSP IP/OBS MODERATE 35: CPT | Performed by: SURGERY

## 2023-09-15 RX ORDER — PROPOFOL 10 MG/ML
INJECTION, EMULSION INTRAVENOUS AS NEEDED
Status: DISCONTINUED | OUTPATIENT
Start: 2023-09-15 | End: 2023-09-15

## 2023-09-15 RX ORDER — SODIUM CHLORIDE 9 MG/ML
INJECTION, SOLUTION INTRAVENOUS CONTINUOUS PRN
Status: DISCONTINUED | OUTPATIENT
Start: 2023-09-15 | End: 2023-09-15

## 2023-09-15 RX ORDER — LIDOCAINE HYDROCHLORIDE 20 MG/ML
INJECTION, SOLUTION EPIDURAL; INFILTRATION; INTRACAUDAL; PERINEURAL AS NEEDED
Status: DISCONTINUED | OUTPATIENT
Start: 2023-09-15 | End: 2023-09-15

## 2023-09-15 RX ORDER — FUROSEMIDE 20 MG/1
20 TABLET ORAL ONCE
Status: COMPLETED | OUTPATIENT
Start: 2023-09-15 | End: 2023-09-15

## 2023-09-15 RX ORDER — PROPOFOL 10 MG/ML
INJECTION, EMULSION INTRAVENOUS CONTINUOUS PRN
Status: DISCONTINUED | OUTPATIENT
Start: 2023-09-15 | End: 2023-09-15

## 2023-09-15 RX ADMIN — METRONIDAZOLE 500 MG: 500 INJECTION, SOLUTION INTRAVENOUS at 13:45

## 2023-09-15 RX ADMIN — SODIUM CHLORIDE: 9 INJECTION, SOLUTION INTRAVENOUS at 16:51

## 2023-09-15 RX ADMIN — LIDOCAINE HYDROCHLORIDE 100 MG: 20 INJECTION, SOLUTION EPIDURAL; INFILTRATION; INTRACAUDAL; PERINEURAL at 16:59

## 2023-09-15 RX ADMIN — FUROSEMIDE 20 MG: 20 TABLET ORAL at 11:22

## 2023-09-15 RX ADMIN — PROPOFOL 90 MG: 10 INJECTION, EMULSION INTRAVENOUS at 16:59

## 2023-09-15 RX ADMIN — FUROSEMIDE 20 MG: 20 TABLET ORAL at 11:21

## 2023-09-15 RX ADMIN — METRONIDAZOLE 500 MG: 500 INJECTION, SOLUTION INTRAVENOUS at 04:41

## 2023-09-15 RX ADMIN — CHLORHEXIDINE GLUCONATE 15 ML: 1.2 SOLUTION ORAL at 11:21

## 2023-09-15 RX ADMIN — CHLORHEXIDINE GLUCONATE 15 ML: 1.2 SOLUTION ORAL at 21:48

## 2023-09-15 RX ADMIN — PROPOFOL 100 MCG/KG/MIN: 10 INJECTION, EMULSION INTRAVENOUS at 16:59

## 2023-09-15 RX ADMIN — METRONIDAZOLE 500 MG: 500 INJECTION, SOLUTION INTRAVENOUS at 21:47

## 2023-09-15 RX ADMIN — CEFTRIAXONE SODIUM 2000 MG: 10 INJECTION, POWDER, FOR SOLUTION INTRAVENOUS at 11:21

## 2023-09-15 NOTE — PLAN OF CARE
Problem: PAIN - ADULT  Goal: Verbalizes/displays adequate comfort level or baseline comfort level  Description: Interventions:  - Encourage patient to monitor pain and request assistance  - Assess pain using appropriate pain scale  - Administer analgesics based on type and severity of pain and evaluate response  - Implement non-pharmacological measures as appropriate and evaluate response  - Consider cultural and social influences on pain and pain management  - Notify physician/advanced practitioner if interventions unsuccessful or patient reports new pain  Outcome: Progressing     Problem: SAFETY ADULT  Goal: Patient will remain free of falls  Description: INTERVENTIONS:  - Educate patient/family on patient safety including physical limitations  - Instruct patient to call for assistance with activity   - Consult OT/PT to assist with strengthening/mobility   - Keep Call bell within reach  - Keep bed low and locked with side rails adjusted as appropriate  - Keep care items and personal belongings within reach  - Initiate and maintain comfort rounds  - Make Fall Risk Sign visible to staff  - Apply yellow socks and bracelet for high fall risk patients  - Consider moving patient to room near nurses station  Outcome: Progressing  Goal: Maintain or return to baseline ADL function  Description: INTERVENTIONS:  -  Assess patient's ability to carry out ADLs; assess patient's baseline for ADL function and identify physical deficits which impact ability to perform ADLs (bathing, care of mouth/teeth, toileting, grooming, dressing, etc.)  - Assess/evaluate cause of self-care deficits   - Assess range of motion  - Assess patient's mobility; develop plan if impaired  - Assess patient's need for assistive devices and provide as appropriate  - Encourage maximum independence but intervene and supervise when necessary  - Involve family in performance of ADLs  - Assess for home care needs following discharge   - Consider OT consult to assist with ADL evaluation and planning for discharge  - Provide patient education as appropriate  Outcome: Progressing  Goal: Maintains/Returns to pre admission functional level  Description: INTERVENTIONS:  - Perform BMAT or MOVE assessment daily.   - Set and communicate daily mobility goal to care team and patient/family/caregiver. - Collaborate with rehabilitation services on mobility goals if consulted  - Perform Range of Motion 3 times a day. - Reposition patient every 3 hours.   - Dangle patient 3 times a day  - Stand patient 3 times a day  - Ambulate patient 3 times a day  - Out of bed to chair 3 times a day   - Out of bed for meals 3 times a day  - Out of bed for toileting  - Record patient progress and toleration of activity level   Outcome: Progressing

## 2023-09-15 NOTE — OCCUPATIONAL THERAPY NOTE
Occupational Therapy Refusal     09/15/23 1100   OT Last Visit   OT Visit Date 09/15/23   Note Type   Note Type Cancelled Session   Cancel Reasons Refusal     Edmundo Anderson  [place] License [id number]  [place] License [id number]

## 2023-09-15 NOTE — ANESTHESIA PREPROCEDURE EVALUATION
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  -Transverse colonic distention  -s/p tube irrigation at bedside 9/8    On heparin gtt for SMV thrombosis    No Known Allergies     Social History     Tobacco Use   • Smoking status: Never     Passive exposure: Past   • Smokeless tobacco: Former     Types: Chew     Quit date: 7/15/2023   • Tobacco comments:     Quit 7/15/23   Vaping Use   • Vaping Use: Never used   Substance Use Topics   • Alcohol use: Not Currently     Comment: quit 7/15/23 (previous 6 drinks per week)   • Drug use: Never     Current Outpatient Medications   Medication Instructions   • acetaminophen (TYLENOL) 650 mg, Oral, Every 6 hours PRN   • Alcohol Swabs 70 % PADS May substitute brand based on insurance coverage. Check glucose ACHS. • apixaban (Eliquis) 5 mg Take 2 tablets (10 mg total) by mouth 2 (two) times a day for 7 days, THEN 1 tablet (5 mg total) 2 (two) times a day for 23 days. • BD PosiFlush 0.9 % SOLN No dose, route, or frequency recorded. • Blood Glucose Monitoring Suppl (OneTouch Verio Reflect) w/Device KIT May substitute brand based on insurance coverage. Check glucose ACHS. • Blood Pressure Monitoring (Adult Blood Pressure Cuff Lg) KIT Does not apply, 3 times daily   • furosemide (LASIX) 20 mg, Oral, Daily   • glucose blood (OneTouch Verio) test strip May substitute brand based on insurance coverage. Check glucose ACHS.    • Insulin Glargine Solostar (LANTUS SOLOSTAR) 14 Units, Subcutaneous, Daily at bedtime   • insulin lispro (HUMALOG KWIKPEN) 5 Units, Subcutaneous, 3 times daily with meals   • Insulin Pen Needle (BD Pen Needle Lupe 2nd Gen) 32G X 4 MM MISC For use with insulin pen. Pharmacy may dispense brand covered by insurance. • Insulin Pen Needle (BD Pen Needle Lupe 2nd Gen) 32G X 4 MM MISC For use with insulin pen. Pharmacy may dispense brand covered by insurance. • Levemir FlexPen 14 Units, Daily at bedtime   • lisinopril (ZESTRIL) 10 mg, Oral, Daily   • NovoLOG FlexPen 100 units/mL injection pen INJECT 5 UNITS UNDER SKIN 3 TIMES DAILY WITH MEALS   • OneTouch Delica Lancets 55B MISC May substitute brand based on insurance coverage. Check glucose ACHS. • pancrelipase (Lip-Prot-Amyl) (CREON) 6,000 Units, Oral, 3 times daily with meals   • sodium chloride, PF, 0.9 % 10 mL, Intracatheter, Daily, Intracatheter flushing daily. May substitute prefilled syringe with normal saline 10 mL vials, 10 mL syringes, and 18 g blunt needles   • sodium chloride, PF, 0.9 % 10 mL, Intracatheter, Daily, Intracatheter flushing daily.  May substitute prefilled syringe with normal saline 10 mL vials, 10 mL syringes, and 18 g blunt needles     Lab Results   Component Value Date    WBC 13.66 (H) 09/15/2023    HGB 8.1 (L) 09/15/2023    HCT 27.4 (L) 09/15/2023     09/15/2023    SODIUM 137 09/15/2023    K 3.9 09/15/2023     09/15/2023    CO2 23 09/15/2023    BUN 20 09/15/2023    CREATININE 1.12 09/15/2023    GLUC 115 09/15/2023    HGBA1C 7.0 (H) 08/07/2023    AST 10 (L) 09/12/2023    ALT 4 (L) 09/12/2023    ALKPHOS 45 09/12/2023    TBILI 0.39 09/12/2023    ALB 2.3 (L) 09/12/2023    PROTIME 22.9 (H) 09/04/2023    PTT 38 (H) 09/15/2023    INR 2.00 (H) 09/04/2023     Vitals:    09/15/23 1454   BP: 128/76   Pulse: (!) 116   Resp: 20   Temp: 98.9 °F (37.2 °C)   SpO2: 97%     Echo 9/5/2  •  Left Ventricle: Left ventricular cavity size is normal. Wall thickness is upper normal. The left ventricular ejection fraction is 60%. Systolic function is normal. Wall motion is normal. Diastolic function is normal.  •  Right Ventricle: Right ventricular cavity size is mildly dilated. Systolic function is normal.  •  Mitral Valve: There is trace regurgitation. •  Pulmonary Artery: The pulmonary artery systolic pressure is mildly increased. EKG 9/9/23  Sinus tachycardia with frequent Premature ventricular complexes  Poor R wave progression  Cannot rule out Inferior infarct  Abnormal ECG  Confirmed by Aviva Hudson [phone]) on 9/10/2023 2:49:59 PM    Physical Exam    Airway      TM Distance: >3 FB  Neck ROM: full     Dental   Comment: Denies loose/chipped teeth, No notable dental hx     Cardiovascular  Rhythm: regular, Rate: normal, Cardiovascular exam normal    Pulmonary  Pulmonary exam normal Breath sounds clear to auscultation,     Other Findings        Anesthesia Plan  ASA Score- 3     Anesthesia Type- IV sedation with anesthesia with ASA Monitors. Additional Monitors:   Airway Plan:           Plan Factors-Exercise tolerance (METS): >4 METS. Chart reviewed. EKG reviewed. Existing labs reviewed. Patient summary reviewed. Patient is not a current smoker. Induction- intravenous. Postoperative Plan-     Informed Consent- Anesthetic plan and risks discussed with patient. I personally reviewed this patient with the CRNA. Discussed and agreed on the Anesthesia Plan with the CRNA. Sreedhar Doss

## 2023-09-15 NOTE — PHYSICAL THERAPY NOTE
Physical Therapy Cancellation Note         09/15/23 0924   PT Last Visit   PT Visit Date 09/15/23   Note Type   Note Type Cancelled Session   Cancel Reasons Refusal  (2* awaiting procedure this afternoon)       Michael Wagner, PT, DPT

## 2023-09-15 NOTE — PROGRESS NOTES
Progress Note - Infectious Disease   Alena Hatfield 46 y.o. male MRN: [id number]  Unit/Bed#: [facility] 901-01 Encounter: [id number]      Impression/Recommendations:  Septic shock, POA.    Fever, WBC, refractory hypotension.  Due to bacteremia, pancreatic abscess as below.  No other appreciable source.  Improving, now off pressors. WBC trending up, possible reflecting need for additional pancreatic drainage which is pending. Rec:  • Continue antibiotics as below  • Follow temperatures closely  • Recheck CBC in AM  • Supportive care as per the primary service     Polymicrobial bacteremia.    Klebsiella, Strep mitis/oralis.  Coagulase-negative Staph x2 from single set are likely contaminants.  Due to pancreatic abscess as below.  No other appreciable source.  Repeat blood cultures 9/6, TTE (technically difficult) negative. Rec:  • Continue ceftriaxone for 14 days total antibiotics through 9/17     Pancreatic abscess.    Due to superinfection of necrosis as below.  Status post drain upsizing 9/4 which yielded infected hemorrhagic material.  Cultures with Citrobacter, E. Coli, Klebsiella, Aeromonas, Bacteroides.  Status post additional drains x2 9/6.  Cultures with same organisms.    Rec:  • Continue ceftriaxone/Flagyl for 14 days total antibiotics through 9/17  • Follow drain output closely  • Cyst gastrostomy placement pending today per GI  • If fevers, WBC worsening when antibiotics stop, may need to consider more prolonged treatment course     Necrotizing pancreatitis.   Presumed alcoholic.  Status post IR drain 8/5 yielding dark bloody fluid.  Cultures initially negative.  Current CT shows replacing most of pancreatic parenchyma, extending to pararenal spaces, causing left colonic obstruction, gastric outlet obstruction.  Status post drain upsizing 9/4, additional drains x2 9/6 yielding superinfected fluid as above.  Collection initially not mature enough for cyst gastrostomy per GI.  Status post bedside tube interrogation, necrosectomy . Rec:  • Follow drain output closely  • Cyst gastrostomy placement pending today per GI  • Close Surgery, GI follow-up ongoing     JERZY. Likely multifactorial.  Improving.     Large bowel obstruction. Due to compression from pancreatitis as above.     Gastric outlet obstruction. Due to compression from pancreatitis as above.     Bilateral pleural effusions. Likely reactive to pancreatitis as above.  Status post left thoracentesis yielding 1200 cc clear yellow fluid.  Fluid cultures negative.     Ascites. Likely reactive to pancreatitis as above.  Status post paracentesis.  Fluid cultures negative.     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       I will reassess the patient on . Please call in the interim with new questions. Antibiotics:  Ceftriaxone #5  Flagyl #11  Antibiotics #12    Subjective:  Patient seen on AM rounds. Denies fevers, chills, sweats, nausea, vomiting, or diarrhea. 24 Hour Events:  No documented fevers, chills, sweats, nausea, vomiting, or diarrhea. Objective:  Vitals:  Temp:  [98.6 °F (37 °C)-99.5 °F (37.5 °C)] 98.7 °F (37.1 °C)  HR:  [] 104  Resp:  [17-19] 17  BP: (110-135)/(68-81) 133/81  SpO2:  [92 %-97 %] 95 %  Temp (24hrs), Av.9 °F (37.2 °C), Min:98.6 °F (37 °C), Max:99.5 °F (37.5 °C)  Current: Temperature: 98.7 °F (37.1 °C)    Physical Exam:   General:  No acute distress  Psychiatric:  Awake and alert  Pulmonary:  Normal respiratory excursion without accessory muscle use  Abdomen:  Soft, nontender  Extremities:  Pitting edema  Skin:  No rashes    Lab Results:  I have personally reviewed pertinent labs.   Results from last 7 days   Lab Units 09/15/23  0512 23  0621 23  0446 23  0524   POTASSIUM mmol/L 3.9 3.6 3.6 3.5   CHLORIDE mmol/L 106 106 106 105   CO2 mmol/L 23 23 24 26   BUN mg/dL 20 20 22 25   CREATININE mg/dL 1.12 1.15 1.14 1.12   EGFR ml/min/1.73sq m 75 72 73 75   CALCIUM mg/dL 7.8* 7. 5* 7.6* 7.3*   AST U/L  --   --   --  10*   ALT U/L  --   --   --  4*   ALK PHOS U/L  --   --   --  45     Results from last 7 days   Lab Units 09/15/23  0459 09/14/23  0621 09/13/23  0446   WBC Thousand/uL 13.66* 14.18* 13.46*   HEMOGLOBIN g/dL 8.1* 7.8* 7.7*   PLATELETS Thousands/uL 336 310 292     Results from last 7 days   Lab Units 09/09/23  0819   GRAM STAIN RESULT  No Polys or Bacteria seen       Imaging Studies:   I have personally reviewed pertinent imaging study reports and images in PACS. EKG, Pathology, and Other Studies:   I have personally reviewed pertinent reports.

## 2023-09-15 NOTE — RESTORATIVE TECHNICIAN NOTE
Restorative Technician Note      Patient Name: Conchita Hernandez     Restorative Tech Visit Date: 09/15/23  Note Type: Mobility  Patient Position Upon Consult: Bedside chair  Activity Performed: Ambulated; Transferred  Assistive Device: Roller walker  Patient Position at End of Consult: Supine;  All needs within reach (Supine on transport stretcher; Left in the care of the transport team)    Dari Mcbride, Restorative Technician

## 2023-09-15 NOTE — TELEPHONE ENCOUNTER
Erin Yancey wants me specfically to see this patient. ..can we bring him in fpr me to see him on early thursday AM?      Dr. Debbie Montanez sent an in basket message to have patient schedule an appointment with him as stated above. Appointment was made on 9/7/23 but patient canceled. I reached out to Dr. Debbie Montanez explaining and asked to advise. Waiting for his reply.

## 2023-09-15 NOTE — QUICK NOTE
Nurse-Patient-Provider rounds were completed with the patient's nurse today, Diego Macias. We discussed the plan is to continue diet as tolerated. Maintain all current drains and continue with drain flushes while monitoring drain output. Continue to monitor abdominal exam and for ongoing bowel function; abdominal exam has remained benign. Continue local wound care as needed to bilateral lower leg wounds with significant edema noted and weeping of serous fluid without evidence of infection or developing cellulitis. Encourage continue activity and ambulation. No further interventions anticipated at this time. Appreciate ongoing evaluation and assistance with management from ID; patient has completed course of antibiotics and will continue to follow closely off of antibiotics for now. Continue to monitor temperature trend and CBC while off antibiotics with plan for tentative discharge to home later this week. Appreciate GI input and assistance with management during hospital encounter with no additional recommendations or interventions planned at this time. We reviewed all of the invasive devices/lines/telemetry orders.  - Maintain all current IR drains indefinitely. DVT Prophylaxis:  - Anticoagulated with Eliquis. Pain Assessment / Plan:  - Continue current analgesic regimen. Mobility Assessment / Plan:  - Activity as tolerated. - Continue PT and OT evaluation and treatment as indicated with the patient cleared for discharge home when medically appropriate. Goals / Barriers for discharge:  - Anticipate discharge in the next 24 to 48 hours pending continued clinical improvement/stability.  - Case management following; case and discharge needs discussed. All questions and concerns were addressed. I spent greater than 15 minutes reviewing the plan with the patient and the nurse, and coordinating care for the day.     Bhavesh Mckeon PA-C  9/15/2023 10:02 AM

## 2023-09-15 NOTE — PROGRESS NOTES
Progress Note - General Surgery   Roland Cantor 46 y.o. 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 UOP: 150cc  BM: x1    cc tan necrotic  LUQ: 10 cc tan necrotic  RUQ: 180 cc tan    Lab Results   Component Value Date/Time    WBC 13.66 (H) 09/15/2023 04:59 AM    WBC 14.18 (H) 09/14/2023 06:21 AM    HGB 8.1 (L) 09/15/2023 04:59 AM    HGB 7.8 (L) 09/14/2023 06:21 AM    CREATININE 1.12 09/15/2023 05:12 AM    CREATININE 1.15 09/14/2023 06:21 AM       Plan:  -NPO since midnight for cygastrostomy  -f/u daily CBC- d/t uptrending WBC  -encouraged IS use  -strict I&O  -Cefepime/Flagyl to continue through 9/17  -Monitor drains output and character  -Heparin gtt- monitor PTT  -Pain control  -Encourage ambulation, PT/OT rec no needs    Subjective/Objective   Subjective:     No acute events overnight. Pt pulling 800cc on IS. Pt aware of new GI plan for cysgastrostomy today. Pt ambulating halls. Having bowel function. Voiding but low volume. Objective:     Blood pressure 114/79, pulse (!) 106, temperature 98.9 °F (37.2 °C), resp. rate 19, height 5' 11" (1.803 m), weight 124 kg (273 lb 9.5 oz), SpO2 94 %. ,Body mass index is 38.16 kg/m². Intake/Output Summary (Last 24 hours) at 9/15/2023 0708  Last data filed at 9/15/2023 0409  Gross per 24 hour   Intake 0 ml   Output 480 ml   Net -480 ml       Invasive Devices     Peripherally Inserted Central Catheter Line  Duration           PICC Line 29/11/42 Left Basilic 6 days          Peripheral Intravenous Line  Duration           Peripheral IV 08/08/23 Dorsal (posterior); Left Wrist 38 days          Drain  Duration Abscess Drain RUQ 10 days    Abscess Drain LUQ 8 days    Abscess Drain LUQ 8 days                Physical Exam:  General: NAD  Skin: Warm, dry, anicteric  HEENT: Normocephalic, atraumatic  CV: Tachycardic, no m/r/g  Pulm: CTA b/l, no inc WOB  Abd: soft, nontender, mild distension, protuberant abdomen. MSK: Symmetric, +3 b/l pitting pedal edema unchanged from day prior, no tenderness, no deformity  Neuro: AOx3, GCS 15    Lab, Imaging and other studies:  I have personally reviewed pertinent lab results.   , CBC:   Lab Results   Component Value Date    WBC 13.66 (H) 09/15/2023    HGB 8.1 (L) 09/15/2023    HCT 27.4 (L) 09/15/2023    MCV 91 09/15/2023     09/15/2023    RBC 3.00 (L) 09/15/2023    MCH 27.0 09/15/2023    MCHC 29.6 (L) 09/15/2023    RDW 20.2 (H) 09/15/2023    MPV 9.7 09/15/2023    NRBC 0 09/15/2023   , CMP:   Lab Results   Component Value Date    SODIUM 137 09/15/2023    K 3.9 09/15/2023     09/15/2023    CO2 23 09/15/2023    BUN 20 09/15/2023    CREATININE 1.12 09/15/2023    CALCIUM 7.8 (L) 09/15/2023    EGFR 75 09/15/2023   , Coagulation: No results found for: "PT", "INR", "APTT"  VTE Pharmacologic Prophylaxis: Sequential compression device (Venodyne)  and Heparin   VTE Mechanical Prophylaxis: sequential compression device

## 2023-09-15 NOTE — ANESTHESIA POSTPROCEDURE EVALUATION
Post-Op Assessment Note    CV Status:  Stable  Pain Score: 0    Pain management: adequate     Mental Status:  Alert and awake   Hydration Status:  Stable   PONV Controlled:  None   Airway Patency:  Patent      Post Op Vitals Reviewed: Yes      Staff: CRNA         No notable events documented.     /60 (09/15/23 1727)    Temp 99.9 °F (37.7 °C) (09/15/23 1727)    Pulse (!) 125 (09/15/23 1727)   Resp 20 (09/15/23 1727)    SpO2 96 % (09/15/23 1727)

## 2023-09-16 LAB
ANION GAP SERPL CALCULATED.3IONS-SCNC: 9 MMOL/L
APTT PPP: 39 SECONDS (ref 23–37)
APTT PPP: 42 SECONDS (ref 23–37)
APTT PPP: 44 SECONDS (ref 23–37)
APTT PPP: 51 SECONDS (ref 23–37)
BASOPHILS # BLD AUTO: 0.02 THOUSANDS/ÂΜL (ref 0–0.1)
BASOPHILS NFR BLD AUTO: 0 % (ref 0–1)
BUN SERPL-MCNC: 20 MG/DL (ref 5–25)
CALCIUM SERPL-MCNC: 7.6 MG/DL (ref 8.4–10.2)
CHLORIDE SERPL-SCNC: 105 MMOL/L (ref 96–108)
CO2 SERPL-SCNC: 22 MMOL/L (ref 21–32)
CREAT SERPL-MCNC: 1.11 MG/DL (ref 0.6–1.3)
EOSINOPHIL # BLD AUTO: 0.02 THOUSAND/ÂΜL (ref 0–0.61)
EOSINOPHIL NFR BLD AUTO: 0 % (ref 0–6)
ERYTHROCYTE [DISTWIDTH] IN BLOOD BY AUTOMATED COUNT: 21.2 % (ref 11.6–15.1)
GFR SERPL CREATININE-BSD FRML MDRD: 75 ML/MIN/1.73SQ M
GLUCOSE SERPL-MCNC: 112 MG/DL (ref 65–140)
GLUCOSE SERPL-MCNC: 114 MG/DL (ref 65–140)
GLUCOSE SERPL-MCNC: 132 MG/DL (ref 65–140)
GLUCOSE SERPL-MCNC: 138 MG/DL (ref 65–140)
GLUCOSE SERPL-MCNC: 143 MG/DL (ref 65–140)
HCT VFR BLD AUTO: 26.6 % (ref 36.5–49.3)
HGB BLD-MCNC: 7.8 G/DL (ref 12–17)
IMM GRANULOCYTES # BLD AUTO: 0.11 THOUSAND/UL (ref 0–0.2)
IMM GRANULOCYTES NFR BLD AUTO: 1 % (ref 0–2)
LYMPHOCYTES # BLD AUTO: 0.59 THOUSANDS/ÂΜL (ref 0.6–4.47)
LYMPHOCYTES NFR BLD AUTO: 5 % (ref 14–44)
MCH RBC QN AUTO: 26.8 PG (ref 26.8–34.3)
MCHC RBC AUTO-ENTMCNC: 29.3 G/DL (ref 31.4–37.4)
MCV RBC AUTO: 91 FL (ref 82–98)
MONOCYTES # BLD AUTO: 0.98 THOUSAND/ÂΜL (ref 0.17–1.22)
MONOCYTES NFR BLD AUTO: 8 % (ref 4–12)
NEUTROPHILS # BLD AUTO: 10.66 THOUSANDS/ÂΜL (ref 1.85–7.62)
NEUTS SEG NFR BLD AUTO: 86 % (ref 43–75)
NRBC BLD AUTO-RTO: 0 /100 WBCS
PLATELET # BLD AUTO: 327 THOUSANDS/UL (ref 149–390)
PMV BLD AUTO: 9.7 FL (ref 8.9–12.7)
POTASSIUM SERPL-SCNC: 3.9 MMOL/L (ref 3.5–5.3)
RBC # BLD AUTO: 2.91 MILLION/UL (ref 3.88–5.62)
SODIUM SERPL-SCNC: 136 MMOL/L (ref 135–147)
WBC # BLD AUTO: 12.38 THOUSAND/UL (ref 4.31–10.16)

## 2023-09-16 PROCEDURE — 99232 SBSQ HOSP IP/OBS MODERATE 35: CPT | Performed by: SURGERY

## 2023-09-16 PROCEDURE — 82948 REAGENT STRIP/BLOOD GLUCOSE: CPT

## 2023-09-16 PROCEDURE — 85730 THROMBOPLASTIN TIME PARTIAL: CPT | Performed by: SURGERY

## 2023-09-16 PROCEDURE — 85730 THROMBOPLASTIN TIME PARTIAL: CPT | Performed by: INTERNAL MEDICINE

## 2023-09-16 PROCEDURE — 85025 COMPLETE CBC W/AUTO DIFF WBC: CPT

## 2023-09-16 PROCEDURE — 80048 BASIC METABOLIC PNL TOTAL CA: CPT

## 2023-09-16 RX ORDER — FUROSEMIDE 40 MG/1
40 TABLET ORAL ONCE
Status: COMPLETED | OUTPATIENT
Start: 2023-09-16 | End: 2023-09-16

## 2023-09-16 RX ADMIN — FUROSEMIDE 40 MG: 40 TABLET ORAL at 15:46

## 2023-09-16 RX ADMIN — METRONIDAZOLE 500 MG: 500 INJECTION, SOLUTION INTRAVENOUS at 21:20

## 2023-09-16 RX ADMIN — CEFTRIAXONE SODIUM 2000 MG: 10 INJECTION, POWDER, FOR SOLUTION INTRAVENOUS at 11:45

## 2023-09-16 RX ADMIN — INSULIN LISPRO 5 UNITS: 100 INJECTION, SOLUTION INTRAVENOUS; SUBCUTANEOUS at 17:42

## 2023-09-16 RX ADMIN — CHLORHEXIDINE GLUCONATE 15 ML: 1.2 SOLUTION ORAL at 21:20

## 2023-09-16 RX ADMIN — METRONIDAZOLE 500 MG: 500 INJECTION, SOLUTION INTRAVENOUS at 13:57

## 2023-09-16 RX ADMIN — INSULIN LISPRO 5 UNITS: 100 INJECTION, SOLUTION INTRAVENOUS; SUBCUTANEOUS at 12:50

## 2023-09-16 RX ADMIN — HEPARIN SODIUM 15.1 UNITS/KG/HR: 10000 INJECTION, SOLUTION INTRAVENOUS at 11:59

## 2023-09-16 RX ADMIN — METRONIDAZOLE 500 MG: 500 INJECTION, SOLUTION INTRAVENOUS at 05:41

## 2023-09-16 RX ADMIN — FUROSEMIDE 40 MG: 40 TABLET ORAL at 19:24

## 2023-09-16 RX ADMIN — CHLORHEXIDINE GLUCONATE 15 ML: 1.2 SOLUTION ORAL at 08:42

## 2023-09-16 NOTE — PLAN OF CARE
Problem: PAIN - ADULT  Goal: Verbalizes/displays adequate comfort level or baseline comfort level  Description: Interventions:  - Encourage patient to monitor pain and request assistance  - Assess pain using appropriate pain scale  - Administer analgesics based on type and severity of pain and evaluate response  - Implement non-pharmacological measures as appropriate and evaluate response  - Consider cultural and social influences on pain and pain management  - Notify physician/advanced practitioner if interventions unsuccessful or patient reports new pain  Outcome: Progressing     Problem: INFECTION - ADULT  Goal: Absence or prevention of progression during hospitalization  Description: INTERVENTIONS:  - Assess and monitor for signs and symptoms of infection  - Monitor lab/diagnostic results  - Monitor all insertion sites, i.e. indwelling lines, tubes, and drains  - Monitor endotracheal if appropriate and nasal secretions for changes in amount and color  - [place] appropriate cooling/warming therapies per order  - Administer medications as ordered  - Instruct and encourage patient and family to use good hand hygiene technique  - Identify and instruct in appropriate isolation precautions for identified infection/condition  Outcome: Progressing     Problem: SAFETY ADULT  Goal: Maintain or return to baseline ADL function  Description: INTERVENTIONS:  -  Assess patient's ability to carry out ADLs; assess patient's baseline for ADL function and identify physical deficits which impact ability to perform ADLs (bathing, care of mouth/teeth, toileting, grooming, dressing, etc.)  - Assess/evaluate cause of self-care deficits   - Assess range of motion  - Assess patient's mobility; develop plan if impaired  - Assess patient's need for assistive devices and provide as appropriate  - Encourage maximum independence but intervene and supervise when necessary  - Involve family in performance of ADLs  - Assess for home care needs following discharge   - Consider OT consult to assist with ADL evaluation and planning for discharge  - Provide patient education as appropriate  Outcome: Progressing     Problem: MOBILITY - ADULT  Goal: Maintain or return to baseline ADL function  Description: INTERVENTIONS:  -  Assess patient's ability to carry out ADLs; assess patient's baseline for ADL function and identify physical deficits which impact ability to perform ADLs (bathing, care of mouth/teeth, toileting, grooming, dressing, etc.)  - Assess/evaluate cause of self-care deficits   - Assess range of motion  - Assess patient's mobility; develop plan if impaired  - Assess patient's need for assistive devices and provide as appropriate  - Encourage maximum independence but intervene and supervise when necessary  - Involve family in performance of ADLs  - Assess for home care needs following discharge   - Consider OT consult to assist with ADL evaluation and planning for discharge  - Provide patient education as appropriate  Outcome: Progressing

## 2023-09-16 NOTE — PLAN OF CARE
Problem: INFECTION - ADULT  Goal: Absence or prevention of progression during hospitalization  Description: INTERVENTIONS:  - Assess and monitor for signs and symptoms of infection  - Monitor lab/diagnostic results  - Monitor all insertion sites, i.e. indwelling lines, tubes, and drains  - Monitor endotracheal if appropriate and nasal secretions for changes in amount and color  - [place] appropriate cooling/warming therapies per order  - Administer medications as ordered  - Instruct and encourage patient and family to use good hand hygiene technique  - Identify and instruct in appropriate isolation precautions for identified infection/condition  Outcome: Progressing  Goal: Absence of fever/infection during neutropenic period  Description: INTERVENTIONS:  - Monitor WBC    Outcome: Progressing     Problem: Prexisting or High Potential for Compromised Skin Integrity  Goal: Skin integrity is maintained or improved  Description: INTERVENTIONS:  - Identify patients at risk for skin breakdown  - Assess and monitor skin integrity  - Assess and monitor nutrition and hydration status  - Monitor labs   - Assess for incontinence   - Turn and reposition patient  - Assist with mobility/ambulation  - Relieve pressure over bony prominences  - Avoid friction and shearing  - Provide appropriate hygiene as needed including keeping skin clean and dry  - Evaluate need for skin moisturizer/barrier cream  - Collaborate with interdisciplinary team   - Patient/family teaching  - Consider wound care consult   Outcome: Progressing

## 2023-09-16 NOTE — PROGRESS NOTES
Progress Note - General Surgery   Mary Gutierrez 46 y.o. 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 UOP: 400cc charted overnight  BM: x1    cc tan necrotic  LUQ: 10 cc tan necrotic  RUQ: 180 cc tan    Lab Results   Component Value Date/Time    WBC 12.38 (H) 09/16/2023 05:37 AM    WBC 13.66 (H) 09/15/2023 04:59 AM    HGB 7.8 (L) 09/16/2023 05:37 AM    HGB 8.1 (L) 09/15/2023 04:59 AM    CREATININE 1.12 09/15/2023 05:12 AM    CREATININE 1.15 09/14/2023 06:21 AM       Plan:  -CLD- adv to low fat  -GI unable to achieve appropriate window for cysgastrostomy 9/15  -continue to encourage PO intake  -consider lasix 40mg today  -Cefepime/Flagyl to continue through 9/17  -Monitor drains output and character  -Heparin gtt- monitor PTT  -Encourage ambulation, PT/OT rec no needs  -encouraged IS use    Subjective/Objective   Subjective:     No acute events overnight. Pt pulling 800cc on IS. Pt aware of new GI plan for cysgastrostomy today. Pt ambulating halls. Having bowel function. Voiding but low volume. Objective:     Blood pressure 123/78, pulse (!) 106, temperature 98.4 °F (36.9 °C), resp. rate 17, height 5' 11" (1.803 m), weight 125 kg (275 lb 2.2 oz), SpO2 96 %. ,Body mass index is 38.37 kg/m².       Intake/Output Summary (Last 24 hours) at 9/16/2023 0733  Last data filed at 9/16/2023 0601  Gross per 24 hour   Intake 200 ml   Output 1285 ml   Net -1085 ml       Invasive Devices     Peripherally Inserted Central Catheter Line  Duration           PICC Line 89/35/38 Left Basilic 7 days          Peripheral Intravenous Line  Duration           Peripheral IV 08/08/23 Dorsal (posterior); Left Wrist 39 days          Drain  Duration           Abscess Drain RUQ 11 days    Abscess Drain LUQ 9 days    Abscess Drain LUQ 9 days                Physical Exam:  General: NAD  Skin: Warm, dry, anicteric  HEENT: Normocephalic, atraumatic  CV: Tachycardic, no m/r/g  Pulm: CTA b/l, no inc WOB  Abd: soft, nontender, mild distension, protuberant abdomen, drains in place. MSK: Symmetric,  prior, no tenderness, no deformity  Neuro: AOx3, GCS 15    Lab, Imaging and other studies:  I have personally reviewed pertinent lab results.   , CBC:   Lab Results   Component Value Date    WBC 12.38 (H) 09/16/2023    HGB 7.8 (L) 09/16/2023    HCT 26.6 (L) 09/16/2023    MCV 91 09/16/2023     09/16/2023    RBC 2.91 (L) 09/16/2023    MCH 26.8 09/16/2023    MCHC 29.3 (L) 09/16/2023    RDW 21.2 (H) 09/16/2023    MPV 9.7 09/16/2023    NRBC 0 09/16/2023   , CMP:   No results found for: "SODIUM", "K", "CL", "CO2", "ANIONGAP", "BUN", "CREATININE", "GLUCOSE", "CALCIUM", "AST", "ALT", "ALKPHOS", "PROT", "BILITOT", "EGFR", Coagulation: No results found for: "PT", "INR", "APTT"  VTE Pharmacologic Prophylaxis: Sequential compression device (Venodyne)  and Heparin   VTE Mechanical Prophylaxis: sequential compression device

## 2023-09-16 NOTE — TELEPHONE ENCOUNTER
Pt's spouse called, requesting a call back from office at [facility], regarding pt in that's in [facility]. Please assist

## 2023-09-17 LAB
ANION GAP SERPL CALCULATED.3IONS-SCNC: 10 MMOL/L
APTT PPP: 164 SECONDS (ref 23–37)
APTT PPP: 57 SECONDS (ref 23–37)
APTT PPP: 75 SECONDS (ref 23–37)
APTT PPP: 93 SECONDS (ref 23–37)
BASOPHILS # BLD AUTO: 0.02 THOUSANDS/ÂΜL (ref 0–0.1)
BASOPHILS NFR BLD AUTO: 0 % (ref 0–1)
BUN SERPL-MCNC: 20 MG/DL (ref 5–25)
CALCIUM SERPL-MCNC: 7.8 MG/DL (ref 8.4–10.2)
CHLORIDE SERPL-SCNC: 104 MMOL/L (ref 96–108)
CO2 SERPL-SCNC: 23 MMOL/L (ref 21–32)
CREAT SERPL-MCNC: 1.15 MG/DL (ref 0.6–1.3)
EOSINOPHIL # BLD AUTO: 0.03 THOUSAND/ÂΜL (ref 0–0.61)
EOSINOPHIL NFR BLD AUTO: 0 % (ref 0–6)
ERYTHROCYTE [DISTWIDTH] IN BLOOD BY AUTOMATED COUNT: 21.5 % (ref 11.6–15.1)
GFR SERPL CREATININE-BSD FRML MDRD: 72 ML/MIN/1.73SQ M
GLUCOSE SERPL-MCNC: 110 MG/DL (ref 65–140)
GLUCOSE SERPL-MCNC: 111 MG/DL (ref 65–140)
GLUCOSE SERPL-MCNC: 115 MG/DL (ref 65–140)
GLUCOSE SERPL-MCNC: 120 MG/DL (ref 65–140)
GLUCOSE SERPL-MCNC: 136 MG/DL (ref 65–140)
HCT VFR BLD AUTO: 26.8 % (ref 36.5–49.3)
HGB BLD-MCNC: 7.9 G/DL (ref 12–17)
IMM GRANULOCYTES # BLD AUTO: 0.08 THOUSAND/UL (ref 0–0.2)
IMM GRANULOCYTES NFR BLD AUTO: 1 % (ref 0–2)
LYMPHOCYTES # BLD AUTO: 0.6 THOUSANDS/ÂΜL (ref 0.6–4.47)
LYMPHOCYTES NFR BLD AUTO: 5 % (ref 14–44)
MCH RBC QN AUTO: 26.5 PG (ref 26.8–34.3)
MCHC RBC AUTO-ENTMCNC: 29.5 G/DL (ref 31.4–37.4)
MCV RBC AUTO: 90 FL (ref 82–98)
MONOCYTES # BLD AUTO: 0.96 THOUSAND/ÂΜL (ref 0.17–1.22)
MONOCYTES NFR BLD AUTO: 8 % (ref 4–12)
NEUTROPHILS # BLD AUTO: 10.94 THOUSANDS/ÂΜL (ref 1.85–7.62)
NEUTS SEG NFR BLD AUTO: 86 % (ref 43–75)
NRBC BLD AUTO-RTO: 0 /100 WBCS
PLATELET # BLD AUTO: 336 THOUSANDS/UL (ref 149–390)
PMV BLD AUTO: 10 FL (ref 8.9–12.7)
POTASSIUM SERPL-SCNC: 3.6 MMOL/L (ref 3.5–5.3)
RBC # BLD AUTO: 2.98 MILLION/UL (ref 3.88–5.62)
SODIUM SERPL-SCNC: 137 MMOL/L (ref 135–147)
WBC # BLD AUTO: 12.63 THOUSAND/UL (ref 4.31–10.16)

## 2023-09-17 PROCEDURE — 85025 COMPLETE CBC W/AUTO DIFF WBC: CPT

## 2023-09-17 PROCEDURE — 82948 REAGENT STRIP/BLOOD GLUCOSE: CPT

## 2023-09-17 PROCEDURE — 85730 THROMBOPLASTIN TIME PARTIAL: CPT | Performed by: SURGERY

## 2023-09-17 PROCEDURE — 80048 BASIC METABOLIC PNL TOTAL CA: CPT

## 2023-09-17 RX ORDER — POTASSIUM CHLORIDE 20 MEQ/1
40 TABLET, EXTENDED RELEASE ORAL ONCE
Status: COMPLETED | OUTPATIENT
Start: 2023-09-17 | End: 2023-09-17

## 2023-09-17 RX ADMIN — POTASSIUM CHLORIDE 40 MEQ: 1500 TABLET, EXTENDED RELEASE ORAL at 15:15

## 2023-09-17 RX ADMIN — METRONIDAZOLE 500 MG: 500 INJECTION, SOLUTION INTRAVENOUS at 06:00

## 2023-09-17 RX ADMIN — HEPARIN SODIUM 21.1 UNITS/KG/HR: 10000 INJECTION, SOLUTION INTRAVENOUS at 03:26

## 2023-09-17 RX ADMIN — METRONIDAZOLE 500 MG: 500 INJECTION, SOLUTION INTRAVENOUS at 12:49

## 2023-09-17 RX ADMIN — FUROSEMIDE 20 MG: 20 TABLET ORAL at 09:19

## 2023-09-17 RX ADMIN — CHLORHEXIDINE GLUCONATE 15 ML: 1.2 SOLUTION ORAL at 21:00

## 2023-09-17 RX ADMIN — CEFTRIAXONE SODIUM 2000 MG: 10 INJECTION, POWDER, FOR SOLUTION INTRAVENOUS at 11:50

## 2023-09-17 RX ADMIN — CHLORHEXIDINE GLUCONATE 15 ML: 1.2 SOLUTION ORAL at 09:19

## 2023-09-17 RX ADMIN — INSULIN LISPRO 5 UNITS: 100 INJECTION, SOLUTION INTRAVENOUS; SUBCUTANEOUS at 09:19

## 2023-09-17 RX ADMIN — HEPARIN SODIUM 23.1 UNITS/KG/HR: 10000 INJECTION, SOLUTION INTRAVENOUS at 17:34

## 2023-09-17 RX ADMIN — METRONIDAZOLE 500 MG: 500 INJECTION, SOLUTION INTRAVENOUS at 21:00

## 2023-09-17 NOTE — PROGRESS NOTES
Progress Note - General Surgery   Alena Hatfield 46 y.o. 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 UOP: 330cc  BM: x1     Total 265cc   LUQ 28Fr 170cc tan necrotic  LUQ 12 Fr: 20 cc tan necrotic  RUQ 16 Fr: 75 cc tan    Lab Results   Component Value Date/Time    WBC 12.38 (H) 09/16/2023 05:37 AM    WBC 13.66 (H) 09/15/2023 04:59 AM    HGB 7.8 (L) 09/16/2023 05:37 AM    HGB 8.1 (L) 09/15/2023 04:59 AM    CREATININE 1.11 09/16/2023 05:37 AM    CREATININE 1.12 09/15/2023 05:12 AM       Plan:  -Continue Low far diet  -continue to encourage PO intake  -verify UOP is accurate  -Cefepime/Flagyl to continue through 9/17  -Monitor drains output and character  -Heparin gtt- monitor PTT  -Encourage ambulation, PT/OT rec no needs  -encouraged IS use    Subjective/Objective   Subjective:     No acute events overnight. Pt would like to have discussion for the next step in his care plan. Pt reports no issues w/ N/V, tolerating diet. Pt did not originally want lasix but then was amenable. Having bowel function. Objective:     Blood pressure 119/73, pulse (!) 110, temperature 98.5 °F (36.9 °C), resp. rate 19, height 5' 11" (1.803 m), weight 125 kg (275 lb 2.2 oz), SpO2 96 %. ,Body mass index is 38.37 kg/m².       Intake/Output Summary (Last 24 hours) at 9/17/2023 0120  Last data filed at 9/16/2023 1752  Gross per 24 hour   Intake --   Output 505 ml   Net -505 ml       Invasive Devices     Peripherally Inserted Central Catheter Line  Duration           PICC Line 91/78/80 Left Basilic 8 days          Peripheral Intravenous Line  Duration           Peripheral IV 08/08/23 Dorsal (posterior); Left Wrist 39 days          Drain  Duration           Abscess Drain RUQ 12 days    Abscess Drain LUQ 10 days    Abscess Drain LUQ 10 days                Physical Exam:  General: NAD  Skin: Warm, dry, anicteric  HEENT: Normocephalic, atraumatic  CV: Tachycardic, no m/r/g  Pulm: CTA b/l, no inc WOB  Abd: soft, nontender, mild distension, protuberant abdomen, drains in place. MSK: Symmetric,no tenderness, no deformity, bilateral pedal 3+ pitting edema  Neuro: AOx3, GCS 15    Lab, Imaging and other studies:  I have personally reviewed pertinent lab results.   , CBC:   Lab Results   Component Value Date    WBC 12.38 (H) 09/16/2023    HGB 7.8 (L) 09/16/2023    HCT 26.6 (L) 09/16/2023    MCV 91 09/16/2023     09/16/2023    RBC 2.91 (L) 09/16/2023    MCH 26.8 09/16/2023    MCHC 29.3 (L) 09/16/2023    RDW 21.2 (H) 09/16/2023    MPV 9.7 09/16/2023    NRBC 0 09/16/2023   , CMP:   Lab Results   Component Value Date    SODIUM 136 09/16/2023    K 3.9 09/16/2023     09/16/2023    CO2 22 09/16/2023    BUN 20 09/16/2023    CREATININE 1.11 09/16/2023    CALCIUM 7.6 (L) 09/16/2023    EGFR 75 09/16/2023   , Coagulation: No results found for: "PT", "INR", "APTT"  VTE Pharmacologic Prophylaxis: Sequential compression device (Venodyne)  and Heparin   VTE Mechanical Prophylaxis: sequential compression device

## 2023-09-17 NOTE — PLAN OF CARE
Problem: INFECTION - ADULT  Goal: Absence or prevention of progression during hospitalization  Description: INTERVENTIONS:  - Assess and monitor for signs and symptoms of infection  - Monitor lab/diagnostic results  - Monitor all insertion sites, i.e. indwelling lines, tubes, and drains  - Monitor endotracheal if appropriate and nasal secretions for changes in amount and color  - [place] appropriate cooling/warming therapies per order  - Administer medications as ordered  - Instruct and encourage patient and family to use good hand hygiene technique  - Identify and instruct in appropriate isolation precautions for identified infection/condition  Outcome: Progressing  Goal: Absence of fever/infection during neutropenic period  Description: INTERVENTIONS:  - Monitor WBC    Outcome: Progressing     Problem: Prexisting or High Potential for Compromised Skin Integrity  Goal: Skin integrity is maintained or improved  Description: INTERVENTIONS:  - Identify patients at risk for skin breakdown  - Assess and monitor skin integrity  - Assess and monitor nutrition and hydration status  - Monitor labs   - Assess for incontinence   - Turn and reposition patient  - Assist with mobility/ambulation  - Relieve pressure over bony prominences  - Avoid friction and shearing  - Provide appropriate hygiene as needed including keeping skin clean and dry  - Evaluate need for skin moisturizer/barrier cream  - Collaborate with interdisciplinary team   - Patient/family teaching  - Consider wound care consult   Outcome: Progressing

## 2023-09-17 NOTE — PLAN OF CARE
Problem: PAIN - ADULT  Goal: Verbalizes/displays adequate comfort level or baseline comfort level  Description: Interventions:  - Encourage patient to monitor pain and request assistance  - Assess pain using appropriate pain scale  - Administer analgesics based on type and severity of pain and evaluate response  - Implement non-pharmacological measures as appropriate and evaluate response  - Consider cultural and social influences on pain and pain management  - Notify physician/advanced practitioner if interventions unsuccessful or patient reports new pain  Outcome: Progressing     Problem: INFECTION - ADULT  Goal: Absence or prevention of progression during hospitalization  Description: INTERVENTIONS:  - Assess and monitor for signs and symptoms of infection  - Monitor lab/diagnostic results  - Monitor all insertion sites, i.e. indwelling lines, tubes, and drains  - Monitor endotracheal if appropriate and nasal secretions for changes in amount and color  - [place] appropriate cooling/warming therapies per order  - Administer medications as ordered  - Instruct and encourage patient and family to use good hand hygiene technique  - Identify and instruct in appropriate isolation precautions for identified infection/condition  Outcome: Progressing  Goal: Absence of fever/infection during neutropenic period  Description: INTERVENTIONS:  - Monitor WBC    Outcome: Progressing     Problem: SAFETY ADULT  Goal: Patient will remain free of falls  Description: INTERVENTIONS:  - Educate patient/family on patient safety including physical limitations  - Instruct patient to call for assistance with activity   - Consult OT/PT to assist with strengthening/mobility   - Keep Call bell within reach  - Keep bed low and locked with side rails adjusted as appropriate  - Keep care items and personal belongings within reach  - Initiate and maintain comfort rounds  - Make Fall Risk Sign visible to staff  - Offer Toileting every 2 Hours, in advance of need  - Initiate/Maintain alarm  - Obtain necessary fall risk management equipment:   - Apply yellow socks and bracelet for high fall risk patients  - Consider moving patient to room near nurses station  Outcome: Progressing  Goal: Maintain or return to baseline ADL function  Description: INTERVENTIONS:  -  Assess patient's ability to carry out ADLs; assess patient's baseline for ADL function and identify physical deficits which impact ability to perform ADLs (bathing, care of mouth/teeth, toileting, grooming, dressing, etc.)  - Assess/evaluate cause of self-care deficits   - Assess range of motion  - Assess patient's mobility; develop plan if impaired  - Assess patient's need for assistive devices and provide as appropriate  - Encourage maximum independence but intervene and supervise when necessary  - Involve family in performance of ADLs  - Assess for home care needs following discharge   - Consider OT consult to assist with ADL evaluation and planning for discharge  - Provide patient education as appropriate  Outcome: Progressing  Goal: Maintains/Returns to pre admission functional level  Description: INTERVENTIONS:  - Perform BMAT or MOVE assessment daily.   - Set and communicate daily mobility goal to care team and patient/family/caregiver.    - Collaborate with rehabilitation services on mobility goals if consulted  Outcome: Progressing     Problem: DISCHARGE PLANNING  Goal: Discharge to home or other facility with appropriate resources  Description: INTERVENTIONS:  - Identify barriers to discharge w/patient and caregiver  - Arrange for needed discharge resources and transportation as appropriate  - Identify discharge learning needs (meds, wound care, etc.)  - Arrange for interpretive services to assist at discharge as needed  - Refer to Case Management Department for coordinating discharge planning if the patient needs post-hospital services based on physician/advanced practitioner order or complex needs related to functional status, cognitive ability, or social support system  Outcome: Progressing     Problem: Knowledge Deficit  Goal: Patient/family/caregiver demonstrates understanding of disease process, treatment plan, medications, and discharge instructions  Description: Complete learning assessment and assess knowledge base. Interventions:  - Provide teaching at level of understanding  - Provide teaching via preferred learning methods  Outcome: Progressing     Problem: MOBILITY - ADULT  Goal: Maintain or return to baseline ADL function  Description: INTERVENTIONS:  -  Assess patient's ability to carry out ADLs; assess patient's baseline for ADL function and identify physical deficits which impact ability to perform ADLs (bathing, care of mouth/teeth, toileting, grooming, dressing, etc.)  - Assess/evaluate cause of self-care deficits   - Assess range of motion  - Assess patient's mobility; develop plan if impaired  - Assess patient's need for assistive devices and provide as appropriate  - Encourage maximum independence but intervene and supervise when necessary  - Involve family in performance of ADLs  - Assess for home care needs following discharge   - Consider OT consult to assist with ADL evaluation and planning for discharge  - Provide patient education as appropriate  Outcome: Progressing  Goal: Maintains/Returns to pre admission functional level  Description: INTERVENTIONS:  - Perform BMAT or MOVE assessment daily.   - Set and communicate daily mobility goal to care team and patient/family/caregiver.    - Collaborate with rehabilitation services on mobility goals if consulted  Outcome: Progressing     Problem: Prexisting or High Potential for Compromised Skin Integrity  Goal: Skin integrity is maintained or improved  Description: INTERVENTIONS:  - Identify patients at risk for skin breakdown  - Assess and monitor skin integrity  - Assess and monitor nutrition and hydration status  - Monitor labs   - Assess for incontinence   - Turn and reposition patient  - Assist with mobility/ambulation  - Relieve pressure over bony prominences  - Avoid friction and shearing  - Provide appropriate hygiene as needed including keeping skin clean and dry  - Evaluate need for skin moisturizer/barrier cream  - Collaborate with interdisciplinary team   - Patient/family teaching  - Consider wound care consult   Outcome: Progressing     Problem: Nutrition/Hydration-ADULT  Goal: Nutrient/Hydration intake appropriate for improving, restoring or maintaining nutritional needs  Description: Monitor and assess patient's nutrition/hydration status for malnutrition. Collaborate with interdisciplinary team and initiate plan and interventions as ordered. Monitor patient's weight and dietary intake as ordered or per policy. Utilize nutrition screening tool and intervene as necessary. Determine patient's food preferences and provide high-protein, high-caloric foods as appropriate.      INTERVENTIONS:  - Monitor oral intake, urinary output, labs, and treatment plans  - Assess nutrition and hydration status and recommend course of action  - Evaluate amount of meals eaten  - Assist patient with eating if necessary   - Allow adequate time for meals  - Recommend/ encourage appropriate diets, oral nutritional supplements, and vitamin/mineral supplements  - Order, calculate, and assess calorie counts as needed  - Recommend, monitor, and adjust tube feedings and TPN/PPN based on assessed needs  - Assess need for intravenous fluids  - Provide specific nutrition/hydration education as appropriate  - Include patient/family/caregiver in decisions related to nutrition  Outcome: Progressing

## 2023-09-18 LAB
ANION GAP SERPL CALCULATED.3IONS-SCNC: 9 MMOL/L
APTT PPP: 66 SECONDS (ref 23–37)
BASOPHILS # BLD AUTO: 0.02 THOUSANDS/ÂΜL (ref 0–0.1)
BASOPHILS NFR BLD AUTO: 0 % (ref 0–1)
BUN SERPL-MCNC: 19 MG/DL (ref 5–25)
CALCIUM SERPL-MCNC: 7.8 MG/DL (ref 8.4–10.2)
CHLORIDE SERPL-SCNC: 104 MMOL/L (ref 96–108)
CO2 SERPL-SCNC: 23 MMOL/L (ref 21–32)
CREAT SERPL-MCNC: 1.01 MG/DL (ref 0.6–1.3)
EOSINOPHIL # BLD AUTO: 0 THOUSAND/ÂΜL (ref 0–0.61)
EOSINOPHIL NFR BLD AUTO: 0 % (ref 0–6)
ERYTHROCYTE [DISTWIDTH] IN BLOOD BY AUTOMATED COUNT: 22 % (ref 11.6–15.1)
GFR SERPL CREATININE-BSD FRML MDRD: 85 ML/MIN/1.73SQ M
GLUCOSE SERPL-MCNC: 116 MG/DL (ref 65–140)
GLUCOSE SERPL-MCNC: 118 MG/DL (ref 65–140)
GLUCOSE SERPL-MCNC: 119 MG/DL (ref 65–140)
GLUCOSE SERPL-MCNC: 129 MG/DL (ref 65–140)
GLUCOSE SERPL-MCNC: 158 MG/DL (ref 65–140)
HCT VFR BLD AUTO: 26.9 % (ref 36.5–49.3)
HGB BLD-MCNC: 8 G/DL (ref 12–17)
IMM GRANULOCYTES # BLD AUTO: 0.04 THOUSAND/UL (ref 0–0.2)
IMM GRANULOCYTES NFR BLD AUTO: 0 % (ref 0–2)
LYMPHOCYTES # BLD AUTO: 0.59 THOUSANDS/ÂΜL (ref 0.6–4.47)
LYMPHOCYTES NFR BLD AUTO: 5 % (ref 14–44)
MAGNESIUM SERPL-MCNC: 1.6 MG/DL (ref 1.9–2.7)
MCH RBC QN AUTO: 26.8 PG (ref 26.8–34.3)
MCHC RBC AUTO-ENTMCNC: 29.7 G/DL (ref 31.4–37.4)
MCV RBC AUTO: 90 FL (ref 82–98)
MONOCYTES # BLD AUTO: 0.88 THOUSAND/ÂΜL (ref 0.17–1.22)
MONOCYTES NFR BLD AUTO: 8 % (ref 4–12)
NEUTROPHILS # BLD AUTO: 9.43 THOUSANDS/ÂΜL (ref 1.85–7.62)
NEUTS SEG NFR BLD AUTO: 87 % (ref 43–75)
NRBC BLD AUTO-RTO: 0 /100 WBCS
PHOSPHATE SERPL-MCNC: 3.6 MG/DL (ref 2.7–4.5)
PLATELET # BLD AUTO: 305 THOUSANDS/UL (ref 149–390)
PMV BLD AUTO: 9.4 FL (ref 8.9–12.7)
POTASSIUM SERPL-SCNC: 3.9 MMOL/L (ref 3.5–5.3)
RBC # BLD AUTO: 2.98 MILLION/UL (ref 3.88–5.62)
SODIUM SERPL-SCNC: 136 MMOL/L (ref 135–147)
WBC # BLD AUTO: 10.96 THOUSAND/UL (ref 4.31–10.16)

## 2023-09-18 PROCEDURE — 84100 ASSAY OF PHOSPHORUS: CPT

## 2023-09-18 PROCEDURE — 83735 ASSAY OF MAGNESIUM: CPT

## 2023-09-18 PROCEDURE — 82948 REAGENT STRIP/BLOOD GLUCOSE: CPT

## 2023-09-18 PROCEDURE — 85025 COMPLETE CBC W/AUTO DIFF WBC: CPT

## 2023-09-18 PROCEDURE — 97535 SELF CARE MNGMENT TRAINING: CPT

## 2023-09-18 PROCEDURE — 85730 THROMBOPLASTIN TIME PARTIAL: CPT | Performed by: SURGERY

## 2023-09-18 PROCEDURE — 99232 SBSQ HOSP IP/OBS MODERATE 35: CPT | Performed by: SURGERY

## 2023-09-18 PROCEDURE — 99232 SBSQ HOSP IP/OBS MODERATE 35: CPT | Performed by: INTERNAL MEDICINE

## 2023-09-18 PROCEDURE — 80048 BASIC METABOLIC PNL TOTAL CA: CPT

## 2023-09-18 RX ORDER — MAGNESIUM SULFATE HEPTAHYDRATE 40 MG/ML
4 INJECTION, SOLUTION INTRAVENOUS ONCE
Status: COMPLETED | OUTPATIENT
Start: 2023-09-18 | End: 2023-09-19

## 2023-09-18 RX ADMIN — FUROSEMIDE 20 MG: 20 TABLET ORAL at 07:34

## 2023-09-18 RX ADMIN — INSULIN LISPRO 5 UNITS: 100 INJECTION, SOLUTION INTRAVENOUS; SUBCUTANEOUS at 18:27

## 2023-09-18 RX ADMIN — HEPARIN SODIUM 21.1 UNITS/KG/HR: 10000 INJECTION, SOLUTION INTRAVENOUS at 06:51

## 2023-09-18 RX ADMIN — APIXABAN 5 MG: 5 TABLET, FILM COATED ORAL at 17:09

## 2023-09-18 RX ADMIN — CHLORHEXIDINE GLUCONATE 15 ML: 1.2 SOLUTION ORAL at 22:00

## 2023-09-18 RX ADMIN — MAGNESIUM SULFATE HEPTAHYDRATE 4 G: 40 INJECTION, SOLUTION INTRAVENOUS at 12:06

## 2023-09-18 RX ADMIN — INSULIN LISPRO 2 UNITS: 100 INJECTION, SOLUTION INTRAVENOUS; SUBCUTANEOUS at 12:37

## 2023-09-18 NOTE — PROGRESS NOTES
[facility] - Inpatient Progress Note    PATIENT INFORMATION      Tee Holloway 46 y.o. male MRN: [id number]  Unit/Bed#: [facility] 901-01 Encounter: [id number]    ASSESSMENT & PLAN   63-year-old male with past medical history of recent admission for gallstone necrotizing pancreatitis complicated by fluid collection status post IR drain placement, obesity who was admitted for sepsis.  On 9/6 underwent repeat IR drain placement with 2 additional drains placed into necrotic fluid collection. GI consulted for evaluation of both necrotizing pancreatitis and colonic dilation.     1. 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 Klebsiella bacteremia  3. Septic shock, resolved  4. SMV thrombus  5. Ascites  • Initial episode of necrotizing gallstone panc 07/2023. BCx Klebsiella, concordant w/ FCx (citrobacter, EC, Klebsiella, Aeromonas)  • S/p multiple drains per IR  • Unable to perform cystgastro on 09/15 due to lack of appropriate iwndow  • Pending VARDS in the future  • ABx per ID   • Therapeutic mikayla prn  • Management of IR drains per surgical team  • AC per primary  • GI will sign off at this time; will schedule outpatient f/u.    6. Dilated colon, improved  Likely 2/2 mass effect extensive inflammatory changes from above. Exam benign. Serial imaging shows improvement  • C/w Aggressive bowel regimen     7. Anemia  Most recent baseline Hgb 7-8 throughout hospitalization in setting of necrotic pancreatitis  • Hb 8 today; still no evidence of GIB  • Trend, Hb > 7    SUBJECTIVE     NAEO. Passing flatus and having BMs. Tolerating PO. Some abdominal soreness but overall improved. No black stools per him.     MEDICATIONS & ALLERGIES       Medications:   Medications Prior to Admission   Medication   • acetaminophen (TYLENOL) 325 mg tablet   • Alcohol Swabs 70 % PADS   • apixaban (Eliquis) 5 mg   • BD PosiFlush 0.9 % SOLN   • Blood Glucose Monitoring Suppl (OneTouch Verio Reflect) w/Device KIT   • Blood Pressure Monitoring (Adult Blood Pressure Cuff Lg) KIT   • furosemide (LASIX) 20 mg tablet   • glucose blood (OneTouch Verio) test strip   • Insulin Glargine Solostar (Lantus SoloStar) 100 UNIT/ML SOPN   • insulin lispro (HumaLOG KwikPen) 100 units/mL injection pen   • Insulin Pen Needle (BD Pen Needle Lupe 2nd Gen) 32G X 4 MM MISC   • Insulin Pen Needle (BD Pen Needle Lupe 2nd Gen) 32G X 4 MM MISC   • Levemir FlexPen 100 units/mL injection pen   • lisinopril (ZESTRIL) 10 mg tablet   • NovoLOG FlexPen 100 units/mL injection pen   • OneTouch Delica Lancets 62F MISC   • pancrelipase, Lip-Prot-Amyl, (CREON) 6,000 units delayed release capsule   • sodium chloride, PF, 0.9 %     Current Facility-Administered Medications   Medication Dose Route Frequency   • acetaminophen (TYLENOL) tablet 650 mg  650 mg Oral Q6H PRN   • bisacodyl (DULCOLAX) rectal suppository 10 mg  10 mg Rectal Daily PRN   • chlorhexidine (PERIDEX) 0.12 % oral rinse 15 mL  15 mL Mouth/Throat Q12H ALEX   • furosemide (LASIX) tablet 20 mg  20 mg Oral Daily   • heparin (porcine) 25,000 units in 0.45% NaCl 250 mL infusion (premix)  3-26 Units/kg/hr (Order-Specific) Intravenous Titrated   • HYDROmorphone (DILAUDID) injection 0.5 mg  0.5 mg Intravenous Q4H PRN   • insulin lispro (HumaLOG) 100 units/mL subcutaneous injection 2-12 Units  2-12 Units Subcutaneous TID AC   • insulin lispro (HumaLOG) 100 units/mL subcutaneous injection 2-12 Units  2-12 Units Subcutaneous HS   • insulin lispro (HumaLOG) 100 units/mL subcutaneous injection 5 Units  5 Units Subcutaneous TID With Meals   • mineral oil enema 1 enema  1 enema Rectal Daily PRN   • oxyCODONE (ROXICODONE) IR tablet 5 mg  5 mg Oral Q6H PRN    Or   • oxyCODONE (ROXICODONE) immediate release tablet 10 mg  10 mg Oral Q6H PRN   • polyethylene glycol (MIRALAX) packet 17 g  17 g Oral BID       Allergies:   No Known Allergies    PHYSICAL EXAM     Objective   Blood pressure 123/80, pulse (!) 108, temperature 97.8 °F (36.6 °C), resp. rate 18, height 5' 11" (1.803 m), weight 125 kg (275 lb 2.2 oz), SpO2 95 %. Body mass index is 38.37 kg/m². Intake/Output Summary (Last 24 hours) at 9/18/2023 0746  Last data filed at 9/18/2023 0510  Gross per 24 hour   Intake 820.88 ml   Output 755 ml   Net 65.88 ml       General Appearance:   Alert, cooperative, no distress   HEENT:   Normocephalic, atraumatic, anicteric     Neck:   Supple, symmetrical, trachea midline   Lungs:   Equal chest rise, respirations unlabored    Heart:   Regular rate and rhythm   Abdomen:   Soft, non-tender, distended    Rectal:   Deferred    Extremities:   Significant B/L LE edema, No cyanosis, clubbing   Neuro: Moves all 4 extremities    Skin:   No jaundice, rashes, or lesions      ADDITIONAL DATA     Lab Results:     Results from last 7 days   Lab Units 09/18/23  0500   WBC Thousand/uL 10.96*   HEMOGLOBIN g/dL 8.0*   HEMATOCRIT % 26.9*   PLATELETS Thousands/uL 305   NEUTROS PCT % 87*   LYMPHS PCT % 5*   MONOS PCT % 8   EOS PCT % 0     Results from last 7 days   Lab Units 09/18/23  0500 09/13/23  0446 09/12/23  0524   POTASSIUM mmol/L 3.9   < > 3.5   CHLORIDE mmol/L 104   < > 105   CO2 mmol/L 23   < > 26   BUN mg/dL 19   < > 25   CREATININE mg/dL 1.01   < > 1.12   CALCIUM mg/dL 7.8*   < > 7.3*   ALK PHOS U/L  --   --  45   ALT U/L  --   --  4*   AST U/L  --   --  10*    < > = values in this interval not displayed. Imaging:    IR INPATIENT Paracentesis    Result Date: 9/8/2023  Narrative: PROCEDURE: Therapeutic and diagnostic paracentesis MEDICATIONS: 1% lidocaine subcutaneous. INDICATION: Symptomatic ascites. Necrotizing pancreatitis. PROCEDURE: Using ultrasound guidance, the right lower quadrant was punctured and a 5f yueh catheter was placed into the abdominal cavity until ascites was aspirated. A total of 2900 milliliters of serous fluid was removed. The catheter was then removed.  FINDINGS: 2900 mL of serous ascites was removed. Impression: Therapeutic and diagnostic paracentesis. Signed, performed, and dictated by GHASSAN Virk under the supervision of Dr. Madelyn Davis. Workstation performed: [id number]     CT abdomen pelvis wo contrast    Result Date: 9/8/2023  Narrative: CT ABDOMEN AND PELVIS WITHOUT IV CONTRAST INDICATION:   Persistent colonic distention. COMPARISON: Multiple prior studies, most recent is dated September 4, 2023. TECHNIQUE:  CT examination of the abdomen and pelvis was performed without intravenous contrast.  Axial, sagittal, and coronal 2D reformatted images were created from the source data and submitted for interpretation. Radiation dose length product (DLP) for this visit:  4512.84 mGy-cm . This examination, like all CT scans performed in the [facility], was performed utilizing techniques to minimize radiation dose exposure, including the use of iterative reconstruction and automated exposure control. Enteric contrast was not administered. FINDINGS: ABDOMEN LOWER CHEST: Bilateral pleural effusions and underlying compressive atelectasis. LIVER/BILIARY TREE:  Unremarkable. GALLBLADDER:  No calcified gallstones. No pericholecystic inflammatory change. SPLEEN:  Unremarkable. PANCREAS: Again shown are findings of pancreatic necrosis with nearly the entire pancreas replaced by necrotic debris and gas. 2. Catheters draining the pancreatic debris, including an epigastric pigtail catheterq, and a large bore surgical catheter placed in the region of the tail the pancreas. Additional pigtail catheter has been placed in a left paracolic collection. The left  paracolic gutter collection is incompletely imaged but has decreased in size. ADRENAL GLANDS:  Unremarkable. KIDNEYS/URETERS: BOWEL: The cecum and transverse colon are distended with gas. The remainder of the colon is normal in caliber. When compared to the prior study though there has been improvement in the distention of the colon. The degree of cecal distention is similar with the cecum measuring approximately 9 cm. There is no cecal pneumatosis. Small bowel is normal in caliber. Fluid contents in the distal colon and rectum compatible with a diarrheal state. APPENDIX:  No findings to suggest appendicitis. ABDOMINOPELVIC CAVITY: Moderate volume ascites in the peritoneal cavity, greatest in the pelvis not significantly changed. VESSELS:  Unremarkable for patient's age. PELVIS REPRODUCTIVE ORGANS:  Unremarkable for patient's age. URINARY BLADDER: Collapsed with a Saunders catheter in place. ABDOMINAL WALL/INGUINAL REGIONS: Central venous access catheter in the left femoral vein. Moderate body wall edema. OSSEOUS STRUCTURES: Bilateral hip arthroplasties. Degenerative changes throughout the spine. Impression: Findings compatible with infected pancreatic necrosis with multiple drains in place. Appearance overall similar. Interval placement of a left paracolic drain with slight decrease in the size of a fluid collection. Gaseous distention of the transverse colon and cecum which overall has improved since the prior study. The cecum measures approximately 9 cm. No cecal pneumatosis. Moderate volume ascites is unchanged. Persistent nephrograms bilaterally compatible with acute kidney injury. Workstation performed: [id number]     XR chest portable    Result Date: 9/7/2023  Narrative: CHEST INDICATION:   SOB. COMPARISON: Radiograph chest 9/5/2023 and CT abdomen pelvis 9/4/2023. EXAM PERFORMED/VIEWS:  XR CHEST PORTABLE FINDINGS:  Monitoring leads and clips project over the chest. Previous nasogastric tube removed. Cardiomediastinal silhouette appears unremarkable. Unchanged right medial lung base opacity. Small left and trace right pleural effusions. No pneumothorax. Thoracolumbar bridging osteophytes. Impression: Unchanged right medial lung base opacity. Small left and trace right pleural effusions. No pneumothorax.  Resident: Kwabena Hinson I, the attending radiologist, have reviewed the images and agree with the final report above. Workstation performed: [id number]     XR abdomen 1 view kub    Result Date: 9/7/2023  Narrative: ABDOMEN INDICATION:   Re-evaluate colonic distention after bowel movements. COMPARISON: KUB 9/5/2023 and CT abdomen pelvis 9/4/2023. VIEWS:  AP supine FINDINGS: Surgical drain in the epigastric region. Saunders catheter overlies pelvic region. Left femoral central venous catheter in unchanged position. Slightly worse distention of the transverse colon. Unchanged gaseous distention of the ascending colon. There may however be a small amount of air present within the distal descending colon adjacent to the left iliac crest No discernible free air under the right diaphragm. Left diaphragm is poorly visualized. Upright or left lateral decubitus imaging is more sensitive to detect subtle free air in the appropriate setting. No pathologic calcifications or soft tissue masses. Visualized right lung base is unremarkable. Left lung base is not visualized. Bilateral total hip arthroplasties. Impression: Slightly worse distention of the transverse colon. However, small amount of air may now be present in the distal descending colon adjacent to the left iliac crest. Unchanged gaseous distention of the ascending colon. Resident: Jeronimo Doll, the attending radiologist, have reviewed the images and agree with the final report above. Workstation performed: [id number]     IR drainage tube placement    Result Date: 9/7/2023  Narrative: Abscess drainage catheter placement x2 Limited ultrasound left chest. Limited ultrasound right chest History: Necrotizing pancreatitis. Anterior drainage catheter placed 8/5/2023, upsize to 16 [place] on 9/4/2023. Patient be admitted with septic shock and continues with respiratory and hemodynamic failure requiring vasopressor support. He is a poor surgical candidate at this time.  After multidisciplinary discussion we have decided to move forward with large bore drainage catheter placement to attempt to manage the collection. He underwent thoracentesis on the fourth and paracentesis earlier today. Risks benefits and alternatives were reviewed including risks of bleeding and damage to adjacent organs. Anesthesia: General Procedure: After explaining the risks and benefits of the procedure to the patient, informed consent was obtained. Anesthesia was initiated and the patient was placed somewhat decubitus CT was used to localize the collections. Radiation dose length product (DLP) for this visit:  6346.4 mGy . This examination, like all CT scans performed in the [facility], was performed utilizing techniques to minimize radiation dose exposure, including the use of iterative reconstruction and automated exposure control. Procedure: In the holding area limited ultrasound of the left and right chest was performed to assess for size of the effusions. This was for planning for possible thoracentesis. The patient was identified verbally and by wristband. Timeout was performed. Based on  imaging I elected to place 2 drains as the left paracolic gutter collection did not appear to freely communicate with the main pancreatic region collection The patient was prepped and draped in the usual sterile fashion. Using intermittent CT guidance access was gained to the patient's left paracolic collection using an 18 gauge needle. Amplatz wire was placed, the tract was dilated and a 12 [place] all-purpose drain formed in the cavity. Purulent necrotic material was aspirated. Simultaneously using CT guidance access was gained to the patient's retroperitoneal pancreatic region collection using an 18 gauge needle. I attempted to use an oblique angle in order to provide a good runway for future interventions. The first needle pass was close to the inferior margin of the spleen.  Although I believe we did not traverse the spleen I felt this would not be the ideal angle for future necrosectomy. Therefore Gelfoam slurry was prepared and injected while the needle was removed A new needle route was chosen with the Cisneros needle in the collection accessed. A Lunderquist wire was placed, initially it coiled in the lateral margin of the collection and I was able to redirected more centrally. The tract was serially dilated with 12, 18 dilators and then a 24 Hungarian peel-away sheath utilized as a dilator followed finally by a 30 [place] dilator. A 28 Hungarian Thal-Quick drain was then inserted without difficulty. The central loop of the Fal would not form properly given the size of the collection. Additionally, sideholes were seen to be visible to the flank but there was no capability to advance the tube further. Necrotic material began to drain under pressure. Each catheter was secured in place and placed to suction drainage. Patient was extubated in the CT scanner and tolerated the procedure well without apparent immediate complications. Findings: Left paracolic gutter collection measuring approximately 6.7 cm. Needle then wire then catheter within the collection. Use yielded purulent necrotic material. Large collection in the region of the pancreatic area, bilobed measuring approximately 16 x 7 cm on a representative image. Needle approaching this collection. Initial pass slightly inferior to the margin of the spleen. This needle pass was removed with Gelfoam injection. Subsequent needle pass more inferiorly and retroperitoneal debris. Wire then large bore catheter within the collection. Numerous additional findings including previously placed anterior drain coiled in the superior aspect of the collection. Minimal ascites. Small bilateral pleural effusions. Stomach containing some fluid. Dilated transverse colon. Limited ultrasound of the left and right chest before the drainage confirmed small bilateral pleural effusions.  Specimens: Abscess drainage material representative sample sent for aerobic, anaerobic culture     Impression: Impression: CT-guided placement of a large-bore 28 Nigerien drain into a retroperitoneal necrotic pancreatic collection CT-guided placement of a 12 Nigerien drain into a more inferior loculated collection Note: The large 28 Nigerien drain was placed at an oblique angle to allow for  possible future necrosectomy/VARDS. Drainage of this very thick necrotic material will be best with continuous large diameter tubing, such as a chest tube box. Not all sideholes are in the collection due to tube length, do not flush with large volumes as this will spill material into the flank. Would hold any anticoagulation at least 12-24 hours due to hemorrhagic nature of the material and proximity of 1 needle pass to the spleen Workstation performed: [id number]     XR abdomen 1 view kub    Result Date: 9/5/2023  Narrative: ABDOMEN INDICATION:   monitor colonic distention. COMPARISON: CT abdomen pelvis with contrast 9/4/2023. VIEWS:  AP supine Images: 3 FINDINGS: Nonweighted enteric tube sidehole overlies distal stomach and tip in proximal second portion of duodenal region. Surgical drain overlies the epigastric region. Probable Saunders catheter overlies pelvic region. Left femoral approach central venous catheter tip overlies left iliac vessel region. There is a nonobstructive bowel gas pattern. Similar persistent gaseous distention of visualized ascending and transverse colon. No discernible free air on this supine study. Upright or left lateral decubitus imaging is more sensitive to detect subtle free air in the appropriate setting. No pathologic calcifications or soft tissue masses. Bibasilar atelectasis (left worse than right). Bilateral total hip arthroplasty. Spinal degenerative changes.      Impression: Devices as detailed below: -Nonweighted enteric tube sidehole overlies distal stomach and tip in proximal second portion of duodenal region. -Surgical drain overlies the epigastric region. -Probable Saunders catheter overlies pelvic region. -Left femoral approach central venous catheter tip overlies left iliac vessel region. Similar persistent gaseous distention of visualized ascending and transverse colon. Bibasilar atelectasis (left worse than right). Workstation performed: [id number]     Echo complete w/ contrast if indicated    Result Date: 9/5/2023  Narrative: •  Left Ventricle: Left ventricular cavity size is normal. Wall thickness is upper normal. The left ventricular ejection fraction is 60%. Systolic function is normal. Wall motion is normal. Diastolic function is normal. •  Right Ventricle: Right ventricular cavity size is mildly dilated. Systolic function is normal. •  Mitral Valve: There is trace regurgitation. •  Pulmonary Artery: The pulmonary artery systolic pressure is mildly increased. XR chest 1 view portable    Result Date: 9/5/2023  Narrative: CHEST INDICATION:   CP. COMPARISON: Chest radiograph from August 4, 2023 EXAM PERFORMED/VIEWS:  XR CHEST PORTABLE FINDINGS: Cardiomediastinal silhouette appears unremarkable. Subsegmental atelectasis in the perihilar regions and lung bases. Small layering pleural effusions. No pneumothorax. Osseous structures appear within normal limits for patient age. Impression: Small pleural effusions and bibasilar atelectasis.  Workstation performed: [id number]     IR drainage tube check/change/reposition/reinsertion/upsize    Result Date: 9/5/2023  Narrative: Examination: Abscess catheter tube reposition/upsize History: Pancreatitis, septic shock, respiratory failure, bilateral pleural effusions larger on the left Patient requires respiratory optimization in order to lay on the fluoroscopy table for drain upsize Contrast type: omnipaque 300   Contrast dose: 10 cc Fluoroscopy time: 1.1 minutes Moderate sedation time: 30 minutes Technique: Risks benefits alternatives discussed informed consent obtained. Risk of bleeding discussed on Eliquis. The patient was identified verbally, and by wrist band." Time out" was performed. Initially patient was optimized with left thoracentesis which is dictated separately. The existing tube was prepped and draped in usual sterile fashion. Lidocaine was given as local anesthesia. Lidocaine was also administered within the tube. Contrast was injected. The system was opacified. The existing tube was removed over  an Amplatz wire. The tract was dilated and a new 12 [place] skater all-purpose drain was formed Given the patient's body habitus and the depth of the collection the catheter is hubbed to the skin Specimens: Culture aerobic, anaerobic Findings: Ill-defined cavity in the mid abdomen containing previously placed drain. Catheter positioned more deeply and upsized to 12 [place]alan 30-50 cc of chunky hemorrhagic infected material removed NG tube noted decompressing the stomach with gaseous distention of the transverse colon     Impression: Impression: Upsize and reposition of a pancreatic necrotic collection drain to a 16 [place]alan pigtail catheter Patient also has ascites leaking around the catheter, he may be a candidate for paracentesis or a peritoneal drain to simply divert and reduce the ascites Workstation performed: [id number]     IR thoracentesis    Result Date: 9/5/2023  Narrative: Ultrasound-guided thoracentesis Clinical History: Pancreatitis, septic shock, respiratory failure, bilateral pleural effusions larger on the left Patient requires respiratory optimization in order to lay on the fluoroscopy table for drain upsize procedure: After explaining the risks and benefits of the procedure to the patient, informed consent was obtained. Ultrasound was used to localize the pleural effusion. The overlying skin was prepped and draped in usual sterile fashion and local anesthesia was obtained with the 1% lidocaine solution.  A 5-[place] Yueh needle was advanced until fluid was aspirated under live ultrasound guidance. Approximately  1200   cc of clear, yellow fluid was aspirated. Specimens: Multiple The patient tolerated the procedure well, and attention was turned to drain upsize which is dictated separately     Impression: Impression: Ultrasound-guided left thoracentesis Workstation performed: [id number]     XR chest portable ICU    Result Date: 9/5/2023  Narrative: CHEST INDICATION:   evaluation after IJ attempted. COMPARISON: 9/4/2023 EXAM PERFORMED/VIEWS:  XR CHEST PORTABLE ICU 2 views FINDINGS: Interval placement of nasogastric tube which terminates below the diaphragm in the mid stomach Cardiomediastinal silhouette appears unremarkable. Persistent opacity at medial right lung base suspicious for pneumonia No pneumothorax or pleural effusion. Osseous structures appear within normal limits for patient age. Impression: Persistent medial right lung base opacity suspicious for pneumonia No pneumothorax Workstation performed: [id number]     CT abdomen pelvis w contrast    Result Date: 9/4/2023  Narrative: CT ABDOMEN AND PELVIS WITH IV CONTRAST INDICATION:   Abdominal abscess/infection suspected r/o peripancreatic abscess, infection. COMPARISON: CT abdomen pelvis 8/18/2023 TECHNIQUE:  CT examination of the abdomen and pelvis was performed. Multiplanar 2D reformatted images were created from the source data. This examination, like all CT scans performed in the [facility], was performed utilizing techniques to minimize radiation dose exposure, including the use of iterative reconstruction and automated exposure control. Radiation dose length product (DLP) for this visit:  3543.98 mGy-cm IV Contrast:  100 mL of iodixanol (VISIPAQUE) Enteric Contrast:  Enteric contrast was not administered. FINDINGS: ABDOMEN LOWER CHEST: Moderate bilateral pleural effusions, increased from prior study with mild bilateral lower lobe compressive atelectasis.  Shotty lymph nodes are seen in the bilateral cardiophrenic angles. 2 mm subpleural right middle lobe nodule, unchanged. Small focal airspace opacity within the lingula, new from previous study could represent atelectasis or early pneumonia. LIVER/BILIARY TREE:  Unremarkable. GALLBLADDER: No calcified gallstones. Pericholecystic inflammatory changes are favored to be secondary to extension from pancreatic primary process. SPLEEN: Small crescentic hypodensity is seen along the medial spleen, unchanged in retrospect. Unclear if this is related to a small infarct or possibly a small component of subcapsular fluid. PANCREAS: There has been development of a significant amount of gas seen within a large acute necrotic collection which is largely replacing the pancreatic parenchyma and extends laterally along the anterior pararenal space bilaterally, left greater than  right. This collection measures about 21 x 5.6 x 18.9 cm transverse, AP and craniocaudal dimensions respectively (series 4 image 74 and 603/96). Pigtail left upper quadrant drainage catheter is seen along the anterior margin of the collection. However, there is a significant amount of gas associated with this collection and elsewhere in the retroperitoneum more so than expected from catheter manipulation alone and is concerning for infected necrosis. The collection extends into the lateral paracolic gutter more caudally and may cross the midline as well such as series 4 image 89. On the left, the collection is associated with thickening of the lateral conal fascia and causes masslike thickening of the proximal to mid descending colon (series 4 images 91- 115). This is believed to represent phlegmonous mass. There is dilatation of the transverse colon up to 12.1 cm craniocaudal dimension (601/43) and 8.8 cm AP dimension, consistent with colonic obstruction. This degree of distention could pose a risk for bowel perforation. Surgical consultation is advised.  The above collection involves the lesser sac and extends cephalad on the right interposed between the caudate lobe liver and reina of the diaphragm to the gastrohepatic ligament. Inflammatory changes are noted within the laurent hepatis surrounding the vessels and causing slitlike narrowing of the main portal vein such as series 4 image 63. The vein is grossly patent. Similar findings are noted involving the splenic vein and SMV. These findings have also progressed from the prior study. Inflammatory tissue also surrounds the vessels about the celiac bifurcation and branches of the common hepatic artery. There is minimal if any appreciable enhancing pancreatic parenchyma. ADRENAL GLANDS:  Unremarkable. KIDNEYS/URETERS:  Unremarkable. No hydronephrosis. STOMACH AND BOWEL: Please see comments regarding the colon under the above pancreas section. The stomach is mildly distended and fluid-filled. There is collapse and/or thickening of the gastric antrum and descending duodenum, the latter is likely 180 degrees enveloped by the inflammatory collection. Secondary antritis/duodenitis with element of gastric outlet obstruction is not excluded. Moderate stool in the rectum. APPENDIX:  No findings to suggest appendicitis. ABDOMINOPELVIC CAVITY: Moderate to large amount of abdominal pelvic free fluid which is greater in the pelvis, also slightly increased. No pneumoperitoneum. No lymphadenopathy. VESSELS: See comments above. PELVIS soft tissue detail of the pelvis is degraded by beam hardening artifact from bilateral total hip arthroplasties. REPRODUCTIVE ORGANS:  Unremarkable for patient's age. URINARY BLADDER:  Unremarkable. ABDOMINAL WALL/INGUINAL REGIONS:  Unremarkable. OSSEOUS STRUCTURES:  No acute fracture or destructive osseous lesion. Bilateral total hip arthroplasties. Degenerative changes of the spine. Impression: 1.  Significant worsening of necrotizing pancreatitis with large acute necrotic collection largely replacing the pancreatic parenchyma and extending into the anterior pararenal spaces (left greater than right) with the collection now containing a large amount of gas concerning for infected necrosis. Additionally, there is an inflammatory phlegmonous mass along the left lateral pararenal space/paracolic gutter with involvement of the proximal to mid descending colon causing associated colonic obstruction. This degree of bowel distention could pose a risk for bowel perforation. Surgical consultation is advised. 2. Progression of inflammatory changes extending into the laurent hepatis with mass effect and narrowing of the portal vein and associated proximal vessels about the portal confluence as detailed above but no evidence for overt venous occlusion. 3. Mild fluid-filled gastric distention. Collapse and/or thickening of the gastric antrum and descending duodenum, the latter is likely 180 degrees enveloped by the inflammatory collection. Secondary antritis/duodenitis with element of gastric outlet obstruction is not excluded. 4. Moderate to large amount of abdominopelvic free fluid, increasing from prior study. 5. Enlarging, moderate bilateral pleural effusions with mild bibasilar compressive atelectasis. I personally discussed this study with [address] [place] [address] on 9/4/2023 at 5:47 PM. Workstation performed: [id number]     CT abdomen pelvis w contrast    Result Date: 8/18/2023  Narrative: CT ABDOMEN AND PELVIS WITH IV CONTRAST INDICATION:   K85.91: Acute pancreatitis with uninfected necrosis, unspecified. COMPARISON: Prior CT abdomen pelvis examinations dating back to 8/4/2023 TECHNIQUE:  CT examination of the abdomen and pelvis was performed. Scanning through the abdomen was performed in arterial, venous and delayed phases according a protocol specifically designed to evaluate upper abdominal viscera. Multiplanar 2D reformatted images were created from the source data.  This examination, like all CT scans performed in the [facility], was performed utilizing techniques to minimize radiation dose exposure, including the use of iterative reconstruction and automated exposure control. Radiation dose length product (DLP) for this visit:  0651 mGy-cm IV Contrast:  100 mL of iohexol (OMNIPAQUE) Enteric Contrast:  Enteric contrast was not administered. FINDINGS: ABDOMEN LOWER CHEST: Partially visualized at least small bilateral pleural effusions. A 3 mm left lower lobe pulmonary nodule (4/8). Bibasilar subsegmental atelectasis. LIVER/BILIARY TREE:  Unremarkable. GALLBLADDER:  No calcified gallstones. No pericholecystic inflammatory change. SPLEEN:  Unremarkable. PANCREAS: There is evidence of acute pancreatitis, which will be described based on the revised [place] Classification of Acute Pancreatitis: -TIME OF ONSET: less than or equal to 4 weeks -NECROSIS: There is evidence of pancreatic parenchyma necrosis (more than 30%), therefore this is necrotizing pancreatitis. There is decreasing peripancreatic fat stranding and fluid tracking compared to 8/6/2023. Again seen is small volume ascites, minimally decreased compared to 0/9/4259. -LOCAL COMPLICATIONS: Interval decrease in the size of acute necrotizing collection in the lesser sac, currently being 4.1 x 7.4 cm (series 4 image 64), previously 11.4 x 6.1 cm. It contains a tendon heterogenous material in the bladder drainage catheter  lies in the superficial portion of this collection The main portal vein, splenic vein and the intermesenteric vein are patent with mild mass effect on the main proximal splenic vein, improved since 8/6/2023. -INFECTION: There is no abnormal gas in or around the pancreas to suggest infection. ADRENAL GLANDS:  Unremarkable. KIDNEYS/URETERS:  Unremarkable. No hydronephrosis. STOMACH AND BOWEL: No abnormal bowel dilation. Suboptimal evaluation rectum due to streak artifact from arthroplasties. APPENDIX:  No findings to suggest appendicitis. ABDOMINOPELVIC CAVITY:  No ascites.   No pneumoperitoneum. No lymphadenopathy. VESSELS:  Unremarkable for patient's age. PELVIS REPRODUCTIVE ORGANS: Suboptimal evaluation of the prostate with streak artifact bilateral hip arthroplasties. Junnie Izabela URINARY BLADDER:  Unremarkable. ABDOMINAL WALL/INGUINAL REGIONS:  Unremarkable. OSSEOUS STRUCTURES:  No acute fracture or destructive osseous lesion. Bilateral total hip arthroplasty     Impression: Resolving acute necrotizing pancreatitis with interval decrease in the size of the peripancreatic necrotizing collection in the lesser sac compared to 8/6/2023. Persistent high attenuation material within the peripancreatic fluid collection with tip of the drainage catheter in its superficial aspect. Mildly improved ascites. At least small bilateral pleural effusions. A 3 mm left lower lobe pulmonary nodule. Attention on follow-up imaging versus optional follow-up CT chest in 12 months is recommended to assess for stability, as per Fleischner criteria. The study was marked in EPIC for significant notification. Workstation performed: [id number]       EKG, Pathology, and Other Studies Reviewed on Admission:   · EKG: Reviewed      Counseling / Coordination of Care Time: 30 total mins spent n consult. Greater than 50% of total time spent on patient counseling and coordination of care.     Justin Garza, PGY-4

## 2023-09-18 NOTE — RESTORATIVE TECHNICIAN NOTE
Restorative Technician Note      Patient Name: Alena Hatfield     Restorative Tech Visit Date: 09/18/23  Note Type: Mobility  Patient Position Upon Consult: Bedside chair  Activity Performed: Ambulated  Assistive Device: Roller walker  Patient Position at End of Consult: Bedside chair;  All needs within reach    Lashon Barba Restorative Technician

## 2023-09-18 NOTE — PROGRESS NOTES
General Surgery  Progress Note   Tha Fung 46 y.o. male MRN: [id number]  Unit/Bed#: 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  -Transverse colonic distention  -s/p tube irrigation at bedside     Afebrile, tachy to the 107-122, VSS on RA    UOP: 320    Drain output:pururlent, 435 total  LUQ 65    RUQ 70    WBC 10.96 (12.63)  Hgb 8.0  K: 3.9, C: 7.8, Mg 1.6    Plan:  • Diet: Low fat diet  • Continue Multimodal pain control  • Nausea control PRN  • DVT ppx: Heparin - monitor PTT  • Encourage OOB/IS and ambulation  • ABX: Cefepime/Flagyl  • Continue home Lasix for edema  • Replete electrolytes   • Dispo: Home with outpatient follow up         Subjective/Objective     Subjective:   Patient seen and examined at bedside, in no acute distress. NAEON, pain is well controlled, 1100 on IS, ambulating, denies N/V, CP, SOB, Fever, and cold chills     Objective:   Vitals:Blood pressure 111/73, pulse (!) 107, temperature 98.2 °F (36.8 °C), resp. rate 15, height 5' 11" (1.803 m), weight 125 kg (275 lb 2.2 oz), SpO2 93 %. Temp (24hrs), Av.8 °F (37.1 °C), Min:98.2 °F (36.8 °C), Max:99.4 °F (37.4 °C)        Intake/Output Summary (Last 24 hours) at 2023 0614  Last data filed at 2023 0510  Gross per 24 hour   Intake 4183.72 ml   Output 755 ml   Net 3428.72 ml       Invasive Devices     Peripherally Inserted Central Catheter Line  Duration           PICC Line /64 Left Basilic 9 days          Peripheral Intravenous Line  Duration           Peripheral IV 23 Dorsal (posterior); Left Wrist 40 days          Drain  Duration           Abscess Drain RUQ 13 days    Abscess Drain LUQ 11 days    Abscess Drain LUQ 11 days                Physical Exam:  Physical Exam    General: No acute distress, alert and oriented  CV: Well perfused, regular rate and rhythm  Lungs: Normal work of breathing, no increased respiratory effort on RA  Abdomen: Soft, moderately distended, no TTP. Drains are in place with purulent output.   Extremities: 2+ pitting edema of b/l LE  Skin: Warm, dry    Lab Results: Results: I have personally reviewed all pertinent laboratory/tests results  VTE Prophylaxis: Heparin    Lennox Freedman  9/18/2023

## 2023-09-18 NOTE — PLAN OF CARE
Problem: OCCUPATIONAL THERAPY ADULT  Goal: Performs self-care activities at highest level of function for planned discharge setting. See evaluation for individualized goals. Outcome: Progressing  Note: Limitation: Decreased ADL status, Decreased endurance, Decreased self-care trans, Decreased high-level ADLs     Assessment: Patient participated in Skilled OT session this date with interventions consisting of ADL re training with the use of correct body mechnaics, Energy Conservation techniques, safety awareness and fall prevention techniques,  therapeutic activities to: increase activity tolerance, increase dynamic sit/ stand balance during functional activity  and increase OOB/ sitting tolerance . Upon arrival patient was found seated OOB to Chair. Pt demonstrated the following tasks: MI grooming, S UB ADLs, MOD A LB ADLs. Pt performs STS transfer and fnxl mobility with S using RW. Limited LB ADLs 2* significant swelling and weeping edema in B/L feet. Patient continues to be functioning below baseline level, occupational performance remains limited secondary to factors listed above and increased risk for falls and injury. From OT standpoint, recommendation at time of d/c would be home with increased social support - pending progress. Patient to benefit from continued Occupational Therapy treatment while in the hospital to address deficits as defined above and maximize level of functional independence with ADLs and functional mobility. Pt was left after session with all current needs met. The patient's raw score on the AM-PAC Daily Activity Inpatient Short Form is 17. A raw score of less than 19 suggests the patient may benefit from discharge to post-acute rehabilitation services. Please refer to the recommendation of the Occupational Therapist for safe discharge planning.      OT Discharge Recommendation: No rehabilitation needs (pending progress)

## 2023-09-18 NOTE — TELEPHONE ENCOUNTER
Have been following his hospital course from the periphery, no longer seems to be in septic shock and improving based on the notes.  Would recommend she bring up concerns with hospital team and they can call and discuss his course if the drive is becoming difficult

## 2023-09-18 NOTE — PROGRESS NOTES
Progress Note - Infectious Disease   Oscar Fleming 46 y.o. male MRN: [id number]  Unit/Bed#: [facility] 901-01 Encounter: [id number]      Impression/Recommendations:  Septic shock, POA.    Fever, WBC, refractory hypotension.  Due to bacteremia, pancreatic abscess as below.  No other appreciable source.  Improved. Rec:  • Continue to follow closely off antibiotics  • Follow temperatures closely  • Recheck CBC in AM  • Supportive care as per the primary service     Polymicrobial bacteremia.    Klebsiella, Strep mitis/oralis.  Coagulase-negative Staph x2 from single set are likely contaminants.  Due to pancreatic abscess as below.  No other appreciable source.  Repeat blood cultures 9/6, TTE (technically difficult) negative. Status post 14 days IV antibiotics. Rec:  • Continue to follow closely off antibiotics     Pancreatic abscess.    Due to superinfection of necrosis as below.  Status post drain upsizing 9/4 which yielded infected hemorrhagic material.  Cultures with Citrobacter, E. Coli, Klebsiella, Aeromonas, Bacteroides.  Status post additional drains x2 9/6.  Cultures with same organisms. Status post 14 days IV antibiotics. Status post EGD 9/15 showing collection not mature enough for cystgastrostomy.   Rec:  • Continue to follow closely off antibiotics  • Follow drain output closely  • If fevers, WBC recur off antibiotics, may need to consider repeat imaging, attempt at further drainage, possible more prolonged treatment course     Necrotizing pancreatitis.   Presumed alcoholic.  Status post IR drain 8/5 yielding dark bloody fluid.  Cultures initially negative.  Current CT shows replacing most of pancreatic parenchyma, extending to pararenal spaces, causing left colonic obstruction, gastric outlet obstruction.  Status post drain upsizing 9/4, additional drains x2 9/6 yielding superinfected fluid as above.  Collection initially not mature enough for cyst gastrostomy per GI.  Status post bedside tube interrogation, necrosectomy . Status post EGD 9/15 showing collection not mature enough for cystgastrostomy.       JERZY. Likely multifactorial.  Improved.     Large bowel obstruction. Due to compression from pancreatitis as above. Improved.     Gastric outlet obstruction. Due to compression from pancreatitis as above. Improved.     Bilateral pleural effusions. Likely reactive to pancreatitis as above.  Status post left thoracentesis yielding 1200 cc clear yellow fluid.  Fluid cultures negative.     Ascites. Likely reactive to pancreatitis as above.  Status post paracentesis.  Fluid cultures negative.     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       The above plan was discussed in detail with the patient and Dr. Gladis Armenta.     Antibiotics:  Off antibiotics #1    Subjective:  Patient seen on AM rounds. Doing well. Denies fevers, chills, sweats, nausea, vomiting, or diarrhea. 24 Hour Events:  Went for EGD Friday. Collection     Objective:  Vitals:  Temp:  [97.8 °F (36.6 °C)-99.4 °F (37.4 °C)] 98.3 °F (36.8 °C)  HR:  [107-111] 108  Resp:  [15-18] 16  BP: (111-130)/(68-80) 119/73  SpO2:  [93 %-97 %] 97 %  Temp (24hrs), Av.6 °F (37 °C), Min:97.8 °F (36.6 °C), Max:99.4 °F (37.4 °C)  Current: Temperature: 98.3 °F (36.8 °C)    Physical Exam:   General:  No acute distress  Psychiatric:  Awake and alert  Pulmonary:  Normal respiratory excursion without accessory muscle use  Abdomen:  Soft, nontender  Extremities:  No edema  Skin:  No rashes    Lab Results:  I have personally reviewed pertinent labs.   Results from last 7 days   Lab Units 23  0500 23  0431 23  0537 23  0446 23  0524   POTASSIUM mmol/L 3.9 3.6 3.9   < > 3.5   CHLORIDE mmol/L 104 104 105   < > 105   CO2 mmol/L 23 23 22   < > 26   BUN mg/dL 19 20 20   < > 25   CREATININE mg/dL 1.01 1.15 1.11   < > 1.12   EGFR ml/min/1.73sq m 85 72 75   < > 75   CALCIUM mg/dL 7.8* 7.8* 7.6*   < > 7.3*   AST U/L  --   -- --   --  10*   ALT U/L  --   --   --   --  4*   ALK PHOS U/L  --   --   --   --  45    < > = values in this interval not displayed. Results from last 7 days   Lab Units 09/18/23  0500 09/17/23  0431 09/16/23  0537   WBC Thousand/uL 10.96* 12.63* 12.38*   HEMOGLOBIN g/dL 8.0* 7.9* 7.8*   PLATELETS Thousands/uL 305 336 327           Imaging Studies:   I have personally reviewed pertinent imaging study reports and images in PACS. EKG, Pathology, and Other Studies:   I have personally reviewed pertinent reports.

## 2023-09-18 NOTE — PHYSICAL THERAPY NOTE
PT cancel note       09/18/23 0842   PT Last Visit   PT Visit Date 09/18/23   Note Type   Note type Cancelled Session   Cancel Reasons Medical status   Additional Comments pt in restraints     [address]

## 2023-09-18 NOTE — TELEPHONE ENCOUNTER
Spoke with pt's wife at this time she's letting us [facility] She would like for Dr. Fritz Mary to look into the treatment plans that's currently going on at the hospital. Patient wife verbalizes that she cant keep driving far to be with  at this time.

## 2023-09-18 NOTE — OCCUPATIONAL THERAPY NOTE
Occupational Therapy Progress Note     Patient Name: Charly Ruff  Today's Date: 9/18/2023  Problem List  Principal Problem:    Necrotizing pancreatitis  Active Problems:    Septic shock (HCC)    Pleural effusion due to pancreatitis    Ascites    Portal vein thrombosis    Anemia    Hyperglycemia    Bacteremia            09/18/23 1152   OT Last Visit   OT Visit Date 09/18/23   Note Type   Note Type Treatment   Pain Assessment   Pain Assessment Tool 0-10   Pain Score No Pain   Restrictions/Precautions   Weight Bearing Precautions Per Order No   Other Precautions Multiple lines; Fall Risk  (x2 DONNY drain, weepeing edema)   Lifestyle   Autonomy pt reports that prior to last hospitalization in August, that he was fully independent w self care, mobility, driving etc. 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   Intrinsic Gratification Likes playing golf and spending time with his Ania Rojow   ADL   Where Assessed Chair   Grooming Assistance 6  Modified Independent   Grooming Deficit Wash/dry face   UB Bathing Assistance 5  Supervision/Setup   UB Bathing Deficit Chest;Left arm; Abdomen;Right arm   LB Bathing Assistance 3  Moderate Assistance   LB Bathing Deficit Buttocks  (+ B/L feet)   UB Dressing Assistance 5  Supervision/Setup   UB Dressing Deficit Thread RUE; Thread LUE   LB Dressing Assistance 2  Maximal Assistance   LB Dressing Deficit Don/doff L sock; Don/doff R sock   Bed Mobility   Supine to Sit Unable to assess   Sit to Supine Unable to assess   Additional Comments Pt seated OOB in chair upon arrival   Transfers   Sit to Stand 5  Supervision   Additional items Increased time required   Stand to Sit 5  Supervision   Additional items Increased time required   Additional Comments transfers with RW   Functional Mobility   Functional Mobility 5  Supervision   Additional items Rolling walker   Cognition Overall Cognitive Status WFL   Arousal/Participation Responsive; Cooperative   Attention Within functional limits   Orientation Level Oriented X4   Memory Within functional limits   Following Commands Follows all commands and directions without difficulty   Comments Pt pleasant and cooperative t/o session   Activity Tolerance   Activity Tolerance Patient tolerated treatment well   Medical Staff Made Aware RN Gasper Santa   Assessment   Assessment Patient participated in Skilled OT session this date with interventions consisting of ADL re training with the use of correct body mechnaics, Energy Conservation techniques, safety awareness and fall prevention techniques,  therapeutic activities to: increase activity tolerance, increase dynamic sit/ stand balance during functional activity  and increase OOB/ sitting tolerance . Upon arrival patient was found seated OOB to Chair. Pt demonstrated the following tasks: MI grooming, S UB ADLs, MOD A LB ADLs. Pt performs STS transfer and fnxl mobility with S using RW. Limited LB ADLs 2* significant swelling and weeping edema in B/L feet. Patient continues to be functioning below baseline level, occupational performance remains limited secondary to factors listed above and increased risk for falls and injury. From OT standpoint, recommendation at time of d/c would be home with increased social support - pending progress. Patient to benefit from continued Occupational Therapy treatment while in the hospital to address deficits as defined above and maximize level of functional independence with ADLs and functional mobility. Pt was left after session with all current needs met. The patient's raw score on the AM-PAC Daily Activity Inpatient Short Form is 17. A raw score of less than 19 suggests the patient may benefit from discharge to post-acute rehabilitation services. Please refer to the recommendation of the Occupational Therapist for safe discharge planning.    Plan   Treatment Interventions ADL retraining;Functional transfer training;UE strengthening/ROM; Endurance training;Patient/family training;Equipment evaluation/education; Compensatory technique education;Continued evaluation; Activityengagement; Energy conservation   Goal Expiration Date 09/21/23   OT Treatment Day 1   OT Frequency 2-3x/wk   Recommendation   OT Discharge Recommendation No rehabilitation needs  (pending progress)   AM-PAC Daily Activity Inpatient   Lower Body Dressing 2   Bathing 2   Toileting 2   Upper Body Dressing 3   Grooming 4   Eating 4   Daily Activity Raw Score 17   Daily Activity Standardized Score (Calc for Raw Score >=11) 37.26   AM-PAC Applied Cognition Inpatient   Following a Speech/Presentation 4   Understanding Ordinary Conversation 4   Taking Medications 4   Remembering Where Things Are Placed or Put Away 4   Remembering List of 4-5 Errands 4   Taking Care of Complicated Tasks 3   Applied Cognition Raw Score 23   Applied Cognition Standardized Score 53.08     Tony Butterfield MS, OTR/L

## 2023-09-18 NOTE — PLAN OF CARE
Problem: INFECTION - ADULT  Goal: Absence or prevention of progression during hospitalization  Description: INTERVENTIONS:  - Assess and monitor for signs and symptoms of infection  - Monitor lab/diagnostic results  - Monitor all insertion sites, i.e. indwelling lines, tubes, and drains  - Monitor endotracheal if appropriate and nasal secretions for changes in amount and color  - [place] appropriate cooling/warming therapies per order  - Administer medications as ordered  - Instruct and encourage patient and family to use good hand hygiene technique  - Identify and instruct in appropriate isolation precautions for identified infection/condition  Outcome: Progressing  Goal: Absence of fever/infection during neutropenic period  Description: INTERVENTIONS:  - Monitor WBC    Outcome: Progressing     Problem: MOBILITY - ADULT  Goal: Maintain or return to baseline ADL function  Description: INTERVENTIONS:  -  Assess patient's ability to carry out ADLs; assess patient's baseline for ADL function and identify physical deficits which impact ability to perform ADLs (bathing, care of mouth/teeth, toileting, grooming, dressing, etc.)  - Assess/evaluate cause of self-care deficits   - Assess range of motion  - Assess patient's mobility; develop plan if impaired  - Assess patient's need for assistive devices and provide as appropriate  - Encourage maximum independence but intervene and supervise when necessary  - Involve family in performance of ADLs  - Assess for home care needs following discharge   - Consider OT consult to assist with ADL evaluation and planning for discharge  - Provide patient education as appropriate  Outcome: Progressing  Goal: Maintains/Returns to pre admission functional level  Description: INTERVENTIONS:  - Perform BMAT or MOVE assessment daily.   - Set and communicate daily mobility goal to care team and patient/family/caregiver.    - Collaborate with rehabilitation services on mobility goals if consulted  Outcome: Progressing     Problem: Prexisting or High Potential for Compromised Skin Integrity  Goal: Skin integrity is maintained or improved  Description: INTERVENTIONS:  - Identify patients at risk for skin breakdown  - Assess and monitor skin integrity  - Assess and monitor nutrition and hydration status  - Monitor labs   - Assess for incontinence   - Turn and reposition patient  - Assist with mobility/ambulation  - Relieve pressure over bony prominences  - Avoid friction and shearing  - Provide appropriate hygiene as needed including keeping skin clean and dry  - Evaluate need for skin moisturizer/barrier cream  - Collaborate with interdisciplinary team   - Patient/family teaching  - Consider wound care consult   Outcome: Progressing     Problem: Nutrition/Hydration-ADULT  Goal: Nutrient/Hydration intake appropriate for improving, restoring or maintaining nutritional needs  Description: Monitor and assess patient's nutrition/hydration status for malnutrition. Collaborate with interdisciplinary team and initiate plan and interventions as ordered. Monitor patient's weight and dietary intake as ordered or per policy. Utilize nutrition screening tool and intervene as necessary. Determine patient's food preferences and provide high-protein, high-caloric foods as appropriate.      INTERVENTIONS:  - Monitor oral intake, urinary output, labs, and treatment plans  - Assess nutrition and hydration status and recommend course of action  - Evaluate amount of meals eaten  - Assist patient with eating if necessary   - Allow adequate time for meals  - Recommend/ encourage appropriate diets, oral nutritional supplements, and vitamin/mineral supplements  - Order, calculate, and assess calorie counts as needed  - Recommend, monitor, and adjust tube feedings and TPN/PPN based on assessed needs  - Assess need for intravenous fluids  - Provide specific nutrition/hydration education as appropriate  - Include patient/family/caregiver in decisions related to nutrition  Outcome: Progressing

## 2023-09-19 ENCOUNTER — TRANSITIONAL CARE MANAGEMENT (OUTPATIENT)
Dept: FAMILY MEDICINE CLINIC | Facility: CLINIC | Age: 53
End: 2023-09-19

## 2023-09-19 VITALS
TEMPERATURE: 98.3 F | OXYGEN SATURATION: 96 % | HEART RATE: 111 BPM | BODY MASS INDEX: 38.52 KG/M2 | RESPIRATION RATE: 16 BRPM | WEIGHT: 275.13 LBS | SYSTOLIC BLOOD PRESSURE: 125 MMHG | HEIGHT: 71 IN | DIASTOLIC BLOOD PRESSURE: 74 MMHG

## 2023-09-19 LAB
ANION GAP SERPL CALCULATED.3IONS-SCNC: 9 MMOL/L
BUN SERPL-MCNC: 18 MG/DL (ref 5–25)
CALCIUM SERPL-MCNC: 7.7 MG/DL (ref 8.4–10.2)
CHLORIDE SERPL-SCNC: 104 MMOL/L (ref 96–108)
CO2 SERPL-SCNC: 23 MMOL/L (ref 21–32)
CREAT SERPL-MCNC: 1.12 MG/DL (ref 0.6–1.3)
ERYTHROCYTE [DISTWIDTH] IN BLOOD BY AUTOMATED COUNT: 22.4 % (ref 11.6–15.1)
GFR SERPL CREATININE-BSD FRML MDRD: 75 ML/MIN/1.73SQ M
GLUCOSE SERPL-MCNC: 112 MG/DL (ref 65–140)
GLUCOSE SERPL-MCNC: 129 MG/DL (ref 65–140)
GLUCOSE SERPL-MCNC: 84 MG/DL (ref 65–140)
HCT VFR BLD AUTO: 26.7 % (ref 36.5–49.3)
HGB BLD-MCNC: 7.9 G/DL (ref 12–17)
MAGNESIUM SERPL-MCNC: 2.1 MG/DL (ref 1.9–2.7)
MCH RBC QN AUTO: 27.3 PG (ref 26.8–34.3)
MCHC RBC AUTO-ENTMCNC: 29.6 G/DL (ref 31.4–37.4)
MCV RBC AUTO: 92 FL (ref 82–98)
PLATELET # BLD AUTO: 321 THOUSANDS/UL (ref 149–390)
PMV BLD AUTO: 9.9 FL (ref 8.9–12.7)
POTASSIUM SERPL-SCNC: 3.7 MMOL/L (ref 3.5–5.3)
RBC # BLD AUTO: 2.89 MILLION/UL (ref 3.88–5.62)
SODIUM SERPL-SCNC: 136 MMOL/L (ref 135–147)
WBC # BLD AUTO: 9.73 THOUSAND/UL (ref 4.31–10.16)

## 2023-09-19 PROCEDURE — 99239 HOSP IP/OBS DSCHRG MGMT >30: CPT | Performed by: SURGERY

## 2023-09-19 PROCEDURE — 82948 REAGENT STRIP/BLOOD GLUCOSE: CPT

## 2023-09-19 PROCEDURE — NC001 PR NO CHARGE: Performed by: SURGERY

## 2023-09-19 PROCEDURE — 97116 GAIT TRAINING THERAPY: CPT

## 2023-09-19 PROCEDURE — 99231 SBSQ HOSP IP/OBS SF/LOW 25: CPT | Performed by: INTERNAL MEDICINE

## 2023-09-19 PROCEDURE — 85027 COMPLETE CBC AUTOMATED: CPT

## 2023-09-19 PROCEDURE — 83735 ASSAY OF MAGNESIUM: CPT

## 2023-09-19 PROCEDURE — 80048 BASIC METABOLIC PNL TOTAL CA: CPT

## 2023-09-19 RX ORDER — PEN NEEDLE, DIABETIC 32GX 5/32"
NEEDLE, DISPOSABLE MISCELLANEOUS
Qty: 100 EACH | Refills: 0 | Status: SHIPPED | OUTPATIENT
Start: 2023-09-19

## 2023-09-19 RX ORDER — POTASSIUM CHLORIDE 20 MEQ/1
40 TABLET, EXTENDED RELEASE ORAL ONCE
Status: COMPLETED | OUTPATIENT
Start: 2023-09-19 | End: 2023-09-19

## 2023-09-19 RX ORDER — POLYETHYLENE GLYCOL 3350 17 G/17G
17 POWDER, FOR SOLUTION ORAL DAILY
Qty: 85 G | Refills: 0 | Status: SHIPPED | OUTPATIENT
Start: 2023-09-19 | End: 2023-09-24

## 2023-09-19 RX ADMIN — APIXABAN 5 MG: 5 TABLET, FILM COATED ORAL at 08:59

## 2023-09-19 RX ADMIN — CHLORHEXIDINE GLUCONATE 15 ML: 1.2 SOLUTION ORAL at 08:59

## 2023-09-19 RX ADMIN — POTASSIUM CHLORIDE 40 MEQ: 1500 TABLET, EXTENDED RELEASE ORAL at 12:39

## 2023-09-19 RX ADMIN — INSULIN LISPRO 5 UNITS: 100 INJECTION, SOLUTION INTRAVENOUS; SUBCUTANEOUS at 08:54

## 2023-09-19 RX ADMIN — INSULIN LISPRO 5 UNITS: 100 INJECTION, SOLUTION INTRAVENOUS; SUBCUTANEOUS at 12:33

## 2023-09-19 RX ADMIN — FUROSEMIDE 20 MG: 20 TABLET ORAL at 08:59

## 2023-09-19 RX ADMIN — POLYETHYLENE GLYCOL 3350 17 G: 17 POWDER, FOR SOLUTION ORAL at 08:59

## 2023-09-19 NOTE — PROGRESS NOTES
Progress Note - Infectious Disease   Estelle Richard 46 y.o. male MRN: [id number]  Unit/Bed#: [facility] 901-01 Encounter: [id number]      Impression/Recommendations:  Septic shock, POA.    Fever, WBC, refractory hypotension.  Due to bacteremia, pancreatic abscess as below.  No other appreciable source.  Improved. Remains stable 48 hours off antibiotics. Rec:  • Continue to follow closely off antibiotics  • Follow temperatures closely  • Recheck CBC in AM  • Supportive care as per the primary service     Polymicrobial bacteremia.    Klebsiella, Strep mitis/oralis.  Coagulase-negative Staph x2 from single set are likely contaminants.  Due to pancreatic abscess as below.  No other appreciable source.  Repeat blood cultures 9/6, TTE (technically difficult) negative. Status post 14 days IV antibiotics. Rec:  • Continue to follow closely off antibiotics     Pancreatic abscess.    Due to superinfection of necrosis as below.  Status post drain upsizing 9/4 which yielded infected hemorrhagic material.  Cultures with Citrobacter, E. Coli, Klebsiella, Aeromonas, Bacteroides.  Status post additional drains x2 9/6.  Cultures with same organisms. Status post 14 days IV antibiotics. Status post EGD 9/15 showing collection not mature enough for cystgastrostomy.   Rec:  • Continue to follow closely off antibiotics  • Follow drain output closely  • If fevers, WBC recur off antibiotics, may need to consider repeat imaging, attempt at further drainage, possible more prolonged treatment course     Necrotizing pancreatitis.   Presumed alcoholic.  Status post IR drain 8/5 yielding dark bloody fluid.  Cultures initially negative.  Current CT shows replacing most of pancreatic parenchyma, extending to pararenal spaces, causing left colonic obstruction, gastric outlet obstruction.  Status post drain upsizing 9/4, additional drains x2 9/6 yielding superinfected fluid as above.  Collection initially not mature enough for cyst gastrostomy per GI.  Status post bedside tube interrogation, necrosectomy . Status post EGD 9/15 showing collection not mature enough for cystgastrostomy.       JERZY. Likely multifactorial.  Improved.     Large bowel obstruction. Due to compression from pancreatitis as above. Improved.     Gastric outlet obstruction. Due to compression from pancreatitis as above. Improved.     Bilateral pleural effusions. Likely reactive to pancreatitis as above.  Status post left thoracentesis yielding 1200 cc clear yellow fluid.  Fluid cultures negative.     Ascites. Likely reactive to pancreatitis as above.  Status post paracentesis.  Fluid cultures negative.     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       The patient is stable from an ID standpoint for D/C home today.     Antibiotics:  Off antibiotics #2    Subjective:  Patient seen on AM rounds. Sleeping and not awakened. Offered no complaints to surgical team on earlier AM rounds. 24 Hour Events:  No documented fevers, chills, sweats, nausea, vomiting, or diarrhea. Objective:  Vitals:  Temp:  [98.2 °F (36.8 °C)-99 °F (37.2 °C)] 98.3 °F (36.8 °C)  HR:  [103-113] 111  Resp:  [16-18] 16  BP: (119-135)/(74-93) 125/74  SpO2:  [94 %-98 %] 96 %  Temp (24hrs), Av.6 °F (37 °C), Min:98.2 °F (36.8 °C), Max:99 °F (37.2 °C)  Current: Temperature: 98.3 °F (36.8 °C)    Physical Exam:   General:  No acute distress  Psychiatric:  Sleeping  Pulmonary:  Normal respiratory excursion without accessory muscle use  Abdomen:  Obese  Extremities:  Large LE edema  Skin:  No rashes    Lab Results:  I have personally reviewed pertinent labs.   Results from last 7 days   Lab Units 23  0500 23  0431   POTASSIUM mmol/L 3.7 3.9 3.6   CHLORIDE mmol/L 104 104 104   CO2 mmol/L  23   BUN mg/dL 18 19 20   CREATININE mg/dL 1.12 1.01 1.15   EGFR ml/min/1.73sq m 75 85 72   CALCIUM mg/dL 7.7* 7.8* 7.8*     Results from last 7 days   Lab Units 230 09/18/23  0500 09/17/23  0431   WBC Thousand/uL 9.73 10.96* 12.63*   HEMOGLOBIN g/dL 7.9* 8.0* 7.9*   PLATELETS Thousands/uL 321 305 336           Imaging Studies:   I have personally reviewed pertinent imaging study reports and images in PACS. EKG, Pathology, and Other Studies:   I have personally reviewed pertinent reports.

## 2023-09-19 NOTE — WOUND OSTOMY CARE
Progress Note - Wound   Birtha [place] 46 y.o. male MRN: [id number]  Unit/Bed#: [facility] 901-01 Encounter: [id number]        Assessment:   Patient seen today for wound care follow up assessment. Patient is mod/max assist to stand for assessment. Patient is continent of bowel and bladder. Patient is independent with meals. B/L heels intact, B/L feet with weeping edema     1. MASD abdomen- wound is much improved with full thickness skin loss with yellow slough tissue with fragile periwound, small serous drainage. 2. POA unstageable right buttocks- wound is oval, full thickness with mostly yellow slough tissue and small pale pink tissue, small serosanguineous drainage. No induration, fluctuance, odor, warmth/temperature differences, redness, or purulence noted to the above noted wounds and skin areas assessed. New dressings applied per orders listed below. Patient tolerated well- no s/s of non-verbal pain or discomfort observed during the encounter. Bedside nurse aware of plan of care. See flow sheets for more detailed assessment findings. Wound care will continue to follow. Skin care plans:  1-Right buttock- Cleanse sacro-buttocks with soap and water, pat dry. Apply triad paste over open wound bed and cover with Allevyn foam dressing. Change every other day and as needed. 2-Cleanse abdominal pannus with NSS, pat dry, and apply maxorb rope to the wound beds. Change daily and PRN for soilage/dislodgement. Wound 09/04/23 Pressure Injury Buttocks (Active)   Wound Image   09/19/23 0856   Wound Description Pink;Yellow 09/19/23 0856   Pressure Injury Stage U 09/19/23 0856   Zaina-wound Assessment Clean;Dry; Intact 09/19/23 0856   Wound Length (cm) 2.5 cm 09/19/23 0856   Wound Width (cm) 2 cm 09/19/23 0856   Wound Depth (cm) 0.2 cm 09/19/23 0856   Wound Surface Area (cm^2) 5 cm^2 09/19/23 0856   Wound Volume (cm^3) 1 cm^3 09/19/23 0856   Calculated Wound Volume (cm^3) 1 cm^3 09/19/23 0856   Change in Wound Size % 79.17 09/19/23 0856   Tunneling 0 cm 09/19/23 0856   Tunneling in depth located at 0 09/19/23 0856   Undermining 0 09/19/23 0856   Undermining is depth extending from 0 09/19/23 0856   Wound Site Closure SONIDO 09/19/23 0856   Drainage Amount Scant 09/19/23 0856   Drainage Description Serosanguineous 09/19/23 0856   Non-staged Wound Description Partial thickness 09/19/23 0856   Treatments Cleansed 09/19/23 0856   Dressing Foam, Silicon (eg. Allevyn, etc); Moisture barrier 09/19/23 0856   Wound packed? No 09/19/23 0856   Packing- # removed 0 09/19/23 0856   Packing- # inserted 0 09/19/23 0856   Dressing Changed New 09/19/23 0856   Patient Tolerance Tolerated well 09/19/23 0856   Dressing Status Clean;Dry; Intact 09/19/23 0856       Wound 09/05/23 MASD Abdomen Anterior; Left (Active)   Wound Image   09/19/23 0855   Wound Description Slough; Yellow 09/19/23 0855   Zaina-wound Assessment Excoriated;Fragile 09/19/23 0855   Wound Length (cm) 1 cm 09/19/23 0855   Wound Width (cm) 3 cm 09/19/23 0855   Wound Depth (cm) 0.2 cm 09/19/23 0855   Wound Surface Area (cm^2) 3 cm^2 09/19/23 0855   Wound Volume (cm^3) 0.6 cm^3 09/19/23 0855   Calculated Wound Volume (cm^3) 0.6 cm^3 09/19/23 0855   Change in Wound Size % 85 09/19/23 0855   Tunneling 0 cm 09/19/23 0855   Tunneling in depth located at 0 09/19/23 0855   Undermining 0 09/19/23 0855   Undermining is depth extending from 0 09/19/23 0855   Wound Site Closure SONIDO 09/19/23 0855   Drainage Amount Scant 09/19/23 0855   Drainage Description Serous 09/19/23 0855   Non-staged Wound Description Full thickness 09/19/23 0855   Treatments Cleansed 09/19/23 0855   Dressing Calcium Alginate 09/19/23 0855   Wound packed? No 09/19/23 0855   Packing- # removed 0 09/19/23 0855   Packing- # inserted 0 09/19/23 0855   Dressing Changed New 09/19/23 0855   Patient Tolerance Tolerated well 09/19/23 0855   Dressing Status Clean;Dry; Intact 09/19/23 0855             Lanette FLORESN, RN, David Rasheed

## 2023-09-19 NOTE — CASE MANAGEMENT
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/pharmacy #[id number] [address]  Phone: [phone] Fax: [facility], [address] [address]  Phone: [phone] Fax: [phone]    Primary Care Provider: Dion Henriquez MD    Primary Insurance: Lorraine Earnest  Secondary Insurance:     DISCHARGE DETAILS:       Additional Comments: CM received a TT from [facility] Resident Dr Jatinder Huff that the patient is ready for DC. A message was sent to [facility] via [employer] regarding DC and when [place] can happen. Awaiting response. CM to be available     [address] came to the unit and was informed patient is ready for DC.   Nayely informed CM that [place] is within 24-48 hours. Patient and wife Lisa Cantrell at bedside were made aware. Lisa Cantrell expressed concerns regarding managing patient's needs at [place]. Lisa Cantrell left a VM for Kaushik Sparrow to discuss her concerns. A message was sent to [facility] via [employer] asking for [place] within 24 hours and if a HHA can be added for more support for the patient. P 9 RN Alexandru Graff was amde aware wife has questions regarding the drain care at home.

## 2023-09-19 NOTE — PLAN OF CARE
Problem: PAIN - ADULT  Goal: Verbalizes/displays adequate comfort level or baseline comfort level  Description: Interventions:  - Encourage patient to monitor pain and request assistance  - Assess pain using appropriate pain scale  - Administer analgesics based on type and severity of pain and evaluate response  - Implement non-pharmacological measures as appropriate and evaluate response  - Consider cultural and social influences on pain and pain management  - Notify physician/advanced practitioner if interventions unsuccessful or patient reports new pain  Outcome: Progressing     Problem: SAFETY ADULT  Goal: Patient will remain free of falls  Description: INTERVENTIONS:  - Educate patient/family on patient safety including physical limitations  - Instruct patient to call for assistance with activity   - Consult OT/PT to assist with strengthening/mobility   - Keep Call bell within reach  - Keep bed low and locked with side rails adjusted as appropriate  - Keep care items and personal belongings within reach  - Initiate and maintain comfort rounds  - Make Fall Risk Sign visible to staff  - Offer Toileting every 2 Hours, in advance of need  - Initiate/Maintain alarm  - Obtain necessary fall risk management equipment:   - Apply yellow socks and bracelet for high fall risk patients  - Consider moving patient to room near nurses station  Outcome: Progressing  Goal: Maintain or return to baseline ADL function  Description: INTERVENTIONS:  -  Assess patient's ability to carry out ADLs; assess patient's baseline for ADL function and identify physical deficits which impact ability to perform ADLs (bathing, care of mouth/teeth, toileting, grooming, dressing, etc.)  - Assess/evaluate cause of self-care deficits   - Assess range of motion  - Assess patient's mobility; develop plan if impaired  - Assess patient's need for assistive devices and provide as appropriate  - Encourage maximum independence but intervene and supervise when necessary  - Involve family in performance of ADLs  - Assess for home care needs following discharge   - Consider OT consult to assist with ADL evaluation and planning for discharge  - Provide patient education as appropriate  Outcome: Progressing  Goal: Maintains/Returns to pre admission functional level  Description: INTERVENTIONS:  - Perform BMAT or MOVE assessment daily.   - Set and communicate daily mobility goal to care team and patient/family/caregiver.    - Collaborate with rehabilitation services on mobility goals if consulted  Outcome: Progressing

## 2023-09-19 NOTE — TELEPHONE ENCOUNTER
Spoke w pts spouse Rex Da Silva aware of  advisements  - read her  note in pts chart. Marlen Singh expressed verbal understanding. Marlen Singh notes pt is being discharged today  9/19/2023. Follow up appt scheduled  - TCM scheduled for 9/25/2023, virtual TCM - per pts request. Pt and pts spouse will call with any additional questions / concerns. [facility] notified.

## 2023-09-19 NOTE — PLAN OF CARE
Problem: PHYSICAL THERAPY ADULT  Goal: Performs mobility at highest level of function for planned discharge setting. See evaluation for individualized goals. Description: Treatment/Interventions: ADL retraining, Functional transfer training, LE strengthening/ROM, Elevations, Therapeutic exercise, Endurance training, Patient/family training, Equipment eval/education, Bed mobility, Gait training, Spoke to nursing, Spoke to case management, OT, Family  Equipment Recommended: Cris Blanton (has for d/c)       See flowsheet documentation for full assessment, interventions and recommendations. Outcome: Progressing  Note: Prognosis: Good  Problem List: Decreased strength, Decreased range of motion, Decreased endurance, Impaired balance, Decreased mobility, Obesity, Decreased skin integrity, Pain  Assessment: Pt seen for PT treatment session this date. Therapy session focused on gait training and endurance training in order to improve overall mobility and independence. Pt requires CGA for ambulation with RW for distances of 50'. Ambulation limited 2* LE edema and CERON with SPO2> 90% t/o ambulation trial. Seated rest breaks taken in between ambulation trials. Pt making good progress toward goals. Pt declined stair trial with PT and reports no concerns for DC home. Pt was left sitting in chair at the end of PT session with all needs in reach. Pt would benefit from continued PT services while in hospital to address remaining limitations. PT to continue treating pt and recommends home with HHPT. The patient's AM-PAC Basic Mobility Inpatient Short Form Raw Score is 18. A Raw score of greater than 16 suggests the patient may benefit from discharge to home. Please also refer to the recommendation of the Physical Therapist for safe discharge planning. PT Discharge Recommendation: Home with home health rehabilitation    See flowsheet documentation for full assessment.

## 2023-09-19 NOTE — PHYSICAL THERAPY NOTE
PHYSICAL THERAPY NOTE      Patient Name: Matt Moss  SPFIT'Y Date: 9/19/2023 09/19/23 1141   PT Last Visit   PT Visit Date 09/19/23   Note Type   Note Type Treatment   Pain Assessment   Pain Assessment Tool 0-10   Pain Score No Pain   Restrictions/Precautions   Weight Bearing Precautions Per Order No   Other Precautions Fall Risk   General   Chart Reviewed Yes   Response to Previous Treatment Patient with no complaints from previous session. Family/Caregiver Present Yes  (Wife)   Cognition   Overall Cognitive Status WFL   Arousal/Participation Responsive; Cooperative   Attention Within functional limits   Orientation Level Oriented X4   Memory Within functional limits   Following Commands Follows all commands and directions without difficulty   Comments Pt pleasant and cooperative t/o PT session   Bed Mobility   Supine to Sit Unable to assess   Sit to Supine Unable to assess   Additional Comments Pt sitting OOb in chair upon arrival. Pt returned to chair with call bell and all needs within reach following PT session   Transfers   Sit to Stand 5  Supervision   Additional items Increased time required   Stand to Sit 5  Supervision   Additional items Increased time required   Additional Comments Transfers w/ RW   Ambulation/Elevation   Gait pattern Forward Flexion;Decreased foot clearance; Excessively slow; Short stride  (Heavy reliance on UEs)   Gait Assistance 4  Minimal assist  (CGA)   Additional items Verbal cues   Assistive Device Rolling walker   Distance 45' + 50'  (seated rest break in between ambulation trials)   Balance   Static Sitting Fair +   Dynamic Sitting Fair   Static Standing Fair -   Dynamic Standing Fair -   Ambulatory Poor +   Endurance Deficit   Endurance Deficit Yes   Endurance Deficit Description BL LE edema, fatigue, deconditioning, CERON   Activity Tolerance   Activity Tolerance Patient tolerated treatment well   Nurse Made Aware RN cleared pt for PT session   Assessment   Prognosis Good   Problem List Decreased strength;Decreased range of motion;Decreased endurance; Impaired balance;Decreased mobility;Obesity; Decreased skin integrity;Pain   Assessment Pt seen for PT treatment session this date. Therapy session focused on gait training and endurance training in order to improve overall mobility and independence. Pt requires CGA for ambulation with RW for distances of 50'. Ambulation limited 2* LE edema and CERON with SPO2> 90% t/o ambulation trial. Seated rest breaks taken in between ambulation trials. Pt making good progress toward goals. Pt declined stair trial with PT and reports no concerns for DC home. Pt was left sitting in chair at the end of PT session with all needs in reach. Pt would benefit from continued PT services while in hospital to address remaining limitations. PT to continue treating pt and recommends home with HHPT. The patient's AM-PAC Basic Mobility Inpatient Short Form Raw Score is 18. A Raw score of greater than 16 suggests the patient may benefit from discharge to home. Please also refer to the recommendation of the Physical Therapist for safe discharge planning. Goals   Patient Goals to go home   STG Expiration Date 09/21/23   Plan   Treatment/Interventions Functional transfer training;LE strengthening/ROM; Elevations; Therapeutic exercise; Endurance training;Bed mobility;Gait training;Spoke to nursing;Spoke to case management;OT   Progress Progressing toward goals   PT Frequency 3-5x/wk   Recommendation   PT Discharge Recommendation Home with home health rehabilitation   [address] [place] [address] Mobility Inpatient   Turning in Flat Bed Without Bedrails 3   Lying on Back to Sitting on Edge of Flat Bed Without Bedrails 3   Moving Bed to Chair 3   Standing Up From Chair Using Arms 3   Walk in Room 3   Climb 3-5 Stairs With Railing 3   Basic Mobility Inpatient Raw Score 18   Basic Mobility Standardized Score 41.05   Highest Level Of Mobility   -HLM Goal 6: Walk 10 steps or more   JH-HLM Achieved 7: Walk 25 feet or more   Education   Education Provided Mobility training;Assistive device   Patient Demonstrates acceptance/verbal understanding   End of Consult   Patient Position at End of Consult Bedside chair; All needs within reach     Gifty Neville PT, DPT

## 2023-09-19 NOTE — PROGRESS NOTES
-- Patient:  -- MRN: [id number]  -- Aidin Request ID: [id number]  -- Level of care reserved: [address]  -- Partner Reserved: [facility] [address] [phone]  -- Clinical needs requested:  -- Geography searched: [phone]  -- Start of Service:  -- Request sent: 7:55am EDT on 9/13/2023 by Giancarlo Álvarez  -- Partner reserved: 9:52am EDT on 9/13/2023 by Giancarlo Álvarez  -- Choice list shared:

## 2023-09-19 NOTE — PROGRESS NOTES
Progress Note - General Surgery   Quyen Harris 46 y.o. 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 Pt reports no issue. Voiding while on bedpan and this may be contributing to low charted urine outputs. Pt reports no abd discomfort. Tolerating diet well without issue. Objective: Afebrile, tachy to 100s, on RA    Blood pressure 125/74, pulse (!) 111, temperature 98.3 °F (36.8 °C), resp. rate 16, height 5' 11" (1.803 m), weight 125 kg (275 lb 2.2 oz), SpO2 96 %. ,Body mass index is 38.37 kg/m². UOP: 175cc and x1 unmeasured  BM: x1      Total 265cc   LUQ 28Fr 300cc tan necrotic  LUQ 12 Fr: 30 cc tan necrotic  RUQ 16 Fr: 103 cc tan      Intake/Output Summary (Last 24 hours) at 9/19/2023 0851  Last data filed at 9/19/2023 0505  Gross per 24 hour   Intake 245.67 ml   Output 608 ml   Net -362.33 ml       Invasive Devices     Peripherally Inserted Central Catheter Line  Duration           PICC Line 87/89/24 Left Basilic 10 days          Peripheral Intravenous Line  Duration           Peripheral IV 08/08/23 Dorsal (posterior); Left Wrist 42 days          Drain  Duration           Abscess Drain RUQ 14 days    Abscess Drain LUQ 12 days    Abscess Drain LUQ 12 days              Physical Exam:  General: No acute distress, alert and oriented  CV: RRR  Lungs: Normal work of breathing   Abdomen: soft/nd/nt.  IR drains in place, w/ tan output in bulb/bag  Skin: Warm, dry    Lab, Imaging and other studies:I have personally reviewed pertinent lab results.     VTE Pharmacologic Prophylaxis: Eliquis  VTE Mechanical Prophylaxis: sequential compression device    Recent Labs     09/17/23  0431 09/18/23  0500 09/19/23  0453   WBC 12.63* 10.96* 9.73   HGB 7.9* 8.0* 7.9*    305 321   SODIUM 137 136  --    K 3.6 3.9  --     104  --    CO2 23 23  --    BUN 20 19  --    CREATININE 1.15 1.01  --    GLUC 120 116  --    CALCIUM 7.8* 7.8*  --    MG  --  1.6*  --    PHOS  --  3.6  --

## 2023-09-19 NOTE — DISCHARGE SUMMARY
Discharge Summary - General Surgery   Charly Ruff 46 y.o. male MRN: [id number]  Unit/Bed#: [facility] 901-01 Encounter: [id number]    Admission Date: 9/4/2023     Discharge Date: 9/19/2023     Admitting Diagnosis: Abdominal pain [R10.9]  Necrotizing pancreatitis [K85.91]  Wound drainage [L24. A9]    Discharge Diagnosis:     1. Necrotizing pancreatitis with intra-abdominal fluid collections. 2.  SMV thrombosis. 3.  Septic shock, present on presentation, now resolved. 4.  Polymicrobial bacteremia, now resolved. 5.  Acute kidney injury, now resolved. 6.  Gastric outlet obstruction and large bowel obstruction secondary to severe pancreatitis, now resolved. 7.  Bilateral pleural effusions. 8.  Ascites. Attending and Service: Dr. Leyla Cortez, Acute Care Surgical Services. Consulting Physician(s):     1. Interventional radiology. 2.  Gastroenterology. 3.  Infectious disease. 4.  Inpatient wound care service. Imaging and Procedures Performed:   Orders Placed This Encounter   Procedures   • Central Line   • Critical Care   • POC Cardiac US       IR INPATIENT Paracentesis    Result Date: 9/8/2023  Impression: Therapeutic and diagnostic paracentesis. Signed, performed, and dictated by GHASSAN Cisneros under the supervision of Dr. Delmis Lucero. Workstation performed: [id number]     CT abdomen pelvis wo contrast    Result Date: 9/8/2023  Impression: Findings compatible with infected pancreatic necrosis with multiple drains in place. Appearance overall similar. Interval placement of a left paracolic drain with slight decrease in the size of a fluid collection. Gaseous distention of the transverse colon and cecum which overall has improved since the prior study. The cecum measures approximately 9 cm. No cecal pneumatosis. Moderate volume ascites is unchanged. Persistent nephrograms bilaterally compatible with acute kidney injury.  Workstation performed: [id number]     XR chest portable    Result Date: 9/7/2023  Impression: Unchanged right medial lung base opacity. Small left and trace right pleural effusions. No pneumothorax. Resident: Keron Carrasco, the attending radiologist, have reviewed the images and agree with the final report above. Workstation performed: [id number]     XR abdomen 1 view kub    Result Date: 9/7/2023  Impression: Slightly worse distention of the transverse colon. However, small amount of air may now be present in the distal descending colon adjacent to the left iliac crest. Unchanged gaseous distention of the ascending colon. Resident: Keron Carrasco, the attending radiologist, have reviewed the images and agree with the final report above. Workstation performed: [id number]     IR drainage tube placement    Result Date: 9/7/2023  Impression: Impression: CT-guided placement of a large-bore 29 Peruvian drain into a retroperitoneal necrotic pancreatic collection CT-guided placement of a 12 Peruvian drain into a more inferior loculated collection Note: The large 28 Peruvian drain was placed at an oblique angle to allow for  possible future necrosectomy/VARDS. Drainage of this very thick necrotic material will be best with continuous large diameter tubing, such as a chest tube box. Not all sideholes are in the collection due to tube length, do not flush with large volumes as this will spill material into the flank. Would hold any anticoagulation at least 12-24 hours due to hemorrhagic nature of the material and proximity of 1 needle pass to the spleen Workstation performed: [id number]     XR abdomen 1 view kub    Result Date: 9/5/2023  Impression: Devices as detailed below: -Nonweighted enteric tube sidehole overlies distal stomach and tip in proximal second portion of duodenal region. -Surgical drain overlies the epigastric region. -Probable Saunders catheter overlies pelvic region. -Left femoral approach central venous catheter tip overlies left iliac vessel region.  Similar persistent gaseous distention of visualized ascending and transverse colon. Bibasilar atelectasis (left worse than right). Workstation performed: [id number]     XR chest 1 view portable    Result Date: 9/5/2023  Impression: Small pleural effusions and bibasilar atelectasis. Workstation performed: [id number]     IR drainage tube check/change/reposition/reinsertion/upsize    Result Date: 9/5/2023  Impression: Impression: Upsize and reposition of a pancreatic necrotic collection drain to a 16 Mohawk pigtail catheter Patient also has ascites leaking around the catheter, he may be a candidate for paracentesis or a peritoneal drain to simply divert and reduce the ascites Workstation performed: [id number]     IR thoracentesis    Result Date: 9/5/2023  Impression: Impression: Ultrasound-guided left thoracentesis Workstation performed: [id number]     XR chest portable ICU    Result Date: 9/5/2023  Impression: Persistent medial right lung base opacity suspicious for pneumonia No pneumothorax Workstation performed: [id number]     CT abdomen pelvis w contrast    Result Date: 9/4/2023  Impression: 1. Significant worsening of necrotizing pancreatitis with large acute necrotic collection largely replacing the pancreatic parenchyma and extending into the anterior pararenal spaces (left greater than right) with the collection now containing a large amount of gas concerning for infected necrosis. Additionally, there is an inflammatory phlegmonous mass along the left lateral pararenal space/paracolic gutter with involvement of the proximal to mid descending colon causing associated colonic obstruction. This degree of bowel distention could pose a risk for bowel perforation. Surgical consultation is advised. 2. Progression of inflammatory changes extending into the laurent hepatis with mass effect and narrowing of the portal vein and associated proximal vessels about the portal confluence as detailed above but no evidence for overt venous occlusion.  3. Mild fluid-filled gastric distention. Collapse and/or thickening of the gastric antrum and descending duodenum, the latter is likely 180 degrees enveloped by the inflammatory collection. Secondary antritis/duodenitis with element of gastric outlet obstruction is not excluded. 4. Moderate to large amount of abdominopelvic free fluid, increasing from prior study. 5. Enlarging, moderate bilateral pleural effusions with mild bibasilar compressive atelectasis. I personally discussed this study with [address] [place] [address] on 9/4/2023 at 5:47 PM. Workstation performed: [id number]     Hospital Course: Domi Williamson is a 59-year-old male who arrived to the emergency department with concerns for draining and redness around a current drain in his left upper abdomen. He noted the symptoms began the day prior to presentation with increasing fatigue, abdominal pain and distention as well as the redness and drainage from around the drain. He also complained of a bedsore on his right upper buttock. During his initial evaluation by the surgery service, he was hypertensive and tachycardic; he was ill-appearing and tachycardic with shortness of breath while laying flat requiring supplemental oxygen via nasal cannula; his abdomen was distended and tender around his left upper abdominal drain site with serous fluid draining from around the drain and some surrounding erythema as well; remainder of his exam is unremarkable. His initial work-up included labs and the above and imaging studies. He was admitted to the acute care surgery service with ongoing/recurrent necrotizing pancreatitis, now infected with septic shock on presentation as well as possible gastric outlet and colonic obstruction secondary to pancreatic compression from inflammatory response. He was admitted to the intensive care unit with surgical critical care consultation.   IR was consulted to assist with management of his drain and possible additional drain insertion with interventions undertaken throughout his hospital encounter. He was also found to have an SMV thrombus and was placed on anticoagulation throughout his hospital encounter. Additional consults during his hospital encounter included to gastroenterology, infectious disease, and the inpatient wound care service to assist with his work-up and management. He did complete a course of antibiotic therapy with resolution of his septic shock and bacteremia during his hospital stay. GI did perform endoscopic intervention attempt for internal drainage of his peripancreatic fluid collections, but was unable to safely do so due to lack of proximity to his gastric wall. The patient clinically was improving and did not require further intervention as he did have adequate drainage at this time from his IR interventions and drains. The therapy and case management services assisted with his treatment and disposition planning. The patient was deemed stable for discharge on 9/19/2023. For further details of his hospital encounter, please see his complete medical records. On discharge, the patient is instructed to follow-up with the patient's primary care provider within the next 2 weeks to review the events of the recent hospitalization. The patient is instructed to follow-up with the Acute Care Surgical Services as scheduled on 9/28/2023 at 10:15 AM.  Patient instructed to follow-up with gastroenterology on 9/29/2023 at 11:40 AM.  Patient to follow-up with IR as instructed for drain check. The patient is instructed to follow the provided discharge instructions. Condition at Discharge: stable     Discharge instructions/Information to patient and family:   See after visit summary for information provided to patient and family. Provisions for Follow-Up Care:  See after visit summary for information related to follow-up care and any pertinent home health orders.       Disposition: See After Visit Summary for discharge disposition information. Planned Readmission: No    Discharge Statement   I spent 30 minutes discharging the patient. This time was spent on the day of discharge. I had direct contact with the patient on the day of discharge. Additional documentation is required if more than 30 minutes were spent on discharge. Discharge Medications:  See after visit summary for reconciled discharge medications provided to patient and family.     Gamal Wilson PA-C  9/19/2023  10:22 AM

## 2023-09-19 NOTE — DISCHARGE INSTR - AVS FIRST PAGE
Acute Care Surgery Discharge Instructions    Please follow-up as instructed. Activity:  - You may resume activity as tolerated. - Continue PT and OT evaluation and treatment as indicated. Diet:    - You may resume your normal diet. Medications:  - You should continue your current medication regimenafter discharge unless otherwise instructed. Please refer to your discharge medication list for further details. - Please take the pain medications as directed. - You may become constipated, especially if taking pain medications. You may take any over the counter stool softeners or laxatives as needed. Examples: Milk of Magnesia, Colace, Senna. Wound Care:  - May cover right foot wounds/blisters with Maxorb and dry cover dressing followed by ACE wrap daily. Additional Instructions:  - May shower daily and/or bathe normally. - If you have any questions or concerns after discharge please call the office.  - Call office or return to ER if fever greater than 101, chills, persistent nausea/vomiting, and/or worsening/uncontrollable pain. Drainage Catheter Care:  1. The properly functioning catheter should be forward flushed once (1x) daily with 10mL of normal saline using clean technique. 2.  The draining bag may be emptied as necessary. This should be done with clean technique as well. 3.  A fresh dressing should be applied over the tube insertion site. 4.  As the tube is secured to the skin with only as suture, tub baths are not permitted. Shower are permitted if the patient's skin entry site is prevented from getting wet. Similarly, washcloth "baths" are acceptable. Notify the Interventional Radiologist if you have any of the followin. Leakage or large amounts of liquid around the catheter. 2.  Fever of 101 degrees lasting several hours without other obvious cause (such as sore throat, flu, etc.)  3. Diminished drainage, which may be associated with pressure or pain.   4.  Catheter pulled back or falls out. 5.  Call Interventional Radiology at [facility] in [place], [place] to schedule an appointment if drain outputs are less than 10 mL for 48 hours. If you have any questions, call The Department of Interventional Radiology at [facility] at [phone].

## 2023-09-19 NOTE — PROGRESS NOTES
-- Patient:  -- MRN: [id number]  -- Aidin Request ID: [id number]  -- Level of care reserved: [address]  -- Partner Reserved: [facility]0 [place] [id number] [facility] [phone]  -- Clinical needs requested:  -- Geography searched: [phone]  -- Start of Service:  -- Request sent: 7:55am EDT on 9/13/2023 by Alo Cook  -- Partner reserved: 9:52am EDT on 9/13/2023 by Alo Cook  -- Choice list shared:

## 2023-09-20 NOTE — UTILIZATION REVIEW
NOTIFICATION OF ADMISSION DISCHARGE   This is a Notification of Discharge from [address] [place] [address]. Please be advised that this patient has been discharge from our facility. Below you will find the admission and discharge date and time including the patient’s disposition. UTILIZATION REVIEW CONTACT:  Rut Haley  Utilization   Network Utilization Review Department  Phone: [phone] x carefully listen to the prompts. All voicemails are confidential.  Email: [email or web address]     ADMISSION INFORMATION  PRESENTATION DATE: 9/4/2023  2:13 PM  OBERVATION ADMISSION DATE:   INPATIENT ADMISSION DATE: 9/4/23  4:24 PM   DISCHARGE DATE: 9/19/2023  2:46 PM   DISPOSITION:Home with [address] INFORMATION:  Send all requests for admission clinical reviews, approved or denied determinations and any other requests to dedicated fax number below belonging to the campus where the patient is receiving treatment.  List of dedicated fax numbers:  [place] DENIALS (Administrative/Medical Necessity) [phone] [address] [facility] (Maternity/NICU/Pediatrics) [phone]   [facility] [phone]   [facility] [phone] 16[address] [facility] [phone]   [address] [place] [address] [phone]   [facility] [phone]   [address] [place], [place] [address]e [address] [facility] [phone]   [address] [place] [address] [phone]   [address] [facility] [phone] 2720 [facility] [address] [facility] [phone]

## 2023-09-22 ENCOUNTER — TELEPHONE (OUTPATIENT)
Dept: GASTROENTEROLOGY | Facility: MEDICAL CENTER | Age: 53
End: 2023-09-22

## 2023-09-22 NOTE — TELEPHONE ENCOUNTER
I received Disability forms from pt's work spoke to spouse John Scott that GI dept is not able to filled out those forms this would have to go to his PCP

## 2023-09-24 ENCOUNTER — TELEPHONE (OUTPATIENT)
Dept: OTHER | Facility: OTHER | Age: 53
End: 2023-09-24

## 2023-09-24 NOTE — TELEPHONE ENCOUNTER
Jessica Alexander RN calling in to report that patient was recently discharged home after IR drain placement. Large bore catheter dislodged and dangling when nurse came to assess patient. Sundandrez Webster took out catheter and saw redness around the site. She advised patient to go to ER, but he declined and would like to f/u with office on Monday. Notified on call provider.

## 2023-09-25 ENCOUNTER — TELEPHONE (OUTPATIENT)
Dept: SURGERY | Facility: CLINIC | Age: 53
End: 2023-09-25

## 2023-09-25 NOTE — TELEPHONE ENCOUNTER
Pt s/p necrotizing pancreatitis - see notes. [facility] called to say that she is concerned - pt's tube fell out pt [facility] go [facility] ER per . [facility]    I called pt and she stated that they were waiting to be called back from IR and did not go to ER. Called IR - miscommunication - advised pt that IR also said for pt to go to ER. Pt agreed.       mb

## 2023-09-26 ENCOUNTER — HOSPITAL ENCOUNTER (INPATIENT)
Facility: HOSPITAL | Age: 53
LOS: 1 days | Discharge: HOME HEALTH | End: 2023-09-27
Attending: EMERGENCY MEDICINE | Admitting: SURGERY
Payer: COMMERCIAL

## 2023-09-26 ENCOUNTER — APPOINTMENT (EMERGENCY)
Dept: RADIOLOGY | Facility: HOSPITAL | Age: 53
End: 2023-09-26
Payer: COMMERCIAL

## 2023-09-26 ENCOUNTER — APPOINTMENT (EMERGENCY)
Dept: RADIOLOGY | Facility: HOSPITAL | Age: 53
End: 2023-09-26
Attending: STUDENT IN AN ORGANIZED HEALTH CARE EDUCATION/TRAINING PROGRAM
Payer: COMMERCIAL

## 2023-09-26 DIAGNOSIS — R60.9 SWELLING: ICD-10-CM

## 2023-09-26 DIAGNOSIS — K85.91 NECROTIZING PANCREATITIS: Primary | ICD-10-CM

## 2023-09-26 LAB
ANION GAP SERPL CALCULATED.3IONS-SCNC: 4 MMOL/L
ANISOCYTOSIS BLD QL SMEAR: PRESENT
BASOPHILS # BLD MANUAL: 0 THOUSAND/UL (ref 0–0.1)
BASOPHILS NFR MAR MANUAL: 0 % (ref 0–1)
BUN SERPL-MCNC: 22 MG/DL (ref 5–25)
CALCIUM SERPL-MCNC: 8 MG/DL (ref 8.4–10.2)
CHLORIDE SERPL-SCNC: 100 MMOL/L (ref 96–108)
CO2 SERPL-SCNC: 30 MMOL/L (ref 21–32)
CREAT SERPL-MCNC: 1.14 MG/DL (ref 0.6–1.3)
EOSINOPHIL # BLD MANUAL: 0.11 THOUSAND/UL (ref 0–0.4)
EOSINOPHIL NFR BLD MANUAL: 1 % (ref 0–6)
ERYTHROCYTE [DISTWIDTH] IN BLOOD BY AUTOMATED COUNT: 21.3 % (ref 11.6–15.1)
ERYTHROCYTE [DISTWIDTH] IN BLOOD BY AUTOMATED COUNT: 21.3 % (ref 11.6–15.1)
GFR SERPL CREATININE-BSD FRML MDRD: 73 ML/MIN/1.73SQ M
GIANT PLATELETS BLD QL SMEAR: PRESENT
GLUCOSE SERPL-MCNC: 26 MG/DL (ref 65–140)
GLUCOSE SERPL-MCNC: 28 MG/DL (ref 65–140)
GLUCOSE SERPL-MCNC: 67 MG/DL (ref 65–140)
GLUCOSE SERPL-MCNC: 86 MG/DL (ref 65–140)
GLUCOSE SERPL-MCNC: 98 MG/DL (ref 65–140)
HCT VFR BLD AUTO: 28.5 % (ref 36.5–49.3)
HCT VFR BLD AUTO: 29.3 % (ref 36.5–49.3)
HGB BLD-MCNC: 8.5 G/DL (ref 12–17)
HGB BLD-MCNC: 8.9 G/DL (ref 12–17)
LYMPHOCYTES # BLD AUTO: 0.11 THOUSAND/UL (ref 0.6–4.47)
LYMPHOCYTES # BLD AUTO: 1 % (ref 14–44)
MCH RBC QN AUTO: 27 PG (ref 26.8–34.3)
MCH RBC QN AUTO: 27.6 PG (ref 26.8–34.3)
MCHC RBC AUTO-ENTMCNC: 29.8 G/DL (ref 31.4–37.4)
MCHC RBC AUTO-ENTMCNC: 30.4 G/DL (ref 31.4–37.4)
MCV RBC AUTO: 91 FL (ref 82–98)
MCV RBC AUTO: 91 FL (ref 82–98)
MONOCYTES # BLD AUTO: 0.44 THOUSAND/UL (ref 0–1.22)
MONOCYTES NFR BLD: 4 % (ref 4–12)
NEUTROPHILS # BLD MANUAL: 10.27 THOUSAND/UL (ref 1.85–7.62)
NEUTS BAND NFR BLD MANUAL: 26 % (ref 0–8)
NEUTS SEG NFR BLD AUTO: 68 % (ref 43–75)
OVALOCYTES BLD QL SMEAR: PRESENT
PLATELET # BLD AUTO: 305 THOUSANDS/UL (ref 149–390)
PLATELET # BLD AUTO: 318 THOUSANDS/UL (ref 149–390)
PLATELET BLD QL SMEAR: ADEQUATE
PMV BLD AUTO: 8.9 FL (ref 8.9–12.7)
PMV BLD AUTO: 9.2 FL (ref 8.9–12.7)
POLYCHROMASIA BLD QL SMEAR: PRESENT
POTASSIUM SERPL-SCNC: 4.2 MMOL/L (ref 3.5–5.3)
RBC # BLD AUTO: 3.15 MILLION/UL (ref 3.88–5.62)
RBC # BLD AUTO: 3.23 MILLION/UL (ref 3.88–5.62)
RBC MORPH BLD: PRESENT
SODIUM SERPL-SCNC: 134 MMOL/L (ref 135–147)
WBC # BLD AUTO: 10.93 THOUSAND/UL (ref 4.31–10.16)
WBC # BLD AUTO: 14.42 THOUSAND/UL (ref 4.31–10.16)

## 2023-09-26 PROCEDURE — 49423 EXCHANGE DRAINAGE CATHETER: CPT

## 2023-09-26 PROCEDURE — 99152 MOD SED SAME PHYS/QHP 5/>YRS: CPT | Performed by: STUDENT IN AN ORGANIZED HEALTH CARE EDUCATION/TRAINING PROGRAM

## 2023-09-26 PROCEDURE — 99285 EMERGENCY DEPT VISIT HI MDM: CPT

## 2023-09-26 PROCEDURE — C1887 CATHETER, GUIDING: HCPCS

## 2023-09-26 PROCEDURE — 99024 POSTOP FOLLOW-UP VISIT: CPT | Performed by: STUDENT IN AN ORGANIZED HEALTH CARE EDUCATION/TRAINING PROGRAM

## 2023-09-26 PROCEDURE — 36415 COLL VENOUS BLD VENIPUNCTURE: CPT

## 2023-09-26 PROCEDURE — 99285 EMERGENCY DEPT VISIT HI MDM: CPT | Performed by: EMERGENCY MEDICINE

## 2023-09-26 PROCEDURE — 99152 MOD SED SAME PHYS/QHP 5/>YRS: CPT

## 2023-09-26 PROCEDURE — 74177 CT ABD & PELVIS W/CONTRAST: CPT

## 2023-09-26 PROCEDURE — 85007 BL SMEAR W/DIFF WBC COUNT: CPT

## 2023-09-26 PROCEDURE — 0W2GX0Z CHANGE DRAINAGE DEVICE IN PERITONEAL CAVITY, EXTERNAL APPROACH: ICD-10-PCS | Performed by: STUDENT IN AN ORGANIZED HEALTH CARE EDUCATION/TRAINING PROGRAM

## 2023-09-26 PROCEDURE — 93005 ELECTROCARDIOGRAM TRACING: CPT

## 2023-09-26 PROCEDURE — 99222 1ST HOSP IP/OBS MODERATE 55: CPT | Performed by: SURGERY

## 2023-09-26 PROCEDURE — 49423 EXCHANGE DRAINAGE CATHETER: CPT | Performed by: STUDENT IN AN ORGANIZED HEALTH CARE EDUCATION/TRAINING PROGRAM

## 2023-09-26 PROCEDURE — C1729 CATH, DRAINAGE: HCPCS

## 2023-09-26 PROCEDURE — 75984 XRAY CONTROL CATHETER CHANGE: CPT | Performed by: STUDENT IN AN ORGANIZED HEALTH CARE EDUCATION/TRAINING PROGRAM

## 2023-09-26 PROCEDURE — 85027 COMPLETE CBC AUTOMATED: CPT

## 2023-09-26 PROCEDURE — 75984 XRAY CONTROL CATHETER CHANGE: CPT

## 2023-09-26 PROCEDURE — C1769 GUIDE WIRE: HCPCS

## 2023-09-26 PROCEDURE — 80048 BASIC METABOLIC PNL TOTAL CA: CPT

## 2023-09-26 PROCEDURE — 82948 REAGENT STRIP/BLOOD GLUCOSE: CPT

## 2023-09-26 RX ORDER — ACETAMINOPHEN 325 MG/1
650 TABLET ORAL EVERY 6 HOURS PRN
Status: DISCONTINUED | OUTPATIENT
Start: 2023-09-26 | End: 2023-09-27 | Stop reason: HOSPADM

## 2023-09-26 RX ORDER — FENTANYL CITRATE 50 UG/ML
INJECTION, SOLUTION INTRAMUSCULAR; INTRAVENOUS AS NEEDED
Status: COMPLETED | OUTPATIENT
Start: 2023-09-26 | End: 2023-09-26

## 2023-09-26 RX ORDER — DEXTROSE MONOHYDRATE 25 G/50ML
INJECTION, SOLUTION INTRAVENOUS
Status: COMPLETED
Start: 2023-09-26 | End: 2023-09-26

## 2023-09-26 RX ORDER — OXYCODONE HYDROCHLORIDE 10 MG/1
10 TABLET ORAL EVERY 4 HOURS PRN
Status: DISCONTINUED | OUTPATIENT
Start: 2023-09-26 | End: 2023-09-27 | Stop reason: HOSPADM

## 2023-09-26 RX ORDER — ONDANSETRON 2 MG/ML
4 INJECTION INTRAMUSCULAR; INTRAVENOUS EVERY 6 HOURS PRN
Status: DISCONTINUED | OUTPATIENT
Start: 2023-09-26 | End: 2023-09-27 | Stop reason: HOSPADM

## 2023-09-26 RX ORDER — OXYCODONE HYDROCHLORIDE 5 MG/1
5 TABLET ORAL EVERY 4 HOURS PRN
Status: DISCONTINUED | OUTPATIENT
Start: 2023-09-26 | End: 2023-09-27 | Stop reason: HOSPADM

## 2023-09-26 RX ORDER — HEPARIN SODIUM 5000 [USP'U]/ML
7500 INJECTION, SOLUTION INTRAVENOUS; SUBCUTANEOUS EVERY 8 HOURS SCHEDULED
Status: DISCONTINUED | OUTPATIENT
Start: 2023-09-26 | End: 2023-09-26

## 2023-09-26 RX ORDER — CEFAZOLIN SODIUM 1 G/50ML
SOLUTION INTRAVENOUS
Status: COMPLETED | OUTPATIENT
Start: 2023-09-26 | End: 2023-09-26

## 2023-09-26 RX ORDER — ENOXAPARIN SODIUM 100 MG/ML
40 INJECTION SUBCUTANEOUS DAILY
Status: DISCONTINUED | OUTPATIENT
Start: 2023-09-27 | End: 2023-09-26

## 2023-09-26 RX ORDER — INSULIN LISPRO 100 [IU]/ML
2-12 INJECTION, SOLUTION INTRAVENOUS; SUBCUTANEOUS
Status: DISCONTINUED | OUTPATIENT
Start: 2023-09-26 | End: 2023-09-27 | Stop reason: HOSPADM

## 2023-09-26 RX ORDER — POLYETHYLENE GLYCOL 3350 17 G/17G
17 POWDER, FOR SOLUTION ORAL DAILY
Status: DISCONTINUED | OUTPATIENT
Start: 2023-09-27 | End: 2023-09-27 | Stop reason: HOSPADM

## 2023-09-26 RX ORDER — HYDROMORPHONE HCL/PF 1 MG/ML
0.5 SYRINGE (ML) INJECTION
Status: DISCONTINUED | OUTPATIENT
Start: 2023-09-26 | End: 2023-09-27 | Stop reason: HOSPADM

## 2023-09-26 RX ORDER — MIDAZOLAM HYDROCHLORIDE 2 MG/2ML
INJECTION, SOLUTION INTRAMUSCULAR; INTRAVENOUS AS NEEDED
Status: COMPLETED | OUTPATIENT
Start: 2023-09-26 | End: 2023-09-26

## 2023-09-26 RX ADMIN — PIPERACILLIN SODIUM AND TAZOBACTAM SODIUM 4.5 G: 36; 4.5 INJECTION, POWDER, LYOPHILIZED, FOR SOLUTION INTRAVENOUS at 19:55

## 2023-09-26 RX ADMIN — CEFAZOLIN SODIUM 3000 MG: 1 SOLUTION INTRAVENOUS at 17:37

## 2023-09-26 RX ADMIN — IOHEXOL 15 ML: 300 INJECTION, SOLUTION INTRAVENOUS at 18:30

## 2023-09-26 RX ADMIN — DEXTROSE MONOHYDRATE 50 ML: 25 INJECTION, SOLUTION INTRAVENOUS at 19:30

## 2023-09-26 RX ADMIN — IOHEXOL 100 ML: 350 INJECTION, SOLUTION INTRAVENOUS at 14:20

## 2023-09-26 RX ADMIN — FENTANYL CITRATE 50 MCG: 50 INJECTION, SOLUTION INTRAMUSCULAR; INTRAVENOUS at 18:10

## 2023-09-26 RX ADMIN — MIDAZOLAM 1 MG: 1 INJECTION INTRAMUSCULAR; INTRAVENOUS at 18:10

## 2023-09-26 RX ADMIN — FENTANYL CITRATE 50 MCG: 50 INJECTION, SOLUTION INTRAMUSCULAR; INTRAVENOUS at 18:01

## 2023-09-26 RX ADMIN — MIDAZOLAM 1 MG: 1 INJECTION INTRAMUSCULAR; INTRAVENOUS at 18:01

## 2023-09-26 NOTE — SEDATION DOCUMENTATION
Drainage tube successfully placed by Dr. Sapphire Muñoz without complications. Tolerated well. Report called to ED RN. Informed patient has been admitted to [address] 8.

## 2023-09-26 NOTE — ED NOTES
Pt urinated on self but is refusing to take off wet pants; refusing to let staff assist him in changing his bedding.      Zaire Ruiz  09/26/23 7798

## 2023-09-26 NOTE — ED NOTES
RN to bedside to place PIV and collect ordered labs - pt requesting US guided PIV placement due to past experiences/difficulty getting access. ED provider notified via TT.      Brandon Francois RN  09/26/23 2536

## 2023-09-26 NOTE — ED PROVIDER NOTES
History  Chief Complaint   Patient presents with   • Medical Problem     Pt reports that one of the drains in his abdomen fell out- just here to have it replaced. Pt denies any complaints      Patient is a 59-year-old male past medical history of necrotizing pancreatitis that presents for evaluation of dislodged DONNY drain. Patient reports that he was recently hospitalized for necrotizing pancreatitis had multiple DONNY drains placed and was discharged home. Has been emptying the bags at home states that 2 nights ago he was sitting in his recliner chair when he went to close the foot rest the loop of the DONNY drain was caught on the foot rest and was yanked out. Since that time has had mucopurulent drainage draining from the site of the drain insertion with developing erythema today. Otherwise patient states that he is completely asymptomatic he has had no fever chills abdominal pain nausea vomiting or any other complaints states that he is just here to have the drain replaced. Prior to Admission Medications   Prescriptions Last Dose Informant Patient Reported? Taking? Alcohol Swabs 70 % PADS  Self No No   Sig: May substitute brand based on insurance coverage. Check glucose ACHS. BD PosiFlush 0.9 % SOLN  Self Yes No   Blood Glucose Monitoring Suppl (OneTouch Verio Reflect) w/Device KIT  Self No No   Sig: May substitute brand based on insurance coverage. Check glucose ACHS. Blood Pressure Monitoring (Adult Blood Pressure Cuff Lg) KIT  Self No No   Sig: Use 3 (three) times a day   Insulin Pen Needle (BD Pen Needle Lupe 2nd Gen) 32G X 4 MM MISC  Self No No   Sig: For use with insulin pen. Pharmacy may dispense brand covered by insurance. Insulin Pen Needle (BD Pen Needle Lupe 2nd Gen) 32G X 4 MM MISC   No No   Sig: For use with insulin pen. Pharmacy may dispense brand covered by insurance.    Levemir FlexPen 100 units/mL injection pen  Self Yes No   Si Units daily at bedtime   NovoLOG FlexPen 100 units/mL injection pen  Self Yes No   Sig: INJECT 5 UNITS UNDER SKIN 3 TIMES DAILY WITH MEALS   OneTouch Delica Lancets 36U MISC  Self No No   Sig: May substitute brand based on insurance coverage. Check glucose ACHS. acetaminophen (TYLENOL) 325 mg tablet  Self No No   Sig: Take 2 tablets (650 mg total) by mouth every 6 (six) hours as needed for mild pain, headaches or fever   apixaban (Eliquis) 5 mg   No No   Sig: Take 1 tablet (5 mg total) by mouth 2 (two) times a day   furosemide (LASIX) 20 mg tablet   No No   Sig: TAKE 1 TABLET BY MOUTH EVERY DAY   glucose blood (OneTouch Verio) test strip  Self No No   Sig: May substitute brand based on insurance coverage. Check glucose ACHS. lisinopril (ZESTRIL) 10 mg tablet  Self No No   Sig: Take 1 tablet (10 mg total) by mouth daily   pancrelipase, Lip-Prot-Amyl, (CREON) 6,000 units delayed release capsule  Self No No   Sig: Take 1 capsule (6,000 Units total) by mouth 3 (three) times a day with meals   polyethylene glycol (MIRALAX) 17 g packet   No No   Sig: Take 17 g by mouth daily for 5 days   sodium chloride, PF, 0.9 %   No No   Sig: 10 mL by Intracatheter route daily for 120 doses Intracatheter flushing daily. May substitute prefilled syringe with normal saline 10 mL vials, 10 mL syringes, and 18 g blunt needles   sodium chloride, PF, 0.9 %   No No   Sig: 10 mL by Intracatheter route daily Intracatheter flushing daily. May substitute prefilled syringe with normal saline 10 mL vials, 10 mL syringes, and 18 g blunt needles      Facility-Administered Medications: None       Past Medical History:   Diagnosis Date   • Pancreatitis        Past Surgical History:   Procedure Laterality Date   • CYSTOSCOPY N/A 8/5/2023    Procedure: CYSTOSCOPY.   Saunders insertion;  Surgeon: Ivy Hidalgo MD;  Location: [facility];  Service: Urology   • FL RETROGRADE URETHROCYSTOGRAM  8/5/2023   • HERNIA REPAIR     • IR DRAINAGE TUBE CHECK/CHANGE/REPOSITION/REINSERTION/UPSIZE  9/4/2023 • IR DRAINAGE TUBE PLACEMENT  8/5/2023   • IR DRAINAGE TUBE PLACEMENT  9/6/2023   • IR PARACENTESIS  9/6/2023   • IR THORACENTESIS  9/4/2023   • JOINT REPLACEMENT     • TONSILLECTOMY  1976       Family History   Problem Relation Age of Onset   • Cancer Mother    • Multiple myeloma Mother    • Pancreatic cancer Father    • Cancer Father      I have reviewed and agree with the history as documented. E-Cigarette/Vaping   • E-Cigarette Use Never User      E-Cigarette/Vaping Substances   • Nicotine No    • THC No    • CBD No    • Flavoring No    • Other No    • Unknown No      Social History     Tobacco Use   • Smoking status: Never     Passive exposure: Past   • Smokeless tobacco: Former     Types: Chew     Quit date: 7/15/2023   • Tobacco comments:     Quit 7/15/23   Vaping Use   • Vaping Use: Never used   Substance Use Topics   • Alcohol use: Not Currently     Comment: quit 7/15/23 (previous 6 drinks per week)   • Drug use: Never        Review of Systems   Constitutional: Negative for chills and fever. HENT: Negative for congestion. Respiratory: Negative for cough and shortness of breath. Cardiovascular: Negative for chest pain. Gastrointestinal: Negative for abdominal pain, nausea and vomiting. DONNY drain dislodged   Musculoskeletal: Negative for back pain and neck pain. Skin: Negative for rash. Neurological: Negative for weakness, numbness and headaches. All other systems reviewed and are negative.       Physical Exam  ED Triage Vitals [09/26/23 1101]   Temperature Pulse Respirations Blood Pressure SpO2   97.7 °F (36.5 °C) 101 17 111/75 99 %      Temp Source Heart Rate Source Patient Position - Orthostatic VS BP Location FiO2 (%)   Temporal Monitor Lying Right arm --      Pain Score       No Pain             Orthostatic Vital Signs  Vitals:    09/26/23 1101 09/26/23 1430   BP: 111/75 115/70   Pulse: 101 95   Patient Position - Orthostatic VS: Lying        Physical Exam  Vitals and nursing note reviewed. Constitutional:       General: He is not in acute distress. Appearance: He is well-developed. He is obese. HENT:      Head: Normocephalic and atraumatic. Mouth/Throat:      Mouth: Mucous membranes are moist.   Eyes:      Extraocular Movements: Extraocular movements intact. Conjunctiva/sclera: Conjunctivae normal.   Cardiovascular:      Rate and Rhythm: Normal rate and regular rhythm. Heart sounds: No murmur heard. Pulmonary:      Effort: Pulmonary effort is normal. No respiratory distress. Breath sounds: Normal breath sounds. Abdominal:      General: Abdomen is protuberant. Palpations: Abdomen is soft. Tenderness: There is no abdominal tenderness. Musculoskeletal:         General: Normal range of motion. Cervical back: Neck supple. Right lower le+ Pitting Edema present. Left lower le+ Pitting Edema present. Skin:     General: Skin is warm and dry. Capillary Refill: Capillary refill takes less than 2 seconds. Neurological:      General: No focal deficit present. Mental Status: He is alert and oriented to person, place, and time. Psychiatric:         Mood and Affect: Mood normal.         ED Medications  Medications   iohexol (OMNIPAQUE) 350 MG/ML injection (MULTI-DOSE) 100 mL (100 mL Intravenous Given 23 1420)       Diagnostic Studies  Results Reviewed     Procedure Component Value Units Date/Time    CBC and differential [[id number]]  (Abnormal) Collected: 23 1306    Lab Status: Final result Specimen: Blood from Arm, Right Updated: 23 1343     WBC 10.93 Thousand/uL      RBC 3.15 Million/uL      Hemoglobin 8.5 g/dL      Hematocrit 28.5 %      MCV 91 fL      MCH 27.0 pg      MCHC 29.8 g/dL      RDW 21.3 %      MPV 8.9 fL      Platelets 377 Thousands/uL     Narrative: This is an appended report. These results have been appended to a previously verified report.     Manual Differential(PHLEBS Do Not Order) [[id number]]  (Abnormal) Collected: 09/26/23 1306    Lab Status: Final result Specimen: Blood from Arm, Right Updated: 09/26/23 1343     Segmented % 68 %      Bands % 26 %      Lymphocytes % 1 %      Monocytes % 4 %      Eosinophils, % 1 %      Basophils % 0 %      Absolute Neutrophils 10.27 Thousand/uL      Lymphocytes Absolute 0.11 Thousand/uL      Monocytes Absolute 0.44 Thousand/uL      Eosinophils Absolute 0.11 Thousand/uL      Basophils Absolute 0.00 Thousand/uL      Total Counted --     RBC Morphology Present     Platelet Estimate Adequate     Giant PLTs Present     Anisocytosis Present     Ovalocytes Present     Polychromasia Present    Basic metabolic panel [[id number]]  (Abnormal) Collected: 09/26/23 1306    Lab Status: Final result Specimen: Blood from Arm, Right Updated: 09/26/23 1339     Sodium 134 mmol/L      Potassium 4.2 mmol/L      Chloride 100 mmol/L      CO2 30 mmol/L      ANION GAP 4 mmol/L      BUN 22 mg/dL      Creatinine 1.14 mg/dL      Glucose 67 mg/dL      Calcium 8.0 mg/dL      eGFR 73 ml/min/1.73sq m     Narrative:      [place] guidelines for Chronic Kidney Disease (CKD):   •  Stage 1 with normal or high GFR (GFR > 90 mL/min/1.73 square meters)  •  Stage 2 Mild CKD (GFR = 60-89 mL/min/1.73 square meters)  •  Stage 3A Moderate CKD (GFR = 45-59 mL/min/1.73 square meters)  •  Stage 3B Moderate CKD (GFR = 30-44 mL/min/1.73 square meters)  •  Stage 4 Severe CKD (GFR = 15-29 mL/min/1.73 square meters)  •  Stage 5 End Stage CKD (GFR <15 mL/min/1.73 square meters)  Note: GFR calculation is accurate only with a steady state creatinine                 CT abdomen pelvis with contrast   Final Result by Anuj Ferrera MD (09/26 1542)      Stable appearance of the infected pancreatic necrosis with a single drain remaining in the pancreatic body region. The pancreatic tail drain has been removed.       The left paracolic drain is now dislodged and lies within the abdominal wall. There is a stable collection in the left paracolic region measuring 4.5 x 2.9 cm. Stable abdominopelvic ascites. Stable bilateral pleural effusions with bibasilar atelectasis. Workstation performed: [id number]               Procedures  US Guided Peripheral IV    Date/Time: 9/26/2023 1:06 PM    Performed by: Vazquez Finch DO  Authorized by: Vazquez Finch DO    Patient location:  ED  Performed by:  Resident  Other Assisting Provider: No    Indications:     Indications: difficulty obtaining IV access and patient request    Procedure details:     Location:  Right arm    Catheter size:  20 gauge    Number of attempts:  1    Successful placement: yes    Post-procedure details:     Post-procedure:  Dressing applied    Assessment: free fluid flow and no signs of infiltration      Post-procedure complications: none      Patient tolerance of procedure: Tolerated well, no immediate complications          ED Course  ED Course as of 09/26/23 1632   Tue Sep 26, 2023   1153 Blood Pressure: 111/75   1154 Temperature: 97.7 °F (36.5 °C)   1154 Pulse: 101   1154 SpO2: 99 %   1441 WBC(!): 10.93   1441 Bands Relative(!): 26   1441 Abs Neutrophils(!): 10.27   1549 D/w Red Surgery attending - they will come evaluate pt                                        Medical Decision Making  Patient is a 54-year-old male presenting for evaluation of dislodged DONNY drain    Patient with known necrotizing pancreatitis had been discharged home with drains was due to have follow-up with surgery later this week. Has been afebrile no abdominal pain nausea vomiting. Has had mucopurulent drainage out of the site which is consistent with drainage that was draining into the bag.   States that he is here simply to have the drain replaced does not wish to be admitted if he can avoid it    Patient well-appearing overall his vitals are within normal limits he is not tachycardic he is not febrile blood pressure is normal he satting at 100% on room air. Will obtain basic labs and get CT abdomen pelvis to assess if there is collecting fluid pockets and to help for planning with replacement of drain    Labs notable for leukocytosis as well as bandemia of 26%. Will reach out to surgery who will come to evaluate the patient    Discussed with surgery they will admit the patient. Patient be taken to IR to have drain replaced. Patient has remained hemodynamically stable during his entire ED course and is stable at the time of admission    Amount and/or Complexity of Data Reviewed  Labs: ordered. Decision-making details documented in ED Course. Radiology: ordered. Risk  Prescription drug management. Decision regarding hospitalization. Disposition  Final diagnoses:   Necrotizing pancreatitis     Time reflects when diagnosis was documented in both MDM as applicable and the Disposition within this note     Time User Action Codes Description Comment    9/26/2023  4:32 PM Bijal Waddell Add [J50.82] Necrotizing pancreatitis       ED Disposition     ED Disposition   Admit    Condition   Stable    Date/Time   Tue Sep 26, 2023  4:32 PM    Comment              Follow-up Information    None         Patient's Medications   Discharge Prescriptions    No medications on file     No discharge procedures on file. PDMP Review       Value Time User    PDMP Reviewed  Yes 9/19/2023 10:58 AM Kaveh Dave PA-C           ED Provider  Attending physically available and evaluated Crystal Rowland. I managed the patient along with the ED Attending.     Electronically Signed by         Bijal Waddell DO  09/27/23 4553

## 2023-09-26 NOTE — NURSING NOTE
Patient came up from IR alert and awake. Vitals stable, except patient tachycardic in the 120s. Provider at bedside notified of elevated heart rate. Blood sugar taken per order and it was 28. Per protocol an amp of d50 given. Will recheck blood sugar in 10-15 minutes. Patient completely alert and awake, asymptomatic at this time. Provider said they will be ordering a diet for the patient. Awaiting new orders.

## 2023-09-26 NOTE — ED ATTENDING ATTESTATION
9/26/2023  I, Raquel De DO, saw and evaluated the patient. I have discussed the patient with the resident/non-physician practitioner and agree with the resident's/non-physician practitioner's findings, Plan of Care, and MDM as documented in the resident's/non-physician practitioner's note, except where noted. All available labs and Radiology studies were reviewed. I was present for key portions of any procedure(s) performed by the resident/non-physician practitioner and I was immediately available to provide assistance. At this point I agree with the current assessment done in the Emergency Department. I have conducted an independent evaluation of this patient a history and physical is as follows:  Patient is a 80-year-old male with a history of hypertension, necrotizing pancreatitis, hyponatremia, acute kidney injury, hospitalized Sept 4 through September 19, 2023. Found to have necrotizing pancreatitis on CT, 3 pancreatic drains were placed, patient discharged home. He is accompanied by his wife. Saturday, 3 days ago, one of his drains got excellently pulled out. Since then has been having some drainage from that area. Prior to the drain being removed it was draining consistently which was being emptied by the wife. He says he is been doing otherwise well, no fever, no chills, feels normal for him. Contacted interventional radiology who recommended patient go to the emergency department to have reevaluation for possible drain replacement. Does have some chronic draining of his legs which he says is normal for him. General:  Patient is well-appearing  Head:  Atraumatic  Eyes:  Conjunctiva pink  ENT:  Mucous membranes are moist  Neck:  Supple  Cardiac:  S1-S2, without murmurs  Lungs:  Clear to auscultation bilaterally  Abdomen: Abdomen is soft and nontender, no tympany, no rigidity, no guarding, he has 2 drains present, no erythema surrounding either of those.   The site where the drain had been placed has some slight purulent drainage surrounding. No erythema. Extremities:  Normal range of motion  Neurologic:  Awake, fluent speech, normal comprehension, AAOx3  Skin:  Pink warm and dry  Psychiatric:  Alert, pleasant, cooperative        ED Course     Plan is for CT abdomen and pelvis to evaluate for worsening of his previous necrotizing pancreatitis. Laboratory studies show a bandemia. Patient overall clinically well-appearing, not hypotensive, at this point no sign for sepsis. Plan is for follow-up on the CT and discussion with surgery to determine further management.  Signed out to Dr. Noel Born Time  Procedures

## 2023-09-26 NOTE — H&P
General Surgery  History and Physical  Mary Gutierrez 46 y.o. male MRN: [id number]  Unit/Bed#: QCF Encounter: [id number]    Assessment:  45yo M with necrotizing pancreatitis presenting after 28Fr IR drain from left flank was dislodged. Now draining pancreatic fluid from drain site. CT showing another drain that has moved and is no longer within a collection. Afebrile, VSS on RA  WBC 10.93  Bands 26%  Hgb 8.5  Creatinine 1.14    Plan:  - admit to general surgery service  - NPO for IR, may have diet once drain placement is completed  - holding home eliquis  - DVT ppx: SQH  - SSI and home levemir  - appreciate IR for urgent drain replacement  - f/u AM labs  - start zosyn  - IS, OOB as tolerated  - appreciate timing meds/vitals to minimize disturbing/waking the patient overnight. History of Present Illness   HPI:  Mary Gutierrez is a 46 y.o. male known to the service for necrotizing pancreatitis s/p x3 IR drains who presents with drainage of pancreatic fluid after a large drain was dislodged. This happened about 3 days ago and he was unable to reach anyone for further instructions/assistance. He decided to come in for evaluation because he started draining significant malodorous drainage from the previous drain site. The skin around it started turning bright red earlier today. He denies fevers, nausea, vomiting, pain, CP, SOB. He has been checking his temperature regularly at home and has been afebrile. He says he has been eating better than he has been without nausea or vomiting. Having good bowel function. Review of Systems   Constitutional: Negative. HENT: Negative. Respiratory: Negative. Cardiovascular: Negative. Gastrointestinal: Negative. Genitourinary: Negative. Musculoskeletal: Negative. Skin: Positive for color change. Neurological: Negative. Hematological: Negative. Psychiatric/Behavioral: Negative.         Historical Information   Past Medical History:   Diagnosis Date   • Pancreatitis      Past Surgical History:   Procedure Laterality Date   • CYSTOSCOPY N/A 8/5/2023    Procedure: CYSTOSCOPY. Saunders insertion;  Surgeon: Aliya Felder MD;  Location: [facility];  Service: Urology   • FL RETROGRADE URETHROCYSTOGRAM  8/5/2023   • HERNIA REPAIR     • IR DRAINAGE TUBE CHECK/CHANGE/REPOSITION/REINSERTION/UPSIZE  9/4/2023   • IR DRAINAGE TUBE PLACEMENT  8/5/2023   • IR DRAINAGE TUBE PLACEMENT  9/6/2023   • IR PARACENTESIS  9/6/2023   • IR THORACENTESIS  9/4/2023   • JOINT REPLACEMENT     • TONSILLECTOMY  1976     Social History   Social History     Substance and Sexual Activity   Alcohol Use Not Currently    Comment: quit 7/15/23 (previous 6 drinks per week)     Social History     Substance and Sexual Activity   Drug Use Never     Social History     Tobacco Use   Smoking Status Never   • Passive exposure: Past   Smokeless Tobacco Former   • Types: Chew   • Quit date: 7/15/2023   Tobacco Comments    Quit 7/15/23     Family History: non-contributory    Meds/Allergies   all medications and allergies reviewed  No Known Allergies    Objective   First Vitals:   Blood Pressure: 111/75 (09/26/23 1101)  Pulse: 101 (09/26/23 1101)  Temperature: 97.7 °F (36.5 °C) (09/26/23 1101)  Temp Source: Temporal (09/26/23 1101)  Respirations: 17 (09/26/23 1101)  SpO2: 99 % (09/26/23 1101)    Current Vitals:   Blood Pressure: 115/70 (09/26/23 1430)  Pulse: 95 (09/26/23 1430)  Temperature: 97.7 °F (36.5 °C) (09/26/23 1101)  Temp Source: Temporal (09/26/23 1101)  Respirations: 18 (09/26/23 1430)  SpO2: 99 % (09/26/23 1430)    No intake or output data in the 24 hours ending 09/26/23 1636    Invasive Devices     Peripheral Intravenous Line  Duration           Peripheral IV 08/08/23 Dorsal (posterior); Left Wrist 49 days    Peripheral IV 09/26/23 Right Antecubital <1 day          Drain  Duration           Abscess Drain RUQ 21 days    Abscess Drain LUQ 19 days    Abscess Drain LUQ 19 days Physical Exam:  General: No acute distress  Neuro: alert and oriented  HEENT: moist mucous membranes  CV: Well perfused, regular rate and rhythm  Lungs: Normal work of breathing, no increased respiratory effort  Abdomen: Soft, non-tender, minimally distended. Left flank with defect from dislodged 28Fr drain with significant pancreatic malodorous output, surrounding skin is erythematous nontender. Left abd with 2 IR drains in place. Extremities: No edema, clubbing or cyanosis  Skin: Warm, dry    Lab Results:   CBC:   Lab Results   Component Value Date    WBC 10.93 (H) 09/26/2023    HGB 8.5 (L) 09/26/2023    HCT 28.5 (L) 09/26/2023    MCV 91 09/26/2023     09/26/2023    RBC 3.15 (L) 09/26/2023    MCH 27.0 09/26/2023    MCHC 29.8 (L) 09/26/2023    RDW 21.3 (H) 09/26/2023    MPV 8.9 09/26/2023   , CMP:   Lab Results   Component Value Date    SODIUM 134 (L) 09/26/2023    K 4.2 09/26/2023     09/26/2023    CO2 30 09/26/2023    BUN 22 09/26/2023    CREATININE 1.14 09/26/2023    CALCIUM 8.0 (L) 09/26/2023    EGFR 73 09/26/2023     Imaging: I have personally reviewed pertinent films in PACS  EKG, Pathology, and Other Studies: I have personally reviewed pertinent films in PACS    Code Status: Prior  Advance Directive and Living Will:      Power of :    POLST:      Counseling / Coordination of Care  Total floor / unit time spent today 20 minutes. This involved direct patient contact where I performed a full history and physical, reviewed previous records, and reviewed laboratory and other diagnostic studies. Greater than 50% of total time was spent with the patient and / or family counseling and / or coordination of care.     Rakesh Brenner MD  General Surgery PGY2  9/26/2023

## 2023-09-26 NOTE — PROGRESS NOTES
Interventional Radiology Preprocedure Note    History/Indication for procedure:   Ying Estrada is a 46 y.o. male with a PMH of pancreatitis who presents for replacement of his 29 Fr drain. It fell out today. Relevant past medical history:    Past Medical History:   Diagnosis Date   • Pancreatitis      Patient Active Problem List   Diagnosis   • Primary hypertension   • Proteinuria   • Chronic pain of both hips   • Family history of prostate cancer in father   • Necrotizing pancreatitis   • Superior mesenteric artery thrombosis ([address])   • Pleural effusion, left   • Hyponatremia   • Abnormal urinalysis   • Leukocytosis   • JERZY (acute kidney injury) ([address])   • Septic shock (HCC)   • Pleural effusion due to pancreatitis   • Ascites   • Portal vein thrombosis   • Anemia   • Hyperglycemia   • Bacteremia       /60   Pulse (!) 126   Temp 97.7 °F (36.5 °C) (Temporal)   Resp 20   SpO2 96%     Medications:    Inpatient Medications:     Scheduled Medications:      Infusions:  No current facility-administered medications for this encounter. PRN:      Outpatient Medications:  No current facility-administered medications on file prior to encounter. Current Outpatient Medications on File Prior to Encounter   Medication Sig Dispense Refill   • acetaminophen (TYLENOL) 325 mg tablet Take 2 tablets (650 mg total) by mouth every 6 (six) hours as needed for mild pain, headaches or fever  0   • Alcohol Swabs 70 % PADS May substitute brand based on insurance coverage. Check glucose ACHS. 200 each 0   • apixaban (Eliquis) 5 mg Take 1 tablet (5 mg total) by mouth 2 (two) times a day 120 tablet 0   • BD PosiFlush 0.9 % SOLN      • Blood Glucose Monitoring Suppl (OneTouch Verio Reflect) w/Device KIT May substitute brand based on insurance coverage. Check glucose ACHS.  1 kit 0   • Blood Pressure Monitoring (Adult Blood Pressure Cuff Lg) KIT Use 3 (three) times a day 1 kit 0   • furosemide (LASIX) 20 mg tablet TAKE 1 TABLET BY MOUTH EVERY DAY 30 tablet 0   • glucose blood (OneTouch Verio) test strip May substitute brand based on insurance coverage. Check glucose ACHS. 200 each 0   • Insulin Pen Needle (BD Pen Needle Lupe 2nd Gen) 32G X 4 MM MISC For use with insulin pen. Pharmacy may dispense brand covered by insurance. 100 each 0   • Insulin Pen Needle (BD Pen Needle Lupe 2nd Gen) 32G X 4 MM MISC For use with insulin pen. Pharmacy may dispense brand covered by insurance. 100 each 0   • Levemir FlexPen 100 units/mL injection pen 14 Units daily at bedtime     • lisinopril (ZESTRIL) 10 mg tablet Take 1 tablet (10 mg total) by mouth daily 90 tablet 1   • NovoLOG FlexPen 100 units/mL injection pen INJECT 5 UNITS UNDER SKIN 3 TIMES DAILY WITH MEALS     • OneTouch Delica Lancets 79D MISC May substitute brand based on insurance coverage. Check glucose ACHS. 200 each 0   • pancrelipase, Lip-Prot-Amyl, (CREON) 6,000 units delayed release capsule Take 1 capsule (6,000 Units total) by mouth 3 (three) times a day with meals 90 capsule 0   • polyethylene glycol (MIRALAX) 17 g packet Take 17 g by mouth daily for 5 days 85 g 0   • sodium chloride, PF, 0.9 % 10 mL by Intracatheter route daily for 120 doses Intracatheter flushing daily. May substitute prefilled syringe with normal saline 10 mL vials, 10 mL syringes, and 18 g blunt needles 300 mL 0   • sodium chloride, PF, 0.9 % 10 mL by Intracatheter route daily Intracatheter flushing daily. May substitute prefilled syringe with normal saline 10 mL vials, 10 mL syringes, and 18 g blunt needles 900 mL 0       No Known Allergies    Anticoagulants: eliquis    ASA classification: ASA 3 - Patient with moderate systemic disease with functional limitations    Airway Assessment: III (soft and hard palate and base of uvula visible)    Relevant family history: None    Relevant review of systems: None    Prior sedation/anesthesia: yes    Can the patient lie flat?  Yes     NPO Status: yes    Labs:   CBC with diff:   Lab Results   Component Value Date    WBC 10.93 (H) 09/26/2023    HGB 8.5 (L) 09/26/2023    HCT 28.5 (L) 09/26/2023    MCV 91 09/26/2023     09/26/2023    RBC 3.15 (L) 09/26/2023    MCH 27.0 09/26/2023    MCHC 29.8 (L) 09/26/2023    RDW 21.3 (H) 09/26/2023    MPV 8.9 09/26/2023    NRBC 0 09/18/2023     BMP/CMP:  Lab Results   Component Value Date    K 4.2 09/26/2023     09/26/2023    CO2 30 09/26/2023    BUN 22 09/26/2023    CREATININE 1.14 09/26/2023    CALCIUM 8.0 (L) 09/26/2023    AST 10 (L) 09/12/2023    ALT 4 (L) 09/12/2023    ALKPHOS 45 09/12/2023    EGFR 73 09/26/2023   ,     Coags:   Lab Results   Component Value Date    PTT 66 (H) 09/18/2023    INR 2.00 (H) 09/04/2023   ,          Relevant imaging studies:   Reviewed. Directed physical examination:  I agree with the physical exam performed on 9/26/23 and there are no additional changes. Assessment/Plan:   Drain replacement. Sedation/Anesthesia plan: Moderate sedation will be used as needed for procedure. Consent with alternatives to the procedure, risks and benefits have been explained and discussed with the patient/patient's family: yes.

## 2023-09-26 NOTE — ED NOTES
Bed cleaned and changed prior to pt being taken to [facility]  09/26/23 [id number] Bertha Ayalavard  09/26/23 0232

## 2023-09-26 NOTE — BRIEF OP NOTE (RAD/CATH)
INTERVENTIONAL RADIOLOGY PROCEDURE NOTE    Date: 9/26/2023    Procedure:   Drain replacement    Preoperative diagnosis:   1. Necrotizing pancreatitis         Postoperative diagnosis: Same. Surgeon: Fiona Carney MD     Assistant: None. No qualified resident was available. Blood loss: 0 ml    Specimens: none. Findings:   Replacement of 28 Fr thal pancreatic drain. Complications: None immediate.     Anesthesia: conscious sedation

## 2023-09-26 NOTE — DISCHARGE INSTRUCTIONS
TUBE CARE INSTRUCTIONS    Care after your procedure:    Resume your normal diet. Small sips of flat soda will help with nausea. 1. The properly functioning catheter should be forward flushed once (1x) daily with 10ml of normal saline using clean technique. You will be given a prescription for flushes. To flush the tube, clean both connections with alcohol swab. Twist off the drainage bag/ bulb  tubing and twist the saline syringe into the drainage tube and flush. Remove the syringe and twist the drainage bag / bulb tubing tubing back on.    2. The drainage bag/bulb may be emptied as necessary. Keep a record of the amount of fluid you drain from your tube. This should be done with clean technique as well. 3. A fresh dressing should be applied daily over the tube insertion site. 4. As the tube is secured to the skin with only a suture,try not to pull on your tube. Tub baths are not permitted. Showers are permitted if the patient's skin entry site is prevented from getting wet. Similarly, washcloth "baths" are acceptable. Contact Interventional Radiology at [phone] Ariel PATIENTS: Contact Interventional Radiology at [phone]) Jacy Kathleen PATIENTS: Contact Interventional Radiology at [phone]) if:    1. Leakage or large amounts of liquid around the catheter. 2. Fever of 101 degrees lasting several hours without other obvious cause (such as sore throat, flu, etc). 3. Persistent nausea or vomiting. 4. Diminished drainage, which may be associated with pressure or pain. Or when the     drainage from your tube is less than 10mls for 48 hours. 5. Catheter pulled back or falls out. The following pharmacies carry the flush syringes.        [facility]                         [address]  [place]  Phone [phone]            Phone [phone] [facility]                                [phone]  [address] [place] [address] Vj Bradshaw [place]                      [place]  Phone [phone]            Phone [phone]                      Lynette Santoyo                                                                                                          [phone]  [facility]  [address] [place] [address]. JAKOB [facility]  Phone [phone] Procedural Sedation   WHAT YOU NEED TO KNOW:   Procedural sedation is medicine used during procedures to help you feel relaxed and calm. You will remember little to none of the procedure. After sedation you may feel tired, weak, or unsteady on your feet. You may also have trouble concentrating or short-term memory loss. These symptoms should go away in 24 hours or less. DISCHARGE INSTRUCTIONS:     Call 911 or have someone else call for any of the following: You have sudden trouble breathing. You cannot be woken. Contact Interventional Radiology at [phone]   Ariel PATIENTS: Contact Interventional Radiology at [phone]) Laura Pires PATIENTS: Contact Interventional Radiology at [phone]) if any of the following occur: You have a severe headache or dizziness. Your heart is beating faster than usual.    You have a fever or chills. Your skin is itchy, swollen, or you have a rash. You have nausea or are vomiting for more than 8 hours after the procedure. You have questions or concerns about your condition or care. Self-care:   Have someone stay with you for 24 hours.  This person can drive you to errands and help you do things around the house. This person can also watch for problems. Rest and do quiet activities for 24 hours. Do not exercise, ride a bike, or play sports. Stand up slowly to prevent dizziness and falls. Take short walks around the house with another person. Slowly return to your usual activities the next day. Do not drive or use dangerous machines or tools for 24 hours. You may injure yourself or others. Examples include a lawnmower, saw, or drill. Do not return to work for 24 hours if you use dangerous machines or tools for work. Do not make important decisions for 24 hours. For example, do not sign important papers or invest money. Drink liquids as directed. Liquids help flush the sedation medicine out of your body. Ask how much liquid to drink each day and which liquids are best for you. Eat small, frequent meals to prevent nausea and vomiting. Start with clear liquids such as juice or broth. If you do not vomit after clear liquids, you can eat your usual foods. Do not drink alcohol or take medicines that make you drowsy. This includes medicines that help you sleep and anxiety medicines. Ask your healthcare provider if it is safe for you to take pain medicine. Follow up with your healthcare provider as directed: Write down your questions so you remember to ask them during your visits.

## 2023-09-27 ENCOUNTER — APPOINTMENT (INPATIENT)
Dept: NON INVASIVE DIAGNOSTICS | Facility: HOSPITAL | Age: 53
End: 2023-09-27
Payer: COMMERCIAL

## 2023-09-27 ENCOUNTER — TELEPHONE (OUTPATIENT)
Age: 53
End: 2023-09-27

## 2023-09-27 ENCOUNTER — PREP FOR PROCEDURE (OUTPATIENT)
Dept: INTERVENTIONAL RADIOLOGY/VASCULAR | Facility: CLINIC | Age: 53
End: 2023-09-27

## 2023-09-27 VITALS
TEMPERATURE: 98.1 F | SYSTOLIC BLOOD PRESSURE: 115 MMHG | HEIGHT: 71 IN | HEART RATE: 63 BPM | DIASTOLIC BLOOD PRESSURE: 71 MMHG | WEIGHT: 275.57 LBS | RESPIRATION RATE: 16 BRPM | OXYGEN SATURATION: 92 % | BODY MASS INDEX: 38.58 KG/M2

## 2023-09-27 DIAGNOSIS — K85.91 NECROTIZING PANCREATITIS: Primary | ICD-10-CM

## 2023-09-27 LAB
ANION GAP SERPL CALCULATED.3IONS-SCNC: 5 MMOL/L
ATRIAL RATE: 124 BPM
BUN SERPL-MCNC: 23 MG/DL (ref 5–25)
CALCIUM SERPL-MCNC: 7.9 MG/DL (ref 8.4–10.2)
CHLORIDE SERPL-SCNC: 100 MMOL/L (ref 96–108)
CO2 SERPL-SCNC: 30 MMOL/L (ref 21–32)
CREAT SERPL-MCNC: 1.27 MG/DL (ref 0.6–1.3)
ERYTHROCYTE [DISTWIDTH] IN BLOOD BY AUTOMATED COUNT: 21.2 % (ref 11.6–15.1)
GFR SERPL CREATININE-BSD FRML MDRD: 64 ML/MIN/1.73SQ M
GLUCOSE SERPL-MCNC: 68 MG/DL (ref 65–140)
GLUCOSE SERPL-MCNC: 68 MG/DL (ref 65–140)
GLUCOSE SERPL-MCNC: 77 MG/DL (ref 65–140)
GLUCOSE SERPL-MCNC: 98 MG/DL (ref 65–140)
HCT VFR BLD AUTO: 27.4 % (ref 36.5–49.3)
HGB BLD-MCNC: 8.3 G/DL (ref 12–17)
MAGNESIUM SERPL-MCNC: 1.6 MG/DL (ref 1.9–2.7)
MCH RBC QN AUTO: 27.9 PG (ref 26.8–34.3)
MCHC RBC AUTO-ENTMCNC: 30.3 G/DL (ref 31.4–37.4)
MCV RBC AUTO: 92 FL (ref 82–98)
NRBC BLD AUTO-RTO: 0 /100 WBCS
P AXIS: 41 DEGREES
PHOSPHATE SERPL-MCNC: 4.8 MG/DL (ref 2.7–4.5)
PLATELET # BLD AUTO: 330 THOUSANDS/UL (ref 149–390)
PMV BLD AUTO: 9.6 FL (ref 8.9–12.7)
POTASSIUM SERPL-SCNC: 4 MMOL/L (ref 3.5–5.3)
PR INTERVAL: 138 MS
QRS AXIS: 56 DEGREES
QRSD INTERVAL: 84 MS
QT INTERVAL: 314 MS
QTC INTERVAL: 451 MS
RBC # BLD AUTO: 2.98 MILLION/UL (ref 3.88–5.62)
SODIUM SERPL-SCNC: 135 MMOL/L (ref 135–147)
T WAVE AXIS: 53 DEGREES
VENTRICULAR RATE: 124 BPM
WBC # BLD AUTO: 12.26 THOUSAND/UL (ref 4.31–10.16)

## 2023-09-27 PROCEDURE — 83735 ASSAY OF MAGNESIUM: CPT

## 2023-09-27 PROCEDURE — 82948 REAGENT STRIP/BLOOD GLUCOSE: CPT

## 2023-09-27 PROCEDURE — 85027 COMPLETE CBC AUTOMATED: CPT

## 2023-09-27 PROCEDURE — 93970 EXTREMITY STUDY: CPT | Performed by: SURGERY

## 2023-09-27 PROCEDURE — 84100 ASSAY OF PHOSPHORUS: CPT

## 2023-09-27 PROCEDURE — 80048 BASIC METABOLIC PNL TOTAL CA: CPT

## 2023-09-27 PROCEDURE — 93010 ELECTROCARDIOGRAM REPORT: CPT | Performed by: INTERNAL MEDICINE

## 2023-09-27 PROCEDURE — 93970 EXTREMITY STUDY: CPT

## 2023-09-27 PROCEDURE — 99232 SBSQ HOSP IP/OBS MODERATE 35: CPT | Performed by: SURGERY

## 2023-09-27 PROCEDURE — NC001 PR NO CHARGE: Performed by: PHYSICIAN ASSISTANT

## 2023-09-27 RX ORDER — SODIUM CHLORIDE 9 MG/ML
10 INJECTION INTRAVENOUS DAILY
Qty: 500 ML | Refills: 0 | Status: SHIPPED | OUTPATIENT
Start: 2023-09-27

## 2023-09-27 RX ORDER — SULFAMETHOXAZOLE AND TRIMETHOPRIM 800; 160 MG/1; MG/1
1 TABLET ORAL EVERY 12 HOURS SCHEDULED
Qty: 10 TABLET | Refills: 0 | Status: SHIPPED | OUTPATIENT
Start: 2023-09-27 | End: 2023-10-02

## 2023-09-27 RX ORDER — OXYCODONE HYDROCHLORIDE 5 MG/1
5 TABLET ORAL EVERY 8 HOURS PRN
Qty: 10 TABLET | Refills: 0 | Status: SHIPPED | OUTPATIENT
Start: 2023-09-27 | End: 2023-10-07

## 2023-09-27 RX ORDER — MAGNESIUM SULFATE HEPTAHYDRATE 40 MG/ML
2 INJECTION, SOLUTION INTRAVENOUS ONCE
Status: COMPLETED | OUTPATIENT
Start: 2023-09-27 | End: 2023-09-27

## 2023-09-27 RX ADMIN — PANCRELIPASE 6000 UNITS: 30000; 6000; 19000 CAPSULE, DELAYED RELEASE PELLETS ORAL at 08:05

## 2023-09-27 RX ADMIN — MAGNESIUM SULFATE HEPTAHYDRATE 2 G: 40 INJECTION, SOLUTION INTRAVENOUS at 08:05

## 2023-09-27 RX ADMIN — PIPERACILLIN SODIUM AND TAZOBACTAM SODIUM 4.5 G: 36; 4.5 INJECTION, POWDER, LYOPHILIZED, FOR SOLUTION INTRAVENOUS at 05:49

## 2023-09-27 RX ADMIN — APIXABAN 5 MG: 5 TABLET, FILM COATED ORAL at 08:05

## 2023-09-27 RX ADMIN — PANCRELIPASE 6000 UNITS: 30000; 6000; 19000 CAPSULE, DELAYED RELEASE PELLETS ORAL at 12:31

## 2023-09-27 RX ADMIN — PIPERACILLIN SODIUM AND TAZOBACTAM SODIUM 4.5 G: 36; 4.5 INJECTION, POWDER, LYOPHILIZED, FOR SOLUTION INTRAVENOUS at 12:26

## 2023-09-27 RX ADMIN — OXYCODONE HYDROCHLORIDE 10 MG: 10 TABLET ORAL at 15:39

## 2023-09-27 RX ADMIN — PIPERACILLIN SODIUM AND TAZOBACTAM SODIUM 4.5 G: 36; 4.5 INJECTION, POWDER, LYOPHILIZED, FOR SOLUTION INTRAVENOUS at 00:07

## 2023-09-27 NOTE — QUICK NOTE
General Surgery Quick Note:    Patient seen and examined. Tachycardic 120-130. Patient denies palpitations, lightheadedness, fever, chills, shortness of breath, abdominal pain, nausea, vomiting. Denies additional complaints. Left leg erythematous, tender, warm to touch, edematous. Patient endorses this has been going on for two days without noticed worsening. Bilateral lower extremity edema on exam.  Drains in place, replaced IR drain with serosanguineous output in tubing. EKG ordered, showed sinus tachycardia. CBC sent. Patient on Zosyn.     ---  Aneta Patterson MD  General Surgery PGY-I

## 2023-09-27 NOTE — DISCHARGE SUMMARY
Discharge Summary - Lima Douglas 46 y.o. male MRN: [id number]    [facility] Encounter: [id number]    Admission Date:   Admission Orders (From admission, onward)     Ordered        09/26/23 1737  Inpatient Admission  Once                        Admitting Diagnosis: Necrotizing pancreatitis [K85.91]  Drainage from wound [L24. A9]    HPI: From H&P by Dr. Yoshi Alfred on 9/26: "Lima Douglas is a 46 y.o. male known to the service for necrotizing pancreatitis s/p x3 IR drains who presents with drainage of pancreatic fluid after a large drain was dislodged. This happened about 3 days ago and he was unable to reach anyone for further instructions/assistance. He decided to come in for evaluation because he started draining significant malodorous drainage from the previous drain site. The skin around it started turning bright red earlier today. He denies fevers, nausea, vomiting, pain, CP, SOB. He has been checking his temperature regularly at home and has been afebrile. He says he has been eating better than he has been without nausea or vomiting. Having good bowel function. "    Procedures Performed:   Orders Placed This Encounter   Procedures   • POC Cardiac US       Summary of Hospital Course: Patient is well known to the surgical service with PMH necrotizing pancreatitis, was discharged to home with 3 IR drains in place approximately 1 week prior to arrival in the ER. The day of arrival he reports he sat up in his chair and his large bore drain got stuck and pulled out of place. He had drainage of fluid from the site since that time. Upon arrival he was noted to have two of his three drains out of place. He returned to the IR suite and both drains were replaced without incident. The following day the drains were draining appropriately and patient was medically stable for discharge to home.      Significant Findings, Care, Treatment and Services Provided: Dislodged pancreatic drains x 2, Return to IR with replacement of IR drains    Complications: none    Discharge Diagnosis: Dislodged drains    Medical Problems     Resolved Problems  Date Reviewed: 9/8/2023   None         Condition at Discharge: good         Discharge instructions/Information to patient and family:   See after visit summary for information provided to patient and family. Provisions for Follow-Up Care:  See after visit summary for information related to follow-up care and any pertinent home health orders. PCP: Jorge Quijano MD    Disposition: See After Visit Summary for discharge disposition information. Planned Readmission: No      Discharge Statement   I spent 25 minutes discharging the patient. This time was spent on the day of discharge. I had direct contact with the patient on the day of discharge. Additional documentation is required if more than 30 minutes were spent on discharge. Discharge Medications:  See after visit summary for reconciled discharge medications provided to patient and family.

## 2023-09-27 NOTE — DISCHARGE INSTR - AVS FIRST PAGE
Acute Care Surgery Discharge Instructions    Please follow-up as instructed. If you do not already have a follow-up appointment, please call the office when you leave to schedule an appointment to be seen in 2-3 weeks for post-operative re-evaluation. Activity:  - No lifting greater than 20 pounds or strenuous physical activity or exercise  - Walking and normal light activities are encouraged. - Normal daily activities including climbing steps are okay. - No driving until  you are cleared by the surgery service    Diet:    - You may resume your normal diet. Wound Care:  - Keep drain site clean and dry. Medications:    - You may resume all of your regular medications after discharge unless otherwise instructed. Please refer to your discharge medication list for further details. - Please take the pain medications as directed. - You are encouraged to use non-narcotic pain medications first and whenever possible. Reserve the use of narcotic pain medication for moderate to severe pain not controlled by non-narcotic medications.  - No driving while taking narcotic pain medications. - You may become constipated, especially if taking pain medications. You may take any over the counter stool softeners or laxatives as needed. Examples: Milk of Magnesia, Colace, Senna. Additional Instructions:  - If you have any questions or concerns after discharge please call the office.  - Call office or return to ER if fever greater than 101, chills, persistent nausea/vomiting, worsening/uncontrollable pain, and/or increasing redness or purulent/foul smelling drainage from incision(s).

## 2023-09-27 NOTE — PROGRESS NOTES
Progress Note - Red Surgery   Werner Moreno 46 y.o. male MRN: [id number]  Unit/Bed#: [facility] 825-01 Encounter: [id number]    Assessment:  47yo M with necrotizing pancreatitis presenting after 28Fr IR drain from left flank was dislodged. S/p repositioning by IR 9/26. Plan:  - CCD 2  - Eliquis for SMV thrombus  - F/u duplex  - Electrolyte repletion  - Wean O2 as tolerated  - Continue antibiotics, dc on keflex/flagyl  - Pain and nausea control PRN  - Encourage IS, ambulation   - Likely discharge home today     Subjective:  No acute events overnight. Reports continued pain in bilateral legs. Patient denies abdominal pain, nausea, vomiting, fever, chills, chest pain, shortness of breath. Objective:    Vitals:   Afebrile, Tachycardic, Otherwise Stable VS on room air to 4 L nc  Temp:  [97.7 °F (36.5 °C)-100.1 °F (37.8 °C)] 98.5 °F (36.9 °C)  HR:  [] 102  Resp:  [16-20] 16  BP: (101-141)/(55-83) 114/72  Body mass index is 38.35 kg/m². Physical Exam:   Gen: NAD, Resting in bed  Neuro: A&O, No focal deficits  Head: Normal Cephalic, Atraumatic  Eye: EOMI, No scleral icterus  CV: Regular rate  Pulm: Normal work of breathing, No respiratory distress on 2 L nc  Abd: Soft, Distended, Non-Tender; Drains in place  Ext: Tender with edema bilateral lower extremities. Left leg with less erythema than observed yesterday.   Skin: Warm, Dry, Intact    I/O:  UO 44 cc  LLQ drain 125 cc brown pancreatic   LUQ drain (replaced) 65 cc blood tinged pancreatic necrotic  LUQ drain DONNY 0 cc    Intake/Output Summary (Last 24 hours) at 9/27/2023 0901  Last data filed at 9/27/2023 0843  Gross per 24 hour   Intake --   Output 830 ml   Net -830 ml       Lab Results:    Recent Labs     09/26/23  1306 09/26/23 2021 09/27/23  0510   WBC 10.93* 14.42* 12.26*   HGB 8.5* 8.9* 8.3*    305 330   SODIUM 134*  --  135   K 4.2  --  4.0     --  100   CO2 30  --  30   BUN 22  --  23   CREATININE 1.14  --  1.27   GLUC 67  --  68 CALCIUM 8.0*  --  7.9*   MG  --   --  1.6*   PHOS  --   --  4.8*       ---    Janann Councilman, MD  General Surgery PGY-I

## 2023-09-27 NOTE — TELEPHONE ENCOUNTER
Patients GI provider:  Dr. Teresita Rodrigues    Number to return call: [phone]    Reason for call: Pt's wife calling to CXL Friday appointment. Patient was Admitted to hospital. She would like Dr. Brianna Villanueva notified of this.     Scheduled procedure/appointment date if applicable:  4/14/63

## 2023-09-27 NOTE — CASE MANAGEMENT
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[address]) 2023   • Pleural effusion, left 2023   • Hyponatremia 2023   • Abnormal urinalysis 2023   • Leukocytosis 2023   • JERZY (acute kidney injury) ([address]) 2023   • Primary hypertension 2022   • Proteinuria 2022   • Chronic pain of both hips 2022   • Family history of prostate cancer in father 2022      LOS (days): 1  Geometric Mean LOS (GMLOS) (days):   Days to GMLOS:     OBJECTIVE:  PATIENT READMITTED TO HOSPITAL  Risk of Unplanned Readmission Score: 29.89         Current admission status: Inpatient       Preferred Pharmacy:   [facility]/pharmacy #[id number] [address]  Phone: [phone] Fax: [address] [address]  Phone: [phone] Fax: [phone]    Primary Care Provider: Zaina Li MD    Primary Insurance: Desi Diamond  Secondary Insurance:     ASSESSMENT:  [employer] Proxies    There are no active Health Care Proxies on file.                  Readmission Root Cause  30 Day Readmission: Yes  Who directed you to return to the hospital?: Self  Did you understand whom to contact if you had questions or problems?: Yes  Did you get your prescriptions before you left the hospital?: Yes  Were you able to get your prescriptions filled when you left the hospital?: Yes  Did you take your medications as prescribed?: Yes  Were you able to get to your follow-up appointments?: Yes  During previous admission, was a post-acute recommendation made?: Yes  What post-acute resources were offered?: [facility]  Patient was readmitted due to: abdominal drain displacement  Action Plan: IR    Patient Information  Admitted from[de-identified] Home  Mental Status: Alert                                 DISCHARGE DETAILS:                         Additional Comments: Patient readmitted d/t abdominal drain dislodgement. CM open completed 9/5/23, see note. Briefly, patient redsides with his wife in a [facility] home ( [facility]) in [place], [place]. He is active with [facility] for SN, PT and OT, will submit return referral. Hx of STR- Belk, unable to recall when. Assistance with ADLS/ utilizes a walker. CM to continue to follow. Patient/caregiver received discharge checklist.  Content reviewed. Patient/caregiver encouraged to participate in discharge plan of care prior to discharge home. CM reviewed d/c planning process including the following: identifying help at home, patient preference for d/c planning needs, Discharge Lounge, [facility] Meds to Bed program, availability of treatment team to discuss questions or concerns patient and/or family may have regarding understanding medications and recognizing signs and symptoms once discharged. CM also encouraged patient to follow up with all recommended appointments after discharge. Patient advised of importance for patient and family to participate in managing patient’s medical well being.

## 2023-09-27 NOTE — PLAN OF CARE
Problem: PAIN - ADULT  Goal: Verbalizes/displays adequate comfort level or baseline comfort level  Description: Interventions:  - Encourage patient to monitor pain and request assistance  - Assess pain using appropriate pain scale  - Administer analgesics based on type and severity of pain and evaluate response  - Implement non-pharmacological measures as appropriate and evaluate response  - Consider cultural and social influences on pain and pain management  - Notify physician/advanced practitioner if interventions unsuccessful or patient reports new pain  Outcome: Progressing     Problem: Knowledge Deficit  Goal: Patient/family/caregiver demonstrates understanding of disease process, treatment plan, medications, and discharge instructions  Description: Complete learning assessment and assess knowledge base.   Interventions:  - Provide teaching at level of understanding  - Provide teaching via preferred learning methods  Outcome: Progressing     Problem: Prexisting or High Potential for Compromised Skin Integrity  Goal: Skin integrity is maintained or improved  Description: INTERVENTIONS:  - Identify patients at risk for skin breakdown  - Assess and monitor skin integrity  - Assess and monitor nutrition and hydration status  - Monitor labs   - Assess for incontinence   - Turn and reposition patient  - Assist with mobility/ambulation  - Relieve pressure over bony prominences  - Avoid friction and shearing  - Provide appropriate hygiene as needed including keeping skin clean and dry  - Evaluate need for skin moisturizer/barrier cream  - Collaborate with interdisciplinary team   - Patient/family teaching  - Consider wound care consult   Outcome: Progressing

## 2023-09-27 NOTE — PLAN OF CARE
Problem: PAIN - ADULT  Goal: Verbalizes/displays adequate comfort level or baseline comfort level  Description: Interventions:  - Encourage patient to monitor pain and request assistance  - Assess pain using appropriate pain scale  - Administer analgesics based on type and severity of pain and evaluate response  - Implement non-pharmacological measures as appropriate and evaluate response  - Consider cultural and social influences on pain and pain management  - Notify physician/advanced practitioner if interventions unsuccessful or patient reports new pain  Outcome: Progressing     Problem: INFECTION - ADULT  Goal: Absence or prevention of progression during hospitalization  Description: INTERVENTIONS:  - Assess and monitor for signs and symptoms of infection  - Monitor lab/diagnostic results  - Monitor all insertion sites, i.e. indwelling lines, tubes, and drains  - Monitor endotracheal if appropriate and nasal secretions for changes in amount and color  - [place] appropriate cooling/warming therapies per order  - Administer medications as ordered  - Instruct and encourage patient and family to use good hand hygiene technique  - Identify and instruct in appropriate isolation precautions for identified infection/condition  Outcome: Progressing  Goal: Absence of fever/infection during neutropenic period  Description: INTERVENTIONS:  - Monitor WBC    Outcome: Progressing     Problem: SAFETY ADULT  Goal: Patient will remain free of falls  Description: INTERVENTIONS:  - Educate patient/family on patient safety including physical limitations  - Instruct patient to call for assistance with activity   - Consult OT/PT to assist with strengthening/mobility   - Keep Call bell within reach  - Keep bed low and locked with side rails adjusted as appropriate  - Keep care items and personal belongings within reach  - Initiate and maintain comfort rounds  - Apply yellow socks and bracelet for high fall risk patients  - Consider moving patient to room near nurses station  Outcome: Progressing  Goal: Maintain or return to baseline ADL function  Description: INTERVENTIONS:  -  Assess patient's ability to carry out ADLs; assess patient's baseline for ADL function and identify physical deficits which impact ability to perform ADLs (bathing, care of mouth/teeth, toileting, grooming, dressing, etc.)  - Assess/evaluate cause of self-care deficits   - Assess range of motion  - Assess patient's mobility; develop plan if impaired  - Assess patient's need for assistive devices and provide as appropriate  - Encourage maximum independence but intervene and supervise when necessary  - Involve family in performance of ADLs  - Assess for home care needs following discharge   - Consider OT consult to assist with ADL evaluation and planning for discharge  - Provide patient education as appropriate  Outcome: Progressing  Goal: Maintains/Returns to pre admission functional level  Description: INTERVENTIONS:  - Perform BMAT or MOVE assessment daily.   - Set and communicate daily mobility goal to care team and patient/family/caregiver. - Collaborate with rehabilitation services on mobility goals if consulted  - Out of bed for toileting  - Record patient progress and toleration of activity level   Outcome: Progressing     Problem: Knowledge Deficit  Goal: Patient/family/caregiver demonstrates understanding of disease process, treatment plan, medications, and discharge instructions  Description: Complete learning assessment and assess knowledge base.   Interventions:  - Provide teaching at level of understanding  - Provide teaching via preferred learning methods  Outcome: Progressing

## 2023-09-27 NOTE — UTILIZATION REVIEW
Initial Clinical Review    Admission: Date/Time/Statement:   Admission Orders (From admission, onward)     Ordered        09/26/23 1737  Inpatient Admission  Once                      Orders Placed This Encounter   Procedures   • Inpatient Admission     Standing Status:   Standing     Number of Occurrences:   1     Order Specific Question:   Level of Care     Answer:   Med Surg [16]     Order Specific Question:   Estimated length of stay     Answer:   More than 2 Midnights     Order Specific Question:   Certification     Answer:   I certify that inpatient services are medically necessary for this patient for a duration of greater than two midnights. See H&P and MD Progress Notes for additional information about the patient's course of treatment. ED Arrival Information     Expected   -    Arrival   9/26/2023 10:57    Acuity   Urgent            Means of arrival   Walk-In    Escorted by   Self    Service   Surgery-General    Admission type   Emergency            Arrival complaint   Drainage replacement           Chief Complaint   Patient presents with   • Medical Problem     Pt reports that one of the drains in his abdomen fell out- just here to have it replaced. Pt denies any complaints        Initial Presentation: 46 y.o. male who presented to [facility] ED due to significant malodorous drainage from pancreatic drain since drain dislodged approx 3 days prior. Drain site bright red began on day of presentation. On exam, abdomen soft, non-tender, minimally distended. Left flank with defect from dislodged 28Fr drain with significant pancreatic malodorous output, surrounding skin is erythematous nontender. Left abd with 2 IR drains in place. Labs revealed wbc 10.93, hgb 8.5, hct 28.5, Na 134. See CTAP results below. PMHx:  necrotizing pancreatitis s/p x3 IR drains.  Plan:  Admit to Inpatient status dt necrotizing pancreatitis, med surg, keep NPO, IR consult, hold eliquis, start accuchecks w/ SSI, start IV Zosyn, fu on labs. 9/26 Per IR:  Procedure: Drain replacement  Findings: Replacement of 28 Fr thal pancreatic drain. Complications: None immediate. Anesthesia: conscious sedation    Date: 9/27   Day 2:   No acute events overnight. Reports continued pain in bilateral legs. Abdomen soft, Distended, Non-Tender; Drains in place. Continue to monitor electrolytes and replete prn, fu on duplex, start eliquis, wean O2 as able, continue ABX, pain and nausea control prn, inc spirometry.      I/O:  UO 44 cc  LLQ drain 125 cc brown pancreatic   LUQ drain (replaced) 65 cc blood tinged pancreatic necrotic  LUQ drain DONNY 0 cc     ED Triage Vitals [09/26/23 1101]   Temperature Pulse Respirations Blood Pressure SpO2   97.7 °F (36.5 °C) 101 17 111/75 99 %      Temp Source Heart Rate Source Patient Position - Orthostatic VS BP Location FiO2 (%)   Temporal Monitor Lying Right arm --      Pain Score       No Pain          Wt Readings from Last 1 Encounters:   09/25/23 125 kg (275 lb)     Additional Vital Signs:   Date/Time Temp Pulse Resp BP MAP (mmHg) SpO2 Calculated FIO2 (%) - Nasal Cannula Nasal Cannula O2 Flow Rate (L/min) O2 Device Patient Position - Orthostatic VS   09/27/23 07:28:34 98.5 °F (36.9 °C) 102 16 114/72 86 100 % -- -- -- --   09/27/23 02:54:53 99 °F (37.2 °C) 118 Abnormal  18 118/72 87 98 % -- -- -- --   09/26/23 21:29:59 100.1 °F (37.8 °C) 128 Abnormal  18 122/72 89 99 % -- -- -- --   09/26/23 19:48:43 99.9 °F (37.7 °C) 124 Abnormal  20 141/76 98 98 % -- -- -- --   09/26/23 1945 -- -- -- -- -- -- 28 2 L/min Nasal cannula --   09/26/23 19:18:54 -- 125 Abnormal  16 125/83 97 100 % -- -- -- --   09/26/23 1842 -- 128 Abnormal  16 -- -- 96 % 28 2 L/min Nasal cannula --   09/26/23 1827 -- 124 Abnormal  16 101/55 -- 98 % 36 4 L/min Nasal cannula --   09/26/23 1823 -- 126 Abnormal  16 115/65 -- 97 % 36 4 L/min Nasal cannula --   09/26/23 1818 -- 122 Abnormal  16 102/70 -- 92 % 36 4 L/min Nasal cannula --   09/26/23 1812 -- 121 Abnormal  16 131/73 -- 94 % 36 4 L/min Nasal cannula --   09/26/23 1807 -- 120 Abnormal  16 122/64 -- 96 % 36 4 L/min Nasal cannula --   09/26/23 1802 -- 120 Abnormal  18 114/61 -- 96 % 36 4 L/min Nasal cannula --   09/26/23 1757 -- 120 Abnormal  18 123/60 -- 91 % -- -- None (Room air) --   09/26/23 1714 -- 126 Abnormal  20 134/60 87 96 % -- -- None (Room air) --   09/26/23 1430 -- 95 18 115/70 -- 99 % -- -- None (Room air) --   09/26/23 1130 -- -- -- -- -- -- -- -- None (Room air) --   09/26/23 1101 97.7 °F (36.5 °C) 101 17 111/75 -- 99 % -- -- None (Room air) Lying     Pertinent Labs/Diagnostic Test Results:   CT abdomen pelvis with contrast   Final Result by Cleo Conroy MD (09/26 1542)      Stable appearance of the infected pancreatic necrosis with a single drain remaining in the pancreatic body region. The pancreatic tail drain has been removed. The left paracolic drain is now dislodged and lies within the abdominal wall. There is a stable collection in the left paracolic region measuring 4.5 x 2.9 cm. Stable abdominopelvic ascites. Stable bilateral pleural effusions with bibasilar atelectasis.             Workstation performed: [id number]         IR drainage tube placement    (Results Pending)   VAS lower limb venous duplex study, complete bilateral    (Results Pending)         Results from last 7 days   Lab Units 09/27/23  0510 09/26/23 2021 09/26/23  1306   WBC Thousand/uL 12.26* 14.42* 10.93*   HEMOGLOBIN g/dL 8.3* 8.9* 8.5*   HEMATOCRIT % 27.4* 29.3* 28.5*   PLATELETS Thousands/uL 330 305 318   BANDS PCT %  --   --  26*         Results from last 7 days   Lab Units 09/27/23  0510 09/26/23  1306   SODIUM mmol/L 135 134*   POTASSIUM mmol/L 4.0 4.2   CHLORIDE mmol/L 100 100   CO2 mmol/L 30 30   ANION GAP mmol/L 5 4   BUN mg/dL 23 22   CREATININE mg/dL 1.27 1.14   EGFR ml/min/1.73sq m 64 73   CALCIUM mg/dL 7.9* 8.0*   MAGNESIUM mg/dL 1.6*  --    PHOSPHORUS mg/dL 4.8*  -- Results from last 7 days   Lab Units 09/27/23  0728 09/27/23  0003 09/26/23 2128 09/26/23  1950 09/26/23 1928 09/26/23  1925   POC GLUCOSE mg/dl 68 77 86 98 28* 26*     Results from last 7 days   Lab Units 09/27/23  0510 09/26/23  1306   GLUCOSE RANDOM mg/dL 68 67       ED Treatment:   Medication Administration from 09/26/2023 1057 to 09/26/2023 1913       Date/Time Order Dose Route Action     09/26/2023 1420 EDT iohexol (OMNIPAQUE) 350 MG/ML injection (MULTI-DOSE) 100 mL 100 mL Intravenous Given     09/26/2023 1737 EDT ceFAZolin (ANCEF) IVPB (premix in dextrose) 3,000 mg Intravenous New Bag     09/26/2023 1810 EDT fentanyl citrate (PF) 100 MCG/2ML 50 mcg Intravenous Given     09/26/2023 1801 EDT fentanyl citrate (PF) 100 MCG/2ML 50 mcg Intravenous Given     09/26/2023 1810 EDT midazolam (VERSED) injection 1 mg Intravenous Given     09/26/2023 1801 EDT midazolam (VERSED) injection 1 mg Intravenous Given     09/26/2023 1830 EDT iohexol (OMNIPAQUE) 300 mg/mL injection 50 mL 15 mL Intravenous Given        Past Medical History:   Diagnosis Date   • Pancreatitis      Present on Admission:  **None**      Admitting Diagnosis: Necrotizing pancreatitis [K85.91]  Drainage from wound [L24. A9]  Age/Sex: 46 y.o. male  Admission Orders:  Scheduled Medications:  apixaban, 5 mg, Oral, BID  insulin detemir, 14 Units, Subcutaneous, HS  insulin lispro, 2-12 Units, Subcutaneous, 4 times day  magnesium sulfate, 2 g, Intravenous, Once  pancrelipase (Lip-Prot-Amyl), 6,000 Units, Oral, TID With Meals  piperacillin-tazobactam, 4.5 g, Intravenous, Q6H  polyethylene glycol, 17 g, Oral, Daily      Continuous IV Infusions:   ceFAZolin (ANCEF) IVPB (premix in dextrose)  Freq:  Intra-op continuous PRN  Last Dose: Stopped (09/26/23 1802)  Start: 09/26/23 1737 End: 09/26/23 1802     PRN Meds:  acetaminophen, 650 mg, Oral, Q6H PRN  HYDROmorphone, 0.5 mg, Intravenous, Q3H PRN  ondansetron, 4 mg, Intravenous, Q6H PRN  oxyCODONE, 10 mg, Oral, Q4H PRN  oxyCODONE, 5 mg, Oral, Q4H PRN    scd  IO      Network Utilization Review Department  ATTENTION: Please call with any questions or concerns to [phone] and carefully listen to the prompts so that you are directed to the right person. All voicemails are confidential.  Renita Stahl all requests for admission clinical reviews, approved or denied determinations and any other requests to dedicated fax number below belonging to the campus where the patient is receiving treatment.  List of dedicated fax numbers for the Facilities:  [place] DENIALS (Administrative/Medical Necessity) [phone] [address] [facility] (Maternity/NICU/Pediatrics) [phone]   [address] [place] [address] Drive [phone]   [facility] [address] [facility] [phone]   [address] [place] [address] [address] [phone]   [address] [address] [phone]

## 2023-09-28 ENCOUNTER — TELEPHONE (OUTPATIENT)
Dept: FAMILY MEDICINE CLINIC | Facility: CLINIC | Age: 53
End: 2023-09-28

## 2023-09-28 ENCOUNTER — TRANSITIONAL CARE MANAGEMENT (OUTPATIENT)
Dept: FAMILY MEDICINE CLINIC | Facility: CLINIC | Age: 53
End: 2023-09-28

## 2023-09-28 NOTE — UTILIZATION REVIEW
NOTIFICATION OF ADMISSION DISCHARGE   This is a Notification of Discharge from [facility]. Please be advised that this patient has been discharge from our facility. Below you will find the admission and discharge date and time including the patient’s disposition. UTILIZATION REVIEW CONTACT:  Asuncion Villa  Utilization   Network Utilization Review Department  Phone: [phone] x carefully listen to the prompts. All voicemails are confidential.  Email: [email or web address]     ADMISSION INFORMATION  PRESENTATION DATE: 9/26/2023 11:02 AM  OBERVATION ADMISSION DATE:   INPATIENT ADMISSION DATE: 9/26/23  5:37 PM   DISCHARGE DATE: 9/27/2023  4:15 PM   DISPOSITION:Home with Home Health Care    IMPORTANT INFORMATION:  Send all requests for admission clinical reviews, approved or denied determinations and any other requests to dedicated fax number below belonging to the campus where the patient is receiving treatment.  List of dedicated fax numbers:  [place] DENIALS (Administrative/Medical Necessity) [phone] 2303 [facility] (Maternity/NICU/Pediatrics) [phone]   [facility] [phone]   [facility] [phone] 1636 [facility] [phone]   [address] [place] [address] [phone]   [facility] [phone]   [address] [place] [address] [address] [place] [address] [phone]   [address] [place] [address] [phone] 3[address] [facility] [phone] 2720 [facility] [address] [facility] [phone]

## 2023-10-02 NOTE — TELEPHONE ENCOUNTER
Patients GI provider:  Dr. de la paz    Number to return call: ([phone]    Reason for call: Pt calling needing to reschedule virtual visit for ov with dr de la paz. Provider has not until 2/2024. Pt is asking if ok to see someone else virtually.     Scheduled procedure/appointment date if applicable: Apt/procedure

## 2023-10-04 ENCOUNTER — TELEPHONE (OUTPATIENT)
Dept: FAMILY MEDICINE CLINIC | Facility: CLINIC | Age: 53
End: 2023-10-04

## 2023-10-04 ENCOUNTER — OFFICE VISIT (OUTPATIENT)
Dept: FAMILY MEDICINE CLINIC | Facility: CLINIC | Age: 53
End: 2023-10-04
Payer: COMMERCIAL

## 2023-10-04 VITALS
WEIGHT: 275 LBS | DIASTOLIC BLOOD PRESSURE: 50 MMHG | BODY MASS INDEX: 38.5 KG/M2 | HEIGHT: 71 IN | TEMPERATURE: 98.1 F | SYSTOLIC BLOOD PRESSURE: 96 MMHG

## 2023-10-04 DIAGNOSIS — R73.9 HYPERGLYCEMIA: ICD-10-CM

## 2023-10-04 DIAGNOSIS — I10 PRIMARY HYPERTENSION: ICD-10-CM

## 2023-10-04 DIAGNOSIS — Z09 HOSPITAL DISCHARGE FOLLOW-UP: ICD-10-CM

## 2023-10-04 DIAGNOSIS — K55.069 SUPERIOR MESENTERIC ARTERY THROMBOSIS (HCC): ICD-10-CM

## 2023-10-04 DIAGNOSIS — K85.91 NECROTIZING PANCREATITIS: Primary | ICD-10-CM

## 2023-10-04 PROBLEM — R65.21 SEPTIC SHOCK (HCC): Status: RESOLVED | Noted: 2023-09-08 | Resolved: 2023-10-04

## 2023-10-04 PROBLEM — A41.9 SEPTIC SHOCK (HCC): Status: RESOLVED | Noted: 2023-09-08 | Resolved: 2023-10-04

## 2023-10-04 PROCEDURE — 99496 TRANSJ CARE MGMT HIGH F2F 7D: CPT | Performed by: STUDENT IN AN ORGANIZED HEALTH CARE EDUCATION/TRAINING PROGRAM

## 2023-10-04 NOTE — TELEPHONE ENCOUNTER
T/c from [facility] with Gavin Graves -- states he had to r/s his OT eval. She will reach out to r/s for next week.

## 2023-10-04 NOTE — PROGRESS NOTES
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[address])     On eliqus            Cardiovascular and Mediastinum    Primary hypertension     Holding antihypertensive medications given pressures are soft            Other    Hyperglycemia     Decrease insulin to 2 units short acting with meals and 8 units long-acting call back in 5 days with readings given the hypoglycemia at home        Other Visit Diagnoses     Hospital discharge follow-up               Reason for visit is tcm    Encounter provider Lynda Gutiérrez MD     Provider located at [address] [place] [address] [address] [place] [address] [address]  [phone]    Recent Visits  Date Type Provider Dept   09/28/23 Telephone Lynda Gutiérrez, [address] [facility] recent visits within past 7 days and meeting all other requirements  Today's Visits  Date Type Provider Dept   10/04/23 Office Visit Lynda Gutiérrez  [facility] today's visits and meeting all other requirements  Future Appointments  No visits were found meeting these conditions. Showing future appointments within next 150 days and meeting all other requirements       After connecting through [employer], the patient was identified by name and date of birth. Werner Moreno was informed that this is a telemedicine visit and that the visit is being conducted through the [employer]. He agrees to proceed. .  My office door was closed. No one else was in the room.   He acknowledged consent and understanding of privacy and security of the video platform. The patient has agreed to participate and understands they can discontinue the visit at any time. Patient is aware this is a billable service. Transitional Care Management Review:  Werner Moreno is a 46 y.o. male here for TCM follow up. During the TCM phone call patient stated:    TCM Call     Date and time call was made  9/28/2023 11:26 AM    Hospital care reviewed  Records reviewed    Patient was hospitialized at  [facility]    Date of Admission  09/26/23    Date of discharge  09/27/23    Diagnosis  Necrotizing pancreatitis    Disposition  Home health services    Were the patients medications reviewed and updated  Yes    Current Symptoms  None      TCM Call     Post hospital issues  None    Should patient be enrolled in anticoag monitoring? No    Scheduled for follow up? Yes    Did you obtain your prescribed medications  Yes    Do you need help managing your prescriptions or medications  No    Is transportation to your appointment needed  No    I have advised the patient to call PCP with any new or worsening symptoms  Maria Luisa Whitley MA    Living Arrangements  Spouse or Significiant other    Support System  Partner; Spouse    The type of support provided  Emotional    Do you have social support  Yes, as much as I need    Are you recieving any outpatient services  No    Are you recieving home care services  No    Types of home care services  Nurse visit    Are you using any community resources  No    Current waiver services  No    Have you fallen in the last 12 months  No    Interperter language line needed  No    Counseling  Caregiver    Counseling topics  Activities of daily living; Importance of RX compliance    Comments  TCM scheduled for 8/15/2023 at 11:40 am- HELLEN        Subjective:     Patient ID: Werner Moreno is a 46 y.o. male. 3 tubes drianing normally, feet are still swollen. Is getting home nurse visits    Has appointment with wound care.  boristie was improving but has now    His wife has cut back on insulin as he was previously on 4 units with meals and 14 units long-acting at night and he was given hypoglycemic readings in the 50s and 60s all hours of the day including waking up. She has been holding his last couple days for sugars have been ranging from 1 25-2 15. Review of Systems   Constitutional: Negative for chills, fatigue and fever. HENT: Negative for rhinorrhea and sore throat. Eyes: Negative for visual disturbance. Respiratory: Negative for cough and shortness of breath. Cardiovascular: Negative for chest pain and palpitations. Gastrointestinal: Negative for abdominal pain, constipation, diarrhea, nausea and vomiting. Genitourinary: Negative for difficulty urinating, dysuria and frequency. Musculoskeletal: Negative for arthralgias and myalgias. Skin: Negative for color change and rash. Neurological: Negative for weakness and headaches. Objective:    Vitals:    10/04/23 1138   BP: 96/50   Temp: 98.1 °F (36.7 °C)   Weight: 125 kg (275 lb)   Height: 5' 11" (1.803 m)       Physical Exam    Medications have been reviewed by provider in current encounter    I spent 20 minutes with the patient during this visit. Jose Kwok MD      VIRTUAL VISIT DISCLAIMER    Quyen Harris verbally agrees to participate in [facility]. Pt is aware that Virtual Care Services could be limited without vital signs or the ability to perform a full hands-on physical [employer] understands he or the provider may request at any time to terminate the video visit and request the patient to seek care or treatment in person.

## 2023-10-04 NOTE — ASSESSMENT & PLAN NOTE
Decrease insulin to 2 units short acting with meals and 8 units long-acting call back in 5 days with readings given the hypoglycemia at home

## 2023-10-09 ENCOUNTER — TELEPHONE (OUTPATIENT)
Dept: FAMILY MEDICINE CLINIC | Facility: CLINIC | Age: 53
End: 2023-10-09

## 2023-10-09 ENCOUNTER — HOSPITAL ENCOUNTER (EMERGENCY)
Facility: HOSPITAL | Age: 53
Discharge: HOME | End: 2023-10-09
Payer: COMMERCIAL

## 2023-10-09 ENCOUNTER — APPOINTMENT (EMERGENCY)
Dept: CT IMAGING | Facility: HOSPITAL | Age: 53
End: 2023-10-09
Payer: COMMERCIAL

## 2023-10-09 VITALS
SYSTOLIC BLOOD PRESSURE: 111 MMHG | HEART RATE: 118 BPM | TEMPERATURE: 99.1 F | OXYGEN SATURATION: 99 % | RESPIRATION RATE: 19 BRPM | DIASTOLIC BLOOD PRESSURE: 51 MMHG

## 2023-10-09 DIAGNOSIS — E87.1 HYPONATREMIA: ICD-10-CM

## 2023-10-09 DIAGNOSIS — S09.90XA INJURY OF HEAD, INITIAL ENCOUNTER: Primary | ICD-10-CM

## 2023-10-09 DIAGNOSIS — K85.91 NECROTIZING PANCREATITIS: ICD-10-CM

## 2023-10-09 DIAGNOSIS — R60.0 LOWER EXTREMITY EDEMA: ICD-10-CM

## 2023-10-09 LAB
ANION GAP SERPL CALCULATED.3IONS-SCNC: 8 MMOL/L
BUN SERPL-MCNC: 29 MG/DL (ref 5–25)
CALCIUM SERPL-MCNC: 8.7 MG/DL (ref 8.4–10.2)
CHLORIDE SERPL-SCNC: 94 MMOL/L (ref 96–108)
CO2 SERPL-SCNC: 26 MMOL/L (ref 21–32)
CREAT SERPL-MCNC: 1.12 MG/DL (ref 0.6–1.3)
GFR SERPL CREATININE-BSD FRML MDRD: 74 ML/MIN/1.73SQ M
GLUCOSE SERPL-MCNC: 113 MG/DL (ref 65–140)
GLUCOSE SERPL-MCNC: 121 MG/DL (ref 65–140)
LIPASE SERPL-CCNC: 30 U/L (ref 11–82)
POTASSIUM SERPL-SCNC: 4.6 MMOL/L (ref 3.5–5.3)
SODIUM SERPL-SCNC: 128 MMOL/L (ref 135–147)

## 2023-10-09 PROCEDURE — 99284 EMERGENCY DEPT VISIT MOD MDM: CPT | Performed by: PHYSICIAN ASSISTANT

## 2023-10-09 PROCEDURE — 83690 ASSAY OF LIPASE: CPT | Performed by: PHYSICIAN ASSISTANT

## 2023-10-09 PROCEDURE — 80048 BASIC METABOLIC PNL TOTAL CA: CPT | Performed by: PHYSICIAN ASSISTANT

## 2023-10-09 PROCEDURE — 82948 REAGENT STRIP/BLOOD GLUCOSE: CPT

## 2023-10-09 PROCEDURE — 99284 EMERGENCY DEPT VISIT MOD MDM: CPT

## 2023-10-09 PROCEDURE — 70450 CT HEAD/BRAIN W/O DYE: CPT

## 2023-10-09 PROCEDURE — 36415 COLL VENOUS BLD VENIPUNCTURE: CPT | Performed by: PHYSICIAN ASSISTANT

## 2023-10-09 PROCEDURE — 93005 ELECTROCARDIOGRAM TRACING: CPT

## 2023-10-09 NOTE — TELEPHONE ENCOUNTER
Sugar readings for the last 5 days & this AM:    10/4 - 155, 171, 187, 166    10/5 - 133, 149, 157, 167    10/6 - 95, 184, 165, 138    10/7 - 121, 119, 131, 138    10/8 - 120, 107 127, 111    10/9 - 102

## 2023-10-09 NOTE — TELEPHONE ENCOUNTER
Spoke with pts wife. Notified her on advisement. Notified her to call back in 5 days with sugar readings.

## 2023-10-09 NOTE — ED PROVIDER NOTES
History  Chief Complaint   Patient presents with   • Fall     Patient arrived to ER c/o Fall , patient was at at wound procedure, he wqas trying to get up from the wheelchair and fell and hit the back of his head. Unknown LOC +BT +Dizziness      55-year-old male with past medical history significant for necrotizing pancreatitis presents to the emergency department as a medical emergency from the [facility] care [place] where he was attending an appointment for treatment of his lower extremity wounds when he fell out of his wheelchair and hit the back of his head on a nearby wall during the appointment. There were no preceding symptoms. There is no loss of consciousness. Patient does take Eliquis daily. He reports mild dizziness but denies vomiting, blurred vision, neck pain chest pain or palpitations. History provided by:  Patient and significant other   used: No        Prior to Admission Medications   Prescriptions Last Dose Informant Patient Reported? Taking? Alcohol Swabs 70 % PADS  Self No No   Sig: May substitute brand based on insurance coverage. Check glucose ACHS. BD PosiFlush 0.9 % SOLN  Self Yes No   Blood Glucose Monitoring Suppl (OneTouch Verio Reflect) w/Device KIT  Self No No   Sig: May substitute brand based on insurance coverage. Check glucose ACHS. Blood Pressure Monitoring (Adult Blood Pressure Cuff Lg) KIT  Self No No   Sig: Use 3 (three) times a day   Insulin Pen Needle (BD Pen Needle Lupe 2nd Gen) 32G X 4 MM MISC  Self No No   Sig: For use with insulin pen. Pharmacy may dispense brand covered by insurance. Insulin Pen Needle (BD Pen Needle Lupe 2nd Gen) 32G X 4 MM MISC   No No   Sig: For use with insulin pen. Pharmacy may dispense brand covered by insurance.    Levemir FlexPen 100 units/mL injection pen  Self Yes No   Si Units daily at bedtime   NovoLOG FlexPen 100 units/mL injection pen  Self Yes No   Sig: INJECT 5 UNITS UNDER SKIN 3 TIMES DAILY WITH MEALS OneTouch Delica Lancets 89M MISC  Self No No   Sig: May substitute brand based on insurance coverage. Check glucose ACHS. acetaminophen (TYLENOL) 325 mg tablet  Self No No   Sig: Take 2 tablets (650 mg total) by mouth every 6 (six) hours as needed for mild pain, headaches or fever   apixaban (Eliquis) 5 mg   No No   Sig: Take 1 tablet (5 mg total) by mouth 2 (two) times a day   furosemide (LASIX) 20 mg tablet   No No   Sig: TAKE 1 TABLET BY MOUTH EVERY DAY   glucose blood (OneTouch Verio) test strip  Self No No   Sig: May substitute brand based on insurance coverage. Check glucose ACHS. lisinopril (ZESTRIL) 10 mg tablet  Self No No   Sig: Take 1 tablet (10 mg total) by mouth daily   pancrelipase, Lip-Prot-Amyl, (CREON) 6,000 units delayed release capsule  Self No No   Sig: Take 1 capsule (6,000 Units total) by mouth 3 (three) times a day with meals   polyethylene glycol (MIRALAX) 17 g packet   No No   Sig: Take 17 g by mouth daily for 5 days   sodium chloride, PF, 0.9 %   No No   Sig: 10 mL by Intracatheter route daily for 120 doses Intracatheter flushing daily. May substitute prefilled syringe with normal saline 10 mL vials, 10 mL syringes, and 18 g blunt needles   sodium chloride, PF, 0.9 %   No No   Sig: 10 mL by Intracatheter route daily Intracatheter flushing daily. May substitute prefilled syringe with normal saline 10 mL vials, 10 mL syringes, and 18 g blunt needles   sodium chloride, PF, 0.9 %   No No   Sig: Inject 10 mL into a catheter in a vein in the morning      Facility-Administered Medications Last Administration Doses Remaining   lidocaine (XYLOCAINE) 4 % topical solution 5 mL 10/9/2023  1:16 PM 0          Past Medical History:   Diagnosis Date   • Pancreatitis        Past Surgical History:   Procedure Laterality Date   • CYSTOSCOPY N/A 8/5/2023    Procedure: CYSTOSCOPY.   Saunders insertion;  Surgeon: Ivy Hidalgo MD;  Location: [facility];  Service: Urology   • FL RETROGRADE URETHROCYSTOGRAM  8/5/2023   • HERNIA REPAIR     • IR DRAINAGE TUBE CHECK/CHANGE/REPOSITION/REINSERTION/UPSIZE  9/4/2023   • IR DRAINAGE TUBE PLACEMENT  8/5/2023   • IR DRAINAGE TUBE PLACEMENT  9/6/2023   • IR DRAINAGE TUBE PLACEMENT  9/26/2023   • IR PARACENTESIS  9/6/2023   • IR THORACENTESIS  9/4/2023   • JOINT REPLACEMENT     • TONSILLECTOMY  1976       Family History   Problem Relation Age of Onset   • Cancer Mother    • Multiple myeloma Mother    • Pancreatic cancer Father    • Cancer Father      I have reviewed and agree with the history as documented. E-Cigarette/Vaping   • E-Cigarette Use Never User      E-Cigarette/Vaping Substances   • Nicotine No    • THC No    • CBD No    • Flavoring No    • Other No    • Unknown No      Social History     Tobacco Use   • Smoking status: Never     Passive exposure: Past   • Smokeless tobacco: Former     Types: Chew     Quit date: 7/15/2023   • Tobacco comments:     Quit 7/15/23   Vaping Use   • Vaping Use: Never used   Substance Use Topics   • Alcohol use: Not Currently     Comment: quit 7/15/23 (previous 6 drinks per week)   • Drug use: Never       Review of Systems   Constitutional: Negative for fever. Respiratory: Negative for chest tightness, shortness of breath and stridor. Cardiovascular: Negative for chest pain. Musculoskeletal: Negative for gait problem. Skin: Negative for rash. Neurological: Positive for light-headedness. Negative for seizures, syncope, facial asymmetry and numbness. All other systems reviewed and are negative. Physical Exam  Physical Exam  Vitals and nursing note reviewed. Constitutional:       General: He is not in acute distress. Appearance: He is well-developed. He is ill-appearing. He is not toxic-appearing. HENT:      Head: Normocephalic and atraumatic. Right Ear: External ear normal.      Left Ear: External ear normal.      Nose: Nose normal. No congestion or rhinorrhea.       Mouth/Throat:      Mouth: Mucous membranes are dry. Pharynx: Oropharynx is clear. Eyes:      Conjunctiva/sclera: Conjunctivae normal.      Pupils: Pupils are equal, round, and reactive to light. Neck:      Trachea: No tracheal deviation. Cardiovascular:      Rate and Rhythm: Regular rhythm. Tachycardia present. Pulses: Normal pulses. Heart sounds: Normal heart sounds. No murmur heard. Pulmonary:      Effort: Pulmonary effort is normal. No respiratory distress. Breath sounds: Normal breath sounds. No stridor. No wheezing or rales. Abdominal:      General: There is no distension. Palpations: Abdomen is soft. Tenderness: There is no abdominal tenderness. There is no guarding or rebound. Comments: Peripancreatic drainage tube x 2 in place. No bleeding. Musculoskeletal:         General: No deformity. Normal range of motion. Cervical back: Normal range of motion and neck supple. Right lower leg: Edema present. Left lower leg: Edema present. Comments: 1+ pitting edema bilateral lower EXTR   Skin:     General: Skin is warm and dry. Capillary Refill: Capillary refill takes less than 2 seconds. Findings: No rash. Neurological:      General: No focal deficit present. Mental Status: He is alert and oriented to person, place, and time. Psychiatric:         Behavior: Behavior normal.         Thought Content:  Thought content normal.         Judgment: Judgment normal.         Vital Signs  ED Triage Vitals [10/09/23 1330]   Temperature Pulse Respirations Blood Pressure SpO2   99.1 °F (37.3 °C) (!) 117 18 104/65 99 %      Temp src Heart Rate Source Patient Position - Orthostatic VS BP Location FiO2 (%)   -- Monitor Lying Left arm --      Pain Score       No Pain           Vitals:    10/09/23 1345 10/09/23 1400 10/09/23 1415 10/09/23 1506   BP: 95/60 95/61 95/60 118/65   Pulse: (!) 110 (!) 110 (!) 112 (!) 115   Patient Position - Orthostatic VS: Lying Lying  Lying Visual Acuity  Visual Acuity    Flowsheet Row Most Recent Value   L Pupil Size (mm) 2   R Pupil Size (mm) 2          ED Medications  Medications - No data to display    Diagnostic Studies  Results Reviewed     Procedure Component Value Units Date/Time    Basic metabolic panel [[id number]]  (Abnormal) Collected: 10/09/23 1502    Lab Status: Final result Specimen: Blood from Arm, Right Updated: 10/09/23 1541     Sodium 128 mmol/L      Potassium 4.6 mmol/L      Chloride 94 mmol/L      CO2 26 mmol/L      ANION GAP 8 mmol/L      BUN 29 mg/dL      Creatinine 1.12 mg/dL      Glucose 121 mg/dL      Calcium 8.7 mg/dL      eGFR 74 ml/min/1.73sq m     Narrative:      [place] guidelines for Chronic Kidney Disease (CKD):   •  Stage 1 with normal or high GFR (GFR > 90 mL/min/1.73 square meters)  •  Stage 2 Mild CKD (GFR = 60-89 mL/min/1.73 square meters)  •  Stage 3A Moderate CKD (GFR = 45-59 mL/min/1.73 square meters)  •  Stage 3B Moderate CKD (GFR = 30-44 mL/min/1.73 square meters)  •  Stage 4 Severe CKD (GFR = 15-29 mL/min/1.73 square meters)  •  Stage 5 End Stage CKD (GFR <15 mL/min/1.73 square meters)  Note: GFR calculation is accurate only with a steady state creatinine    Lipase [[id number]]  (Normal) Collected: 10/09/23 1502    Lab Status: Final result Specimen: Blood from Arm, Right Updated: 10/09/23 1541     Lipase 30 u/L     Fingerstick Glucose (POCT) [[id number]]  (Normal) Collected: 10/09/23 1340    Lab Status: Final result Updated: 10/09/23 1341     POC Glucose 113 mg/dl                  CT head without contrast   Final Result by [address] [place] [address],  (10/09 1442)      No acute intracranial abnormality. Workstation performed: [id number]                    Procedures  Procedures         ED Course  ED Course as of 10/09/23 1557   Mon Oct 09, 2023   1555 Sodium low at 128, down from prior 134 to 135 but since recently starting diuretics for edema. SBIRT 20yo+    Flowsheet Row Most Recent Value   Initial Alcohol Screen: US AUDIT-C     1. How often do you have a drink containing alcohol? 0 Filed at: 10/09/2023 1504   2. How many drinks containing alcohol do you have on a typical day you are drinking? 0 Filed at: 10/09/2023 1504   3a. Male UNDER 65: How often do you have five or more drinks on one occasion? 0 Filed at: 10/09/2023 1504   3b. FEMALE Any Age, or MALE 65+: How often do you have 4 or more drinks on one occassion? 0 Filed at: 10/09/2023 1330   Audit-C Score 0 Filed at: 10/09/2023 1504   DENIS: How many times in the past year have you. .. Used an illegal drug or used a prescription medication for non-medical reasons? Never Filed at: 10/09/2023 1504                    Medical Decision Making  MDM    DDX:  ddx includes but is not limited to intracranial injury/bleed/fracture, heat illness, hypoglycemia, dehydration, syncope, tension headache, migraine headache, cluster headache, sinusitis. Initial ED Plan: ct head to evaluate for traumatic injury    MDM:  I have reviewed the patient's vital signs, nursing notes, and other relevant ancillary testing/information. I have had a detailed discussion with the patient regarding the historical points, examination findings, and any diagnostic results    Results:  Reviewed at bedside: ct head negative. NA trending down from baseline. ED summary:  Head injury secondary to fall from wheelchair on eliquis. Head CT negative for acute bleed or fracture. No scalp wound or laceartion on exam. NA trending down from previous in setting of increased diuretic use for edema. Needs outpatient follow up with PCP for further monitoring. Discussed results at bedside with patient. Stable for discharge and outpatient follow-up. I discussed diagnosis and treatment plan with patient at bedside.   Extended discussion with patient regarding the diagnosis, pathophysiology, expectant coarse and treatment plan. Instructed to follow up with pcp and established specialist in 3-5 days. Reviewed reasons to return to ed. Patient verbalized understanding of diagnosis and agreement with discharge plan of care as well as understanding of reasons to return to ed. Amount and/or Complexity of Data Reviewed  Labs: ordered. Radiology: ordered. Disposition  Final diagnoses:   Injury of head, initial encounter   Necrotizing pancreatitis   Lower extremity edema   Hyponatremia     Time reflects when diagnosis was documented in both MDM as applicable and the Disposition within this note     Time User Action Codes Description Comment    10/9/2023  3:00 PM Mahogany Miller Add [J86.22WG] Injury of head, initial encounter     10/9/2023  3:00 PM Lilian Naseem [K85.91] Necrotizing pancreatitis     10/9/2023  3:00 PM Mahogany Heal Add [R60.0] Lower extremity edema     10/9/2023  3:57 PM Mahogany Miller Add [E87.1] Hyponatremia       ED Disposition     ED Disposition   Discharge    Condition   Stable    Date/Time   Mon Oct 9, 2023  3:03 PM    Comment   Susan Jones discharge to home/self care.                Follow-up Information     Follow up With Specialties Details Why Contact Info Additional Information    Janet Webb MD Family Medicine Call in 2 days for further evaluation of symptoms [address] [facility] Emergency Department Emergency Medicine Go to  If symptoms worsen [address] [facility] [place] 2[facility] Emergency Department, [place], [place], [address] [place] [address] Road Call in 1 day for further evaluation of symptoms [address], [address] Patient's Medications   Discharge Prescriptions    No medications on file       No discharge procedures on file.     PDMP Review       Value Time User    PDMP Reviewed  Yes 9/19/2023 10:58 AM Maranda Jimenez PA-C          ED Provider  Electronically Signed by           Corazon Lorenzo PA-C  10/09/23 7909

## 2023-10-10 ENCOUNTER — TELEPHONE (OUTPATIENT)
Dept: FAMILY MEDICINE CLINIC | Facility: CLINIC | Age: 53
End: 2023-10-10

## 2023-10-10 DIAGNOSIS — E87.1 LOW SODIUM LEVELS: Primary | ICD-10-CM

## 2023-10-10 DIAGNOSIS — L89.150 PRESSURE INJURY OF SACRAL REGION, UNSTAGEABLE (HCC): ICD-10-CM

## 2023-10-10 DIAGNOSIS — K85.91 NECROTIZING PANCREATITIS: ICD-10-CM

## 2023-10-10 LAB
ATRIAL RATE: 116 BPM
P AXIS: 60 DEGREES
PR INTERVAL: 126 MS
QRS AXIS: 103 DEGREES
QRSD INTERVAL: 82 MS
QT INTERVAL: 316 MS
QTC INTERVAL: 439 MS
T WAVE AXIS: 50 DEGREES
VENTRICULAR RATE: 116 BPM

## 2023-10-10 PROCEDURE — 93010 ELECTROCARDIOGRAM REPORT: CPT | Performed by: INTERNAL MEDICINE

## 2023-10-10 RX ORDER — SODIUM CHLORIDE 9 MG/ML
10 INJECTION INTRAVENOUS DAILY
Qty: 300 ML | Refills: 0 | Status: SHIPPED | OUTPATIENT
Start: 2023-10-10 | End: 2023-10-21 | Stop reason: CLARIF

## 2023-10-10 RX ORDER — SODIUM CHLORIDE 9 MG/ML
10 INJECTION INTRAVENOUS DAILY
Qty: 500 ML | Refills: 0 | Status: SHIPPED | OUTPATIENT
Start: 2023-10-10 | End: 2023-10-11

## 2023-10-10 RX ORDER — SODIUM CHLORIDE 9 MG/ML
10 INJECTION INTRAVENOUS DAILY
Qty: 900 ML | Refills: 0 | Status: SHIPPED | OUTPATIENT
Start: 2023-10-10 | End: 2023-10-11

## 2023-10-10 NOTE — TELEPHONE ENCOUNTER
V/m from pts wife --    Called for multiple reasons --    Called to report pt passed out at wound care yesterday and fell, hit back and head-- was in ED. Requesting attached refills. Also needs the sodium chloride PF .9% syringes -- requesting they be sent to a local medical supply company, as the local pharmacies don't carry them, and they were using [facility], but now that is too far away.

## 2023-10-10 NOTE — TELEPHONE ENCOUNTER
Pt and wife requesting sodium chloride, PF, .9% and syringes script be sent to [facility], as [facility] does not have them available.

## 2023-10-11 ENCOUNTER — TELEPHONE (OUTPATIENT)
Dept: FAMILY MEDICINE CLINIC | Facility: CLINIC | Age: 53
End: 2023-10-11

## 2023-10-11 DIAGNOSIS — L89.150 PRESSURE INJURY OF SACRAL REGION, UNSTAGEABLE (HCC): Primary | ICD-10-CM

## 2023-10-11 RX ORDER — SODIUM CHLORIDE 9 MG/ML
10 INJECTION INTRAVENOUS DAILY
Qty: 900 ML | Refills: 0 | Status: SHIPPED | OUTPATIENT
Start: 2023-10-11 | End: 2023-10-21 | Stop reason: CLARIF

## 2023-10-11 RX ORDER — SODIUM CHLORIDE 9 MG/ML
10 INJECTION INTRAVENOUS DAILY
Qty: 500 ML | Refills: 0 | Status: SHIPPED | OUTPATIENT
Start: 2023-10-11 | End: 2023-10-21 | Stop reason: CLARIF

## 2023-10-11 RX ORDER — OXYCODONE HYDROCHLORIDE 5 MG/1
5 TABLET ORAL DAILY PRN
Qty: 30 TABLET | Refills: 0 | Status: SHIPPED | OUTPATIENT
Start: 2023-10-11 | End: 2023-10-21 | Stop reason: CLARIF

## 2023-10-11 NOTE — TELEPHONE ENCOUNTER
T/c from pt's spouse -- asking for refills for pt's Oxycodone (was given in hospital)for at night for his feet. Pt still having a lot of pain in his foot. Also wondering if they should cut back on his water pill being the swelling has gone down.      Please advise

## 2023-10-11 NOTE — TELEPHONE ENCOUNTER
Yes hold lasix, check daily weights the next 3-5 days.  If starting to gain 5+lbs with worsening edema, SOB when laying down, take a lasix

## 2023-10-12 DIAGNOSIS — R60.9 EDEMA, UNSPECIFIED TYPE: ICD-10-CM

## 2023-10-12 RX ORDER — FUROSEMIDE 20 MG/1
20 TABLET ORAL DAILY
Qty: 90 TABLET | Refills: 1 | Status: SHIPPED | OUTPATIENT
Start: 2023-10-12 | End: 2023-10-21 | Stop reason: CLARIF

## 2023-10-12 NOTE — TELEPHONE ENCOUNTER
Fax arrived from [facility] -   oxyCODONE (Roxicodone) 5 immediate release tablet  requires prior auth -  KEY : [id number]      Proceed with PA?     Please advise

## 2023-10-13 ENCOUNTER — HOSPITAL ENCOUNTER (EMERGENCY)
Facility: HOSPITAL | Age: 53
End: 2023-10-13
Attending: EMERGENCY MEDICINE
Payer: COMMERCIAL

## 2023-10-13 ENCOUNTER — APPOINTMENT (EMERGENCY)
Dept: CT IMAGING | Facility: HOSPITAL | Age: 53
End: 2023-10-13
Payer: COMMERCIAL

## 2023-10-13 ENCOUNTER — APPOINTMENT (EMERGENCY)
Dept: RADIOLOGY | Facility: HOSPITAL | Age: 53
End: 2023-10-13
Payer: COMMERCIAL

## 2023-10-13 VITALS
HEART RATE: 120 BPM | TEMPERATURE: 98.8 F | DIASTOLIC BLOOD PRESSURE: 69 MMHG | SYSTOLIC BLOOD PRESSURE: 116 MMHG | OXYGEN SATURATION: 96 % | RESPIRATION RATE: 27 BRPM

## 2023-10-13 DIAGNOSIS — K65.1 INTRA-ABDOMINAL ABSCESS (HCC): Primary | ICD-10-CM

## 2023-10-13 LAB
2HR DELTA HS TROPONIN: -3 NG/L
ALBUMIN SERPL BCP-MCNC: 2.6 G/DL (ref 3.5–5)
ALP SERPL-CCNC: 83 U/L (ref 34–104)
ALT SERPL W P-5'-P-CCNC: 24 U/L (ref 7–52)
AMORPH URATE CRY URNS QL MICRO: ABNORMAL
ANION GAP SERPL CALCULATED.3IONS-SCNC: 9 MMOL/L
APTT PPP: 55 SECONDS (ref 23–37)
AST SERPL W P-5'-P-CCNC: 32 U/L (ref 13–39)
ATRIAL RATE: 104 BPM
BACTERIA UR QL AUTO: ABNORMAL /HPF
BASOPHILS # BLD MANUAL: 0 THOUSAND/UL (ref 0–0.1)
BASOPHILS NFR MAR MANUAL: 0 % (ref 0–1)
BILIRUB SERPL-MCNC: 0.54 MG/DL (ref 0.2–1)
BILIRUB UR QL STRIP: NEGATIVE
BUN SERPL-MCNC: 24 MG/DL (ref 5–25)
CALCIUM ALBUM COR SERPL-MCNC: 9.5 MG/DL (ref 8.3–10.1)
CALCIUM SERPL-MCNC: 8.4 MG/DL (ref 8.4–10.2)
CARDIAC TROPONIN I PNL SERPL HS: 3 NG/L
CARDIAC TROPONIN I PNL SERPL HS: 6 NG/L
CHLORIDE SERPL-SCNC: 93 MMOL/L (ref 96–108)
CLARITY UR: CLEAR
CO2 SERPL-SCNC: 25 MMOL/L (ref 21–32)
COLOR UR: YELLOW
CREAT SERPL-MCNC: 1.02 MG/DL (ref 0.6–1.3)
EOSINOPHIL # BLD MANUAL: 0 THOUSAND/UL (ref 0–0.4)
EOSINOPHIL NFR BLD MANUAL: 0 % (ref 0–6)
ERYTHROCYTE [DISTWIDTH] IN BLOOD BY AUTOMATED COUNT: 19 % (ref 11.6–15.1)
FLUAV RNA RESP QL NAA+PROBE: NEGATIVE
FLUBV RNA RESP QL NAA+PROBE: NEGATIVE
GFR SERPL CREATININE-BSD FRML MDRD: 83 ML/MIN/1.73SQ M
GLUCOSE SERPL-MCNC: 108 MG/DL (ref 65–140)
GLUCOSE SERPL-MCNC: 116 MG/DL (ref 65–140)
GLUCOSE UR STRIP-MCNC: NEGATIVE MG/DL
HCT VFR BLD AUTO: 26.7 % (ref 36.5–49.3)
HGB BLD-MCNC: 8.1 G/DL (ref 12–17)
HGB UR QL STRIP.AUTO: NEGATIVE
HYALINE CASTS #/AREA URNS LPF: ABNORMAL /LPF
HYPERCHROMIA BLD QL SMEAR: PRESENT
INR PPP: 2.1 (ref 0.84–1.19)
KETONES UR STRIP-MCNC: ABNORMAL MG/DL
LACTATE SERPL-SCNC: 1.9 MMOL/L (ref 0.5–2)
LEUKOCYTE ESTERASE UR QL STRIP: NEGATIVE
LIPASE SERPL-CCNC: 36 U/L (ref 11–82)
LYMPHOCYTES # BLD AUTO: 0.71 THOUSAND/UL (ref 0.6–4.47)
LYMPHOCYTES # BLD AUTO: 8 % (ref 14–44)
MCH RBC QN AUTO: 26.4 PG (ref 26.8–34.3)
MCHC RBC AUTO-ENTMCNC: 30.3 G/DL (ref 31.4–37.4)
MCV RBC AUTO: 87 FL (ref 82–98)
MICROCYTES BLD QL AUTO: PRESENT
MONOCYTES # BLD AUTO: 0.62 THOUSAND/UL (ref 0–1.22)
MONOCYTES NFR BLD: 7 % (ref 4–12)
NEUTROPHILS # BLD MANUAL: 7.5 THOUSAND/UL (ref 1.85–7.62)
NEUTS BAND NFR BLD MANUAL: 24 % (ref 0–8)
NEUTS SEG NFR BLD AUTO: 61 % (ref 43–75)
NITRITE UR QL STRIP: NEGATIVE
NON-SQ EPI CELLS URNS QL MICRO: ABNORMAL /HPF
P AXIS: 26 DEGREES
PH UR STRIP.AUTO: 5.5 [PH]
PLATELET # BLD AUTO: 478 THOUSANDS/UL (ref 149–390)
PLATELET BLD QL SMEAR: ABNORMAL
PMV BLD AUTO: 9 FL (ref 8.9–12.7)
POLYCHROMASIA BLD QL SMEAR: PRESENT
POTASSIUM SERPL-SCNC: 4.4 MMOL/L (ref 3.5–5.3)
PR INTERVAL: 132 MS
PROCALCITONIN SERPL-MCNC: 0.73 NG/ML
PROT SERPL-MCNC: 7 G/DL (ref 6.4–8.4)
PROT UR STRIP-MCNC: ABNORMAL MG/DL
PROTHROMBIN TIME: 24.4 SECONDS (ref 11.6–14.5)
QRS AXIS: 64 DEGREES
QRSD INTERVAL: 90 MS
QT INTERVAL: 342 MS
QTC INTERVAL: 449 MS
RBC # BLD AUTO: 3.07 MILLION/UL (ref 3.88–5.62)
RBC #/AREA URNS AUTO: ABNORMAL /HPF
RBC MORPH BLD: PRESENT
RSV RNA RESP QL NAA+PROBE: NEGATIVE
SARS-COV-2 RNA RESP QL NAA+PROBE: NEGATIVE
SODIUM SERPL-SCNC: 127 MMOL/L (ref 135–147)
SP GR UR STRIP.AUTO: 1.02 (ref 1–1.03)
T WAVE AXIS: 24 DEGREES
UROBILINOGEN UR STRIP-ACNC: <2 MG/DL
VENTRICULAR RATE: 104 BPM
WBC # BLD AUTO: 8.82 THOUSAND/UL (ref 4.31–10.16)
WBC #/AREA URNS AUTO: ABNORMAL /HPF

## 2023-10-13 PROCEDURE — 96361 HYDRATE IV INFUSION ADD-ON: CPT

## 2023-10-13 PROCEDURE — 85027 COMPLETE CBC AUTOMATED: CPT | Performed by: EMERGENCY MEDICINE

## 2023-10-13 PROCEDURE — 83605 ASSAY OF LACTIC ACID: CPT | Performed by: EMERGENCY MEDICINE

## 2023-10-13 PROCEDURE — 83690 ASSAY OF LIPASE: CPT | Performed by: EMERGENCY MEDICINE

## 2023-10-13 PROCEDURE — 93010 ELECTROCARDIOGRAM REPORT: CPT | Performed by: INTERNAL MEDICINE

## 2023-10-13 PROCEDURE — 84145 PROCALCITONIN (PCT): CPT | Performed by: EMERGENCY MEDICINE

## 2023-10-13 PROCEDURE — 96366 THER/PROPH/DIAG IV INF ADDON: CPT

## 2023-10-13 PROCEDURE — 84484 ASSAY OF TROPONIN QUANT: CPT | Performed by: EMERGENCY MEDICINE

## 2023-10-13 PROCEDURE — 71046 X-RAY EXAM CHEST 2 VIEWS: CPT

## 2023-10-13 PROCEDURE — 93005 ELECTROCARDIOGRAM TRACING: CPT

## 2023-10-13 PROCEDURE — 96365 THER/PROPH/DIAG IV INF INIT: CPT

## 2023-10-13 PROCEDURE — 85610 PROTHROMBIN TIME: CPT | Performed by: EMERGENCY MEDICINE

## 2023-10-13 PROCEDURE — 99285 EMERGENCY DEPT VISIT HI MDM: CPT

## 2023-10-13 PROCEDURE — 82948 REAGENT STRIP/BLOOD GLUCOSE: CPT

## 2023-10-13 PROCEDURE — 85007 BL SMEAR W/DIFF WBC COUNT: CPT | Performed by: EMERGENCY MEDICINE

## 2023-10-13 PROCEDURE — 0241U HB NFCT DS VIR RESP RNA 4 TRGT: CPT | Performed by: EMERGENCY MEDICINE

## 2023-10-13 PROCEDURE — 85730 THROMBOPLASTIN TIME PARTIAL: CPT | Performed by: EMERGENCY MEDICINE

## 2023-10-13 PROCEDURE — 87040 BLOOD CULTURE FOR BACTERIA: CPT | Performed by: EMERGENCY MEDICINE

## 2023-10-13 PROCEDURE — 36415 COLL VENOUS BLD VENIPUNCTURE: CPT | Performed by: EMERGENCY MEDICINE

## 2023-10-13 PROCEDURE — 96367 TX/PROPH/DG ADDL SEQ IV INF: CPT

## 2023-10-13 PROCEDURE — 71260 CT THORAX DX C+: CPT

## 2023-10-13 PROCEDURE — 81001 URINALYSIS AUTO W/SCOPE: CPT | Performed by: EMERGENCY MEDICINE

## 2023-10-13 PROCEDURE — G1004 CDSM NDSC: HCPCS

## 2023-10-13 PROCEDURE — 74177 CT ABD & PELVIS W/CONTRAST: CPT

## 2023-10-13 PROCEDURE — 80053 COMPREHEN METABOLIC PANEL: CPT | Performed by: EMERGENCY MEDICINE

## 2023-10-13 RX ORDER — CLINDAMYCIN PHOSPHATE 600 MG/50ML
600 INJECTION INTRAVENOUS ONCE
Status: DISCONTINUED | OUTPATIENT
Start: 2023-10-13 | End: 2023-10-13

## 2023-10-13 RX ORDER — CEFEPIME HYDROCHLORIDE 2 G/50ML
2000 INJECTION, SOLUTION INTRAVENOUS ONCE
Status: COMPLETED | OUTPATIENT
Start: 2023-10-13 | End: 2023-10-13

## 2023-10-13 RX ADMIN — VANCOMYCIN HYDROCHLORIDE 2000 MG: 1 INJECTION, POWDER, LYOPHILIZED, FOR SOLUTION INTRAVENOUS at 18:49

## 2023-10-13 RX ADMIN — SODIUM CHLORIDE 1000 ML: 0.9 INJECTION, SOLUTION INTRAVENOUS at 14:44

## 2023-10-13 RX ADMIN — CEFEPIME HYDROCHLORIDE 2000 MG: 2 INJECTION, SOLUTION INTRAVENOUS at 18:23

## 2023-10-13 RX ADMIN — IOHEXOL 100 ML: 350 INJECTION, SOLUTION INTRAVENOUS at 16:28

## 2023-10-13 NOTE — EMTALA/ACUTE CARE TRANSFER
[address] [facility] [address]  Dept: [phone]      EMTALA TRANSFER CONSENT    NAME Azeem Soriano                                         1970                              MRN [id number]    I have been informed of my rights regarding examination, treatment, and transfer   by Dr. Sandra Grimes DO    Benefits: Specialized equipment and/or services available at the receiving facility (Include comment)________________________, Other benefits (Include comment)_______________________    Risks: Potential for delay in receiving treatment, Other: (Include comment)__________________________      Transfer Request   I acknowledge that my medical condition has been evaluated and explained to me by the emergency department physician or other qualified medical person and/or my attending physician who has recommended and offered to me further medical examination and treatment. I understand the Hospital's obligation with respect to the treatment and stabilization of my emergency medical condition. I nevertheless request to be transferred. I release the Hospital, the doctor, and any other persons caring for me from all responsibility or liability for any injury or ill effects that may result from my transfer and agree to accept all responsibility for the consequences of my choice to transfer, rather than receive stabilizing treatment at the Hospital. I understand that because the transfer is my request, my insurance may not provide reimbursement for the services. The Hospital will assist and direct me and my family in how to make arrangements for transfer, but the hospital is not liable for any fees charged by the transport service. In spite of this understanding, I refuse to consent to further medical examination and treatment which has been offered to me, and request transfer to [address] [place] [address] [address] Name, [address] [facility].  I authorize the performance of emergency medical procedures and treatments upon me in both transit and upon arrival at the receiving facility. Additionally, I authorize the release of any and all medical records to the receiving facility and request they be transported with me, if possible. I authorize the performance of emergency medical procedures and treatments upon me in both transit and upon arrival at the receiving facility. Additionally, I authorize the release of any and all medical records to the receiving facility and request they be transported with me, if possible. I understand that the safest mode of transportation during a medical emergency is an ambulance and that the Hospital advocates the use of this mode of transport. Risks of traveling to the receiving facility by car, including absence of medical control, life sustaining equipment, such as oxygen, and medical personnel has been explained to me and I fully understand them. (GEE CORRECT BOX BELOW)  [  ]  I consent to the stated transfer and to be transported by ambulance/helicopter. [  ]  I consent to the stated transfer, but refuse transportation by ambulance and accept full responsibility for my transportation by car. I understand the risks of non-ambulance transfers and I exonerate the Hospital and its staff from any deterioration in my condition that results from this refusal.    X___________________________________________    DATE  10/13/23  TIME________  Signature of patient or legally responsible individual signing on patient behalf           RELATIONSHIP TO PATIENT_________________________          Provider Certification    NAME Domi Williamson                                        [facility] 1970                              MRN [id number]    A medical screening exam was performed on the above named patient. Based on the examination:    Condition Necessitating Transfer The encounter diagnosis was Intra-abdominal abscess ([address]).     Patient Condition: The patient has been stabilized such that within reasonable medical probability, no material deterioration of the patient condition or the condition of the unborn child(jorge) is likely to result from the transfer    Reason for Transfer: Level of Care needed not available at this facility, Other (Include comment)____________________    Transfer Requirements: [address] available and qualified personnel available for treatment as acknowledged by PACS  Agreed to accept transfer and to provide appropriate medical treatment as acknowledged by       M[place]h  Appropriate medical records of the examination and treatment of the patient are provided at the time of transfer   [address] _______  Transfer will be performed by qualified personnel from    and appropriate transfer equipment as required, including the use of necessary and appropriate life support measures. Provider Certification: I have examined the patient and explained the following risks and benefits of being transferred/refusing transfer to the patient/family:  General risk, such as traffic hazards, adverse weather conditions, rough terrain or turbulence, possible failure of equipment (including vehicle or aircraft), or consequences of actions of persons outside the control of the transport personnel, Unanticipated needs of medical equipment and personnel during transport, Risk of worsening condition      Based on these reasonable risks and benefits to the patient and/or the unborn child(jorge), and based upon the information available at the time of the patient’s examination, I certify that the medical benefits reasonably to be expected from the provision of appropriate medical treatments at another medical facility outweigh the increasing risks, if any, to the individual’s medical condition, and in the case of labor to the unborn child, from effecting the transfer.     X____________________________________________ DATE 10/13/23        TIME_______      ORIGINAL - SEND TO MEDICAL RECORDS   COPY - SEND WITH PATIENT DURING TRANSFER

## 2023-10-13 NOTE — ED PROVIDER NOTES
History  Chief Complaint   Patient presents with   • Weakness - Generalized     Pt arrives EMS from home for near syncope episodes at home following discharge from hospital on Tuesday. C/o generalized weakness. 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 Patient states that he continues to feel like he is going to pass out every time he stands up but has not lost consciousness as of yet. Was seen here 3 days ago for head injury and had CT scan that was normal.  Denies any loss of consciousness from the near syncope. States that he has been eating and drinking normally but has poor p.o. intake at baseline. Denies any abdominal pain, nausea or vomiting, diarrhea, chest pain, shortness breath, vision changes, neck pain, numbness or tingling, weakness. Notes decreased blood pressures in PCP office therefore has stopped Lasix. Denies any new head trauma, denies fevers. States last bowel movement was 4 days ago. Prior to Admission Medications   Prescriptions Last Dose Informant Patient Reported? Taking? Alcohol Swabs 70 % PADS  Self No No   Sig: May substitute brand based on insurance coverage. Check glucose ACHS. BD PosiFlush 0.9 % SOLN  Self Yes No   Blood Glucose Monitoring Suppl (OneTouch Verio Reflect) w/Device KIT  Self No No   Sig: May substitute brand based on insurance coverage. Check glucose ACHS. Blood Pressure Monitoring (Adult Blood Pressure Cuff Lg) KIT  Self No No   Sig: Use 3 (three) times a day   Insulin Pen Needle (BD Pen Needle Lupe 2nd Gen) 32G X 4 MM MISC  Self No No   Sig: For use with insulin pen. Pharmacy may dispense brand covered by insurance. Insulin Pen Needle (BD Pen Needle Lupe 2nd Gen) 32G X 4 MM MISC   No No   Sig: For use with insulin pen. Pharmacy may dispense brand covered by insurance. Levemir FlexPen 100 units/mL injection pen  Self Yes No   Si Units daily at bedtime   NovoLOG FlexPen 100 units/mL injection pen  Self Yes No   Sig: INJECT 5 UNITS UNDER SKIN 3 TIMES DAILY WITH MEALS   OneTouch Delica Lancets 85X MISC  Self No No   Sig: May substitute brand based on insurance coverage. Check glucose ACHS. acetaminophen (TYLENOL) 325 mg tablet  Self No No   Sig: Take 2 tablets (650 mg total) by mouth every 6 (six) hours as needed for mild pain, headaches or fever   apixaban (Eliquis) 5 mg   No No   Sig: Take 1 tablet (5 mg total) by mouth 2 (two) times a day   furosemide (LASIX) 20 mg tablet   No No   Sig: TAKE 1 TABLET BY MOUTH EVERY DAY   glucose blood (OneTouch Verio) test strip  Self No No   Sig: May substitute brand based on insurance coverage. Check glucose ACHS. lisinopril (ZESTRIL) 10 mg tablet  Self No No   Sig: Take 1 tablet (10 mg total) by mouth daily   oxyCODONE (Roxicodone) 5 immediate release tablet   No No   Sig: Take 1 tablet (5 mg total) by mouth daily as needed for moderate pain Max Daily Amount: 5 mg   pancrelipase, Lip-Prot-Amyl, (CREON) 6,000 units delayed release capsule   No No   Sig: Take 1 capsule (6,000 Units total) by mouth 3 (three) times a day with meals   polyethylene glycol (MIRALAX) 17 g packet   No No   Sig: Take 17 g by mouth daily for 5 days   sodium chloride, PF, 0.9 %   No No   Sig: 10 mL by Intracatheter route daily for 120 doses Intracatheter flushing daily. May substitute prefilled syringe with normal saline 10 mL vials, 10 mL syringes, and 18 g blunt needles   sodium chloride, PF, 0.9 %   No No   Sig: Inject 10 mL into a catheter in a vein in the morning   sodium chloride, PF, 0.9 %   No No   Sig: 10 mL by Intracatheter route daily Intracatheter flushing daily.  May substitute prefilled syringe with normal saline 10 mL vials, 10 mL syringes, and 18 g blunt needles      Facility-Administered Medications: None       Past Medical History:   Diagnosis Date   • Pancreatitis        Past Surgical History:   Procedure Laterality Date   • CYSTOSCOPY N/A 8/5/2023    Procedure: CYSTOSCOPY. Saunders insertion;  Surgeon: Marybel Rubio MD;  Location: [facility];  Service: Urology   • FL RETROGRADE URETHROCYSTOGRAM  8/5/2023   • HERNIA REPAIR     • IR DRAINAGE TUBE CHECK/CHANGE/REPOSITION/REINSERTION/UPSIZE  9/4/2023   • IR DRAINAGE TUBE PLACEMENT  8/5/2023   • IR DRAINAGE TUBE PLACEMENT  9/6/2023   • IR DRAINAGE TUBE PLACEMENT  9/26/2023   • IR PARACENTESIS  9/6/2023   • IR THORACENTESIS  9/4/2023   • JOINT REPLACEMENT     • TONSILLECTOMY  1976       Family History   Problem Relation Age of Onset   • Cancer Mother    • Multiple myeloma Mother    • Pancreatic cancer Father    • Cancer Father      I have reviewed and agree with the history as documented. E-Cigarette/Vaping   • E-Cigarette Use Never User      E-Cigarette/Vaping Substances   • Nicotine No    • THC No    • CBD No    • Flavoring No    • Other No    • Unknown No      Social History     Tobacco Use   • Smoking status: Never     Passive exposure: Past   • Smokeless tobacco: Former     Types: Chew     Quit date: 7/15/2023   • Tobacco comments:     Quit 7/15/23   Vaping Use   • Vaping Use: Never used   Substance Use Topics   • Alcohol use: Not Currently     Comment: quit 7/15/23 (previous 6 drinks per week)   • Drug use: Never       Review of Systems   All other systems reviewed and are negative. Physical Exam  Physical Exam  Vitals reviewed. Constitutional:       General: He is not in acute distress. Appearance: Normal appearance. He is not ill-appearing. HENT:      Mouth/Throat:      Mouth: Mucous membranes are dry. Eyes:      Extraocular Movements: Extraocular movements intact. Conjunctiva/sclera: Conjunctivae normal.   Cardiovascular:      Rate and Rhythm: Normal rate and regular rhythm. Pulses: Normal pulses. Heart sounds: Normal heart sounds.    Pulmonary:      Effort: Pulmonary effort is normal.      Breath sounds: Normal breath sounds. Abdominal:      General: Abdomen is flat. Palpations: Abdomen is soft. Tenderness: There is no abdominal tenderness. Musculoskeletal:         General: No swelling. Normal range of motion. Cervical back: Neck supple. Right lower leg: No edema. Left lower leg: No edema. Skin:     General: Skin is warm and dry. Neurological:      General: No focal deficit present. Mental Status: He is alert.       Coordination: Coordination normal.   Psychiatric:         Mood and Affect: Mood normal.         Vital Signs  ED Triage Vitals   Temperature Pulse Respirations Blood Pressure SpO2   10/13/23 1350 10/13/23 1342 10/13/23 1342 10/13/23 1342 10/13/23 1342   98.7 °F (37.1 °C) (!) 106 18 97/65 100 %      Temp Source Heart Rate Source Patient Position - Orthostatic VS BP Location FiO2 (%)   10/13/23 1350 10/13/23 1342 -- 10/13/23 1342 --   Oral Monitor  Right arm       Pain Score       --                  Vitals:    10/13/23 1342   BP: 97/65   Pulse: (!) 106         Visual Acuity      ED Medications  Medications   sodium chloride 0.9 % bolus 1,000 mL (has no administration in time range)       Diagnostic Studies  Results Reviewed       None                   XR chest pa & lateral    (Results Pending)              Procedures  ECG 12 Lead Documentation Only    Date/Time: 10/13/2023 2:35 PM    Performed by: Jobie Closs, DO  Authorized by: Jobie Closs, DO    Patient location:  ED  Previous ECG:     Previous ECG:  Compared to current    Similarity:  No change  Interpretation:     Interpretation: normal    Rate:     ECG rate assessment: normal    Rhythm:     Rhythm: sinus rhythm    Ectopy:     Ectopy: none    QRS:     QRS axis:  Normal    QRS intervals:  Normal  Conduction:     Conduction: normal    ST segments:     ST segments:  Normal  T waves:     T waves: normal             ED Course  ED Course as of 10/13/23 1941 Fri Oct 13, 2023   4305 Patient with recurrent necrotizing pancreatitis with abscess, will discuss with surgery. Medical Decision Making  Patient is a 48 male past medical history of SMA and portal vein thrombosis on Eliquis, hypertension, necrotizing pancreatitis presenting for near syncope. Patient is well-appearing at bedside though with low-grade tachycardia and hypotension at 97/65 and 106 heart rate but mentating appropriately in no acute distress. He has nontender abdomen, no gross amount is on neurologic exam and no other significant physical exam findings with the exception of dry mucosa. Will give fluids, obtain labs to assess for electrolyte abnormalities, anemia, EKG to assess for arrhythmia, urinalysis, chest x-ray to assess for signs of infection and continue to monitor. Amount and/or Complexity of Data Reviewed  Labs: ordered. Radiology: ordered. Disposition  Final diagnoses:   None     ED Disposition       None          Follow-up Information    None         Patient's Medications   Discharge Prescriptions    No medications on file       No discharge procedures on file.     PDMP Review         Value Time User    PDMP Reviewed  Yes 9/19/2023 10:58 AM Kesha Vincent PA-C            ED Provider  Electronically Signed by             Kacy Polanco DO  10/13/23 2761

## 2023-10-14 ENCOUNTER — TELEPHONE (OUTPATIENT)
Dept: OTHER | Facility: OTHER | Age: 53
End: 2023-10-14

## 2023-10-14 NOTE — PROGRESS NOTES
Progress Note - General Surgery   GI Resident on RED Service   Oscar Fleming 48 y.o. male MRN: [id number]  Unit/Bed#: [facility] 519-01 Encounter: [id number]    Assessment:  49 yo male who presents with syncope most likely 2/2 orthostatic hypotension from poor oral intake due to his pancreatitis. Concern for worsening collection in paracolic gutter. Tachycardic to the 120's   cc   Drains with 30cc, 275 cc and 0 cc of output  WBC 9.5 from 8.8  Hgb 7 from 8.1  Cr 0.83 from 1.02    Plan:  NPO sips with meds  IV fluids  No abx at this time as there are no signs of infection  Will have ID consult for abx management when needed  PRN pain meds  IR consult for repositioning of IR drain  PRN anti nausea meds  DVT prophylaxis    Subjective/Objective     Subjective: No acute events overnight. Patient denies having abdominal pain. Denies having nausea, vomiting, fevers, chills, chest pain, shortness of breath. Currently NPO. No bowel movements and no flatus. Voiding without difficulty. Objective:     Blood pressure 107/61, pulse (!) 107, temperature 98.4 °F (36.9 °C), temperature source Oral, resp. rate 20, height 5' 11" (1.803 m), SpO2 96 %. ,Body mass index is 38.35 kg/m². Intake/Output Summary (Last 24 hours) at 10/14/2023 0940  Last data filed at 10/14/2023 0901  Gross per 24 hour   Intake --   Output 705 ml   Net -705 ml         Invasive Devices       Peripheral Intravenous Line  Duration             Peripheral IV 10/13/23 Right Forearm <1 day              Drain  Duration             Abscess Drain RUQ 39 days    Abscess Drain LUQ 37 days    Abscess Drain LUQ 37 days    Abscess Drain LLQ 17 days                    General: NAD  HENT: NCAT MMM  Neck: supple, no JVD  CV: nl rate  Lungs: nl wob. No resp distress  ABD: Soft, nontender, nondistended. Anterior drains with seropurulent output. Posterior CT drain with dark necrotic output.    Extrem: No CCE  Neuro: AAOx3       Scheduled Meds:  Current Facility-Administered Medications   Medication Dose Route Frequency Provider Last Rate    acetaminophen  650 mg Oral Q6H PRN Kristin Patel MD      furosemide  20 mg Oral Daily Kristin Patel MD      heparin (porcine)  5,000 Units Subcutaneous [facility] Kristin Patel MD      HYDROmorphone  0.5 mg Intravenous Q3H PRN Kristin Patel MD      insulin lispro  2-12 Units Subcutaneous Q6H [address] Kristin Patel MD      lisinopril  10 mg Oral Daily Kristin Patel MD      multi-electrolyte  125 mL/hr Intravenous Continuous Kristin Patel  mL/hr (10/14/23 0133)    ondansetron  4 mg Intravenous Q6H PRN Kristin Patel MD      oxyCODONE  10 mg Oral Q4H PRN Kristin Patel MD      oxyCODONE  5 mg Oral Q4H PRN Kristin Patel MD      pancrelipase (Lip-Prot-Amyl)  6,000 Units Oral TID With [facility], MD       Continuous Infusions:multi-electrolyte, 125 mL/hr, Last Rate: 125 mL/hr (10/14/23 0133)      PRN Meds:.  acetaminophen    HYDROmorphone    ondansetron    oxyCODONE    oxyCODONE      Lab, Imaging and other studies:I have personally reviewed pertinent lab results.     VTE Pharmacologic Prophylaxis: Heparin  VTE Mechanical Prophylaxis: sequential compression device      Kristin Patel MD  10/14/2023 9:40 AM

## 2023-10-14 NOTE — ED NOTES
Transfer inofrmation:     Transfer to: \[facility]\"" ED    : 2115 with SLETS    Accepting: Dr. Gabbie Cruz red surgery    Report: [facility], [address] [place] [address]  10/13/23 2017

## 2023-10-14 NOTE — UTILIZATION REVIEW
Initial Clinical Review    Admission: Date/Time/Statement:   Admission Orders (From admission, onward)       Ordered        10/13/23 2327  Inpatient Admission  Once                          Orders Placed This Encounter   Procedures    Inpatient Admission     Standing Status:   Standing     Number of Occurrences:   1     Order Specific Question:   Level of Care     Answer:   Level 2 Stepdown / HOT [14]     Order Specific Question:   Estimated length of stay     Answer:   More than 2 Midnights     Order Specific Question:   Certification     Answer:   I certify that inpatient services are medically necessary for this patient for a duration of greater than two midnights. See H&P and MD Progress Notes for additional information about the patient's course of treatment. ED Arrival Information       Expected   10/13/2023     Arrival   10/13/2023 22:34    Acuity   Emergent              Means of arrival   Ambulance    Escorted by   JOHN Beck)    Service   Surgery-General    Admission type   Emergency              Arrival complaint   intra-abdominal abscess             Chief Complaint   Patient presents with    Abdominal Pain     Patient hx pancreatic nec fasc, had 3 drains placed July. Tx for surgery team with drainage from one of drains. Initial Presentation: 48 y.o. male presents to [facility] as a transfer from [address] [place] [address] ED Admitted Inpatient to [facility] 2 Step down unit with syncope likely 2/2 orthostatic hypotension from poor oral intake d/t his pancreatitis. PMHx of portal vein thrombosis, SMA thrombosis (on eliquis), HTN, and necrotizing pancreatitis required IR drains x3. On presentation T 99.9, tachycardic, tachypnic. WBCs wnl. His drains are productive and making serous necrotic fluid. CT AP demonstrated a previously known infected pancreatic mass, a 7.7 x 2.4 cm paracolic gutter abscess,and ascites. Plan: npo sips with meds. IVFs. Hold on abx at this time. ID consulted. Analgesics/antiemetics prn. Consult IR. SCDs. Date: 10/14  Day 2: No acute events overnight. Currently NPO. Abdomen soft, nontender, nondistended. Anterior drains with seropurulent output. Posterior CT drain with dark necrotic output. Tachycardic. Drains with 30cc, 275 cc and 0 cc of output. WBC 9.5 from 8.8. Hgb 7 from 8.1. Cr 0.83 from 1.02. continue npo, sips with meds. IVFs. No abx at this time. ID consulted. Continue prn antiemetics. SCDs. IR consult -- The left lateral drain is dislodged and not draining. Plan for removal of that drain and replacement under CT guidance.        ED Triage Vitals   Temperature Pulse Respirations Blood Pressure SpO2   10/13/23 2243 10/13/23 2243 10/13/23 2243 10/13/23 2243 10/13/23 2243   99.9 °F (37.7 °C) (!) 124 22 109/57 96 %      Temp Source Heart Rate Source Patient Position - Orthostatic VS BP Location FiO2 (%)   10/13/23 2243 10/13/23 2243 10/13/23 2243 10/13/23 2243 --   Oral Monitor Sitting Right arm       Pain Score       10/14/23 0145       No Pain          Wt Readings from Last 1 Encounters:   10/04/23 125 kg (275 lb)     Additional Vital Signs:   ate/Time Temp Pulse Resp BP MAP (mmHg) SpO2 O2 Device Patient Position - Orthostatic VS   10/14/23 11:19:39 98.5 °F (36.9 °C) -- -- -- -- -- -- --   10/14/23 0858 98.4 °F (36.9 °C) 107 Abnormal  20 107/61 78 96 % None (Room air) Lying   10/14/23 0700 98.5 °F (36.9 °C) 105 19 103/59 77 95 % -- Lying   10/14/23 01:56:07 99.6 °F (37.6 °C) 117 Abnormal  20 108/66 -- 96 % None (Room air) Lying   10/14/23 0145 -- -- -- -- -- -- None (Room air) --   10/13/23 2243 99.9 °F (37.7 °C) 124 Abnormal  22 109/57 78 96 % None (Room air) Sitting     Pertinent Labs/Diagnostic Test Results:   EKG 10/13:  Sinus tachycardia  Cannot rule out Anterior infarct (cited on or before 09-OCT-2023)  Abnormal ECG  When compared with ECG of 09-OCT-2023 13:51,  No significant change was found    CT c/a/p 10/13:  Left greater than right pleural effusions with associated dependent atelectasis. Extensive infected pancreatic necrosis again identified minimally changed from the prior exam despite presence of 2 drainage catheters. 7.7 x 2.4 cm gas containing left paracolic gutter collection is identified suggesting abscess. Small to moderate volume of ascites some of which appears loculated.      CXR 10/13:  Bilateral effusion left greater than right   Obscured left base may be due to atelectasis/consolidation     Results from last 7 days   Lab Units 10/13/23  1423   SARS-COV-2  Negative     Results from last 7 days   Lab Units 10/14/23  0832 10/14/23  0451 10/13/23  1420   WBC Thousand/uL  --  9.50 8.82   HEMOGLOBIN g/dL 7.7* 7.0* 8.1*   HEMATOCRIT % 25.1* 22.7* 26.7*   PLATELETS Thousands/uL 407* 423* 478*   BANDS PCT %  --   --  24*     Results from last 7 days   Lab Units 10/14/23  0451 10/13/23  1420 10/09/23  1502   SODIUM mmol/L 130* 127* 128*   POTASSIUM mmol/L 3.9 4.4 4.6   CHLORIDE mmol/L 97 93* 94*   CO2 mmol/L 23 25 26   ANION GAP mmol/L 10 9 8   BUN mg/dL 23 24 29*   CREATININE mg/dL 0.83 1.02 1.12   EGFR ml/min/1.73sq m 100 83 74   CALCIUM mg/dL 7.7* 8.4 8.7   MAGNESIUM mg/dL 1.6*  --   --    PHOSPHORUS mg/dL 3.8  --   --      Results from last 7 days   Lab Units 10/14/23  0451 10/13/23  1420   AST U/L 22 32   ALT U/L 18 24   ALK PHOS U/L 80 83   TOTAL PROTEIN g/dL 5.9* 7.0   ALBUMIN g/dL 2.2* 2.6*   TOTAL BILIRUBIN mg/dL 0.48 0.54     Results from last 7 days   Lab Units 10/14/23  1118 10/14/23  0646 10/14/23  0456 10/14/23  0136 10/13/23  1439 10/09/23  1340   POC GLUCOSE mg/dl 131 115 110 104 108 113     Results from last 7 days   Lab Units 10/14/23  0451 10/13/23  1420 10/09/23  1502   GLUCOSE RANDOM mg/dL 106 116 121     Results from last 7 days   Lab Units 10/13/23  1621 10/13/23  1420   HS TNI 0HR ng/L  --  6   HS TNI 2HR ng/L 3  --    HSTNI D2 ng/L -3  --      Results from last 7 days   Lab Units 10/13/23  1420   PROTIME seconds 24.4*   INR  2.10*   PTT seconds 55*     Results from last 7 days   Lab Units 10/13/23  1420   PROCALCITONIN ng/ml 0.73*     Results from last 7 days   Lab Units 10/13/23  1420   LACTIC ACID mmol/L 1.9     Results from last 7 days   Lab Units 10/13/23  1420 10/09/23  1502   LIPASE u/L 36 30     Results from last 7 days   Lab Units 10/13/23  1632   CLARITY UA  Clear   COLOR UA  Yellow   SPEC GRAV UA  1.022   PH UA  5.5   GLUCOSE UA mg/dl Negative   KETONES UA mg/dl 10 (1+)*   BLOOD UA  Negative   PROTEIN UA mg/dl 30 (1+)*   NITRITE UA  Negative   BILIRUBIN UA  Negative   UROBILINOGEN UA (BE) mg/dl <2.0   LEUKOCYTES UA  Negative   WBC UA /hpf 4-10*   RBC UA /hpf 2-4*   BACTERIA UA /hpf None Seen   EPITHELIAL CELLS WET PREP /hpf None Seen     Results from last 7 days   Lab Units 10/13/23  1423   INFLUENZA A PCR  Negative   INFLUENZA B PCR  Negative   RSV PCR  Negative     Results from last 7 days   Lab Units 10/13/23  1424 10/13/23  1420   BLOOD CULTURE  Received in Microbiology Lab. Culture in Progress. Received in Microbiology Lab. Culture in Progress.        ED Treatment:   Medication Administration from 10/13/2023 1822 to 10/14/2023 0140         Date/Time Order Dose Route Action     10/14/2023 0133 EDT multi-electrolyte (PLASMALYTE-A/ISOLYTE-S PH 7.4) IV solution 125 mL/hr Intravenous New Bag       Past Medical History:   Diagnosis Date    Pancreatitis        Admitting Diagnosis: Bacteremia [R78.81]  Intra-abdominal abscess ([address]) [K65.1]  Age/Sex: 48 y.o. male  Admission Orders:  Scheduled Medications:  furosemide, 20 mg, Oral, Daily  heparin (porcine), 5,000 Units, Subcutaneous, Q8H [facility]  insulin lispro, 2-12 Units, Subcutaneous, Q6H ALEX  lisinopril, 10 mg, Oral, Daily  magnesium sulfate, 2 g, Intravenous, Once  pancrelipase (Lip-Prot-Amyl), 6,000 Units, Oral, TID With Meals    Continuous IV Infusions:  multi-electrolyte, 125 mL/hr, Intravenous, Continuous    PRN Meds:  acetaminophen, 650 mg, Oral, Q6H PRN  HYDROmorphone, 0.5 mg, Intravenous, Q3H PRN  ondansetron, 4 mg, Intravenous, Q6H PRN  oxyCODONE, 10 mg, Oral, Q4H PRN  oxyCODONE, 5 mg, Oral, Q4H PRN          Network Utilization Review Department  ATTENTION: Please call with any questions or concerns to [phone] and carefully listen to the prompts so that you are directed to the right person. All voicemails are confidential.   For Discharge needs, contact Care Management DC Support Team at [phone] opt. 2  Send all requests for admission clinical reviews, approved or denied determinations and any other requests to dedicated fax number below belonging to the campus where the patient is receiving treatment.  List of dedicated fax numbers for the Facilities:  [place] DENIALS (Administrative/Medical Necessity) [phone]   DISCHARGE SUPPORT TEAM (NETWORK) [id number] Paulino (Maternity/NICU/Pediatrics) [phone]   [address] [facility] 1000 [facility] [phone]   [address] [place] [address] [address] [address] [facility] [address] [facility] [address] [facility]  [facility] [phone]

## 2023-10-14 NOTE — TELEPHONE ENCOUNTER
Nancy called from [facility] to notify Dr. Nimo Aponte of [facility] Admission on 10-. [facility].

## 2023-10-14 NOTE — CONSULTS
Consultation - Infectious Disease   Oscar Fleming 48 y.o. male MRN: [id number]  Unit/Bed#: [facility] 600-94 Encounter: [id number]      Inpatient consult to Infectious Diseases  Consult performed by: Deanna Barrera MD  Consult ordered by: Tiana Marion MD          IMPRESSION & RECOMMENDATIONS:   Impression:  1. Necrotizing pancreatitis with abscess  2. 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 Superficial wounds anterior bilateral feet (POA)    Recommendations:    Discussed with the primary surgical service. 1.  Agree with replacing dislodged drain and draining paracolic collection. 2.  Would obtain cultures from above procedure  3. If adequate drainage is established and patient shows no signs of systemic toxicity may not need antibiotic therapy        HISTORY OF PRESENT ILLNESS:    Reason for Consult: Necrotizing pancreatitis with bacteremia  HPI: Oscar Fleming is a 48y.o. 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 Patient was seen in the [address] [place] [address] ER on 10/9 referred from the wound care center where he was being treated for lower extremity wounds when he fell out of his wheelchair and hit the back of his head on a nearby wall. He has not been taking his Eliquis daily. He had a head CT which was negative for acute bleed or fracture was discharge to the care of his attending physician as an outpatient. He was last admitted here from 9/26/2023-9/27/2023 with necrotizing pancreatitis with dislodging of a priorly inserted 3 drains into the area by IR of which 1 was dislodged.   His IR drains were replaced without incident prior to that he had been here from 9/4/2023-9/19/2023 for necrotizing pancreatitis and septic shock with IR drain placement, necrosectomy, SMV thrombosis and Klebsiella bacteremia. His pancreatic abscess grew Citrobacter, E. coli, Klebsiella, Aeromonas, and Bacteroides for which she was treated for 14 days with ceftriaxone and metronidazole through 9/17. During this admission on 10/13 his CT of the chest abdomen and pelvis revealed extensive pancreatic necrosis with gas present that was in keeping with infected pancreatic necrosis. The pigtail catheter within the superior aspect of the collection was again with minimal change seen with interval placement of a drainage catheter within the pancreatic tail collection, there was a 7 x 7 gas-containing left paracolic gutter collection identified suggesting an abscess. IR has evaluated the patient and plans to remove the left lateral drain that had been dislodged and not draining and replaced under CT guidance. Patient was begun on cefepime 2 g IV x1 and vancomycin 2 g IV x1. Blood cultures x2 from 10/13 are negative so far and SARS Cov 2 PCR panel was negative. BC count is 9500 with hemoglobin of 7 and platelet count of 670,654. Last differential done yesterday showed marked left shift with 24% bands. Review of Systems   Constitutional:  Positive for fatigue. Gastrointestinal:  Positive for abdominal distention (Has 3 drains in place). Skin:  Positive for wound (Superficial wounds of his anterior feet). Neurological:  Positive for syncope and weakness. A [id number] point system-based review of systems is otherwise negative. PAST MEDICAL HISTORY:  Past Medical History:   Diagnosis Date    Pancreatitis      Past Surgical History:   Procedure Laterality Date    CYSTOSCOPY N/A 8/5/2023    Procedure: CYSTOSCOPY.   Saunders insertion;  Surgeon: Edita Fuchs MD;  [facility];  Service: Urology    FL RETROGRADE URETHROCYSTOGRAM  8/5/2023    HERNIA REPAIR      IR DRAINAGE TUBE CHECK/CHANGE/REPOSITION/REINSERTION/UPSIZE  9/4/2023    IR DRAINAGE TUBE PLACEMENT  8/5/2023    IR DRAINAGE TUBE PLACEMENT  2023    IR DRAINAGE TUBE PLACEMENT  2023    IR PARACENTESIS  2023    IR THORACENTESIS  2023    JOINT REPLACEMENT      TONSILLECTOMY         FAMILY HISTORY:  Non-contributory    SOCIAL HISTORY:  Social History   /Civil Union  Social History     Substance and Sexual Activity   Alcohol Use Not Currently    Comment: quit 7/15/23 (previous 6 drinks per week)     Social History     Substance and Sexual Activity   Drug Use Never     Social History     Tobacco Use   Smoking Status Never    Passive exposure: Past   Smokeless Tobacco Former    Types: Chew    Quit date: 7/15/2023   Tobacco Comments    Quit 7/15/23       ALLERGIES:  No Known Allergies    MEDICATIONS:  All current active medications have been reviewed.       PHYSICAL EXAM:  Temp:  [98.4 °F (36.9 °C)-99.9 °F (37.7 °C)] 98.7 °F (37.1 °C)  HR:  [] 97  Resp:  [15-28] 15  BP: (103-122)/(57-70) 113/67  SpO2:  [92 %-98 %] 96 %  Temp (24hrs), Av.9 °F (37.2 °C), Min:98.4 °F (36.9 °C), Max:99.9 °F (37.7 °C)  Current: Temperature: 98.7 °F (37.1 °C)    Intake/Output Summary (Last 24 hours) at 10/14/2023 1557  Last data filed at 10/14/2023 1336  Gross per 24 hour   Intake 0 ml   Output 915 ml   Net -915 ml       General Appearance:  Appearing chronically ill, alert, responsive, nontoxic, and in no distress, appears stated age   Head:  Normocephalic, without obvious abnormality, atraumatic   Eyes:  PERRL, conjunctiva pale and sclera anicteric, both eyes   Nose: Nares normal, mucosa normal, no drainage   Throat: Oropharynx moist without lesions; lips, mucosa, and tongue normal; teeth and gums normal   Neck: Supple, symmetrical, trachea midline, no adenopathy, no tenderness/mass/nodules   Back:   Symmetric, no curvature, ROM normal, no CVA tenderness   Lungs:   Clear to auscultation bilaterally, no audible wheezes, rhonchi and rales, respirations unlabored   Chest Wall:  No tenderness or deformity   Heart:  Regular rate and rhythm, S1, S2 normal, no murmur, rub or gallop   Abdomen:   Soft, non-tender, distended, has striae and left side has 2 DONNY drains and one large gravity drain positive bowel sounds, no masses, no organomegaly    No CVA tenderness   Extremities: Numerous superficial scabbed ulcerations of anterior bilateral feet   Skin: As above. Neurologic: Alert and oriented times 3, extremity strength 5/5 and symmetric           Invasive Devices:   Peripheral IV 10/13/23 Right Forearm (Active)       Abscess Drain RUQ (Active)   Dressing Status Clean;Dry; Intact 10/14/23 0303   Output (mL) 150 mL 10/14/23 1336       Abscess Drain LUQ (Active)   Dressing Status Clean;Dry; Intact 10/14/23 0302   Output (mL) 0 mL 10/14/23 1336       Abscess Drain LUQ (Active)   Dressing Status Dry; Intact 10/14/23 0303   Output (mL) 60 mL 10/14/23 1336       LABS, IMAGING, & OTHER STUDIES:  Lab Results:      I have personally reviewed pertinent labs. Results from last 7 days   Lab Units 10/14/23  0832 10/14/23  0451 10/13/23  1420   WBC Thousand/uL  --  9.50 8.82   HEMOGLOBIN g/dL 7.7* 7.0* 8.1*   PLATELETS Thousands/uL 407* 423* 478*     Results from last 7 days   Lab Units 10/14/23  0451 10/13/23  1420 10/09/23  1502 10/09/23  1502   SODIUM mmol/L 130* 127*  --  128*   POTASSIUM mmol/L 3.9 4.4  --  4.6   CHLORIDE mmol/L 97 93*  --  94*   CO2 mmol/L 23 25  --  26   BUN mg/dL 23 24  --  29*   CREATININE mg/dL 0.83 1.02  --  1.12   EGFR ml/min/1.73sq m 100 83  --  74   CALCIUM mg/dL 7.7* 8.4  --  8.7   AST U/L 22 32  --   --    ALT U/L 18 24   < >  --    ALK PHOS U/L 80 83   < >  --     < > = values in this interval not displayed. Results from last 7 days   Lab Units 10/13/23  1424 10/13/23  1420   BLOOD CULTURE  Received in Microbiology Lab. Culture in Progress. Received in Microbiology Lab. Culture in Progress. Imaging Studies:   I have personally reviewed pertinent imaging study reports and images in PACS.         EKG, Pathology, and Other Studies:   I have personally reviewed pertinent reports.

## 2023-10-14 NOTE — TELEMEDICINE
e-Consult (IPC)  - Interventional Radiology  Lima Douglas 48 y.o. male MRN: [id number]  Unit/Bed#: [facility] 883-33 Encounter: [id number]          Interventional Radiology has been consulted to evaluate [facility] to   Consult performed by: Abbi Amezquita MD  Consult ordered by: Jose Bryant MD        10/14/23    Assessment/Recommendation:     48year old with necrotizing pancreatitis, s/p multiple drain placements. The left lateral drain is dislodged and not draining. Plan for removal of that drain and replacement under CT guidance. 11-20 minutes, >50% of the total time devoted to medical consultative verbal/EMR discussion between providers. Written report will be generated in the EMR. Thank you for allowing Interventional Radiology to participate in the care of Lima Douglas. Please don't hesitate to call or TigerText us with any questions.      Abbi Amezquita MD

## 2023-10-14 NOTE — RESTORATIVE TECHNICIAN NOTE
Restorative Technician Note      Patient Name: Lima Douglas     Note Type: Mobility  Patient Position Upon Consult: Supine  Activity Performed: Repositioned  Patient Position at End of Consult: All needs within reach; Bed/Chair alarm activated; Supine;  Other (comment) (Sitting up in bed)        Deferring OOB at this time

## 2023-10-14 NOTE — PLAN OF CARE
Problem: Potential for Falls  Goal: Patient will remain free of falls  Description: INTERVENTIONS:  - Educate patient/family on patient safety including physical limitations  - Instruct patient to call for assistance with activity   - Consult OT/PT to assist with strengthening/mobility   - Keep Call bell within reach  - Keep bed low and locked with side rails adjusted as appropriate  - Keep care items and personal belongings within reach  - Initiate and maintain comfort rounds  - Make Fall Risk Sign visible to staff  - Offer Toileting every  Hours, in advance of need  - Initiate/Maintain alarm  - Obtain necessary fall risk management equipment:  - Apply yellow socks and bracelet for high fall risk patients  - Consider moving patient to room near nurses station  Outcome: Progressing     Problem: MOBILITY - ADULT  Goal: Maintain or return to baseline ADL function  Description: INTERVENTIONS:  -  Assess patient's ability to carry out ADLs; assess patient's baseline for ADL function and identify physical deficits which impact ability to perform ADLs (bathing, care of mouth/teeth, toileting, grooming, dressing, etc.)  - Assess/evaluate cause of self-care deficits   - Assess range of motion  - Assess patient's mobility; develop plan if impaired  - Assess patient's need for assistive devices and provide as appropriate  - Encourage maximum independence but intervene and supervise when necessary  - Involve family in performance of ADLs  - Assess for home care needs following discharge   - Consider OT consult to assist with ADL evaluation and planning for discharge  - Provide patient education as appropriate  Outcome: Progressing  Goal: Maintains/Returns to pre admission functional level  Description: INTERVENTIONS:  - Perform BMAT or MOVE assessment daily.   - Set and communicate daily mobility goal to care team and patient/family/caregiver.    - Collaborate with rehabilitation services on mobility goals if consulted  - Perform Range of Motion  times a day. - Reposition patient every  hours.   - Dangle patient  times a day  - Stand patient  times a day  - Ambulate patient times a day  - Out of bed to chair  times a day   - Out of bed for meals times a day  - Out of bed for toileting  - Record patient progress and toleration of activity level   Outcome: Progressing     Problem: Prexisting or High Potential for Compromised Skin Integrity  Goal: Skin integrity is maintained or improved  Description: INTERVENTIONS:  - Identify patients at risk for skin breakdown  - Assess and monitor skin integrity  - Assess and monitor nutrition and hydration status  - Monitor labs   - Assess for incontinence   - Turn and reposition patient  - Assist with mobility/ambulation  - Relieve pressure over bony prominences  - Avoid friction and shearing  - Provide appropriate hygiene as needed including keeping skin clean and dry  - Evaluate need for skin moisturizer/barrier cream  - Collaborate with interdisciplinary team   - Patient/family teaching  - Consider wound care consult   Outcome: Progressing     Problem: PAIN - ADULT  Goal: Verbalizes/displays adequate comfort level or baseline comfort level  Description: Interventions:  - Encourage patient to monitor pain and request assistance  - Assess pain using appropriate pain scale  - Administer analgesics based on type and severity of pain and evaluate response  - Implement non-pharmacological measures as appropriate and evaluate response  - Consider cultural and social influences on pain and pain management  - Notify physician/advanced practitioner if interventions unsuccessful or patient reports new pain  Outcome: Progressing     Problem: INFECTION - ADULT  Goal: Absence or prevention of progression during hospitalization  Description: INTERVENTIONS:  - Assess and monitor for signs and symptoms of infection  - Monitor lab/diagnostic results  - Monitor all insertion sites, i.e. indwelling lines, tubes, and drains  - Monitor endotracheal if appropriate and nasal secretions for changes in amount and color  - Randolph appropriate cooling/warming therapies per order  - Administer medications as ordered  - Instruct and encourage patient and family to use good hand hygiene technique  - Identify and instruct in appropriate isolation precautions for identified infection/condition  Outcome: Progressing  Goal: Absence of fever/infection during neutropenic period  Description: INTERVENTIONS:  - Monitor WBC    Outcome: Progressing

## 2023-10-14 NOTE — PROGRESS NOTES
Progress Note - General Surgery   GI Resident on RED Service   Kevin Gains 48 y.o. male MRN: [id number]  Unit/Bed#: [facility] 519-01 Encounter: [id number]    Assessment:  47 yo male who presents with syncope most likely 2/2 orthostatic hypotension from poor oral intake due to his pancreatitis. Tachycardic to the 120's   cc   Drains with 30cc, 275 cc and 0 cc of output  WBC 9.5 from 8.8  Hgb 7 from 8.1  Cr 0.83 from 1.02    Plan:  NPO sips with meds  IV fluids  No abx at this time as there are no signs of infection  Will have ID consult for abx management when needed  PRN pain meds  IR consult for repositioning of IR drain  PRN anti nausea meds  DVT prophylaxis    Subjective/Objective     Subjective: No acute events overnight. Patient denies having abdominal pain. Denies having nausea, vomiting, fevers, chills, chest pain, shortness of breath. Currently NPO. No bowel movements and no flatus. Voiding without difficulty. Objective:     Blood pressure 107/61, pulse (!) 107, temperature 98.4 °F (36.9 °C), temperature source Oral, resp. rate 20, height 5' 11" (1.803 m), SpO2 96 %. ,Body mass index is 38.35 kg/m². Intake/Output Summary (Last 24 hours) at 10/14/2023 0936  Last data filed at 10/14/2023 0901  Gross per 24 hour   Intake --   Output 705 ml   Net -705 ml       Invasive Devices       Peripheral Intravenous Line  Duration             Peripheral IV 10/13/23 Right Forearm <1 day              Drain  Duration             Abscess Drain RUQ 39 days    Abscess Drain LUQ 37 days    Abscess Drain LUQ 37 days    Abscess Drain LLQ 17 days                    General: NAD  HENT: NCAT MMM  Neck: supple, no JVD  CV: nl rate  Lungs: nl wob. No resp distress  ABD: Soft, nontender, nondistended. Anterior drains with seropurulent output. Posterior CT drain with dark necrotic output.    Extrem: No CCE  Neuro: AAOx3       Scheduled Meds:  Current Facility-Administered Medications   Medication Dose Route Frequency Provider Last Rate    acetaminophen  650 mg Oral Q6H PRN Rosa Frias MD      furosemide  20 mg Oral Daily Rosa Frias MD      heparin (porcine)  5,000 Units Subcutaneous [facility] Rosa Frias MD      HYDROmorphone  0.5 mg Intravenous Q3H PRN Rosa Frias MD      insulin lispro  2-12 Units Subcutaneous Q6H [address] Rosa Frias MD      lisinopril  10 mg Oral Daily Rosa Frias MD      multi-electrolyte  125 mL/hr Intravenous Continuous Rosa Frias  mL/hr (10/14/23 0133)    ondansetron  4 mg Intravenous Q6H PRN Rosa Frias MD      oxyCODONE  10 mg Oral Q4H PRN Rosa Frias MD      oxyCODONE  5 mg Oral Q4H PRN Rosa Frias MD      pancrelipase (Lip-Prot-Amyl)  6,000 Units Oral TID With [address] [facility], MD       Continuous Infusions:multi-electrolyte, 125 mL/hr, Last Rate: 125 mL/hr (10/14/23 0133)      PRN Meds:.  acetaminophen    HYDROmorphone    ondansetron    oxyCODONE    oxyCODONE      Lab, Imaging and other studies:I have personally reviewed pertinent lab results.     VTE Pharmacologic Prophylaxis: Heparin  VTE Mechanical Prophylaxis: sequential compression device      Rosa Frias MD  10/14/2023 9:36 AM

## 2023-10-14 NOTE — H&P
H&P - General Surgery  Charly Ruff 48 y.o. male MRN: [id number]  Unit/Bed#: [facility] 27 Encounter: [id number]        Assessment/Plan     Assessment:  47 yo male who presents with syncope most likely 2/2 orthostatic hypotension from poor oral intake due to his pancreatitis. Plan:  NPO sips with meds  IV fluids  No abx at this time as there are no signs of infection  Will have ID consult for abx management when needed  PRN pain meds  Consider GI consult for possible cystogastrostomy  Consider IR consult for repositioning of IR drain  PRN anti nausea meds  DVT prophylaxis  Will admit to surgical service as level 2 stepdown    History of Present Illness     HPI:  Charly Ruff is a 48 y.o. male who presents with syncope. The patient states that since 10/10 he has felt dizzy whenever getting up form a seated position. The patient is well known to the surgical service for his management of his pancreatitis which has required IR drains x3. Patient currently endorses fevers, but denies chest pain and shortness of breath. The patient also denies having abdominal pain. He is voiding without difficulty. He has very poor oral intake. PMHX his notable for portal vein thrombosis, SMA thrombosis (on eliquis), HTN, and necrotizing pancreatitis. On presentation to the ED the patient was tachycardic to the 120's and tachypnic to the 20's. No I and Os were recorded. He has no leukocytosis. His drains are productive and making serous necrotic fluid. 10/13 CT AP demonstrated a previously known infected pancreatic mass, a 7.7 x 2.4 cm paracolic gutter abscess ,and ascites. See above for recommendations and plan. Review of Systems  See above, otherwise negative. Historical Information   Past Medical History:   Diagnosis Date    Pancreatitis      Past Surgical History:   Procedure Laterality Date    CYSTOSCOPY N/A 8/5/2023    Procedure: CYSTOSCOPY.   Saunders insertion;  Surgeon: Lily Webber MD;  Location: [facility]; Service: Urology    FL RETROGRADE URETHROCYSTOGRAM  8/5/2023    HERNIA REPAIR      IR DRAINAGE TUBE CHECK/CHANGE/REPOSITION/REINSERTION/UPSIZE  9/4/2023    IR DRAINAGE TUBE PLACEMENT  8/5/2023    IR DRAINAGE TUBE PLACEMENT  9/6/2023    IR DRAINAGE TUBE PLACEMENT  9/26/2023    IR PARACENTESIS  9/6/2023    IR THORACENTESIS  9/4/2023    JOINT REPLACEMENT      TONSILLECTOMY  1976     Social History   Social History     Substance and Sexual Activity   Alcohol Use Not Currently    Comment: quit 7/15/23 (previous 6 drinks per week)     Social History     Substance and Sexual Activity   Drug Use Never     Social History     Tobacco Use   Smoking Status Never    Passive exposure: Past   Smokeless Tobacco Former    Types: Chew    Quit date: 7/15/2023   Tobacco Comments    Quit 7/15/23     Family History: non-contributory    Meds/Allergies   all medications and allergies reviewed  No Known Allergies    Objective   First Vitals:   Blood Pressure: 109/57 (10/13/23 2243)  Pulse: (!) 124 (10/13/23 2243)  Temperature: 99.9 °F (37.7 °C) (10/13/23 2243)  Temp Source: Oral (10/13/23 2243)  Respirations: 22 (10/13/23 2243)  SpO2: 96 % (10/13/23 2243)    Current Vitals:   Blood Pressure: 109/57 (10/13/23 2243)  Pulse: (!) 124 (10/13/23 2243)  Temperature: 99.9 °F (37.7 °C) (10/13/23 2243)  Temp Source: Oral (10/13/23 2243)  Respirations: 22 (10/13/23 2243)  SpO2: 96 % (10/13/23 2243)    No intake or output data in the 24 hours ending 10/13/23 2331    Invasive Devices       Peripheral Intravenous Line  Duration             Peripheral IV 10/13/23 Right Forearm <1 day              Drain  Duration             Abscess Drain RUQ 39 days    Abscess Drain LUQ 37 days    Abscess Drain LUQ 37 days    Abscess Drain LLQ 17 days                    Physical Exam:  General: No acute distress  Neuro: Alert, oriented to person and place  Eyes: Extraocular movements intact  CV: Well perfused, regular rate and rhythm  Lungs: Normal work of breathing, no increased respiratory effort  Abdomen: Soft, non-tender, non-distended. See above for drain output and drain character. Extremities: No edema, clubbing or cyanosis  Skin: Warm, dry  Lymph: No palpable lymph nodes  Psych: Normal mentation      Lab Results: I have personally reviewed pertinent lab results. Imaging: I have personally reviewed pertinent reports. EKG, Pathology, and Other Studies: I have personally reviewed pertinent reports.       Code Status: Level 1 - Full Code  Advance Directive and Living Will:      Power of :    POLST:      Johnie Hashimoto, MD  General Surgery Resident

## 2023-10-15 LAB
BACTERIA BLD CULT: NORMAL
BACTERIA BLD CULT: NORMAL

## 2023-10-15 NOTE — PROGRESS NOTES
Progress Note - General Surgery   GI Resident on RED Service   Tha Fung 48 y.o. male MRN: [id number]  Unit/Bed#: [facility] 519-01 Encounter: [id number]    Assessment:  49 yo male who presents with syncope most likely 2/2 orthostatic hypotension from poor oral intake due to his pancreatitis. Concern for worsening collection in paracolic gutter. Tachycardic to the 100's    3 drains in place with outputs of 50 cc, 265 cc, and 150 cc  Drain #1 appeared serous/tan output  Drain #2 appeared tan output  Drain #3 appeared thick necrotic tannish output  WBC 7.4 from 9.5  Hgb 6.8 from 7.7  Cr 0.79 from 0.83    Plan:  NPO sips with meds  IV fluids  No abx at this time as there are no signs of infection  Consider transfusion given drop in hgb  Appreciate IR recommendations: will proceed with repositioning/placement of new drain today  Appreciate ID recommendations: no antibiotics at this time, decision on antibiotics pending what is obtained from IR drainage procedure today  PRN pain meds  PRN anti nausea meds  DVT prophylaxis    Subjective/Objective     Subjective: No acute events overnight. Patient denies having abdominal pain. Denies having nausea, vomiting, fevers, chills, chest pain, shortness of breath. Currently NPO. No bowel movements but passing flatus. Voiding without difficulty. Objective:     Blood pressure 109/62, pulse 98, temperature 98.6 °F (37 °C), temperature source Oral, resp. rate 18, height 5' 11" (1.803 m), SpO2 96 %. ,Body mass index is 38.35 kg/m².       Intake/Output Summary (Last 24 hours) at 10/15/2023 0633  Last data filed at 10/15/2023 5846  Gross per 24 hour   Intake 3813.75 ml   Output 1240 ml   Net 2573.75 ml         Invasive Devices       Peripheral Intravenous Line  Duration             Peripheral IV 10/13/23 Right Forearm 1 day              Drain  Duration             Abscess Drain RUQ 40 days    Abscess Drain LUQ 38 days    Abscess Drain LUQ 38 days General: NAD  HENT: NCAT MMM  Neck: supple, no JVD  CV: nl rate  Lungs: nl wob. No resp distress  ABD: Soft, nontender, nondistended. Anterior drains with serous/tannish output. Posterior CT drain with necrotic/tannish output. Extrem: No CCE  Neuro: AAOx3       Scheduled Meds:  Current Facility-Administered Medications   Medication Dose Route Frequency Provider Last Rate    acetaminophen  650 mg Oral Q6H PRN Michael Suero MD      furosemide  20 mg Oral Daily Michael Suero MD      heparin (porcine)  5,000 Units Subcutaneous [facility] Michael Suero MD      HYDROmorphone  0.5 mg Intravenous Q3H PRN Michael Suero MD      insulin lispro  2-12 Units Subcutaneous 4x Daily Radha Ferguson MD      lisinopril  10 mg Oral Daily Michael Suero MD      multi-electrolyte  125 mL/hr Intravenous Continuous Michael Suero  mL/hr (10/15/23 0052)    ondansetron  4 mg Intravenous Q6H PRN Michael Suero MD      oxyCODONE  10 mg Oral Q4H PRN Michael Suero MD      oxyCODONE  5 mg Oral Q4H PRN Michael Suero MD      pancrelipase (Lip-Prot-Amyl)  6,000 Units Oral TID With Meals Michael Suero MD      senna-docusate sodium  1 tablet Oral HS Radha Ferguson MD       Continuous Infusions:multi-electrolyte, 125 mL/hr, Last Rate: 125 mL/hr (10/15/23 0052)      PRN Meds:.  acetaminophen    HYDROmorphone    ondansetron    oxyCODONE    oxyCODONE      Lab, Imaging and other studies:I have personally reviewed pertinent lab results.     VTE Pharmacologic Prophylaxis: Heparin  VTE Mechanical Prophylaxis: sequential compression device      Michael Suero MD  10/15/2023 6:33 AM

## 2023-10-16 NOTE — ANESTHESIA PREPROCEDURE EVALUATION
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 Intermittent episodes of hypotension responsive to fluid boluses, leukocytosis, anemia with Hgb 8.0, antibiotics broadened today. Denies nausea, vomiting, NPO >12h. Denies previous complications from anesthesia, denies GERD, WILTON. 1 unit pRBC being transfused pre-op. Relevant Problems   CARDIO   (+) Primary hypertension      GI/HEPATIC   (+) Necrotizing pancreatitis      /RENAL   (+) JERZY (acute kidney injury) ([address])      HEMATOLOGY   (+) Anemia      PULMONARY   (+) Pleural effusion due to pancreatitis   (+) Pleural effusion, left        Lab Results   Component Value Date/Time    WBC 23.66 (H) 10/16/2023 03:48 AM    RBC 2.85 (L) 10/16/2023 03:48 AM    HGB 9.2 (L) 10/16/2023 02:01 PM    HGB 8.0 (L) 10/16/2023 03:48 AM    HCT 27 (L) 10/16/2023 02:01 PM    HCT 25.1 (L) 10/16/2023 03:48 AM     (H) 10/16/2023 03:48 AM    BANDSPCT 24 (H) 10/13/2023 02:20 PM     Lab Results   Component Value Date/Time    SODIUM 132 (L) 10/16/2023 03:48 AM    K 4.2 10/16/2023 03:48 AM    CL 97 10/16/2023 03:48 AM    CO2 20 (L) 10/16/2023 02:01 PM    BUN 24 10/16/2023 03:48 AM    CREATININE 0.85 10/16/2023 03:48 AM    GLUC 123 10/16/2023 03:48 AM     Lab Results   Component Value Date/Time    ALKPHOS 111 (H) 10/16/2023 03:48 AM    ALT 18 10/16/2023 03:48 AM    AST 25 10/16/2023 03:48 AM    ALB 2.2 (L) 10/16/2023 03:48 AM    TBILI 0.81 10/16/2023 03:48 AM    INR 1.58 (H) 10/15/2023 04:54 AM     Lab Results   Component Value Date/Time    INR 1.58 (H) 10/15/2023 04:54 AM     CTA abdomen pelvis w wo contrast  Result Date: 10/16/2023  Impression: No evidence of extraluminal contrast to suggest hemorrhage into small bowel or into the abdominal cavity.  Evidence of pancreatic necrosis with collections/gas and pigtail catheter similar to the prior study. Superinfection/abscess should be suspected. No evidence of aneurysm or main portal vein thrombosis. Ascites has mildly increased without increased density. Hemorrhage can often dilute within ascites however. Wedge-shaped hepatic lesions in segment 4A and 8 superiorly with delayed enhancement but persistent lower attenuation. These areas were less defined on earlier exams. Findings could represent increasing edema in areas of prior focal fatty infiltration. Cholangitis is possible but less likely hepatic infarct given persistent vascularity. Smaller portal venous vessels still appear to be patent. Finding should be correlated with any worsening LFT abnormalities. Pleural effusions are again noted slightly increased without increased density. . Persistent contrast in the kidneys and bladder prior to injection could be related to recent contrast injection but should be correlated with any worsening renal function. This was discussed with Dr. Hood Estrella at 12:10 p.m. Workstation performed: [id number]     TTE (9/5/23): Left Ventricle: Left ventricular cavity size is normal. Wall thickness is upper normal. The left ventricular ejection fraction is 60%. Systolic function is normal. Wall motion is normal. Diastolic function is normal.    Right Ventricle: Right ventricular cavity size is mildly dilated. Systolic function is normal.    Mitral Valve: There is trace regurgitation. Pulmonary Artery: The pulmonary artery systolic pressure is mildly increased. Physical Exam    Airway    Mallampati score: III  TM Distance: >3 FB  Neck ROM: full     Dental       Cardiovascular  Rhythm: regular, Rate: normal    Pulmonary   Decreased breath sounds    Other Findings  Obese abdomen, soft, nontender. L sided drains x2      Anesthesia Plan  ASA Score- 3 Emergent    Anesthesia Type- IV sedation with anesthesia with ASA Monitors.          Additional Monitors:     Airway Plan:     Comment: Planned for MAC. Understands risk for hypotension with general anesthesia and discussed arterial line, additional monitoring and line placement if needed. .       Plan Factors-    Chart reviewed. EKG reviewed. Imaging results reviewed. Existing labs reviewed. Patient summary reviewed. Patient is not a current smoker. Obstructive sleep apnea risk education given perioperatively. Induction- intravenous. Postoperative Plan- Plan for postoperative opioid use. Planned trial extubation    Informed Consent- Anesthetic plan and risks discussed with patient. I personally reviewed this patient with the CRNA. Discussed and agreed on the Anesthesia Plan with the CRNA. Cruzito Hernandez

## 2023-10-16 NOTE — BRIEF OP NOTE (RAD/CATH)
INTERVENTIONAL RADIOLOGY PROCEDURE NOTE    Date: 10/16/2023    Procedure: Left flank retroperitoneal drain replacement, left paracolic drainage catheter replacement/reposition. Preoperative diagnosis:   1. Necrotizing pancreatitis    2. Bacteremia    3. Lower GI bleed         Postoperative diagnosis: Same. Surgeon: Artur Osullivan MD     Assistant: None. No qualified resident was available. Blood loss: None    Specimens: Left paracolic gutter purulent fluid sent for cultures     Findings: Replacement of a 28 Ghanaian drainage catheter into the retroperitoneum. Repositioning of a 12 Ghanaian pigtail drainage catheter at the left paracolic gutter fluid collection, with 5 mL of purulent fluid aspirated. We will plan for tube check in 2 weeks of all 3 drains. Complications: None immediate.     Anesthesia: MAC sedation

## 2023-10-16 NOTE — WOUND OSTOMY CARE
Consult Note - Wound   Edwardo Loges 48 y.o. male MRN: [id number]  Unit/Bed#: [facility] 517-01 Encounter: [id number]        History and Present Illness:  Patient is 49 yo male admitted to hospitals with bacteremia. Patient has history of HTN and pancreatitis. Patient is continent of bowel and bladder. Patient is mod/max assist with turning from side to side for assessment. Patient is independent with meals. Assessment Findings:   B/L heels intact and blanching, preventative skin care orders placed. POA stage II B/L buttocks- wound is partial thickness with pink tissue, periwound skin is fragile, smal serosanguineous drainage present. B/L anterior foot wounds- well adhered eschars, no drainage, currently open to air. No induration, fluctuance, odor, warmth/temperature differences, redness, or purulence noted to the above noted wounds and skin areas assessed. New dressings applied per orders listed below. Patient tolerated well- no s/s of non-verbal pain or discomfort observed during the encounter. Bedside nurse aware of plan of care. See flow sheets for more detailed assessment findings. Wound care will continue to follow. Skin care Plan:  1-Cleanse sacro-buttocks with soap and water. Apply Allevyn foam. Gera with T for treatment. Change every three days and PRN. 2-Turn/reposition q2h or when medically stable for pressure re-distribution on skin . 3-Elevate heels to offload pressure  4-Moisturize skin daily with skin nourishing cream  5-Ehob cushion in chair when out of bed. 6-Hydraguard to bilateral heels BID and PRN. Wounds:  Wound 09/04/23 Pressure Injury Buttocks (Active)   Wound Image   10/16/23 1126   Wound Description Granulation tissue; Epithelialization;Fragile 10/16/23 1126   Pressure Injury Stage 2 10/14/23 1600   Zaina-wound Assessment Fragile 10/16/23 1126   Wound Length (cm) 4 cm 10/16/23 1126   Wound Width (cm) 3 cm 10/16/23 1126   Wound Depth (cm) 0.1 cm 10/16/23 1126   Wound Surface Area (cm^2) 12 cm^2 10/16/23 1126   Wound Volume (cm^3) 1.2 cm^3 10/16/23 1126   Calculated Wound Volume (cm^3) 1.2 cm^3 10/16/23 1126   Change in Wound Size % 75 10/16/23 1126   Tunneling 0 cm 10/16/23 1126   Tunneling in depth located at 0 10/16/23 1126   Undermining 0 10/16/23 1126   Undermining is depth extending from 0 10/16/23 1126   Wound Site Closure SONIDO 10/16/23 1126   Drainage Amount Small 10/16/23 1126   Drainage Description Serosanguineous 10/16/23 1126   Non-staged Wound Description Partial thickness 10/16/23 1126   Treatments Cleansed 10/16/23 1126   Dressing Foam, Silicon (eg. Allevyn, etc) 10/16/23 1126   Wound packed? No 10/16/23 1126   Packing- # removed 0 10/16/23 1126   Packing- # inserted 0 10/16/23 1126   Dressing Changed New 10/16/23 1126   Patient Tolerance Tolerated well 10/16/23 1126   Dressing Status Clean;Dry; Intact 10/16/23 1126   Wound 09/26/23 Foot Anterior;Right (Active)   Wound Image   10/16/23 1124   Wound Description Dry;Fragile 10/16/23 0435   Drainage Amount None 10/16/23 0435   Dressing Open to air 10/16/23 0435       Wound 09/26/23 Foot Anterior; Left (Active)   Wound Image   10/16/23 1124   Wound Description Dry;Fragile 10/16/23 0435   Drainage Amount None 10/16/23 0435   Dressing Open to air 10/16/23 659 Giovana BSN, RN, [employer]

## 2023-10-16 NOTE — PROGRESS NOTES
Progress Note - Infectious Disease   Cleo Blanton 48 y.o. male MRN: [id number]  Unit/Bed#: [facility] 517-01 Encounter: [id number]      Impression:  1. Necrotizing pancreatitis with abscess  2. 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 Superficial wounds anterior bilateral feet (POA    Recommendations:  Tmax 100.5 °F, now afebrile with WBC count 24,700 he denies pain and has no evidence of systemic toxicity  Had IR replacement of retroperitoneal drainage catheter and repositioned drainage catheter at the left paracolic gutter fluid collection. Will check cultures from above procedure  3. Patient was started on IV vancomycin and piperacillin/tazobactam pending above with further dosing of vancomycin by pharmacy    Antibiotics:  1. Vancomycin 1250 mg every 12 hours IV, day 1 Rx  2. Piperacillin/tazobactam 3.375 g every 6 hours IV, day 1 Rx    Subjective: The patient has no complaints. No significant pain postprocedure  Denies fevers, chills, or sweats. Denies nausea, vomiting, or diarrhea. Objective:  Vitals:  Temp:  [97.4 °F (36.3 °C)-100.5 °F (38.1 °C)] 97.8 °F (36.6 °C)  HR:  [103-138] 105  Resp:  [16-32] 22  BP: ()/(38-74) 102/56  SpO2:  [93 %-99 %] 99 %  Temp (24hrs), Av.4 °F (36.9 °C), Min:97.4 °F (36.3 °C), Max:100.5 °F (38.1 °C)  Current: Temperature: 97.8 °F (36.6 °C)    Physical Exam:     General Appearance:  Eyes: Alert, chronically ill-appearing nontoxic, no acute distress. Conjunctiva pale   Throat: Oropharynx moist without lesions. Lips, mucosa, and tongue normal   Neck: Supple, symmetrical, trachea midline, no adenopathy,  no tenderness/mass/nodules   Lungs:   Clear to auscultation bilaterally, no audible wheezes, rhonchi or rales; respirations unlabored   Heart:  Regular rate and rhythm, S1, S2 normal, no murmur, rub or gallop   Abdomen:   Soft, non-tender, distended, striae positive bowel sounds. No masses, no organomegaly.   Left side has 2 DONNY drains with serosanguineous drainage and one large gravity drain with dark bloody drainage    No CVA tenderness   Extremities: Bilateral numerous superficial scabbed ulcerations of anterior feet   Skin: As above         Invasive Devices       Peripheral Intravenous Line  Duration             Peripheral IV 10/16/23 Dorsal (posterior); Left Forearm <1 day    Peripheral IV 10/16/23 Right Antecubital <1 day              Drain  Duration             Abscess Drain RUQ 42 days    Abscess Drain LUQ 39 days    Abscess Drain Abdomen <1 day                    Labs, Imaging, & Other studies:   All pertinent labs were personally reviewed  Results from last 7 days   Lab Units 10/16/23  1748 10/16/23  1401 10/16/23  0348 10/15/23  2342 10/15/23  0454   WBC Thousand/uL 24.70*  --  23.66*  --  7.40   HEMOGLOBIN g/dL 7.6*  --  8.0*   < > 6.8*   I STAT HEMOGLOBIN g/dl  --  9.2*  --   --   --    PLATELETS Thousands/uL 351  --  490*  --  393*    < > = values in this interval not displayed. Results from last 7 days   Lab Units 10/16/23  1752 10/16/23  1401 10/16/23  1349 10/16/23  0348 10/15/23  0454 10/14/23  0451   SODIUM mmol/L 134*  --  133* 132*   < > 130*   POTASSIUM mmol/L 3.9  --  4.5 4.2   < > 3.9   CHLORIDE mmol/L 102  --  96 97   < > 97   CO2 mmol/L 23  --  18* 22   < > 23   CO2, I-STAT mmol/L  --  20*  --   --   --   --    BUN mg/dL 28*  --  29* 24   < > 23   CREATININE mg/dL 1.01  --  1.11 0.85   < > 0.83   EGFR ml/min/1.73sq m 84  --  75 99   < > 100   GLUCOSE, ISTAT mg/dl  --  143*  --   --   --   --    CALCIUM mg/dL 6.9*  --  8.1* 7.6*   < > 7.7*   AST U/L  --   --  23 25  --  22   ALT U/L  --   --  15 18  --  18   ALK PHOS U/L  --   --  98 111*  --  80    < > = values in this interval not displayed. Results from last 7 days   Lab Units 10/16/23  1314 10/16/23  1216 10/13/23  1424 10/13/23  1420   BLOOD CULTURE  Received in Microbiology Lab. Culture in Progress. Received in Microbiology Lab. Culture in Progress. No Growth at 48 hrs.  No Growth at 48 hrs.

## 2023-10-16 NOTE — CONSULTS
[address] [place] [address]  Consult: Critical Care  Name: Jami Theodore 48 y.o. male I MRN: [id number]  Unit/Bed#: [facility] 517-01 I Date of Admission: 10/13/2023   Date of Service: 10/16/2023 I Hospital Day: 3      Consults  Assessment/Plan   Neuro:   Diagnosis: Acute pain  Plan:   PRN tylenol 650 q6h  PRN dilaudid 0.5 mg IV q3h breakthrough  PRN oxy 5/10 mg mod/severe    CV:   Diagnosis: Shock secondary to sepsis vs SIRS reaction from acute pancreatitis  Plan: continue to monitor  fluid resuscitation  vasopressors as needed not requiring any at this time   Diagnosis: Hypertension (chronic)  no home meds    Pulm:  Diagnosis: Persistent left pleural effusion and left base atelectasis  Encourage IS  Monitor O2 status, goal SpO2 > 90%  CXR as needed    GI:   Diagnosis: necrotizing pancreatitis  Monitor drain output, left lateral drain dislodged  Creon 6000 units  Zosyn, vanc  IR recs appreciated, possible drain replacement? Diagnosis: GI bleed  Plan: GI recs appreciated, possible scope? PRN zofran for nausea  Protonix 40mg IV ppx      :   Trend Cr  Monitor UOP      F/E/N:   F: isolyte 125  E: monitor and replace PRN  N: NPO    Heme/Onc:   Acute blood loss anemia  Trend HgB  Current HgB 8.0   replete for HgB < 7.0  Received 2 units pRBC to date  Hold DVT ppx    Endo:   Diagnosis: hyperglycemia  Plan: SSI  BG goal 140-180    ID:   Diagnosis: necrotizing pancreatitis, febrile, septic shock  Plan: Zosyn, vancomycin  Appreciate ID recs  F/u drain cultures, blood cultures    MSK/Skin:   Local wound care PRN  PT/OT when able    Disposition: Critical care     History of Present Illness     HPI: Jami Theodore is a 48 y.o. 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  He was initially admitted 9/4-9/19 for pancreatitis with IR drains x3 placed , SMA thrombosis, septic shock, Klebsiella bacteremia treated with ceftriaxone and metronidazole for 14 days. He was readmitted 9/26-9/27 for replacement of a dislodged drain. He was then seen in [facility] ED 10/9 for fall with head strike, negative CT. which have been producing serous necrotic fluid. He most recently presented to the [facility] ED 10/13 with tachycardia, tachypnea, and syncope. Drains were in place though patient has been bleeding around the drain. ED CT demonstrated known necrotizing pancreatic mass, 7.7 x 2.3IY paracolic gutter abscess, and ascites. He was transferred to [facility] 10/13 and admitted to critical care 10/16 for further management. History obtained from chart review and the patient. Review of Systems   Constitutional:  Positive for activity change (dec), chills and fever. HENT: Negative. Respiratory:  Negative for shortness of breath. Cardiovascular:  Negative for chest pain. Gastrointestinal:  Positive for abdominal distention, abdominal pain and constipation. Skin:  Positive for pallor. Historical Information   Past Medical History:  No date: Pancreatitis Past Surgical History:  8/5/2023: CYSTOSCOPY; N/A      Comment:  Procedure: CYSTOSCOPY. Saunders insertion;  Surgeon:                James Palma MD;  Location: [facility];  Service:               Urology  8/5/2023: FL RETROGRADE URETHROCYSTOGRAM  No date: HERNIA REPAIR  9/4/2023: IR DRAINAGE TUBE CHECK/CHANGE/REPOSITION/REINSERTION/UPSIZE  8/5/2023: IR DRAINAGE TUBE PLACEMENT  9/6/2023: IR DRAINAGE TUBE PLACEMENT  9/26/2023: IR DRAINAGE TUBE PLACEMENT  9/6/2023: IR PARACENTESIS  9/4/2023: IR THORACENTESIS  No date: JOINT REPLACEMENT  1976: TONSILLECTOMY   Current Outpatient Medications   Medication Instructions    acetaminophen (TYLENOL) 650 mg, Oral, Every 6 hours PRN    Alcohol Swabs 70 % PADS May substitute brand based on insurance coverage. Check glucose ACHS.     apixaban (ELIQUIS) 5 mg, Oral, 2 times daily    BD PosiFlush 0.9 % SOLN No dose, route, or frequency recorded. Blood Glucose Monitoring Suppl (OneTouch Verio Reflect) w/Device KIT May substitute brand based on insurance coverage. Check glucose ACHS. Blood Pressure Monitoring (Adult Blood Pressure Cuff Lg) KIT Does not apply, 3 times daily    furosemide (LASIX) 20 mg, Oral, Daily    glucose blood (OneTouch Verio) test strip May substitute brand based on insurance coverage. Check glucose ACHS. Insulin Pen Needle (BD Pen Needle Lupe 2nd Gen) 32G X 4 MM MISC For use with insulin pen. Pharmacy may dispense brand covered by insurance. Insulin Pen Needle (BD Pen Needle Lupe 2nd Gen) 32G X 4 MM MISC For use with insulin pen. Pharmacy may dispense brand covered by insurance. Levemir FlexPen 14 Units, Daily at bedtime    lisinopril (ZESTRIL) 10 mg, Oral, Daily    NovoLOG FlexPen 100 units/mL injection pen INJECT 5 UNITS UNDER SKIN 3 TIMES DAILY WITH MEALS    OneTouch Delica Lancets 79Y MISC May substitute brand based on insurance coverage. Check glucose ACHS. oxyCODONE (ROXICODONE) 5 mg, Oral, Daily PRN    pancrelipase (Lip-Prot-Amyl) (CREON) 6,000 Units, Oral, 3 times daily with meals    polyethylene glycol (MIRALAX) 17 g, Oral, Daily    sodium chloride, PF, 0.9 % 10 mL, Intracatheter, Daily, Intracatheter flushing daily. May substitute prefilled syringe with normal saline 10 mL vials, 10 mL syringes, and 18 g blunt needles    sodium chloride, PF, 0.9 % 10 mL, Intravenous, Daily    sodium chloride, PF, 0.9 % 10 mL, Intracatheter, Daily, Intracatheter flushing daily.  May substitute prefilled syringe with normal saline 10 mL vials, 10 mL syringes, and 18 g blunt needles    No Known Allergies   Social History     Tobacco Use    Smoking status: Never     Passive exposure: Past    Smokeless tobacco: Former     Types: Chew     Quit date: 7/15/2023    Tobacco comments:     Quit 7/15/23   Vaping Use    Vaping Use: Never used   Substance Use Topics    Alcohol use: Not Currently     Comment: quit 7/15/23 (previous 6 drinks per week)    Drug use: Never    Family History   Problem Relation Age of Onset    Cancer Mother     Multiple myeloma Mother     Pancreatic cancer Father     Cancer Father           Objective                            Vitals I/O      Most Recent Min/Max in 24hrs   Temp 97.5 °F (36.4 °C) Temp  Min: 97.4 °F (36.3 °C)  Max: 100.5 °F (38.1 °C)   Pulse (!) 122 Pulse  Min: 102  Max: 138   Resp (!) 28 Resp  Min: 16  Max: 31   BP 90/55 BP  Min: 73/51  Max: 126/68   O2 Sat 98 % SpO2  Min: 96 %  Max: 99 %      Intake/Output Summary (Last 24 hours) at 10/16/2023 1423  Last data filed at 10/16/2023 1119  Gross per 24 hour   Intake 3552.33 ml   Output 1240 ml   Net 2312.33 ml         Diet NPO; Sips with meds     Invasive Monitoring Physical exam    Physical Exam  Eyes:      General: Vision grossly intact. Skin:     General: Skin is warm. Coloration: Skin is mottled (lower extremities) and pale. HENT:      Head: Normocephalic. Left Ear: Hearing normal.   Cardiovascular:      Rate and Rhythm: Tachycardia present. Rhythm irregular. Musculoskeletal:         General: Swelling present. Abdominal:      General: There is distension. Tenderness: There is abdominal tenderness. Comments: LUQ x2, RUQ drain   Constitutional:       Appearance: He is ill-appearing and diaphoretic. Pulmonary:      Effort: Tachypnea present. No respiratory distress. Neurological:      General: No focal deficit present. Mental Status: He is alert. Genitourinary/Anorectal:     Comments: Blood clots per rectum  Vitals reviewed.             Diagnostic Studies      EKG: 10/16 sinus tachycardia, nonspecific T wave abnormalities  Imaging:  I have personally reviewed pertinent films in PACS     Medications:  Scheduled PRN   heparin (porcine), 5,000 Units, Q8H [address]  insulin lispro, 2-12 Units, 4x Daily  multi-electrolyte, 1,000 mL, Once  pancrelipase (Lip-Prot-Amyl), 6,000 Units, TID With Meals  piperacillin-tazobactam, 3.375 g, Q6H  polyethylene glycol, 17 g, TID  senna-docusate sodium, 1 tablet, HS  vancomycin, 1,250 mg, Q12H  vancomycin, 2,000 mg, Once      acetaminophen, 650 mg, Q6H PRN  HYDROmorphone, 0.5 mg, Q3H PRN  ondansetron, 4 mg, Q6H PRN  oxyCODONE, 10 mg, Q4H PRN  oxyCODONE, 5 mg, Q4H PRN       Continuous    multi-electrolyte, 125 mL/hr, Last Rate: 125 mL/hr (10/16/23 9271)         Labs:    CBC    Recent Labs     10/15/23  0454 10/15/23  2342 10/16/23  0348 10/16/23  1401   WBC 7.40  --  23.66*  --    HGB 6.8*   < > 8.0* 9.2*   HCT 22.3*  --  25.1* 27*   *  --  490*  --     < > = values in this interval not displayed. BMP    Recent Labs     10/15/23  0454 10/16/23  0348 10/16/23  1401   SODIUM 132* 132*  --    K 3.8 4.2  --    CL 96 97  --    CO2 23 22 20*   AGAP 13 13  --    BUN 21 24  --    CREATININE 0.79 0.85  --    CALCIUM 7.6* 7.6*  --        Coags    Recent Labs     10/15/23  0454   INR 1.58*   PTT 44*        Additional Electrolytes  Recent Labs     10/15/23  0454 10/16/23  1401   MG 2.1  --    PHOS 3.8  --    CAIONIZED  --  1.10*          Blood Gas    No recent results  No recent results LFTs  Recent Labs     10/16/23  0348   ALT 18   AST 25   ALKPHOS 111*   ALB 2.2*   TBILI 0.81       Infectious  No recent results  Glucose  Recent Labs     10/15/23  0454 10/16/23  0348   GLUC 102 123              Critical Care Time Delivered : Upon my evaluation, this patient had a high probability of imminent or life-threatening deterioration due to necrotizing pancreatitis, which required my direct attention, intervention, and personal management. I have personally provided 30 minutes of critical care time, exclusive of procedures, teaching, family meetings, and any prior time recorded by providers other than myself.      [facility]

## 2023-10-16 NOTE — RESTORATIVE TECHNICIAN NOTE
Restorative Technician Note      Patient Name: Rj Grimes     Note Type: Mobility  Patient Position Upon Consult: Supine  Activity Performed: Repositioned  Patient Position at End of Consult: Other (comment);  All needs within reach (Sitting up in bed deferring OOB at this time)

## 2023-10-16 NOTE — QUICK NOTE
General Surgery Quick Note:    Patient seen and examined after IR procedure. Per Dr. Elaina White, "Replacement of a 28 Bulgarian drainage catheter into the retroperitoneum. Repositioning of a 12 Bulgarian pigtail drainage catheter at the left paracolic gutter fluid collection, with 5 mL of purulent fluid aspirated."    Plan:  - Follow up pending labs, Hb   - GI scope and procedure tomorrow pending clinical status, labs this evening   - NPO at midnight, prep for GI     Subjective:  No acute distress. Resting comfortably in bed. Denies abdominal pain, nausea, vomiting, fever, chills, dizziness, shortness of breath, chest pain. Had additional bloody bowel movement on transport to unit. Objective  Vitals:    10/16/23 1500 10/16/23 1700 10/16/23 1717 10/16/23 1800   BP: 92/50 94/50 103/53 100/56   BP Location:       Pulse: (!) 120 (!) 111 (!) 112 (!) 109   Resp: (!) 32 (!) 29 (!) 24 (!) 26   Temp:       TempSrc:       SpO2: 98% 98% 98% 99%   Height:         GENERAL: No acute distress  CV: Tachycardic   LUNGS: Nonlabored respirations on 6 L nc  ABDOMEN: Abdomen soft, non-tender, distended, tympanitic. Drains in place. EXTREMITIES: Bilateral wounds on feet and ankles. No pre-tibial edema appreciated.     Lactic acid 1.7 from 5.5 at 2 pm     Drain 1 - anterior left ss drainage  Drain 3 12 fr to DONNY - purulent drainge  Drain 4 (hemovac) - dark thin sanguinous     ---  Dominga García MD  General Surgery PGY-I

## 2023-10-16 NOTE — DISCHARGE INSTR - OTHER ORDERS
Skin care Plan:  1-Cleanse sacro-buttocks with soap and water. Apply Allevyn foam. Gera with T for treatment. Change every three days and PRN. 2-Turn/reposition q2h or when medically stable for pressure re-distribution on skin . 3-Elevate heels to offload pressure  4-Moisturize skin daily with skin nourishing cream  5-Ehob cushion in chair when out of bed. 6-Hydraguard to bilateral heels BID and PRN.

## 2023-10-16 NOTE — PROGRESS NOTES
Edwardo Turpin is a 48 y.o. male who is currently ordered Vancomycin IV with management by the Pharmacy Consult service. Relevant clinical data and objective / subjective history reviewed. Vancomycin Assessment:  Indication and Goal AUC/Trough: Bacteremia (goal -600, trough >10), intra-abdominal infection  Clinical Status: Stable  Micro:     Renal Function:  SCr: 0.85 mg/dL  CrCl: 135 mL/min  Renal replacement: not on dialysis   Days of Therapy: 1  Current Dose: Patient to receive 2 gram IV vanco load   Vancomycin Plan:  New Dosing: Following 2 gram load, start 1250 mg IV vanco q12h (starting 2200 on 10/16)  Estimated AUC: 451 mcg*hr/mL  Estimated Trough: 14.6 mcg/mL  Next Level: 10/17 AM labs  Renal Function Monitoring: Daily BMP and [place] will continue to follow closely for s/sx of nephrotoxicity, infusion reactions and appropriateness of therapy. BMP and CBC will be ordered per protocol. We will continue to follow the patient’s culture results and clinical progress daily.     Adelaida Sanchez, Pharmacist

## 2023-10-16 NOTE — ANESTHESIA POSTPROCEDURE EVALUATION
Post-Op Assessment Note    CV Status:  Stable    Pain management: adequate     Mental Status:  Alert and awake   Hydration Status:  Euvolemic   PONV Controlled:  None   Airway Patency:  Patent      Post Op Vitals Reviewed: Yes      Staff: CRNA         There were no known notable events for this encounter.     /53 (10/16/23 1717)    Temp      Pulse (!) 112 (10/16/23 1717)   Resp (!) 24 (10/16/23 1717)    SpO2 98 % (10/16/23 1717)

## 2023-10-16 NOTE — CONSULTS
[facility]  Patient: Matt Moss 48 y.o. male   MRN: [id number]  PCP: Anahi Capone MD  Unit/Bed#: [facility] 413-70 Encounter: [id number]  Length of Stay: 3 days        Inpatient consult to gastroenterology     Date/Time  10/16/2023 3:52 PM     Performed by  Kala Marcano MD   Authorized by  Mendel Libman, MD           Assessment/Plan:  Matt Moss is a 48 y.o. 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 On 9/6 underwent repeat IR drain placement with 2 additional drains placed into necrotic fluid collection. GI consulted for evaluation of both necrotizing pancreatitis and GI bleeding    # acute necrotizing pancreatitis with peripancreatic fluid collections   - EUS previously attempted on 09/15, but cystgastrostomy unable to be done due to poor windows, lack of wall apposition and limited visualization on EUS   - since going for colonoscopy for below, can consider simultaneous EUS, however doubtful there will be adequate windows for cystgastro   - Consider repeat paracentesis for symptom relief from ascites   - going for drain revision w/ IR   - defer to primary team for other optimal source ctrl   - likely needs Abx too, ID csx    # shock, multifactorial   - new marked leukocytosis, purulence noted from drain sites # 2 and (formerly) #3, with significant necrosis and multiple fluid collections noted on CTA-AP. Most likely septic in etiology   - check procal, VBG, trend lactate   - MAP goal > 65; MAPs currently tolerable however remains tachycardic to 120s-130s.  Needs more IVF or pressors    # LGIB   - 10/16 new-onset maroon stools likely due to ischemia 2/2 above   - can prep today for colon/flex-sig tomorrow   - Hb > 7 (trend q8h), plt > 50, INR < 1.5, T&S q3d, 2 large bore IV access   - hold AC, antiplatelets and BB   - CLD today, NPO AMN   - if remains HDUS or has brisk bleeding w/ BRBPR, recommend IR consult for angio    History of Present Illness:  Mary Gutierrez is a 48 y.o. male with a PMH of recent admission for gallstone necrotizing pancreatitis complicated by fluid collection status post IR drain placement, obesity who was admitted for sepsis. GI is consulted for management of necrotizing pancreatitis as well as dilated colon noted on imaging. Patient states that he felt weak prior to presentation. Met sepsis criteria on presentation. Having colonic distension, since resolved. Drain dislodged over the weekend, was having episodes of hypotension. Pt reports chills today. Had a BM around noon that was maroon and smelled of blood. Reports his abdomen is soft. GI HPI NEGATIVES:  Denies any recent N/V/D/C, hematemesis, heartburn, dysphagia, odynophagia, abdominal pain, hematochezia, melena, stool incontinence, jaundice, pruritis, anorexia, weight loss, weakness, or fatigue. REVIEW OF SYSTEMS:  Otherwise, pt denies any fevers/chills, lightheadedness, dizziness, CP, SOB, urinary symptoms, weakness/numbness/tingling. Past Medical History:   Diagnosis Date    Pancreatitis      Past Surgical History:   Procedure Laterality Date    CYSTOSCOPY N/A 8/5/2023    Procedure: CYSTOSCOPY. Saunders insertion;  Surgeon: Joan Martinez MD;  Location: [facility];  Service: Urology    FL RETROGRADE URETHROCYSTOGRAM  8/5/2023    HERNIA REPAIR      IR DRAINAGE TUBE CHECK/CHANGE/REPOSITION/REINSERTION/UPSIZE  9/4/2023    IR DRAINAGE TUBE PLACEMENT  8/5/2023    IR DRAINAGE TUBE PLACEMENT  9/6/2023    IR DRAINAGE TUBE PLACEMENT  9/26/2023    IR PARACENTESIS  9/6/2023    IR THORACENTESIS  9/4/2023    JOINT REPLACEMENT      TONSILLECTOMY  1976     Prior to Admission medications    Medication Sig Start Date End Date Taking?  Authorizing Provider   apixaban (Eliquis) 5 mg Take 1 tablet (5 mg total) by mouth 2 (two) times a day 10/10/23 12/9/23 Yes Osman Gonsalez MD   furosemide (LASIX) 20 mg tablet TAKE 1 TABLET BY MOUTH EVERY DAY 10/12/23  Yes Osman Gonsalez MD   Levemir FlexPen 100 units/mL injection pen 14 Units daily at bedtime 8/11/23  Yes Historical Provider, MD   oxyCODONE (Roxicodone) 5 immediate release tablet Take 1 tablet (5 mg total) by mouth daily as needed for moderate pain Max Daily Amount: 5 mg 10/11/23  Yes Osman Gonsalez MD   pancrelipase, Lip-Prot-Amyl, (CREON) 6,000 units delayed release capsule Take 1 capsule (6,000 Units total) by mouth 3 (three) times a day with meals 10/10/23 11/9/23 Yes Osman Gonsalez MD   acetaminophen (TYLENOL) 325 mg tablet Take 2 tablets (650 mg total) by mouth every 6 (six) hours as needed for mild pain, headaches or fever 8/11/23   Katlin Bearden PA-C   Alcohol Swabs 70 % PADS May substitute brand based on insurance coverage. Check glucose ACHS. 8/11/23   Katlin Bearden PA-C   BD PosiFlush 0.9 % SOLN  8/6/23   Historical Provider, MD   Blood Glucose Monitoring Suppl (OneTouch Verio Reflect) w/Device KIT May substitute brand based on insurance coverage. Check glucose ACHS. 8/11/23   Katlin Bearden PA-C   Blood Pressure Monitoring (Adult Blood Pressure Cuff Lg) KIT Use 3 (three) times a day 2/8/22   Osman Gonsalez MD   glucose blood (OneTouch Verio) test strip May substitute brand based on insurance coverage. Check glucose ACHS. 8/11/23   Katlin Bearden PA-C   Insulin Pen Needle (BD Pen Needle Lupe 2nd Gen) 32G X 4 MM MISC For use with insulin pen. Pharmacy may dispense brand covered by insurance. 8/11/23   Katlin Bearden PA-C   Insulin Pen Needle (BD Pen Needle Lupe 2nd Gen) 32G X 4 MM MISC For use with insulin pen. Pharmacy may dispense brand covered by insurance.  9/19/23   Bhanu Bermeo MD   lisinopril (ZESTRIL) 10 mg tablet Take 1 tablet (10 mg total) by mouth daily 7/31/23   Osman Gonsalez MD   NovoLOG FlexPen 100 units/mL injection pen INJECT 5 UNITS UNDER SKIN 3 TIMES DAILY WITH MEALS 8/11/23   Historical Provider, MD   OneTouch Delica Lancets 91D MISC May substitute brand based on insurance coverage. Check glucose ACHS. 8/11/23   Rehana AMANDA Bull   polyethylene glycol (MIRALAX) 17 g packet Take 17 g by mouth daily for 5 days 9/19/23 9/24/23  ROCIO Lopez-JENS   sodium chloride, PF, 0.9 % 10 mL by Intracatheter route daily for 120 doses Intracatheter flushing daily. May substitute prefilled syringe with normal saline 10 mL vials, 10 mL syringes, and 18 g blunt needles 10/10/23 2/7/24  Vignesh Rucker MD   sodium chloride, PF, 0.9 % Inject 10 mL into a catheter in a vein in the morning 10/11/23   Vignesh Rucker MD   sodium chloride, PF, 0.9 % 10 mL by Intracatheter route daily Intracatheter flushing daily. May substitute prefilled syringe with normal saline 10 mL vials, 10 mL syringes, and 18 g blunt needles 10/11/23 1/9/24  Vignesh Rucker MD     No Known Allergies  Social History     Tobacco Use    Smoking status: Never     Passive exposure: Past    Smokeless tobacco: Former     Types: Chew     Quit date: 7/15/2023    Tobacco comments:     Quit 7/15/23   Vaping Use    Vaping Use: Never used   Substance Use Topics    Alcohol use: Not Currently     Comment: quit 7/15/23 (previous 6 drinks per week)    Drug use: Never     Family History   Problem Relation Age of Onset    Cancer Mother     Multiple myeloma Mother     Pancreatic cancer Father     Cancer Father      Objective:  BP 90/53   Pulse (!) 122   Temp 97.8 °F (36.6 °C) (Oral)   Resp (!) 28   Ht 5' 11" (1.803 m)   SpO2 98%   BMI 38.35 kg/m²     Intake/Output Summary (Last 24 hours) at 10/16/2023 1533  Last data filed at 10/16/2023 1527  Gross per 24 hour   Intake 5454.33 ml   Output 1253 ml   Net 4201.33 ml     Body mass index is 38.35 kg/m². Physical Exam  Constitutional:       General: He is not in acute distress. Appearance: He is well-developed. HENT:      Head: Normocephalic and atraumatic. Mouth/Throat:      Mouth: Mucous membranes are moist.   Eyes:      General: No scleral icterus. Extraocular Movements: Extraocular movements intact. Conjunctiva/sclera: Conjunctivae normal.      Pupils: Pupils are equal, round, and reactive to light. Cardiovascular:      Rate and Rhythm: Normal rate and regular rhythm. Pulses: Normal pulses. Heart sounds: No murmur heard. No friction rub. No gallop. Pulmonary:      Effort: Pulmonary effort is normal. No respiratory distress. Abdominal:      General: Bowel sounds are normal. There is no distension. Palpations: Abdomen is soft. Tenderness: There is abdominal tenderness in the right lower quadrant and epigastric area. Comments: Drain #1 periumbilical, yellow-brown drainage  Drain #2 (anterior L lateral), purulence in bulb and at incision site  Drain #3 dislodged (posterior L lateral), purulence at incision site. Musculoskeletal:         General: No swelling. Normal range of motion. Cervical back: Normal range of motion and neck supple. Skin:     General: Skin is warm and dry. Capillary Refill: Capillary refill takes less than 2 seconds. Neurological:      General: No focal deficit present. Mental Status: He is alert and oriented to person, place, and time. Psychiatric:         Mood and Affect: Mood normal.         Labs: I have reviewed all available labs and imaging results in Epic.   Results from last 7 days   Lab Units 10/16/23  1401 10/16/23  1349 10/16/23  0348 10/15/23  0454 10/14/23  0451   SODIUM mmol/L  --  133* 132* 132* 130*   POTASSIUM mmol/L  --  4.5 4.2 3.8 3.9   CHLORIDE mmol/L  --  96 97 96 97   CO2 mmol/L  --  18* 22 23 23   CO2, I-STAT mmol/L 20*  --   --   --   --    BUN mg/dL  --  29* 24 21 23   CREATININE mg/dL  --  1.11 0.85 0.79 0.83   GLUCOSE RANDOM mg/dL  --  137 123 102 106   CALCIUM mg/dL  --  8.1* 7.6* 7.6* 7.7*   ALBUMIN g/dL  --  3.0* 2.2*  --  2.2*   ALK PHOS U/L  --  98 111*  --  80   ALT U/L  --  15 18  --  18   AST U/L  --  23 25  --  22   EGFR ml/min/1.73sq m  --  75 99 102 100     Results from last 7 days   Lab Units 10/16/23  1401 10/16/23  0348 10/15/23  2342 10/15/23  0454 10/14/23  0832 10/14/23  0451 10/13/23  1420   WBC Thousand/uL  --  23.66*  --  7.40  --  9.50 8.82   HEMOGLOBIN g/dL  --  8.0* 8.5* 6.8* 7.7* 7.0* 8.1*   I STAT HEMOGLOBIN g/dl 9.2*  --   --   --   --   --   --    HEMATOCRIT %  --  25.1*  --  22.3* 25.1* 22.7* 26.7*   HEMATOCRIT, ISTAT % 27*  --   --   --   --   --   --    PLATELETS Thousands/uL  --  490*  --  393* 407* 423* 478*   LYMPHO PCT %  --   --   --   --   --   --  8*   MONO PCT %  --   --   --   --   --   --  7   EOS PCT %  --   --   --   --   --   --  0   BASOS PCT %  --   --   --   --   --   --  0   EOS ABS Thousand/uL  --   --   --   --   --   --  0.00   BASOS ABS Thousand/uL  --   --   --   --   --   --  0.00     Results from last 7 days   Lab Units 10/15/23  0454 10/13/23  1420   PROTIME seconds 18.7* 24.4*   INR  1.58* 2.10*   PTT seconds 44* 55*           Additional Labs:  Results from last 7 days   Lab Units 10/16/23  1349 10/15/23  0454 10/14/23  0451 10/13/23  1420   LIPASE u/L  --   --   --  36   LACTIC ACID mmol/L 5.5*  --   --  1.9   MAGNESIUM mg/dL  --  2.1 1.6*  --    PHOSPHORUS mg/dL  --  3.8 3.8  --                 Imaging:  XR chest portable   Final Result by Lavinia Wilkes MD (10/16 1459)      Persistent left effusion and left base atelectasis. Pneumonia not excluded in the appropriate clinical setting. Workstation performed: [id number]         CTA abdomen pelvis w wo contrast   Final Result by Ev Singh MD (10/16 2619)      No evidence of extraluminal contrast to suggest hemorrhage into small bowel or into the abdominal cavity. Evidence of pancreatic necrosis with collections/gas and pigtail catheter similar to the prior study. Superinfection/abscess should be suspected.       No evidence of aneurysm or main portal vein thrombosis. Ascites has mildly increased without increased density. Hemorrhage can often dilute within ascites however. Wedge-shaped hepatic lesions in segment 4A and 8 superiorly with delayed enhancement but persistent lower attenuation. These areas were less defined on earlier exams. Findings could represent increasing edema in areas of prior focal fatty infiltration. Cholangitis is possible but less likely hepatic infarct given persistent vascularity. Smaller portal venous vessels still appear to be patent. Finding should be correlated with any worsening LFT abnormalities. Pleural effusions are again noted slightly increased without increased density. . Persistent contrast in the kidneys and bladder prior to injection could be related to recent contrast injection but should be correlated with any worsening renal function. This was discussed with Dr. Nathan Marie at 12:10 p.m.       Workstation performed: [id number]         IR drainage tube placement    (Results Pending)       Medications:   Current Facility-Administered Medications   Medication Dose Route Frequency Provider Last Rate    acetaminophen  650 mg Oral Q6H PRN Rolley Dus, PA-C      calcium gluconate  2 g Intravenous Once Rolley Dus, PA-C      chlorhexidine  15 mL Mouth/Throat Q12H [address] Rolley Dus, PA-C      HYDROmorphone  0.5 mg Intravenous Q3H PRN Rolley Dus, PA-C      insulin lispro  2-12 Units Subcutaneous 4x Daily Rolley Dus, PA-C      multi-electrolyte  125 mL/hr Intravenous Continuous Rolley Dus, PA-C 125 mL/hr (10/16/23 0745)    ondansetron  4 mg Intravenous Q6H PRN Rolley Dus, PA-C      oxyCODONE  10 mg Oral Q4H PRN Rolley Dus, PA-C      oxyCODONE  5 mg Oral Q4H PRN Rolley Dus, PA-C      pancrelipase (Lip-Prot-Amyl)  6,000 Units Oral TID With Meals Rolley Dus, PA-C pantoprazole  40 mg Intravenous Q24H [address] Ashley Fallsarthur Davila PA-C      piperacillin-tazobactam  3.375 g Intravenous Q6H Gurmeet Davila, PA-C      polyethylene glycol  17 g Oral TID [place] Giovanni, PA-C      senna-docusate sodium  1 tablet Oral HS Gurmeet Davila, PA-C      vancomycin  1,250 mg Intravenous Q12H [place] Giovanni, PA-C      vancomycin  2,000 mg Intravenous Once Ashley FallsROCIO Wiggins-C         Problem List:  Patient Active Problem List   Diagnosis    Primary hypertension    Proteinuria    Chronic pain of both hips    Family history of prostate cancer in father    Necrotizing pancreatitis    Superior mesenteric artery thrombosis (HCC)    Pleural effusion, left    Hyponatremia    Abnormal urinalysis    Leukocytosis    JERZY (acute kidney injury) ([address])    Pleural effusion due to pancreatitis    Ascites    Portal vein thrombosis    Anemia    Hyperglycemia    Bacteremia       Case discussed with attending physician Dr. Sera Contreras. All recs are preliminary pending final attestation.     Gladys Florian, PGY-4

## 2023-10-16 NOTE — TELEPHONE ENCOUNTER
T/c from pts wife -- called to report pt is in hospital again. Work will be faxing over [address] [facility] for Dr Eugenia Patel to complete.

## 2023-10-16 NOTE — PROGRESS NOTES
Progress Note - Infectious Disease   Maura Stephen 48 y.o. male MRN: [id number]  Unit/Bed#: [facility] 519-01 Encounter: [id number]      Impression:  1. Necrotizing pancreatitis with abscess  2. 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 Superficial wounds anterior bilateral feet (POA    Recommendations:  Afebrile with normal WBC count. He denies pain and has no evidence of systemic toxicity  Agree with replacing dislodged drain and draining paracolic collection. Procedure has been postponed by IR until tomorrow  2. Would obtain cultures from above procedure  3. If adequate drainage is established and patient shows no signs of systemic toxicity may not need antibiotic therapy      Antibiotics:  1. None    Subjective: The patient has no complaints. Denies fevers, chills, or sweats. Denies nausea, vomiting, or diarrhea. Objective:  Vitals:  Temp:  [98.3 °F (36.8 °C)-99.1 °F (37.3 °C)] 98.3 °F (36.8 °C)  HR:  [] 102  Resp:  [16-20] 16  BP: (108-126)/(62-72) 126/68  SpO2:  [94 %-97 %] 96 %  Temp (24hrs), Av.8 °F (37.1 °C), Min:98.3 °F (36.8 °C), Max:99.1 °F (37.3 °C)  Current: Temperature: 98.3 °F (36.8 °C)    Physical Exam:     General Appearance:  Eyes: Alert, chronically ill-appearing nontoxic, no acute distress. Conjunctiva pale   Throat: Oropharynx moist without lesions. Lips, mucosa, and tongue normal   Neck: Supple, symmetrical, trachea midline, no adenopathy,  no tenderness/mass/nodules   Lungs:   Clear to auscultation bilaterally, no audible wheezes, rhonchi or rales; respirations unlabored   Heart:  Regular rate and rhythm, S1, S2 normal, no murmur, rub or gallop   Abdomen:   Soft, non-tender, distended, striae positive bowel sounds. No masses, no organomegaly.   Left side has 2 DONNY drains and one large gravity drain    No CVA tenderness   Extremities: Bilateral numerous superficial scabbed ulcerations of anterior feet   Skin: As above         Invasive Devices       Peripheral Intravenous Line  Duration             Peripheral IV 10/13/23 Right Forearm 2 days              Drain  Duration             Abscess Drain RUQ 41 days    Abscess Drain LUQ 39 days    Abscess Drain LUQ 39 days                    Labs, Imaging, & Other studies:   All pertinent labs were personally reviewed  Results from last 7 days   Lab Units 10/15/23  0454 10/14/23  0832 10/14/23  0451 10/13/23  1420   WBC Thousand/uL 7.40  --  9.50 8.82   HEMOGLOBIN g/dL 6.8* 7.7* 7.0* 8.1*   PLATELETS Thousands/uL 393* 407* 423* 478*     Results from last 7 days   Lab Units 10/15/23  0454 10/14/23  0451 10/13/23  1420   SODIUM mmol/L 132* 130* 127*   POTASSIUM mmol/L 3.8 3.9 4.4   CHLORIDE mmol/L 96 97 93*   CO2 mmol/L 23 23 25   BUN mg/dL 21 23 24   CREATININE mg/dL 0.79 0.83 1.02   EGFR ml/min/1.73sq m 102 100 83   CALCIUM mg/dL 7.6* 7.7* 8.4   AST U/L  --  22 32   ALT U/L  --  18 24   ALK PHOS U/L  --  80 83     Results from last 7 days   Lab Units 10/13/23  1424 10/13/23  1420   BLOOD CULTURE  No Growth at 48 hrs. No Growth at 48 hrs.

## 2023-10-16 NOTE — PROGRESS NOTES
Progress Note - General Surgery   GI Resident on RED Service   Tha Fung 48 y.o. male MRN: [id number]  Unit/Bed#: [facility] 519-01 Encounter: [id number]    Assessment:  49 yo male who presents with syncope most likely 2/2 orthostatic hypotension from poor oral intake due to his pancreatitis. Concern for worsening collection in paracolic gutter. Tachycardic to 120's and hypotensive to 70s overnight  Was given 500cc bolus of NS and 500cc bolus of albumin  Seems to be responding appropriately  EKG normal sinus tachycardia   cc  Drain output 0cc, 210 cc, and 210 cc respectively  Drain #1 appeared serous/tan output  Drain #2 appeared tan output  Drain #3 appeared to by more dark necrotic bloody output  WBC 23 from 7.4  Hgb 8 from 8.5 from 6.8  He received 1U of PRBC yesterday    Plan:  NPO sips with meds  IV fluids  Consider antibiotics after IR procedure  Consider another transfusion as hgb is beginning to trend down along with most recent change in hemodynamics  Appreciate IR recommendations: will proceed with repositioning/placement of new drain today  Appreciate ID recommendations: no antibiotics at this time, decision on antibiotics pending what is obtained from IR drainage procedure today  PRN pain meds  PRN anti nausea meds  DVT prophylaxis    Subjective/Objective     Subjective: See above for overnight events. Patient currently has no complaints. Patient denies having nausea, vomiting, fevers, chills, chest pain, shortness of breath. He was tolerating a PO diet. He denies having a bowel movement but is passing flatus. Voiding without difficulty. Objective:     Blood pressure 99/58, pulse (!) 118, temperature 98.5 °F (36.9 °C), resp. rate 18, height 5' 11" (1.803 m), SpO2 97 %. ,Body mass index is 38.35 kg/m².       Intake/Output Summary (Last 24 hours) at 10/16/2023 0912  Last data filed at 10/16/2023 0855  Gross per 24 hour   Intake 3552.33 ml   Output 1420 ml   Net 2132.33 ml Invasive Devices       Peripheral Intravenous Line  Duration             Peripheral IV 10/13/23 Right Forearm 2 days    Peripheral IV 10/16/23 Dorsal (posterior); Left Forearm <1 day              Drain  Duration             Abscess Drain RUQ 41 days    Abscess Drain LUQ 39 days    Abscess Drain LUQ 39 days                    General: NAD  HENT: NCAT MMM  Neck: supple, no JVD  CV: nl rate  Lungs: nl wob. No resp distress  ABD: Soft, nontender, nondistended. Anterior drains with serous/tannish output. Posterior CT drain with necrotic/reddish/bloody tinged output. Extrem: No CCE  Neuro: AAOx3       Scheduled Meds:  Current Facility-Administered Medications   Medication Dose Route Frequency Provider Last Rate    acetaminophen  650 mg Oral Q6H PRN Zaheer Erazo MD      bisacodyl  10 mg Rectal Once Zaheer Erazo MD      furosemide  20 mg Oral Daily Zaheer Erazo MD      heparin (porcine)  5,000 Units Subcutaneous [facility] Zaheer Erazo MD      HYDROmorphone  0.5 mg Intravenous Q3H PRN Zaheer Erazo MD      insulin lispro  2-12 Units Subcutaneous 4x Daily Suella Boas, MD      lisinopril  10 mg Oral Daily Zaheer Erazo MD      multi-electrolyte  125 mL/hr Intravenous Continuous Zaheer Erazo  mL/hr (10/16/23 2810)    ondansetron  4 mg Intravenous Q6H PRN Zaheer Erazo MD      oxyCODONE  10 mg Oral Q4H PRN Zaheer Erazo MD      oxyCODONE  5 mg Oral Q4H PRN Zaheer Erazo MD      pancrelipase (Lip-Prot-Amyl)  6,000 Units Oral TID With Meals Zaheer Erazo MD      polyethylene glycol  17 g Oral TID Zaheer Erazo MD      senna-docusate sodium  1 tablet Oral HS Suella Boas, MD       Continuous Infusions:multi-electrolyte, 125 mL/hr, Last Rate: 125 mL/hr (10/16/23 5802)      PRN Meds:.  acetaminophen    HYDROmorphone    ondansetron    oxyCODONE    oxyCODONE      Lab, Imaging and other studies:I have personally reviewed pertinent lab results.     VTE Pharmacologic Prophylaxis: Heparin  VTE Mechanical Prophylaxis: sequential compression device      Ron Melvin MD  10/16/2023 9:12 AM

## 2023-10-16 NOTE — SEDATION DOCUMENTATION
Pt in IR for drainage tube placement to LUQ and drainage tube reposition of drain 2 to LUQ. Anesthesia present throughout case. Pt tolerated procedure well. IR bedrest start time 1650. Report given to primary RN.

## 2023-10-17 PROBLEM — N17.9 AKI (ACUTE KIDNEY INJURY) (HCC): Status: RESOLVED | Noted: 2023-01-01 | Resolved: 2023-01-01

## 2023-10-17 PROBLEM — D62 ACUTE BLOOD LOSS ANEMIA: Status: ACTIVE | Noted: 2023-01-01

## 2023-10-17 PROBLEM — D72.829 LEUKOCYTOSIS: Chronic | Status: ACTIVE | Noted: 2023-01-01

## 2023-10-17 NOTE — PROGRESS NOTES
[address] [place] [address]  Progress Note: Critical Care  Name: Estelle Richard 48 y.o. male I MRN: [id number]  Unit/Bed#: [facility] 517-01 I Date of Admission: 10/13/2023   Date of Service: 10/17/2023 I Hospital Day: 4    Assessment/Plan   Neuro:   Diagnosis: Acute pain  Plan:   PRN tylenol 650 q6h  PRN dilaudid 0.5 mg IV q3h breakthrough  PRN oxy 5/10 mg mod/severe  CAM-ICU precautions      CV:   Diagnosis: Shock secondary to sepsis vs SIRS reaction from acute pancreatitis  Plan: continue to monitor  fluid resuscitation  vasopressors as needed not requiring any at this time   Diagnosis: Hypertension (chronic)  no home meds     Pulm:  Diagnosis: Persistent left pleural effusion and left base atelectasis  Encourage IS  2L NC currently   Monitor O2 status, goal SpO2 > 90%  CXR as needed     GI:   Diagnosis: necrotizing pancreatitis  Monitor drain output, left lateral drain replaced  Previous LUQ drain- 25 ml prior to replacement  LUQ drain 23 cc  RUQ drain 10 cc  L flank RP drain 100 cc  Creon 6000 units  Zosyn, vanc    Diagnosis: GI bleed  Plan: GI recs appreciated  Flex sigmoidoscopy today   PRN zofran for nausea  Protonix 40mg IV ppx        :   Trend Cr  Cr today 0.68  Monitor UOP  1L out   Is and Os  In 6.1L  Out 1.1L   Net +5 L today  Net +9.3 L for admission           F/E/N:   F: isolyte 125  E: monitor and replace PRN  Repleted K and Mg this a.m.   N: NPO     Heme/Onc:   Acute blood loss anemia  Trend HgB  Current HgB 8.2 (7.1)  replete for HgB < 7.0  Received 3 units pRBC to date  Hold DVT ppx     Endo:   Diagnosis: hyperglycemia  Plan: SSI  BG goal 140-180     ID:   Diagnosis: necrotizing pancreatitis, febrile-resolved , septic shock-resolved   Plan: Zosyn, vancomycin  Fever/WBC curve  WBC 21.79(24.7)  Random Vanc 30.4  Appreciate ID recs  F/u drain cultures, blood cultures     MSK/Skin:   Local wound care PRN  PT/OT when able     Disposition: Critical care      ICU Core Measures     A: Assess, Prevent, and Manage Pain Has pain been assessed? Yes  Need for changes to pain regimen? Yes   B: Both SAT/SAT  N/A   C: Choice of Sedation RASS Goal: 0 Alert and Calm  Need for changes to sedation or analgesia regimen? NA   D: Delirium CAM-ICU: Negative   E: Early Mobility  Plan for early mobility? Yes   F: Family Engagement Plan for family engagement today? NA       Antibiotic Review: Awaiting culture results. 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 He was initially admitted 9/4-9/19 for pancreatitis with IR drains x3 placed , SMA thrombosis, septic shock, Klebsiella bacteremia treated with ceftriaxone and metronidazole for 14 days. He was readmitted 9/26-9/27 for replacement of a dislodged drain. He was then seen in [facility] ED 10/9 for fall with head strike, negative CT. which have been producing serous necrotic fluid. He most recently presented to the [facility] ED 10/13 with tachycardia, tachypnea, and syncope. Drains were in place though patient has been bleeding around the drain. ED CT demonstrated known necrotizing pancreatic mass, 7.7 x 6.9WX paracolic gutter abscess, and ascites. He was transferred to [facility] 10/13 and admitted to critical care 10/16 for further management. Overnight: Post op VBG 7.343/45.4/41.7/24.2, base excess -1.5; lactate cleared. Ca 6.9 repleated. Systolic BP 63K with tachycardia to 120s despite hgb 7.6 after 2 units. Gave 500 cc isolyte. Repeat H and H at midnight hbg 7.1, additional 1u PRBC. Review of Systems   Constitutional:  Positive for fatigue. Negative for fever. Respiratory:  Negative for shortness of breath.     Cardiovascular: Negative for chest pain. Gastrointestinal:  Positive for diarrhea. Negative for abdominal pain. Objective                            Vitals I/O      Most Recent Min/Max in 24hrs   Temp 97.8 °F (36.6 °C) Temp  Min: 97.4 °F (36.3 °C)  Max: 100.5 °F (38.1 °C)   Pulse 90 Pulse  Min: 88  Max: 138   Resp 20 Resp  Min: 18  Max: 32   /61 BP  Min: 77/48  Max: 115/61   O2 Sat 99 % SpO2  Min: 93 %  Max: 100 %      Intake/Output Summary (Last 24 hours) at 10/17/2023 0333  Last data filed at 10/17/2023 0306  Gross per 24 hour   Intake 7398.09 ml   Output 1033 ml   Net 6365.09 ml         Diet NPO     Invasive Monitoring Physical exam    Physical Exam  Eyes:      Extraocular Movements: Extraocular movements intact. Conjunctiva/sclera: Conjunctivae normal.      Pupils: Pupils are equal, round, and reactive to light. Skin:     General: Skin is warm and dry. Capillary Refill: Capillary refill takes less than 2 seconds. Comments: Multiple wounds to dosrum of b/l feet    HENT:      Head: Normocephalic and atraumatic. Mouth/Throat:      Mouth: Mucous membranes are moist.   Cardiovascular:      Rate and Rhythm: Normal rate and regular rhythm. Heart sounds: No murmur heard. No friction rub. No gallop. Musculoskeletal:      Right lower le+ Edema present. Left lower le+ Edema present. Comments: Moves all extremities equally    Abdominal:      Palpations: Abdomen is soft. Tenderness: There is no abdominal tenderness. Comments: Abdominal drains with serosanguinous drainage    Watery diarrhea, maroon tinged    Constitutional:       General: He is not in acute distress. Pulmonary:      Effort: Pulmonary effort is normal. No respiratory distress. Neurological:      Mental Status: He is alert and oriented to person, place and time. He is calm.       Comments: Moves all extremities equally             Diagnostic Studies      EKG: NSR on monitor   Imaging:  I have personally reviewed pertinent reports. Medications:  Scheduled PRN   chlorhexidine, 15 mL, Q12H ALEX  enoxaparin, 30 mg, Q12H ALEX  insulin lispro, 2-12 Units, 4x Daily  pancrelipase (Lip-Prot-Amyl), 6,000 Units, TID With Meals  pantoprazole, 40 mg, Q24H ALEX  piperacillin-tazobactam, 3.375 g, Q6H  polyethylene glycol, 4,000 mL, See Admin Instructions  polyethylene glycol, 17 g, TID  senna-docusate sodium, 1 tablet, HS  vancomycin, 1,250 mg, Q12H      acetaminophen, 650 mg, Q6H PRN  HYDROmorphone, 0.5 mg, Q3H PRN  ondansetron, 4 mg, Q6H PRN  oxyCODONE, 10 mg, Q4H PRN  oxyCODONE, 5 mg, Q4H PRN       Continuous    multi-electrolyte, 125 mL/hr, Last Rate: 125 mL/hr (10/17/23 0038)         Labs:    CBC    Recent Labs     10/16/23  0348 10/16/23  1401 10/16/23  1748 10/17/23  0021   WBC 23.66*  --  24.70*  --    HGB 8.0*   < > 7.6* 7.1*   HCT 25.1*   < > 22.7* 21.9*   *  --  351  --     < > = values in this interval not displayed.      BMP    Recent Labs     10/16/23  1349 10/16/23  1401 10/16/23  1752   SODIUM 133*  --  134*   K 4.5  --  3.9   CL 96  --  102   CO2 18* 20* 23   AGAP 19  --  9   BUN 29*  --  28*   CREATININE 1.11  --  1.01   CALCIUM 8.1*  --  6.9*       Coags    Recent Labs     10/15/23  0454   INR 1.58*   PTT 44*        Additional Electrolytes  Recent Labs     10/15/23  0454 10/16/23  1401   MG 2.1  --    PHOS 3.8  --    CAIONIZED  --  1.10*          Blood Gas    No recent results  Recent Labs     10/16/23  1748   PHVEN 7.343   UQZ1HEQ 45.5   PO2VEN 41.7   UVI5FYE 24.2   BEVEN -1.5    LFTs  Recent Labs     10/16/23  0348 10/16/23  1349   ALT 18 15   AST 25 23   ALKPHOS 111* 98   ALB 2.2* 3.0*   TBILI 0.81 1.31*       Infectious  Recent Labs     10/16/23  1349   PROCALCITONI 4.45*     Glucose  Recent Labs     10/15/23  0454 10/16/23  0348 10/16/23  1349 10/16/23  1752   GLUC 102  [facility], DO

## 2023-10-17 NOTE — PLAN OF CARE
Problem: Potential for Falls  Goal: Patient will remain free of falls  Description: INTERVENTIONS:  - Educate patient/family on patient safety including physical limitations  - Instruct patient to call for assistance with activity   - Consult OT/PT to assist with strengthening/mobility   - Keep Call bell within reach  - Keep bed low and locked with side rails adjusted as appropriate  - Keep care items and personal belongings within reach  - Initiate and maintain comfort rounds  - Make Fall Risk Sign visible to staff  - Apply yellow socks and bracelet for high fall risk patients  - Consider moving patient to room near nurses station  Outcome: Progressing     Problem: MOBILITY - ADULT  Goal: Maintain or return to baseline ADL function  Description: INTERVENTIONS:  -  Assess patient's ability to carry out ADLs; assess patient's baseline for ADL function and identify physical deficits which impact ability to perform ADLs (bathing, care of mouth/teeth, toileting, grooming, dressing, etc.)  - Assess/evaluate cause of self-care deficits   - Assess range of motion  - Assess patient's mobility; develop plan if impaired  - Assess patient's need for assistive devices and provide as appropriate  - Encourage maximum independence but intervene and supervise when necessary  - Involve family in performance of ADLs  - Assess for home care needs following discharge   - Consider OT consult to assist with ADL evaluation and planning for discharge  - Provide patient education as appropriate  Outcome: Progressing  Goal: Maintains/Returns to pre admission functional level  Description: INTERVENTIONS:  - Perform BMAT or MOVE assessment daily.   - Set and communicate daily mobility goal to care team and patient/family/caregiver. - Collaborate with rehabilitation services on mobility goals if consulted  - Perform Range of Motion  times a day. - Reposition patient every  hours.   - Dangle patient  times a day  - Stand patient  times a day  - Ambulate patient  times a day  - Out of bed to chair  times a day   - Out of bed for meals times a day  - Out of bed for toileting  - Record patient progress and toleration of activity level   Outcome: Progressing     Problem: Prexisting or High Potential for Compromised Skin Integrity  Goal: Skin integrity is maintained or improved  Description: INTERVENTIONS:  - Identify patients at risk for skin breakdown  - Assess and monitor skin integrity  - Assess and monitor nutrition and hydration status  - Monitor labs   - Assess for incontinence   - Turn and reposition patient  - Assist with mobility/ambulation  - Relieve pressure over bony prominences  - Avoid friction and shearing  - Provide appropriate hygiene as needed including keeping skin clean and dry  - Evaluate need for skin moisturizer/barrier cream  - Collaborate with interdisciplinary team   - Patient/family teaching  - Consider wound care consult   Outcome: Progressing     Problem: PAIN - ADULT  Goal: Verbalizes/displays adequate comfort level or baseline comfort level  Description: Interventions:  - Encourage patient to monitor pain and request assistance  - Assess pain using appropriate pain scale  - Administer analgesics based on type and severity of pain and evaluate response  - Implement non-pharmacological measures as appropriate and evaluate response  - Consider cultural and social influences on pain and pain management  - Notify physician/advanced practitioner if interventions unsuccessful or patient reports new pain  Outcome: Progressing     Problem: INFECTION - ADULT  Goal: Absence or prevention of progression during hospitalization  Description: INTERVENTIONS:  - Assess and monitor for signs and symptoms of infection  - Monitor lab/diagnostic results  - Monitor all insertion sites, i.e. indwelling lines, tubes, and drains  - Monitor endotracheal if appropriate and nasal secretions for changes in amount and color  - [place] appropriate cooling/warming therapies per order  - Administer medications as ordered  - Instruct and encourage patient and family to use good hand hygiene technique  - Identify and instruct in appropriate isolation precautions for identified infection/condition  Outcome: Progressing

## 2023-10-17 NOTE — TELEPHONE ENCOUNTER
PA submitted to pt's plan. Awaiting a determination of coverage. Pt is currently admitted to the hospital.     Raghavendra Langford Caldera: [facility] [address] - PA Case ID: [id number] - Rx #: [id number]  Need help?  Call us at [phone]  Status   Sent to [facility]

## 2023-10-17 NOTE — TELEPHONE ENCOUNTER
Received notification from Cover My Meds that pts script for oxycodone requires PA.     Key: [id number]

## 2023-10-17 NOTE — ANESTHESIA POSTPROCEDURE EVALUATION
Post-Op Assessment Note    CV Status:  Stable  Pain Score: 0    Pain management: adequate     Mental Status:  Alert and awake   Hydration Status:  Euvolemic   PONV Controlled:  Controlled   Airway Patency:  Patent   Two or more mitigation strategies used for obstructive sleep apnea   Post Op Vitals Reviewed: Yes      Staff: CRNA         No notable events documented.     BP   105/71   Temp      Pulse 99   Resp   16   SpO2   100

## 2023-10-17 NOTE — PROGRESS NOTES
Vancomycin IV Pharmacy-to-Dose Consultation    Edwardo Turpin is a 48 y.o. male who is currently receiving Vancomycin IV with management by the Pharmacy Consult service. Relevant clinical data and objective / subjective history reviewed. Vancomycin Assessment:  Indication: Bacteremia (goal -600, trough >10)    Status: critically ill  Micro:   - 10/16 Body fluid 2/2: GNR  - 10/16 Body fluid: GVR  Procalcitonin: 4.45  Renal Function:     Lab Results   Component Value Date    CREATININE 0.97 10/17/2023     Estimated Creatinine Clearance: 111.1 mL/min (by C-G formula based on SCr of 0.97 mg/dL). Dialysis: no  Days of Therapy: 2  Current Dose: 1250 mg IV q12h   Goal AUC / Trough: -600, trough >10   Last Level: 30.4 on 10/17  Assessment: WBC improving, afebrile. Vancomycin Plan:  New Dosing: change to 750 mg IV q12h   Predicted Trough / AUC: 12.8 / 414  Next Level: on 10/24 at 0600  Renal Function Monitoring: Daily BMP and [address] will continue to follow closely for s/sx of nephrotoxicity, infusion reactions and appropriateness of therapy. BMP and CBC will be ordered per protocol. We will continue to follow the patient’s culture results and clinical progress daily.        Elder Meyers, PharmD, BCCCP  Critical Care Clinical Pharmacist  Available via [facility] Text

## 2023-10-17 NOTE — ANESTHESIA PREPROCEDURE EVALUATION
Procedure:  COLONOSCOPY    Relevant Problems   CARDIO   (+) Primary hypertension      GI/HEPATIC   (+) Necrotizing pancreatitis      /RENAL   (+) JERZY (acute kidney injury) ([address])      HEMATOLOGY   (+) Anemia      PULMONARY   (+) Pleural effusion due to pancreatitis   (+) Pleural effusion, left        Physical Exam    Airway    Mallampati score: II  TM Distance: >3 FB  Neck ROM: full     Dental   No notable dental hx     Cardiovascular  Cardiovascular exam normal    Pulmonary  Pulmonary exam normal     Other Findings      Left Ventricle: Left ventricular cavity size is normal. Wall thickness is upper normal. The left ventricular ejection fraction is 60%. Systolic function is normal. Wall motion is normal. Diastolic function is normal.    Right Ventricle: Right ventricular cavity size is mildly dilated. Systolic function is normal.    Mitral Valve: There is trace regurgitation. Pulmonary Artery: The pulmonary artery systolic pressure is mildly increased. Anesthesia Plan  ASA Score- 3     Anesthesia Type- IV sedation with anesthesia with ASA Monitors. Additional Monitors:     Airway Plan:            Plan Factors-Exercise tolerance (METS): <4 METS. Chart reviewed. Patient summary reviewed. Patient is not a current smoker. Induction- intravenous. Postoperative Plan-     Informed Consent- Anesthetic plan and risks discussed with patient.

## 2023-10-17 NOTE — OCCUPATIONAL THERAPY NOTE
Occupational Therapy cx        Patient Name: Crystal Rowland  Today's Date: 10/17/2023         10/17/23 1400   OT Last Visit   OT Visit Date 10/17/23   Note Type   Note type Evaluation   Cancel Reasons Refusal   Additional Comments Pt refused 2x this date, reports his dr told him he does not need to get up if he doesn't want to. Will hold and address as pt and clinical course allow.          Priscilla Larsen, SHIRA, OTR/L

## 2023-10-17 NOTE — PROGRESS NOTES
Progress Note - General Surgery   GI Resident on RED Service   Cleo Blanton 48 y.o. male MRN: [id number]  Unit/Bed#: [facility] 517-01 Encounter: [id number]    Assessment:  47 yo male who presents with infected necrotizing pancreatitis. Now s/p IR draining of retroperitoneal abscess, repositioning of left paracolic drain. Afebrile with stable vitals.  cc  Drains with 100cc, 23cc, and 10cc of output  Hgb 7.1 from 7.6 from 8 from 8.5  BM x0  Drain #1 serosang/purulent  Drain #2 purulent  Drain #3 dark red necrotic appearing    Plan:  NPO sips with meds  IV fluids  IV antibiotics  Continue to flush IR drains  F/u GI for colonoscopy today given history of bloody bowel movements yesterday and need for transfusion  F/u ID regarding antibiotics  PRN pain meds  PRN anti nausea meds  DVT prophylaxis    Subjective/Objective     Subjective: No acute events overnight. Underwent IR procedure yesterday for drain management. Patient denies having abdominal pain. Denies having nausea, vomiting, fevers, chills, chest pain, shortness of breath. Has not had a bowel movements. Voiding without difficulty. Got another transfusion overnight. Overall doing well. Objective:     Blood pressure 96/55, pulse 96, temperature 98.1 °F (36.7 °C), temperature source Oral, resp. rate 21, height 5' 11" (1.803 m), weight 110 kg (243 lb 2.7 oz), SpO2 97 %. ,Body mass index is 33.91 kg/m². Intake/Output Summary (Last 24 hours) at 10/17/2023 1024  Last data filed at 10/17/2023 1001  Gross per 24 hour   Intake 7158.01 ml   Output 1268 ml   Net 5890.01 ml         Invasive Devices       Peripheral Intravenous Line  Duration             Peripheral IV 10/16/23 Dorsal (posterior); Left Forearm 1 day    Peripheral IV 10/16/23 Right Antecubital <1 day              Drain  Duration             Abscess Drain RUQ 42 days    Abscess Drain LUQ 40 days    Abscess Drain Abdomen <1 day                    General: NAD  HENT: NCAT MMM  Neck: supple, no JVD  CV: nl rate  Lungs: nl wob. No resp distress  ABD: Soft, nontender, nondistended. See above for drain output and drain character.    Extrem: No CCE  Neuro: AAOx3       Scheduled Meds:  Current Facility-Administered Medications   Medication Dose Route Frequency Provider Last Rate    acetaminophen  650 mg Oral Q6H PRN Gurmeet Davila, PA-C      chlorhexidine  15 mL Mouth/Throat Q12H [address] [place] ROCIO Davila-JENS      enoxaparin  30 mg Subcutaneous Q12H [address] Mike Ochoa MD      HYDROmorphone  0.5 mg Intravenous Q3H PRN Gurmeet Davila, PA-C      insulin lispro  2-12 Units Subcutaneous 4x Daily ROCIO Adorno-JENS      multi-electrolyte  125 mL/hr Intravenous Continuous Gurmeet Davila PA-C 125 mL/hr (10/17/23 0038)    ondansetron  4 mg Intravenous Q6H PRN ROCIO Adorno-C      oxyCODONE  10 mg Oral Q4H PRN ROCIO Adorno-C      oxyCODONE  5 mg Oral Q4H PRN [place] Davila, PA-C      pancrelipase (Lip-Prot-Amyl)  6,000 Units Oral TID With Meals Gurmeet Davila PA-C      pantoprazole  40 mg Intravenous Q24H [address] ROCIO Adorno-JENS      piperacillin-tazobactam  3.375 g Intravenous Q6H ROCIO Adorno-C Stopped (10/17/23 1001)    polyethylene glycol  4,000 mL Oral See Admin Instructions Gladys Florian MD      polyethylene glycol  17 g Oral TID Gurmeet Davila PA-C      potassium chloride  20 mEq Intravenous Once Lorel Boom, DO 20 mEq (10/17/23 9307)    potassium chloride  20 mEq Intravenous Once Lorel Boom, DO      senna-docusate sodium  1 tablet Oral HS ROCIO Adorno-C      vancomycin  750 mg Intravenous Q12H Yves Singh DO       Continuous Infusions:multi-electrolyte, 125 mL/hr, Last Rate: 125 mL/hr (10/17/23 0038)      PRN Meds:.  acetaminophen    HYDROmorphone    ondansetron    oxyCODONE    oxyCODONE      Lab, Imaging and other studies:I have personally reviewed pertinent lab results.     VTE Pharmacologic Prophylaxis: Heparin  VTE Mechanical Prophylaxis: sequential compression device      Faye Matthews MD  10/17/2023 10:24 AM

## 2023-10-17 NOTE — PHYSICAL THERAPY NOTE
Physical Therapy Cancellation Note         10/17/23 1305   PT Last Visit   PT Visit Date 10/17/23   Note Type   Note type Evaluation; Cancelled Session   Cancel Reasons Refusal   Additional Comments attempted x 2 however patient declining due to fatigue.        DARYN ROBERTS PT, DPT

## 2023-10-18 LAB
BACTERIA BLD CULT: NORMAL
BACTERIA BLD CULT: NORMAL

## 2023-10-18 NOTE — PROGRESS NOTES
[facility] - Progress Note  Tha Fung 48 y.o. 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 Hematochezia  Hg stable, 8.1 today from 7.8 yesterday  Colonoscopy 10/17/23 w stool, no plans for inpatient colonoscopy at this time    Acute Necrotizing Pancreatitis  EUS 9/15/23 w poor windows, no cystgastrostomy performed  F/u w advanced endoscopy outpt for possible repeat attempt  S/p drain revision w IR 10/16/23  ______________________________________________________________________    SUBJECTIVE:     Earnie Koyanagi. Hg stable.      Scheduled Meds:  Current Facility-Administered Medications   Medication Dose Route Frequency Provider Last Rate    acetaminophen  650 mg Oral Q6H PRN Daljit Aquino PA-C      chlorhexidine  15 mL Mouth/Throat Q12H [facility] Daljit Aquino PA-C      enoxaparin  30 mg Subcutaneous Q12H Promise Duenas MD      HYDROmorphone  0.5 mg Intravenous Q3H PRN Daljit Aquino PA-C      insulin lispro  2-12 Units Subcutaneous 4x Daily Daljit Aquino PA-C      multi-electrolyte  125 mL/hr Intravenous Continuous Daljit Aquino PA-C 125 mL/hr (10/18/23 0711)    ondansetron  4 mg Intravenous Q6H PRN Daljit Aquino PA-C      oxyCODONE  10 mg Oral Q4H PRN Daljit Aquino PA-C      oxyCODONE  5 mg Oral Q4H PRN Daljit Aquino PA-C      pancrelipase (Lip-Prot-Amyl)  6,000 Units Oral TID With Meals Daljit Aquino PA-C      pantoprazole  40 mg Intravenous Q12H [facility] Lord Mary MD      piperacillin-tazobactam  3.375 g Intravenous Q6H Ward Pearson PA-C Stopped (10/18/23 0830)    polyethylene glycol  4,000 mL Oral See Admin Instructions Forrest Mcintyre MD      polyethylene glycol  17 g Oral TID Ward Pearson PA-C      senna-docusate sodium  1 tablet Oral HS Ward Pearson PA-C      vancomycin  750 mg Intravenous Q12H Mikie arana,  mg (10/18/23 0840)     Continuous Infusions:multi-electrolyte, 125 mL/hr, Last Rate: 125 mL/hr (10/18/23 0711)      PRN Meds:.  acetaminophen    HYDROmorphone    ondansetron    oxyCODONE    oxyCODONE    OBJECTIVE:     Objective   Blood pressure 103/58, pulse 86, temperature 97.7 °F (36.5 °C), temperature source Oral, resp. rate 21, height 5' 11" (1.803 m), weight 110 kg (242 lb 15.2 oz), SpO2 98 %. Body mass index is 33.88 kg/m². Intake/Output Summary (Last 24 hours) at 10/18/2023 1142  Last data filed at 10/18/2023 1001  Gross per 24 hour   Intake 4382 ml   Output 1125 ml   Net 3257 ml       PHYSICAL EXAM:   General Appearance: Awake and alert, in no acute distress  Abdomen: Soft, non-tender, non-distended; no masses or no organomegaly    Invasive Devices       Peripheral Intravenous Line  Duration             Peripheral IV 10/16/23 Dorsal (posterior); Left Forearm 2 days    Peripheral IV 10/16/23 Right Antecubital 1 day              Drain  Duration             Abscess Drain RUQ 43 days    Abscess Drain LUQ 41 days    Abscess Drain Abdomen 1 day                    LAB RESULTS:      Lab Units 10/18/23  0430 10/17/23  0623 10/16/23  1752 10/16/23  1401 10/16/23  1349 10/16/23  0348 10/15/23  0454 10/14/23  0451 10/09/23  1502 09/27/23  0510 09/19/23  0453 09/18/23  0500   SODIUM mmol/L 137 137 134*  --  133* 132* 132* 130*   < > 135   < > 136   POTASSIUM mmol/L 3.2* 3.5 3.9  --  4.5 4.2 3.8 3.9   < > 4.0   < > 3.9   CHLORIDE mmol/L 104 103 102  --  96 97 96 97   < > 100   < > 104   CO2 mmol/L 22 25 23  --  18* 22 23 23   < > 30   < > 23   CO2, I-STAT mmol/L  --   --   -- 20*  --   --   --   --   --   --   --   --    BUN mg/dL 20 26* 28*  --  29* 24 21 23   < > 23   < > 19   CREATININE mg/dL 0.90 0.97 1.01  --  1.11 0.85 0.79 0.83   < > 1.27   < > 1.01   GLUCOSE RANDOM mg/dL 114 125 140  --  137 123 102 106   < > 68   < > 116   CALCIUM mg/dL 7.2* 7.7* 6.9*  --  8.1* 7.6* 7.6* 7.7*   < > 7.9*   < > 7.8*   MAGNESIUM mg/dL 2.2 1.9  --   --   --   --  2.1 1.6*  --  1.6*   < > 1.6*   PHOSPHORUS mg/dL  --  3.5  --   --   --   --  3.8 3.8  --  4.8*  --  3.6    < > = values in this interval not displayed. Lab Units 10/18/23  0430 10/17/23  0623 10/16/23  1349 10/16/23  0348 10/14/23  0451 10/13/23  1420 09/12/23  0524   TOTAL PROTEIN g/dL 4.6* 4.9* 5.9* 5.6* 5.9*   < > 5.5*   ALBUMIN g/dL 2.0* 2.2* 3.0* 2.2* 2.2*   < > 2.3*   TOTAL BILIRUBIN mg/dL 0.48 0.68 1.31* 0.81 0.48   < > 0.39   BILIRUBIN DIRECT mg/dL  --   --   --  0.34*  --   --  0.10   AST U/L 15 14 23 25 22   < > 10*   ALT U/L 7 10 15 18 18   < > 4*   ALK PHOS U/L 62 77 98 111* 80   < > 45    < > = values in this interval not displayed. Lab Units 10/18/23  0430 10/17/23  2208 10/17/23  1721 10/17/23  1156 10/17/23  0623 10/17/23  0021 10/16/23  1748 10/16/23  1401 10/16/23  0348 10/15/23  2342 10/15/23  0454   WBC Thousand/uL 15.16*  --   --   --  21.79*  --  24.70*  --  23.66*  --  7.40   HEMOGLOBIN g/dL 8.1* 7.8* 8.4* 8.7* 8.2* 7.1* 7.6*  --  8.0*   < > 6.8*   I STAT HEMOGLOBIN g/dl  --   --   --   --   --   --   --  9.2*  --   --   --    HEMATOCRIT % 24.7*  --   --   --  24.9* 21.9* 22.7*  --  25.1*  --  22.3*   HEMATOCRIT, ISTAT %  --   --   --   --   --   --   --  27*  --   --   --    PLATELETS Thousands/uL 341  --   --   --  312  --  351  --  490*  --  393*   MCV fL 88  --   --   --  88  --  87  --  88  --  87    < > = values in this interval not displayed.        No results found for: "IRON", "TIBC", "FERRITIN"    Lab Results   Component Value Date    INR 1.58 (H) 10/15/2023    INR 2.10 (H) 10/13/2023 INR 2.00 (H) 09/04/2023    PROTIME 18.7 (H) 10/15/2023    PROTIME 24.4 (H) 10/13/2023    PROTIME 22.9 (H) 09/04/2023       RADIOLOGY RESULTS:   Procedure: IR drainage tube placement    Result Date: 10/18/2023  Narrative: Reposition and replacement of multiple drainage catheters Clinical History: 77-year-old male with history of necrotizing pancreatitis, with multiple drains in place. Patient had dislodgment of a left flank retroperitoneal drain, and retraction of a left lower quadrant drain. Contrast: 15 mL of iohexol (OMNIPAQUE) Fluoro time: 5 min Number of Images: Multiple Radiation dose: 21 mGy Technique/findings: The patient was brought to the interventional radiology suite and placed supine on the table. Intervention/findings: The tract from the prior left retroperitoneal drainage catheter was cannulized, and a Kumpe catheter and a Glidewire was used to access the retroperitoneal collection. A new 28 Citizen of [place] Thal-Quick catheter was then advanced, with the  tip placed centrally within the collection. Contrast was then injected through the catheter confirming appropriate position within the cavity. Next, the existing left lower quadrant abdominal drain was injected, which was seen to be retracted, however with a tract identified into a left lower quadrant fluid collection. Therefore, the existing drainage catheter was removed over a wire, and a Kumpe catheter and Glidewire was used to access the left lower quadrant intra-abdominal collection. A new 12 Citizen of [place] pigtail drainage catheter was then placed within the collection. Contrast was injected through the catheter confirming appropriate position within the collection. Impression: Impression: Replacement of a 28 [place] drainage catheter into the retroperitoneum. Repositioning of a 12 [place] pigtail drainage catheter at the left paracolic gutter fluid collection, with 5 mL of purulent fluid aspirated.  Workstation performed: [id number]     Procedure: Colonoscopy    Result Date: 10/17/2023  Narrative: Table formatting from the original result was not included. [address] [place] [address] Endoscopy [address] [address] [place] [address] E: 10/17/23 PHYSICIAN(S): Attending: No Staff Documented Fellow: Kacy Mercer DO INDICATION: Lower GI bleed POST-OP DIAGNOSIS: See the impression below. HISTORY: Prior colonoscopy: No prior colonoscopy. BOWEL PREPARATION:  PREPROCEDURE: Informed consent was obtained for the procedure, including sedation. Risks including but not limited to bleeding, infection, perforation, adverse drug reaction and aspiration were explained in detail. Also explained about less than 100% sensitivity with the exam and other alternatives. The patient was placed in the left lateral decubitus position. Procedure: Colonoscopy DETAILS OF PROCEDURE: Patient was taken to the procedure room where a time out was performed to confirm correct patient and correct procedure. The patient underwent monitored anesthesia care, which was administered by an anesthesia professional. The patient's blood pressure, heart rate, level of consciousness, oxygen, respirations, ECG and ETCO2 were monitored throughout the procedure. A digital rectal exam was performed. The scope was introduced through the anus. The quality of bowel preparation was evaluated using the [facility] Bowel Preparation Scale with scores of: right colon = not assessed, transverse colon = not assessed, left colon = 0. The total BBPS score was 0. Bowel prep was not adequate. The patient experienced no blood loss. The procedure was difficult due to poor preparation. The patient tolerated the procedure well. There were no apparent adverse events.  ANESTHESIA INFORMATION: ASA: III Anesthesia Type: IV Sedation with Anesthesia MEDICATIONS: No administrations occurring from 1607 to 1637 on 10/17/23 FINDINGS: Procedure aborted due to solid stool in the rectum despite significant irrigation and suctioning. EVENTS: Procedure Events Event Event Time SPECIMENS: * No specimens in log * EQUIPMENT: Colonoscope -[id number]     Impression: Procedure aborted due to poor prep with solid stool in the rectum. RECOMMENDATION:  Follow up with me in clinic  Monitor stool output and hemoglobin. Transfuse for <7. If patient re-bleeds, can consider repeat colonoscopy. No Staff Documented     Procedure: XR chest portable    Result Date: 10/16/2023  Narrative: CHEST INDICATION:   sepsis. COMPARISON: CXR and chest CT 10/13/2023. EXAM PERFORMED/VIEWS:  XR CHEST PORTABLE. FINDINGS: Cardiomediastinal silhouette normal. Persistent left effusion and left base atelectasis. No pneumothorax. Upper abdomen normal. Bones normal for age. Impression: Persistent left effusion and left base atelectasis. Pneumonia not excluded in the appropriate clinical setting. Workstation performed: [id number]     Procedure: CTA abdomen pelvis w wo contrast    Result Date: 10/16/2023  Narrative: CT ANGIOGRAM OF THE ABDOMEN AND PELVIS WITH AND WITHOUT IV CONTRAST INDICATION:   GI bleed Further assess for active intra-abdominal bleeding. 10/13/2023 CT abdomen COMPARISON: 10/13/2023; 9/26/2023; 9/8/2023 TECHNIQUE:  CT angiogram examination of the abdomen and pelvis was performed according to standard protocol. This examination, like all CT scans performed in the [facility], was performed utilizing techniques to minimize radiation dose exposure, including the use of iterative reconstruction and automated exposure control. Contrast as well as noncontrast images were obtained. Rad dose 3718.3 mGy-cm IV Contrast:  100 mL of iohexol (OMNIPAQUE) Enteric Contrast:  Enteric contrast was not administered. FINDINGS: VASCULAR STRUCTURES: The aorta demonstrates no evidence of aneurysm. The IVC is patent. There is some narrowing of the main portal vein and splenic vein but without evidence of thrombosis.  Some narrowing of the SMV is seen near the confluence but also appears patent more distally. The celiac, SMA, DEVON and renal arteries appear patent. There appears to be dual renal arteries on the left. OTHER FINDINGS ABDOMEN LOWER CHEST: Small pericardial effusion. Moderate to large pleural effusions left greater than right with adjacent atelectasis. Daryn Negus LIVER/BILIARY TREE: Multifocal areas of diminished attenuation are noted in segment 4A and 8 somewhat more pronounced than the study of October 13 where these were thought to perhaps represent areas of focal fatty infiltration. These less apparent on earlier examinations however. There is some enhancement of the vasculature within these areas better seen on portal phase. This could represent new areas of edema perhaps in areas of older focal fatty infiltration. Mild enhancement the common bile duct could be related to secondary cholangitis from patient's pancreatitis. No intrahepatic biliary dilatation. GALLBLADDER:  No calcified gallstones. No pericholecystic inflammatory change. SPLEEN: Heterogeneous enhancement resolves in the spleen probably related to arterial phase injection. Daryn Negus PANCREAS: Gas-filled collection/debris is seen centrally within the pancreas with a pigtail catheter similar to the prior examination. This measures 5.9 x 5.7 cm without significant change. Findings suggest pancreatic necrosis and extends towards the pancreatic tail and lesser sac abutting the stomach. . Another area of gas and fluid debris is seen within the pancreatic head. This measures approximately 5.7 x 4 cm, image 84, previously 4.5 x 2.6 cm. This extends inferiorly into the anterior pararenal space causing thickening of the road is fascia due to extension of this necrotic collection and surrounding inflammatory change. No evidence of hemorrhage can be appreciated on the arterial portal phase images in the region of these collections.  There is some surrounding rim enhancement as well as some enhancement of some residual pancreatic parenchymal elements. ADRENAL GLANDS: Adrenal thickening is seen bilaterally. KIDNEYS/URETERS: No hydronephrosis. Some increased density in the kidneys and bladder is from residual contrast. Patient did have a IR procedure on 10/14 and CT on 10/13. STOMACH AND BOWEL: Moderate gastric distention is seen. Some scattered dilated loops of small bowel are seen without obvious focus of transition and suggests ileus pattern. Stool scattered throughout the colon with evidence of possible impaction in the rectum. Some proximal colonic distention is also demonstrated but similar to the study of October 13. Some enhancing loops of small bowel which are collapsed are visible as compared to the precontrast image but no focus of intraluminal contrast to suggest hemorrhage can be appreciated. APPENDIX: The appendix is not well seen. ABDOMINOPELVIC CAVITY: There is mild increased ascites in the paracolic gutters and pelvis. Fluid still demonstrates fluid attenuation precontrast without any evidence of hemorrhage fluid level. There is some bubbles of gas in the retroperitoneum closely associated with the areas of pancreatic necrosis. No gas near the diaphragms or elsewhere in the retroperitoneum. Enhancing lymph node measures 11 mm on image 111 and is probably reactive. Lymph nodes in the root of the mesentery are also prominent and reactive and unchanged. Additional collections along the gutters appear similar to the study of October 13. A drainage catheter is still present extending into the pancreatic tail collection. Gas bubble also is seen in the gutter collection on image 1227 slightly inferior. PELVIS REPRODUCTIVE ORGANS:  Unremarkable for patient's age. URINARY BLADDER:  Unremarkable. ABDOMINAL WALL/INGUINAL REGIONS: Dependent edema. OSSEOUS STRUCTURES:  No acute fracture or destructive osseous lesion. Bilateral hip replacements are present.      Impression: No evidence of extraluminal contrast to suggest hemorrhage into small bowel or into the abdominal cavity. Evidence of pancreatic necrosis with collections/gas and pigtail catheter similar to the prior study. Superinfection/abscess should be suspected. No evidence of aneurysm or main portal vein thrombosis. Ascites has mildly increased without increased density. Hemorrhage can often dilute within ascites however. Wedge-shaped hepatic lesions in segment 4A and 8 superiorly with delayed enhancement but persistent lower attenuation. These areas were less defined on earlier exams. Findings could represent increasing edema in areas of prior focal fatty infiltration. Cholangitis is possible but less likely hepatic infarct given persistent vascularity. Smaller portal venous vessels still appear to be patent. Finding should be correlated with any worsening LFT abnormalities. Pleural effusions are again noted slightly increased without increased density. . Persistent contrast in the kidneys and bladder prior to injection could be related to recent contrast injection but should be correlated with any worsening renal function. This was discussed with Dr. Fletcher Boyer at 12:10 p.m. Workstation performed: [id number]     Narrative/Impressions - 3 day look back     Giuliana Reyes M.D.  PGY-5 Gastroenterology Fellow  [facility]. [facility]  Available on Chelsey Bronson. [email or web address]

## 2023-10-18 NOTE — PROGRESS NOTES
Progress Note - General Surgery   GI Resident on [facility]   Charly Ada 48 y.o. male MRN: [id number]  Unit/Bed#: [facility] 517-01 Encounter: [id number]    Assessment:  47 yo male who presents with infected necrotizing pancreatitis. Now s/p IR draining of retroperitoneal abscess, repositioning of left paracolic drain on 55/83. Afebrile with stable vitals. Currently on 3L NC saturating greater than 92%   cc  RUQ drain 65 cc bloody  LUQ drain 110cc necrotic bloody output  Abdominal drain with 160 cc purulent output  WBC 15 from 21.7  Hgb 8.1 from 7.8  Cr 0.9 from 0.97    Plan:  NPO sips with meds  IV fluids  IV antibiotics  Continue to flush IR drains  F/u colonoscopy/EUS today with GI  F/u ID regarding antibiotics  PRN pain meds  PRN anti nausea meds  DVT prophylaxis    Subjective/Objective     Subjective: No acute events overnight. Patient denies having abdominal pain. Denies having nausea, vomiting, fevers, chills, chest pain, shortness of breath. Had had several bowel movements after completing his bowel prep. Voiding without difficulty. Overall doing well. Objective:     Blood pressure 100/55, pulse 87, temperature 97.7 °F (36.5 °C), temperature source Oral, resp. rate 22, height 5' 11" (1.803 m), weight 110 kg (242 lb 15.2 oz), SpO2 98 %. ,Body mass index is 33.88 kg/m². Intake/Output Summary (Last 24 hours) at 10/18/2023 0934  Last data filed at 10/18/2023 0913  Gross per 24 hour   Intake 6134.5 ml   Output 1225 ml   Net 4909.5 ml         Invasive Devices       Peripheral Intravenous Line  Duration             Peripheral IV 10/16/23 Dorsal (posterior); Left Forearm 2 days    Peripheral IV 10/16/23 Right Antecubital 1 day              Drain  Duration             Abscess Drain RUQ 43 days    Abscess Drain LUQ 41 days    Abscess Drain Abdomen 1 day                    General: NAD  HENT: NCAT MMM  Neck: supple, no JVD  CV: nl rate  Lungs: nl wob.  No resp distress  ABD: Soft, nontender, nondistended. See above for drain output and drain character. Extrem: No CCE  Neuro: AAOx3       Scheduled Meds:  Current Facility-Administered Medications   Medication Dose Route Frequency Provider Last Rate    acetaminophen  650 mg Oral Q6H PRN Tami Wilson PA-C      chlorhexidine  15 mL Mouth/Throat Q12H [address] Tami Wilson PA-C      enoxaparin  30 mg Subcutaneous Q12H [address] Blu Edwards MD      HYDROmorphone  0.5 mg Intravenous Q3H PRN Tami Wilson PA-C      insulin lispro  2-12 Units Subcutaneous 4x Daily Tami Wilson PA-C      multi-electrolyte  125 mL/hr Intravenous Continuous Tami Wilson PA-C 125 mL/hr (10/18/23 0711)    ondansetron  4 mg Intravenous Q6H PRN Tami Wilson PA-C      oxyCODONE  10 mg Oral Q4H PRN Tami Wilson PA-C      oxyCODONE  5 mg Oral Q4H PRN Tami Wilson PA-C      pancrelipase (Lip-Prot-Amyl)  6,000 Units Oral TID With Meals Tami Wilson PA-C      pantoprazole  40 mg Intravenous Q12H [address] Blu Edwards MD      piperacillin-tazobactam  3.375 g Intravenous Q6H Tami Wilson PA-C Stopped (10/18/23 0830)    polyethylene glycol  4,000 mL Oral See Admin Instructions Israel Robles MD      polyethylene glycol  17 g Oral TID Tami Wilson PA-C      potassium chloride  20 mEq Intravenous Once [place] AMANDA Alberto 20 mEq (10/18/23 0931)    senna-docusate sodium  1 tablet Oral HS Tami Wilson PA-C      vancomycin  750 mg Intravenous Q12H Laban Galas,  mg (10/18/23 0840)     Continuous Infusions:multi-electrolyte, 125 mL/hr, Last Rate: 125 mL/hr (10/18/23 0711)      PRN Meds:.  acetaminophen    HYDROmorphone    ondansetron    oxyCODONE    oxyCODONE      Lab, Imaging and other studies:I have personally reviewed pertinent lab results.     VTE Pharmacologic Prophylaxis: Heparin  VTE Mechanical Prophylaxis: sequential compression device      Jere Bossis, MD  10/18/2023 9:34 AM

## 2023-10-18 NOTE — PROGRESS NOTES
Daily Progress Note - Critical Care   Agustín Overton 48 y.o. male MRN: [id number]  Unit/Bed#: [facility] 517-01 Encounter: [id number]        ----------------------------------------------------------------------------------------  HPI/24hr events: Patient did not go for his scope overnight due to stool still present in the rectum. Otherwise, no acute events overnight.     ---------------------------------------------------------------------------------------      Review of Systems  Review of systems was reviewed and negative unless stated above in HPI/24-hour events   ---------------------------------------------------------------------------------------  Assessment and Plan:    Neuro:   Diagnosis: Acute pain  Plan:   PRN tylenol 650 q6h   PRN dilaudid 0.5 mg IV q3h breakthrough  PRN oxy 5/10 mg mod/severe  CAM-ICU precautions       CV:   Diagnosis: Shock secondary to sepsis vs SIRS reaction from acute pancreatitis  Plan: continue to monitor  fluid resuscitation   vasopressors as needed, not requiring any at this time   Diagnosis: Hypertension (chronic)  no home meds      Pulm:  Diagnosis: Persistent left pleural effusion and left base atelectasis  Encourage IS   Monitor O2 status, goal SpO2 > 90%  CXR as needed      GI:   Diagnosis: Necrotizing pancreatitis  Monitor drain output, left lateral drain replaced  Previous LUQ drain- 25 ml prior to replacement  LUQ drain 110 cc  RUQ drain 65 cc  L flank RP drain 160 cc  Creon 6000 units TID   Zosyn, vanc   Diagnosis: GI bleed  Plan: GI recs appreciated  Patient unable to go to scope last night.  Will discuss plan with GI   PRN zofran for nausea  Protonix 40mg IV ppx         :   Diagnosis: No active issues   Monitor UOP   0.4 cc/kg/hr  Trend Cr   0.90      F/E/N:   F: isolyte 125  E: monitor and replace PRN, replaced K today   N: NPO      Heme/Onc:   Acute blood loss anemia  Trend HgB  Current HgB 8.1 (7.8)  replete for HgB < 7.0  Received 3 units pRBC to date  DVT ppx: Lovenox     Endo:   Diagnosis: hyperglycemia  Plan: SSI  BG goal 140-180  BG today 114, algorithm 4      ID:   Diagnosis: Necrotizing pancreatitis, febrile-resolved , septic shock-resolved   Plan: Zosyn, vancomycin  Fever/WBC curve  WBC 15.16 (21.79)  Appreciate ID recs   Bcx- no growth at 24 hours   Fluid cx- E. Coli, Klebsiella aerogenes, and Providencia rettgeri         MSK/Skin:   Local wound care PRN  PT/OT when able      Disposition: Continue Critical Care   Code Status: Level 1 - Full Code  ---------------------------------------------------------------------------------------  ICU CORE MEASURES    Prophylaxis   VTE Pharmacologic Prophylaxis: Enoxaparin (Lovenox)  VTE Mechanical Prophylaxis: sequential compression device  Stress Ulcer Prophylaxis: Pantoprazole IV     ABCDE Protocol (if indicated)  Plan to perform spontaneous awakening trial today? Not applicable  Plan to perform spontaneous breathing trial today? Not applicable  Obvious barriers to extubation? Not applicable  CAM-ICU: Negative    Invasive Devices Review  Invasive Devices       Peripheral Intravenous Line  Duration             Peripheral IV 10/16/23 Dorsal (posterior); Left Forearm 2 days    Peripheral IV 10/16/23 Right Antecubital 1 day              Drain  Duration             Abscess Drain RUQ 43 days    Abscess Drain LUQ 41 days    Abscess Drain Abdomen 1 day                  Can any invasive devices be discontinued today? Not applicable  ---------------------------------------------------------------------------------------  OBJECTIVE    Physical Exam  Constitutional:       General: He is not in acute distress. HENT:      Head: Normocephalic and atraumatic. Mouth/Throat:      Mouth: Mucous membranes are moist.   Eyes:      Extraocular Movements: Extraocular movements intact. Cardiovascular:      Rate and Rhythm: Normal rate and regular rhythm. Pulmonary:      Effort: Pulmonary effort is normal. No respiratory distress.       Breath sounds: Normal breath sounds. Abdominal:      Palpations: Abdomen is soft. Tenderness: There is no abdominal tenderness. Comments: Drains in place draining  serosang fluid   Musculoskeletal:      Right lower leg: Edema present. Left lower leg: Edema present. Skin:     General: Skin is warm and dry. Capillary Refill: Capillary refill takes less than 2 seconds. Neurological:      Mental Status: He is alert. Comments: Moves all extremities equally          Vitals   Vitals:    10/18/23 0100 10/18/23 0200 10/18/23 0300 10/18/23 0400   BP: 123/61 110/60 114/65 118/65   Pulse: 104 100 97 98   Resp: (!) 28 20 19 17   Temp:    97.9 °F (36.6 °C)   TempSrc:       SpO2: 95% 97% 96% 99%   Weight:    110 kg (242 lb 15.2 oz)   Height:         Temp (24hrs), Av °F (36.7 °C), Min:97.8 °F (36.6 °C), Max:98.3 °F (36.8 °C)  Current: Temperature: 97.9 °F (36.6 °C)  Temp:  [97.8 °F (36.6 °C)-98.3 °F (36.8 °C)] 97.9 °F (36.6 °C)  HR:  [] 98  Resp:  [17-41] 17  BP: ()/() 118/65     Respiratory:  SpO2: SpO2: 99 %       Invasive/non-invasive ventilation settings   Respiratory      Lab Data (Last 4 hours)      None           O2/Vent Data (Last 4 hours)      None                    Height and Weights   Height: 5' 11" (180.3 cm)  IBW (Ideal Body Weight): 75.3 kg  Body mass index is 33.88 kg/m². Weight (last 2 days)       Date/Time Weight    10/18/23 0400 110 (242.95)    10/17/23 0600 110 (243.17)            Intake and Output  I/O         10/16 0701  10/17 0700 10/17 0701  10/18 0700    P. O. 480 2400    I.V. (mL/kg) 3902 (35.5) 3050 (27.7)    Blood 296 350    IV Piggyback 1750 610.8    Total Intake(mL/kg) 6428 (58.4) 6410.8 (58.3)    Urine (mL/kg/hr) 1000 (0.4) 920 (0.3)    Drains 158 335    Stool 0 0    Total Output 1158 1255    Net +5270 +5155.8          Unmeasured Stool Occurrence 0 x 5 x          UOP: 0.4 ml/hr     Nutrition       Diet Orders   (From admission, onward) Start     Ordered    10/18/23 0001  Diet NPO  Diet effective midnight        References:    Adult Nutrition Support Algorithm    RD Therapeutic Diet Order Protocol   Question Answer Comment   Diet Type NPO    RD to adjust diet per protocol? Yes        10/17/23 2048                      Laboratory and Diagnostics:  Results from last 7 days   Lab Units 10/18/23  0430 10/17/23  2208 10/17/23  1721 10/17/23  1156 10/17/23  0623 10/17/23  0021 10/16/23  1748 10/16/23  1401 10/16/23  0348 10/15/23  2342 10/15/23  0454 10/14/23  0832 10/14/23  0451 10/13/23  1420   WBC Thousand/uL 15.16*  --   --   --  21.79*  --  24.70*  --  23.66*  --  7.40  --  9.50 8.82   HEMOGLOBIN g/dL 8.1* 7.8* 8.4* 8.7* 8.2* 7.1* 7.6*  --  8.0*   < > 6.8* 7.7* 7.0* 8.1*   I STAT HEMOGLOBIN g/dl  --   --   --   --   --   --   --  9.2*  --   --   --   --   --   --    HEMATOCRIT % 24.7*  --   --   --  24.9* 21.9* 22.7*  --  25.1*  --  22.3* 25.1* 22.7* 26.7*   HEMATOCRIT, ISTAT %  --   --   --   --   --   --   --  27*  --   --   --   --   --   --    PLATELETS Thousands/uL 341  --   --   --  312  --  351  --  490*  --  393* 407* 423* 478*   BANDS PCT %  --   --   --   --  7  --   --   --   --   --   --   --   --  24*   MONO PCT %  --   --   --   --  3*  --   --   --   --   --   --   --   --  7   EOS PCT %  --   --   --   --  0  --   --   --   --   --   --   --   --  0    < > = values in this interval not displayed.      Results from last 7 days   Lab Units 10/18/23  0430 10/17/23  8149 10/16/23  1752 10/16/23  1401 10/16/23  1349 10/16/23  0348 10/15/23  0454 10/14/23  0451 10/13/23  1420   SODIUM mmol/L 137 137 134*  --  133* 132* 132* 130* 127*   POTASSIUM mmol/L 3.2* 3.5 3.9  --  4.5 4.2 3.8 3.9 4.4   CHLORIDE mmol/L 104 103 102  --  96 97 96 97 93*   CO2 mmol/L 22 25 23  --  18* 22 23 23 25   CO2, I-STAT mmol/L  --   --   --  20*  --   --   --   --   --    ANION GAP mmol/L 11 9 9  --  19 13 13 10 9   BUN mg/dL 20 26* 28*  --  29* 24 21 23 24 CREATININE mg/dL 0.90 0.97 1.01  --  1.11 0.85 0.79 0.83 1.02   CALCIUM mg/dL 7.2* 7.7* 6.9*  --  8.1* 7.6* 7.6* 7.7* 8.4   GLUCOSE RANDOM mg/dL 114 125 140  --  137 123 102 106 116   ALT U/L 7 10  --   --  15 18  --  18 24   AST U/L 15 14  --   --  23 25  --  22 32   ALK PHOS U/L 62 77  --   --  98 111*  --  80 83   ALBUMIN g/dL 2.0* 2.2*  --   --  3.0* 2.2*  --  2.2* 2.6*   TOTAL BILIRUBIN mg/dL 0.48 0.68  --   --  1.31* 0.81  --  0.48 0.54     Results from last 7 days   Lab Units 10/18/23  0430 10/17/23  0623 10/15/23  0454 10/14/23  0451   MAGNESIUM mg/dL 2.2 1.9 2.1 1.6*   PHOSPHORUS mg/dL  --  3.5 3.8 3.8      Results from last 7 days   Lab Units 10/15/23  0454 10/13/23  1420   INR  1.58* 2.10*   PTT seconds 44* 55*          Results from last 7 days   Lab Units 10/16/23  1748 10/16/23  1349 10/13/23  1420   LACTIC ACID mmol/L 1.7 5.5* 1.9     ABG:    VBG:  Results from last 7 days   Lab Units 10/16/23  1748   PH WILLIAM  7.343   PCO2 WILLIAM mm Hg 45.5   PO2 WILLIAM mm Hg 41.7   HCO3 WILLIAM mmol/L 24.2   BASE EXC WILLIAM mmol/L -1.5     Results from last 7 days   Lab Units 10/16/23  1349 10/13/23  1420   PROCALCITONIN ng/ml 4.45* 0.73*       Micro  Results from last 7 days   Lab Units 10/16/23  1651 10/16/23  1404 10/16/23  1403 10/16/23  1314 10/16/23  1216 10/13/23  1424 10/13/23  1420   BLOOD CULTURE   --   --   --  No Growth at 24 hrs. No Growth at 24 hrs. No Growth After 4 Days. No Growth After 4 Days. GRAM STAIN RESULT  No polys seen*  3+ Gram negative rods* No polys seen*  4+ Gram negative rods* No polys seen*  4+ Gram variable rods*  --   --   --   --    BODY FLUID CULTURE, STERILE  3+ Growth of Escherichia coli*  3+ Growth of Klebsiella aerogenes* 3+ Growth of Providencia rettgeri* 4+ Growth of Oxidase Positive gram negative shauna*  --   --   --   --        EKG:   Imaging:  I have personally reviewed pertinent reports.       Active Medications  Scheduled Meds:  Current Facility-Administered Medications   Medication Dose Route Frequency Provider Last Rate    acetaminophen  650 mg Oral Q6H PRN Silver Plume Saupe, PA-C      chlorhexidine  15 mL Mouth/Throat Q12H [address] [facility], AMANDA      enoxaparin  30 mg Subcutaneous Q12H [address] Mendy Lopez MD      HYDROmorphone  0.5 mg Intravenous Q3H PRN Silver Plume Saupe, PA-C      insulin lispro  2-12 Units Subcutaneous 4x Daily Silver Plume Saupe, PA-C      magnesium sulfate  2 g Intravenous Once aLila Mera      multi-electrolyte  125 mL/hr Intravenous Continuous Silver Plume Saupe, PA-C 125 mL/hr (10/18/23 0711)    ondansetron  4 mg Intravenous Q6H PRN Silver Plume Saupe, PA-C      oxyCODONE  10 mg Oral Q4H PRN Silver Plume Saupe, PA-JENS      oxyCODONE  5 mg Oral Q4H PRN Silver Plume Saupe, PA-C      pancrelipase (Lip-Prot-Amyl)  6,000 Units Oral TID With Meals Silver Plume Sadivyae, AMANDA      pantoprazole  40 mg Intravenous Q12H [address] Mendy Lopez MD      piperacillin-tazobactam  3.375 g Intravenous Q6H Silver Plume Saupe, PA-C 3.375 g (10/18/23 0137)    polyethylene glycol  4,000 mL Oral See Admin Instructions Shekhar Thomas MD      polyethylene glycol  17 g Oral TID Silver Plume Saupe, PA-C      potassium chloride  20 mEq Intravenous Once Harinder Knight PA-C      Followed by    potassium chloride  20 mEq Intravenous Once Harinder Knight PA-C      senna-docusate sodium  1 tablet Oral HS Silver Plume Saupe, PA-JENS      vancomycin  750 mg Intravenous Q12H Caralee Gess,  mg (10/17/23 2044)     Continuous Infusions:  multi-electrolyte, 125 mL/hr, Last Rate: 125 mL/hr (10/18/23 0711)      PRN Meds:   acetaminophen, 650 mg, Q6H PRN  HYDROmorphone, 0.5 mg, Q3H PRN  ondansetron, 4 mg, Q6H PRN  oxyCODONE, 10 mg, Q4H PRN  oxyCODONE, 5 mg, Q4H PRN        Allergies   No Known Allergies    Advance Directive and Living Will:      Power of :    POLST:      Counseling / Coordination of Care  Total Critical Care time spent 30 minutes excluding procedures, teaching and family updates. Evleyn Degroot MD      Portions of the record may have been created with voice recognition software. Occasional wrong word or "sound a like" substitutions may have occurred due to the inherent limitations of voice recognition software.   Read the chart carefully and recognize, using context, where substitutions have occurred

## 2023-10-18 NOTE — PROGRESS NOTES
Vancomycin IV Pharmacy-to-Dose Consultation    Kevin Molina is a 48 y.o. male who is currently receiving Vancomycin IV with management by the Pharmacy Consult service. Relevant clinical data and objective / subjective history reviewed. Vancomycin Assessment:  Indication: Bacteremia (goal -600, trough >10)    Status: critically ill  Micro:   - 10/16 Body fluid 2/2: E. Coli + Klebsiella aerogenes + P. aeruginosa  - 10/16 Body fluid: Providencia rettgeri + P. Aeruginosa  - 10/16 Body fluid: P. Aeruginosa  Procalcitonin: 4.45 on 10/16  Renal Function:     Lab Results   Component Value Date    CREATININE 0.90 10/18/2023     Estimated Creatinine Clearance: 119.8 mL/min (by C-G formula based on SCr of 0.9 mg/dL). Dialysis: no  Days of Therapy: 3  Current Dose: 750 mg IV q12h   Goal AUC / Trough: -600, trough >10   Last Level: 30.4 on 10/17  Assessment: WBC improving, afebrile. Vancomycin Plan:  New Dosing: change to 1000 mg IV q12h   Predicted Trough / AUC: 15.5 / 514  Next Level: on 10/24 at 0600  Renal Function Monitoring: Daily BMP and [address] will continue to follow closely for s/sx of nephrotoxicity, infusion reactions and appropriateness of therapy. BMP and CBC will be ordered per protocol. We will continue to follow the patient’s culture results and clinical progress daily.        Leidy Del Angel, PharmD, BCCCP  Critical Care Clinical Pharmacist  Available via [facility] Text

## 2023-10-18 NOTE — PLAN OF CARE
Problem: OCCUPATIONAL THERAPY ADULT  Goal: Performs self-care activities at highest level of function for planned discharge setting. See evaluation for individualized goals. Description: Treatment Interventions: ADL retraining, Functional transfer training, Endurance training, Patient/family training, Equipment evaluation/education, Compensatory technique education, Energy conservation, Activityengagement          See flowsheet documentation for full assessment, interventions and recommendations. Note: Limitation: Decreased ADL status, Decreased endurance, Decreased self-care trans, Decreased high-level ADLs, Decreased Safe judgement during ADL  Prognosis: Good  Assessment: Pt is a 48 y.o. male admitted 10/16/23 s/p syncopal episode, believed to be 2' necrotizing pancreatitis. Pt underwent IR  draining of retroperitoneal abscess and repositioning of L paracolic drain 61/61/85. POD #2 w active OT eval and treat orders. PMH includes  has a past medical history of Pancreatitis. Pt lives w spouse in a 1 SH with 2 REJI, reports Zena Layman in showr with shower chair, standard toilet with raiser. Pta, pt was independent w/ ADL/IADL and functional mobility, was not driving. Currently, pt is Min Ax1 for UB ADL, Mod Ax1 for LB ADL, and completed transfers/FM w Mod Ax2. Pt is limited at this time 2* decreased endurance/activtiy tolerance, decreased cognition, decreased ADL/High-level ADL status, decreased self-care trans, decreased safety awareness, limited home support and is a fall risk. This impacts pt's ability to complete UB and LB dressing and bathing, toileting, transfers, functional mobility, community mobility, home and health maintenance, and safe engagement in typical daily routine. The patient's raw score on the AM-PAC Daily Activity inpatient short form is 17, standardized score is 37.26, less than 39.4. Patients at this level are likely to benefit from discharge to post-acute rehabilitation services.  Please refer to the recommendation of the Occupational Therapist for safe discharge planning. From OT standpoint, pt should D/C to STR when medically stable. Pt will benefit from continued acute OT services 2-3 x/wk for 10-14 days to meet goals.      OT Discharge Recommendation: Post acute rehabilitation services

## 2023-10-18 NOTE — RAPID RESPONSE
Rapid Response Note  Domi Williamson 48 y.o. male MRN: [id number]  Unit/Bed#: MICU 05 Encounter: [id number]    Rapid Response Notification(s):   Response called date/time:  10/18/2023 6:01 PM  Response team arrival date/time:  10/18/2023 6:04 PM  Response end date/time:  10/18/2023 6:22 PM  Level of care:  Stepdown 2  Rapid response location:  Stepdown unit ([facility])  Primary reason for rapid response call:  Acute change in BP    Rapid Response Intervention(s):   Airway:  None  Breathing:  None  Circulation:  None  Fluids administered:  Isolyte/plasmalyte  Medications administered:  None       Assessment:   Hemorrhagic shock   Acute GIB   Acute blood loss anemia     Plan:   IVF bolus   STAT call to GI - plan for rapid prep for scope   iSTAT   STAT labs   [facility]      Rapid Response Outcome:   Transfer:  Transfer to ICU  Primary service notified of transfer: Yes    Code Status: Level 1 (Full Code)      Family notified of transfer: yes  Family member contacted: Primary team to update Wife     Background/Situation:   Domi Williamson is a 48 y.o. male with necrotizing pancreatitis and GIB this admission. Attempted colonoscopy yesterday but unable to visualize secondary to stool in rectum. Had had 3 non-bloody BM after that point and repeat hgb checks were stable. Transferred to [address] [facility] [address]. While on floor had large frankly bloody BM with associated hypotension and RR called. Continue to have large volume BRB CA during RR. Review of Systems   Respiratory:  Negative for shortness of breath. Cardiovascular:  Negative for chest pain. Gastrointestinal:  Positive for abdominal pain, blood in stool and diarrhea. Skin:  Positive for pallor.        Objective:   Vitals:    10/18/23 1806 10/18/23 1809 10/18/23 1812 10/18/23 1815   BP: (!) 78/58 (!) 78/53 (!) 89/55 90/57   BP Location:       Pulse: (!) 115 (!) 115 (!) 113    Resp:       Temp:       TempSrc:       SpO2:  100%     Weight:       Height: Physical Exam  Vitals reviewed. Constitutional:       General: He is awake. He is in acute distress. Appearance: He is obese. He is ill-appearing. HENT:      Head: Normocephalic and atraumatic. Cardiovascular:      Rate and Rhythm: Regular rhythm. Tachycardia present. Heart sounds: Normal heart sounds. Pulmonary:      Effort: Pulmonary effort is normal.      Breath sounds: Normal breath sounds. Abdominal:      Comments: BRB per rectum    Skin:     General: Skin is cool and dry. Coloration: Skin is pale. Neurological:      General: No focal deficit present. Mental Status: He is alert and oriented to person, place, and time. Portions of the record may have been created with voice recognition software. Occasional wrong word or "sound a like" substitutions may have occurred due to the inherent limitations of voice recognition software. Read the chart carefully and recognize, using context, where substitutions have occurred.     Maral Brink PA-C

## 2023-10-18 NOTE — OCCUPATIONAL THERAPY NOTE
Occupational Therapy Evaluation     Patient Name: Matt Moss  Today's Date: 10/18/2023  Problem List  Principal Problem:    Necrotizing pancreatitis  Active Problems:    Leukocytosis    Acute blood loss anemia    Past Medical History  Past Medical History:   Diagnosis Date    Pancreatitis      Past Surgical History  Past Surgical History:   Procedure Laterality Date    CYSTOSCOPY N/A 8/5/2023    Procedure: CYSTOSCOPY. Saunders insertion;  Surgeon: Elvis Souza MD;  Location: [facility];  Service: Urology    FL RETROGRADE URETHROCYSTOGRAM  8/5/2023    HERNIA REPAIR      IR DRAINAGE TUBE CHECK/CHANGE/REPOSITION/REINSERTION/UPSIZE  9/4/2023    IR DRAINAGE TUBE PLACEMENT  8/5/2023    IR DRAINAGE TUBE PLACEMENT  9/6/2023    IR DRAINAGE TUBE PLACEMENT  9/26/2023    IR DRAINAGE TUBE PLACEMENT  10/16/2023    IR PARACENTESIS  9/6/2023    IR THORACENTESIS  9/4/2023    JOINT REPLACEMENT      TONSILLECTOMY  1976         10/18/23 0922   OT Last Visit   OT Visit Date 10/18/23   Note Type   Note type Evaluation   Pain Assessment   Pain Assessment Tool 0-10   Pain Score 2   Pain Location/Orientation Location: Abdomen   Patient's Stated Pain Goal No pain   Hospital Pain Intervention(s) Repositioned; Ambulation/increased activity; Emotional support   Restrictions/Precautions   Weight Bearing Precautions Per Order No   Other Precautions Multiple lines;Telemetry;O2;Fall Risk;Pain  (pt w 2 R sided DONNY drains, R sided drain)   Home Living   Type of [address] [place] [address] One level;Stairs to enter with rails  (2 carlene)   Bathroom Shower/Tub Walk-in shower   Bathroom Toilet Standard   Bathroom Equipment Shower chair   [address]  (not using)   Prior Function   Level of [place] Independent with ADLs; Independent with functional mobility; Independent with IADLS   Lives With Spouse   Receives Help From Family   IADLs Independent with meal prep; Independent with medication management  (pt reports he can drive, but hasn't since june.)   Falls in the last 6 months 1 to 4  (1)   Vocational Full time employment   Lifestyle   Autonomy Pta, pt was I for ADL/IADL, no AD mobility. - . reports that he has required in assistance recently. Reciprocal Relationships supportive spouse   Service to Others FTE, but currently working out next steps given his medical status. Intrinsic Gratification spending time with spouse   Subjective   Subjective "I'm just in pain"   ADL   Where Assessed Edge of bed   Eating Assistance 6  Modified independent   Grooming Assistance 6  Modified Independent   UB Bathing Assistance 4  Minimal Assistance   LB Bathing Assistance 3  Moderate Assistance   UB Dressing Assistance 4  Minimal Assistance   LB Dressing Assistance 3  Moderate [address]  4  Minimal Assistance   Functional Assistance 3  Moderate Assistance   Bed Mobility   Rolling R 2  Maximal assistance   Additional items Assist x 1;Bedrails   Supine to Sit 3  Moderate assistance   Additional items Assist x 2; Increased time required;LE management;Verbal cues   Additional Comments rolled to R for bedpan mgmt. required mod A x2 to achieve EOB sititng. Sits with min A. BP supine: 115/64. sitting EOB: 113/60   Transfers   Sit to Stand 3  Moderate assistance   Additional items Assist x 2; Increased time required;Verbal cues   Stand to Sit 3  Moderate assistance   Additional items Assist x 2; Increased time required;Verbal cues   Additional Comments b/l HHA, pt very anxious   Functional Mobility   Functional Mobility 3  Moderate assistance   Additional Comments ax2, small steps EOB>chair. /61.    Additional items Hand hold assistance   Balance   Static Sitting Fair   Dynamic Sitting Fair -   Static Standing Poor   Dynamic Standing Poor   Ambulatory Poor -   Activity Tolerance   Activity Tolerance Patient limited by fatigue;Patient limited by pain   Medical Staff Made Aware DPT Elder Single Nurse Made Aware ok per RN   RUE Assessment   RUE Assessment WFL   LUE Assessment   LUE Assessment WFL   Hand Function   Gross Motor Coordination Functional   Fine Motor Coordination Functional   Psychosocial   Psychosocial (WDL) X   Patient Behaviors/Mood Anxious   Cognition   Overall Cognitive Status WFL   Arousal/Participation Alert; Responsive   Attention Within functional limits   Orientation Level Oriented X4   Memory Within functional limits   Following Commands Follows all commands and directions without difficulty   Comments cooperative, no cognitive concerns at this time. a bit anxious t/o session. Assessment   Limitation Decreased ADL status; Decreased endurance;Decreased self-care trans;Decreased high-level ADLs; Decreased Safe judgement during ADL   Prognosis Good   Assessment Pt is a 48 y.o. male admitted 10/16/23 s/p syncopal episode, believed to be 2' necrotizing pancreatitis. Pt underwent IR  draining of retroperitoneal abscess and repositioning of L paracolic drain 03/41/18. POD #2 w active OT eval and treat orders. PMH includes  has a past medical history of Pancreatitis. Pt lives w spouse in a 1 SH with 2 REJI, reports Braydon Ratel in showr with shower chair, standard toilet with raiser. Pta, pt was independent w/ ADL/IADL and functional mobility, was not driving. Currently, pt is Min Ax1 for UB ADL, Mod Ax1 for LB ADL, and completed transfers/FM w Mod Ax2. Pt is limited at this time 2* decreased endurance/activtiy tolerance, decreased cognition, decreased ADL/High-level ADL status, decreased self-care trans, decreased safety awareness, limited home support and is a fall risk. This impacts pt's ability to complete UB and LB dressing and bathing, toileting, transfers, functional mobility, community mobility, home and health maintenance, and safe engagement in typical daily routine. The patient's raw score on the AM-PAC Daily Activity inpatient short form is 17, standardized score is 37.26, less than 39.4. Patients at this level are likely to benefit from discharge to post-acute rehabilitation services. Please refer to the recommendation of the Occupational Therapist for safe discharge planning. From OT standpoint, pt should D/C to STR when medically stable. Pt will benefit from continued acute OT services 2-3 x/wk for 10-14 days to meet goals. Goals   Patient Goals get better   LTG Time Frame 10-14   Plan   Treatment Interventions ADL retraining;Functional transfer training; Endurance training;Patient/family training;Equipment evaluation/education; Compensatory technique education; Energy conservation; Activityengagement   Goal Expiration Date 11/01/23   OT Frequency 2-3x/wk   Discharge Recommendation   OT Discharge Recommendation Post acute rehabilitation services   AM-PAC Daily Activity Inpatient   Lower Body Dressing 2   Bathing 3   Toileting 2   Upper Body Dressing 3   Grooming 3   Eating 4   Daily Activity Raw Score 17   Daily Activity Standardized Score (Calc for Raw Score >=11) 37.26   AM-PAC Applied Cognition Inpatient   Following a Speech/Presentation 4   Understanding Ordinary Conversation 4   Taking Medications 4   Remembering Where Things Are Placed or Put Away 4   Remembering List of 4-5 Errands 4   Taking Care of Complicated Tasks 4   Applied Cognition Raw Score 24   Applied Cognition Standardized Score 62.21   Modified Carroll Scale   Modified Carroll Scale 4   End of Consult   Education Provided Yes   Patient Position at End of Consult Bedside chair; All needs within reach   Nurse Communication Nurse aware of consult     Pt will complete functional mobility with Mod I using appropriate DME as needed. Pt will complete UB dressing and bathing with Mod I using appropriate DME as needed. Pt will complete LB dressing and bathing with Mod I using appropriate DME as needed. Pt will complete transfers with Mod I using appropriate DME as needed.      Pt will complete toileting with Mod I using appropriate DME as needed. Pt will utilize energy conservation techniques throughout functional activity/ADL s/p skilled education. Pt will demonstrate increased safety awareness during functional tasks/ADL's s/p skilled education. Pt will increase activity tolerance to 30 minutes in order to complete ADL's/ functional tasks, using appropriate DME as needed. Pt will engage in ongoing cog assessment w/ G participation to assist with safe d/c planning.        SHIRA Marin, OTR/L

## 2023-10-18 NOTE — PROGRESS NOTES
Progress Note - Infectious Disease   Conchita Hernandez 48 y.o. male MRN: [id number]  Unit/Bed#: [facility] 517-01 Encounter: [id number]      Impression:  1. Necrotizing pancreatitis with abscess  2. 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 Superficial wounds anterior bilateral feet (POA    Recommendations:  Afebrile with WBC count 21,790 he denies pain and has no evidence of systemic toxicity. Patient went for colonoscopy today but procedure was aborted due to solid stool in rectum. Had IR replacement of retroperitoneal drainage catheter and repositioned drainage catheter at the left paracolic gutter fluid collection 10/16. Will check cultures from above procedure which so far are showing 3+ E. coli and Klebsiella aerogenes and in another specimen 3+ Providencia rettgeri I  3. Patient was started on IV vancomycin and piperacillin/tazobactam pending above with further dosing of vancomycin by pharmacy    Antibiotics:  1. Vancomycin 750 mg every 12 hours IV, day 2 Rx  2. Piperacillin/tazobactam 3.375 g every 6 hours IV, day 2 Rx    Subjective: The patient has no complaints. Denies fevers, chills, or sweats. Denies nausea, vomiting, or diarrhea. Objective:  Vitals:  Temp:  [97.6 °F (36.4 °C)-98.3 °F (36.8 °C)] 97.8 °F (36.6 °C)  HR:  [] 103  Resp:  [18-41] 31  BP: ()/() 101/61  SpO2:  [82 %-100 %] 96 %  Temp (24hrs), Av.9 °F (36.6 °C), Min:97.6 °F (36.4 °C), Max:98.3 °F (36.8 °C)  Current: Temperature: 97.8 °F (36.6 °C)    Physical Exam:     General Appearance:  Eyes: Alert, chronically ill-appearing nontoxic, no acute distress. Conjunctiva pale   Throat: Oropharynx moist without lesions.   Lips, mucosa, and tongue normal   Neck: Supple, symmetrical, trachea midline, no adenopathy,  no tenderness/mass/nodules   Lungs:   Clear to auscultation bilaterally, no audible wheezes, rhonchi or rales; respirations unlabored   Heart:  Regular rate and rhythm, S1, S2 normal, no murmur, rub or gallop   Abdomen:   Soft, non-tender, distended, striae positive bowel sounds. No masses, no organomegaly. Left side has 2 DONNY drains with serosanguineous drainage and one large gravity drain with dark bloody drainage    No CVA tenderness   Extremities: Bilateral numerous superficial scabbed ulcerations of anterior feet   Skin: As above         Invasive Devices       Peripheral Intravenous Line  Duration             Peripheral IV 10/16/23 Dorsal (posterior); Left Forearm 1 day    Peripheral IV 10/16/23 Right Antecubital 1 day              Drain  Duration             Abscess Drain RUQ 43 days    Abscess Drain LUQ 40 days    Abscess Drain Abdomen 1 day                    Labs, Imaging, & Other studies:   All pertinent labs were personally reviewed  Results from last 7 days   Lab Units 10/17/23  1721 10/17/23  1156 10/17/23  0623 10/17/23  0021 10/16/23  1748 10/16/23  1401 10/16/23  0348   WBC Thousand/uL  --   --  21.79*  --  24.70*  --  23.66*   HEMOGLOBIN g/dL 8.4* 8.7* 8.2*   < > 7.6*  --  8.0*   I STAT HEMOGLOBIN   --   --   --   --   --    < >  --    PLATELETS Thousands/uL  --   --  312  --  351  --  490*    < > = values in this interval not displayed.        Results from last 7 days   Lab Units 10/17/23  0623 10/16/23  1752 10/16/23  1401 10/16/23  1349 10/16/23  0348   SODIUM mmol/L 137 134*  --  133* 132*   POTASSIUM mmol/L 3.5 3.9  --  4.5 4.2   CHLORIDE mmol/L 103 102  --  96 97   CO2 mmol/L 25 23  --  18* 22   CO2, I-STAT mmol/L  --   --  20*  --   --    BUN mg/dL 26* 28*  --  29* 24   CREATININE mg/dL 0.97 1.01  --  1.11 0.85   EGFR ml/min/1.73sq m 88 84  --  75 99   GLUCOSE, ISTAT mg/dl  --   --  143*  --   --    CALCIUM mg/dL 7.7* 6.9*  --  8.1* 7.6*   AST U/L 14  --   --  23 25   ALT U/L 10  --   --  15 18   ALK PHOS U/L 77  --   --  98 111*       Results from last 7 days   Lab Units 10/16/23  1651 10/16/23  1404 10/16/23  1403 10/16/23  1314 10/16/23  1216 10/13/23  1424 10/13/23  1420   BLOOD CULTURE   --   --   --  No Growth at 24 hrs. No Growth at 24 hrs. No Growth at 72 hrs. No Growth at 72 hrs. GRAM STAIN RESULT  No polys seen*  3+ Gram negative rods* No polys seen*  4+ Gram negative rods* No polys seen*  4+ Gram variable rods*  --   --   --   --    BODY FLUID CULTURE, STERILE  3+ Growth of Escherichia coli*  3+ Growth of Klebsiella aerogenes* 3+ Growth of Providencia rettgeri* Culture results to follow.   --   --   --   --

## 2023-10-18 NOTE — UTILIZATION REVIEW
Continued Stay Review    Date: 10/18                          Current Patient Class: IP  Current Level of Care: Level 2 stepdown/HOT    HPI:53 y.o. male initially admitted on  10/13    Assessment/Plan: Pt had several bowel movements after completing his bowel prep. Still has stool in the rectum, unable to go for scope ovn. Currently on 3L NC saturating >92%.  cc. RUQ drain 65 cc bloody. LUQ drain 110cc necrotic bloody output. Abdominal drain with 160 cc purulent output. WBC improving. Cont NPO, IVF, IV abx. IV PPI. Cont to flush IR drains. F./u colonoscopy/EUS w/ GI today. Pain and nausea control prn. DVT ppx. Diuresis today for low UO and volume overload, goal 0 to -1L I/O balance. Strict I&O. Vital Signs: /66   Pulse 96   Temp 98.1 °F (36.7 °C)   Resp (!) 30   Ht 5' 11" (1.803 m)   Wt 110 kg (242 lb 15.2 oz)   SpO2 97%   BMI 33.88 kg/m²       Pertinent Labs/Diagnostic Results:   Results from last 7 days   Lab Units 10/13/23  1423   SARS-COV-2  Negative     Results from last 7 days   Lab Units 10/18/23  0430 10/17/23  2208 10/17/23  1721 10/17/23  1156 10/17/23  0623 10/17/23  0021 10/16/23  1748 10/16/23  1401 10/14/23  0451 10/13/23  1420   WBC Thousand/uL 15.16*  --   --   --  21.79*  --  24.70*  --    < > 8.82   HEMOGLOBIN g/dL 8.1* 7.8* 8.4* 8.7* 8.2* 7.1* 7.6*  --    < > 8.1*   I STAT HEMOGLOBIN g/dl  --   --   --   --   --   --   --  9.2*  --   --    HEMATOCRIT % 24.7*  --   --   --  24.9* 21.9* 22.7*  --    < > 26.7*   HEMATOCRIT, ISTAT %  --   --   --   --   --   --   --  27*  --   --    PLATELETS Thousands/uL 341  --   --   --  312  --  351  --    < > 478*   BANDS PCT %  --   --   --   --  7  --   --   --   --  24*    < > = values in this interval not displayed.          Results from last 7 days   Lab Units 10/18/23  0430 10/17/23  0623 10/16/23  1752 10/16/23  1401 10/16/23  1349 10/16/23  0348 10/15/23  0454 10/14/23  0451   SODIUM mmol/L 137 137 134*  --  133* 132* 132* 130* POTASSIUM mmol/L 3.2* 3.5 3.9  --  4.5 4.2 3.8 3.9   CHLORIDE mmol/L 104 103 102  --  96 97 96 97   CO2 mmol/L 22 25 23  --  18* 22 23 23   CO2, I-STAT mmol/L  --   --   --  20*  --   --   --   --    ANION GAP mmol/L 11 9 9  --  19 13 13 10   BUN mg/dL 20 26* 28*  --  29* 24 21 23   CREATININE mg/dL 0.90 0.97 1.01  --  1.11 0.85 0.79 0.83   EGFR ml/min/1.73sq m 97 88 84  --  75 99 102 100   CALCIUM mg/dL 7.2* 7.7* 6.9*  --  8.1* 7.6* 7.6* 7.7*   CALCIUM, IONIZED mmol/L  --  1.12  --   --   --   --   --   --    CALCIUM, IONIZED, ISTAT mmol/L  --   --   --  1.10*  --   --   --   --    MAGNESIUM mg/dL 2.2 1.9  --   --   --   --  2.1 1.6*   PHOSPHORUS mg/dL  --  3.5  --   --   --   --  3.8 3.8     Results from last 7 days   Lab Units 10/18/23  0430 10/17/23  0623 10/16/23  1349 10/16/23  0348 10/14/23  0451   AST U/L 15 14 23 25 22   ALT U/L 7 10 15 18 18   ALK PHOS U/L 62 77 98 111* 80   TOTAL PROTEIN g/dL 4.6* 4.9* 5.9* 5.6* 5.9*   ALBUMIN g/dL 2.0* 2.2* 3.0* 2.2* 2.2*   TOTAL BILIRUBIN mg/dL 0.48 0.68 1.31* 0.81 0.48   BILIRUBIN DIRECT mg/dL  --   --   --  0.34*  --      Results from last 7 days   Lab Units 10/18/23  1225 10/18/23  0629 10/17/23  2201 10/17/23  1714 10/17/23  1111 10/17/23  0628 10/17/23  0025 10/16/23  2019 10/16/23  1815 10/16/23  1358 10/16/23  0642 10/15/23  2332   POC GLUCOSE mg/dl 113 117 115 110 118 124 118 121 162* 151* 146* 109     Results from last 7 days   Lab Units 10/18/23  0430 10/17/23  0623 10/16/23  1752 10/16/23  1349 10/16/23  0348 10/15/23  0454 10/14/23  0451 10/13/23  1420   GLUCOSE RANDOM mg/dL 114 125 140 137 123 102 106 116             No results found for: "BETA-HYDROXYBUTYRATE"       Results from last 7 days   Lab Units 10/16/23  1748   PH WILLIAM  7.343   PCO2 WILLIAM mm Hg 45.5   PO2 WILLIAM mm Hg 41.7   HCO3 WILLIAM mmol/L 24.2   BASE EXC WILLIAM mmol/L -1.5   O2 CONTENT WILLIAM ml/dL 8.4   O2 HGB, VENOUS % 73.6     Results from last 7 days   Lab Units 10/16/23  1401   PH, WILLIAM I-STAT  7.342 PCO2, WILLIAM ISTAT mm HG 34.3*   PO2, WILLIAM ISTAT mm HG 22.0*   HCO3, WILLIAM ISTAT mmol/L 18.6*   I STAT BASE EXC mmol/L -7*   I STAT O2 SAT % 34*         Results from last 7 days   Lab Units 10/13/23  1621 10/13/23  1420   HS TNI 0HR ng/L  --  6   HS TNI 2HR ng/L 3  --    HSTNI D2 ng/L -3  --          Results from last 7 days   Lab Units 10/15/23  0454 10/13/23  1420   PROTIME seconds 18.7* 24.4*   INR  1.58* 2.10*   PTT seconds 44* 55*         Results from last 7 days   Lab Units 10/16/23  1349 10/13/23  1420   PROCALCITONIN ng/ml 4.45* 0.73*     Results from last 7 days   Lab Units 10/16/23  1748 10/16/23  1349 10/13/23  1420   LACTIC ACID mmol/L 1.7 5.5* 1.9                         Results from last 7 days   Lab Units 10/18/23  1228 10/17/23  0555 10/16/23  0555   UNIT PRODUCT CODE  [id number]  [id number]  [id number]   UNIT NUMBER  [id number]  [id number]  [id number]   UNITABO  AB  AB B  B B   UNITRH  POS  NEG POS  POS POS   CROSSMATCH   --  Compatible  Compatible Compatible   UNIT DISPENSE STATUS  Return to Inv  Return to Inv Presumed Trans  Presumed Trans Presumed Trans   UNIT PRODUCT VOL ml 280  328 350  350 300         Results from last 7 days   Lab Units 10/13/23  1420   LIPASE u/L 36                 Results from last 7 days   Lab Units 10/16/23  1443 10/13/23  1632   CLARITY UA  Turbid Clear   COLOR UA  Yellow Yellow   SPEC GRAV UA  1.043* 1.022   PH UA  6.0 5.5   GLUCOSE UA mg/dl Negative Negative   KETONES UA mg/dl 10 (1+)* 10 (1+)*   BLOOD UA  Negative Negative   PROTEIN UA mg/dl 70 (1+)* 30 (1+)*   NITRITE UA  Negative Negative   BILIRUBIN UA  Negative Negative   UROBILINOGEN UA (BE) mg/dl <2.0 <2.0   LEUKOCYTES UA  Negative Negative   WBC UA /hpf 30-50* 4-10*   RBC UA /hpf 10-20* 2-4*   BACTERIA UA /hpf Moderate* None Seen   EPITHELIAL CELLS WET PREP /hpf Occasional None Seen     Results from last 7 days   Lab Units 10/13/23  9988 INFLUENZA A PCR  Negative   INFLUENZA B PCR  Negative   RSV PCR  Negative                             Results from last 7 days   Lab Units 10/16/23  1651 10/16/23  1404 10/16/23  1403 10/16/23  1314 10/16/23  1216 10/13/23  1424 10/13/23  1420   BLOOD CULTURE   --   --   --  No Growth at 24 hrs. No Growth at 24 hrs. No Growth After 4 Days. No Growth After 4 Days.    GRAM STAIN RESULT  No polys seen*  3+ Gram negative rods* No polys seen*  4+ Gram negative rods* No polys seen*  4+ Gram variable rods*  --   --   --   --    BODY FLUID CULTURE, STERILE  3+ Growth of Escherichia coli*  3+ Growth of Klebsiella aerogenes*  2+ Growth of Pseudomonas aeruginosa*  3+ Growth of 3+ Growth of Providencia rettgeri*  3+ Growth of Pseudomonas aeruginosa* 4+ Growth of Pseudomonas aeruginosa*  4+ Growth of  --   --   --   --              Results from last 7 days   Lab Units 10/17/23  0623   VANCOMYCIN RM ug/mL 30.4*       Medications:   Scheduled Medications:  [MAR Hold] chlorhexidine, 15 mL, Mouth/Throat, Q12H [address]  [MAR Hold] enoxaparin, 30 mg, Subcutaneous, Q12H [address]  [MAR Hold] insulin lispro, 2-12 Units, Subcutaneous, 4x Daily  [MAR Hold] pancrelipase (Lip-Prot-Amyl), 6,000 Units, Oral, TID With Meals  [MAR Hold] pantoprazole, 40 mg, Intravenous, Q12H [address]  [MAR Hold] piperacillin-tazobactam, 3.375 g, Intravenous, Q6H  [MAR Hold] polyethylene glycol, 17 g, Oral, TID  [MAR Hold] senna-docusate sodium, 1 tablet, Oral, HS  [MAR Hold] vancomycin, 1,000 mg, Intravenous, Q12H      Continuous IV Infusions:  multi-electrolyte (PLASMALYTE-A/ISOLYTE-S PH 7.4) IV solution  Rate: 125 mL/hr Dose: 125 mL/hr  Freq: Continuous Route: IV  Indications of Use: IV Hydration  Last Dose: Stopped (10/18/23 1259)  Start: 10/13/23 2330 End: 10/18/23 1323      PRN Meds:  [FNU Hold] acetaminophen, 650 mg, Oral, Q6H PRN  [MAR Hold] HYDROmorphone, 0.5 mg, Intravenous, Q3H PRN  [MAR Hold] ondansetron, 4 mg, Intravenous, Q6H PRN  [MAR Hold] oxyCODONE, 10 mg, Oral, Q4H PRN  [MAR Hold] oxyCODONE, 5 mg, Oral, Q4H PRN        Discharge Plan: TBD    Network Utilization Review Department  ATTENTION: Please call with any questions or concerns to [phone] and carefully listen to the prompts so that you are directed to the right person. All voicemails are confidential.   For Discharge needs, contact Care Management DC Support Team at [phone] opt. 2  Send all requests for admission clinical reviews, approved or denied determinations and any other requests to dedicated fax number below belonging to the campus where the patient is receiving treatment.  List of dedicated fax numbers for the Facilities:  [place] DENIALS (Administrative/Medical Necessity) [phone]   DISCHARGE SUPPORT TEAM (NETWORK) [id number] Trey Paulino (Maternity/NICU/Pediatrics) [phone]   [address] 1521 [facility] 1000 [facility] [phone]   1505 [facility] 207 [facility] 5220 [facility] [address] [address] [facility] [address]0 [address] 1300 [facility] [address] [place] [address] [phone]

## 2023-10-18 NOTE — PLAN OF CARE
Problem: PHYSICAL THERAPY ADULT  Goal: Performs mobility at highest level of function for planned discharge setting. See evaluation for individualized goals. Description: Treatment/Interventions: Functional transfer training, ADL retraining, Elevations, LE strengthening/ROM, Therapeutic exercise, Endurance training, Patient/family training, Equipment eval/education, Bed mobility, Gait training, Spoke to nursing, Spoke to case management, OT  Equipment Recommended:  (TBD)       See flowsheet documentation for full assessment, interventions and recommendations. Outcome: Progressing  Note: Prognosis: Good  Problem List: Decreased strength, Decreased range of motion, Decreased endurance, Impaired balance, Decreased mobility, Pain  Assessment: Pt is a 48 y.o. male seen for a high complexity PT evaluation due to Ongoing medical management for primary dx, Increased reliance on more restrictive AD compared to baseline, Decreased activity tolerance compared to baseline, Fall risk, Increased assistance needed from caregiver at current time, Ongoing telemetry monitoring, Increased O2 via NC from pts baseline, Trending lab values, Continuous pulse oximetry monitoring , DONNY drain in place at current time, s/p surgical intervention, wound vac . Patient is s/p admit to [address] [place] [address] on 10/13/2023 for Bacteremia (R78.81)  Intra-abdominal abscess ([address]) (K65.1). Patient  has a past medical history of Pancreatitis. .     PT now consulted to assess functional mobility and needs for safe d/c planning. Prior to admission, pt independent with functional mobility, ADLs, and IADLs. However has been requiring increased assist recently.  Personal factors affecting status include hx of falls, fall risk, steps to enter home, assist for ADLs, and medical status     Currently pt requires moderate assistance x2 for bed mobility, moderate assistance x2 for functional transfers with two hand hold ; moderate assistance x2 for ambulation with two hand hold. Pt presents functioning below baseline and w/ overall mobility deficits 2* to: decreased strength, decreased endurance, decreased mobility, impaired balance. These impairments place pt at risk for falls. Pt will continue to benefit from skilled PT interventions to address stated impairments; to maximize functional potential; for ongoing pt/family education; and DME needs. The patient's AM-PAC Basic Mobility Inpatient Short Form Raw Score Is 7. A Raw score of less than 16 suggests the patient may benefit from discharge to post-acute rehabilitation services. PT is currently recommending Post Acute Rehab Services on d/c from hospital. Will continue to follow as able. PT Discharge Recommendation: Post acute rehabilitation services    See flowsheet documentation for full assessment.

## 2023-10-18 NOTE — PROGRESS NOTES
Progress Note - Infectious Disease   Ventura Maldonado 48 y.o. male MRN: [id number]  Unit/Bed#: [facility] 510-01 Encounter: [id number]      Impression:  1. Necrotizing pancreatitis with abscess  2. 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 Superficial wounds anterior bilateral feet (POA    Recommendations:  Afebrile with WBC count 15,160 he denies pain and has no evidence of systemic toxicity. Patient went for colonoscopy today but procedure was aborted due to solid stool in rectum. Had IR replacement of retroperitoneal drainage catheter and repositioned drainage catheter at the left paracolic gutter fluid collection 10/16. Will check cultures from above procedure which so far are showing 3+ E. coli and Klebsiella aerogenes and in another specimen 3+ Providencia rettgeri, Pseudomonas aeruginosa and B fragilis all susceptible to piperacillin/tazobactam  3. Will discontinue vancomycin     Antibiotics:  1. Piperacillin/tazobactam 3.375 g every 6 hours IV, day 3 Rx    Subjective: The patient has no complaints. Denies fevers, chills, or sweats. Denies nausea, vomiting, or diarrhea. Objective:  Vitals:  Temp:  [97.7 °F (36.5 °C)-98.1 °F (36.7 °C)] 98.1 °F (36.7 °C)  HR:  [] 96  Resp:  [14-41] 30  BP: (100-214)/() 106/66  SpO2:  [82 %-100 %] 97 %  Temp (24hrs), Av.9 °F (36.6 °C), Min:97.7 °F (36.5 °C), Max:98.1 °F (36.7 °C)  Current: Temperature: 98.1 °F (36.7 °C)    Physical Exam:     General Appearance:  Eyes: Alert, chronically ill-appearing nontoxic, no acute distress. Conjunctiva pale   Throat: Oropharynx moist without lesions.   Lips, mucosa, and tongue normal   Neck: Supple, symmetrical, trachea midline, no adenopathy,  no tenderness/mass/nodules   Lungs:   Clear to auscultation bilaterally, no audible wheezes, rhonchi or rales; respirations unlabored   Heart:  Regular rate and rhythm, S1, S2 normal, no murmur, rub or gallop   Abdomen:   Soft, non-tender, distended, striae positive bowel sounds. No masses, no organomegaly. Left side has 2 DONNY drains with serosanguineous drainage and one large accordion drain with dark bloody drainage    No CVA tenderness   Extremities: Bilateral numerous superficial scabbed ulcerations of anterior feet   Skin: As above         Invasive Devices       Peripheral Intravenous Line  Duration             Peripheral IV 10/16/23 Dorsal (posterior); Left Forearm 2 days    Peripheral IV 10/16/23 Right Antecubital 1 day              Drain  Duration             Abscess Drain RUQ 43 days    Abscess Drain LUQ 41 days    Abscess Drain Abdomen 2 days                    Labs, Imaging, & Other studies:   All pertinent labs were personally reviewed  Results from last 7 days   Lab Units 10/18/23  0430 10/17/23  2208 10/17/23  1721 10/17/23  1156 10/17/23  0623 10/17/23  0021 10/16/23  1748   WBC Thousand/uL 15.16*  --   --   --  21.79*  --  24.70*   HEMOGLOBIN g/dL 8.1* 7.8* 8.4*   < > 8.2*   < > 7.6*   PLATELETS Thousands/uL 341  --   --   --  312  --  351    < > = values in this interval not displayed. Results from last 7 days   Lab Units 10/18/23  0430 10/17/23  0623 10/16/23  1752 10/16/23  1401 10/16/23  1349   SODIUM mmol/L 137 137 134*  --  133*   POTASSIUM mmol/L 3.2* 3.5 3.9  --  4.5   CHLORIDE mmol/L 104 103 102  --  96   CO2 mmol/L 22 25 23  --  18*   CO2, I-STAT mmol/L  --   --   --  20*  --    BUN mg/dL 20 26* 28*  --  29*   CREATININE mg/dL 0.90 0.97 1.01  --  1.11   EGFR ml/min/1.73sq m 97 88 84  --  75   GLUCOSE, ISTAT mg/dl  --   --   --  143*  --    CALCIUM mg/dL 7.2* 7.7* 6.9*  --  8.1*   AST U/L 15 14  --   --  23   ALT U/L 7 10  --   --  15   ALK PHOS U/L 62 77  --   --  98       Results from last 7 days   Lab Units 10/16/23  1651 10/16/23  1404 10/16/23  1403 10/16/23  1314 10/16/23  1216 10/13/23  1424 10/13/23  1420   BLOOD CULTURE   --   --   --  No Growth at 48 hrs. No Growth at 48 hrs. No Growth After 4 Days. No Growth After 4 Days.    GRAM STAIN RESULT  No polys seen*  3+ Gram negative rods* No polys seen*  4+ Gram negative rods* No polys seen*  4+ Gram variable rods*  --   --   --   --    BODY FLUID CULTURE, STERILE  3+ Growth of Escherichia coli*  3+ Growth of Klebsiella aerogenes*  2+ Growth of Pseudomonas aeruginosa*  3+ Growth of 3+ Growth of Providencia rettgeri*  3+ Growth of Pseudomonas aeruginosa* 4+ Growth of Pseudomonas aeruginosa*  4+ Growth of  --   --   --   --

## 2023-10-18 NOTE — CASE MANAGEMENT
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 Fax: [address] [address]  Phone: [phone] Fax: [phone]    Primary Care Provider: Wendy Prajapati MD    Primary Insurance: [id number] [facility]  Secondary Insurance:     ASSESSMENT:  [place] Proxies    There are no active Health Care Proxies on file.        Readmission Root Cause  30 Day Readmission: Yes  Who directed you to return to the hospital?: Self  Did you understand whom to contact if you had questions or problems?: Yes  Did you get your prescriptions before you left the hospital?: Yes  Were you able to get your prescriptions filled when you left the hospital?: Yes  Did you take your medications as prescribed?: Yes  Were you able to get to your follow-up appointments?: Yes  During previous admission, was a post-acute recommendation made?: Yes  What post-acute resources were offered?: STR  Patient was readmitted due to: Necrotizing pancreatitis    Patient Information  Admitted from[de-identified] Home  Mental Status: Alert  During Assessment patient was accompanied by: Not accompanied during assessment  Assessment information provided by[de-identified] Patient  Primary Caregiver: Family  Caregiver's Name[de-identified] Gisele Case Relationship to Patient[de-identified] Significant Other  Caregiver's Telephone Number[de-identified] [phone]  Support Systems: Spouse/significant other  [place] of Residence: [address] [place] [address] do you live in?: [facility] entry access options.  Select all that apply.: Stairs  Type of Current Residence: [place]  In the last 12 months, was there a time when you were not able to pay the mortgage or rent on time?: No  In the last 12 months, how many places have you lived?: 1  In the last 12 months, was there a time when you did not have a steady place to sleep or slept in a shelter (including now)?: No  Homeless/housing insecurity resource given?: N/A  Living Arrangements: Lives w/ Spouse/significant other    Activities of Daily Living Prior to Admission  Functional Status: Assistance  Completes ADLs independently?: No  Level of ADL dependence: Assistance  Ambulates independently?: No  Level of ambulatory dependence: Assistance  Does patient use assisted devices?: Yes  Assisted Devices (DME) used: [employer]res  Does patient currently own DME?: Yes  What DME does the patient currently own?: David Gutierres  Does patient have a history of Outpatient Therapy (PT/OT)?: Yes  Does the patient have a history of Short-Term Rehab?: No  Does patient have a history of HHC?: Yes  Does patient currently have [facility]?: Yes    Current Home Health Care  Type of Current Home Care Services: Nurse visit, Home PT  Current Home Health Agency[de-identified] [facility] Follow-Up Provider[de-identified] PCP    Patient Information Continued  Income Source: Employed  Does patient have prescription coverage?: Yes  Within the past 12 months, you worried that your food would run out before you got the money to buy more.: Never true  Within the past 12 months, the food you bought just didn't last and you didn't have money to get more.: Never true  Food insecurity resource given?: N/A  Does patient receive dialysis treatments?: No  Does patient have a history of substance abuse?: No  Does patient have a history of Mental Health Diagnosis?: No    PHQ 2/9 Screening   Reviewed PHQ 2/9 Depression Screening Score?: No    Means of Transportation  Means of Transport to Appts[de-identified] Family transport  In the past 12 months, has lack of transportation kept you from medical appointments or from getting medications?: No  In the past 12 months, has lack of transportation kept you from meetings, work, or from getting things needed for daily living?: No  Was application for public transport provided?: N/A        Summary: Met with patient at bedside to complete readmission assessment. DC on  9/27/23 with [facility]. Demographics confirmed. Lives with spouse who assists with ADLs. Discussed with patient therapy recommendations of rehab. Patient is agreeable to rehab. Patient provided CM with permission to perform a provider search in the patient's area. Patient also requested a referral to be sent to [facility]. Referrals sent via [employer]. Awaiting acceptance.

## 2023-10-18 NOTE — PHYSICAL THERAPY NOTE
Physical Therapy Evaluation     Patient's Name: Ying Estrada    Admitting Diagnosis  Bacteremia [R78.81]  Intra-abdominal abscess ([address]) [K65.1]    Problem List  Patient Active Problem List   Diagnosis    Primary hypertension    Proteinuria    Chronic pain of both hips    Family history of prostate cancer in father    Necrotizing pancreatitis    Superior mesenteric artery thrombosis ([address])    Pleural effusion, left    Hyponatremia    Abnormal urinalysis    Leukocytosis    Pleural effusion due to pancreatitis    Ascites    Portal vein thrombosis    Anemia    Hyperglycemia    Bacteremia    Acute blood loss anemia       Past Medical History  Past Medical History:   Diagnosis Date    Pancreatitis        Past Surgical History  Past Surgical History:   Procedure Laterality Date    CYSTOSCOPY N/A 8/5/2023    Procedure: CYSTOSCOPY. Saunders insertion;  Surgeon: Loni Olmedo MD;  Location: [facility];  Service: Urology    FL RETROGRADE URETHROCYSTOGRAM  8/5/2023    HERNIA REPAIR      IR DRAINAGE TUBE CHECK/CHANGE/REPOSITION/REINSERTION/UPSIZE  9/4/2023    IR DRAINAGE TUBE PLACEMENT  8/5/2023    IR DRAINAGE TUBE PLACEMENT  9/6/2023    IR DRAINAGE TUBE PLACEMENT  9/26/2023    IR DRAINAGE TUBE PLACEMENT  10/16/2023    IR PARACENTESIS  9/6/2023    IR THORACENTESIS  9/4/2023    JOINT REPLACEMENT      TONSILLECTOMY  1976          10/18/23 0923   PT Last Visit   PT Visit Date 10/18/23   Note Type   Note type Evaluation   Pain Assessment   Pain Assessment Tool 0-10   Pain Score 2   Pain Location/Orientation Location: Abdomen   Pain Onset/Description Onset: Ongoing;Frequency: Intermittent; Descriptor: Discomfort   Effect of Pain on Daily Activities limts comfort and mobility   Patient's Stated Pain Goal No pain   Hospital Pain Intervention(s) Repositioned; Ambulation/increased activity; Emotional support   Restrictions/Precautions   Weight Bearing Precautions Per Order No   Other Precautions Multiple lines;Telemetry;O2;Fall Risk;Pain  (multiple drains/vac)   Home Living   Type of [address] One level;Performs ADLs on one level; Access;Stairs to enter with rails  ([address] with 2-3 REJI)   Bathroom Shower/Tub Walk-in shower   Bathroom Toilet Standard   Bathroom Equipment Shower chair   [address] Walker  (doesnt use PTA)   Prior Function   Level of [place] Independent with ADLs; Independent with functional mobility; Independent with IADLS  (requiring increased assistance recently)   Lives With Spouse  (able to assist as needed)   Receives Help From Family   IADLs Independent with driving; Independent with meal prep; Independent with medication management  (has not been driving recently)   Falls in the last 6 months 1 to 4  (x1 per patient)   Vocational Full time employment   General   Family/Caregiver Present No   Cognition   Overall Cognitive Status WFL   Arousal/Participation Alert   Orientation Level Oriented X4   Memory Within functional limits   Following Commands Follows one step commands without difficulty   Comments Patient pleasant and cooperative. Fearful of falling. Subjective   Subjective Patient agreeable to PT eval   RUE Assessment   RUE Assessment WFL   LUE Assessment   LUE Assessment WFL   RLE Assessment   RLE Assessment   (grossly 3+/5)   LLE Assessment   LLE Assessment   (grossly 3+/5)   Bed Mobility   Rolling R 2  Maximal assistance   Additional items Assist x 1;Verbal cues; Increased time required   Supine to Sit 3  Moderate assistance   Additional items Assist x 2; Increased time required;Verbal cues;LE management   Transfers   Sit to Stand 3  Moderate assistance   Additional items Assist x 2; Increased time required;Verbal cues   Stand to Sit 3  Moderate assistance   Additional items Assist x 2; Increased time required;Verbal cues   Stand pivot 3  Moderate assistance   Additional items Assist x 2; Increased time required;Verbal cues   Additional Comments B/L HHA Ambulation/Elevation   Gait pattern Decreased foot clearance;Shuffling; Foward flexed; Short stride; Excessively slow   Gait Assistance 3  Moderate assist   Additional items Assist x 2;Verbal cues   Assistive Device   (B/L HHA)   Distance 3'   Balance   Static Sitting Fair   Dynamic Sitting Fair -   Static Standing Poor   Dynamic Standing Poor -   Ambulatory Poor -   Endurance Deficit   Endurance Deficit Yes   Activity Tolerance   Activity Tolerance Patient limited by fatigue;Patient limited by pain   Medical Staff Made Aware OT   Nurse Made Aware RN cleared and updated   Assessment   Prognosis Good   Problem List Decreased strength;Decreased range of motion;Decreased endurance; Impaired balance;Decreased mobility;Pain   Assessment Pt is a 48 y.o. male seen for a high complexity PT evaluation due to Ongoing medical management for primary dx, Increased reliance on more restrictive AD compared to baseline, Decreased activity tolerance compared to baseline, Fall risk, Increased assistance needed from caregiver at current time, Ongoing telemetry monitoring, Increased O2 via NC from pts baseline, Trending lab values, Continuous pulse oximetry monitoring , DONNY drain in place at current time, s/p surgical intervention, wound vac . Patient is s/p admit to [facility] on 10/13/2023 for Bacteremia (R78.81)  Intra-abdominal abscess ([address]) (K65.1). Patient  has a past medical history of Pancreatitis. .     PT now consulted to assess functional mobility and needs for safe d/c planning. Prior to admission, pt independent with functional mobility, ADLs, and IADLs. However has been requiring increased assist recently.  Personal factors affecting status include hx of falls, fall risk, steps to enter home, assist for ADLs, and medical status     Currently pt requires moderate assistance x2 for bed mobility, moderate assistance x2 for functional transfers with two hand hold ; moderate assistance x2 for ambulation with two hand hold. Pt presents functioning below baseline and w/ overall mobility deficits 2* to: decreased strength, decreased endurance, decreased mobility, impaired balance. These impairments place pt at risk for falls. Pt will continue to benefit from skilled PT interventions to address stated impairments; to maximize functional potential; for ongoing pt/family education; and DME needs. The patient's AM-PAC Basic Mobility Inpatient Short Form Raw Score Is 7. A Raw score of less than 16 suggests the patient may benefit from discharge to post-acute rehabilitation services. PT is currently recommending Post Acute Rehab Services on d/c from hospital. Will continue to follow as able. Goals   Patient Goals to feel better   STG Expiration Date 11/01/23   Short Term Goal #1 In 14 days, patient will    1) increase strength in BUE/BLE by 1/2 to 1 full grade for increased strength and stability needed for functional mobility 2) improve bed mobility to MI for improved mobility and decreased need for assist 3) increase functional transfers to MI for improved safety and functional mobility 4) ambulate at least 50ft with MI using LRAD for increased endurance and safety ambulating home and community environments 5) improve balance by 1 grade for improved safety and stability and decreased risk for falls. 6) ascend/descend at least 3 stairs using HR with MI in order to safely enter home   PT Treatment Day 0   Plan   Treatment/Interventions Functional transfer training;ADL retraining;Elevations;LE strengthening/ROM; Therapeutic exercise; Endurance training;Patient/family training;Equipment eval/education; Bed mobility;Gait training;Spoke to nursing;Spoke to case management;OT   PT Frequency 3-5x/wk   Discharge Recommendation   PT Discharge Recommendation Post acute rehabilitation services   Equipment Recommended   (TBD)   AM-PAC Basic Mobility Inpatient   Turning in Flat Bed Without Bedrails 2   Lying on Back to Sitting on Edge of Flat Bed Without Bedrails 1   Moving Bed to Chair 1   Standing Up From Chair Using Arms 1   Walk in Room 1   Climb 3-5 Stairs With Railing 1   Basic Mobility Inpatient Raw Score 7   Highest Level Of Mobility   -HLM Goal 2: Bed activities/Dependent transfer   -HLM Achieved 4: Move to chair/commode   Modified [place] Scale   Modified [place] Scale 4   End of Consult   Patient Position at End of Consult Bedside chair; All needs within reach         [place], PT, DPT

## 2023-10-19 PROBLEM — J96.00 ACUTE RESPIRATORY FAILURE (HCC): Status: ACTIVE | Noted: 2023-01-01

## 2023-10-19 PROBLEM — R57.8 HEMORRHAGIC SHOCK (HCC): Status: ACTIVE | Noted: 2023-01-01

## 2023-10-19 PROBLEM — R58 INTRA-ABDOMINAL BLEEDING: Status: ACTIVE | Noted: 2023-01-01

## 2023-10-19 PROBLEM — E43 SEVERE PROTEIN-CALORIE MALNUTRITION (HCC): Status: ACTIVE | Noted: 2023-01-01

## 2023-10-19 NOTE — CONSULTS
Vancomycin IV Pharmacy-to-Dose Consultation    Cleo Blanton is a 48 y.o. male who was receiving Vancomycin IV with management by the Pharmacy Consult service for treatment of Bacteremia (goal -600, trough >10)  . The patient’s Vancomycin therapy has been discontinued. Thank you for allowing us to take part in this patient's care. Pharmacy will sign-off now; please call or re-consult if there are any questions.         Noe Bland PharmD, BCCCP  Critical Care Clinical Pharmacist  Available via [facility] Text

## 2023-10-19 NOTE — PHYSICAL THERAPY NOTE
Physical Therapy Cancellation Note    Patient's Name: Leonardo Reaves       10/19/23 0831   PT Last Visit   PT Visit Date 10/19/23   Note Type   Note Type Cancelled Session   Cancel Reasons Intubated/sedated     Will continue to follow on PT caseload as clinical course allows.     Ritu Painter, PT, DPT

## 2023-10-19 NOTE — DISCHARGE INSTRUCTIONS
ARTERIOGRAM    WHAT YOU SHOULD KNOW:   An angiogram is a procedure to look at arteries in your body. Arteries are the blood vessels that carry blood from your heart to your body. AFTER YOU LEAVE:     Self-care:   Limit activity: Rest for the remainder of the day of your procedure. Have some one with you until the next morning. Keep your arm or leg straight as much as possible. Rest as much as possible, sitting lying or reclining. Walk only to go to the bathroom, to bed or to eat. If the angiogram catheter was put in your leg, use the stairs as little as possible. No driving for 55-34 hours. No heavy lifting, >10 lbs. Or strenuous activity for 48 hours. Keep your wound clean and dry. Remove band aid/ dressing tomorrow. You may shower 24 hours after your procedure. Shower and wash groin area or wrist area gently with soap and water: beginning tomorrow. Rinse and pat Dry. Apply new water seal band aid. Repeat this process for 5 days. If there is any drainage from the puncture site, you should put on a clean bandage. No Powders, creams, lotions or antibiotic ointments for 5 days. No tub baths, hot tubs or swimming for 5 days. Watch for bleeding and bruising: It is normal to have a bruise and soreness where the angiogram catheter went in. Medication: If your angiogram was performed to treat blockages in your leg arteries, it is strongly recommended that you take both an antiplatelet medication (like aspirin or Plavix) to prevent clotting AND a statin drug (like Lipitor or Crestor), even if you have normal cholesterol. If these drugs are not ordered for you please contact either your Vascular Surgery office or the Interventional Radiology Dept during normal daytime working hours. See Interventional Radiology telephone numbers below.   You Should Have Follow up with the vascular surgeon   call [phone] with questions  Diet:   You may resume your regular diet, Sips of flat soda will help with mild nausea. Drink more liquids than usual for the next 24 hours      IMMEDIATELY Contact Interventional Radiology at [phone]  if any of the following occur: If your bruise gets larger or if you notice any active bleeding. APPLY DIRECT PRESSURE TO THE BLEEDING SITE. If you notice increased swelling or have increased pain at the puncture site   If you have any numbness or pain in the extremity of the puncture site   If that extremity seems cold or pale. You have fever greater than 101  Persistent nausea or vomitting    Follow up with your primary healthcare provider  as directed: Write down your questions so you remember to ask them during your visits.

## 2023-10-19 NOTE — RESPIRATORY THERAPY NOTE
RT Ventilator Management Note  Conchita Hernandez 48 y.o. male MRN: [id number]  Unit/Bed#: [facility] 05 Encounter: [id number]      Daily Screen         10/19/2023  0805             Patient safety screen outcome[de-identified] Failed    Not Ready for Weaning due to[de-identified] FiO2 >60%; Underline problem not resolved              Physical Exam:   Assessment Type: Assess only  General Appearance: Sedated  Respiratory Pattern: Assisted  Chest Assessment: Chest expansion symmetrical  Bilateral Breath Sounds: Diminished      Resp Comments: (P) Pt remains on 20/500/100 and 6 of peep. No weaning done due to high fio2 requirements. tube is 27 at the lip. Per PA withdraw to 24 Cmh20.

## 2023-10-19 NOTE — MALNUTRITION/BMI
This medical record reflects one or more clinical indicators suggestive of malnutrition and/or morbid obesity. Malnutrition Findings:   Adult Malnutrition type: Acute illness  Adult Degree of Malnutrition: Other severe protein calorie malnutrition  Malnutrition Characteristics: Inadequate energy, Weight loss, Fluid accumulation                  360 Statement: Acute severe pro, nikki malnutrition d/t condition as evidence by <50% energy intake > 5 days, significant weight loss, 2/8/22 292lbs, 7/25/23 277lbs, 10/4/23 275lbs, 10/18/23 242lbs, 33lbs/12% wt. loss x 2 weeks, 2+ edema LE's, currently NPO, will monitor care of plan, monitor for appropriate route of nutrition. Will provide both TF and TPN recommendations as needed. If and once able to initiate TF recommend [employer], advance by 10mL every 6hrs to goal of 65mL/hr provides total volume 1560mL, 1560cal, 136g pro, 1304mL, water flushes per MD or consider minimum of 30mL every 4hrs for tube patency. If unable to initiate TF recommend initiating standard TPN, monitor electrolytes, check triglycerides, advance as tolerated to goal TPN AA15 900mL, D40% 500mL, 20% SMOFlipids 150mL provides total of 1520cal, 135g pro, 1550mL. Glucose load 1.26mg/kg/min, lipid load .27g/kg/day. BMI Findings: Body mass index is 33.88 kg/m². See Nutrition note dated 10/19/23 for additional details. Completed nutrition assessment is viewable in the nutrition documentation.

## 2023-10-19 NOTE — SEDATION DOCUMENTATION
Mesenteric angiogram completed by Dr. Rafael Kendall. No active bleed per Dr. Rafael Kendall. Sheath pulled without complications. Prostyle closure successfully deployed. Report given to Rebeca Gibbons RN. Transported via bed by MICU staff and RT.

## 2023-10-19 NOTE — QUICK NOTE
Informed regarding patient's condition. 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 He underwent IR drain revision on 10/16/23. For hematochezia he also underwent colonoscopy 10/17/23 which was unfortunately incomplete due to solid brown stool in the rectum; no signs of bleeding that were seen. 10/18/23  Patient had rapid response activation for hypotension and rectal bleeding. Patient noted to have bright red blood per rectum in profuse amount along with hypotension and was subsequently started on resuscitation protocol and moved to MICU. Patient was seen and examined at bedside. Since moving to MICU he has had few more bloody bowel movements. He was hypotensive with active bleeding for which he has received 2 PRBC transfusions and 2 FFP transfusions. Since receiving those, his blood pressure has improved. Stat CBC obtained at the RRT is pending. CMP showing normal BUN/Cr ratio. Lactic acid  3.3. INR pending. On exam, patient is comfortable lying on bed. Not on pressors at this point. On o2 via NC. Abdomen soft.      Assessment/Plan:    Hemodynamically significant GI bleeding    - at this time, would recommend performing NG tube with lavage to rule out brisk upper GI bleeding, if lavage shows bilious output or clear output, it will be reassuring as the BUN Cr ratio is normal as well  - UPDATE: bilious and clear output noted through the NG tube  - in case it is unremarkable, he would need colonoscopy for evaluation of hematochezia  - for colonoscopy, he would need colonoscopy prep as unprepped colonoscopy are low yield and would unnecessary expose him to anesthesia putting him at risk of further decompensation for a low benefit procedure   - at this point his hemodynamics seems to be improving, would start him on colonoscopy prep through PO or NG slowly overnight in order for colonoscopy tomorrow.    - start prep after the CTA   - if he is noted to have hemodynamic instability during the prep would recommend performing stat CTA high volume GI bleeding scan and if positive, stat IR consult   - await CBC  - PRBC transfusion for hypotension and based on CBC if Hb < 7  - IV PPI drip for now  - reversal of therapeutic anticoagulation if he was on any  - keep NPO except meds for now    Plan was discussed with ICU fellow and RN team.

## 2023-10-19 NOTE — PLAN OF CARE
Problem: Potential for Falls  Goal: Patient will remain free of falls  Description: INTERVENTIONS:  - Educate patient/family on patient safety including physical limitations  - Instruct patient to call for assistance with activity   - Consult OT/PT to assist with strengthening/mobility   - Keep Call bell within reach  - Keep bed low and locked with side rails adjusted as appropriate  - Keep care items and personal belongings within reach  - Initiate and maintain comfort rounds  - Make Fall Risk Sign visible to staff  - Apply yellow socks and bracelet for high fall risk patients  - Consider moving patient to room near nurses station  Outcome: Progressing     Problem: MOBILITY - ADULT  Goal: Maintain or return to baseline ADL function  Description: INTERVENTIONS:  -  Assess patient's ability to carry out ADLs; assess patient's baseline for ADL function and identify physical deficits which impact ability to perform ADLs (bathing, care of mouth/teeth, toileting, grooming, dressing, etc.)  - Assess/evaluate cause of self-care deficits   - Assess range of motion  - Assess patient's mobility; develop plan if impaired  - Assess patient's need for assistive devices and provide as appropriate  - Encourage maximum independence but intervene and supervise when necessary  - Involve family in performance of ADLs  - Assess for home care needs following discharge   - Consider OT consult to assist with ADL evaluation and planning for discharge  - Provide patient education as appropriate  Outcome: Progressing  Goal: Maintains/Returns to pre admission functional level  Description: INTERVENTIONS:  - Perform BMAT or MOVE assessment daily.   - Set and communicate daily mobility goal to care team and patient/family/caregiver. - Collaborate with rehabilitation services on mobility goals if consulted  - Perform Range of Motion  times a day. - Reposition patient every  hours.   - Dangle patient  times a day  - Stand patient  times a day  - Ambulate patient  times a day  - Out of bed to chair  times a day   - Out of bed for meals times a day  - Out of bed for toileting  - Record patient progress and toleration of activity level   Outcome: Progressing     Problem: Prexisting or High Potential for Compromised Skin Integrity  Goal: Skin integrity is maintained or improved  Description: INTERVENTIONS:  - Identify patients at risk for skin breakdown  - Assess and monitor skin integrity  - Assess and monitor nutrition and hydration status  - Monitor labs   - Assess for incontinence   - Turn and reposition patient  - Assist with mobility/ambulation  - Relieve pressure over bony prominences  - Avoid friction and shearing  - Provide appropriate hygiene as needed including keeping skin clean and dry  - Evaluate need for skin moisturizer/barrier cream  - Collaborate with interdisciplinary team   - Patient/family teaching  - Consider wound care consult   Outcome: Progressing     Problem: PAIN - ADULT  Goal: Verbalizes/displays adequate comfort level or baseline comfort level  Description: Interventions:  - Encourage patient to monitor pain and request assistance  - Assess pain using appropriate pain scale  - Administer analgesics based on type and severity of pain and evaluate response  - Implement non-pharmacological measures as appropriate and evaluate response  - Consider cultural and social influences on pain and pain management  - Notify physician/advanced practitioner if interventions unsuccessful or patient reports new pain  Outcome: Progressing     Problem: INFECTION - ADULT  Goal: Absence or prevention of progression during hospitalization  Description: INTERVENTIONS:  - Assess and monitor for signs and symptoms of infection  - Monitor lab/diagnostic results  - Monitor all insertion sites, i.e. indwelling lines, tubes, and drains  - Monitor endotracheal if appropriate and nasal secretions for changes in amount and color  - [place] appropriate cooling/warming therapies per order  - Administer medications as ordered  - Instruct and encourage patient and family to use good hand hygiene technique  - Identify and instruct in appropriate isolation precautions for identified infection/condition  Outcome: Progressing  Goal: Absence of fever/infection during neutropenic period  Description: INTERVENTIONS:  - Monitor WBC    Outcome: Progressing     Problem: Nutrition/Hydration-ADULT  Goal: Nutrient/Hydration intake appropriate for improving, restoring or maintaining nutritional needs  Description: Monitor and assess patient's nutrition/hydration status for malnutrition. Collaborate with interdisciplinary team and initiate plan and interventions as ordered. Monitor patient's weight and dietary intake as ordered or per policy. Utilize nutrition screening tool and intervene as necessary. Determine patient's food preferences and provide high-protein, high-caloric foods as appropriate.      INTERVENTIONS:  - Monitor oral intake, urinary output, labs, and treatment plans  - Assess nutrition and hydration status and recommend course of action  - Evaluate amount of meals eaten  - Assist patient with eating if necessary   - Allow adequate time for meals  - Recommend/ encourage appropriate diets, oral nutritional supplements, and vitamin/mineral supplements  - Order, calculate, and assess calorie counts as needed  - Recommend, monitor, and adjust tube feedings and TPN/PPN based on assessed needs  - Assess need for intravenous fluids  - Provide specific nutrition/hydration education as appropriate  - Include patient/family/caregiver in decisions related to nutrition  Outcome: Progressing     Problem: RESPIRATORY - ADULT  Goal: Achieves optimal ventilation and oxygenation  Description: INTERVENTIONS:  - Assess for changes in respiratory status  - Assess for changes in mentation and behavior  - Position to facilitate oxygenation and minimize respiratory effort  - Oxygen administered by appropriate delivery if ordered  - Initiate smoking cessation education as indicated  - Encourage broncho-pulmonary hygiene including cough, deep breathe, Incentive Spirometry  - Assess the need for suctioning and aspirate as needed  - Assess and instruct to report SOB or any respiratory difficulty  - Respiratory Therapy support as indicated  Outcome: Progressing     Problem: METABOLIC, FLUID AND ELECTROLYTES - ADULT  Goal: Electrolytes maintained within normal limits  Description: INTERVENTIONS:  - Monitor labs and assess patient for signs and symptoms of electrolyte imbalances  - Administer electrolyte replacement as ordered  - Monitor response to electrolyte replacements, including repeat lab results as appropriate  - Instruct patient on fluid and nutrition as appropriate  Outcome: Progressing  Goal: Fluid balance maintained  Description: INTERVENTIONS:  - Monitor labs   - Monitor I/O and WT  - Instruct patient on fluid and nutrition as appropriate  - Assess for signs & symptoms of volume excess or deficit  Outcome: Progressing  Goal: Glucose maintained within target range  Description: INTERVENTIONS:  - Monitor Blood Glucose as ordered  - Assess for signs and symptoms of hyperglycemia and hypoglycemia  - Administer ordered medications to maintain glucose within target range  - Assess nutritional intake and initiate nutrition service referral as needed  Outcome: Progressing     Problem: SKIN/TISSUE INTEGRITY - ADULT  Goal: Skin Integrity remains intact(Skin Breakdown Prevention)  Description: Assess:  -Inspect skin when repositioning, toileting, and assisting with ADLS  -Assess extremities for adequate circulation and sensation     Bed Management:  -Have minimal linens on bed & keep smooth, unwrinkled  -Change linens as needed when moist    Toileting:  -Offer bedside commode    Activity:  -Encourage activity and walks on unit  -Encourage or provide ROM exercises   -Use appropriate equipment to lift or move patient in bed    Skin Care:  -Avoid use of baby powder, tape, friction and shearing, hot water or constrictive clothing  -Do not massage red bony areas  Outcome: Progressing  Goal: Incision(s), wounds(s) or drain site(s) healing without S/S of infection  Description: INTERVENTIONS  - Assess and document dressing, incision, wound bed, drain sites and surrounding tissue  - Provide patient and family education  Outcome: Progressing  Goal: Pressure injury heals and does not worsen  Description: Interventions:  - Implement low air loss mattress or specialty surface (Criteria met)  - Apply silicone foam dressing  - Apply fecal or urinary incontinence containment device   - Utilize friction reducing device or surface for transfers     - Consider nutrition services referral as needed  Outcome: Progressing     Problem: HEMATOLOGIC - ADULT  Goal: Maintains hematologic stability  Description: INTERVENTIONS  - Assess for signs and symptoms of bleeding or hemorrhage  - Monitor labs  - Administer supportive blood products/factors as ordered and appropriate  Outcome: Progressing

## 2023-10-19 NOTE — BRIEF OP NOTE (RAD/CATH)
INTERVENTIONAL RADIOLOGY PROCEDURE NOTE    Date: 10/19/2023    Procedure:   Procedure Summary       Date:  Room / Location:     Anesthesia Start:  Anesthesia Stop:     Procedure:  Diagnosis:     Scheduled Providers:  Responsible Provider:     Anesthesia Type: Not recorded ASA Status: Not recorded            Preoperative diagnosis:   1. Necrotizing pancreatitis    2. Bacteremia    3. Lower GI bleed    4. Acute blood loss anemia         Postoperative diagnosis: Same. Surgeon: Tiburcio Shetty MD     Assistant: None. No qualified resident was available. Blood loss: None    Specimens: None    Findings: No active GI bleeding identified with aortogram and selective SMA and DEVON arteriograms. GDA embolization remains durable with no continued bleeding seen from retrograde SMA opacification. Continue flow across splenic artery coils without extravasation into percutaneous drain. Complications: None immediate.     Anesthesia: general

## 2023-10-19 NOTE — ANESTHESIA POSTPROCEDURE EVALUATION
Post-Op Assessment Note    CV Status:  Stable  Pain Score: 0    Pain management: adequate     Mental Status:  Somnolent (sedated on ppf gtt)   Hydration Status:  Euvolemic and stable   PONV Controlled:  None   Airway Patency:  Patent  Airway: intubated      Post Op Vitals Reviewed: Yes      Staff: CRNA   Comments: report to ICU staff      No notable events documented.     BP (!) 115/45 (10/19/23 0116)    Temp      Pulse (!) 115 (10/19/23 0116)   Resp 19 (10/19/23 0116)    SpO2 99 % (10/19/23 0116)

## 2023-10-19 NOTE — ANESTHESIA POSTPROCEDURE EVALUATION
Post-Op Assessment Note    No notable events documented.     BP  132/85   Temp     Pulse  90   Resp   16   SpO2   95

## 2023-10-19 NOTE — SEDATION DOCUMENTATION
Angiogram with embolization completed by Dr. Elder Gaming with Anesthesia support. Patient transported back to MICU with RT and CRNA.  Report given to José Luis Ashby,

## 2023-10-19 NOTE — PROGRESS NOTES
[facility] - Progress Note  Jaime Valle 48 y.o. 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 Poor prep. Large volume hematochezia + positive CTA 10/19 s/p GDA + splenic artery embo. Hematochezia  GDA and splenic artery bleed s/p embolization  10/18 CTA and angiogram showed active extrav near the GDA, s/p embo w/ IR. Repeat imaging showed interval splenic artery extrav, s/p embo as well. During this, was given 6u pRBC, 10u FFP, 2u plt  Hb 10 from 8 after blood products   Colonoscopy 10/17/23 w stool, no plans for inpatient colonoscopy at this time  Monitor BMs, trend Hb  C/w PPI  Hb > 7 (trend q8h), plt > 50, INR < 1.5, T&S q3d, 2 large bore IV access  Anticipate resolution of melena within the next few days    Acute Necrotizing Pancreatitis  EUS 9/15/23 w poor windows, no cystgastrostomy performed  F/u w advanced endoscopy outpt for possible repeat attempt  S/p drain revision w IR 10/16/23  ______________________________________________________________________    SUBJECTIVE:  Eventful evening, beginning w/ large-volume hematochezia and clots, code crimson called, received multiple units of blood products w/ good hemodynamics. CTA was positive and he went to IR for angio, where the GDA was embolized. Several hours later on repeat angio there was extrav from the splenic artery, which was also embolized. Remains intubated in the ICU, HDS and not on pressors. Family at bedside. Questions answered. Unable to provide any meaningful ROS.     Scheduled Meds:  Current Facility-Administered Medications Medication Dose Route Frequency Provider Last Rate    acetaminophen  650 mg Oral Q6H PRN Kaleigh Valero, DO      chlorhexidine  15 mL Mouth/Throat Q12H [address] Kaleigh Valero, DO      fentaNYL  50 mcg/hr Intravenous Continuous Rex Haddad MD 50 mcg/hr (10/19/23 0534)    HYDROmorphone  0.5 mg Intravenous Q3H PRN Kaleigh Valero, DO      insulin lispro  2-12 Units Subcutaneous 4x Daily Kaleigh Valero, DO      iodixanol  50 mL Intravenous Once in imaging Laverne Pires MD      magnesium sulfate  2 g Intravenous Once Sammy Batman, DO      ondansetron  4 mg Intravenous Q6H PRN Annemarie Julien, DO      oxyCODONE  10 mg Oral Q4H PRN Kaleigh Valero, DO      oxyCODONE  5 mg Oral Q4H PRN Kaleigh Valero, DO      pancrelipase (Lip-Prot-Amyl)  6,000 Units Oral TID [facility] Meals [facility]      pantoprazole (PROTONIX) 80 mg in sodium chloride 0.9 % 100 mL infusion  8 mg/hr Intravenous Continuous Rex Haddad MD 8 mg/hr (10/19/23 0526)    piperacillin-tazobactam  3.375 g Intravenous Q6H [facility] Stopped (10/18/23 1400)    polyethylene glycol  4,000 mL Oral Once Manjula Young MD      polyethylene glycol  17 g Oral TID [facility]      potassium chloride  40 mEq Intravenous Once [place] Batman, DO      propofol  5-50 mcg/kg/min Intravenous Titrated Rex Haddad MD Stopped (10/19/23 0145)    senna-docusate sodium  1 tablet Oral HS Kaleigh Valero, DO       Continuous Infusions:fentaNYL, 50 mcg/hr, Last Rate: 50 mcg/hr (10/19/23 0534)  pantoprazole (PROTONIX) 80 mg in sodium chloride 0.9 % 100 mL infusion, 8 mg/hr, Last Rate: 8 mg/hr (10/19/23 0526)  propofol, 5-50 mcg/kg/min, Last Rate: Stopped (10/19/23 0145)      PRN Meds:.  acetaminophen    HYDROmorphone    iodixanol    ondansetron    oxyCODONE    oxyCODONE    OBJECTIVE:     Objective   Blood pressure (!) 89/60, pulse 102, temperature (!) 96.8 °F (36 °C), resp. rate 22, height 5' 11" (1.803 m), weight 110 kg (242 lb 15.2 oz), SpO2 100 %.  Body mass index is 33.88 kg/m². Intake/Output Summary (Last 24 hours) at 10/19/2023 2428  Last data filed at 10/19/2023 0600  Gross per 24 hour   Intake 8758.68 ml   Output 8080 ml   Net 678.68 ml         PHYSICAL EXAM:   Physical Exam  Constitutional:       General: He is not in acute distress. Appearance: He is ill-appearing. Interventions: He is sedated and intubated. HENT:      Head: Normocephalic and atraumatic. Mouth/Throat:      Mouth: Mucous membranes are moist.   Cardiovascular:      Rate and Rhythm: Normal rate and regular rhythm. Pulmonary:      Effort: Pulmonary effort is normal. No respiratory distress. He is intubated. Breath sounds: Normal breath sounds. Abdominal:      General: There is distension. Palpations: Abdomen is soft. Tenderness: There is no abdominal tenderness. Comments: Drains in place draining  serosang fluid   Musculoskeletal:      Right lower leg: Edema present. Left lower leg: Edema present. Skin:     General: Skin is warm and dry. Capillary Refill: Capillary refill takes less than 2 seconds. Neurological:      Comments: Moves all extremities equally            Invasive Devices       Central Venous Catheter Line  Duration             CVC Central Lines 10/18/23 Double <1 day              Peripheral Intravenous Line  Duration             Peripheral IV 10/16/23 Dorsal (posterior); Left Forearm 3 days    Peripheral IV 10/16/23 Right Antecubital 2 days    Peripheral IV 10/18/23 Left Antecubital <1 day    Peripheral IV 10/18/23 Right;Ventral (anterior) Forearm <1 day              Arterial Line  Duration             Arterial Line 10/18/23 Femoral <1 day              Line  Duration             Arterial Sheath 5 Fr.  Right Femoral <1 day              Drain  Duration             Abscess Drain RUQ 44 days    Abscess Drain LUQ 42 days    Abscess Drain Abdomen 2 days    NG/OG/Enteral Tube Nasogastric Right nare <1 day    Urethral Catheter <1 day Airway  Duration             ETT  Cuffed 8 mm <1 day                    LAB RESULTS:      Lab Units 10/19/23  0519 10/19/23  0154 10/18/23  1817 10/18/23  0430 10/17/23  1472 10/16/23  1752 10/16/23  0348 10/15/23  0454 10/14/23  0451 10/09/23  1502 09/27/23  0510   SODIUM mmol/L 142  --  137 137 137 134*   < > 132* 130*   < > 135   POTASSIUM mmol/L 3.5  --  3.0* 3.2* 3.5 3.9   < > 3.8 3.9   < > 4.0   CHLORIDE mmol/L 108  --  101 104 103 102   < > 96 97   < > 100   CO2 mmol/L 20*  --  22 22 25 23   < > 23 23   < > 30   CO2, I-STAT mmol/L  --  19* 21  --   --   --    < >  --   --   --   --    BUN mg/dL 16  --  19 20 26* 28*   < > 21 23   < > 23   CREATININE mg/dL 0.94  --  0.99 0.90 0.97 1.01   < > 0.79 0.83   < > 1.27   GLUCOSE RANDOM mg/dL 191*  --  134 114 125 140   < > 102 106   < > 68   CALCIUM mg/dL 9.2  --  7.1* 7.2* 7.7* 6.9*   < > 7.6* 7.7*   < > 7.9*   MAGNESIUM mg/dL 1.9  --   --  2.2 1.9  --   --  2.1 1.6*  --  1.6*   PHOSPHORUS mg/dL 5.4*  --   --   --  3.5  --   --  3.8 3.8  --  4.8*    < > = values in this interval not displayed. Lab Units 10/19/23  0519 10/18/23  1817 10/18/23  0430 10/17/23  0623 10/16/23  1349 10/16/23  0348 10/13/23  1420 09/12/23  0524   TOTAL PROTEIN g/dL 5.0* 5.0* 4.6* 4.9* 5.9* 5.6*   < > 5.5*   ALBUMIN g/dL 2.9* 2.1* 2.0* 2.2* 3.0* 2.2*   < > 2.3*   TOTAL BILIRUBIN mg/dL 3.05* 0.48 0.48 0.68 1.31* 0.81   < > 0.39   BILIRUBIN DIRECT mg/dL  --   --   --   --   --  0.34*  --  0.10   AST U/L 41* 10* 15 14 23 25   < > 10*   ALT U/L 21 9 7 10 15 18   < > 4*   ALK PHOS U/L 97 65 62 77 98 111*   < > 45    < > = values in this interval not displayed.              Lab Units 10/19/23  0519 10/19/23  0154 10/18/23  2025 10/18/23  1817 10/18/23  0430 10/17/23  1156 10/17/23  0623 10/17/23  0021 10/16/23  1748   WBC Thousand/uL 12.58*  --  14.59*  --  15.16*  --  21.79*  --  24.70*   HEMOGLOBIN g/dL 10.3*  --  8.8*  --  8.1*   < > 8.2*   < > 7.6*   I STAT HEMOGLOBIN g/dl  -- 9.9*  --  8.2*  --   --   --   --   --    HEMATOCRIT % 30.8*  --  26.1*  --  24.7*  --  24.9*   < > 22.7*   HEMATOCRIT, ISTAT %  --  29*  --  24*  --   --   --   --   --    PLATELETS Thousands/uL 143*  --  262  --  341  --  312  --  351   MCV fL 91  --  85  --  88  --  88  --  87    < > = values in this interval not displayed. No results found for: "IRON", "TIBC", "FERRITIN"    Lab Results   Component Value Date    INR 1.39 (H) 10/18/2023    INR 1.58 (H) 10/15/2023    INR 2.10 (H) 10/13/2023    PROTIME 16.9 (H) 10/18/2023    PROTIME 18.7 (H) 10/15/2023    PROTIME 24.4 (H) 10/13/2023       RADIOLOGY RESULTS:   Procedure: IR drainage tube placement    Result Date: 10/18/2023  Narrative: Reposition and replacement of multiple drainage catheters Clinical History: 24-year-old male with history of necrotizing pancreatitis, with multiple drains in place. Patient had dislodgment of a left flank retroperitoneal drain, and retraction of a left lower quadrant drain. Contrast: 15 mL of iohexol (OMNIPAQUE) Fluoro time: 5 min Number of Images: Multiple Radiation dose: 21 mGy Technique/findings: The patient was brought to the interventional radiology suite and placed supine on the table. Intervention/findings: The tract from the prior left retroperitoneal drainage catheter was cannulized, and a Kumpe catheter and a Glidewire was used to access the retroperitoneal collection. A new 28 Albanian Thal-Quick catheter was then advanced, with the  tip placed centrally within the collection. Contrast was then injected through the catheter confirming appropriate position within the cavity. Next, the existing left lower quadrant abdominal drain was injected, which was seen to be retracted, however with a tract identified into a left lower quadrant fluid collection. Therefore, the existing drainage catheter was removed over a wire, and a Kumpe catheter and Glidewire was used to access the left lower quadrant intra-abdominal collection. A new 12 Syrian pigtail drainage catheter was then placed within the collection. Contrast was injected through the catheter confirming appropriate position within the collection. Impression: Impression: Replacement of a 28 [place] drainage catheter into the retroperitoneum. Repositioning of a 12 [place] pigtail drainage catheter at the left paracolic gutter fluid collection, with 5 mL of purulent fluid aspirated. Workstation performed: [id number]     Procedure: Colonoscopy    Result Date: 10/17/2023  Narrative: Table formatting from the original result was not included. [address] [place] [address] Endoscopy [address] [place] [address] E: 10/17/23 PHYSICIAN(S): Attending: No Staff Documented Fellow: Jennifer Ann DO INDICATION: Lower GI bleed POST-OP DIAGNOSIS: See the impression below. HISTORY: Prior colonoscopy: No prior colonoscopy. BOWEL PREPARATION:  PREPROCEDURE: Informed consent was obtained for the procedure, including sedation. Risks including but not limited to bleeding, infection, perforation, adverse drug reaction and aspiration were explained in detail. Also explained about less than 100% sensitivity with the exam and other alternatives. The patient was placed in the left lateral decubitus position. Procedure: Colonoscopy DETAILS OF PROCEDURE: Patient was taken to the procedure room where a time out was performed to confirm correct patient and correct procedure. The patient underwent monitored anesthesia care, which was administered by an anesthesia professional. The patient's blood pressure, heart rate, level of consciousness, oxygen, respirations, ECG and ETCO2 were monitored throughout the procedure. A digital rectal exam was performed. The scope was introduced through the anus. The quality of bowel preparation was evaluated using the [facility] Bowel Preparation Scale with scores of: right colon = not assessed, transverse colon = not assessed, left colon = 0.  The total BBPS score was 0. Bowel prep was not adequate. The patient experienced no blood loss. The procedure was difficult due to poor preparation. The patient tolerated the procedure well. There were no apparent adverse events. ANESTHESIA INFORMATION: ASA: III Anesthesia Type: IV Sedation with Anesthesia MEDICATIONS: No administrations occurring from 1607 to 1637 on 10/17/23 FINDINGS: Procedure aborted due to solid stool in the rectum despite significant irrigation and suctioning. EVENTS: Procedure Events Event Event Time SPECIMENS: * No specimens in log * EQUIPMENT: Colonoscope -[id number]     Impression: Procedure aborted due to poor prep with solid stool in the rectum. RECOMMENDATION:  Follow up with me in clinic  Monitor stool output and hemoglobin. Transfuse for <7. If patient re-bleeds, can consider repeat colonoscopy. No Staff Documented     Procedure: XR chest portable    Result Date: 10/16/2023  Narrative: CHEST INDICATION:   sepsis. COMPARISON: CXR and chest CT 10/13/2023. EXAM PERFORMED/VIEWS:  XR CHEST PORTABLE. FINDINGS: Cardiomediastinal silhouette normal. Persistent left effusion and left base atelectasis. No pneumothorax. Upper abdomen normal. Bones normal for age. Impression: Persistent left effusion and left base atelectasis. Pneumonia not excluded in the appropriate clinical setting. Workstation performed: [id number]     Procedure: CTA abdomen pelvis w wo contrast    Result Date: 10/16/2023  Narrative: CT ANGIOGRAM OF THE ABDOMEN AND PELVIS WITH AND WITHOUT IV CONTRAST INDICATION:   GI bleed Further assess for active intra-abdominal bleeding. 10/13/2023 CT abdomen COMPARISON: 10/13/2023; 9/26/2023; 9/8/2023 TECHNIQUE:  CT angiogram examination of the abdomen and pelvis was performed according to standard protocol.   This examination, like all CT scans performed in the [facility], was performed utilizing techniques to minimize radiation dose exposure, including the use of iterative reconstruction and automated exposure control. Contrast as well as noncontrast images were obtained. Rad dose 3718.3 mGy-cm IV Contrast:  100 mL of iohexol (OMNIPAQUE) Enteric Contrast:  Enteric contrast was not administered. FINDINGS: VASCULAR STRUCTURES: The aorta demonstrates no evidence of aneurysm. The IVC is patent. There is some narrowing of the main portal vein and splenic vein but without evidence of thrombosis. Some narrowing of the SMV is seen near the confluence but also appears patent more distally. The celiac, SMA, DEVON and renal arteries appear patent. There appears to be dual renal arteries on the left. OTHER FINDINGS ABDOMEN LOWER CHEST: Small pericardial effusion. Moderate to large pleural effusions left greater than right with adjacent atelectasis. Beena Cm LIVER/BILIARY TREE: Multifocal areas of diminished attenuation are noted in segment 4A and 8 somewhat more pronounced than the study of October 13 where these were thought to perhaps represent areas of focal fatty infiltration. These less apparent on earlier examinations however. There is some enhancement of the vasculature within these areas better seen on portal phase. This could represent new areas of edema perhaps in areas of older focal fatty infiltration. Mild enhancement the common bile duct could be related to secondary cholangitis from patient's pancreatitis. No intrahepatic biliary dilatation. GALLBLADDER:  No calcified gallstones. No pericholecystic inflammatory change. SPLEEN: Heterogeneous enhancement resolves in the spleen probably related to arterial phase injection. Beena Cm PANCREAS: Gas-filled collection/debris is seen centrally within the pancreas with a pigtail catheter similar to the prior examination. This measures 5.9 x 5.7 cm without significant change. Findings suggest pancreatic necrosis and extends towards the pancreatic tail and lesser sac abutting the stomach. . Another area of gas and fluid debris is seen within the pancreatic head. This measures approximately 5.7 x 4 cm, image 84, previously 4.5 x 2.6 cm. This extends inferiorly into the anterior pararenal space causing thickening of the road is fascia due to extension of this necrotic collection and surrounding inflammatory change. No evidence of hemorrhage can be appreciated on the arterial portal phase images in the region of these collections. There is some surrounding rim enhancement as well as some enhancement of some residual pancreatic parenchymal elements. ADRENAL GLANDS: Adrenal thickening is seen bilaterally. KIDNEYS/URETERS: No hydronephrosis. Some increased density in the kidneys and bladder is from residual contrast. Patient did have a IR procedure on 10/14 and CT on 10/13. STOMACH AND BOWEL: Moderate gastric distention is seen. Some scattered dilated loops of small bowel are seen without obvious focus of transition and suggests ileus pattern. Stool scattered throughout the colon with evidence of possible impaction in the rectum. Some proximal colonic distention is also demonstrated but similar to the study of October 13. Some enhancing loops of small bowel which are collapsed are visible as compared to the precontrast image but no focus of intraluminal contrast to suggest hemorrhage can be appreciated. APPENDIX: The appendix is not well seen. ABDOMINOPELVIC CAVITY: There is mild increased ascites in the paracolic gutters and pelvis. Fluid still demonstrates fluid attenuation precontrast without any evidence of hemorrhage fluid level. There is some bubbles of gas in the retroperitoneum closely associated with the areas of pancreatic necrosis. No gas near the diaphragms or elsewhere in the retroperitoneum. Enhancing lymph node measures 11 mm on image 111 and is probably reactive. Lymph nodes in the root of the mesentery are also prominent and reactive and unchanged. Additional collections along the gutters appear similar to the study of October 13.  A drainage catheter is still present extending into the pancreatic tail collection. Gas bubble also is seen in the gutter collection on image 1227 slightly inferior. PELVIS REPRODUCTIVE ORGANS:  Unremarkable for patient's age. URINARY BLADDER:  Unremarkable. ABDOMINAL WALL/INGUINAL REGIONS: Dependent edema. OSSEOUS STRUCTURES:  No acute fracture or destructive osseous lesion. Bilateral hip replacements are present. Impression: No evidence of extraluminal contrast to suggest hemorrhage into small bowel or into the abdominal cavity. Evidence of pancreatic necrosis with collections/gas and pigtail catheter similar to the prior study. Superinfection/abscess should be suspected. No evidence of aneurysm or main portal vein thrombosis. Ascites has mildly increased without increased density. Hemorrhage can often dilute within ascites however. Wedge-shaped hepatic lesions in segment 4A and 8 superiorly with delayed enhancement but persistent lower attenuation. These areas were less defined on earlier exams. Findings could represent increasing edema in areas of prior focal fatty infiltration. Cholangitis is possible but less likely hepatic infarct given persistent vascularity. Smaller portal venous vessels still appear to be patent. Finding should be correlated with any worsening LFT abnormalities. Pleural effusions are again noted slightly increased without increased density. . Persistent contrast in the kidneys and bladder prior to injection could be related to recent contrast injection but should be correlated with any worsening renal function. This was discussed with Dr. Manny Beaver at 12:10 p.m.  Workstation performed: [id number]     Narrative/Impressions - 3 day look back     Gabriela Mckay, PGY-4

## 2023-10-19 NOTE — NURSING NOTE
All blood products documented on Emergent Blood Transfusion Form from both [facility] overnight. Forms sent to Blood Bank. Intake volumes accounted for in EPIC.

## 2023-10-19 NOTE — PROGRESS NOTES
S: Kevin Molina is a 48 y.o. male who returns to the ICU s/p Splenic artery coil  EBL 2L. UO not documented.  Received 4 units PRBC, 7 units FFP, 2 platelets, 137 cc crystalloid    Required Vasopressin pushes to maintain MAP    O:  Vitals     Vitals:    10/19/23 0540   BP: 82/62   Pulse: 92   Resp:    Temp: (!) 96.4 °F (35.8 °C)   SpO2: 98%       General: intubated and sedated, ill appearing   HEENT: normocephalic, atraumatic, intubated  Cardiac: RRR,  no m/r/g  Pulm: Lungs rales b/l ; intubated w vent settings 20/500/100%/6  Abd: markedly distended, tense; DONNY drain x 2; drain 1 jennifer blood, drain 2 gray fluid with particulates, RP drain, bloody drainage  : palafox placed, yellow urine output  Neuro: gcs 3T [id number]  Skin: lines c/d/I    A:  Hemorrhagic Shock 2/2 splenic artery bleeding, s/p splenic artery coil     P:    Monitor hgb  Resuscitate as needed  Serial abdominal exams   Maintain intubation  Monitor and replete electrolytes

## 2023-10-19 NOTE — PROGRESS NOTES
[address] [facility]  Progress Note: Critical Care  Name: Kevin Molina 48 y.o. male I MRN: [id number]  Unit/Bed#: MICU 05 I Date of Admission: 10/13/2023   Date of Service: 10/19/2023 I Hospital Day: 6    Assessment/Plan   Neuro:   Diagnosis: Acute pain/Sedation   Plan:   Gtt: Fentanyl 50 mcg/hr  PRN tylenol 650 q6h-held NPO  PRN dilaudid 0.5 mg IV q3h breakthrough  PRN oxy 5/10 mg mod/severe-hold currently NPO   CAM-ICU precautions         CV:   Diagnosis: Hemorrhagic Shock  Plan:   S/p massive transfusion  protocol resuscitation  MAP >65, consider pressors  Monitor hgb  Resuscitate as needed   Diagnosis: Shock secondary to sepsis vs SIRS reaction from acute pancreatitis-resolved   Plan: continue to monitor  Diagnosis: Hypertension (chronic)  no home meds        Pulm:  Diagnosis Mechanical Ventilation   20/500/100%/6  Monitor O2 status, goal SpO2 > 90%  Diagnosis Persistent left pleural effusion and left base atelectasis  CXR as needed        GI:   Diagnosis: Necrotizing pancreatitis  Monitor drain output  LUQ drain minimal  RUQ drain 130 cc  L flank RP drain 5.4L   Creon 6000 units TID   Zosyn, vanc   Diagnosis: GI bleed  Plan: GI recs appreciated  Gtt: Protonix 8 mg/hr  Received total 30 PRBC, 25 FFP, 1 plt   CT high volume bleeding scan: Blush of contrast at region of pancreatic head measuring 3 cm, fluid collection extends from dorsal aspect of pancreatic neck posteriority along liver margin 5.4 x 3 cm. Moderate volume ascites. Fluid along the distal end of left lateral approach catheter 4.0 x 2.1 cm. Active bleeding in regional of pancreatic head.    S/p IR GDA embolization on 10/19   PRN zofran for nausea             :   Diagnosis: Saunders   Monitor  overnight   Trend Cr   0.94        F/E/N:   F:/  E: monitor and replace PRN; repleted K and Mg today   N: NPO        Heme/Onc:   Acute blood loss anemia  Multiple episodes of BRB per rectum and splenic artery bleeding on 10/19  S/p IR GDA embolization on 10/19   S/p IR splenic artery embolization 10/19   Trend HgB  Hgb 10.3  today   replete for HgB < 7.0  Total 30 PRBC, 25 FFP, 1 plt       Endo:   Diagnosis: hyperglycemia  Plan: SSI  BG goal 140-180        ID:   Diagnosis: Necrotizing pancreatitis, febrile-resolved , septic shock-resolved   Plan: Zosyn, vancomycin  Fever/WBC curve  Last WBC 15.16, new labs pending   Appreciate ID recs   Bcx- 10/16 no growth at 48 hours   Fluid cx- E. Coli, Klebsiella aerogenes, pseudomonas and Providencia rettgeri            MSK/Skin:   Local wound care PRN  PT/OT when able  Frequent repositioning and offloading             Disposition: Critical care     ICU Core Measures     Vented Patient  VAP Bundle  VAP bundle ordered     A: Assess, Prevent, and Manage Pain Has pain been assessed? Yes  Need for changes to pain regimen? No   B: Both Spontaneous Awakening Trials (SATs) and Spontaneous Breathing Trials (SBTs) Plan to perform spontaneous awakening trial today? Yes   Plan to perform spontaneous breathing trial today? Yes   Obvious barriers to extubation? Yes   C: Choice of Sedation RASS Goal: 0 Alert and Calm  Need for changes to sedation or analgesia regimen? No   D: Delirium CAM-ICU: unable to perform, sedated    E: Early Mobility  Plan for early mobility? No   F: Family Engagement Plan for family engagement today? Yes       Antibiotic Review: Patient on appropriate coverage based on culture data. and Awaiting culture results. Review of Invasive Devices: Nicky Plan: Continue for accurate I/O monitoring for 48 hours  Central access plan: May require pressors, if so will need to replace current central access.    Rdoy Plan: Keep arterial line for hemodynamic monitoring    Prophylaxis:  VTE    Stress Ulcer  covered bypantoprazole (PROTONIX) 80 mg in sodium chloride 0.9 % 100 mL infusion [[id number]]          Subjective   HPI/24hr events: Edwardo Turpin is a 48 y.o. who presents to ICU s/p rapid response for hemorrhagic shock, acute GI bleed and acute blood loss anemia. Given IVF bolus, stat labs and iSTAT prior to transfer to ICU. BRB per rectum, GI to see patient with plan to scope in AM. Patient given 2 u PRBC and 2 FFP upon arrival to MICU. Overnight:    Approximately 2300 patient had repeat episode of large amount of jennifer BRB per rectum, code crimson called given large amount of blood and hypotension. Given 5 u PRBC, 2u FFP. MAC central line place in right fem and left femoral Counce placed. Discussed case with IR who agreed to take patient for angio and embolization. Patient intubated with 8.0 tube for airway protection for IR procedure. IR performed GDA embolization with portion of coil extending into the common hepatic artery. 3 units FFP given intra-op and 500 NS bolus. Patient initially returned from IR hemodynamically stable and shortly after called to bedside by nurses for active bleeding around left RP drain, patient hypotensive and 2nd code crimson called. During resuscitation patient found to be pulling volumes of 400 while on 500 TV ACVC, tube investigated and advanced. Patient received 16 u PRBCs, 11u FFP, calcium pushes prior to going to OR hybrid room for IR intervention. IR performed coil embolization of the splenic artery across site of bleeding with 6 coils. Patient given 4 additional PRBC, 7 FFP and 1 platelet. Blood loss during entire code crimson and IR procedure approximately 7L.        Review of Systems   Unable to perform ROS: Intubated          Objective                            Vitals I/O      Most Recent Min/Max in 24hrs   Temp (!) 96.8 °F (36 °C) Temp  Min: 96.4 °F (35.8 °C)  Max: 99 °F (37.2 °C)   Pulse 102 Pulse  Min: 48  Max: 172   Resp 22 Resp  Min: 11  Max: 53   BP (!) 89/60 BP  Min: 39/30  Max: 135/87   O2 Sat 100 % SpO2  Min: 83 %  Max: 100 %      Intake/Output Summary (Last 24 hours) at 10/19/2023 0635  Last data filed at 10/19/2023 0600  Gross per 24 hour   Intake 8758.68 ml   Output 8080 ml   Net 678.68 ml         Diet NPO     Invasive Monitoring Physical exam   Arterial Line  [place] /66  Arterial Line BP  Min: 82/62  Max: 212/92   MAP 84 mmHg  Arterial Line MAP (mmHg)  Min: 72 mmHg  Max: 126 mmHg    Physical Exam  Eyes:      Conjunctiva/sclera: Conjunctivae normal.      Pupils: Pupils are equal, round, and reactive to light. Skin:     General: Skin is cool and dry. Coloration: Skin is pale. Comments: Multiple wounds to dorsum of b/l feet       HENT:      Head: Normocephalic and atraumatic. Mouth/Throat:      Comments: Intubated   Cardiovascular:      Rate and Rhythm: Normal rate and regular rhythm. Heart sounds: No murmur heard. Musculoskeletal:      Right lower leg: 3+ Edema present. Left lower leg: 3+ Edema present. Comments: R fem CVC, right fem 5fr, left fem art line   Abdominal:      General: There is distension. Comments: Abdomen tense. DONNY drain LUQ, left lateral abdomen gray, particulates, left RP drain bloody drainage    Constitutional:       Appearance: He is ill-appearing. Pulmonary:      Effort: Pulmonary effort is normal.      Breath sounds: Examination of the right-lower field reveals rales. Examination of the left-lower field reveals rales. Rales present.    Neurological:      Comments: Intubated and sedated    Genitourinary/Anorectal:  [place]         Diagnostic Studies      EKG: NSR on monitor   Imaging:  I have personally reviewed pertinent films in PACS     Medications:  Scheduled PRN   chlorhexidine, 15 mL, Q12H [address]  insulin lispro, 2-12 Units, 4x Daily  magnesium sulfate, 2 g, Once  pancrelipase (Lip-Prot-Amyl), 6,000 Units, TID With Meals  piperacillin-tazobactam, 3.375 g, Q6H  polyethylene glycol, 4,000 mL, Once  polyethylene glycol, 17 g, TID  potassium chloride, 40 mEq, Once  senna-docusate sodium, 1 tablet, HS      acetaminophen, 650 mg, Q6H PRN  HYDROmorphone, 0.5 mg, Q3H PRN  iodixanol, 50 mL, Once in imaging  ondansetron, 4 mg, Q6H PRN  oxyCODONE, 10 mg, Q4H PRN  oxyCODONE, 5 mg, Q4H PRN       Continuous    fentaNYL, 50 mcg/hr, Last Rate: 50 mcg/hr (10/19/23 0534)  pantoprazole (PROTONIX) 80 mg in sodium chloride 0.9 % 100 mL infusion, 8 mg/hr, Last Rate: 8 mg/hr (10/19/23 0526)  propofol, 5-50 mcg/kg/min, Last Rate: Stopped (10/19/23 0145)         Labs:    CBC    Recent Labs     10/18/23  2025 10/19/23  0154 10/19/23  0519   WBC 14.59*  --  12.58*   HGB 8.8* 9.9* 10.3*   HCT 26.1* 29* 30.8*     --  143*     BMP    Recent Labs     10/18/23  1817 10/19/23  0154 10/19/23  0519   SODIUM 137  --  142   K 3.0*  --  3.5     --  108   CO2 22  21 19* 20*   AGAP 14  --  14   BUN 19  --  16   CREATININE 0.99  --  0.94   CALCIUM 7.1*  --  9.2       Coags    Recent Labs     10/18/23  1817   INR 1.39*        Additional Electrolytes  Recent Labs     10/18/23  0430 10/18/23  1817 10/19/23  0154 10/19/23  0519   MG 2.2  --   --  1.9   PHOS  --   --   --  5.4*   CAIONIZED  --    < > 1.08* 1.13    < > = values in this interval not displayed.           Blood Gas    Recent Labs     10/19/23  0520 10/19/23  0546   PHART 7.393 7.413   XAE4PFP 31.5* 28.8*   PO2ART 331.8* 326.4*   UJS5NDW 18.8* 18.0*   BEART -5.3 -5.5   SOURCE Line, Arterial  --      Recent Labs     10/19/23  0520   SOURCE Line, Arterial    LFTs  Recent Labs     10/18/23  1817 10/19/23  0519   ALT 9 21   AST 10* 41*   ALKPHOS 65 97   ALB 2.1* 2.9*   TBILI 0.48 3.05*       Infectious  No recent results  Glucose  Recent Labs     10/18/23  0430 10/18/23  1817 10/19/23  0519   GLUC  N Jeremy Joseph, DO

## 2023-10-19 NOTE — PLAN OF CARE
PT is intubated, NPO. Acute severe pro, nikki malnutrition d/t condition as evidence by <50% energy intake > 5 days, significant weight loss, 2/8/22 292lbs, 7/25/23 277lbs, 10/4/23 275lbs, 10/18/23 242lbs, 33lbs/12% wt. loss x 2 weeks, 2+ edema LE's, currently NPO, will monitor care of plan, monitor for appropriate route of nutrition. Will provide both TF and TPN recommendations as needed. If and once able to initiate TF recommend [email or web address], advance by 10mL every 6hrs to goal of 65mL/hr provides total volume 1560mL, 1560cal, 136g pro, 1304mL, water flushes per MD or consider minimum of 30mL every 4hrs for tube patency. If unable to initiate TF recommend initiating standard TPN, monitor electrolytes, check triglycerides, advance as tolerated to goal TPN AA15 900mL, D40% 500mL, 20% SMOFlipids 150mL provides total of 1520cal, 135g pro, 1550mL. Glucose load 1.26mg/kg/min, lipid load .27g/kg/day. Problem: Nutrition/Hydration-ADULT  Goal: Nutrient/Hydration intake appropriate for improving, restoring or maintaining nutritional needs  Description: Monitor and assess patient's nutrition/hydration status for malnutrition. Collaborate with interdisciplinary team and initiate plan and interventions as ordered. Monitor patient's weight and dietary intake as ordered or per policy. Utilize nutrition screening tool and intervene as necessary. Determine patient's food preferences and provide high-protein, high-caloric foods as appropriate.      INTERVENTIONS:  - Monitor oral intake, urinary output, labs, and treatment plans  - Assess nutrition and hydration status and recommend course of action  - Evaluate amount of meals eaten  - Assist patient with eating if necessary   - Allow adequate time for meals  - Recommend/ encourage appropriate diets, oral nutritional supplements, and vitamin/mineral supplements  - Order, calculate, and assess calorie counts as needed  - Recommend, monitor, and adjust tube feedings and TPN/PPN based on assessed needs  - Assess need for intravenous fluids  - Provide specific nutrition/hydration education as appropriate  - Include patient/family/caregiver in decisions related to nutrition  Outcome: Not Progressing

## 2023-10-19 NOTE — PROGRESS NOTES
Responded to request from RN "Luisa Beck" to meet with pt's wife "[facility]" for support. Provided [facility] with space to verbally process, express emotions and engage in life review which was met with gratitude. Available to follow upon request.     Thank you!         10/19/23 0315   Clinical Encounter Type   Visited With Family   Routine Visit Introduction   Crisis Visit Critical Care   Referral From Nurse   Referral To

## 2023-10-19 NOTE — SEDATION DOCUMENTATION
Angiogram with splenic artery embolization completed by Dr. Maggi Hunter with anesthesia support. Patient transported back to MICU 5 and bedside report given to Baldemar Holland, [address]

## 2023-10-19 NOTE — PROGRESS NOTES
Responded to reported "Code Crimson" as per protocol. Pt "aRmy Cabral" with medical staff; no family present at this time. Provided supportive presence to pt and staff which was met with gratitude.  Available to follow upon request. Thank you!        10/18/23 5512   Clinical Encounter Type   Visited With Patient not available   Crisis Visit Code  (reported Code Crimson)   Referral From Other (Comment)  (as per protocol)

## 2023-10-19 NOTE — RESPIRATORY THERAPY NOTE
RT Protocol Note  Merdis Hunger 48 y.o. male MRN: [id number]  Unit/Bed#: MICU 05 Encounter: [id number]    Assessment    Principal Problem:    Necrotizing pancreatitis  Active Problems:    Leukocytosis    Acute blood loss anemia      Home Pulmonary Medications:  None per chart hx. Past Medical History:   Diagnosis Date    Pancreatitis      Social History     Socioeconomic History    Marital status: /Civil Union     Spouse name: None    Number of children: None    Years of education: None    Highest education level: None   Occupational History    None   Tobacco Use    Smoking status: Never     Passive exposure: Past    Smokeless tobacco: Former     Types: Chew     Quit date: 7/15/2023    Tobacco comments:     Quit 7/15/23   Vaping Use    Vaping Use: Never used   Substance and Sexual Activity    Alcohol use: Not Currently     Comment: quit 7/15/23 (previous 6 drinks per week)    Drug use: Never    Sexual activity: None   Other Topics Concern    None   Social History Narrative    None     Social Determinants of Health     Financial Resource Strain: Not on file   Food Insecurity: No Food Insecurity (10/18/2023)    Hunger Vital Sign     Worried About Running Out of Food in the Last Year: Never true     Ran Out of Food in the Last Year: Never true   Transportation Needs: No Transportation Needs (10/18/2023)    PRAPARE - Transportation     Lack of Transportation (Medical): No     Lack of Transportation (Non-Medical):  No   Physical Activity: Not on file   Stress: Not on file   Social Connections: Not on file   Intimate Partner Violence: Not on file   Housing Stability: Low Risk  (10/18/2023)    Housing Stability Vital Sign     Unable to Pay for Housing in the Last Year: No     Number of Places Lived in the Last Year: 1     Unstable Housing in the Last Year: No       Subjective         Objective    Physical Exam:   Assessment Type: (P) Assess only    Vitals:  Blood pressure (!) 115/45, pulse (!) 115, temperature 99 °F (37.2 °C), resp. rate 19, height 5' 11" (1.803 m), weight 110 kg (242 lb 15.2 oz), SpO2 99 %. Imaging and other studies: I have personally reviewed pertinent reports. Plan    Respiratory Plan: (P) Vent/NIV/HFNC        Resp Comments: (P) Pt. evaluated for respiratory protocol. BS=sl coarse and well aerated. Pt. in no distress on A/C. SpO2=97%. Pt. uses no respiratory medications at home per chart hx. no indication for respiratory intervention at this time. Will continue to monitor and titrate care accordingly.

## 2023-10-19 NOTE — PROCEDURES
Arterial Line Insertion    Date/Time: 10/18/2023 11:15 PM    Performed by: April Brady DO  Authorized by: April Brady DO    Patient location:  ICU  Consent:     Consent obtained:  Emergent situation    Consent given by:  Patient    Risks discussed:  Bleeding and ischemia  Universal protocol:     Patient identity confirmed:  Arm band  Indications:     Indications: hemodynamic monitoring, multiple ABGs, continuous blood pressure monitoring and frequent labs / infusion    Pre-procedure details:     Skin preparation:  Betadine  Anesthesia (see MAR for exact dosages): Anesthesia method:  None  Procedure details:     Location / Tip of Catheter:  Femoral    Laterality:  Left    Jasvir's test performed: no      Needle gauge:  18 G    Placement technique:  Ultrasound guided    Ultrasound image availability:  Not saved    Sterile ultrasound techniques: Sterile gel and sterile probe covers were used      Number of attempts:  2    Successful placement: yes      Transducer: waveform confirmed    Post-procedure details:     Post-procedure:  Biopatch applied and sutured    CMS:  Normal    Patient tolerance of procedure:   Tolerated well, no immediate complications

## 2023-10-19 NOTE — PROCEDURES
Central Line Insertion    Date/Time: 10/18/2023 11:11 PM    Performed by: Gena Lu by: GHASSAN Serrano    Patient location:  Bedside  Other Assisting Provider: No    Consent:     Consent obtained:  Verbal and emergent situation    Consent given by:  Patient    Alternatives discussed:  No treatment and alternative treatment  Universal protocol:     Procedure explained and questions answered to patient or proxy's satisfaction: yes      Required blood products, implants, devices, and special equipment available: yes      Site/side marked: yes      Immediately prior to procedure, a time out was called: yes      Patient identity confirmed:  Verbally with patient  Pre-procedure details:     Hand hygiene: Hand hygiene performed prior to insertion      Sterile barrier technique: All elements of maximal sterile technique followed      Skin preparation:  ChloraPrep    Skin preparation agent: Skin preparation agent completely dried prior to procedure    Indications:     Central line indications: medications requiring central line and other (comment)      Central line indications comment:  Rapid blood transfusion, pressor infusion  Anesthesia (see MAR for exact dosages):      Anesthesia method:  Local infiltration    Local anesthetic:  Lidocaine 1% w/o epi  Procedure details:     Location:  Right femoral    Vessel type: vein      Laterality:  Right    Approach: percutaneous technique used      Patient position:  Flat    Catheter type:  Double lumen (MAC 11.5cm)    Catheter size:  9 Fr    Landmarks identified: no      Ultrasound guidance: yes      Ultrasound image availability:  Not saved and not obtained due to urgency    Manometry confirmation: no      Number of attempts:  1    Successful placement: yes    Post-procedure details:     Post-procedure:  Dressing applied and line sutured    Assessment:  Blood return through all ports and free fluid flow    Post-procedure complications: none Patient tolerance of procedure:   Tolerated well, no immediate complications    Observer: Yes      Observer name:  Dr. Tiffany Delgado

## 2023-10-19 NOTE — BRIEF OP NOTE (RAD/CATH)
INTERVENTIONAL RADIOLOGY PROCEDURE NOTE    Date: 10/19/2023    Procedure:   Procedure Summary       Date:  Room / Location:     Anesthesia Start:  Anesthesia Stop:     Procedure:  Diagnosis:     Scheduled Providers:  Responsible Provider:     Anesthesia Type: Not recorded ASA Status: Not recorded            Preoperative diagnosis:   1. Necrotizing pancreatitis    2. Bacteremia    3. Lower GI bleed    4. Acute blood loss anemia         Postoperative diagnosis: Same. Surgeon: Crispin Brown MD     Assistant: None. No qualified resident was available. Blood loss: Minimal    Specimens: None     Findings:     Right groin access: Successful Proglide closure. Recent CTA with active bleeding arising from a medially directed GDA branch. Active bleeding vs small wire perforation identified in this exact region. Coil embolization of the GDA. SMA angiogram without active extravasation or focal vessel irregularity. Complications: Tiny piece of coil extended into common hepatic artery from the most proximal end of the GDA coil pack, unlikely to be clinically significant.      Anesthesia: general

## 2023-10-19 NOTE — CONSULTS
[address] [place] [address]  Consult: Critical Care  Name: Jaime Valle 48 y.o. male I MRN: [id number]  Unit/Bed#: MICU 05 I Date of Admission: 10/13/2023   Date of Service: 10/18/2023 I Hospital Day: 5      Consults  Assessment/Plan   Expand All Collapse All    Daily Progress Note - Critical Care   Jaime Valle 48 y.o. male MRN: [id number]  Unit/Bed#: [facility] 517-01 Encounter: [id number]       Neuro:   Diagnosis: Acute pain  Plan:   PRN tylenol 650 q6h   PRN dilaudid 0.5 mg IV q3h breakthrough  PRN oxy 5/10 mg mod/severe-hold currently NPO   CAM-ICU precautions         CV:   Diagnosis: Hemorrhagic Shock  Plan:   2 PRBC and 2 FFP ordered  High volume CT per GI  Rapid prep and scope per GI  Monitor hgb  Resuscitate as needed   Diagnosis: Shock secondary to sepsis vs SIRS reaction from acute pancreatitis-resolved   Plan: continue to monitor  Diagnosis: Hypertension (chronic)  no home meds        Pulm:  Diagnosis: Persistent left pleural effusion and left base atelectasis  NC  Encourage IS   Monitor O2 status, goal SpO2 > 90%  CXR as needed        GI:   Diagnosis: Necrotizing pancreatitis  Monitor drain output, left lateral drain replaced  LUQ drain   RUQ drain   L flank RP drain  hooked to syringe  Creon 6000 units TID   Zosyn, vanc   Diagnosis: GI bleed  Plan: GI recs appreciated  CT high volume bleeding scan abd/pelvis per GI  Quick prep and scope tomorrow per GI  PRN zofran for nausea  Protonix 40mg IV ppx           :   Diagnosis: No active issues   Monitor UOP   Trend Cr   0.99        F/E/N:   F:/  E: monitor and replace PRN  N: NPO        Heme/Onc:   Acute blood loss anemia  Active BRB per rectum, 2 units PRBC and 2 units FFP ordered, pending labs  Trend HgB  replete for HgB < 7.0  Received 3 units pRBC to date on admission   DVT ppx: Lovenox      Endo:   Diagnosis: hyperglycemia  Plan: SSI  BG goal 140-180        ID:   Diagnosis: Necrotizing pancreatitis, febrile-resolved , septic shock-resolved   Plan: Zosyn, vancomycin  Fever/WBC curve  Last WBC 15.16, new labs pending   Appreciate ID recs   Bcx- no growth at 48 hours   Fluid cx- E. Coli, Klebsiella aerogenes, pseudomonas and Providencia rettgeri            MSK/Skin:   Local wound care PRN  PT/OT when able          Disposition: Critical care     History of Present Illness     HPI: Angie Luna is a 48 y.o. who presents to ICU s/p rapid response for hemorrhagic shock, acute GI bleed and acute blood loss anemia. Given IVF bolus, stat labs and iSTAT prior to transfer to ICU. BRB per rectum, GI to see patient with plan to scope. History obtained from chart review. Review of Systems   Unable to perform ROS: Acuity of condition       Historical Information   Past Medical History:  No date: Pancreatitis Past Surgical History:  8/5/2023: CYSTOSCOPY; N/A      Comment:  Procedure: CYSTOSCOPY. Saunders insertion;  Surgeon:                Kaz Sargent MD;  Location: [facility];  Service:               Urology  8/5/2023: FL RETROGRADE URETHROCYSTOGRAM  No date: HERNIA REPAIR  9/4/2023: IR DRAINAGE TUBE CHECK/CHANGE/REPOSITION/REINSERTION/UPSIZE  8/5/2023: IR DRAINAGE TUBE PLACEMENT  9/6/2023: IR DRAINAGE TUBE PLACEMENT  9/26/2023: IR DRAINAGE TUBE PLACEMENT  10/16/2023: IR DRAINAGE TUBE PLACEMENT  9/6/2023: IR PARACENTESIS  9/4/2023: IR THORACENTESIS  No date: JOINT REPLACEMENT  1976: TONSILLECTOMY   Current Outpatient Medications   Medication Instructions    acetaminophen (TYLENOL) 650 mg, Oral, Every 6 hours PRN    Alcohol Swabs 70 % PADS May substitute brand based on insurance coverage. Check glucose ACHS. apixaban (ELIQUIS) 5 mg, Oral, 2 times daily    BD PosiFlush 0.9 % SOLN No dose, route, or frequency recorded. Blood Glucose Monitoring Suppl (OneTouch Verio Reflect) w/Device KIT May substitute brand based on insurance coverage. Check glucose ACHS.     Blood Pressure Monitoring (Adult Blood Pressure Cuff Lg) KIT Does not apply, 3 times daily    furosemide (LASIX) 20 mg, Oral, Daily    glucose blood (OneTouch Verio) test strip May substitute brand based on insurance coverage. Check glucose ACHS. Insulin Pen Needle (BD Pen Needle Lupe 2nd Gen) 32G X 4 MM MISC For use with insulin pen. Pharmacy may dispense brand covered by insurance. Insulin Pen Needle (BD Pen Needle Lupe 2nd Gen) 32G X 4 MM MISC For use with insulin pen. Pharmacy may dispense brand covered by insurance. Levemir FlexPen 14 Units, Daily at bedtime    lisinopril (ZESTRIL) 10 mg, Oral, Daily    NovoLOG FlexPen 100 units/mL injection pen INJECT 5 UNITS UNDER SKIN 3 TIMES DAILY WITH MEALS    OneTouch Delica Lancets 45I MISC May substitute brand based on insurance coverage. Check glucose ACHS. oxyCODONE (ROXICODONE) 5 mg, Oral, Daily PRN    pancrelipase (Lip-Prot-Amyl) (CREON) 6,000 Units, Oral, 3 times daily with meals    polyethylene glycol (MIRALAX) 17 g, Oral, Daily    sodium chloride, PF, 0.9 % 10 mL, Intracatheter, Daily, Intracatheter flushing daily. May substitute prefilled syringe with normal saline 10 mL vials, 10 mL syringes, and 18 g blunt needles    sodium chloride, PF, 0.9 % 10 mL, Intravenous, Daily    sodium chloride, PF, 0.9 % 10 mL, Intracatheter, Daily, Intracatheter flushing daily.  May substitute prefilled syringe with normal saline 10 mL vials, 10 mL syringes, and 18 g blunt needles    No Known Allergies   Social History     Tobacco Use    Smoking status: Never     Passive exposure: Past    Smokeless tobacco: Former     Types: Chew     Quit date: 7/15/2023    Tobacco comments:     Quit 7/15/23   Vaping Use    Vaping Use: Never used   Substance Use Topics    Alcohol use: Not Currently     Comment: quit 7/15/23 (previous 6 drinks per week)    Drug use: Never    Family History   Problem Relation Age of Onset    Cancer Mother     Multiple myeloma Mother     Pancreatic cancer Father     Cancer Father           Objective                            Vitals I/O      Most Recent Min/Max in 24hrs   Temp 98.1 °F (36.7 °C) Temp  Min: 97.7 °F (36.5 °C)  Max: 98.2 °F (36.8 °C)   Pulse 92 Pulse  Min: 81  Max: 172   Resp 22 Resp  Min: 14  Max: 38   /57 BP  Min: 78/53  Max: 123/61   O2 Sat 98 % SpO2  Min: 89 %  Max: 100 %      Intake/Output Summary (Last 24 hours) at 10/18/2023 2327  Last data filed at 10/18/2023 1950  Gross per 24 hour   Intake 3804.92 ml   Output 630 ml   Net 3174.92 ml         Diet NPO     Invasive Monitoring Physical exam    Physical Exam  Eyes:      Extraocular Movements: Extraocular movements intact. Conjunctiva/sclera: Conjunctivae normal.      Pupils: Pupils are equal, round, and reactive to light. Skin:     General: Skin is cool and dry. Coloration: Skin is pale. Comments: Multiple wounds to dorsal aspect of b/l feet. HENT:      Head: Normocephalic and atraumatic. Cardiovascular:      Rate and Rhythm: Regular rhythm. Tachycardia present. Pulses: Normal pulses. Musculoskeletal:      Right lower le+ Edema present. Left lower le+ Edema present. Abdominal:      Palpations: Abdomen is soft. Comments: Mild diffuse TTP. LUQ, left lateral DONNY drains with sanguinous drainage with particulate matter. RP drain with maroon output, hooked to syringe. BRB per rectum    Constitutional:       Appearance: He is ill-appearing. Comments: Pale    Pulmonary:      Effort: Tachypnea present. Breath sounds: Normal breath sounds. Neurological:      Mental Status: He is alert and oriented to person, place and time. Mental status is at baseline. He is calm. Motor: No motor deficit. Comments: Moves all extremities equally            Diagnostic Studies      EKG: sinus tachycardia on monitor   Imaging:  I have personally reviewed pertinent reports.        Medications:  Scheduled PRN   chlorhexidine, 15 mL, Q12H ALEX  insulin lispro, 2-12 Units, 4x Daily  pancrelipase (Lip-Prot-Amyl), 6,000 Units, TID With Meals  piperacillin-tazobactam, 3.375 g, Q6H  polyethylene glycol, 4,000 mL, Once  polyethylene glycol, 17 g, TID  senna-docusate sodium, 1 tablet, HS      acetaminophen, 650 mg, Q6H PRN  HYDROmorphone, 0.5 mg, Q3H PRN  ondansetron, 4 mg, Q6H PRN  oxyCODONE, 10 mg, Q4H PRN  oxyCODONE, 5 mg, Q4H PRN       Continuous    fentaNYL, 50 mcg/hr  pantoprazole (PROTONIX) 80 mg in sodium chloride 0.9 % 100 mL infusion, 8 mg/hr  propofol, 5-50 mcg/kg/min         Labs:    CBC    Recent Labs     10/17/23  0623 10/17/23  1156 10/18/23  0430 10/18/23  1817 10/18/23  2025   WBC 21.79*  --  15.16*  --  14.59*   HGB 8.2*   < > 8.1* 8.2* 8.8*   HCT 24.9*  --  24.7* 24* 26.1*     --  341  --  262   BANDSPCT 7  --   --   --   --     < > = values in this interval not displayed.      BMP    Recent Labs     10/18/23  0430 10/18/23  1817   SODIUM 137 137   K 3.2* 3.0*    101   CO2 22 22  21   AGAP 11 14   BUN 20 19   CREATININE 0.90 0.99   CALCIUM 7.2* 7.1*       Coags    Recent Labs     10/18/23  1817   INR 1.39*        Additional Electrolytes  Recent Labs     10/17/23  0623 10/18/23  0430 10/18/23  1817   MG 1.9 2.2  --    PHOS 3.5  --   --    CAIONIZED 1.12  --  1.00*  1.10*          Blood Gas    No recent results  No recent results LFTs  Recent Labs     10/18/23  0430 10/18/23  1817   ALT 7 9   AST 15 10*   ALKPHOS 62 65   ALB 2.0* 2.1*   TBILI 0.48 0.48       Infectious  No recent results  Glucose  Recent Labs     10/17/23  0623 10/18/23  0430 10/18/23  1817   GLUC [address], [place]

## 2023-10-19 NOTE — PROCEDURES
POC Cardiac US    Date/Time: 10/19/2023 3:17 PM    Performed by: Angel Philip MD  Authorized by: Angel Philip MD    Patient location:  ICU  Other Assisting Provider: Yes (comment) Rashmi Chris)    Procedure details:     Exam Type:  Diagnostic    Indications: hypotension and suspected volume depletion      Assessment / Evaluation for: cardiac function, pericardial effusion, inferior vena cava for fluid responsiveness, intravascular volume status and right heart strain (suspected pulmonary embolism)      Exam Type: initial exam      Image quality: diagnostic      Image availability:  Images available in PACS  Patient Details:     Cardiac Rhythm:  Regular    Mechanical ventilation: Yes    Cardiac findings:     Echo technique: limited 2D and M-mode      Views obtained: parasternal long axis, parasternal short axis, subcostal and apical      Pericardial effusion: absent      Tamponade physiology: absent      Wall motion: hyperdynamic      LV systolic function: hyperdynamic      RV dilation: severe    IVC findings:     IVC Size: indeterminate    Interpretation:     Fluid Status:  Hypervolemic

## 2023-10-19 NOTE — TELEMEDICINE
e-Consult (IPC)  - Interventional Radiology  Ying Estrada 48 y.o. male MRN: [id number]  Unit/Bed#: MICU 05 Encounter: [id number]          Interventional Radiology has been consulted to evaluate [address] to IR  Consult performed by: Crispin Brown MD  Consult ordered by: Ameena Alberts MD        10/18/23    Assessment/Recommendation:     48year old male with pancreatitis and brisk CTA positive upper GI bleed. Plan for emergent angiogram, possible embolization. 11-20 minutes, >50% of the total time devoted to medical consultative verbal/EMR discussion between providers. Written report will be generated in the EMR. Thank you for allowing Interventional Radiology to participate in the care of Ying Estrada. Please don't hesitate to call or TigerText us with any questions.      Crispin Brown MD

## 2023-10-19 NOTE — PROCEDURES
Intubation    Date/Time: 10/18/2023 11:31 PM    Performed by: Gladys Runner, DO  Authorized by: Gladys Runner, DO    Patient location:  Bedside  Other Assisting Provider: Yes (comment) (Dr. Kenton Bang)    Consent:     Consent obtained:  Emergent situation  Universal protocol:     Patient identity confirmed:  Arm band  Pre-procedure details:     Patient status:  Awake    Pretreatment medications:  Etomidate (30 mg)    Paralytics:  Succinylcholine (150 mg)  Indications:     Indications for intubation: airway protection    Procedure details:     Preoxygenation:  Nonrebreather mask    CPR in progress: no      Intubation method:  Oral    Oral intubation technique:  Glidescope    Laryngoscope blade: Mac 3    Tube size (mm):  8.0    Tube type:  Cuffed    Number of attempts:  1    Tube visualized through cords: yes    Placement assessment:     ETT to lip:  22    Tube secured with:  ETT ignacio    Breath sounds:  Equal    Placement verification: chest rise, condensation, equal breath sounds and ETCO2 detector      Chest x-ray findings: patient taken to procedure emergently, CXR pending. Post-procedure details:     Patient tolerance of procedure:   Tolerated well, no immediate complications  Comments:      Supervised by Dr. Kenton Bang

## 2023-10-19 NOTE — PROGRESS NOTES
Progress Note - Infectious Disease   Estelle Richard 48 y.o. male MRN: [id number]  Unit/Bed#: MICU 05 Encounter: [id number]      Impression:  1. Necrotizing pancreatitis with abscess  2. 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 Superficial wounds anterior bilateral feet (POA  4.  GI bleed with hemorrhagic shock s/p coil embolization of splenic artery and GDA    Recommendations:  Afebrile with WBC count 24,610. Last night's events noted. Intubated, sedated, on ventilator  Necrotizing pancreatitis with abscess  Had IR replacement of retroperitoneal drainage catheter and repositioned drainage catheter at the left paracolic gutter fluid collection 10/16. Will check cultures from above procedure which so far are showing 3+ E. coli and Klebsiella aerogenes and in another specimen 3+ Providencia rettgeri, Pseudomonas aeruginosa and B fragilis all susceptible to piperacillin/tazobactam  GI bleed with hemorrhagic shock secondary to splenic artery and GDA bleed  Had rapid response last p.m. after having frankly bloody BM with hypotension. Transferred to critical care unit. 1.  IR performed coil embolization of the splenic artery and GDA  2. Currently on pressors with abdominal compartment syndrome  3. Family has made patient DNR      Antibiotics:  1. Piperacillin/tazobactam 3.375 g every 6 hours IV, day 4 Rx    Subjective: Intubated sedated on ventilator. Objective:  Vitals:  Temp:  [96.1 °F (35.6 °C)-99 °F (37.2 °C)] 99 °F (37.2 °C)  HR:  [] 96  Resp:  [11-53] 22  BP: ()/() 73/47  SpO2:  [83 %-100 %] 99 %  Temp (24hrs), Av.5 °F (36.4 °C), Min:96.1 °F (35.6 °C), Max:99 °F (37.2 °C)  Current: Temperature: 99 °F (37.2 °C)    Physical Exam:     General Appearance:  Eyes: Intubated sedated on ventilator.   ETT and NG tube in place  Conjunctiva pale   Throat: ETT in place   Neck: Supple, symmetrical, trachea midline, no adenopathy,  no tenderness/mass/nodules   Lungs:   Coarse ventilator sounds   Heart:  Regular rate and rhythm, S1, S2 normal, no murmur, rub or gallop   Abdomen:    distended, striae absent bowel sounds. No masses, no organomegaly. Left side has 2 DONNY drains with serosanguineous drainage and one large accordion drain with dark bloody drainage    No CVA tenderness, Saunders catheter   Extremities: Bilateral numerous superficial scabbed ulcerations of anterior feet   Skin: As above         Invasive Devices       Central Venous Catheter Line  Duration             CVC Central Lines 10/18/23 Double <1 day              Peripheral Intravenous Line  Duration             Peripheral IV 10/16/23 Dorsal (posterior); Left Forearm 3 days    Peripheral IV 10/16/23 Right Antecubital 2 days    Peripheral IV 10/18/23 Left Antecubital 1 day    Peripheral IV 10/18/23 Right;Ventral (anterior) Forearm 1 day              Arterial Line  Duration             Arterial Line 10/18/23 Femoral <1 day              Drain  Duration             Abscess Drain RUQ 44 days    Abscess Drain LUQ 42 days    Abscess Drain Abdomen 3 days    NG/OG/Enteral Tube Nasogastric Right nare <1 day    Urethral Catheter <1 day              Airway  Duration             ETT  Cuffed 8 mm <1 day                    Labs, Imaging, & Other studies:   All pertinent labs were personally reviewed  Results from last 7 days   Lab Units 10/19/23  1502 10/19/23  1102 10/19/23  1055 10/19/23  0519   WBC Thousand/uL 24.61* 15.70*  --  12.58*   HEMOGLOBIN g/dL 14.3 10.5*  --  10.3*   I STAT HEMOGLOBIN g/dl  --   --  9.9*  --    PLATELETS Thousands/uL 130* 101*  --  143*       Results from last 7 days   Lab Units 10/19/23  1500 10/19/23  1102 10/19/23  1055 10/19/23  0519 10/19/23  0154 10/18/23  1817   SODIUM mmol/L 142 142  --  142  --  137   POTASSIUM mmol/L 3.9 3.7  --  3.5  --  3.0*   CHLORIDE mmol/L 107 109*  --  108  --  101   CO2 mmol/L 24 24  --  20*  --  22   CO2, I-STAT mmol/L  --   --  24  --    < > 21   BUN mg/dL 16 17  --  16  --  19 CREATININE mg/dL 0.96 0.95  --  0.94  --  0.99   EGFR ml/min/1.73sq m 89 91  --  92  --  86   GLUCOSE, ISTAT mg/dl  --   --  177*  --    < > 158*   CALCIUM mg/dL 9.9 8.1*  --  9.2  --  7.1*   AST U/L 25  --   --  41*  --  10*   ALT U/L 20  --   --  21  --  9   ALK PHOS U/L 101  --   --  97  --  65    < > = values in this interval not displayed. Results from last 7 days   Lab Units 10/16/23  1651 10/16/23  1404 10/16/23  1403 10/16/23  1314 10/16/23  1216 10/13/23  1424 10/13/23  1420   BLOOD CULTURE   --   --   --  No Growth at 72 hrs. No Growth at 72 hrs. No Growth After 5 Days. No Growth After 5 Days.    GRAM STAIN RESULT  No polys seen*  3+ Gram negative rods* No polys seen*  4+ Gram negative rods* No polys seen*  4+ Gram variable rods*  --   --   --   --    BODY FLUID CULTURE, STERILE  3+ Growth of Escherichia coli*  3+ Growth of Klebsiella aerogenes*  2+ Growth of Pseudomonas aeruginosa*  3+ Growth of 4+ Growth of Providencia rettgeri*  4+ Growth of Pseudomonas aeruginosa* 4+ Growth of Pseudomonas aeruginosa*  4+ Growth of  --   --   --   --

## 2023-10-19 NOTE — OCCUPATIONAL THERAPY NOTE
OT CANCEL NOTE:    Unable to see pt for OT session as pt is intubated/sedated. Will continue to follow and treat as able.

## 2023-10-19 NOTE — ANESTHESIA PREPROCEDURE EVALUATION
Procedure:  IR MESENTERIC/VISCERAL ANGIOGRAM    Relevant Problems   CARDIO   (+) Primary hypertension      GI/HEPATIC   (+) Necrotizing pancreatitis      HEMATOLOGY   (+) Acute blood loss anemia   (+) Anemia      PULMONARY   (+) Acute respiratory failure (HCC)   (+) Pleural effusion due to pancreatitis   (+) Pleural effusion, left        Physical Exam    Airway  Comment: intubated           Dental       Cardiovascular  Rhythm: regular    Pulmonary   Breath sounds clear to auscultation    Other Findings        Anesthesia Plan  ASA Score- 4 Emergent    Anesthesia Type- general with ASA Monitors. Additional Monitors: arterial line. Airway Plan: ETT.            Plan Factors-    Induction-     Postoperative Plan-     Informed Consent-

## 2023-10-19 NOTE — ANESTHESIA POSTPROCEDURE EVALUATION
Post-Op Assessment Note    CV Status:  Stable  Pain Score: 0    Pain management: adequate     Mental Status:  Unresponsive (sedated)   Hydration Status:  Unstable   PONV Controlled:  None   Airway Patency:  Patent  Airway: intubated      Post Op Vitals Reviewed: Yes      Staff: CRNA   Comments: report to icu staff    No notable events documented.     BP (!) 89/60 (10/19/23 0423)    Temp      Pulse 93 (10/19/23 0423)   Resp 22 (10/19/23 0423)    SpO2 95 % (10/19/23 0423)

## 2023-10-19 NOTE — PROGRESS NOTES
Progress Note - General Surgery   GI Resident on [facility]   Leonardo Reaves 48 y.o. male MRN: [id number]  Unit/Bed#: MICU 05 Encounter: [id number]    Assessment:  49 yo male who presents with infected necrotizing pancreatitis. -s/p IR draining of retroperitoneal abscess, repositioning of left paracolic drain on 58/04.   -s/p failed cystgastrostomy with GI 10/16  -code crimson #1 10/18 before midnight 10/18  -s/p IR coil embolization of GDA  -code crimson #2 10/19 in AM  -s/p IR coil embolization of splenic artery 10/19     Now afebrile, most recent vitals are stable  Originally had MAPS in 30's, was hypotensive to systolics of 84'N, and was tachycardic to 140's at one point during the night  UOP 300cc  NG with 200cc bilious output  Stool 2x  WBC 12.5 from 14.5  Hgb 10.3 from 8.8  Drain #1 with 130 cc bloody output  Drain #2 accordian with 5450 cc of blood output  Drain #3 with 0 cc purulent output  On fentanyl @ 50  On propofol @ 8  Vent settings currently SCMV rate of 20, TV of 500, PEEP of 6, and FiO2 of 100%  Was given 2 RBC and 2 FFP before crimson #1  Was given 4 RBC and 2 FFP during crimson #1  Was given 3 FFP during 1st IR procedure  Was given 16 RBC and 11 FFP during crimson #2  Was given 4 RBC and 2 FFP during 2nd IR procedure    Plan:  NPO /NG tube  Continue mechanical ventilation  Continue resuscitation with blood product  Continue IV fluids PRN  Continue IV antibiotics  F/u ID regarding antibiotics  Appreciate IR recommendations  PRN pain meds  PRN anti nausea meds  DVT prophylaxis  Appreciate ICU level of care  Final recommendations pending attending attestation    Subjective/Objective     Subjective: Patient required multiple transfusions of blood product and multiple trips to IR for active bleeding from GDA and splenic artery. Continued to deteriorate throughout the night. See above for blood products given. Will continue with blood resuscitation at this time.     Objective: Blood pressure (!) 89/60, pulse 102, temperature (!) 96.8 °F (36 °C), resp. rate 22, height 5' 11" (1.803 m), weight 110 kg (242 lb 15.2 oz), SpO2 100 %. ,Body mass index is 33.88 kg/m². Intake/Output Summary (Last 24 hours) at 10/19/2023 0638  Last data filed at 10/19/2023 0600  Gross per 24 hour   Intake 8758.68 ml   Output 8080 ml   Net 678.68 ml         Invasive Devices       Central Venous Catheter Line  Duration             CVC Central Lines 10/18/23 Double <1 day              Peripheral Intravenous Line  Duration             Peripheral IV 10/16/23 Dorsal (posterior); Left Forearm 3 days    Peripheral IV 10/16/23 Right Antecubital 2 days    Peripheral IV 10/18/23 Left Antecubital <1 day    Peripheral IV 10/18/23 Right;Ventral (anterior) Forearm <1 day              Arterial Line  Duration             Arterial Line 10/18/23 Femoral <1 day              Line  Duration             Arterial Sheath 5 Fr. Right Femoral <1 day              Drain  Duration             Abscess Drain RUQ 44 days    Abscess Drain LUQ 42 days    Abscess Drain Abdomen 2 days    NG/OG/Enteral Tube Nasogastric Right nare <1 day    Urethral Catheter <1 day              Airway  Duration             ETT  Cuffed 8 mm <1 day                    General: Intubated, mechanically ventilated, sedated  HENT: NCAT MMM  Neck: supple, no JVD  CV: nl rate  Lungs: nl wob. No resp distress  ABD: Soft, significantly distended. See above for drain output and drain character.    Extrem: No CCE  Neuro: AAOx3       Scheduled Meds:  Current Facility-Administered Medications   Medication Dose Route Frequency Provider Last Rate    acetaminophen  650 mg Oral Q6H PRN Kaleigh Valero, DO      chlorhexidine  15 mL Mouth/Throat Q12H [address] Kaleigh Valero, DO      fentaNYL  50 mcg/hr Intravenous Continuous Charles Alonso MD 50 mcg/hr (10/19/23 0534)    HYDROmorphone  0.5 mg Intravenous Q3H PRN Kaeligh Valero, DO      insulin lispro  2-12 Units Subcutaneous 4x Daily Kaleigh Valero, DO      iodixanol  50 mL Intravenous Once in imaging Jogre Stone MD      magnesium sulfate  2 g Intravenous Once Jaylen Pinedo, DO      ondansetron  4 mg Intravenous Q6H PRN Jessica He, DO      oxyCODONE  10 mg Oral Q4H PRN Jessica He, DO      oxyCODONE  5 mg Oral Q4H PRN Kaleigh Valero, DO      pancrelipase (Lip-Prot-Amyl)  6,000 Units Oral TID With Meals Kaleigh Valero, DO      pantoprazole (PROTONIX) 80 mg in sodium chloride 0.9 % 100 mL infusion  8 mg/hr Intravenous Continuous Yudi Akins MD 8 mg/hr (10/19/23 0526)    piperacillin-tazobactam  3.375 g Intravenous Q6H [facility] Stopped (10/18/23 1400)    polyethylene glycol  4,000 mL Oral Once Valente Blanton MD      polyethylene glycol  17 g Oral TID [facility]      potassium chloride  40 mEq Intravenous Once Jaylen Pinedo, DO      propofol  5-50 mcg/kg/min Intravenous Titrated Yudi Akins MD Stopped (10/19/23 0145)    senna-docusate sodium  1 tablet Oral HS [facility], DO       Continuous Infusions:fentaNYL, 50 mcg/hr, Last Rate: 50 mcg/hr (10/19/23 0534)  pantoprazole (PROTONIX) 80 mg in sodium chloride 0.9 % 100 mL infusion, 8 mg/hr, Last Rate: 8 mg/hr (10/19/23 0526)  propofol, 5-50 mcg/kg/min, Last Rate: Stopped (10/19/23 0145)      PRN Meds:.  acetaminophen    HYDROmorphone    iodixanol    ondansetron    oxyCODONE    oxyCODONE      Lab, Imaging and other studies:I have personally reviewed pertinent lab results.         Jere Alegria MD  10/19/2023 6:38 AM

## 2023-10-19 NOTE — BRIEF OP NOTE (RAD/CATH)
INTERVENTIONAL RADIOLOGY PROCEDURE NOTE    Date: 10/19/2023    Procedure:   Procedure Summary       Date:  Room / Location:     Anesthesia Start:  Anesthesia Stop:     Procedure:  Diagnosis:     Scheduled Providers:  Responsible Provider:     Anesthesia Type: Not recorded ASA Status: Not recorded            Preoperative diagnosis:   1. Necrotizing pancreatitis    2. Bacteremia    3. Lower GI bleed    4. Acute blood loss anemia         Postoperative diagnosis: Same. Surgeon: Jorge Stone MD     Assistant: None. No qualified resident was available. Blood loss: See anesthesia note for hemorrhagic output from existing abdominal drain during case. Specimens: None     Findings:     Interval appearance of a brisk proximal splenic artery bleed, directly entering the adjacent large bore drainage tube. Coil embolization of the splenic artery across the site of the bleed using a combination of fibered and packing coils. Bleeding into the drain resolved at completion. Right common femoral access sheath left in placed and sutured to skin. Complications: None immediate.     Anesthesia: general

## 2023-10-20 NOTE — PROGRESS NOTES
Progress Note - General Surgery   GI Resident on [facility]   Alena Hatfield 48 y.o. male MRN: [id number]  [facility]: [id number]    Assessment:  47 yo male who presents with infected necrotizing pancreatitis. -s/p IR draining of retroperitoneal abscess, repositioning of left paracolic drain on 92/39.   -s/p failed cystgastrostomy with GI 10/16  -code crimson #1 10/18 before midnight 10/18  -s/p IR coil embolization of GDA  -code crimson #2 10/19 in AM  -s/p IR coil embolization of splenic artery 10/19    Plan:  Transitioning to comfort care  Appreciate palliative assistance    Subjective/Objective     Subjective: Patient being transitioned to comfort care as he has developed compartment syndrome on top of his acute blood loss anemia. Injuries non survivable at this point. Spoke with family, and they have already had comfort care discussions with the critical care team. Critical care team has proceeded with comfort care measures. Objective:     Blood pressure (!) 73/47, pulse 98, temperature (!) 102.2 °F (39 °C), resp. rate 22, height 5' 11" (1.803 m), weight 110 kg (242 lb 15.2 oz), SpO2 96 %. ,Body mass index is 33.88 kg/m². Intake/Output Summary (Last 24 hours) at 10/20/2023 1119  Last data filed at 10/20/2023 0600  Gross per 24 hour   Intake 23281.12 ml   Output 660 ml   Net 48057.12 ml         Invasive Devices       Central Venous Catheter Line  Duration             CVC Central Lines 10/18/23 Double 1 day              Peripheral Intravenous Line  Duration             Peripheral IV 10/16/23 Dorsal (posterior); Left Forearm 4 days    Peripheral IV 10/16/23 Right Antecubital 3 days    Peripheral IV 10/18/23 Left Antecubital 1 day    Peripheral IV 10/18/23 Right;Ventral (anterior) Forearm 1 day              Arterial Line  Duration             Arterial Line 10/18/23 Femoral 1 day              Drain  Duration             Abscess Drain RUQ 45 days    Abscess Drain LUQ 43 days    Abscess Drain Abdomen 3 days    NG/OG/Enteral Tube Nasogastric Right nare 1 day    Urethral Catheter 1 day                    General: Intubated, mechanically ventilated, sedated  HENT: NCAT MMM  Neck: supple, no JVD  CV: nl rate  Lungs: nl wob. No resp distress  ABD: Soft, significantly distended. Extrem: No CCE  Neuro: AAOx3       Scheduled Meds:  Current Facility-Administered Medications   Medication Dose Route Frequency Provider Last Rate    acetaminophen  650 mg Rectal Q6H PRN Monique Prado MD      chlorhexidine  15 mL Mouth/Throat Q12H [address] Kaleighdania Valero, DO      fentaNYL  50 mcg/hr Intravenous Continuous Oly Saez MD Stopped (10/20/23 0920)    glycopyrrolate  0.1 mg Intravenous Q4H PRN Oly Saez MD      haloperidol lactate  0.5 mg Intravenous Q2H PRN Oly Saez MD      HYDROmorphone  0.5 mg Intravenous Q3H PRN Evan Jules DO      LORazepam  1 mg Intravenous Q10 Min PRN Oly Saez MD      midazolam  2 mg Intravenous Q2H PRN Oly Saez MD      ondansetron  4 mg Intravenous Q6H PRN Kaleigh Valero DO       Continuous Infusions:fentaNYL, 50 mcg/hr, Last Rate: Stopped (10/20/23 0920)      PRN Meds:.  acetaminophen    glycopyrrolate    haloperidol lactate    HYDROmorphone    LORazepam    midazolam    ondansetron      Lab, Imaging and other studies:I have personally reviewed pertinent lab results.         Vega Webster MD  10/20/2023 11:19 AM

## 2023-10-20 NOTE — QUICK NOTE
Stopped by to see the patient, who was surrounded with loving family. Pressor requirements remain high, on max levo and vaso. Rahul not started due to transitions in goals of care. The family let me know that they planned on compassionate liberation once more family arrived later this morning. Physical exam deferred in lieu of emotional support.

## 2023-10-20 NOTE — DEATH NOTE
INPATIENT DEATH NOTE  Jami Theodore 48 y.o. male MRN: [id number]  Unit/Bed#: [facility] 05 Encounter: [id number]             PHYSICAL EXAM:  Unresponsive to noxious stimuli, Spontaneous respirations absent, Breath sounds absent, Carotid pulse absent, Heart sounds absent, Pupillary light reflex absent, and Corneal blink reflex absent    Medical Examiner notification criteria:  NONE APPLICABLE   Medical Examiner's office notified?:  No, does not meet ME notification criteria   Medical Examiner accepted case?:  No  Name of Medical Examiner: Miller Gonzalez         Autopsy Options:  Post-mortem examination declined by next of kin    Primary Service Attending Physician notified?:  yes - Attending:  Prince Maldonado,*  Dr. Yaneli Adler    Physician/Resident responsible for completing Discharge Summary:  Aleena Luque      At the request of patient's wife/NoK, the patient was compassionately extubated per the conversation noted in the ACP note earlier today. All sedatives and paralytics were turned off prior to extubation. Patient was given pain medications and anxiolytics and was extubated. He  a few minutes later with family at bedside.      Yaritza Orr MD

## 2023-10-20 NOTE — ANESTHESIA PREPROCEDURE EVALUATION
Procedure:  IR MESENTERIC/VISCERAL ANGIOGRAM    Relevant Problems   ANESTHESIA (within normal limits)      CARDIO   (+) Primary hypertension      ENDO (within normal limits)      GI/HEPATIC   (+) Necrotizing pancreatitis      /RENAL (within normal limits)      HEMATOLOGY   (+) Acute blood loss anemia   (+) Anemia      MUSCULOSKELETAL (within normal limits)      NEURO/PSYCH (within normal limits)      PULMONARY   (+) Acute respiratory failure (HCC)   (+) Pleural effusion due to pancreatitis   (+) Pleural effusion, left        Physical Exam    Airway  Comment: ETT insitu    TM Distance: >3 FB  Neck ROM: full     Dental   No notable dental hx     Cardiovascular  Rhythm: regular, Rate: abnormal    Pulmonary  Pulmonary exam normal Breath sounds clear to auscultation    Other Findings        Anesthesia Plan  ASA Score- 5 Emergent    Anesthesia Type- general with ASA Monitors. Additional Monitors: central venous line and arterial line. Airway Plan: ETT. Plan Factors-Exercise tolerance (METS): >4 METS. Chart reviewed. EKG reviewed. Imaging results reviewed. Existing labs reviewed. Patient summary reviewed. Induction- intravenous. Postoperative Plan- Plan for postoperative opioid use. Informed Consent- Anesthetic plan and risks discussed with spouse. I personally reviewed this patient with the CRNA. Discussed and agreed on the Anesthesia Plan with the CRNA. Humberto Spencer

## 2023-10-20 NOTE — PROGRESS NOTES
[facility]  Progress Note: Critical Care  Name: Mihai Duckworth 48 y.o. male I MRN: [id number]  Unit/Bed#: MICU 05 I Date of Admission: 10/13/2023   Date of Service: 10/20/2023 I Hospital Day: 7    Assessment/Plan   Plan for transition to comfort care today   Neuro:   Diagnosis: Acute pain/Sedation   Plan:   Gtt: Fentanyl 100 mcg/hr  PRN tylenol 650 q6h-held NPO  PRN dilaudid 0.5 mg IV q3h breakthrough  PRN oxy 5/10 mg mod/severe-hold currently NPO   CAM-ICU precautions         CV:   Diagnosis: Hemorrhagic Shock  Plan:   S/p massive transfusion  protocol resuscitation  MAP >65, consider pressors  Monitor hgb  Diagnosis: Shock secondary to sepsis vs SIRS reaction from acute pancreatitis-resolved   Plan: continue to monitor  Diagnosis: Hypertension (chronic)  no home meds        Pulm:  Diagnosis Mechanical Ventilation   20/500/100%/6  Monitor O2 status, goal SpO2 > 90%  Diagnosis Persistent left pleural effusion and left base atelectasis  CXR as needed        GI:   Diagnosis: Necrotizing pancreatitis  Monitor drain output  LUQ drain 5 cc  RUQ drain 50 cc  L flank RP drain 305 cc  Creon 6000 units TID, currently held  Zosyn   Diagnosis: GI bleed  Plan: GI recs appreciated  Gtt: Protonix 8 mg/hr  Received total 30 PRBC, 25 FFP, 1 plt   CT high volume bleeding scan: Blush of contrast at region of pancreatic head measuring 3 cm, fluid collection extends from dorsal aspect of pancreatic neck posteriority along liver margin 5.4 x 3 cm. Moderate volume ascites. Fluid along the distal end of left lateral approach catheter 4.0 x 2.1 cm. Active bleeding in regional of pancreatic head.    S/p IR GDA embolization on 10/19   10/19 angio with brisk bleeding from splenic artery, s/p coil  PRN zofran for nausea             :   Diagnosis: Saunders   Monitor  overnight   Trend Cr   1.04        F/E/N:   F:/  E: monitor and replace PRN  N: NPO        Heme/Onc:   Acute blood loss anemia  Multiple episodes of BRB per rectum and splenic artery bleeding on 10/19  S/p IR GDA embolization on 10/19   S/p IR splenic artery embolization 10/19   Total 30 PRBC, 25 FFP, 1 plt       Endo:   Diagnosis: hyperglycemia  Plan: SSI  BG goal 140-180        ID:   Diagnosis: Necrotizing pancreatitis, febrile-resolved , septic shock-resolved   Plan: Zosyn  Fever/WBC curve  Appreciate ID recs   Bcx- 10/16 no growth at 72 hours   Fluid cx- E. Coli, Klebsiella aerogenes, pseudomonas and Providencia rettgeri            MSK/Skin:   Local wound care PRN  PT/OT when able  Frequent repositioning and offloading             Disposition: Critical care     ICU Core Measures     Vented Patient  VAP Bundle  VAP bundle ordered     A: Assess, Prevent, and Manage Pain Has pain been assessed? Yes  Need for changes to pain regimen? No   B: Both Spontaneous Awakening Trials (SATs) and Spontaneous Breathing Trials (SBTs) Plan to perform spontaneous awakening trial today? Yes   Plan to perform spontaneous breathing trial today? Yes   Obvious barriers to extubation? Yes   C: Choice of Sedation RASS Goal: 0 Alert and Calm  Need for changes to sedation or analgesia regimen? No   D: Delirium CAM-ICU: unable to perform, sedated    E: Early Mobility  Plan for early mobility? No   F: Family Engagement Plan for family engagement today? Yes       Antibiotic Review: Patient on appropriate coverage based on culture data. and Awaiting culture results. Review of Invasive Devices: Nicky Plan: Continue for accurate I/O monitoring for 48 hours  Central access plan: May require pressors, if so will need to replace current central access.    Rody Plan: Keep arterial line for hemodynamic monitoring    Prophylaxis:  VTE    Stress Ulcer  covered bypantoprazole (PROTONIX) 80 mg in sodium chloride 0.9 % 100 mL infusion [[id number]]          Subjective   HPI/24hr events: Rylee Mejia is a 48 y.o. who presents to ICU s/p rapid response for hemorrhagic shock, acute GI bleed and acute blood loss anemia. Given IVF bolus, stat labs and iSTAT prior to transfer to ICU. BRB per rectum, GI to see patient with plan to scope in AM. Patient given 2 u PRBC and 2 FFP upon arrival to MICU. Overnight:    Patient had no re-bleeding episodes overnight. His UOP has decreased with no response to bumex diuretics. Pressors adjustments made as family was making their way in. Per [address] [place] discussions yesterday, plan for comfort care today. Review of Systems   Unable to perform ROS: Intubated          Objective                            Vitals I/O      Most Recent Min/Max in 24hrs   Temp (!) 102.2 °F (39 °C) Temp  Min: 96.1 °F (35.6 °C)  Max: 102.2 °F (39 °C)   Pulse 98 Pulse  Min: 92  Max: 127   Resp 22 Resp  Min: 14  Max: 22   BP (!) 73/47 BP  Min: 73/47  Max: 108/61   O2 Sat 96 % SpO2  Min: 92 %  Max: 100 %      Intake/Output Summary (Last 24 hours) at 10/20/2023 0623  Last data filed at 10/20/2023 0540  Gross per 24 hour   Intake 41909.36 ml   Output 830 ml   Net 9940.36 ml           Diet NPO     Invasive Monitoring Physical exam   Arterial Line  Rody /52  Arterial Line BP  Min: 86/56  Max: 122/66   MAP (!) 62 mmHg  Arterial Line MAP (mmHg)  Min: 58 mmHg  Max: 84 mmHg    Physical Exam  Eyes:      Conjunctiva/sclera: Conjunctivae normal.      Pupils: Pupils are equal, round, and reactive to light. Skin:     General: Skin is cool and dry. Coloration: Skin is pale. Comments: Multiple wounds to dorsum of b/l feet       HENT:      Head: Normocephalic and atraumatic. Mouth/Throat:      Comments: Intubated   Cardiovascular:      Rate and Rhythm: Normal rate and regular rhythm. Heart sounds: No murmur heard. Musculoskeletal:      Right lower leg: 3+ Edema present. Left lower leg: 3+ Edema present. Comments: R fem CVC, right fem 5fr, left fem art line   Abdominal:      General: There is distension. Comments: Abdomen tense. DONNY drain LUQ, left lateral abdomen gray, particulates, left RP drain bloody drainage    Constitutional:       Appearance: He is ill-appearing. Pulmonary:      Effort: Pulmonary effort is normal.      Breath sounds: Examination of the right-lower field reveals rales. Examination of the left-lower field reveals rales. Rales present.    Neurological:      Comments: Intubated and sedated    Genitourinary/Anorectal:  Saunders         Diagnostic Studies      EKG: NSR on monitor   Imaging:  I have personally reviewed pertinent films in PACS     Medications:  Scheduled PRN   artificial tear, , Q2H  chlorhexidine, 15 mL, Q12H ALEX  piperacillin-tazobactam, 3.375 g, Q6H      acetaminophen, 650 mg, Q6H PRN  HYDROmorphone, 0.5 mg, Q3H PRN  insulin lispro, 2-12 Units, Q6H PRN  iodixanol, 50 mL, Once in imaging  midazolam, 2 mg, Q2H PRN  ondansetron, 4 mg, Q6H PRN       Continuous    cisatracurium (NIMBEX) in 0.9% sodium chloride infusion, 0.1-5 mcg/kg/min, Last Rate: Stopped (10/20/23 0328)  fentaNYL, 100 mcg/hr, Last Rate: 100 mcg/hr (10/20/23 0240)  norepinephrine, 1-30 mcg/min, Last Rate: 30 mcg/min (10/20/23 0306)  pantoprazole (PROTONIX) 80 mg in sodium chloride 0.9 % 100 mL infusion, 8 mg/hr, Last Rate: 8 mg/hr (10/20/23 0045)  phenylephine,  mcg/min  propofol, 5-50 mcg/kg/min, Last Rate: 50 mcg/kg/min (10/20/23 7612)  sodium bicarbonate 150 mEq in dextrose 5 % 1,000 mL infusion, 50 mL/hr, Last Rate: Stopped (10/20/23 0436)  vasopressin, 0.04 Units/min, Last Rate: 0.04 Units/min (10/20/23 0664)         Labs:    CBC    Recent Labs     10/19/23  1102 10/19/23  1502   WBC 15.70* 24.61*   HGB 10.5* 14.3   HCT 30.7* 40.1   * 130*       BMP    Recent Labs     10/19/23  1500 10/19/23  2259   SODIUM 142 142   K 3.9 4.0    108   CO2 24 21   AGAP 11 13   BUN 16 17   CREATININE 0.96 1.09   CALCIUM 9.9 9.3         Coags    Recent Labs     10/18/23  1817   INR 1.39*          Additional Electrolytes  Recent Labs 10/19/23  0519 10/19/23  1055 10/19/23  1500 10/19/23  1501   MG 1.9  --  2.3  --    PHOS 5.4*  --  6.0*  --    CAIONIZED 1.13 1.19  --  1.25            Blood Gas    Recent Labs     10/19/23  2259   PHART 7.286*   MAS9GFP 41.0   PO2ART 106.7   [id number] 19.1*   BEART -7.1   SOURCE Line, Arterial       Recent Labs     10/19/23  2259   SOURCE Line, Arterial      LFTs  Recent Labs     10/19/23  1500 10/19/23  2259   ALT 20 18   AST 25 19   ALKPHOS 101 103   ALB 3.3* 3.1*   TBILI 5.35* 4.25*         Infectious  No recent results  Glucose  Recent Labs     10/19/23  0519 10/19/23  1102 10/19/23  1500 10/19/23  2259   GLUC 191* 181* 189* 185*                     Mendoza Warren MD

## 2023-10-20 NOTE — PROGRESS NOTES
Patient's nurse call for transition to comfort care and support to family. Responded to family request for last rite. Patient is Orthodox and provided prayer with family at bed side. Patient's wife expressed her appreciates and thanked for providing prayer and supporting them in this hard time.    10/20/23 0700   Clinical Encounter Type   Visited With Patient and family together;Health care provider   Routine Visit Introduction   Crisis Visit Patient actively dying   Referral From Nurse   Referral To [address] Needs Prayer

## 2023-10-20 NOTE — QUICK NOTE
Patient is now level 4 comfort care. Recommend to continue with plan of care in place as patient tolerates. Wound care will sign off at this time. Please re-consult if needed or if goals of care change.

## 2023-10-20 NOTE — ACP (ADVANCE CARE PLANNING)
Critical Care Advanced Care Planning Note  Matt Moss 48 y.o. male MRN: [id number]  Unit/Bed#: MICU 05 Encounter: [id number]    Matt Moss is a 48 y.o. male requiring critical care evaluation and advanced care planning. The patient has chronic comorbidities, including but not limited to pancreatitis, hypertension, portal venous thrombosis which is now further complicated by the following acute conditions: necrotizing pancreatitis, retroperitoneal hemorrhage, acute respiratory failure. Due to the severity of the patient's acute condition and/or the extent of chronic conditions, additional conversations pertaining to advanced care planning were required. Today's discussion, which was held in a face-to-face meeting, included  his wife, Jorge Luis Makc, and sister , and it was established that all stake holders understood the rationale for the advanced care planning. The patient was unable to participate in the discussion due to mechanical ventilation, deep sedation, encephalopathy. Summary of Discussion:  Patient had acute decompensation overnight requiring increasing pressors. The patient's wife had already stated last night that she would not want a big surgery for the patient or decompression of his abdomen if required. They were pursuing medical management of his retroperitoneal hemorrhage and necrotizing pancreatitis but in light of the increasing pressor requirements and profound hypotension, they returned to the hospital overnight to be with the patient. They had initially planned to withdraw care and pursue compassionate extubation at that time, but then decided to wait until this morning when the rest of the family could be at bedside.  They state that the patient would not want prolonged mechanical ventilation and even over this past weekend had expressed that he no longer wanted aggressive measures for his pancreatitis as he was tired and was "ready to go." As such, all invasive procedures have, at this time, been stopped. The paralytic was turned off at 03:30 am and the rest of sedation will be weaned when the rest of the family arrives. The plan is to make him as comfortable as possible and to compassionately extubate him this morning with family at bedside. Total time spent, (60) minutes.        CODE STATUS: COMFORT CARE - Level 4  POA:    POLST:      SIGNATURE: Flaquito Santacruz MD  DATE: October 20, 2023  TIME: 6:37 AM Yes

## 2023-10-20 NOTE — ANESTHESIA PREPROCEDURE EVALUATION
Procedure:  IR MESENTERIC/VISCERAL ANGIOGRAM    Relevant Problems   ANESTHESIA (within normal limits)      CARDIO   (+) Portal vein thrombosis   (+) Primary hypertension   (+) Superior mesenteric artery thrombosis (HCC)      ENDO (within normal limits)      GI/HEPATIC   (+) Necrotizing pancreatitis      /RENAL (within normal limits)      HEMATOLOGY   (+) Acute blood loss anemia   (+) Anemia      MUSCULOSKELETAL (within normal limits)      NEURO/PSYCH (within normal limits)      PULMONARY   (+) Acute respiratory failure (HCC)   (+) Pleural effusion due to pancreatitis   (+) Pleural effusion, left      Other   (+) Ascites   (+) Bacteremia   (+) Hemorrhagic shock (HCC)   (+) Intra-abdominal bleeding   (+) Leukocytosis   (+) Severe protein-calorie malnutrition (HCC)        Physical Exam    Airway  Comment: ETT insitu    TM Distance: >3 FB  Neck ROM: full     Dental   No notable dental hx     Cardiovascular  Rhythm: regular, Rate: abnormal    Pulmonary  Pulmonary exam normal Breath sounds clear to auscultation    Other Findings        Anesthesia Plan  ASA Score- 5 Emergent    Anesthesia Type- general with ASA Monitors. Additional Monitors: central venous line and arterial line. Airway Plan: ETT. Plan Factors-Exercise tolerance (METS): >4 METS. Chart reviewed. EKG reviewed. Imaging results reviewed. Existing labs reviewed. Patient summary reviewed. Induction- intravenous. Postoperative Plan- Plan for postoperative opioid use. Informed Consent- Anesthetic plan and risks discussed with spouse. I personally reviewed this patient with the CRNA. Discussed and agreed on the Anesthesia Plan with the CRNA. Jamie Avila

## 2023-10-20 NOTE — RESPIRATORY THERAPY NOTE
Resp Therapy    10/20/23 0926   Respiratory Assessment   Resp Comments Pt extubated to comfort care per order.    Vent Information   Ventilator End Yes   Daily Screen   Patient safety screen outcome: Failed   IHI Ventilator Associated Pneumonia Bundle   Daily Assessment of Readiness to Extubate Yes   Head of Bed Elevated HOB 30

## 2023-10-20 NOTE — CLINICAL RISK MANAGEMENT
Progress Note - Death in [address] [place] [address] [place] 48 y.o. male MRN: [id number]  Unit/Bed#: MICU 05 Encounter: [id number]      Patient  within 24 hours of restraint  with Soft restraint right wrist and Soft restraint left wrist. Death unrelated to use of restraints. This situation was tracked internally. CMS and PA-SHELBY  notification not required.

## 2023-10-21 LAB
ABO GROUP BLD BPU: NORMAL
ABO GROUP BLD BPU: NORMAL
BACTERIA BLD CULT: NORMAL
BACTERIA BLD CULT: NORMAL
BPU ID: NORMAL
BPU ID: NORMAL
CROSSMATCH: NORMAL
CROSSMATCH: NORMAL
UNIT DISPENSE STATUS: NORMAL
UNIT DISPENSE STATUS: NORMAL
UNIT PRODUCT CODE: NORMAL
UNIT PRODUCT CODE: NORMAL
UNIT PRODUCT VOLUME: 300 ML
UNIT PRODUCT VOLUME: 300 ML
UNIT RH: NORMAL
UNIT RH: NORMAL

## 2023-10-23 NOTE — CLINICAL RISK MANAGEMENT
Progress Note - Death in Restraints   Dionna Franco 48 y.o. male MRN: [id number]  Unit/Bed#: MICU 05 Encounter: [id number]      Patient  while restrained  with Soft restraint right wrist and Soft restraint left wrist. Death unrelated to use of restraints. This situation was tracked internally. CMS and PA-SHELBY  notification not required.

## 2023-10-23 NOTE — UTILIZATION REVIEW
NOTIFICATION OF ADMISSION DISCHARGE   This is a Notification of Discharge from [facility]ST[facility] Chris christiano. Please be advised that this patient has been discharge from our facility. Below you will find the admission and discharge date and time including the patient’s disposition. UTILIZATION REVIEW CONTACT:  Jackie Todd  Utilization   Network Utilization Review Department  Phone: [phone] x carefully listen to the prompts. All voicemails are confidential.  Email: [email or web address]     ADMISSION INFORMATION  PRESENTATION DATE: 10/13/2023 10:34 PM  OBERVATION ADMISSION DATE:   INPATIENT ADMISSION DATE: 10/13/23 11:27 PM   DISCHARGE DATE: 10/20/2023 12:04 PM   DISPOSITION:    Network Utilization Review Department  ATTENTION: Please call with any questions or concerns to [phone] and carefully listen to the prompts so that you are directed to the right person. All voicemails are confidential.   For Discharge needs, contact Care Management DC Support Team at [phone] opt. [phone]  Send all requests for admission clinical reviews, approved or denied determinations and any other requests to dedicated fax number below belonging to the campus where the patient is receiving treatment.  List of dedicated fax numbers for the Facilities:  [place] DENIALS (Administrative/Medical Necessity) [phone]   DISCHARGE SUPPORT TEAM (Network) [phone]   [address] [facility] (Maternity/NICU/Pediatrics) [phone]   [address] [place] [address] Drive [phone]   [facility] [address] [facility] [phone] [address] [facility] [address] [place] [address] [phone]   [facility] [address] [facility],[address]Th Floor [address] [facility]  [address]  [phone]

## 2023-11-12 NOTE — ED NOTES
Patient transported to [address] [place] [address]  10/13/23 2883 (0) Performs both tasks correctly
